# Patient Record
Sex: FEMALE | Race: WHITE | Employment: OTHER | URBAN - METROPOLITAN AREA
[De-identification: names, ages, dates, MRNs, and addresses within clinical notes are randomized per-mention and may not be internally consistent; named-entity substitution may affect disease eponyms.]

---

## 2022-03-16 ENCOUNTER — HOSPITAL ENCOUNTER (INPATIENT)
Age: 62
LOS: 2 days | Discharge: HOME | DRG: 193 | End: 2022-03-18
Attending: EMERGENCY MEDICINE | Admitting: INTERNAL MEDICINE
Payer: MEDICARE

## 2022-03-16 ENCOUNTER — APPOINTMENT (OUTPATIENT)
Dept: GENERAL RADIOLOGY | Age: 62
DRG: 193 | End: 2022-03-16
Payer: MEDICARE

## 2022-03-16 DIAGNOSIS — J44.1 COPD EXACERBATION (HCC): Primary | ICD-10-CM

## 2022-03-16 DIAGNOSIS — J18.9 MULTIFOCAL PNEUMONIA: ICD-10-CM

## 2022-03-16 DIAGNOSIS — J96.21 ACUTE ON CHRONIC RESPIRATORY FAILURE WITH HYPOXIA (HCC): ICD-10-CM

## 2022-03-16 PROBLEM — J15.9 BACTERIAL PNEUMONIA: Status: ACTIVE | Noted: 2022-03-16

## 2022-03-16 LAB
A/G RATIO: 1.2 (ref 1.1–2.2)
ALBUMIN SERPL-MCNC: 3.8 G/DL (ref 3.4–5)
ALP BLD-CCNC: 114 U/L (ref 40–129)
ALT SERPL-CCNC: 17 U/L (ref 10–40)
ANION GAP SERPL CALCULATED.3IONS-SCNC: 14 MMOL/L (ref 3–16)
ANISOCYTOSIS: ABNORMAL
AST SERPL-CCNC: 26 U/L (ref 15–37)
BANDED NEUTROPHILS RELATIVE PERCENT: 8 % (ref 0–7)
BASE EXCESS VENOUS: 4.5 MMOL/L (ref -3–3)
BASOPHILS ABSOLUTE: 0 K/UL (ref 0–0.2)
BASOPHILS RELATIVE PERCENT: 0 %
BILIRUB SERPL-MCNC: 0.8 MG/DL (ref 0–1)
BUN BLDV-MCNC: 20 MG/DL (ref 7–20)
CALCIUM SERPL-MCNC: 8.9 MG/DL (ref 8.3–10.6)
CARBOXYHEMOGLOBIN: 7.2 % (ref 0–1.5)
CHLORIDE BLD-SCNC: 98 MMOL/L (ref 99–110)
CO2: 26 MMOL/L (ref 21–32)
CREAT SERPL-MCNC: 0.7 MG/DL (ref 0.6–1.2)
EOSINOPHILS ABSOLUTE: 0 K/UL (ref 0–0.6)
EOSINOPHILS RELATIVE PERCENT: 0 %
GFR AFRICAN AMERICAN: >60
GFR NON-AFRICAN AMERICAN: >60
GLUCOSE BLD-MCNC: 125 MG/DL (ref 70–99)
HCO3 VENOUS: 29.2 MMOL/L (ref 23–29)
HCT VFR BLD CALC: 44.7 % (ref 36–48)
HEMOGLOBIN: 14.2 G/DL (ref 12–16)
LYMPHOCYTES ABSOLUTE: 2.8 K/UL (ref 1–5.1)
LYMPHOCYTES RELATIVE PERCENT: 15 %
MCH RBC QN AUTO: 28.5 PG (ref 26–34)
MCHC RBC AUTO-ENTMCNC: 31.7 G/DL (ref 31–36)
MCV RBC AUTO: 89.7 FL (ref 80–100)
METHEMOGLOBIN VENOUS: 0 %
MONOCYTES ABSOLUTE: 0.9 K/UL (ref 0–1.3)
MONOCYTES RELATIVE PERCENT: 5 %
NEUTROPHILS ABSOLUTE: 14.9 K/UL (ref 1.7–7.7)
NEUTROPHILS RELATIVE PERCENT: 72 %
O2 CONTENT, VEN: 20 VOL %
O2 SAT, VEN: 99 %
O2 THERAPY: ABNORMAL
PCO2, VEN: 42.8 MMHG (ref 40–50)
PDW BLD-RTO: 21.3 % (ref 12.4–15.4)
PH VENOUS: 7.44 (ref 7.35–7.45)
PLATELET # BLD: 172 K/UL (ref 135–450)
PLATELET SLIDE REVIEW: ADEQUATE
PMV BLD AUTO: 8.1 FL (ref 5–10.5)
PO2, VEN: 121 MMHG (ref 25–40)
POLYCHROMASIA: ABNORMAL
POTASSIUM SERPL-SCNC: 4.4 MMOL/L (ref 3.5–5.1)
PRO-BNP: 637 PG/ML (ref 0–124)
RBC # BLD: 4.99 M/UL (ref 4–5.2)
SLIDE REVIEW: ABNORMAL
SODIUM BLD-SCNC: 138 MMOL/L (ref 136–145)
TCO2 CALC VENOUS: 69 MMOL/L
TOTAL PROTEIN: 7.1 G/DL (ref 6.4–8.2)
TROPONIN: <0.01 NG/ML
WBC # BLD: 18.6 K/UL (ref 4–11)

## 2022-03-16 PROCEDURE — 99284 EMERGENCY DEPT VISIT MOD MDM: CPT

## 2022-03-16 PROCEDURE — 84484 ASSAY OF TROPONIN QUANT: CPT

## 2022-03-16 PROCEDURE — 96375 TX/PRO/DX INJ NEW DRUG ADDON: CPT

## 2022-03-16 PROCEDURE — 71045 X-RAY EXAM CHEST 1 VIEW: CPT

## 2022-03-16 PROCEDURE — 85025 COMPLETE CBC W/AUTO DIFF WBC: CPT

## 2022-03-16 PROCEDURE — 83880 ASSAY OF NATRIURETIC PEPTIDE: CPT

## 2022-03-16 PROCEDURE — 6370000000 HC RX 637 (ALT 250 FOR IP): Performed by: PHYSICIAN ASSISTANT

## 2022-03-16 PROCEDURE — 94640 AIRWAY INHALATION TREATMENT: CPT

## 2022-03-16 PROCEDURE — 82803 BLOOD GASES ANY COMBINATION: CPT

## 2022-03-16 PROCEDURE — 1200000000 HC SEMI PRIVATE

## 2022-03-16 PROCEDURE — U0003 INFECTIOUS AGENT DETECTION BY NUCLEIC ACID (DNA OR RNA); SEVERE ACUTE RESPIRATORY SYNDROME CORONAVIRUS 2 (SARS-COV-2) (CORONAVIRUS DISEASE [COVID-19]), AMPLIFIED PROBE TECHNIQUE, MAKING USE OF HIGH THROUGHPUT TECHNOLOGIES AS DESCRIBED BY CMS-2020-01-R: HCPCS

## 2022-03-16 PROCEDURE — 2580000003 HC RX 258: Performed by: PHYSICIAN ASSISTANT

## 2022-03-16 PROCEDURE — 96374 THER/PROPH/DIAG INJ IV PUSH: CPT

## 2022-03-16 PROCEDURE — U0005 INFEC AGEN DETEC AMPLI PROBE: HCPCS

## 2022-03-16 PROCEDURE — 93005 ELECTROCARDIOGRAM TRACING: CPT | Performed by: PHYSICIAN ASSISTANT

## 2022-03-16 PROCEDURE — 36415 COLL VENOUS BLD VENIPUNCTURE: CPT

## 2022-03-16 PROCEDURE — 6360000002 HC RX W HCPCS: Performed by: PHYSICIAN ASSISTANT

## 2022-03-16 PROCEDURE — 80053 COMPREHEN METABOLIC PANEL: CPT

## 2022-03-16 RX ORDER — ALBUTEROL SULFATE 2.5 MG/3ML
SOLUTION RESPIRATORY (INHALATION)
COMMUNITY
Start: 2022-03-10

## 2022-03-16 RX ORDER — ROSUVASTATIN CALCIUM 5 MG/1
TABLET, COATED ORAL
COMMUNITY
Start: 2021-04-29

## 2022-03-16 RX ORDER — METHYLPREDNISOLONE SODIUM SUCCINATE 125 MG/2ML
125 INJECTION, POWDER, LYOPHILIZED, FOR SOLUTION INTRAMUSCULAR; INTRAVENOUS ONCE
Status: COMPLETED | OUTPATIENT
Start: 2022-03-16 | End: 2022-03-16

## 2022-03-16 RX ORDER — ESCITALOPRAM OXALATE 20 MG/1
TABLET ORAL
COMMUNITY
Start: 2021-04-29

## 2022-03-16 RX ORDER — LISINOPRIL 2.5 MG/1
2.5 TABLET ORAL DAILY
COMMUNITY
Start: 2022-02-15

## 2022-03-16 RX ORDER — ONDANSETRON 4 MG/1
4 TABLET, FILM COATED ORAL EVERY 8 HOURS PRN
COMMUNITY
Start: 2021-04-29

## 2022-03-16 RX ORDER — IPRATROPIUM BROMIDE AND ALBUTEROL SULFATE 2.5; .5 MG/3ML; MG/3ML
1 SOLUTION RESPIRATORY (INHALATION) ONCE
Status: COMPLETED | OUTPATIENT
Start: 2022-03-16 | End: 2022-03-16

## 2022-03-16 RX ORDER — ALBUTEROL SULFATE 2.5 MG/3ML
SOLUTION RESPIRATORY (INHALATION)
COMMUNITY
Start: 2022-02-15

## 2022-03-16 RX ORDER — IPRATROPIUM BROMIDE AND ALBUTEROL SULFATE 2.5; .5 MG/3ML; MG/3ML
SOLUTION RESPIRATORY (INHALATION)
Status: COMPLETED
Start: 2022-03-16 | End: 2022-03-17

## 2022-03-16 RX ORDER — OMEPRAZOLE 40 MG/1
CAPSULE, DELAYED RELEASE ORAL
Status: ON HOLD | COMMUNITY
Start: 2021-04-29 | End: 2022-03-18 | Stop reason: SDUPTHER

## 2022-03-16 RX ORDER — CYCLOBENZAPRINE HCL 10 MG
TABLET ORAL
COMMUNITY
Start: 2021-04-29

## 2022-03-16 RX ORDER — FUROSEMIDE 10 MG/ML
20 INJECTION INTRAMUSCULAR; INTRAVENOUS ONCE
Status: DISCONTINUED | OUTPATIENT
Start: 2022-03-16 | End: 2022-03-18 | Stop reason: HOSPADM

## 2022-03-16 RX ORDER — GABAPENTIN 600 MG/1
TABLET ORAL
COMMUNITY
Start: 2021-07-06

## 2022-03-16 RX ADMIN — Medication 1000 MG: at 23:21

## 2022-03-16 RX ADMIN — METHYLPREDNISOLONE SODIUM SUCCINATE 125 MG: 125 INJECTION, POWDER, FOR SOLUTION INTRAMUSCULAR; INTRAVENOUS at 22:25

## 2022-03-16 RX ADMIN — IPRATROPIUM BROMIDE AND ALBUTEROL SULFATE 1 AMPULE: .5; 3 SOLUTION RESPIRATORY (INHALATION) at 21:43

## 2022-03-16 RX ADMIN — AZITHROMYCIN MONOHYDRATE 500 MG: 500 INJECTION, POWDER, LYOPHILIZED, FOR SOLUTION INTRAVENOUS at 23:24

## 2022-03-16 ASSESSMENT — ENCOUNTER SYMPTOMS
SHORTNESS OF BREATH: 1
DIARRHEA: 0
CHEST TIGHTNESS: 1
RHINORRHEA: 0
COUGH: 0
NAUSEA: 0
WHEEZING: 1
VOMITING: 0
ABDOMINAL PAIN: 0

## 2022-03-17 LAB
BASOPHILS ABSOLUTE: 0 K/UL (ref 0–0.2)
BASOPHILS RELATIVE PERCENT: 0.1 %
EKG ATRIAL RATE: 100 BPM
EKG DIAGNOSIS: NORMAL
EKG P AXIS: 51 DEGREES
EKG P-R INTERVAL: 152 MS
EKG Q-T INTERVAL: 346 MS
EKG QRS DURATION: 72 MS
EKG QTC CALCULATION (BAZETT): 446 MS
EKG R AXIS: 76 DEGREES
EKG T AXIS: 73 DEGREES
EKG VENTRICULAR RATE: 100 BPM
EOSINOPHILS ABSOLUTE: 0 K/UL (ref 0–0.6)
EOSINOPHILS RELATIVE PERCENT: 0 %
HCT VFR BLD CALC: 43 % (ref 36–48)
HEMOGLOBIN: 13.7 G/DL (ref 12–16)
LYMPHOCYTES ABSOLUTE: 0.3 K/UL (ref 1–5.1)
LYMPHOCYTES RELATIVE PERCENT: 2.5 %
MCH RBC QN AUTO: 29.1 PG (ref 26–34)
MCHC RBC AUTO-ENTMCNC: 31.9 G/DL (ref 31–36)
MCV RBC AUTO: 91.3 FL (ref 80–100)
MONOCYTES ABSOLUTE: 0.1 K/UL (ref 0–1.3)
MONOCYTES RELATIVE PERCENT: 0.8 %
NEUTROPHILS ABSOLUTE: 12.7 K/UL (ref 1.7–7.7)
NEUTROPHILS RELATIVE PERCENT: 96.6 %
PDW BLD-RTO: 21.2 % (ref 12.4–15.4)
PLATELET # BLD: 148 K/UL (ref 135–450)
PMV BLD AUTO: 8.5 FL (ref 5–10.5)
RBC # BLD: 4.71 M/UL (ref 4–5.2)
TROPONIN: <0.01 NG/ML
TROPONIN: <0.01 NG/ML
WBC # BLD: 13.2 K/UL (ref 4–11)

## 2022-03-17 PROCEDURE — G0378 HOSPITAL OBSERVATION PER HR: HCPCS

## 2022-03-17 PROCEDURE — 1200000000 HC SEMI PRIVATE

## 2022-03-17 PROCEDURE — 94761 N-INVAS EAR/PLS OXIMETRY MLT: CPT

## 2022-03-17 PROCEDURE — 6370000000 HC RX 637 (ALT 250 FOR IP): Performed by: INTERNAL MEDICINE

## 2022-03-17 PROCEDURE — 84484 ASSAY OF TROPONIN QUANT: CPT

## 2022-03-17 PROCEDURE — 92610 EVALUATE SWALLOWING FUNCTION: CPT

## 2022-03-17 PROCEDURE — 2700000000 HC OXYGEN THERAPY PER DAY

## 2022-03-17 PROCEDURE — 94640 AIRWAY INHALATION TREATMENT: CPT

## 2022-03-17 PROCEDURE — 96375 TX/PRO/DX INJ NEW DRUG ADDON: CPT

## 2022-03-17 PROCEDURE — 96376 TX/PRO/DX INJ SAME DRUG ADON: CPT

## 2022-03-17 PROCEDURE — 6370000000 HC RX 637 (ALT 250 FOR IP)

## 2022-03-17 PROCEDURE — 6360000002 HC RX W HCPCS: Performed by: INTERNAL MEDICINE

## 2022-03-17 PROCEDURE — 92526 ORAL FUNCTION THERAPY: CPT

## 2022-03-17 PROCEDURE — 36415 COLL VENOUS BLD VENIPUNCTURE: CPT

## 2022-03-17 PROCEDURE — 87205 SMEAR GRAM STAIN: CPT

## 2022-03-17 PROCEDURE — 2580000003 HC RX 258: Performed by: INTERNAL MEDICINE

## 2022-03-17 PROCEDURE — 93010 ELECTROCARDIOGRAM REPORT: CPT | Performed by: INTERNAL MEDICINE

## 2022-03-17 PROCEDURE — 85025 COMPLETE CBC W/AUTO DIFF WBC: CPT

## 2022-03-17 PROCEDURE — 96372 THER/PROPH/DIAG INJ SC/IM: CPT

## 2022-03-17 PROCEDURE — 87070 CULTURE OTHR SPECIMN AEROBIC: CPT

## 2022-03-17 RX ORDER — ONDANSETRON 4 MG/1
4 TABLET, ORALLY DISINTEGRATING ORAL EVERY 8 HOURS PRN
Status: DISCONTINUED | OUTPATIENT
Start: 2022-03-17 | End: 2022-03-18 | Stop reason: HOSPADM

## 2022-03-17 RX ORDER — PANTOPRAZOLE SODIUM 40 MG/1
40 TABLET, DELAYED RELEASE ORAL
Status: DISCONTINUED | OUTPATIENT
Start: 2022-03-17 | End: 2022-03-18 | Stop reason: HOSPADM

## 2022-03-17 RX ORDER — ACETAMINOPHEN 325 MG/1
650 TABLET ORAL EVERY 6 HOURS PRN
Status: DISCONTINUED | OUTPATIENT
Start: 2022-03-17 | End: 2022-03-18 | Stop reason: HOSPADM

## 2022-03-17 RX ORDER — ROSUVASTATIN CALCIUM 10 MG/1
5 TABLET, COATED ORAL NIGHTLY
Status: DISCONTINUED | OUTPATIENT
Start: 2022-03-17 | End: 2022-03-18 | Stop reason: HOSPADM

## 2022-03-17 RX ORDER — FUROSEMIDE 10 MG/ML
20 INJECTION INTRAMUSCULAR; INTRAVENOUS 2 TIMES DAILY
Status: DISCONTINUED | OUTPATIENT
Start: 2022-03-17 | End: 2022-03-18 | Stop reason: HOSPADM

## 2022-03-17 RX ORDER — POTASSIUM CHLORIDE 20 MEQ/1
40 TABLET, EXTENDED RELEASE ORAL PRN
Status: DISCONTINUED | OUTPATIENT
Start: 2022-03-17 | End: 2022-03-18 | Stop reason: HOSPADM

## 2022-03-17 RX ORDER — ONDANSETRON 2 MG/ML
4 INJECTION INTRAMUSCULAR; INTRAVENOUS EVERY 6 HOURS PRN
Status: DISCONTINUED | OUTPATIENT
Start: 2022-03-17 | End: 2022-03-18 | Stop reason: HOSPADM

## 2022-03-17 RX ORDER — SODIUM CHLORIDE 9 MG/ML
25 INJECTION, SOLUTION INTRAVENOUS PRN
Status: DISCONTINUED | OUTPATIENT
Start: 2022-03-17 | End: 2022-03-18 | Stop reason: HOSPADM

## 2022-03-17 RX ORDER — PREDNISONE 20 MG/1
40 TABLET ORAL DAILY
Status: DISCONTINUED | OUTPATIENT
Start: 2022-03-17 | End: 2022-03-18 | Stop reason: HOSPADM

## 2022-03-17 RX ORDER — ACETAMINOPHEN 650 MG/1
650 SUPPOSITORY RECTAL EVERY 6 HOURS PRN
Status: DISCONTINUED | OUTPATIENT
Start: 2022-03-17 | End: 2022-03-18 | Stop reason: HOSPADM

## 2022-03-17 RX ORDER — CYCLOBENZAPRINE HCL 10 MG
10 TABLET ORAL NIGHTLY PRN
Status: DISCONTINUED | OUTPATIENT
Start: 2022-03-17 | End: 2022-03-18 | Stop reason: HOSPADM

## 2022-03-17 RX ORDER — POTASSIUM CHLORIDE 7.45 MG/ML
10 INJECTION INTRAVENOUS PRN
Status: DISCONTINUED | OUTPATIENT
Start: 2022-03-17 | End: 2022-03-18 | Stop reason: HOSPADM

## 2022-03-17 RX ORDER — GABAPENTIN 300 MG/1
600 CAPSULE ORAL 3 TIMES DAILY
Status: DISCONTINUED | OUTPATIENT
Start: 2022-03-17 | End: 2022-03-18 | Stop reason: HOSPADM

## 2022-03-17 RX ORDER — SODIUM CHLORIDE 0.9 % (FLUSH) 0.9 %
10 SYRINGE (ML) INJECTION EVERY 12 HOURS SCHEDULED
Status: DISCONTINUED | OUTPATIENT
Start: 2022-03-17 | End: 2022-03-18 | Stop reason: HOSPADM

## 2022-03-17 RX ORDER — ALBUTEROL SULFATE 2.5 MG/3ML
2.5 SOLUTION RESPIRATORY (INHALATION) EVERY 4 HOURS PRN
Status: DISCONTINUED | OUTPATIENT
Start: 2022-03-17 | End: 2022-03-18 | Stop reason: HOSPADM

## 2022-03-17 RX ORDER — ESCITALOPRAM OXALATE 10 MG/1
20 TABLET ORAL DAILY
Status: DISCONTINUED | OUTPATIENT
Start: 2022-03-17 | End: 2022-03-18 | Stop reason: HOSPADM

## 2022-03-17 RX ORDER — IPRATROPIUM BROMIDE AND ALBUTEROL SULFATE 2.5; .5 MG/3ML; MG/3ML
1 SOLUTION RESPIRATORY (INHALATION) EVERY 4 HOURS
Status: DISCONTINUED | OUTPATIENT
Start: 2022-03-17 | End: 2022-03-18

## 2022-03-17 RX ORDER — MAGNESIUM SULFATE 1 G/100ML
1000 INJECTION INTRAVENOUS PRN
Status: DISCONTINUED | OUTPATIENT
Start: 2022-03-17 | End: 2022-03-18 | Stop reason: HOSPADM

## 2022-03-17 RX ORDER — POLYETHYLENE GLYCOL 3350 17 G/17G
17 POWDER, FOR SOLUTION ORAL DAILY PRN
Status: DISCONTINUED | OUTPATIENT
Start: 2022-03-17 | End: 2022-03-18 | Stop reason: HOSPADM

## 2022-03-17 RX ORDER — LISINOPRIL 5 MG/1
2.5 TABLET ORAL DAILY
Status: DISCONTINUED | OUTPATIENT
Start: 2022-03-17 | End: 2022-03-18 | Stop reason: HOSPADM

## 2022-03-17 RX ORDER — IPRATROPIUM BROMIDE AND ALBUTEROL SULFATE 2.5; .5 MG/3ML; MG/3ML
1 SOLUTION RESPIRATORY (INHALATION)
Status: DISCONTINUED | OUTPATIENT
Start: 2022-03-17 | End: 2022-03-17

## 2022-03-17 RX ORDER — SODIUM CHLORIDE 0.9 % (FLUSH) 0.9 %
10 SYRINGE (ML) INJECTION PRN
Status: DISCONTINUED | OUTPATIENT
Start: 2022-03-17 | End: 2022-03-18 | Stop reason: HOSPADM

## 2022-03-17 RX ORDER — GUAIFENESIN/DEXTROMETHORPHAN 100-10MG/5
5 SYRUP ORAL EVERY 4 HOURS PRN
Status: DISCONTINUED | OUTPATIENT
Start: 2022-03-17 | End: 2022-03-18 | Stop reason: HOSPADM

## 2022-03-17 RX ADMIN — IPRATROPIUM BROMIDE AND ALBUTEROL SULFATE 1 AMPULE: .5; 2.5 SOLUTION RESPIRATORY (INHALATION) at 15:52

## 2022-03-17 RX ADMIN — GABAPENTIN 600 MG: 300 CAPSULE ORAL at 20:54

## 2022-03-17 RX ADMIN — ROSUVASTATIN 5 MG: 10 TABLET, FILM COATED ORAL at 20:54

## 2022-03-17 RX ADMIN — GABAPENTIN 600 MG: 300 CAPSULE ORAL at 13:35

## 2022-03-17 RX ADMIN — IPRATROPIUM BROMIDE AND ALBUTEROL SULFATE 1 AMPULE: .5; 2.5 SOLUTION RESPIRATORY (INHALATION) at 08:03

## 2022-03-17 RX ADMIN — FUROSEMIDE 20 MG: 10 INJECTION, SOLUTION INTRAMUSCULAR; INTRAVENOUS at 17:25

## 2022-03-17 RX ADMIN — IPRATROPIUM BROMIDE AND ALBUTEROL SULFATE 1 AMPULE: 2.5; .5 SOLUTION RESPIRATORY (INHALATION) at 20:06

## 2022-03-17 RX ADMIN — LISINOPRIL 2.5 MG: 5 TABLET ORAL at 08:36

## 2022-03-17 RX ADMIN — IPRATROPIUM BROMIDE AND ALBUTEROL SULFATE 1 AMPULE: .5; 2.5 SOLUTION RESPIRATORY (INHALATION) at 20:06

## 2022-03-17 RX ADMIN — PANTOPRAZOLE SODIUM 40 MG: 40 TABLET, DELAYED RELEASE ORAL at 06:13

## 2022-03-17 RX ADMIN — FUROSEMIDE 20 MG: 10 INJECTION, SOLUTION INTRAMUSCULAR; INTRAVENOUS at 08:37

## 2022-03-17 RX ADMIN — SODIUM CHLORIDE, PRESERVATIVE FREE 10 ML: 5 INJECTION INTRAVENOUS at 08:37

## 2022-03-17 RX ADMIN — ESCITALOPRAM OXALATE 20 MG: 10 TABLET ORAL at 08:39

## 2022-03-17 RX ADMIN — ENOXAPARIN SODIUM 40 MG: 40 INJECTION SUBCUTANEOUS at 08:35

## 2022-03-17 RX ADMIN — SODIUM CHLORIDE, PRESERVATIVE FREE 10 ML: 5 INJECTION INTRAVENOUS at 17:26

## 2022-03-17 RX ADMIN — CYCLOBENZAPRINE 10 MG: 10 TABLET, FILM COATED ORAL at 20:54

## 2022-03-17 RX ADMIN — GABAPENTIN 600 MG: 300 CAPSULE ORAL at 08:39

## 2022-03-17 RX ADMIN — PREDNISONE 40 MG: 20 TABLET ORAL at 08:39

## 2022-03-17 ASSESSMENT — PAIN - FUNCTIONAL ASSESSMENT: PAIN_FUNCTIONAL_ASSESSMENT: ACTIVITIES ARE NOT PREVENTED

## 2022-03-17 ASSESSMENT — PAIN SCALES - GENERAL
PAINLEVEL_OUTOF10: 0
PAINLEVEL_OUTOF10: 3
PAINLEVEL_OUTOF10: 0
PAINLEVEL_OUTOF10: 2
PAINLEVEL_OUTOF10: 0

## 2022-03-17 ASSESSMENT — PAIN DESCRIPTION - FREQUENCY: FREQUENCY: INTERMITTENT

## 2022-03-17 ASSESSMENT — PAIN DESCRIPTION - ONSET: ONSET: GRADUAL

## 2022-03-17 ASSESSMENT — PAIN DESCRIPTION - PAIN TYPE: TYPE: CHRONIC PAIN

## 2022-03-17 ASSESSMENT — PAIN DESCRIPTION - DESCRIPTORS: DESCRIPTORS: ACHING;SPASM

## 2022-03-17 ASSESSMENT — PAIN DESCRIPTION - LOCATION: LOCATION: ARM

## 2022-03-17 ASSESSMENT — PAIN DESCRIPTION - ORIENTATION: ORIENTATION: LEFT

## 2022-03-17 ASSESSMENT — PAIN DESCRIPTION - PROGRESSION: CLINICAL_PROGRESSION: GRADUALLY WORSENING
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
## 2022-03-17 NOTE — PLAN OF CARE
Secure message sent to Provider Deatricpietro Kennedy) regarding Speech recommendation for Modified Barium Swallow to be completed once COVID results are available. Waiting on response or orders. Speech recommend Med's Whole with Puree (pudding or applesauce) and no straws with fluids.      Franco BLUMN, RN

## 2022-03-17 NOTE — PROGRESS NOTES
complaints  No fever  No chest pain      Medications: Reviewed           Allergy(ies):  Penicillins, Codeine, and Morphine        Review of Systems:  Pertinent positives are listed in HPI. At least 10-point ROS reviewed and were negative. Physical examination:  /62   Pulse 87   Temp 97.7 °F (36.5 °C) (Oral)   Resp 18   Ht 5' 3\" (1.6 m)   Wt 207 lb 3.7 oz (94 kg)   SpO2 94%   BMI 36.71 kg/m²   Gen/overall appearance: Not in acute distress. Alert. Oriented x3. Head: Normocephalic, atraumatic  Eyes: EOMI, good acuity  ENT: Oral mucosa moist  Neck: No JVD, thyromegaly  CVS: Nml S1S2, no MRG, RRR  Pulm: Diffuse expiratory wheezes. There is fine crackles in lung bases. Gastrointestinal: Soft, NT/ND, +BS  Musculoskeletal: No edema. Warm  Neuro: No focal deficit. Moves extremity spontaneously. Psychiatry: Appropriate affect. Not agitated. Skin: Warm, dry with normal turgor. No rash  Capillary refill: Brisk,< 3 seconds   Peripheral Pulses: +2 palpable, equal bilaterally      Labs/imaging/EKG:  CBC:   Recent Labs     03/16/22 2124 03/17/22  0452   WBC 18.6* 13.2*   HGB 14.2 13.7    148     BMP:    Recent Labs     03/16/22 2124      K 4.4   CL 98*   CO2 26   BUN 20   CREATININE 0.7   GLUCOSE 125*     Hepatic:   Recent Labs     03/16/22 2124   AST 26   ALT 17   BILITOT 0.8   ALKPHOS 114       EKG: Normal sinus rhythm, rate 100 beats per minutes. No acute ST/T changes. I reviewed EKG. Assessment/plan:    Acute on chronic respiratory failure with hypoxia  COPD exacerbation   Asthma  Tobacco abuse  - Uses 5 L/min supplemental oxygen at baseline  - States she has been out for about 3-5 days  - Tobacco abuse hx  - Has bronchoconstriction on exam  - Not fluid overloaded on exam  - Cont steroids, antibx: Zithromax and Rocephin for presumed pneumonia  - Check Procalcitonin: low threshold to de-escalate antibx  - Blood cultures have been sent from the emergency room.    - COVID-19 infection, although my suspicion for COVID-19 infection is low  - Tobacco cessation  - Continue supplemental oxygen, plan to wean as tolerated  - Pxt was supposed to f/u with Pulm o/p: advised to keep appointment        Abnormal CXR:  Acute pulmonary edema, noted on chest x-ray. Pxt does not appear fluid overloaded on exam    Reviewed limited echo done in 1/2022 in [address] on  IV Lasix 20 mg twice daily on admit: continue for now pending progress  Repeat troponin; CXR   Monitor fluid in/out. Moderate obesity due to excess calories Body mass index is 36.71 kg/m². - Complicating assessment and treatment. Placing patient at risk for multiple co-morbidities as well as early death and contributing to the patient's presentation.   on weight loss   - F/u for sleep study as per recent rec           Activities: Up with assist  Prophylaxis: Subcutaneous Lovenox  Code status: Full code    ==========================================================      Disposition: possible DC in 2-3 days  PT/OT eval  -----------------------------  Paulina Ball MD  [facility] hospitalist

## 2022-03-17 NOTE — PROGRESS NOTES
motor strength and ROM for completion of OME with no overt asymmetry noted. With initial po trial of thin liquids, Pt demonstrated abrupt oral \"holding\" with apparent cough prior to bolus transfer to pharynx. Pt reports this happens some times. With subsequent po trials of thin liquids, purees and solids, pt demonstrated adequate bolus control, A-P propulsion and appropriate oropharyngeal bolus transfer with all textures. Clinical symptoms of mild delayed swallow initiation, mild reduced laryngeal excursion and suspected delayed pharyngeal clearing noted with all textures. One episode of suspected penetration/aspiration with delayed throat clear, noted with thin liquids via straw. Pt tolerated trials of thin liquids via cup in isolation and in combination with other textures, with no overt signs of aspiration noted. However, an MBS is indicated to conclusively rule out aspiration with thin liquids due to reported episodes of \"choking\" and history of recurrent pneumonias. Recommended Diet and Intervention 3/17/2022:  Diet Solids Recommendation:  Regular diet  Liquid Consistency Recommendation: Thin liquids/no straws Recommended form of Meds:   Po meds (whole) with applesauce     Compensatory Swallowing Strategies:  Upright as possible with all PO intake , No straws , Small bites/sips , Swallow 2 times per bite , Remain upright 30-45 min     SHORT TERM DYSPHAGIA GOALS/PLAN OF CARE: Speech therapy for dysphagia tx 1-2 times to ensure diet tolerance.   Pt will functionally tolerate recommended diet with no overt clinical s/s of aspiration  Pt will demonstrate understanding of aspiration risk and precautions via education/demonstration with occasional prompting  Pt will participate in Modified Barium swallow study to further assess pharyngeal phase of swallow, assist with diet recommendations and to further direct plan of care     Dysphagia Therapeutic Intervention:  Diet Tolerance Monitoring , Patient/Family Education     Discharge Recommendations: Discharge recommendations to be determined pending ongoing follow-up during acute care stay    Patient Positioning: Upright in bed     Current Diet Level (prior to evaluation): Regular texture diet  Thin liquids      Respiratory Status:   []Room Air   [x]O2 via nasal cannula   []Other:    Dentition:  [x]Adequate  []Dentures   []Missing Many Teeth  []Edentulous  []Other:    Baseline Vocal Quality:  []Normal  []Dysphonic   []Aphonic   [x]Hoarse  []Wet  []Weak  []Other:    Volitional Cough:  []Strong  [x]Weak  []Wet  []Absent  []Congested  []Other:    Volitional Swallow:   []Absent   []Delayed     [x]Adequate     []Required use of drink     Oral Mechanism Exam:  [x]WFL []Mild   [] Moderate  []Severe  []To be assessed  Impaired:   []Left side      []Right side    []Labial ROM/Coordination    []Labial Symmetry   []Lingual ROM/Coordination   []Lingual Symmetry  []Gag  []Other:     Oral Phase: []WFL [x]Mild   [] Moderate  []Severe  []To be assessed   [x]Impaired/Prolonged Mastication:   []Spillage Left:   []Spillage Right:  []Pocketing Left:   []Pocketing Right:   []Decreased Anterior to Posterior Transit:   [x]Suspected Premature Bolus Loss:   []Lingual/Palatal Residue:   []Other:     Pharyngeal Phase: []WFL [x]Mild   [] Moderate  []Severe  []To be assessed   [x]Delayed Swallow:   []Suspected Pharyngeal Pooling:   [x]Decreased Laryngeal Elevation:   []Absent Swallow:  []Wet Vocal Quality:   []Throat Clearing-Immediate:   [x]Throat Clearing-Delayed:   []Cough-Immediate:   []Cough-Delayed:  []Change in Vital Signs:  [x]Suspected Delayed Pharyngeal Clearing:  []Other:       Eating Assistance:  [x]Independent  []Setup or clean-up assistance   [] Supervision or touching assistance   [] Partial or moderate assistance   [] Substantial or maximal assistance  [] Dependent       EDUCATION:   Provided education regarding role of SLP, results of assessment, recommendations and general speech pathology plan of care. [x] Pt verbalized understanding and agreement   [] Pt requires ongoing learning   [] No evidence of comprehension     If patient discharges prior to next visit, this note will serve as discharge.      Timed Code Minutes: 0 min  Total Treatment Minutes: 30 min    Shay ESCALONAGallup [facility] Center#[id number]
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
## 2022-03-17 NOTE — PROGRESS NOTES
4 Eyes Skin Assessment     NAME:  Vandana Mcelroy  YOB: 1960  MEDICAL RECORD NUMBER:  [id number]    The patient is being assess for  Admission    I agree that 2 RN's have performed a thorough Head to Toe Skin Assessment on the patient. ALL assessment sites listed below have been assessed. Areas assessed by both nurses:    Head, Face, Ears, Shoulders, Back, Chest, Arms, Elbows, Hands, Sacrum. Buttock, Coccyx, Ischium and Legs. Feet and Heels        Does the Patient have a Wound? Yes wound(s) were present on assessment.  LDA wound assessment was Initiated and completed        Dc Prevention initiated:  No   Wound Care Orders initiated:  No    Pressure Injury (Stage 3,4, Unstageable, DTI, NWPT, and Complex wounds) if present place consult order under [de-identified] No    New and Established Ostomies if present place consult order under : No      Nurse 1 eSignature: Electronically signed by Kia Lopez RN on 3/17/22 at 2:50 AM EDT    **SHARE this note so that the co-signing nurse is able to place an eSignature**    Nurse 2 eSignature: Electronically signed by Estill Landau, RN on 3/17/22 at 6:52 AM EDT

## 2022-03-17 NOTE — RT PROTOCOL NOTE
RT Inhaler-Nebulizer Bronchodilator Protocol Note    There is a bronchodilator order in the chart from a provider indicating to follow the RT Bronchodilator Protocol and there is an Initiate RT Inhaler-Nebulizer Bronchodilator Protocol order as well (see protocol at bottom of note). CXR Findings:  XR CHEST PORTABLE    Result Date: 3/16/2022  Pulmonary vascular congestion/interstitial edema versus an atypical pneumonia. Right basilar atelectasis versus airspace disease. The findings from the last RT Protocol Assessment were as follows:   History Pulmonary Disease: Chronic pulmonary disease  Respiratory Pattern: Dyspnea on exertion or RR 21-25 bpm  Breath Sounds: Intermittent or unilateral wheezes  Cough: Weak, productive  Indication for Bronchodilator Therapy: Wheezing associated with pulm disorder  Bronchodilator Assessment Score: 10    Aerosolized bronchodilator medication orders have been revised according to the RT Inhaler-Nebulizer Bronchodilator Protocol below. Respiratory Therapist to perform RT Therapy Protocol Assessment initially then follow the protocol. Repeat RT Therapy Protocol Assessment PRN for score 0-3 or on second treatment, BID, and PRN for scores above 3. No Indications  adjust the frequency to every 6 hours PRN wheezing or bronchospasm, if no treatments needed after 48 hours then discontinue using Per Protocol order mode. If indication present, adjust the RT bronchodilator orders based on the Bronchodilator Assessment Score as indicated below. Use Inhaler orders unless patient has one or more of the following: on home nebulizer, not able to hold breath for 10 seconds, is not alert and oriented, cannot activate and use MDI correctly, or respiratory rate 25 breaths per minute or more, then use the equivalent nebulizer order(s) with same Frequency and PRN reasons based on the score.   If a patient is on this medication at home then do not decrease Frequency below that used at home.    0-3  enter or revise RT bronchodilator order(s) to equivalent RT Bronchodilator order with Frequency of every 4 hours PRN for wheezing or increased work of breathing using Per Protocol order mode. 4-6  enter or revise RT Bronchodilator order(s) to two equivalent RT bronchodilator orders with one order with BID Frequency and one order with Frequency of every 4 hours PRN wheezing or increased work of breathing using Per Protocol order mode. 7-10  enter or revise RT Bronchodilator order(s) to two equivalent RT bronchodilator orders with one order with TID Frequency and one order with Frequency of every 4 hours PRN wheezing or increased work of breathing using Per Protocol order mode. 11-13  enter or revise RT Bronchodilator order(s) to one equivalent RT bronchodilator order with QID Frequency and an Albuterol order with Frequency of every 4 hours PRN wheezing or increased work of breathing using Per Protocol order mode. Greater than 13  enter or revise RT Bronchodilator order(s) to one equivalent RT bronchodilator order with every 4 hours Frequency and an Albuterol order with Frequency of every 2 hours PRN wheezing or increased work of breathing using Per Protocol order mode. RT to enter RT Home Evaluation for COPD & MDI Assessment order using Per Protocol order mode.     Electronically signed by Skyler Martines RCP on 3/17/2022 at 4:55 AM

## 2022-03-17 NOTE — PLAN OF CARE
Results for Luis Montenegro (MRN [id number]) as of 3/17/2022 18:23   Ref. Range 3/17/2022 16:54   Troponin Latest Ref Range: <0.01 ng/mL <0.01     COVID result still in process as of 3/17/2022 at 1824.     Mary Nur BSN, RN

## 2022-03-17 NOTE — ED PROVIDER NOTES
[facility]    Physician Attestation    Pt Name: Anai Gale  MRN: [id number]  Shannon 1960  Date of evaluation: 3/16/22        Physician Note:    I havepersonally performed and/or participated in the history, exam and medical decision making and agree with all pertinent clinical information. I have also reviewed and agree with the past medical, family and social historyunless otherwise noted. I have personally performed a face to face diagnostic evaluation onthis patient. I have reviewed the mid-levels findings and agree. History: Shortness of breath with productive cough for several days patient normally on 4 L today she requires 6 L denies any fever      REVIEW OF SYSTEMS    Constitutional:  Denies fever, chills, or weakness   Eyes:  Denies vision changes  HENT:  Denies sore throat or neck pain   Respiratory:   cough  shortness of breath   Cardiovascular:  Denies chest pain  GI:  Denies abdominal pain, nausea, vomiting, or diarrhea   Musculoskeletal:  Denies back pain   Skin: no rash or vesicles   Neurologic:  no headache weakness focal    Lymphatic:  no swollen  nodes   Psychiatric: no si or hs thoughts     All systems negative except as marked. General Appearance:  Alert, cooperative, mod distress, appears stated age. Head:  Normocephalic, without obvious abnormality, atraumatic. Eyes:  conjunctiva/corneas clear, EOM's intact. Sclera anicteric. ENT: Mucous membranes moist.   Neck: Supple, symmetrical, trachea midline, no adenopathy. No jugular venous distention. Lungs:    Respiratory Distress. no rales  rhonchi rub   Chest Wall:  Nontender  no deformity   Heart:   Tacky no murmer gallop    Abdomen:   Soft nontender no organomegally    Extremities:  Full range of motion. no deformity   Pulses: Equal  upper and lower    Skin:  No rashes or lesions to exposed skin. Neurologic: Alert and oriented X 3.    Motor

## 2022-03-17 NOTE — PLAN OF CARE
Results for Mariana Huff (MRN [id number]) as of 3/17/2022 07:09   Ref.  Range 3/16/2022 21:24 3/16/2022 22:38 3/16/2022 23:25 3/17/2022 04:52   WBC Latest Ref Range: 4.0 - 11.0 K/uL 18.6 (H)   13.2 (H)     COVID in process 3/17/2022 at [facility] [address], [address] [facility]
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
## 2022-03-17 NOTE — PLAN OF CARE
Nutrition Problem #1: Inadequate oral intake,In context of social or environmental circumstances  Intervention: Food and/or Nutrient Delivery: Continue Current Diet  Nutritional Goals: po intake greater than 50% of meals

## 2022-03-17 NOTE — PROGRESS NOTES
Patient up to unit by stretcher with belongings in hand and oxygen. Oriented to room and call light. Admission and assessment completed. Four eyes completed by 2 RN's. VSS. O2 at 91% on 5L NC. Patient awake, alert, and in bed. Safety and isolation precautions maintained. Call light within reach. Will continue to monitor.

## 2022-03-18 ENCOUNTER — APPOINTMENT (OUTPATIENT)
Dept: GENERAL RADIOLOGY | Age: 62
DRG: 193 | End: 2022-03-18
Payer: MEDICARE

## 2022-03-18 VITALS
OXYGEN SATURATION: 97 % | WEIGHT: 201.4 LBS | HEIGHT: 63 IN | SYSTOLIC BLOOD PRESSURE: 96 MMHG | DIASTOLIC BLOOD PRESSURE: 60 MMHG | RESPIRATION RATE: 13 BRPM | TEMPERATURE: 97.3 F | BODY MASS INDEX: 35.68 KG/M2 | HEART RATE: 93 BPM

## 2022-03-18 LAB
BASOPHILS ABSOLUTE: 0 K/UL (ref 0–0.2)
BASOPHILS RELATIVE PERCENT: 0.3 %
EOSINOPHILS ABSOLUTE: 0 K/UL (ref 0–0.6)
EOSINOPHILS RELATIVE PERCENT: 0 %
HCT VFR BLD CALC: 42.2 % (ref 36–48)
HEMOGLOBIN: 13.3 G/DL (ref 12–16)
LV EF: 63 %
LVEF MODALITY: NORMAL
LYMPHOCYTES ABSOLUTE: 1 K/UL (ref 1–5.1)
LYMPHOCYTES RELATIVE PERCENT: 6.6 %
MCH RBC QN AUTO: 29 PG (ref 26–34)
MCHC RBC AUTO-ENTMCNC: 31.6 G/DL (ref 31–36)
MCV RBC AUTO: 91.9 FL (ref 80–100)
MONOCYTES ABSOLUTE: 0.8 K/UL (ref 0–1.3)
MONOCYTES RELATIVE PERCENT: 5.4 %
NEUTROPHILS ABSOLUTE: 13.5 K/UL (ref 1.7–7.7)
NEUTROPHILS RELATIVE PERCENT: 87.7 %
PDW BLD-RTO: 21.1 % (ref 12.4–15.4)
PLATELET # BLD: 180 K/UL (ref 135–450)
PMV BLD AUTO: 8.9 FL (ref 5–10.5)
PROCALCITONIN: 0.12 NG/ML (ref 0–0.15)
RBC # BLD: 4.59 M/UL (ref 4–5.2)
SARS-COV-2: NOT DETECTED
WBC # BLD: 15.4 K/UL (ref 4–11)

## 2022-03-18 PROCEDURE — 2580000003 HC RX 258: Performed by: INTERNAL MEDICINE

## 2022-03-18 PROCEDURE — 2700000000 HC OXYGEN THERAPY PER DAY

## 2022-03-18 PROCEDURE — G0378 HOSPITAL OBSERVATION PER HR: HCPCS

## 2022-03-18 PROCEDURE — 6360000002 HC RX W HCPCS: Performed by: INTERNAL MEDICINE

## 2022-03-18 PROCEDURE — 97165 OT EVAL LOW COMPLEX 30 MIN: CPT

## 2022-03-18 PROCEDURE — 36415 COLL VENOUS BLD VENIPUNCTURE: CPT

## 2022-03-18 PROCEDURE — 96376 TX/PRO/DX INJ SAME DRUG ADON: CPT

## 2022-03-18 PROCEDURE — 97530 THERAPEUTIC ACTIVITIES: CPT

## 2022-03-18 PROCEDURE — 94761 N-INVAS EAR/PLS OXIMETRY MLT: CPT

## 2022-03-18 PROCEDURE — 6370000000 HC RX 637 (ALT 250 FOR IP): Performed by: INTERNAL MEDICINE

## 2022-03-18 PROCEDURE — 93306 TTE W/DOPPLER COMPLETE: CPT

## 2022-03-18 PROCEDURE — 94640 AIRWAY INHALATION TREATMENT: CPT

## 2022-03-18 PROCEDURE — 85025 COMPLETE CBC W/AUTO DIFF WBC: CPT

## 2022-03-18 PROCEDURE — 97116 GAIT TRAINING THERAPY: CPT

## 2022-03-18 PROCEDURE — 74230 X-RAY XM SWLNG FUNCJ C+: CPT

## 2022-03-18 PROCEDURE — 96372 THER/PROPH/DIAG INJ SC/IM: CPT

## 2022-03-18 PROCEDURE — 97161 PT EVAL LOW COMPLEX 20 MIN: CPT

## 2022-03-18 PROCEDURE — 92526 ORAL FUNCTION THERAPY: CPT

## 2022-03-18 PROCEDURE — 84145 PROCALCITONIN (PCT): CPT

## 2022-03-18 PROCEDURE — 92611 MOTION FLUOROSCOPY/SWALLOW: CPT

## 2022-03-18 PROCEDURE — 96365 THER/PROPH/DIAG IV INF INIT: CPT

## 2022-03-18 RX ORDER — FUROSEMIDE 20 MG/1
20 TABLET ORAL DAILY
Qty: 30 TABLET | Refills: 1 | Status: SHIPPED | OUTPATIENT
Start: 2022-03-18

## 2022-03-18 RX ORDER — AZITHROMYCIN 250 MG/1
250 TABLET, FILM COATED ORAL DAILY
Qty: 5 TABLET | Refills: 0 | Status: SHIPPED | OUTPATIENT
Start: 2022-03-18 | End: 2022-03-23

## 2022-03-18 RX ORDER — IPRATROPIUM BROMIDE AND ALBUTEROL SULFATE 2.5; .5 MG/3ML; MG/3ML
1 SOLUTION RESPIRATORY (INHALATION) 4 TIMES DAILY
Status: DISCONTINUED | OUTPATIENT
Start: 2022-03-18 | End: 2022-03-18 | Stop reason: HOSPADM

## 2022-03-18 RX ORDER — OMEPRAZOLE 40 MG/1
CAPSULE, DELAYED RELEASE ORAL
Qty: 30 CAPSULE | Refills: 3 | Status: SHIPPED | OUTPATIENT
Start: 2022-03-18

## 2022-03-18 RX ORDER — IPRATROPIUM BROMIDE AND ALBUTEROL SULFATE 2.5; .5 MG/3ML; MG/3ML
1 SOLUTION RESPIRATORY (INHALATION) EVERY 4 HOURS PRN
Status: DISCONTINUED | OUTPATIENT
Start: 2022-03-18 | End: 2022-03-18 | Stop reason: HOSPADM

## 2022-03-18 RX ORDER — PREDNISONE 20 MG/1
40 TABLET ORAL DAILY
Qty: 10 TABLET | Refills: 0 | Status: SHIPPED | OUTPATIENT
Start: 2022-03-19 | End: 2022-03-24

## 2022-03-18 RX ORDER — POTASSIUM CHLORIDE 750 MG/1
10 TABLET, EXTENDED RELEASE ORAL DAILY
Qty: 90 TABLET | Refills: 1 | Status: SHIPPED | OUTPATIENT
Start: 2022-03-18

## 2022-03-18 RX ADMIN — GABAPENTIN 600 MG: 300 CAPSULE ORAL at 09:46

## 2022-03-18 RX ADMIN — FUROSEMIDE 20 MG: 10 INJECTION, SOLUTION INTRAMUSCULAR; INTRAVENOUS at 09:48

## 2022-03-18 RX ADMIN — PANTOPRAZOLE SODIUM 40 MG: 40 TABLET, DELAYED RELEASE ORAL at 05:12

## 2022-03-18 RX ADMIN — ENOXAPARIN SODIUM 40 MG: 40 INJECTION SUBCUTANEOUS at 09:48

## 2022-03-18 RX ADMIN — IPRATROPIUM BROMIDE AND ALBUTEROL SULFATE 1 AMPULE: .5; 3 SOLUTION RESPIRATORY (INHALATION) at 12:23

## 2022-03-18 RX ADMIN — IPRATROPIUM BROMIDE AND ALBUTEROL SULFATE 1 AMPULE: .5; 2.5 SOLUTION RESPIRATORY (INHALATION) at 03:23

## 2022-03-18 RX ADMIN — IPRATROPIUM BROMIDE AND ALBUTEROL SULFATE 1 AMPULE: .5; 2.5 SOLUTION RESPIRATORY (INHALATION) at 00:15

## 2022-03-18 RX ADMIN — SODIUM CHLORIDE 25 ML: 9 INJECTION, SOLUTION INTRAVENOUS at 00:20

## 2022-03-18 RX ADMIN — PREDNISONE 40 MG: 20 TABLET ORAL at 09:47

## 2022-03-18 RX ADMIN — SODIUM CHLORIDE, PRESERVATIVE FREE 10 ML: 5 INJECTION INTRAVENOUS at 09:52

## 2022-03-18 RX ADMIN — IPRATROPIUM BROMIDE AND ALBUTEROL SULFATE 1 AMPULE: .5; 3 SOLUTION RESPIRATORY (INHALATION) at 15:42

## 2022-03-18 RX ADMIN — AZITHROMYCIN MONOHYDRATE 500 MG: 500 INJECTION, POWDER, LYOPHILIZED, FOR SOLUTION INTRAVENOUS at 00:21

## 2022-03-18 RX ADMIN — GABAPENTIN 600 MG: 300 CAPSULE ORAL at 14:21

## 2022-03-18 RX ADMIN — Medication 1000 MG: at 00:12

## 2022-03-18 RX ADMIN — ESCITALOPRAM OXALATE 20 MG: 10 TABLET ORAL at 09:47

## 2022-03-18 RX ADMIN — IPRATROPIUM BROMIDE AND ALBUTEROL SULFATE 1 AMPULE: .5; 3 SOLUTION RESPIRATORY (INHALATION) at 08:50

## 2022-03-18 ASSESSMENT — PAIN DESCRIPTION - PROGRESSION: CLINICAL_PROGRESSION: NOT CHANGED

## 2022-03-18 ASSESSMENT — PAIN SCALES - GENERAL
PAINLEVEL_OUTOF10: 5
PAINLEVEL_OUTOF10: 0
PAINLEVEL_OUTOF10: 5
PAINLEVEL_OUTOF10: 0
PAINLEVEL_OUTOF10: 0
PAINLEVEL_OUTOF10: 5
PAINLEVEL_OUTOF10: 0

## 2022-03-18 ASSESSMENT — PAIN DESCRIPTION - LOCATION
LOCATION: BACK

## 2022-03-18 ASSESSMENT — PAIN DESCRIPTION - ONSET: ONSET: ON-GOING

## 2022-03-18 ASSESSMENT — PAIN DESCRIPTION - PAIN TYPE
TYPE: CHRONIC PAIN

## 2022-03-18 ASSESSMENT — PAIN DESCRIPTION - DESCRIPTORS: DESCRIPTORS: ACHING

## 2022-03-18 ASSESSMENT — PAIN - FUNCTIONAL ASSESSMENT: PAIN_FUNCTIONAL_ASSESSMENT: ACTIVITIES ARE NOT PREVENTED

## 2022-03-18 ASSESSMENT — PAIN DESCRIPTION - FREQUENCY: FREQUENCY: CONTINUOUS

## 2022-03-18 NOTE — PROGRESS NOTES
Assessment completed. VSS. O2 at 93% on 5L NC. Patient awake, alert, and in bed. Medications given per MAR. Safety and isolation precautions maintained. Call light within reach. Will continue to monitor.

## 2022-03-18 NOTE — PROGRESS NOTES
Pt discharged home. Pt educated on new medications and plans for follow up care including lab work. All questions answered. Pt verbalized understanding. Pt reports she has all DME at home that she requires.

## 2022-03-18 NOTE — PROGRESS NOTES
03/18/22 0447   RT Protocol   History Pulmonary Disease 2   Respiratory pattern 2   Breath sounds 6   Cough 0   Indications for Bronchodilator Therapy Decreased or absent breath sounds; On home bronchodilators; Wheezing associated with pulm disorder   Bronchodilator Assessment Score 10

## 2022-03-18 NOTE — PROCEDURES
[facility] SPEECH THERAPY  MODIFIED BARIUM SWALLOW EVALUATION    Patient's Name: Sofía Valadez  YOB: 1960  Medical Diagnosis: Bacterial pneumonia [J15.9]  COPD exacerbation ([address] [J44.1]  Acute on chronic respiratory failure with hypoxia (HCC) [J96.21]  Multifocal pneumonia [J18.9]  Treatment Diagnosis: Dysphagia  Ordering MD: Rolando Diaz MD  Date of Evaluation: 3/18/2022  Type of Study: Modified Barium Swallowing Study (MBS)  Diet Prior to Study: Regular textures with Thin liquids/no straws, meds in applesauce   Pain Level: Pt did not report pain    Impression:  Modified Barium Swallow evaluation completed on 3/18/2022. Results indicate mild oropharyngeal dysphagia characterized by prolonged mastication, decreased AP transit, premature spillage to pyriforms, mildly delayed swallow initiation and minimally decreased hyolaryngeal excursion. Deep laryngeal penetration was viewed with thin liquids via straw with a mild stain along laryngeal inlet that appeared to be related to presentation of thins with increased volume. Pharyngeal residue was grossly clearing with intermittent use of successive swallow. No retrograde flow or reflux was viewed in the upper 1/3 of esophagus. Although no aspiration was viewed, precautions should be followed to maximize safety with diet recommendations. Aspiration/Penetration Risk:  Mild with thin liquids via straw    Recommendations:    Diet Level: Regular textures with Thin liquids/avoid straws, meds in puree   Strategies:  Upright as possible with all PO intake , No straws , Small bites/sips , Eat/feed slowly  Treatments: Speech Therapy for dysphagia treatment  Goals:  1. Pt will functionally tolerate recommended diet level without s/s of aspiration/penetration   2. Pt/family will demonstrate understanding of results Modified Barium Swallow Study, risk for aspiration, rationale for diet level and compensatory strategies   3.   Pt will utilize appropriate compensatory strategies as indicated with min with cues   4.   Pt will demonstrate improved sensory motor function via targeted exercises and appropriate treatment modalities     Consistencies given: Thin, Mildly Thick (Nectar) Liquids, Puree, Soft solid, Solid    Oral Phase  -Reduced bolus control  -Premature bolus loss to pharynx  -Reduced mastication  -Reduced A-P bolus propulsion    Pharyngeal Phase  -Delayed swallow onset  -Pooling to the pyriforms   -Decreased hyolaryngeal excursion   -Laryngeal penetration with thin liquids via straw    Penetration/Aspiration Scores across consistencies     CONSISTENCY  Pen/Asp rating Description    Thin liquids  1    Thin liquids via straw 2    Mildly (nectar) thick  1    Moderately (honey) thick  1    Puree  1    Soft Solid  1    Regular Solid  1      KEY:   1) Material does not enter the airway   2) Material enters the airway, remains above the vocal folds, and is ejected from the airway   3) Material enters the airway, remains above the vocal folds, and is not ejected from the airway   4) Material enters the airway, contacts the vocal folds, and is ejected from the airway   5) Material enters the airway, contacts the vocal folds, and is not ejected from the airway   6) Material enters the airway, passes below the vocal folds, and is ejected into the larynx or out of the airway  7) Material enters the airway, passes below the vocal folds, and is not ejected from the trachea despite effort  8) Material enters the airway, passes below the vocal folds, and on effort is made to eject   N/A - consistency not provided due to safety concerns     Esophageal Phase  Unremarkable    Following Evaluation:  Results/recommendations and education given to Patient and nurse, who verbalized understanding    Timed Code Treatment: 0 minutes    Total Treatment Time: 30 minutes    Michela Mean, M.A., [address] [place], [place] [id number]  Speech-Language Pathologist  3/18/2022 11:22 AM

## 2022-03-18 NOTE — DISCHARGE SUMMARY
have:          Discharge Diagnoses:     Acute on chronic respiratory failure with hypoxia  COPD exacerbation   Asthma  Tobacco abuse  - Uses 3-5 L/min supplemental oxygen at baseline  - States she has been out for about 3-5 days  - Tobacco abuse hx  - Had bronchoconstriction on exam; on going tobacco abuse  - Not fluid overloaded on exam  - Cont steroids  - Was placed on Zithromax and Rocephin for presumed pneumonia; switch to PO on discharge. - Checked Procalcitonin: 0.12  - Sputum cultures: appears growing resp nikki   - COVID-19 infection, negative  - Tobacco cessation counseled  - Continue supplemental oxygen, weaned to 3L  - Son has obtained new supplies for her  - Pxt was supposed to f/u with Pulm o/p: advised to keep appointment  - PT/OT eval: Home no needs.           Abnormal CXR:  Appears there was concern for acute pulmonary edema, noted on chest x-ray. Pxt does not appear fluid overloaded on exam    Reviewed limited echo done in 1/2022 in [address] on  IV Lasix 20 mg twice daily on admit:  Change to PO dly on discharge  O/P follow up to re-evaluate         Moderate obesity due to excess calories Body mass index is 36.71 kg/m². - Complicating assessment and treatment. Placing patient at risk for multiple co-morbidities as well as early death and contributing to the patient's presentation.  on weight loss   - Prob Sleep apnea: F/u for sleep study as per recent rec                    Physical Exam: /67   Pulse 79   Temp 97 °F (36.1 °C) (Oral)   Resp 18   Ht 5' 3\" (1.6 m)   Wt 201 lb 6.4 oz (91.4 kg)   SpO2 97%   BMI 35.68 kg/m²     No results for input(s): POCGLU in the last 72 hours. Gen/overall appearance: Not in acute distress. Alert. Oriented x3. Head: Normocephalic, atraumatic  Eyes: EOMI, good acuity  ENT: Oral mucosa moist  Neck: No JVD, thyromegaly  CVS: Nml S1S2, no MRG, RRR  Pulm: Diffuse expiratory wheezes.   There is fine crackles in lung bases. Gastrointestinal: Soft, NT/ND, +BS  Musculoskeletal: No edema. Warm  Neuro: No focal deficit. Moves extremity spontaneously. Psychiatry: Appropriate affect. Not agitated. Skin: Warm, dry with normal turgor. No rash  Capillary refill: Brisk,< 3 seconds   Peripheral Pulses: +2 palpable, equal bilaterally          LABS:  Recent Labs     03/18/22  0443   WBC 15.4*   HGB 13.3         Recent Labs     03/16/22  2124      K 4.4   CL 98*   CO2 26   BUN 20   CREATININE 0.7   GLUCOSE 125*     No results for input(s): INR in the last 72 hours.         Discharge Medications:  Current Discharge Medication List           Details   predniSONE (DELTASONE) 20 MG tablet Take 2 tablets by mouth daily for 5 days  Qty: 10 tablet, Refills: 0      azithromycin (ZITHROMAX) 250 MG tablet Take 1 tablet by mouth daily for 5 days  Qty: 5 tablet, Refills: 0    Associated Diagnoses: COPD exacerbation (HCC)      furosemide (LASIX) 20 MG tablet Take 1 tablet by mouth daily  Qty: 30 tablet, Refills: 1      potassium chloride (KLOR-CON M) 10 MEQ extended release tablet Take 1 tablet by mouth daily  Qty: 90 tablet, Refills: 1              Details   nicotine (NICODERM CQ) 7 MG/24HR Place 1 patch onto the skin daily  Qty: 30 patch, Refills: 3      omeprazole (PRILOSEC) 40 MG delayed release capsule TAKE 1 CAPSULE EVERY DAY  Qty: 30 capsule, Refills: 3              Details   !! albuterol (PROVENTIL) (2.5 MG/3ML) 0.083% nebulizer solution INHALE THE CONTENTS OF 1 VIAL VIA NEBULIZER AS DIRECTED EVERY 4 HOURS AS NEEDED      cyclobenzaprine (FLEXERIL) 10 MG tablet TAKE 1 TABLET AT BEDTIME      escitalopram (LEXAPRO) 20 MG tablet TAKE ONE TABLET BY MOUTH DAILY      gabapentin (NEURONTIN) 600 MG tablet TAKE ONE TABLET BY MOUTH THREE TIMES A DAY      lisinopril (PRINIVIL;ZESTRIL) 2.5 MG tablet Take 2.5 mg by mouth daily      mupirocin (BACTROBAN) 2 % ointment Apply topically 2 times daily      ondansetron (ZOFRAN) 4 MG tablet Take 4 mg by mouth every 8 hours as needed      rosuvastatin (CRESTOR) 5 MG tablet TAKE 1 TABLET EVERY DAY FOR CHOLESTEROL      !! albuterol (PROVENTIL) (2.5 MG/3ML) 0.083% nebulizer solution        !! - Potential duplicate medications found. Please discuss with provider. Activity: activity as tolerated  Diet: regular diet  Wound Care: none needed    Disposition: home  Discharged Condition: Stable  Follow Up: Primary Care Physician in one week    Total time spent on discharge, finalizing medications, referrals and arranging outpatient follow up was more than 30 minutes    Thank you Dr. Anusha Winter, APRN, APRN for the opportunity to be involved in this patients care. If you have any questions or concerns please feel free to contact me at [phone].
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
## 2022-03-18 NOTE — PROGRESS NOTES
Occupational Therapy   Occupational Therapy Initial Assessment/Discharge Summary  Date: 3/18/2022   Patient Name: Dmitry Rodriguez  MRN: [id number]     : 1960    Date of Service: 3/18/2022    Discharge Recommendations: Dmitry Rodriguez scored a 23/24 on the AM-PAC ADL Inpatient form. At this time, no further OT is recommended upon discharge due to patient being near baseline level of function. Recommend patient returns to prior setting with prior services. OT Equipment Recommendations  Equipment Needed: No    Assessment   Assessment: Pt presents near baseline level of function. Anticipate safe to return home with assist from family as needed. No further OT needed at this time. Decision Making: Low Complexity  Exam: as seen above  Assistance / Modification: mod I transfers & bed mobility; SUP fxl mobility  OT Education: OT Role;Plan of Care  Patient Education: eval/discharge - pt v/u  REQUIRES OT FOLLOW UP: No  Activity Tolerance  Activity Tolerance: Patient Tolerated treatment well;Patient limited by fatigue  Activity Tolerance: Pt SpO2 dropped to ~88% with activity but recovered almost immediately to 92%. Remained upper 90's the rest of session. Safety Devices  Safety Devices in place: Yes  Type of devices: Nurse notified (left with transport at end of session)           Patient Diagnosis(es): The primary encounter diagnosis was COPD exacerbation ([facility], [place] [address] Diagnoses of Multifocal pneumonia and Acute on chronic respiratory failure with hypoxia [facility]) were also pertinent to this visit. has a past medical history of Asthma and COPD (chronic obstructive pulmonary disease) ([facility], [place] [address] has no past surgical history on file.            Restrictions  Restrictions/Precautions  Restrictions/Precautions: Fall Risk (high fall risk)  Position Activity Restriction  Other position/activity restrictions: 57 y.o. female with  COPD, asthma, chronic respiratory failure on 5 L/min supplemental oxygen, who presented to the emergency room with complaints of cough that is productive of yellowish sputum, shortness of breath, wheezes    Subjective   General  Chart Reviewed: Yes  Patient assessed for rehabilitation services?: Yes  Family / Caregiver Present: No  Diagnosis: Bacterial pneumonia  Subjective  Subjective: Pt in bed upon arrival -- pleasant and agreeable to therapy  Patient Currently in Pain: Yes  Pain Assessment  Pain Assessment: 0-10  Pain Level: 5  Patient's Stated Pain Goal: 5  Pain Type: Chronic pain  Pain Location: Back  Pain Descriptors: Aching  Pain Frequency: Continuous  Pain Onset: On-going  Clinical Progression: Not changed  Functional Pain Assessment: Activities are not prevented  Non-Pharmaceutical Pain Intervention(s): Rest  Response to Pain Intervention: Patient Satisfied  Multiple Pain Sites: No  Pre Treatment Pain Screening  Intervention List: Patient able to continue with treatment  Vital Signs  Temp: 97 °F (36.1 °C)  Temp Source: Oral  Pulse: 79  Heart Rate Source: Radial  Resp: 18  BP: 103/67  BP Location: Left upper arm  Patient Position: Sitting  Level of Consciousness: Alert (0)  MEWS Score: 1  Patient Currently in Pain: Yes  Oxygen Therapy  SpO2: 97 %  Pulse Oximeter Device Mode: Intermittent  Pulse Oximeter Device Location: Finger  O2 Device: Nasal cannula  Skin Assessment: Clean, dry, & intact  O2 Flow Rate (L/min): 3 L/min  Social/Functional History  Social/Functional History  Lives With: Family  Type of Home: House  Home Layout: One level  Home Access: Stairs to enter without rails  Entrance Stairs - Number of Steps: 1 curb step  Bathroom Shower/Tub: Tub/Shower unit  Bathroom Toilet: Standard  Bathroom Equipment:  (unsure what equipment is at her moms house)  Home Equipment: Rolling walker  ADL Assistance: Needs assistance (independent with everything except bathing)  Bath:  (assist from )  Homemaking Assistance: Needs assistance  Homemaking Responsibilities: No (aide comes ~2 days a week)  Ambulation Assistance: Independent (uses RW)  Transfer Assistance: Independent  Active : No  Patient's  Info:  drives  Additional Comments: Pt reported 6 falls in last 6 months. Pt currently in between houses; was living at [facility] [place] but will d/c to [facility]. above information about [facility]       Objective   Vision: Impaired  Vision Exceptions: Wears glasses at all times  Hearing: Within functional limits    Orientation  Overall Orientation Status: Within Functional Limits     Balance  Sitting Balance: Independent  Standing Balance: Supervision (SUP (dynamic) > Independent (static))  Standing Balance  Time: ~15 minutes total  Activity: fxl mobility, in stance to complete ADLs  Comment: RW  Functional Mobility  Functional - Mobility Device: Rolling Walker  Activity: To/from bathroom; Other (hallway)  Assist Level: Supervision  Functional Mobility Comments: 200' with no rest breaks  Toilet Transfers  Toilet - Technique: Ambulating  Equipment Used: Standard toilet  Toilet Transfer: Modified independent  ADL  Grooming: Modified independent  (oral care; in stance at sink)  UE Bathing: Modified independent  (seated on toilet)  LE Bathing: Supervision (standing; holding onto grab bars)  UE Dressing: Modified independent  (gown change; seated)  LE Dressing: Modified independent  (pants)  Toileting: Modified independent   Tone RUE  RUE Tone: Normotonic  Tone LUE  LUE Tone: Normotonic  Coordination  Movements Are Fluid And Coordinated: Yes     Bed mobility  Supine to Sit: Modified independent  Scooting: Independent  Transfers  Sit to stand: Modified independent (3x; EOB, toilet, recliner)  Stand to sit: Modified independent (3x; toilet, recliner, transfer chair)     Cognition  Overall Cognitive Status: WFL  Perception  Overall Perceptual Status: WFL     Sensation  Overall Sensation Status: WFL        LUE AROM (degrees)  LUE AROM : WFL  Left Hand AROM (degrees)  Left Hand AROM: WFL  RUE AROM (degrees)  RUE AROM : WFL  Right Hand AROM (degrees)  Right Hand AROM: WFL  LUE Strength  Gross LUE Strength: WFL  RUE Strength  Gross RUE Strength: WFL        Plan   Plan  Times per week: d/c      AM-PAC Score        AM-PAC Inpatient Daily Activity Raw Score: 23 (03/18/22 1230)  AM-PAC Inpatient ADL T-Scale Score : 51.12 (03/18/22 1230)  ADL Inpatient CMS 0-100% Score: 15.86 (03/18/22 1230)  ADL Inpatient CMS G-Code Modifier : CI (03/18/22 1230)    Goals  Short term goals  Time Frame for Short term goals: d/c       Therapy Time   Individual Concurrent Group Co-treatment   Time In 0942         Time Out 1030         Minutes 48           Timed Code Treatment Minutes:   33 minutes     Total Treatment Minutes:  48 minutes      Wyatt Louise, S/OT    OT provided direct supervision to student, facilitated in making skilled judgements throughout duration of session.     PHOEBE Kim OTR/L [id number]     Jaja Rodriguez OT

## 2022-03-18 NOTE — PROGRESS NOTES
Physical Therapy    Facility/Department: [address]  Initial Assessment    NAME: Jose G Cee  : 1960  MRN: [id number]    Date of Service: 3/18/2022    Discharge Recommendations: oJse G Cee scored a 22/24 on the AM-PAC short mobility form. At this time, no further PT is recommended upon discharge due to pt being near baseline functional mobility. Recommend patient returns to prior setting with prior services. PT Equipment Recommendations  Equipment Needed: No    Assessment   Body structures, Functions, Activity limitations: Decreased endurance  Assessment: Pt presents slightly below baseline functional mobility with impaired activity tolerance during extended mobility tasks. Pt would benefit from acute PT services to address deficits and facilitate return to PLOF of MOD I with RW. Treatment Diagnosis: decreased activity tolerance  Prognosis: Good  Decision Making: Low Complexity  Clinical Presentation: stable  PT Education: Goals;PT Role;Plan of Care;General Safety  Patient Education: d/c recommendations--pt verbalizing understanding  Barriers to Learning: none  REQUIRES PT FOLLOW UP: Yes  Activity Tolerance  Activity Tolerance: Patient Tolerated treatment well;Patient limited by endurance  Activity Tolerance: Slight SOB noted with mobility, SpO2 ranging from 88-92% throughout session. Patient Diagnosis(es): The primary encounter diagnosis was COPD exacerbation ([facility] [address] Diagnoses of Multifocal pneumonia and Acute on chronic respiratory failure with hypoxia [facility]) were also pertinent to this visit. has a past medical history of Asthma and COPD (chronic obstructive pulmonary disease) ([facility] [address] has no past surgical history on file.     Restrictions  Restrictions/Precautions  Restrictions/Precautions: Fall Risk  Position Activity Restriction  Other position/activity restrictions: 57 y.o. female with  COPD, asthma, chronic respiratory failure on 5 L/min supplemental oxygen, who presented to the emergency room with complaints of cough that is productive of yellowish sputum, shortness of breath, wheezes     Vision/Hearing  Vision: Impaired  Vision Exceptions: Wears glasses at all times  Hearing: Within functional limits       Subjective  General  Chart Reviewed: Yes  Patient assessed for rehabilitation services?: Yes  Response To Previous Treatment: Not applicable  Family / Caregiver Present: No  Diagnosis: bacterial pneumonia  Follows Commands: Within Functional Limits  General Comment  Comments: Pt supine in bed upon arrival.  Subjective  Subjective: Pt agreeable to PT/OT eval, reporting no pain at this time. Pain Screening  Patient Currently in Pain: Denies  Vital Signs  Patient Currently in Pain: Denies       Orientation  Orientation  Overall Orientation Status: Within Functional Limits     Social/Functional History  Social/Functional History  Lives With: Family  Type of Home: House  Home Layout: One level  Home Access: Stairs to enter without rails  Entrance Stairs - Number of Steps: 1 curb step  Bathroom Shower/Tub: Tub/Shower unit  Bathroom Toilet: Standard  Bathroom Equipment:  (unsure what equipment is at her [facility])  Home Equipment: Rolling walker  ADL Assistance: Needs assistance (independent with everything except bathing)  Bath:  (assist from )  Homemaking Assistance: Needs assistance  Homemaking Responsibilities: No (aide comes ~2 days a week)  Ambulation Assistance: Independent (uses RW)  Transfer Assistance: Independent  Active : No  Patient's  Info:  drives  Additional Comments: Pt reported 6 falls in last 6 months. Pt currently in between houses; was living at [facility] but will d/c to [facility].  above information about [facility]     Cognition   Cognition  Overall Cognitive Status: WFL    Objective  AROM RLE (degrees)  RLE AROM: WFL  AROM LLE (degrees)  LLE AROM : WFL  Strength RLE  Strength RLE: WFL  Strength LLE  Strength LLE: WFL  Tone RLE  RLE Tone: Normotonic  Tone LLE  LLE Tone: Normotonic  Sensation  Overall Sensation Status: WFL  Bed mobility  Supine to Sit: Modified independent  Scooting: Independent  Transfers  Sit to Stand: Modified independent (x1 EOB, x1 toilet, x2 chair)  Stand to sit: Modified independent  Comment: with RW  Ambulation  Ambulation?: Yes  Ambulation 1  Surface: level tile  Device: Rolling Walker  Other Apparatus: O2 (3L)  Assistance: Supervision  Quality of Gait: slightly widened William, slightly decreased aline  Distance: 10'+8'+10'+300'+20'  Comments: Pt able to negotiate obstacles without assist, no LOB. SOB noted towards end of ambulation, SpO2 at 88% at end of longer bout of ambulation and required ~30 seconds for recovery to low 90s. Stairs/Curb  Stairs?: No     Balance  Posture: Good  Sitting - Static: Good  Sitting - Dynamic: Good (independent seated at toilet for ADLs ~20 minutes total)  Standing - Static: Good;- (supervision with RW for UE support)  Standing - Dynamic: Good;- (Supervision during ADLs ~5-8 minutes total)        Plan   Plan  Times per week: 1-2x  Times per day: Daily  Current Treatment Recommendations: Endurance Training,Gait Training,Stair training,Home Exercise Program  Safety Devices  Type of devices: Gait belt,Nurse notified (pt left seated in w/c with transporter taking pt off floor for testing--RN aware)  Restraints  Initially in place: No    G-Code       OutComes Score       AM-PAC Score  AM-PAC Inpatient Mobility Raw Score : 22 (03/18/22 1153)  AM-PAC Inpatient T-Scale Score : 53.28 (03/18/22 1153)  Mobility Inpatient CMS 0-100% Score: 20.91 (03/18/22 1153)  Mobility Inpatient CMS G-Code Modifier : CJ (03/18/22 1153)          Goals  Short term goals  Time Frame for Short term goals: upon d/c  Short term goal 1: Pt will ambulate 150' with LRAD MOD I with SpO2 above 90%.   Short term goal 2: Pt will negotiate curb step with LRAD and SBA  Patient Goals   Patient goals : pt did not state       Therapy Time Individual Concurrent Group Co-treatment   Time In 0942         Time Out 1030         Minutes 48              Timed Code Treatment Minutes:   33    Total Treatment Minutes:  [address] [facility], [address], [place] [id number]

## 2022-03-19 LAB
CULTURE, RESPIRATORY: NORMAL
GRAM STAIN RESULT: NORMAL

## 2022-03-22 NOTE — PROGRESS NOTES
Physician Progress Note      PATIENT:               Modesto Escobar  CSN #:                  [id number]  :                       1960  ADMIT DATE:       3/16/2022 8:15 PM  [address] [place] DATE:        3/18/2022 4:44 PM  RESPONDING  PROVIDER #:        Yeny Mcmahan MD          QUERY TEXT:    Pt admitted with Pneumonia. Pt noted to have Acute pulmonary edema on CXR. If   possible, please document in the progress notes and discharge summary if you   are evaluating and/or treating any of the following: The medical record reflects the following:  Risk Factors: HTN, HLD  Clinical Indicators: Patient presented to ED with worsening dyspnea, noted   with COPD exacerbation and pneumonia. CXR showed acute pulmonary edema and pt   was treated with IV Lasix. Echo with no regional wall abnormalities, EF   60-65%. . Per DC summary, patient did not present as fluid overloaded   on exam, discharged with oral Lasix 20mg at DC with plan to follow up out   patient. Treatment: Echo, Lasix IV, Imaging. Options provided:  -- Acute pulmonary edema due to possible heart disease  -- Acute pulmonary edema due to possible heart failure  -- Acute pulmonary edema due to heart failure ruled out  -- Other - I will add my own diagnosis  -- Disagree - Not applicable / Not valid  -- Disagree - Clinically unable to determine / Unknown  -- Refer to Clinical Documentation Reviewer    PROVIDER RESPONSE TEXT:    This patient has acute pulmonary edema due to possible heart failure.     Query created by: Brooke Gerard on 3/19/2022 9:01 AM      Electronically signed by:  Yeny Mcmahan MD 3/21/2022 10:41 PM

## 2023-11-13 ENCOUNTER — APPOINTMENT (OUTPATIENT)
Dept: RADIOLOGY | Facility: HOSPITAL | Age: 63
DRG: 189 | End: 2023-11-13
Payer: COMMERCIAL

## 2023-11-13 ENCOUNTER — HOSPITAL ENCOUNTER (INPATIENT)
Facility: HOSPITAL | Age: 63
LOS: 5 days | Discharge: NURSING, REHAB OR LONG-TERM CARE | DRG: 189 | End: 2023-11-18
Attending: EMERGENCY MEDICINE | Admitting: INTERNAL MEDICINE
Payer: COMMERCIAL

## 2023-11-13 DIAGNOSIS — R74.01 TRANSAMINASEMIA: ICD-10-CM

## 2023-11-13 DIAGNOSIS — R79.89 ELEVATED TROPONIN: ICD-10-CM

## 2023-11-13 DIAGNOSIS — J44.9 COPD (CHRONIC OBSTRUCTIVE PULMONARY DISEASE) (MULTI): Primary | ICD-10-CM

## 2023-11-13 DIAGNOSIS — R07.9 CHEST PAIN: ICD-10-CM

## 2023-11-13 DIAGNOSIS — E11.9 DIABETES MELLITUS (MULTI): ICD-10-CM

## 2023-11-13 DIAGNOSIS — D64.9 ANEMIA, UNSPECIFIED TYPE: ICD-10-CM

## 2023-11-13 DIAGNOSIS — R53.1 GENERALIZED WEAKNESS: ICD-10-CM

## 2023-11-13 DIAGNOSIS — I21.4 NSTEMI (NON-ST ELEVATED MYOCARDIAL INFARCTION) (MULTI): ICD-10-CM

## 2023-11-13 DIAGNOSIS — Z78.9 DECREASED ACTIVITIES OF DAILY LIVING (ADL): ICD-10-CM

## 2023-11-13 DIAGNOSIS — I50.9 CONGESTIVE HEART FAILURE, UNSPECIFIED HF CHRONICITY, UNSPECIFIED HEART FAILURE TYPE (MULTI): ICD-10-CM

## 2023-11-13 DIAGNOSIS — E83.51 HYPOCALCEMIA: ICD-10-CM

## 2023-11-13 DIAGNOSIS — R41.82 ALTERED MENTAL STATUS, UNSPECIFIED ALTERED MENTAL STATUS TYPE: ICD-10-CM

## 2023-11-13 DIAGNOSIS — Z74.09 IMPAIRED MOBILITY: ICD-10-CM

## 2023-11-13 LAB
ALBUMIN SERPL-MCNC: 3 G/DL (ref 3.5–5)
ALP BLD-CCNC: 89 U/L (ref 35–125)
ALT SERPL-CCNC: 107 U/L (ref 5–40)
ANION GAP BLDA CALCULATED.4IONS-SCNC: 2 MMO/L (ref 10–25)
ANION GAP SERPL CALC-SCNC: 9 MMOL/L
APPEARANCE UR: ABNORMAL
AST SERPL-CCNC: 287 U/L (ref 5–40)
BASE EXCESS BLDA CALC-SCNC: 8.2 MMOL/L (ref -2–3)
BASOPHILS # BLD AUTO: 0.05 X10*3/UL (ref 0–0.1)
BASOPHILS NFR BLD AUTO: 0.5 %
BILIRUB SERPL-MCNC: 0.5 MG/DL (ref 0.1–1.2)
BILIRUB UR STRIP.AUTO-MCNC: NEGATIVE MG/DL
BODY TEMPERATURE: 37 DEGREES CELSIUS
BUN SERPL-MCNC: 19 MG/DL (ref 8–25)
CA-I BLDA-SCNC: 1.06 MMOL/L (ref 1.1–1.33)
CALCIUM SERPL-MCNC: 7.9 MG/DL (ref 8.5–10.4)
CHLORIDE BLDA-SCNC: 101 MMOL/L (ref 98–107)
CHLORIDE SERPL-SCNC: 103 MMOL/L (ref 97–107)
CK SERPL-CCNC: 494 U/L (ref 24–195)
CO2 SERPL-SCNC: 29 MMOL/L (ref 24–31)
COLOR UR: YELLOW
CREAT SERPL-MCNC: 1.5 MG/DL (ref 0.4–1.6)
CREAT SERPL-MCNC: 1.5 MG/DL (ref 0.4–1.6)
EOSINOPHIL # BLD AUTO: 0.01 X10*3/UL (ref 0–0.7)
EOSINOPHIL NFR BLD AUTO: 0.1 %
ERYTHROCYTE [DISTWIDTH] IN BLOOD BY AUTOMATED COUNT: 16.2 % (ref 11.5–14.5)
ETHANOL SERPL-MCNC: <0.01 G/DL
GFR SERPL CREATININE-BSD FRML MDRD: 39 ML/MIN/1.73M*2
GFR SERPL CREATININE-BSD FRML MDRD: 39 ML/MIN/1.73M*2
GLUCOSE BLDA-MCNC: 94 MG/DL (ref 74–99)
GLUCOSE SERPL-MCNC: 106 MG/DL (ref 65–99)
GLUCOSE UR STRIP.AUTO-MCNC: NORMAL MG/DL
HCO3 BLDA-SCNC: 37.3 MMOL/L (ref 22–26)
HCT VFR BLD AUTO: 44.7 % (ref 36–46)
HCT VFR BLD EST: 40 % (ref 36–46)
HGB BLD-MCNC: 13.1 G/DL (ref 12–16)
HGB BLDA-MCNC: 13.3 G/DL (ref 12–16)
HYALINE CASTS #/AREA URNS AUTO: ABNORMAL /LPF
IMM GRANULOCYTES # BLD AUTO: 0.03 X10*3/UL (ref 0–0.7)
IMM GRANULOCYTES NFR BLD AUTO: 0.3 % (ref 0–0.9)
INHALED O2 CONCENTRATION: 36 %
KETONES UR STRIP.AUTO-MCNC: NEGATIVE MG/DL
LACTATE BLDA-SCNC: 0.9 MMOL/L (ref 0.4–2)
LEUKOCYTE ESTERASE UR QL STRIP.AUTO: NEGATIVE
LYMPHOCYTES # BLD AUTO: 2.21 X10*3/UL (ref 1.2–4.8)
LYMPHOCYTES NFR BLD AUTO: 19.9 %
MCH RBC QN AUTO: 29 PG (ref 26–34)
MCHC RBC AUTO-ENTMCNC: 29.3 G/DL (ref 32–36)
MCV RBC AUTO: 99 FL (ref 80–100)
MONOCYTES # BLD AUTO: 1.11 X10*3/UL (ref 0.1–1)
MONOCYTES NFR BLD AUTO: 10 %
MUCOUS THREADS #/AREA URNS AUTO: ABNORMAL /LPF
NEUTROPHILS # BLD AUTO: 7.68 X10*3/UL (ref 1.2–7.7)
NEUTROPHILS NFR BLD AUTO: 69.2 %
NITRITE UR QL STRIP.AUTO: NEGATIVE
NRBC BLD-RTO: 0 /100 WBCS (ref 0–0)
NT-PROBNP SERPL-MCNC: ABNORMAL PG/ML (ref 0–353)
OXYHGB MFR BLDA: 92.7 % (ref 94–98)
PCO2 BLDA: 74 MM HG (ref 38–42)
PH BLDA: 7.31 PH (ref 7.38–7.42)
PH UR STRIP.AUTO: 5.5 [PH]
PLATELET # BLD AUTO: 187 X10*3/UL (ref 150–450)
PO2 BLDA: 105 MM HG (ref 85–95)
POTASSIUM BLDA-SCNC: 3.7 MMOL/L (ref 3.5–5.3)
POTASSIUM SERPL-SCNC: 3.6 MMOL/L (ref 3.4–5.1)
PROT SERPL-MCNC: 6.5 G/DL (ref 5.9–7.9)
PROT UR STRIP.AUTO-MCNC: ABNORMAL MG/DL
RBC # BLD AUTO: 4.52 X10*6/UL (ref 4–5.2)
RBC # UR STRIP.AUTO: NEGATIVE /UL
RBC #/AREA URNS AUTO: ABNORMAL /HPF
SAO2 % BLDA: 100 % (ref 94–100)
SARS-COV-2 RNA RESP QL NAA+PROBE: NOT DETECTED
SODIUM BLDA-SCNC: 137 MMOL/L (ref 136–145)
SODIUM SERPL-SCNC: 141 MMOL/L (ref 133–145)
SP GR UR STRIP.AUTO: 1.03
SQUAMOUS #/AREA URNS AUTO: ABNORMAL /HPF
TROPONIN T SERPL-MCNC: 160 NG/L
TROPONIN T SERPL-MCNC: 196 NG/L
UROBILINOGEN UR STRIP.AUTO-MCNC: ABNORMAL MG/DL
WBC # BLD AUTO: 11.1 X10*3/UL (ref 4.4–11.3)
WBC #/AREA URNS AUTO: ABNORMAL /HPF

## 2023-11-13 PROCEDURE — 83880 ASSAY OF NATRIURETIC PEPTIDE: CPT | Performed by: EMERGENCY MEDICINE

## 2023-11-13 PROCEDURE — 99285 EMERGENCY DEPT VISIT HI MDM: CPT | Mod: 25 | Performed by: EMERGENCY MEDICINE

## 2023-11-13 PROCEDURE — 85025 COMPLETE CBC W/AUTO DIFF WBC: CPT | Performed by: EMERGENCY MEDICINE

## 2023-11-13 PROCEDURE — 70450 CT HEAD/BRAIN W/O DYE: CPT

## 2023-11-13 PROCEDURE — 2550000001 HC RX 255 CONTRASTS: Performed by: EMERGENCY MEDICINE

## 2023-11-13 PROCEDURE — 36600 WITHDRAWAL OF ARTERIAL BLOOD: CPT

## 2023-11-13 PROCEDURE — 94664 DEMO&/EVAL PT USE INHALER: CPT

## 2023-11-13 PROCEDURE — 36415 COLL VENOUS BLD VENIPUNCTURE: CPT | Performed by: EMERGENCY MEDICINE

## 2023-11-13 PROCEDURE — 87635 SARS-COV-2 COVID-19 AMP PRB: CPT | Performed by: EMERGENCY MEDICINE

## 2023-11-13 PROCEDURE — 84484 ASSAY OF TROPONIN QUANT: CPT | Performed by: EMERGENCY MEDICINE

## 2023-11-13 PROCEDURE — 2500000004 HC RX 250 GENERAL PHARMACY W/ HCPCS (ALT 636 FOR OP/ED): Performed by: INTERNAL MEDICINE

## 2023-11-13 PROCEDURE — 94640 AIRWAY INHALATION TREATMENT: CPT

## 2023-11-13 PROCEDURE — 76705 ECHO EXAM OF ABDOMEN: CPT

## 2023-11-13 PROCEDURE — 82565 ASSAY OF CREATININE: CPT | Performed by: CLINICAL NURSE SPECIALIST

## 2023-11-13 PROCEDURE — 84132 ASSAY OF SERUM POTASSIUM: CPT | Performed by: INTERNAL MEDICINE

## 2023-11-13 PROCEDURE — 94660 CPAP INITIATION&MGMT: CPT

## 2023-11-13 PROCEDURE — 82550 ASSAY OF CK (CPK): CPT | Performed by: INTERNAL MEDICINE

## 2023-11-13 PROCEDURE — 2500000002 HC RX 250 W HCPCS SELF ADMINISTERED DRUGS (ALT 637 FOR MEDICARE OP, ALT 636 FOR OP/ED): Performed by: INTERNAL MEDICINE

## 2023-11-13 PROCEDURE — 2020000001 HC ICU ROOM DAILY

## 2023-11-13 PROCEDURE — 71045 X-RAY EXAM CHEST 1 VIEW: CPT

## 2023-11-13 PROCEDURE — 81001 URINALYSIS AUTO W/SCOPE: CPT | Performed by: EMERGENCY MEDICINE

## 2023-11-13 PROCEDURE — 80053 COMPREHEN METABOLIC PANEL: CPT | Performed by: EMERGENCY MEDICINE

## 2023-11-13 PROCEDURE — 82077 ASSAY SPEC XCP UR&BREATH IA: CPT | Performed by: CLINICAL NURSE SPECIALIST

## 2023-11-13 PROCEDURE — 82553 CREATINE MB FRACTION: CPT | Mod: TRILAB | Performed by: INTERNAL MEDICINE

## 2023-11-13 PROCEDURE — 93010 ELECTROCARDIOGRAM REPORT: CPT | Performed by: INTERNAL MEDICINE

## 2023-11-13 PROCEDURE — 74177 CT ABD & PELVIS W/CONTRAST: CPT

## 2023-11-13 RX ORDER — POTASSIUM CHLORIDE 750 MG/1
1 TABLET, FILM COATED, EXTENDED RELEASE ORAL
COMMUNITY
Start: 2022-03-18 | End: 2023-11-18 | Stop reason: HOSPADM

## 2023-11-13 RX ORDER — HEPARIN SODIUM 5000 [USP'U]/ML
5000 INJECTION, SOLUTION INTRAVENOUS; SUBCUTANEOUS EVERY 12 HOURS
Status: DISCONTINUED | OUTPATIENT
Start: 2023-11-13 | End: 2023-11-18 | Stop reason: HOSPADM

## 2023-11-13 RX ORDER — SPIRONOLACTONE 25 MG/1
12.5 TABLET ORAL DAILY
Status: DISCONTINUED | OUTPATIENT
Start: 2023-11-14 | End: 2023-11-18 | Stop reason: HOSPADM

## 2023-11-13 RX ORDER — VANCOMYCIN 1.5 G/300ML
1500 INJECTION, SOLUTION INTRAVENOUS ONCE
Status: DISCONTINUED | OUTPATIENT
Start: 2023-11-13 | End: 2023-11-13 | Stop reason: ENTERED-IN-ERROR

## 2023-11-13 RX ORDER — BUDESONIDE 0.5 MG/2ML
0.5 INHALANT ORAL
Status: DISCONTINUED | OUTPATIENT
Start: 2023-11-13 | End: 2023-11-18 | Stop reason: HOSPADM

## 2023-11-13 RX ORDER — DEXTROSE MONOHYDRATE 100 MG/ML
0.3 INJECTION, SOLUTION INTRAVENOUS ONCE AS NEEDED
Status: DISCONTINUED | OUTPATIENT
Start: 2023-11-13 | End: 2023-11-18 | Stop reason: HOSPADM

## 2023-11-13 RX ORDER — IPRATROPIUM BROMIDE AND ALBUTEROL SULFATE 2.5; .5 MG/3ML; MG/3ML
3 SOLUTION RESPIRATORY (INHALATION)
Status: DISCONTINUED | OUTPATIENT
Start: 2023-11-14 | End: 2023-11-18 | Stop reason: HOSPADM

## 2023-11-13 RX ORDER — IPRATROPIUM BROMIDE AND ALBUTEROL SULFATE 2.5; .5 MG/3ML; MG/3ML
3 SOLUTION RESPIRATORY (INHALATION) EVERY 2 HOUR PRN
Status: DISCONTINUED | OUTPATIENT
Start: 2023-11-13 | End: 2023-11-18 | Stop reason: HOSPADM

## 2023-11-13 RX ORDER — OMEPRAZOLE 40 MG/1
40 CAPSULE, DELAYED RELEASE ORAL
COMMUNITY
Start: 2023-04-25 | End: 2023-11-18 | Stop reason: HOSPADM

## 2023-11-13 RX ORDER — DEXTROSE 50 % IN WATER (D50W) INTRAVENOUS SYRINGE
25
Status: DISCONTINUED | OUTPATIENT
Start: 2023-11-13 | End: 2023-11-18 | Stop reason: HOSPADM

## 2023-11-13 RX ORDER — VANCOMYCIN 1 G/200ML
1000 INJECTION, SOLUTION INTRAVENOUS EVERY 24 HOURS
Status: DISCONTINUED | OUTPATIENT
Start: 2023-11-14 | End: 2023-11-14

## 2023-11-13 RX ORDER — IPRATROPIUM BROMIDE AND ALBUTEROL SULFATE 2.5; .5 MG/3ML; MG/3ML
3 SOLUTION RESPIRATORY (INHALATION)
Status: DISCONTINUED | OUTPATIENT
Start: 2023-11-14 | End: 2023-11-13

## 2023-11-13 RX ORDER — GABAPENTIN 600 MG/1
600 TABLET ORAL 3 TIMES DAILY
COMMUNITY
Start: 2023-08-10 | End: 2023-11-18 | Stop reason: HOSPADM

## 2023-11-13 RX ORDER — ROSUVASTATIN CALCIUM 5 MG/1
5 TABLET, COATED ORAL
COMMUNITY
Start: 2023-07-05 | End: 2023-11-18 | Stop reason: HOSPADM

## 2023-11-13 RX ORDER — INSULIN LISPRO 100 [IU]/ML
0-5 INJECTION, SOLUTION INTRAVENOUS; SUBCUTANEOUS
Status: DISCONTINUED | OUTPATIENT
Start: 2023-11-14 | End: 2023-11-18 | Stop reason: HOSPADM

## 2023-11-13 RX ORDER — METFORMIN HYDROCHLORIDE 500 MG/1
1 TABLET, EXTENDED RELEASE ORAL
Status: ON HOLD | COMMUNITY
Start: 2023-07-26 | End: 2024-01-03 | Stop reason: SDUPTHER

## 2023-11-13 RX ORDER — LISINOPRIL 2.5 MG/1
2.5 TABLET ORAL
COMMUNITY
Start: 2023-04-25 | End: 2023-11-18 | Stop reason: HOSPADM

## 2023-11-13 RX ORDER — FUROSEMIDE 10 MG/ML
40 INJECTION INTRAMUSCULAR; INTRAVENOUS ONCE
Status: COMPLETED | OUTPATIENT
Start: 2023-11-13 | End: 2023-11-13

## 2023-11-13 RX ORDER — FUROSEMIDE 10 MG/ML
40 INJECTION INTRAMUSCULAR; INTRAVENOUS DAILY
Status: DISCONTINUED | OUTPATIENT
Start: 2023-11-13 | End: 2023-11-15

## 2023-11-13 RX ORDER — ONDANSETRON 8 MG/1
8 TABLET, ORALLY DISINTEGRATING ORAL EVERY 8 HOURS PRN
COMMUNITY
Start: 2023-05-05 | End: 2023-11-18 | Stop reason: HOSPADM

## 2023-11-13 RX ORDER — ACETAMINOPHEN 325 MG/1
650 TABLET ORAL ONCE
Status: DISCONTINUED | OUTPATIENT
Start: 2023-11-13 | End: 2023-11-18 | Stop reason: HOSPADM

## 2023-11-13 RX ADMIN — FUROSEMIDE 40 MG: 10 INJECTION, SOLUTION INTRAMUSCULAR; INTRAVENOUS at 22:54

## 2023-11-13 RX ADMIN — IOHEXOL 75 ML: 350 INJECTION, SOLUTION INTRAVENOUS at 20:17

## 2023-11-13 RX ADMIN — BUDESONIDE INHALATION 0.5 MG: 0.5 SUSPENSION RESPIRATORY (INHALATION) at 23:26

## 2023-11-13 ASSESSMENT — COLUMBIA-SUICIDE SEVERITY RATING SCALE - C-SSRS
2. HAVE YOU ACTUALLY HAD ANY THOUGHTS OF KILLING YOURSELF?: NO
1. IN THE PAST MONTH, HAVE YOU WISHED YOU WERE DEAD OR WISHED YOU COULD GO TO SLEEP AND NOT WAKE UP?: NO
6. HAVE YOU EVER DONE ANYTHING, STARTED TO DO ANYTHING, OR PREPARED TO DO ANYTHING TO END YOUR LIFE?: NO

## 2023-11-13 ASSESSMENT — PAIN DESCRIPTION - PAIN TYPE: TYPE: CHRONIC PAIN

## 2023-11-13 ASSESSMENT — PAIN - FUNCTIONAL ASSESSMENT: PAIN_FUNCTIONAL_ASSESSMENT: 0-10

## 2023-11-13 ASSESSMENT — PAIN DESCRIPTION - LOCATION: LOCATION: BACK

## 2023-11-13 ASSESSMENT — PAIN SCALES - GENERAL: PAINLEVEL_OUTOF10: 9

## 2023-11-13 ASSESSMENT — PAIN DESCRIPTION - PROGRESSION: CLINICAL_PROGRESSION: NOT CHANGED

## 2023-11-13 NOTE — ED PROVIDER NOTES
Department of Emergency Medicine   ED  Provider Note  Admit Date/RoomTime: 2023  6:18 PM  ED Room: 18/18        History of Present Illness:  Chief Complaint   Patient presents with    Weakness, Gen Subha Miller is a 63 y.o. female presenting to the ED for increased confusion EMS was notified by patient's son who felt she was confused and patient presented with general malaise fatigue generalized weakness and shortness of breath.  Patient reports the symptoms are chronic for her.  She does not believe there is any change in her symptoms she does use supplemental oxygen at home for COPD and CHF.  Swelling noted in bilateral legs which is also chronic for her.  EMS was called to the house around 230 today the patient refused to come to the emergency department at that time.  She also has chronic back pain with no change no fall no loss of bowel or bladder control no numbness or tingling to the groin area.  Patient reported to the attending physician that she had no nausea or vomiting however told me she had nausea vomiting for the last 3 days.  She reports she has not been able to have a bowel movement for several days.  No diarrhea.  No pressure burning or frequency when she urinates when asked what brought her to the emergency department patient states that she has swelling in her legs.    Review of Systems:   Pertinent positives and negatives are stated within HPI, all other systems reviewed and are negative.        --------------------------------------------- PAST HISTORY ---------------------------------------------  Past Medical History:  has a past medical history of CHF (congestive heart failure) (CMS/[facility]), COPD (chronic obstructive pulmonary disease) (CMS/[facility]), Diabetes mellitus (CMS/[facility]), Hypertension, and Stroke (CMS/[facility]).  Past Surgical History:  has a past surgical history that includes Back surgery; Tonsillectomy; and  section, low transverse.  Social History:  reports that she  has been smoking cigarettes. She has a 12.50 pack-year smoking history. She has never used smokeless tobacco. She reports that she does not drink alcohol and does not use drugs.  Family History: family history is not on file.. Unless otherwise noted, family history is non contributory  The patient’s home medications have been reviewed.  Allergies: Penicillins, Codeine, and Morphine        ---------------------------------------------------PHYSICAL EXAM--------------------------------------    GENERAL APPEARANCE: Awake and alert.  Confused  VITAL SIGNS: As per the nurses' triage record.   HEENT: Normocephalic, atraumatic. Extraocular muscles are intact. Pupils equal round and reactive to light. Conjunctiva are pink. Negative scleral icterus. Mucous membranes are dry Tongue in the midline. Pharynx was without erythema or exudates, uvula midline  NECK: Soft Nontender and supple, full gross ROM, no meningeal signs.  CHEST: Nontender to palpation.  Scattered wheezing posterior lobes fine crackles bilateral bases..   HEART: S1, S2. Regular rate and rhythm. No murmurs, gallops or rubs.  Strong and equal pulses in the extremities.   ABDOMEN: Soft, nontender, nondistended, positive bowel sounds, no palpable masses.  MUSCULCSKELETAL: The calves are nontender to palpation. Full gross active range of motion. Ambulating on own with no acute difficulties.  +2 pitting edema bilateral extremities peripheral pulses intact.  No erythema noted  NEUROLOGICAL: Awake, alert confused power intact in the upper and lower extremities. Sensation is intact to light touch in the upper and lower extremities.   IMMUNOLOGICAL: No lymphatic streaking noted   DERM: No petechiae, rashes, or ecchymoses.          ------------------------- NURSING NOTES AND VITALS REVIEWED ---------------------------  The nursing notes within the ED encounter and vital signs as below have been reviewed by myself  /80 (BP Location: Left arm, Patient Position:  "Lying) Comment (BP Location): forearm  Pulse 67   Temp 37 °C (98.6 °F) (Oral)   Resp 15   Ht 1.6 m (5' 3\")   Wt 105 kg (231 lb 0.7 oz)   SpO2 97%   BMI 40.93 kg/m²     Oxygen Saturation Interpretation: Normal    The cardiac monitor revealed sinus rhythm with a heart rate in the 70s s as interpreted by me. The cardiac monitor was ordered secondary to the patient's heart rate and to monitor the patient for dysrhythmia.       The patient’s available past medical records and past encounters were reviewed.          -----------------------DIAGNOSTIC RESULTS------------------------  LABS:    Labs Reviewed   URINALYSIS WITH REFLEX MICROSCOPIC - Abnormal       Result Value    Color, Urine Yellow      Appearance, Urine Turbid (*)     Specific Gravity, Urine 1.026      pH, Urine 5.5      Protein, Urine 70 (1+) (*)     Glucose, Urine Normal      Blood, Urine NEGATIVE      Ketones, Urine NEGATIVE      Bilirubin, Urine NEGATIVE      Urobilinogen, Urine 4 (2+) (*)     Nitrite, Urine NEGATIVE      Leukocyte Esterase, Urine NEGATIVE     CBC WITH AUTO DIFFERENTIAL - Abnormal    WBC 11.1      nRBC 0.0      RBC 4.52      Hemoglobin 13.1      Hematocrit 44.7      MCV 99      MCH 29.0      MCHC 29.3 (*)     RDW 16.2 (*)     Platelets 187      Neutrophils % 69.2      Immature Granulocytes %, Automated 0.3      Lymphocytes % 19.9      Monocytes % 10.0      Eosinophils % 0.1      Basophils % 0.5      Neutrophils Absolute 7.68      Immature Granulocytes Absolute, Automated 0.03      Lymphocytes Absolute 2.21      Monocytes Absolute 1.11 (*)     Eosinophils Absolute 0.01      Basophils Absolute 0.05     COMPREHENSIVE METABOLIC PANEL - Abnormal    Glucose 106 (*)     Sodium 141      Potassium 3.6      Chloride 103      Bicarbonate 29      Urea Nitrogen 19      Creatinine 1.50      eGFR 39 (*)     Calcium 7.9 (*)     Albumin 3.0 (*)     Alkaline Phosphatase 89      Total Protein 6.5       (*)     Bilirubin, Total 0.5       " (*)     Anion Gap 9     N-TERMINAL PROBNP - Abnormal    PROBNP 33,009 (*)     Narrative:     Reference ranges are based on clinical submission data. These ranges represent the 95th percentile of normal cut-off points. As NT Pro- BNP values approach 1000 pg/ml, clinical symptoms are more likely associated with CHF.   SERIAL TROPONIN, INITIAL ([facility]) - Abnormal    Troponin T, High Sensitivity 160 (*)    SERIAL TROPONIN,  2 HOUR ([facility]) - Abnormal    Troponin T, High Sensitivity 196 (*)    CREATININE - Abnormal    Creatinine 1.50      eGFR 39 (*)    MICROSCOPIC ONLY, URINE - Abnormal    WBC, Urine 1-5      RBC, Urine 1-2      Squamous Epithelial Cells, Urine 10-25 (FEW)      Mucus, Urine 2+      Hyaline Casts, Urine OCCASIONAL (*)    SARS-COV-2 PCR, SYMPTOMATIC - Normal    Coronavirus 2019, PCR Not Detected      Narrative:     This assay has received FDA Emergency Use Authorization (EUA) and is only authorized for the duration of time that circumstances exist to justify the authorization of the emergency use of in vitro diagnostic tests for the detection of SARS-CoV-2 virus and/or diagnosis of COVID-19 infection under section 564(b)(1) of the Act, 21 U.S.C. 360bbb-3(b)(1). This assay is an in vitro diagnostic nucleic acid amplification test for the qualitative detection of SARS-CoV-2 from nasopharyngeal specimens and has been validated for use at [facility]. Negative results do not preclude COVID-19 infections and should not be used as the sole basis for diagnosis, treatment, or other management decisions.     ALCOHOL - Normal    Alcohol <0.010     TROPONIN T SERIES, HIGH SENSITIVITY (0, 2 HR, 6 HR)    Narrative:     The following orders were created for panel order Troponin T Series, High Sensitivity (0, 2HR, 6HR).  Procedure                               Abnormality         Status                     ---------                               -----------         ------                     Serial  Troponin, Initial...[[id number]]  Abnormal            Final result               Serial Troponin, 2 Hour ...[[id number]]  Abnormal            Final result               Serial Troponin, 6 Hour ...[[id number]]                                                   Please view results for these tests on the individual orders.   SERIAL TROPONIN, 6 HOUR ([facility])   BLOOD GAS ARTERIAL FULL PANEL       As interpreted by me, the above displayed labs are abnormal. All other labs obtained during this visit were within normal range or not returned as of this dictation.      EKG Interpretation    normal sinus rhythm at 76 bpm, rightward axis, normal voltage, normal ST segment, and a slight diffuse flattening of the T waves in the septal and lateral leads.  Per attending Note Dr. Wills         CT abdomen pelvis w IV contrast   Final Result   Left renal angiomyolipoma measures 6.6 x 4.7 x 5.5 cm, unchanged in   comparison to prior. Beyond 4 cm, this lesion presents an increased   risk for hemorrhage. Urological consult recommended.        MACRO:   None        Signed by: Anastasia Yoontina 11/13/2023 8:48 PM   Dictation workstation:   [id number]      CT head wo IV contrast   Final Result   Acute intracranial findings.        MACRO:   None        Signed by: Anastasia Yoontina 11/13/2023 8:49 PM   Dictation workstation:   [id number]      US gallbladder   Final Result   1. Cholelithiasis. No additional abnormalities identified.   2. Mild right hydronephrosis.        MACRO:   None.        Signed by: Anastasia Yoontina 11/13/2023 8:14 PM   Dictation workstation:   [id number]      XR chest 1 view   Final Result   Prominent heart size. No focal airspace consolidations or effusions.        MACRO:   None        Signed by: Anastasia Yoontina 11/13/2023 7:33 PM   Dictation workstation:   [id number]              CT abdomen pelvis w IV contrast   Final Result   Left renal angiomyolipoma measures 6.6 x 4.7 x 5.5 cm, unchanged in   comparison to prior. Beyond 4 cm, this lesion presents  an increased   risk for hemorrhage. Urological consult recommended.        MACRO:   None        Signed by: Anastasia Fishman 11/13/2023 8:48 PM   Dictation workstation:   [id number]      CT head wo IV contrast   Final Result   Acute intracranial findings.        MACRO:   None        Signed by: Anastasia Fishman 11/13/2023 8:49 PM   Dictation workstation:   [id number]      US gallbladder   Final Result   1. Cholelithiasis. No additional abnormalities identified.   2. Mild right hydronephrosis.        MACRO:   None.        Signed by: Anastasia Fishman 11/13/2023 8:14 PM   Dictation workstation:   [id number]      XR chest 1 view   Final Result   Prominent heart size. No focal airspace consolidations or effusions.        MACRO:   None        Signed by: Anastasia Fishman 11/13/2023 7:33 PM   Dictation workstation:   [id number]              ------------------------------ ED COURSE/MEDICAL DECISION MAKING----------------------  Medical Decision Making:   Exam: A medically appropriate exam performed, outlined above, given the known history and presentation.    History obtained from: Patient review of records EMS      Social Determinants of Health considered during this visit: Patient lives at home      PAST MEDICAL HISTORY/Chronic Conditions Affecting Care     has a past medical history of CHF (congestive heart failure) (CMS/[facility]), COPD (chronic obstructive pulmonary disease) (CMS/[facility]), Diabetes mellitus (CMS/[facility]), Hypertension, and Stroke (CMS/[facility]).       CC/HPI Summary, Social Determinants of health, Records Reviewed, DDx, testing done/not done, ED Course, Reassessment, disposition considerations/shared decision making with patient, consults, disposition:   Presents with worsening altered mental status General malaise weakness and shortness of breath  Plan:  Tylenol  EKG  CBC  CMP  COVID  Urine  CT head- CSF Spaces: Ex vacuo dilation of the ventricles. Sulci and basal  cisterns are within normal limits. There is no extraaxial fluid  collection.       Parenchyma: Chronic bilateral white matter microvascular disease.  There is no mass effect or midline shift.  There is no intracranial  hemorrhage.      Calvarium: The calvarium is unremarkable.      Paranasal sinuses and mastoids: Visualized paranasal sinuses and  mastoids are clear.     CT ABD pelvis -Left renal angiomyolipoma measures 6.6 x 4.7 x 5.5 cm, unchanged in  comparison to prior. Beyond 4 cm, this lesion presents an increased  risk for hemorrhage. Urological consult recommended.    Chest x-ray- Prominent heart size. No focal airspace consolidations or effusions.    Ultrasound gallbladder: 1. Cholelithiasis. No additional abnormalities identified.  2. Mild right hydronephrosis.  proBNP  Troponin    Medical Decision Making/Differential Diagnosis:  Differentials include but not limited to electrode abnormality versus anemia versus dehydration versus infectious process UTI pneumonia COVID versus acute coronary syndrome versus CHF versus intracranial bleed versus stroke.  Patient is not a TNK candidate onset of symptoms is unknown  Review:  Troponin 160 repeat trop 196  proBNP 33,009  White blood cells 11.1  Hemoglobin 13.1  Glucose 106  Electrolytes within normal limits except calcium 7.9 baseline 9.7 on 9/24/23  albumin 3   baseline 23 on 9/24/23   baseline  11 on 9/24/23  BUN 19  Creatinine 1.5  Patient presented with shortness of breath, confusion weakness.  Troponin is elevated at 160 repeat troponin 196 EKG per attending note no ST elevation no arrhythmia.  Chest x-ray showed prominent heart size no focal airspace consolidation or effusions.  proBNP elevated at 33,009.  Peripheral edema noted.  Concerning for congestive heart failure.  LFTs are elevated from her baseline.  CT of the abdomen pelvis showed left renal angiomyolipoma unchanged from prior comparisons recommended urological consult.  Ultrasound showed cholelithiasis no additional abnormalities identified right mild  hydronephrosis noted.  No complaints of chest pain.  Electrolytes within normal limits except for calcium low at 7.9 baseline of 9.7.  Patient is not anemic.  She is not hypoglycemic.  White blood cell count is within normal limits.  Urine is not consistent with UTI.  Creatinine 1.4 BUN 19.  CT of the head showed no acute abnormality.  Patient seen and evaluated with attending physician Dr. Wills  Unsure etiology of patient's altered mental status noted to have elevated troponins.  Congestive heart failure with history of the same.  Hypocalcemia.  Patient will need to be admitted to the hospital for further evaluation and treatment Case was discussed with hospitalist by attending physician is agreed to meet the patient under his service.  PROCEDURES  Unless otherwise noted below, none      CONSULTS:   None      Diagnoses as of 11/13/23 2316   Generalized weakness   COPD (chronic obstructive pulmonary disease) (CMS/HCC)   Altered mental status, unspecified altered mental status type   Congestive heart failure, unspecified HF chronicity, unspecified heart failure type (CMS/HCC)   Anemia, unspecified type   Elevated troponin   Hypocalcemia   Transaminasemia         This patient has remained hemodynamically stable during their ED course.      Critical Care: none       Counseling:  The emergency provider has spoken with the patient and discussed today’s results, in addition to providing specific details for the plan of care and counseling regarding the diagnosis and prognosis.  Questions are answered at this time and they are agreeable with the plan.         --------------------------------- IMPRESSION AND DISPOSITION ---------------------------------    IMPRESSION  1. COPD (chronic obstructive pulmonary disease) (CMS/HCC)    2. Generalized weakness    3. Altered mental status, unspecified altered mental status type    4. Congestive heart failure, unspecified HF chronicity, unspecified heart failure type (CMS/HCC)    5.  Anemia, unspecified type    6. Elevated troponin    7. Hypocalcemia    8. Transaminasemia        DISPOSITION  Disposition: Admit hospitalist service  Patient condition is stable        NOTE: This report was transcribed using voice recognition software. Every effort was made to ensure accuracy; however, inadvertent computerized transcription errors may be present      Marnie Tesfaye, AILYN-APRIL  11/13/23 [id number]
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
## 2023-11-13 NOTE — PROGRESS NOTES
Attestation note/supervisory note for APRIL Mera      The patient is a 63-year-old female presenting to the emergency department by EMS from home for evaluation of altered mental status, generalized malaise and fatigue, generalized weakness, and shortness of breath.  The patient reports that her symptoms are chronic.  She states that she does not have any new changes today.  She does use supplemental oxygen at home for her COPD/CHF.  She states that she also has swelling of her legs but this is also a chronic issue.  EMS reports that her son called them to have her brought to the emergency room initially around 1430 today because he thought that she was more confused than usual.  The patient reportedly signed off and refused to come to the emergency room at that time but the son called back just prior to arrival and she did agree to come to the emergency room at this time.  They report that her  is currently an inpatient in the hospital so she is trying to do things for herself at home.  The patient states that she always is a little confused but it has been going on for several years.  She states that she has chronic back pain but that is not a new issue either.  She also reports chronic shortness of breath without recent changes.  She denies any chest pain.  No abdominal pain.  She denies any nausea or vomiting.  No diarrhea or constipation.  No urinary complaints.  No fever or chills.  No sick contacts or recent travel.  No vaginal discharge.  EMS is not able to provide a last known well time from the son as the son reportedly lives in [place], [place] and was only able to say that the patient seemed more confused when talking to him on the phone today.  All pertinent positives and negatives are recorded above.  All other systems reviewed and otherwise negative.  Vital signs with diastolic hypertension but otherwise within normal limits.  Physical exam with a well-nourished well-developed female with  obesity but no evidence of acute distress.  HEENT exam with dry mucous membranes but otherwise unremarkable.  She is able to converse without difficulty.  She has no evidence of airway compromise or respiratory distress.  Abdominal exam is benign.  She does have bilateral lower extremity pitting edema but no visible or palpable bony deformity.  She has no gross motor, neurologic or vascular deficits on exam.  NIH stroke scale score of 0.      EKG with normal sinus rhythm at 76 bpm, rightward axis, normal voltage, normal ST segment, and a slight diffuse flattening of the T waves in the septal and lateral leads.      Diagnostic labs with elevated BNP, elevated transaminases, elevated Troponin T but otherwise unremarkable.      Initial Troponin T 160.  Repeat Troponin T pending at the time of admission      COVID-19 testing negative      CT abdomen pelvis w IV contrast   Final Result   Left renal angiomyolipoma measures 6.6 x 4.7 x 5.5 cm, unchanged in   comparison to prior. Beyond 4 cm, this lesion presents an increased   risk for hemorrhage. Urological consult recommended.        MACRO:   None        Signed by: Anastasia Fishman 11/13/2023 8:48 PM   Dictation workstation:   [id number]      CT head wo IV contrast   Final Result   Acute intracranial findings.        MACRO:   None        Signed by: Anastasia Fishman 11/13/2023 8:49 PM   Dictation workstation:   [id number]      US gallbladder   Final Result   1. Cholelithiasis. No additional abnormalities identified.   2. Mild right hydronephrosis.        MACRO:   None.        Signed by: Anastasia Fishman 11/13/2023 8:14 PM   Dictation workstation:   [id number]      XR chest 1 view   Final Result   Prominent heart size. No focal airspace consolidations or effusions.        MACRO:   None        Signed by: Anastasia Fishman 11/13/2023 7:33 PM   Dictation workstation:   [id number]           The patient does not have any evidence of airway compromise or respiratory distress on exam but she does have an  elevated troponin T and an elevated BNP.  Chest x-ray does not show any evidence of CHF or pneumothorax.  No evidence of pneumonia.  The patient does have elevated AST and ALT but she does not have any acute process on CT abdomen pelvis.  She does have evidence of cholelithiasis without cholecystitis on the gallbladder ultrasound.  The patient does not have any acute process on the CT head.  She reports that her confusion is chronic but EMS reports that her son was very concerned and felt that she was more confused than usual.      The patient was admitted for further management and trending of her cardiac enzymes.      I personally saw the patient and performed a substantive portion of the visit including all aspects of the medical decision making.        I reviewed the results of the diagnostic labs and diagnostic imaging.  Formal radiology reading was completed by the radiologist      Anna Wills MD

## 2023-11-14 PROBLEM — Z78.9 DECREASED ACTIVITIES OF DAILY LIVING (ADL): Status: ACTIVE | Noted: 2023-11-14

## 2023-11-14 PROBLEM — N28.89 RENAL MASS: Status: ACTIVE | Noted: 2023-11-14

## 2023-11-14 PROBLEM — I10 HYPERTENSION: Status: ACTIVE | Noted: 2023-11-14

## 2023-11-14 PROBLEM — E11.9 DIABETES MELLITUS (MULTI): Status: ACTIVE | Noted: 2023-11-14

## 2023-11-14 LAB
ALBUMIN SERPL-MCNC: 3 G/DL (ref 3.5–5)
ALP BLD-CCNC: 88 U/L (ref 35–125)
ALT SERPL-CCNC: 247 U/L (ref 5–40)
ANION GAP SERPL CALC-SCNC: 11 MMOL/L
AST SERPL-CCNC: 638 U/L (ref 5–40)
BILIRUB SERPL-MCNC: 0.5 MG/DL (ref 0.1–1.2)
BUN SERPL-MCNC: 20 MG/DL (ref 8–25)
CALCIUM SERPL-MCNC: 8.4 MG/DL (ref 8.5–10.4)
CHLORIDE SERPL-SCNC: 103 MMOL/L (ref 97–107)
CK MB CFR SERPL CALC: 1 %MB OF CK
CK MB CFR SERPL CALC: 2 %MB OF CK
CK MB SERPL-CCNC: 5.7 NG/ML
CK MB SERPL-CCNC: 6.8 NG/ML
CK SERPL-CCNC: 408 U/L (ref 24–195)
CO2 SERPL-SCNC: 29 MMOL/L (ref 24–31)
CREAT SERPL-MCNC: 1.2 MG/DL (ref 0.4–1.6)
ERYTHROCYTE [DISTWIDTH] IN BLOOD BY AUTOMATED COUNT: 16.4 % (ref 11.5–14.5)
GFR SERPL CREATININE-BSD FRML MDRD: 50 ML/MIN/1.73M*2
GLUCOSE BLD MANUAL STRIP-MCNC: 183 MG/DL (ref 74–99)
GLUCOSE BLD MANUAL STRIP-MCNC: 202 MG/DL (ref 74–99)
GLUCOSE BLD MANUAL STRIP-MCNC: 313 MG/DL (ref 74–99)
GLUCOSE BLD MANUAL STRIP-MCNC: 86 MG/DL (ref 74–99)
GLUCOSE SERPL-MCNC: 76 MG/DL (ref 65–99)
HCT VFR BLD AUTO: 52.5 % (ref 36–46)
HGB BLD-MCNC: 14.5 G/DL (ref 12–16)
MCH RBC QN AUTO: 29.4 PG (ref 26–34)
MCHC RBC AUTO-ENTMCNC: 27.6 G/DL (ref 32–36)
MCV RBC AUTO: 106 FL (ref 80–100)
NRBC BLD-RTO: 0 /100 WBCS (ref 0–0)
PLATELET # BLD AUTO: 164 X10*3/UL (ref 150–450)
POTASSIUM SERPL-SCNC: 3.8 MMOL/L (ref 3.4–5.1)
PROT SERPL-MCNC: 6.4 G/DL (ref 5.9–7.9)
RBC # BLD AUTO: 4.94 X10*6/UL (ref 4–5.2)
SODIUM SERPL-SCNC: 143 MMOL/L (ref 133–145)
TROPONIN T SERPL-MCNC: 170 NG/L
TROPONIN T SERPL-MCNC: 174 NG/L
TROPONIN T SERPL-MCNC: 206 NG/L
VANCOMYCIN SERPL-MCNC: 10 UG/ML (ref 10–20)
WBC # BLD AUTO: 9.2 X10*3/UL (ref 4.4–11.3)

## 2023-11-14 PROCEDURE — 82553 CREATINE MB FRACTION: CPT | Mod: TRILAB | Performed by: INTERNAL MEDICINE

## 2023-11-14 PROCEDURE — 84484 ASSAY OF TROPONIN QUANT: CPT | Performed by: INTERNAL MEDICINE

## 2023-11-14 PROCEDURE — 36415 COLL VENOUS BLD VENIPUNCTURE: CPT | Performed by: INTERNAL MEDICINE

## 2023-11-14 PROCEDURE — 94664 DEMO&/EVAL PT USE INHALER: CPT

## 2023-11-14 PROCEDURE — 94660 CPAP INITIATION&MGMT: CPT

## 2023-11-14 PROCEDURE — 9420000001 HC RT PATIENT EDUCATION 5 MIN

## 2023-11-14 PROCEDURE — 97530 THERAPEUTIC ACTIVITIES: CPT | Mod: GP

## 2023-11-14 PROCEDURE — 82947 ASSAY GLUCOSE BLOOD QUANT: CPT

## 2023-11-14 PROCEDURE — S0073 INJECTION, AZTREONAM, 500 MG: HCPCS | Performed by: INTERNAL MEDICINE

## 2023-11-14 PROCEDURE — 2020000001 HC ICU ROOM DAILY

## 2023-11-14 PROCEDURE — 2500000005 HC RX 250 GENERAL PHARMACY W/O HCPCS: Performed by: INTERNAL MEDICINE

## 2023-11-14 PROCEDURE — 2500000002 HC RX 250 W HCPCS SELF ADMINISTERED DRUGS (ALT 637 FOR MEDICARE OP, ALT 636 FOR OP/ED): Performed by: INTERNAL MEDICINE

## 2023-11-14 PROCEDURE — 2500000004 HC RX 250 GENERAL PHARMACY W/ HCPCS (ALT 636 FOR OP/ED): Performed by: INTERNAL MEDICINE

## 2023-11-14 PROCEDURE — 80053 COMPREHEN METABOLIC PANEL: CPT | Performed by: INTERNAL MEDICINE

## 2023-11-14 PROCEDURE — 94640 AIRWAY INHALATION TREATMENT: CPT

## 2023-11-14 PROCEDURE — 2500000001 HC RX 250 WO HCPCS SELF ADMINISTERED DRUGS (ALT 637 FOR MEDICARE OP): Performed by: INTERNAL MEDICINE

## 2023-11-14 PROCEDURE — 85027 COMPLETE CBC AUTOMATED: CPT | Performed by: INTERNAL MEDICINE

## 2023-11-14 PROCEDURE — 99223 1ST HOSP IP/OBS HIGH 75: CPT | Performed by: INTERNAL MEDICINE

## 2023-11-14 PROCEDURE — 97162 PT EVAL MOD COMPLEX 30 MIN: CPT | Mod: GP

## 2023-11-14 PROCEDURE — 84484 ASSAY OF TROPONIN QUANT: CPT | Performed by: EMERGENCY MEDICINE

## 2023-11-14 PROCEDURE — 80202 ASSAY OF VANCOMYCIN: CPT | Performed by: INTERNAL MEDICINE

## 2023-11-14 PROCEDURE — 82550 ASSAY OF CK (CPK): CPT | Performed by: INTERNAL MEDICINE

## 2023-11-14 PROCEDURE — 94799 UNLISTED PULMONARY SVC/PX: CPT

## 2023-11-14 PROCEDURE — 96372 THER/PROPH/DIAG INJ SC/IM: CPT | Performed by: INTERNAL MEDICINE

## 2023-11-14 PROCEDURE — 97166 OT EVAL MOD COMPLEX 45 MIN: CPT | Mod: GO

## 2023-11-14 RX ORDER — GABAPENTIN 600 MG/1
300 TABLET ORAL 3 TIMES DAILY
Status: DISCONTINUED | OUTPATIENT
Start: 2023-11-14 | End: 2023-11-18 | Stop reason: HOSPADM

## 2023-11-14 RX ORDER — AZTREONAM 1 G/1
1 INJECTION, POWDER, LYOPHILIZED, FOR SOLUTION INTRAMUSCULAR; INTRAVENOUS EVERY 8 HOURS
Status: DISCONTINUED | OUTPATIENT
Start: 2023-11-14 | End: 2023-11-14

## 2023-11-14 RX ORDER — IBUPROFEN 600 MG/1
600 TABLET ORAL EVERY 8 HOURS PRN
Status: DISCONTINUED | OUTPATIENT
Start: 2023-11-14 | End: 2023-11-18 | Stop reason: HOSPADM

## 2023-11-14 RX ORDER — VANCOMYCIN 1.5 G/300ML
1500 INJECTION, SOLUTION INTRAVENOUS EVERY 24 HOURS
Status: DISCONTINUED | OUTPATIENT
Start: 2023-11-14 | End: 2023-11-15 | Stop reason: DRUGHIGH

## 2023-11-14 RX ADMIN — SPIRONOLACTONE 12.5 MG: 25 TABLET ORAL at 10:01

## 2023-11-14 RX ADMIN — GABAPENTIN 300 MG: 600 TABLET, FILM COATED ORAL at 11:19

## 2023-11-14 RX ADMIN — METHYLPREDNISOLONE SODIUM SUCCINATE 40 MG: 40 INJECTION, POWDER, FOR SOLUTION INTRAMUSCULAR; INTRAVENOUS at 11:17

## 2023-11-14 RX ADMIN — BUDESONIDE INHALATION 0.5 MG: 0.5 SUSPENSION RESPIRATORY (INHALATION) at 19:36

## 2023-11-14 RX ADMIN — FUROSEMIDE 40 MG: 10 INJECTION, SOLUTION INTRAMUSCULAR; INTRAVENOUS at 10:02

## 2023-11-14 RX ADMIN — IPRATROPIUM BROMIDE AND ALBUTEROL SULFATE 3 ML: 2.5; .5 SOLUTION RESPIRATORY (INHALATION) at 08:07

## 2023-11-14 RX ADMIN — METHYLPREDNISOLONE SODIUM SUCCINATE 40 MG: 40 INJECTION, POWDER, FOR SOLUTION INTRAMUSCULAR; INTRAVENOUS at 23:13

## 2023-11-14 RX ADMIN — AZTREONAM 1 G: 1 INJECTION, POWDER, LYOPHILIZED, FOR SOLUTION INTRAMUSCULAR; INTRAVENOUS at 18:16

## 2023-11-14 RX ADMIN — GABAPENTIN 300 MG: 600 TABLET, FILM COATED ORAL at 15:51

## 2023-11-14 RX ADMIN — AZTREONAM 1 G: 1 INJECTION, POWDER, LYOPHILIZED, FOR SOLUTION INTRAMUSCULAR; INTRAVENOUS at 11:18

## 2023-11-14 RX ADMIN — IPRATROPIUM BROMIDE AND ALBUTEROL SULFATE 3 ML: 2.5; .5 SOLUTION RESPIRATORY (INHALATION) at 19:36

## 2023-11-14 RX ADMIN — IPRATROPIUM BROMIDE AND ALBUTEROL SULFATE 3 ML: 2.5; .5 SOLUTION RESPIRATORY (INHALATION) at 15:17

## 2023-11-14 RX ADMIN — HEPARIN SODIUM 5000 UNITS: 5000 INJECTION, SOLUTION INTRAVENOUS; SUBCUTANEOUS at 23:12

## 2023-11-14 RX ADMIN — VANCOMYCIN 1500 MG: 1.5 INJECTION, SOLUTION INTRAVENOUS at 21:39

## 2023-11-14 RX ADMIN — INSULIN LISPRO 1 UNITS: 100 INJECTION, SOLUTION INTRAVENOUS; SUBCUTANEOUS at 12:35

## 2023-11-14 RX ADMIN — Medication 4 L/MIN: at 19:37

## 2023-11-14 RX ADMIN — GABAPENTIN 300 MG: 600 TABLET, FILM COATED ORAL at 21:43

## 2023-11-14 RX ADMIN — HEPARIN SODIUM 5000 UNITS: 5000 INJECTION, SOLUTION INTRAVENOUS; SUBCUTANEOUS at 11:17

## 2023-11-14 RX ADMIN — VANCOMYCIN 1000 MG: 1 INJECTION, SOLUTION INTRAVENOUS at 00:24

## 2023-11-14 RX ADMIN — INSULIN LISPRO 4 UNITS: 100 INJECTION, SOLUTION INTRAVENOUS; SUBCUTANEOUS at 18:16

## 2023-11-14 RX ADMIN — AMPICILLIN SODIUM AND SULBACTAM SODIUM 3 G: 2; 1 INJECTION, POWDER, FOR SOLUTION INTRAMUSCULAR; INTRAVENOUS at 23:12

## 2023-11-14 SDOH — SOCIAL STABILITY: SOCIAL INSECURITY: ABUSE: ADULT

## 2023-11-14 ASSESSMENT — ENCOUNTER SYMPTOMS
SHORTNESS OF BREATH: 1
FEVER: 0
MYALGIAS: 1
ARTHRALGIAS: 1
CHILLS: 0
CONFUSION: 0
APPETITE CHANGE: 1
ACTIVITY CHANGE: 1

## 2023-11-14 ASSESSMENT — COGNITIVE AND FUNCTIONAL STATUS - GENERAL
MOVING FROM LYING ON BACK TO SITTING ON SIDE OF FLAT BED WITH BEDRAILS: A LITTLE
STANDING UP FROM CHAIR USING ARMS: A LOT
DAILY ACTIVITIY SCORE: 18
TOILETING: A LOT
TURNING FROM BACK TO SIDE WHILE IN FLAT BAD: A LITTLE
DAILY ACTIVITIY SCORE: 16
DRESSING REGULAR LOWER BODY CLOTHING: A LOT
PERSONAL GROOMING: A LITTLE
DRESSING REGULAR UPPER BODY CLOTHING: A LITTLE
CLIMB 3 TO 5 STEPS WITH RAILING: TOTAL
HELP NEEDED FOR BATHING: A LOT
TOILETING: A LOT
MOVING FROM LYING ON BACK TO SITTING ON SIDE OF FLAT BED WITH BEDRAILS: A LOT
MOVING FROM LYING ON BACK TO SITTING ON SIDE OF FLAT BED WITH BEDRAILS: A LOT
MOBILITY SCORE: 10
DRESSING REGULAR LOWER BODY CLOTHING: A LOT
MOBILITY SCORE: 18
PERSONAL GROOMING: A LITTLE
TURNING FROM BACK TO SIDE WHILE IN FLAT BAD: A LOT
WALKING IN HOSPITAL ROOM: TOTAL
TOILETING: A LITTLE
DAILY ACTIVITIY SCORE: 16
DRESSING REGULAR LOWER BODY CLOTHING: A LITTLE
CLIMB 3 TO 5 STEPS WITH RAILING: A LITTLE
MOVING TO AND FROM BED TO CHAIR: A LOT
HELP NEEDED FOR BATHING: A LITTLE
STANDING UP FROM CHAIR USING ARMS: A LITTLE
WALKING IN HOSPITAL ROOM: A LITTLE
MOVING TO AND FROM BED TO CHAIR: A LITTLE
EATING MEALS: A LITTLE
PERSONAL GROOMING: A LITTLE
DRESSING REGULAR UPPER BODY CLOTHING: A LITTLE
MOVING TO AND FROM BED TO CHAIR: A LOT
STANDING UP FROM CHAIR USING ARMS: TOTAL
HELP NEEDED FOR BATHING: A LOT
PATIENT BASELINE BEDBOUND: NO
DRESSING REGULAR UPPER BODY CLOTHING: A LITTLE
WALKING IN HOSPITAL ROOM: TOTAL
TURNING FROM BACK TO SIDE WHILE IN FLAT BAD: A LOT

## 2023-11-14 ASSESSMENT — ACTIVITIES OF DAILY LIVING (ADL)
GROOMING: NEEDS ASSISTANCE
ADL_ASSISTANCE: INDEPENDENT
BATHING_ASSISTANCE: MODERATE
ASSISTIVE_DEVICE: WHEELCHAIR
HEARING - LEFT EAR: FUNCTIONAL
DRESSING YOURSELF: NEEDS ASSISTANCE
JUDGMENT_ADEQUATE_SAFELY_COMPLETE_DAILY_ACTIVITIES: YES
ADL_ASSISTANCE: INDEPENDENT
FEEDING YOURSELF: INDEPENDENT
ADEQUATE_TO_COMPLETE_ADL: YES
TOILETING: NEEDS ASSISTANCE
BATHING: NEEDS ASSISTANCE
WALKS IN HOME: NEEDS ASSISTANCE
PATIENT'S MEMORY ADEQUATE TO SAFELY COMPLETE DAILY ACTIVITIES?: YES
HEARING - RIGHT EAR: FUNCTIONAL

## 2023-11-14 ASSESSMENT — PAIN - FUNCTIONAL ASSESSMENT
PAIN_FUNCTIONAL_ASSESSMENT: 0-10

## 2023-11-14 ASSESSMENT — PAIN SCALES - GENERAL
PAINLEVEL_OUTOF10: 10 - WORST POSSIBLE PAIN
PAINLEVEL_OUTOF10: 0 - NO PAIN

## 2023-11-14 NOTE — H&P
History Of Present Illness  Subha Miller is a 63 y.o. female presenting with a change in mental status.  Her son talked to her today and was very concerned that she was not her normal self.  Patient herself says she feels weak and is having trouble walking but is not able to describe any other specific symptoms.     Past Medical History  She has a past medical history of CHF (congestive heart failure) (CMS/HCC), COPD (chronic obstructive pulmonary disease) (CMS/HCC), Diabetes mellitus (CMS/HCC), Hypertension, and Stroke (CMS/HCC).    Surgical History  She has a past surgical history that includes Back surgery; Tonsillectomy; and  section, low transverse.     Social History  She reports that she has been smoking cigarettes. She has a 12.50 pack-year smoking history. She has never used smokeless tobacco. She reports that she does not drink alcohol and does not use drugs.    Family History  Negative for premature heart disease     Allergies  Penicillins, Codeine, and Morphine    Review of Systems-unobtainable.  Too confused for a laundry list of questions     Physical Exam  She is groggy and making some upper airway noises when she breathes but she can wake up follow commands and answer some questions.  Head atraumatic normocephalic  Pupils equal round reactive light extraocular motion intact  Mouth normal appearing tongue and oropharynx  Neck supple without thyromegaly  Lymph nodes no cervical or axillary lymphadenopathy  Heart regular rate and rhythm without murmurs rubs or gallops  Lungs intermittent very faint expiratory wheeze  Abdomen soft nontender nondistended  Extremities very tight bilateral leg edema going all the way up to the thighs right erythematous shins bilateral.  Neuro cranial nerves intact good tone and strength in arms  Difficulty lifting either leg up off the bed  Musculoskeletal normal passive range of motion shoulders elbows  Vague sense of diffuse pain in either leg with range of motion  testing.  Skin see above description of erythematous shins     Last Recorded Vitals  /80 (BP Location: Left arm, Patient Position: Lying) Comment (BP Location): forearm  Pulse 67   Temp 37 °C (98.6 °F) (Oral)   Resp 15   Wt 105 kg (231 lb 0.7 oz)   SpO2 97%     Relevant Results  Cardiac troponins 160 and 196  creatinine 1.5.  EKG shows low voltage.  Recent echocardiogram shows aortic stenosis.  CT of the abdomen shows a longstanding kidney mass.  Right upper quadrant ultrasound shows some gallstones.     Assessment/Plan   #1-COPD-she certainly looks like somebody who could be under ventilating and this could explain her lethargy and confusion.  I have ordered a blood gas.  She might need BiPAP she might need steroids she might need a pulmonary consult.  At the very least she will get oxygen Pulmicort and DuoNebs  #2-CHF-she is fluid overloaded and she has very tight edema in both of her legs.  She would benefit from diuresis.  Ordering 40 mg IV Lasix now and again daily starting tomorrow  #3-leg cellulitis.  Treat with vancomycin  #4-elevated troponins-there is not much about this presentation that suggest acute coronary syndrome.  We will check CKs to retroactively and prospectively and see how those look.  #5-elevated AST.  She denies alcohol usage and does have some gallstones.  This is not an acute gallbladder situation    Use subcu heparin for DVT prophylaxis sliding-scale insulin and ordering a PT OT eval.       Marcell Esquivel MD

## 2023-11-14 NOTE — PROGRESS NOTES
Physical Therapy    Physical Therapy Evaluation & Treatment    Patient Name: Subha Miller  MRN: [id number]  Today's Date: 11/14/2023   Time Calculation  Start Time: 1311  Stop Time: 1334  Time Calculation (min): 23 min    Assessment/Plan   PT Assessment  PT Assessment Results: Decreased strength, Decreased range of motion, Decreased endurance, Impaired balance, Decreased mobility, Decreased coordination, Decreased cognition, Impaired judgement, Decreased safety awareness  Rehab Prognosis: Good  Evaluation/Treatment Tolerance: Patient tolerated treatment well  Medical Staff Made Aware: Yes  End of Session Communication: Bedside nurse  End of Session Patient Position: Up in chair  IP OR SWING BED PT PLAN  Inpatient or Swing Bed: Inpatient  PT Plan  Treatment/Interventions: Bed mobility, Transfer training, Gait training, Stair training, Balance training, Neuromuscular re-education, Strengthening, Endurance training, Range of motion, Therapeutic exercise, Therapeutic activity, Home exercise program  PT Plan: Skilled PT  PT Frequency: 4 times per week  PT Discharge Recommendations: Moderate intensity level of continued care  PT Recommended Transfer Status: Assist x2, Assistive device  PT - OK to Discharge: Yes      Subjective     General Visit Information:  General  Reason for Referral: Impaired Mobility  Referred By: Dr. Esquivel  Missed Visit: Yes  Missed Visit Reason: Cancel  Family/Caregiver Present: No  Prior to Session Communication: Bedside nurse  Patient Position Received: Bed, 3 rail up  Preferred Learning Style: verbal  General Comment: 63 year old female admits with COPD, CHF, LE cellulitis, elevated Tp  Home Living:  Home Living  Type of Home: Apartment  Lives With: Spouse  Home Layout: One level  Home Access: No concerns  Bathroom Shower/Tub: Tub/shower unit  Bathroom Equipment: Grab bars in shower, Hand-held shower hose  Prior Level of Function:  Prior Function Per Pt/Caregiver Report  Level of Evangeline:  "Independent with ADLs and functional transfers, Independent with homemaking with ambulation  Receives Help From: Family  ADL Assistance: Independent  Homemaking Assistance: Independent  Ambulatory Assistance: Independent (no AD)  Precautions:  Precautions  Medical Precautions: Fall precautions    Objective   Pain:  Pain Assessment  Pain Assessment: 0-10  Pain Score:  (\"30\")  Pain Type: Acute pain  Pain Location: Leg  Pain Orientation: Left, Right  Pain Interventions: Ambulation/increased activity  Response to Interventions: Decreased pain reported OOB and sitting in chair but unrated  Cognition:  Cognition  Overall Cognitive Status: Impaired  Orientation Level: Disoriented to time (\"1998\")  Safety/Judgement: Exceptions to WFL  Insight: Moderate (Surprised to find difficulty with her movements today)  Impulsive: Mildly    General Assessments:  Activity Tolerance  Endurance: Decreased tolerance for upright activites  Rate of Perceived Exertion (RPE): 10/10 with transfer to chair    Sensation  Light Touch: No apparent deficits    Static Sitting Balance  Static Sitting-Balance Support: Bilateral upper extremity supported, Feet supported  Static Sitting-Level of Assistance: Minimum assistance  Static Sitting-Comment/Number of Minutes: 5    Static Standing Balance  Static Standing-Balance Support: Bilateral upper extremity supported  Static Standing-Level of Assistance: Maximum assistance  Static Standing-Comment/Number of Minutes: 1  Functional Assessments:  Bed Mobility  Bed Mobility: Yes  Bed Mobility 1  Bed Mobility 1: Supine to sitting  Level of Assistance 1: Moderate assistance, Moderate verbal cues  Bed Mobility Comments 1: R rail use    Transfers  Transfer: Yes  Transfer 1  Transfer From 1: Bed to  Transfer to 1: Chair with arms  Technique 1: Stand pivot  Transfer Device 1: Walker  Transfer Level of Assistance 1: Maximum assistance, Maximum tactile cues, Maximum verbal cues  Trials/Comments 1: B knee " buckling  Transfers 2  Transfer From 2: Chair with arms to  Transfer to 2: Stand  Technique 2: Sit to stand  Transfer Device 2: Walker  Transfer Level of Assistance 2: Maximum assistance, Maximum verbal cues  Trials/Comments 2: cues on safety and posture    Ambulation/Gait Training  Ambulation/Gait Training Performed: No-unable  Extremity/Trunk Assessments:  RLE   RLE : Exceptions to WFL  Strength RLE  RLE Overall Strength: Deficits  R Hip Flexion: 2/5  R Knee Flexion: 2/5  R Knee Extension: 2/5  LLE   LLE : Exceptions to WFL  Strength LLE  LLE Overall Strength: Deficits  L Hip Flexion: 2/5  L Knee Flexion: 2/5  L Knee Extension: 2/5  Treatments:  Bed Mobility  Bed Mobility: Yes  Bed Mobility 1  Bed Mobility 1: Supine to sitting  Level of Assistance 1: Moderate assistance, Moderate verbal cues  Bed Mobility Comments 1: R rail use    Transfers  Transfer: Yes  Transfer 1  Transfer From 1: Bed to  Transfer to 1: Chair with arms  Technique 1: Stand pivot  Transfer Device 1: Walker  Transfer Level of Assistance 1: Maximum assistance, Maximum tactile cues, Maximum verbal cues  Trials/Comments 1: B knee buckling  Transfers 2  Transfer From 2: Chair with arms to  Transfer to 2: Stand  Technique 2: Sit to stand  Transfer Device 2: Walker  Transfer Level of Assistance 2: Maximum assistance, Maximum verbal cues  Trials/Comments 2: cues on safety and posture    Education as below  Outcome Measures:  [facility] Basic Mobility  Turning from your back to your side while in a flat bed without using bedrails: A lot  Moving from lying on your back to sitting on the side of a flat bed without using bedrails: A lot  Moving to and from bed to chair (including a wheelchair): A lot  Standing up from a chair using your arms (e.g. wheelchair or bedside chair): A lot  To walk in hospital room: Total  Climbing 3-5 steps with railing: Total  Basic Mobility - Total Score: 10    Encounter Problems       Encounter Problems (Active)       Balance        Sitting Balance (Progressing)       Start:  11/14/23    Expected End:  11/28/23       Pt will demonstrate good sitting balance to promote safe engagement with out of bed activities           Standing Balance (Progressing)       Start:  11/14/23    Expected End:  11/28/23       Pt will demonstrate good static standing balance to promote safe participation with out of bed activity, transfers, and mobility              Mobility       Ambulation (Progressing)       Start:  11/14/23    Expected End:  11/28/23       Pt will ambulate 50' modified independent assist with LRD to promote safe home mobility              Pain - Adult          Safety       Safe Mobility Techniques (Progressing)       Start:  11/14/23    Expected End:  11/28/23       Pt will correctly identify and demonstrate safe mobility techniques to reduce their risks for falls during their acute care stay               Transfers       Supine to sit (Progressing)       Start:  11/14/23    Expected End:  11/28/23       Pt will transfer supine to sitting at edge of bed with modified independent assist to promote acute care out of bed activity             Sit to stand (Progressing)       Start:  11/14/23    Expected End:  11/28/23       Pt will transfer sit to standing position with modified independent assist and LRD to promote safe out of bed activity           Bed to Chair (Progressing)       Start:  11/14/23    Expected End:  11/28/23       Pt will transfer from sitting edge of bed to the chair with modified independent assist and LRD to promote out of bed activity and reduce the risks of prolonged acute care bedrest                  Education Documentation  Mobility Training, taught by Reinaldo Cooper, PT at 11/14/2023  3:20 PM.  Learner: Patient  Readiness: Acceptance  Method: Explanation  Response: Verbalizes Understanding  Comment: safe mobility techniques, fall risk management, AD use, risks of prolonged bedrest    Education Comments  No comments  found.

## 2023-11-14 NOTE — NURSING NOTE
COPD education given to patient. Discussed importance of taking medications as prescribed/following up with physician appointments. Smoking cessation education given to patient.

## 2023-11-14 NOTE — PROGRESS NOTES
"Vancomycin Dosing by Pharmacy- FOLLOW UP    Subha Miller is a 63 y.o. year old female who Pharmacy has been consulted for vancomycin dosing for cellulitis, skin and soft tissue. Based on the patient's indication and renal status this patient is being dosed based on a goal AUC of 400-600.     Renal function is currently improving.    Current vancomycin dose: 1000 mg given every 24 hours    Estimated vancomycin AUC on current dose: 326 mg/L.hr     Visit Vitals  /58   Pulse 87   Temp 36.4 °C (97.5 °F) (Temporal)   Resp 16        Lab Results   Component Value Date    CREATININE 1.20 11/14/2023    CREATININE 1.50 11/13/2023    CREATININE 1.50 11/13/2023    CREATININE 0.94 09/24/2023    CREATININE 0.90 09/23/2023    CREATININE 0.91 09/23/2023    CREATININE 0.87 09/23/2023        Patient weight is No results found for: \"PTWEIGHT\"    No results found for: \"CULTURE\"     I/O last 3 completed shifts:  In: - (0 mL/kg)   Out: 300 (3 mL/kg) [Urine:300 (0.1 mL/kg/hr)]  Weight: 100.4 kg   [unfilled]    Lab Results   Component Value Date    PATIENTTEMP 37.0 11/13/2023    PATIENTTEMP 37.0 09/19/2023        Assessment/Plan    Below goal AUC. Orders placed for new vancomcyin regimen of 1500 mg every 24 hours to begin at 2100 today.     This dosing regimen is predicted by [employer] to result in the following pharmacokinetic parameters:  Loading dose: N/A  Regimen: 1500 mg IV every 24 hours.  Start time: 12:24 on 11/14/2023  Exposure target: AUC24 (range)400-600 mg/L.hr   AUC24,ss: 480 mg/L.hr  Probability of AUC24 > 400: 80 %  Ctrough,ss: 13.7 mg/L  Probability of Ctrough,ss > 20: 10 %  Probability of nephrotoxicity (Lodise ISSA 2009): 9 %    The next level will be obtained on 11/15 with am labs. May be obtained sooner if clinically indicated.   Will continue to monitor renal function daily while on vancomycin and order serum creatinine at least every 48 hours if not already ordered.  Follow for continued vancomycin needs, " clinical response, and signs/symptoms of toxicity.       Rosanna Sr, PharmD

## 2023-11-14 NOTE — CONSULTS
Consults    Reason For Consult  Elevated LFTs question cholecystitis from Dr Patel     Location [facility]     History Of Present Illness  Subha Miller is a 63 y.o. female on day 1 of admission presenting with COPD (chronic obstructive pulmonary disease) (CMS/[facility]) and was admitted with COPD, CHF, leg cellulitis, elevated troponins, elevated AST.  We were asked to see the patient as above. H&P also states that the patient presented with a change in mental status.  The patient states that she came in because her legs are really bad and she could not stand.    When initially asked the patient said that she has not been having any abdominal pain, nausea or vomiting prior to admission, since, or now but then changed her answer stating that she has been getting bilateral lower abdominal pain for years that she does not know how many years every day every time she eats something no matter what she eats right after she eats something that usually lasts a half hour or more that is intermittent, without any radiation, describes as dull, achy, crampy, upon ER presentation was a 0 and is currently 2 because she just ate and just had cream of wheat, eggs, and yogurt.  Says that the bilateral lower abdominal pain is chronically there before she eats as well a little bit but eating always makes it a little bit worse. Denies ever having any right upper quadrant, epigastric, or bilateral upper abdominal pain. Fried foods don't bother her. Foods that are heavy and greasy give her really bad heartburn and nausea and then she vomits sometimes without blood.  Denies eating anything out of the ordinary, any sick contacts or any recent travels.  Denies ever having an EGD.  Says that last colonoscopy was over 20 years ago for the indications of screening and was negative.  Is passing gas.  Last bowel movement was 3 to 4 days ago and was constipated and says she has been constipated lately for the past 3 to 4 days which is not normal  for her.  Denies eating anything out of the ordinary, any sick contacts or any recent travels.    Denies ever being diagnosed with gallbladder disease before. Denies ever having jaundice or icterus. Denies previously having elevated liver function tests before.  Denies any known liver disease, hepatitis, cirrhosis, ever having mono.  Denies ever abusing alcohol or drinking currently.    Bilateral lower extremities have been red for 1 week. Denies ever having cellulitis before.  Denies any trauma to her skin.  Says is due to to her smaller wheelchair that she has.    Denies taking any anticoagulants at home.  Please see her below past medical and past surgical history.  Please see her labs and imaging that are noted.    Past Medical History  CHF   Current cigarette smoker  COPD  Chronic hypoxic respiratory failure-chronically on 4 L of oxygen continuously  Diabetes  Hypertension  TIAs x2-does not recall when-denies any deficits from  Obesity  Chronic low back pain  Anxiety  Depression      Surgical History  Tonsillectomy 15 years ago  Back surgeries x2 ten years ago  Low-transverse  x1 in         Social History  Comes from home with her  who the patient says is here as a patient as well and thinks he is in [address]  Has 2 children-1 vaginal and 1 low-transverse   Retired manager  Smoker of 5 cigarettes a day and has been a smoker for 20 years  Denies any alcohol or drugs     Family History  Denies any family history of gallbladder disease       Allergies  Allergies   Allergen Reactions    Penicillins Hives and Rash     dilerious    Codeine GI Upset    Morphine Hives        Review of Systems  1) Anesthetic complications: No known personal or family history of anesthetic complications  2) General: No significant unintentional weight loss or gain, fevers, chills, weakness, fatigue, appetite loss.  3) HEENT: Negative.  4) Cardiac:  Says she gets chest pain sometimes.  Does have bilateral lower  "extremity edema.  Also has chronic shortness of breath at rest and exertion.  5) Pulmonary: No asthma, wheezing.  Chronic shortness of breath at rest and exertion.  6) GI: As per HPI.   7) Hematologic: No known personal or family history of bleeding diathesis.  8) Endocrine: No thyroid disorders.  Positive diabetes.  9) : No dysuria, hematuria, or frequency.  10) Musculoskeletal:  As per HPI  11) Neuro: No history of seizures or strokes. Positive TIAs x2  12) Psych:  Positive anxiety and depression        Physical Exam  1) VS-noted as documented.  2) General-laying in bed in no acute distress. Not septic appearing. Pleasant and cooperative. Looks fine and comfortable.   3) Neuro-Awake, alert, oriented to person, place, and time. Speech is normal. Affect is normal. Follows commands appropriately.  4) HEENT-Head normocephalic and externally atraumatic. Conjunctiva pink. Sclera anicteric. MMM.  5) Heart-RRR. Sinus rhythm on the monitor with a rate of 75  Blood pressure on the monitor 107/71 with a MAP of 80  6) Lungs-Clear on 4 L. Speaks in complete sentences and does not have any accessory muscle use.  7) Extremities-  Positive bilateral lower extremity edema. The patient's extremities are warm and normal color except for the patient has bilateral lower extremity cellulitis with no evidence of any abscesses.  8) Skin-Warm and dry. No diaphoresis or jaundice. See extremities.  9) Psych-Normal mood. Appropriate affect.   10) Abdomen-Soft. Positive bowel sounds. Non distended. Nontender.  Negative Baum's.  No obvious hernias  Obese    Vital signs in last 24 hours:  Temp:  [35.6 °C (96.1 °F)-37 °C (98.6 °F)] 36.5 °C (97.7 °F)  Heart Rate:  [67-87] 73  Resp:  [15-20] 15  BP: (101-129)/(56-82) 107/71  Heart Rate:  [67-87]   Temp:  [35.6 °C (96.1 °F)-37 °C (98.6 °F)]   Resp:  [15-20]   BP: (101-129)/(56-82)   Height:  [160 cm (5' 3\")]   Weight:  [100 kg (221 lb 5.5 oz)-105 kg (231 lb 0.7 oz)]   SpO2:  [90 %-97 %]  "     Intake/Output last 3 Shifts:  I/O last 3 completed shifts:  In: - (0 mL/kg)   Out: 300 (3 mL/kg) [Urine:300 (0.1 mL/kg/hr)]  Weight: 100.4 kg     Scheduled medications  acetaminophen, 650 mg, oral, Once  aztreonam, 1 g, intravenous, q8h  budesonide, 0.5 mg, nebulization, BID  furosemide, 40 mg, intravenous, Daily  gabapentin, 300 mg, oral, TID  heparin, 5,000 Units, subcutaneous, q12h  insulin lispro, 0-5 Units, subcutaneous, TID with meals  ipratropium-albuteroL, 3 mL, nebulization, 4x daily  methylPREDNISolone sodium succinate (PF), 40 mg, intravenous, q12h  spironolactone, 12.5 mg, oral, Daily  vancomycin, 1,500 mg, intravenous, q24h      Continuous medications     PRN medications  PRN medications: dextrose 10 % in water (D10W), dextrose, glucagon, ipratropium-albuteroL, oxygen    Relevant Results  Results from last 7 days   Lab Units 11/14/23  0602 11/13/23  1838   WBC AUTO x10*3/uL 9.2 11.1   HEMOGLOBIN g/dL 14.5 13.1   HEMATOCRIT % 52.5* 44.7   PLATELETS AUTO x10*3/uL 164 187      Results from last 7 days   Lab Units 11/14/23  0602 11/13/23  2141 11/13/23  1838   SODIUM mmol/L 143  --  141   POTASSIUM mmol/L 3.8  --  3.6   CHLORIDE mmol/L 103  --  103   CO2 mmol/L 29  --  29   BUN mg/dL 20  --  19   CREATININE mg/dL 1.20 1.50 1.50   GLUCOSE mg/dL 76  --  106*   CALCIUM mg/dL 8.4*  --  7.9*      Results from last 7 days   Lab Units 11/13/23  2224   POCT PH, ARTERIAL pH 7.31*   POCT PCO2, ARTERIAL mm Hg 74*   POCT PO2, ARTERIAL mm Hg 105*   POCT HCO3 CALCULATED, ARTERIAL mmol/L 37.3*   POCT BASE EXCESS, ARTERIAL mmol/L 8.2*     No results found for the last 7 days.    CT head wo IV contrast  Result Date: 11/13/2023  Interpreted By:  Anastasia Fishman, STUDY: CT HEAD WO IV CONTRAST;  11/13/2023 8:00 pm   INDICATION: Signs/Symptoms:AMS.   COMPARISON: None.   ACCESSION NUMBER(S): [id number]   ORDERING CLINICIAN: ERIC CERVANTES   TECHNIQUE: Noncontrast axial CT scan of head was performed. Angled reformats in brain and  bone windows were generated. The images were reviewed in bone, brain, blood and soft tissue windows.   FINDINGS: CSF Spaces: Ex vacuo dilation of the ventricles. Sulci and basal cisterns are within normal limits. There is no extraaxial fluid collection.   Parenchyma: Chronic bilateral white matter microvascular disease. There is no mass effect or midline shift.  There is no intracranial hemorrhage.   Calvarium: The calvarium is unremarkable.   Paranasal sinuses and mastoids: Visualized paranasal sinuses and mastoids are clear.       Acute intracranial findings.   MACRO: None   Signed by: Anastasia Fishman 11/13/2023 8:49 PM Dictation workstation:   [id number]    CT abdomen pelvis w IV contrast  Result Date: 11/13/2023  Interpreted By:  Anastasia Fishman, STUDY: CT ABDOMEN PELVIS W IV CONTRAST;  11/13/2023 8:15 pm   INDICATION: Increasing confusion   COMPARISON: 10/09/2022.   ACCESSION NUMBER(S): [id number]   ORDERING CLINICIAN: SOLITARIO MONTOYA   TECHNIQUE: CT of the abdomen and pelvis was performed.  Standard contiguous axial images were obtained at 3 mm slice thickness through the abdomen and pelvis. Coronal and sagittal reconstructions at 3 mm slice thickness were performed.   75 mL of Omnipaque were administered intravenously without immediate complication.   FINDINGS: LOWER CHEST: Within normal limits.   ABDOMEN:   LIVER: Normal size. No focal lesions.   BILE DUCTS: Nondilated.   GALLBLADDER: No hyperdense stones. No wall thickening.   PANCREAS: No focal lesions.   SPLEEN: Normal size. No focal lesions   ADRENAL GLANDS: Within normal limits   KIDNEYS AND URETERS: Left lower pole angiomyolipoma measures 6.6 x 4.7 by 5.5 cm (series 5, image 63, and series 3, image 97). This is overall unchanged comparison to prior. Right renal cyst.   PELVIS:   BLADDER: Within normal limits.   REPRODUCTIVE ORGANS: No suspicious findings   BOWEL: The stomach, small and large bowel are unremarkable.   VESSELS: IVC and aorta are normal.    PERITONEUM/RETROPERITONEUM/LYMPH NODES: No free air, free fluid or adenopathy.   BONES AND ABDOMINAL WALL: No suspicious osseous lesions.       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 Beyond 4 cm, this lesion presents an increased risk for hemorrhage. Urological consult recommended.   MACRO: None   Signed by: Anastasia Fishman 11/13/2023 8:48 PM Dictation workstation:   [id number]    US gallbladder  Result Date: 11/13/2023  Interpreted By:  Anastasia Fishman, STUDY: US GALLBLADDER; 8:11 pm   INDICATION: Signs/Symptoms:elevated LFTs / vomiting.   COMPARISON: CT abdomen and pelvis dated same day.   ACCESSION NUMBER(S): [id number]   ORDERING CLINICIAN: SOLITARIO MONTOYA   TECHNIQUE: Limited abdominal ultrasound of the right upper quadrant was performed utilizing gray scale imaging.   FINDINGS: Liver: Normal echogenicity without focal lesion. No intrahepatic biliary distention. Gallbladder: Gallstones present. Gallbladder wall measures 0.3 cm. Sonographic Baum's sign: Negative CBD: 2.7 mm Visualized right kidney measures 11.6 cm with good corticomedullary differentiation. Mild prominent right renal collecting system. Right renal cyst.       1. Cholelithiasis. No additional abnormalities identified. 2. Mild right hydronephrosis.   MACRO: None.   Signed by: Anastasia Fishman 11/13/2023 8:14 PM Dictation workstation:   [id number]    XR chest 1 view  Result Date: 11/13/2023  Interpreted By:  Anastasia Fishman, STUDY: XR CHEST 1 VIEW;  11/13/2023 7:17 pm   INDICATION: Signs/Symptoms:dyspnea/ams.   COMPARISON: None.   ACCESSION NUMBER(S): [id number]   ORDERING CLINICIAN: ERIC CERVANTES   FINDINGS:         CARDIOMEDIASTINAL SILHOUETTE: Prominent heart size.   LUNGS: No focal airspace consolidations or effusions.   ABDOMEN: No remarkable upper abdominal findings.   BONES: No acute osseous changes.       Prominent heart size. No focal airspace consolidations or effusions.   MACRO: None   Signed by: Anastasia Fishman  11/13/2023 7:33 PM Dictation workstation:   [id number]         Assessment/Plan   Principal Problem:    COPD (chronic obstructive pulmonary disease) (CMS/HCC)  Active Problems:    Generalized weakness    Altered mental status    Congestive heart failure (CMS/HCC)    Anemia    Elevated troponin    Hypocalcemia    Transaminasemia    Diabetes mellitus (CMS/HCC)    Hypertension    Renal mass    Cholelithiasis  Does not seem to be symptomatic  Seems to be an incidental finding    Elevated LFTs  Follow-CMP already ordered for am    Chronic bilateral lower abdominal pain  Denies ever having an EGD  Says last colonoscopy was done over 20 years ago as screening and was negative  11/13 CT noted as above    Bilateral lower extremity cellulitis  Antibiotics per IM/infectious disease  No acute surgical indications at present      Lyubov Angulo, APRN-CNP

## 2023-11-14 NOTE — CONSULTS
Inpatient consult to Cardiology  Consult performed by: Andria Campo MD  Consult ordered by: Jean Carlos Patel MD  Reason for consult: Elevated troponin, shortness of breath, heart failure        History Of Present Illness:    Subha Miller is a 63 y.o. female with history of heart failure diastolic dysfunction.  Patient has also history of COPD.  Admitted to the hospital with altered mental status.  She was found to have hypoxia.  Chest x-ray suggestive of pulmonary edema.  Troponin mildly elevated.  Patient currently laying comfortable in bed.  She does complain of shortness of breath lower extremity edema for a few days.  Denies having chest pain.  EKG showed normal sinus rhythm nonspecific ST-T wave abnormalities.  Denies prior coronary artery disease history.  Again not great historian.  Limited review of systems.  Has been complaining of orthopnea and mild cough nonproductive.  No fever or chills.     Last Recorded Vitals:  Vitals:    11/14/23 1500 11/14/23 1517 11/14/23 1600 11/14/23 1713   BP:   113/56 (!) 111/6   BP Location:       Patient Position:       Pulse: 88  89 90   Resp: 22  17 17   Temp:    36.7 °C (98.1 °F)   TempSrc:    Temporal   SpO2: (!) 75% 95% (!) 87% 99%   Weight:       Height:           Last Labs:  CBC - 11/14/2023:  6:02 AM  9.2 14.5 164    52.5      CMP - 11/14/2023:  6:02 AM  8.4 6.4 638 --- 0.5   4.0 3.0 247 88      PTT - 9/20/2023:  6:03 AM  1.1   12.3 31     BNP   Date/Time Value Ref Range Status   09/19/2023 03:36  (H) 0 - 99 pg/mL Final     Comment:     .  <100 pg/mL - Heart failure unlikely  100-299 pg/mL - Intermediate probability of acute heart  .               failure exacerbation. Correlate with clinical  .               context and patient history.    >=300 pg/mL - Heart Failure likely. Correlate with clinical  .               context and patient history.  BNP testing is performed using different testing   methodology at [facility] than at other  "  system hospitals. Direct result comparisons should   only be made within the same method.       Hemoglobin A1C   Date/Time Value Ref Range Status   09/22/2023 10:24 AM 6.4 (A) % Final     Comment:          Diagnosis of Diabetes-Adults   Non-Diabetic: < or = 5.6%   Increased risk for developing diabetes: 5.7-6.4%   Diagnostic of diabetes: > or = 6.5%  .       Monitoring of Diabetes                Age (y)     Therapeutic Goal (%)   Adults:          >18           <7.0   Pediatrics:    13-18           <7.5                   7-12           <8.0                   0- 6            7.5-8.5   American Diabetes Association. Diabetes Care 33(S1), Jan 2010.     04/25/2023 01:39 PM 6.4 (H) 4.3 - 5.6 % Final     Comment:     American Diabetes Association guidelines indicate that patients with HgbA1c in the range 5.7-6.4% are at increased risk for development of diabetes, and intervention by lifestyle modification may be beneficial. HgbA1c greater or equal to 6.5% is considered diagnostic of diabetes.      Last I/O:  I/O last 3 completed shifts:  In: - (0 mL/kg)   Out: 300 (3 mL/kg) [Urine:300 (0.1 mL/kg/hr)]  Weight: 100.4 kg     Past Cardiology Tests (Last 3 Years):  EKG:  No results found for this or any previous visit from the past 1095 days.    Echo:  No results found for this or any previous visit from the past 1095 days.    Ejection Fractions:  No results found for: \"EF\"  Cath:  No results found for this or any previous visit from the past 1095 days.    Stress Test:  No results found for this or any previous visit from the past 1095 days.    Cardiac Imaging:  No results found for this or any previous visit from the past 1095 days.    Review of systems.  10 point review of systems Limited but otherwise negative    Past Medical History:  She has a past medical history of CHF (congestive heart failure) (CMS/HCC), COPD (chronic obstructive pulmonary disease) (CMS/HCC), Diabetes mellitus (CMS/HCC), Hypertension, and Stroke " (CMS/[facility]).    Past Surgical History:  She has a past surgical history that includes Back surgery; Tonsillectomy; and  section, low transverse.      Social History:  She reports that she has been smoking cigarettes. She has a 12.50 pack-year smoking history. She has never used smokeless tobacco. She reports that she does not drink alcohol and does not use drugs.    Family History:  No family history on file.     Allergies:  Penicillins, Codeine, and Morphine    Inpatient Medications:  Scheduled medications   Medication Dose Route Frequency    acetaminophen  650 mg oral Once    aztreonam  1 g intravenous q8h    budesonide  0.5 mg nebulization BID    furosemide  40 mg intravenous Daily    gabapentin  300 mg oral TID    heparin  5,000 Units subcutaneous q12h    insulin lispro  0-5 Units subcutaneous TID with meals    ipratropium-albuteroL  3 mL nebulization 4x daily    methylPREDNISolone sodium succinate (PF)  40 mg intravenous q12h    spironolactone  12.5 mg oral Daily    vancomycin  1,500 mg intravenous q24h     PRN medications   Medication    dextrose 10 % in water (D10W)    dextrose    glucagon    ibuprofen    ipratropium-albuteroL    oxygen     Continuous Medications   Medication Dose Last Rate     Outpatient Medications:  Current Outpatient Medications   Medication Instructions    albuterol 90 mcg/actuation inhaler INHALE 2 PUFFS BY MOUTH EVERY 4 HOURS AS NEEDED FOR SHORTNESS OF BREATH    empagliflozin (Jardiance) 10 mg TAKE 1 TABLET BY MOUTH ONCE DAILY.    fluticasone-umeclidin-vilanter (TRELEGY-ELLIPTA) 100-62.5-25 mcg blister with device INHALE 1 PUFF BY MOUTH ONCE DAILY.    furosemide (Lasix) 20 mg tablet TAKE 1 TABLET BY MOUTH ONCE DAILY    gabapentin (NEURONTIN) 600 mg, oral, 3 times daily    lisinopril 2.5 mg, oral, Daily RT    metFORMIN  mg 24 hr tablet 1 tablet, oral, Daily with breakfast    mupirocin (Bactroban) 2 % ointment APPLY TO CRUSTED AREAS ON FACE THREE TIMES A DAY UNTIL EVENING OF  9/25/23. AVOID THE EYES.    nicotine (Nicoderm CQ) 21 mg/24 hr patch APPLY 1 PATCH EVERY 24 HOURS    nicotine polacrilex (Commit) 4 mg lozenge DISSOLVE 1 LOZENGE BY MOUTH EVERY 4 HOURS AS NEEDED    omeprazole (PRILOSEC) 40 mg, oral, Daily before breakfast    ondansetron ODT (ZOFRAN-ODT) 8 mg, oral, Every 8 hours PRN    potassium chloride CR 10 mEq ER tablet 1 tablet, oral, Daily RT    rosuvastatin (CRESTOR) 5 mg, oral, Daily RT    spironolactone (Aldactone) 25 mg tablet TAKE 1/2 TABLET BY MOUTH ONCE DAILY    sulfamethoxazole-trimethoprim (Bactrim DS) 800-160 mg tablet TAKE 1 TABLET BY MOUTH TWO TIMES A DAY. FIRST DOSE ON EVENING OF 09/23/2023, AND LAST DOSE EVENING OF 09/26/2023       Physical Exam:  General: Patient is in mild respiratory distress HEENT: atraumatic normocephalic.  Neck: is supple jugular venous pressure within normal limits no thyromegaly.  Cardiovascular regular rate and rhythm normal heart sounds no murmurs rubs or gallops.  Lungs: Bibasilar crackles  Abdomen: is soft nontender.  Extremities warm to touch 2+ edema.  Neurologic examination: patient is awake alert oriented to person, place  Psychiatric examination: patient has poor insight.  Denies feeling suicidal or depressed   pulses 2+ intact bilaterally     Assessment/Plan   Acute respiratory failure.  Patient admitted to the hospital with acute respiratory failure likely secondary to acute on chronic COPD.  She does have extremely elevated NT-proBNP and lower extremity edema.  She is also in acute on chronic decompensated heart failure diastolic dysfunction.  I will start furosemide 40 mg IV twice daily in AM.  She received already 40 mg of furosemide IV today.  We will monitor daily weights input and output.  According to the nurse she has been having significant amount of urine output although is not measured or collected.  Had 2D echo done in September 2023 showed normal ejection fraction.  We will monitor for now.  Elevated troponin.   Patient has elevated troponin with significant delta.  EKG at baseline.  Denies having chest pain.  This could be related to hypoxic respiratory failure and demand ischemia.  Patient did not have any ischemic work-up recently.  We will arrange for Lexiscan nuclear stress test once her respiratory status improves.  For now we will continue diuresis, aspirin, high intensity statin.  Hypertension currently blood pressure on the low normal.  We will hold antihypertensive medications will reassess in a.m.  Hyperlipidemia continue high intensity statin.  Altered mental status management by primary team.  Peripheral IV 11/13/23 22 G Left;Anterior Hand (Active)   Site Assessment Clean;Dry;Intact 11/14/23 1543   Dressing Status Clean;Dry 11/14/23 1543   Number of days: 1       Peripheral IV 11/14/23 20 G Left;Proximal;Anterior Forearm (Active)   Site Assessment Clean;Dry;Intact 11/14/23 1543   Dressing Status Clean;Dry 11/14/23 1543   Number of days: 0       Code Status:  Full Code      Andria Campo MD

## 2023-11-14 NOTE — PROGRESS NOTES
Occupational Therapy    Evaluation    Patient Name: Subha Miller  MRN: [id number]  Today's Date: 11/14/2023  Time Calculation  Start Time: 1309  Stop Time: 1336  Time Calculation (min): 27 min    Assessment  IP OT Assessment  OT Assessment:  (Pt is 62 y/o female admitted for COPD, CHF. Pt presents w/ decreased ADL skills/ functional mobility, decresedativity tolerance, impaired cognitiion. Recommend OT services toa ddress above deficits.)  Prognosis: Good  Barriers to Discharge: Other (Comment) (Spouse is currently in hospital.)  Evaluation/Treatment Tolerance: Patient tolerated treatment well  End of Session Communication: Bedside nurse  End of Session Patient Position: Up in chair  Plan:  Treatment Interventions: ADL retraining, Functional transfer training, Endurance training, Patient/family training  OT Frequency: 2 times per week  OT Discharge Recommendations: Moderate intensity level of continued care  OT Recommended Transfer Status: Maximum assist  OT - OK to Discharge: Yes    Subjective   Current Problem:  1. COPD (chronic obstructive pulmonary disease) (CMS/HCC)        2. Generalized weakness        3. Altered mental status, unspecified altered mental status type        4. Congestive heart failure, unspecified HF chronicity, unspecified heart failure type (CMS/HCC)        5. Anemia, unspecified type        6. Elevated troponin        7. Hypocalcemia        8. Transaminasemia          General:  General  Reason for Referral: decreased ADL's  Referred By: Dr. Esquivel  Past Medical History Relevant to Rehab: CHF, COPD, DMII, HTN, stroke  Missed Visit: Yes  Missed Visit Reason: Cancel, Other (Comment) (Per nurse, Wes Currie, AM cancel, pt is on continous biPAP.)  Family/Caregiver Present: No  Co-Treatment: PT  Co-Treatment Reason: For safety in mobility  Prior to Session Communication: Bedside nurse  Patient Position Received: Bed, 3 rail up  Preferred Learning Style: verbal  Precautions:  Medical Precautions: Fall  precautions  Vital Signs:  Heart Rate: 79  Heart Rate Source: Monitor  Resp: 18  SpO2: 93 %  BP: 103/70  BP Location: Right arm  BP Method: Automatic  Patient Position: Lying  Pain:  Pain Assessment  Pain Assessment: 0-10  Pain Score: 10 - Worst possible pain (Pt reports 30 pain level)  Pain Type: Acute pain (cellulitis)  Pain Location: Leg (Both legs)  Pain Orientation: Right, Left    Objective   Cognition:  Overall Cognitive Status: Impaired  Orientation Level: Disoriented to time, Disoriented to situation  Insight: Moderate           Home Living:  Type of Home: Apartment  Lives With: Spouse  Home Layout: One level  Home Access: Level entry  Bathroom Shower/Tub: Tub/shower unit  Bathroom Toilet: Standard  Bathroom Equipment: Grab bars in shower, Hand-held shower hose   Prior Function:  Level of [place]: Independent with ADLs and functional transfers  Receives Help From: Family  ADL Assistance: Independent  Homemaking Assistance: Independent  Ambulatory Assistance: Independent  Hand Dominance: Right  Prior Function Comments:  (All per pt, who is questionable historian)  IADL History:  Homemaking Responsibilities: No  ADL:  Eating Assistance: Not performed  Grooming Assistance: Stand by  Grooming Deficit: Setup (per clinical judgement)  Bathing Assistance: Moderate  Bathing Deficit:  (per clinical judgement)  UE Dressing Assistance: Minimal  UE Dressing Deficit:  (per clinical judgement)  LE Dressing Assistance: Maximal  LE Dressing Deficit:  (per clinical judgement)  Toileting Assistance with Device: Not performed  Functional Assistance: Not performed  Activity Tolerance:  Endurance: Decreased tolerance for upright activites  Bed Mobility/Transfers: Bed Mobility  Bed Mobility: Yes  Bed Mobility 1  Bed Mobility 1: Supine to sitting  Level of Assistance 1: Moderate assistance (for trunk up, legs out)    Transfers  Transfer: Yes  Transfer 1  Transfer From 1: Sit to, Stand to  Transfer to 1: Stand, Sit  Technique  1: Stand pivot  Transfer Level of Assistance 1: Maximum assistance  Transfers 2  Transfer From 2: Sit to, Stand to, Chair with arms to  Transfer to 2: Sit, Stand, Chair with arms  Technique 2: Sit to stand, Stand to sit  Transfer Level of Assistance 2: Maximum assistance  Trials/Comments 2: x1  Modalities:     IADL's:   Homemaking Responsibilities: No  Vision: Vision - Basic Assessment  Current Vision: Does not wear glasses  Sensation:  Light Touch: No apparent deficits  Strength:  Strength Comments: 3+/5 (BUE)  Perception:  Inattention/Neglect: Appears intact  Coordination:  Movements are Fluid and Coordinated: Yes   Hand Function:  Hand Function  Gross Grasp: Functional  Coordination: Functional  Extremities: RUE   RUE : Within Functional Limits and LUE   LUE: Within Functional Limits      Outcome Measures: [facility] Daily Activity  Putting on and taking off regular lower body clothing: A lot  Bathing (including washing, rinsing, drying): A lot  Putting on and taking off regular upper body clothing: A little  Toileting, which includes using toilet, bedpan or urinal: A lot  Taking care of personal grooming such as brushing teeth: A little  Eating Meals: None  Daily Activity - Total Score: 16      Education Documentation  Home Exercise Program, taught by Alisha Bess OT at 11/14/2023  3:37 PM.  Learner: Patient  Readiness: Acceptance  Method: Explanation  Response: Needs Reinforcement    ADL Training, taught by Alisha Bess OT at 11/14/2023  3:37 PM.  Learner: Patient  Readiness: Acceptance  Method: Explanation  Response: Needs Reinforcement    Education Comments  No comments found.      Goals:   Encounter Problems       Encounter Problems (Active)           OT Goals       ADL's (Progressing)       Start:  11/14/23    Expected End:  11/28/23       Pt will complete bathing/ dressing w/ min/mod. Assist w/ AE as needed for increased safety/ ease in self care tasks.         Functional transfers (Progressing)        Start:  11/14/23    Expected End:  11/28/23       Pt will demon. Bed, chair and toilet/ BSC transfers w/ mod. Assist and LRD.         Functional endurance. (Progressing)       Start:  11/14/23    Expected End:  11/28/23       Pt will demon. BUE exercises to improve functional endurance for self care tasks/ functional transfers.

## 2023-11-14 NOTE — PROGRESS NOTES
Vancomycin Dosing by Pharmacy- INITIAL    Subha Miller is a 63 y.o. year old female who Pharmacy has been consulted for vancomycin dosing for cellulitis, skin and soft tissue. Based on the patient's indication and renal status this patient will be dosed based on a goal AUC of 400-600.     scr is currently elevated.    Visit Vitals  /80 (BP Location: Left arm, Patient Position: Lying) Comment (BP Location): forearm   Pulse 67   Temp 37 °C (98.6 °F) (Oral)   Resp 15        Lab Results   Component Value Date    CREATININE 1.50 11/13/2023    CREATININE 1.50 11/13/2023    CREATININE 0.94 09/24/2023    CREATININE 0.90 09/23/2023    CREATININE 0.91 09/23/2023    CREATININE 0.87 09/23/2023        Patient weight is 105 kg     No intake/output data recorded.  [unfilled]    Lab Results   Component Value Date    PATIENTTEMP 37.0 11/13/2023    PATIENTTEMP 37.0 09/19/2023          Assessment/Plan     Patient will not be given a loading dose.  Will initiate vancomycin maintenance,  1000 mg every 24 hours.    This dosing regimen is predicted by InsightRx to result in the following pharmacokinetic parameters:  Loading dose: N/A  Regimen: 1000 mg IV every 24 hours.  Start time: 23:13 on 11/13/2023  Exposure target: AUC24 (range)400-600 mg/L.hr   AUC24,ss: 553 mg/L.hr  Probability of AUC24 > 400: 76 %  Ctrough,ss: 16.7 mg/L  Probability of Ctrough,ss > 20: 37 %  Probability of nephrotoxicity (Lodise ISSA 2009): 12 %      Follow-up level will be ordered on 11/14 with am labs unless clinically indicated sooner.  Will continue to monitor renal function daily while on vancomycin and order serum creatinine at least every 48 hours if not already ordered.  Follow for continued vancomycin needs, clinical response, and signs/symptoms of toxicity.       Yohana Wheatley, PharmD

## 2023-11-14 NOTE — PROGRESS NOTES
Subha Miller is a 63 y.o. female on day 1 of admission presenting with COPD (chronic obstructive pulmonary disease) (CMS/HCC).      Subjective   Taking over from my colleague Dr. Esquivel.  Chart reviewed.  Patient was seen in room 413.  She is on continuous BiPAP but alert oriented x3 follows commands she is hungry requesting to eat denies chest pain states shortness of breath is now better she does have some pain bilateral calves more so on the left and some chronic back pain for which she is requesting her gabapentin       Objective     Last Recorded Vitals  /65   Pulse 74   Temp 36.4 °C (97.5 °F) (Temporal)   Resp 17   Wt 100 kg (221 lb 5.5 oz)   SpO2 93%   Intake/Output last 3 Shifts:    Intake/Output Summary (Last 24 hours) at 11/14/2023 1004  Last data filed at 11/14/2023 0816  Gross per 24 hour   Intake --   Output 500 ml   Net -500 ml       Physical Exam  Morbidly obese  female on BiPAP no distress follows commands alert oriented x3  Normocephalic atraumatic EOMI PERRLA  Neck supple no obvious JVD  Chest bilateral wheezes and crackles somewhat prolonged expiration  Heart regular S1-S2 distant normal sinus rhythm on monitor they will  Abdomen obese soft nontender  Extremities bilateral edema both lower extremities with some redness more pronounced in the left calf some tenderness to touch both feet are warm seem to be well perfused  Neurologic exam moves all 4 extremities equally seems nonfocal  Psych no psychosis or delirium  Relevant Results  Labs reviewed  Assessment/Plan     Principal Problem:    COPD (chronic obstructive pulmonary disease) (CMS/HCC)  Active Problems:    Generalized weakness    Altered mental status    Congestive heart failure (CMS/HCC)    Anemia    Elevated troponin    Hypocalcemia    Transaminasemia    Diabetes mellitus (CMS/HCC)    Hypertension    Renal mass      I feel she can come off BiPAP now.  Pulmonology consult will add on cardiology consult.  We will add on  additional antibiotic vancomycin as I do believe she has lower extremity cellulitis more pronounced on the left.  Mental status seems to have improved and may have been related to CO2 retention.  We will continue diuresis until we await further input from cardiology.  We will advance diet and observe in ICU for the time being  Consult urology for renal mass and hydronephrosis  Consult surgery for elevated LFTs and cholelithiasis per ultrasound although I doubt cholecystitis           Jean Carlos Patel MD

## 2023-11-14 NOTE — CARE PLAN
The patient's goals for the shift include      The clinical goals for the shift include improved respiratory status    Over the shift, the patient did not make progress toward the following goals. Barriers to progression include Chronic lung disease. Recommendations to address these barriers include Monitoring glucise while on IV steroids.    Problem: Diabetes  Goal: Achieve decreasing blood glucose levels by end of shift  Outcome: Not Progressing  Goal: Increase stability of blood glucose readings by end of shift  Outcome: Not Progressing  Goal: Maintain glucose levels >70mg/dl to <250mg/dl throughout shift  Outcome: Not Progressing

## 2023-11-14 NOTE — CONSULTS
Inpatient consult to Infectious Diseases  Consult performed by: Unruly Shaevr MD  Consult ordered by: Jean Carlos Patel MD          Primary MD: No Assigned PCP Generic MD Sierra    Reason For Consult   bilateral leg cellulitis    History Of Present Illness  Subha Miller is a 63 y.o. female presenting with  altered mental status.   onset was on day of admission, gradual, constant, with interval worsening.  Her son noticed that she was not her normal self  She was brought to the hospital for further care  She was found to be hypoxic and is on supplemental oxygen.  She has mild nonproductive cough.   she denies any fever or chills.  She denies any chest pain.  She has bilateral leg swelling with redness, right greater than left.  She is on empiric antibiotic therapy-vancomycin and  aztreonam  Her work-up was remarkable for transaminitis, the CT abdomen pelvis remarkable for renal mass,     Past Medical History  She has a past medical history of CHF (congestive heart failure) (CMS/[facility]), COPD (chronic obstructive pulmonary disease) (CMS/[facility]), Diabetes mellitus (CMS/[facility]), Hypertension, and Stroke (CMS/[facility]).    Surgical History  She has a past surgical history that includes Back surgery; Tonsillectomy; and  section, low transverse.     Social History     Occupational History    Not on file   Tobacco Use    Smoking status: Every Day     Packs/day: 0.50     Years: 25.00     Additional pack years: 0.00     Total pack years: 12.50     Types: Cigarettes    Smokeless tobacco: Never   Substance and Sexual Activity    Alcohol use: Never    Drug use: Never    Sexual activity: Not on file     Travel History   Travel since 10/14/23    No documented travel since 10/14/23                Family History  No family history on file.  Allergies  Penicillins, Codeine, and Morphine     Immunization History   Administered Date(s) Administered    Moderna SARS-CoV-2 Vaccination 2021, 2021     Medications  Home  medications:  Medications Prior to Admission   Medication Sig Dispense Refill Last Dose    gabapentin (Neurontin) 600 mg tablet Take 1 tablet (600 mg) by mouth 3 times a day.   11/13/2023    lisinopril 2.5 mg tablet Take 1 tablet (2.5 mg) by mouth once daily.   11/13/2023    metFORMIN  mg 24 hr tablet Take 1 tablet (500 mg) by mouth once daily with a meal.   Past Week    omeprazole (PriLOSEC) 40 mg DR capsule Take 1 capsule (40 mg) by mouth once daily in the morning. Take before meals.   Past Week    ondansetron ODT (Zofran-ODT) 8 mg disintegrating tablet Take 1 tablet (8 mg) by mouth every 8 hours if needed for vomiting or nausea.   Past Week    potassium chloride CR 10 mEq ER tablet Take 1 tablet (10 mEq) by mouth once daily.   Past Week    rosuvastatin (Crestor) 5 mg tablet Take 1 tablet (5 mg) by mouth once daily.   Past Week    albuterol 90 mcg/actuation inhaler INHALE 2 PUFFS BY MOUTH EVERY 4 HOURS AS NEEDED FOR SHORTNESS OF BREATH 18 g 2 Past Week    empagliflozin (Jardiance) 10 mg TAKE 1 TABLET BY MOUTH ONCE DAILY. 90 tablet 0 Unknown    fluticasone-umeclidin-vilanter (TRELEGY-ELLIPTA) 100-62.5-25 mcg blister with device INHALE 1 PUFF BY MOUTH ONCE DAILY. 60 each 0 Past Week    furosemide (Lasix) 20 mg tablet TAKE 1 TABLET BY MOUTH ONCE DAILY 30 tablet 0 Past Week    mupirocin (Bactroban) 2 % ointment APPLY TO CRUSTED AREAS ON FACE THREE TIMES A DAY UNTIL EVENING OF 9/25/23. AVOID THE EYES. 22 g 0 Past Week    nicotine (Nicoderm CQ) 21 mg/24 hr patch APPLY 1 PATCH EVERY 24 HOURS 28 patch 2 Unknown    nicotine polacrilex (Commit) 4 mg lozenge DISSOLVE 1 LOZENGE BY MOUTH EVERY 4 HOURS AS NEEDED 72 lozenge 2 Unknown    spironolactone (Aldactone) 25 mg tablet TAKE 1/2 TABLET BY MOUTH ONCE DAILY 30 tablet 3 Past Week    sulfamethoxazole-trimethoprim (Bactrim DS) 800-160 mg tablet TAKE 1 TABLET BY MOUTH TWO TIMES A DAY. FIRST DOSE ON EVENING OF 09/23/2023, AND LAST DOSE EVENING OF 09/26/2023 7 tablet 0 Past  "Week     Current medications:  Scheduled medications  acetaminophen, 650 mg, oral, Once  aztreonam, 1 g, intravenous, q8h  budesonide, 0.5 mg, nebulization, BID  furosemide, 40 mg, intravenous, Daily  heparin, 5,000 Units, subcutaneous, q12h  insulin lispro, 0-5 Units, subcutaneous, TID with meals  ipratropium-albuteroL, 3 mL, nebulization, 4x daily  methylPREDNISolone sodium succinate (PF), 40 mg, intravenous, q12h  spironolactone, 12.5 mg, oral, Daily  vancomycin, 1,500 mg, intravenous, q24h      Continuous medications     PRN medications  PRN medications: dextrose 10 % in water (D10W), dextrose, glucagon, ipratropium-albuteroL, oxygen    Review of Systems   Constitutional:  Negative for chills and fever.   Skin:  Positive for rash.   Psychiatric/Behavioral:  Negative for confusion.    All other systems reviewed and are negative.       Objective  Range of Vitals (last 24 hours)  Heart Rate:  [67-87]   Temp:  [35.6 °C (96.1 °F)-37 °C (98.6 °F)]   Resp:  [15-20]   BP: (107-129)/(58-82)   Height:  [160 cm (5' 3\")]   Weight:  [100 kg (221 lb 5.5 oz)-105 kg (231 lb 0.7 oz)]   SpO2:  [90 %-97 %]   Daily Weight  11/14/23 : 100 kg (221 lb 5.5 oz)    Body mass index is 39.21 kg/m².     Physical Exam  Constitutional:       Appearance: Normal appearance.   HENT:      Head: Normocephalic and atraumatic.   Eyes:      Extraocular Movements: Extraocular movements intact.      Conjunctiva/sclera: Conjunctivae normal.   Cardiovascular:      Rate and Rhythm: Regular rhythm.      Pulses: Normal pulses.      Heart sounds: Normal heart sounds.   Pulmonary:      Effort: Pulmonary effort is normal.      Breath sounds: Decreased breath sounds and wheezing present.   Abdominal:      General: Bowel sounds are normal.      Palpations: Abdomen is soft.   Musculoskeletal:      Cervical back: Normal range of motion and neck supple.      Right lower leg: Edema present.      Left lower leg: Edema present.   Skin:     Findings: Erythema present. " "  Neurological:      General: No focal deficit present.      Mental Status: She is alert and oriented to person, place, and time.   Psychiatric:         Mood and Affect: Mood normal.         Behavior: Behavior normal.          Relevant Results  Outside Hospital Results    Labs  Results from last 72 hours   Lab Units 11/14/23  0602 11/13/23  1838   WBC AUTO x10*3/uL 9.2 11.1   HEMOGLOBIN g/dL 14.5 13.1   HEMATOCRIT % 52.5* 44.7   PLATELETS AUTO x10*3/uL 164 187   NEUTROS PCT AUTO %  --  69.2   LYMPHS PCT AUTO %  --  19.9   MONOS PCT AUTO %  --  10.0   EOS PCT AUTO %  --  0.1     Results from last 72 hours   Lab Units 11/14/23  0602 11/13/23  2141 11/13/23  1838   SODIUM mmol/L 143  --  141   POTASSIUM mmol/L 3.8  --  3.6   CHLORIDE mmol/L 103  --  103   CO2 mmol/L 29  --  29   BUN mg/dL 20  --  19   CREATININE mg/dL 1.20 1.50 1.50   GLUCOSE mg/dL 76  --  106*   CALCIUM mg/dL 8.4*  --  7.9*   ANION GAP mmol/L 11  --  9   EGFR mL/min/1.73m*2 50* 39* 39*     Results from last 72 hours   Lab Units 11/14/23  0602 11/13/23  1838   ALK PHOS U/L 88 89   BILIRUBIN TOTAL mg/dL 0.5 0.5   PROTEIN TOTAL g/dL 6.4 6.5   ALT U/L 247* 107*   AST U/L 638* 287*   ALBUMIN g/dL 3.0* 3.0*     Estimated Creatinine Clearance: 52.7 mL/min (by C-G formula based on SCr of 1.2 mg/dL).  No results found for: \"CRP\", \"SEDRATE\"  No results found for: \"HIV1X2\", \"HIVCONF\", \"NUFZYW7IN\"  No results found for: \"HEPCABINIT\", \"HEPCAB\", \"HCVPCRQUANT\"  Microbiology   urine micro negative for pyuria  Imaging  CT head wo IV contrast    Result Date: 11/13/2023  Interpreted By:  Anastasia Fishman, STUDY: CT HEAD WO IV CONTRAST;  11/13/2023 8:00 pm   INDICATION: Signs/Symptoms:AMS.   COMPARISON: None.   ACCESSION NUMBER(S): [id number]   ORDERING CLINICIAN: ERIC CERVANTES   TECHNIQUE: Noncontrast axial CT scan of head was performed. Angled reformats in brain and bone windows were generated. The images were reviewed in bone, brain, blood and soft tissue windows.   " FINDINGS: CSF Spaces: Ex vacuo dilation of the ventricles. Sulci and basal cisterns are within normal limits. There is no extraaxial fluid collection.   Parenchyma: Chronic bilateral white matter microvascular disease. There is no mass effect or midline shift.  There is no intracranial hemorrhage.   Calvarium: The calvarium is unremarkable.   Paranasal sinuses and mastoids: Visualized paranasal sinuses and mastoids are clear.       Acute intracranial findings.   MACRO: None   Signed by: Anastasia Fishman 11/13/2023 8:49 PM Dictation workstation:   [id number]    CT abdomen pelvis w IV contrast    Result Date: 11/13/2023  Interpreted By:  Anastasia Fishman, STUDY: CT ABDOMEN PELVIS W IV CONTRAST;  11/13/2023 8:15 pm   INDICATION: Increasing confusion   COMPARISON: 10/09/2022.   ACCESSION NUMBER(S): [id number]   ORDERING CLINICIAN: SOLITARIO MONTOYA   TECHNIQUE: CT of the abdomen and pelvis was performed.  Standard contiguous axial images were obtained at 3 mm slice thickness through the abdomen and pelvis. Coronal and sagittal reconstructions at 3 mm slice thickness were performed.   75 mL of Omnipaque were administered intravenously without immediate complication.   FINDINGS: LOWER CHEST: Within normal limits.   ABDOMEN:   LIVER: Normal size. No focal lesions.   BILE DUCTS: Nondilated.   GALLBLADDER: No hyperdense stones. No wall thickening.   PANCREAS: No focal lesions.   SPLEEN: Normal size. No focal lesions   ADRENAL GLANDS: Within normal limits   KIDNEYS AND URETERS: Left lower pole angiomyolipoma measures 6.6 x 4.7 by 5.5 cm (series 5, image 63, and series 3, image 97). This is overall unchanged comparison to prior. Right renal cyst.   PELVIS:   BLADDER: Within normal limits.   REPRODUCTIVE ORGANS: No suspicious findings   BOWEL: The stomach, small and large bowel are unremarkable.   VESSELS: IVC and aorta are normal.   PERITONEUM/RETROPERITONEUM/LYMPH NODES: No free air, free fluid or adenopathy.   BONES AND ABDOMINAL WALL: No  suspicious osseous lesions.       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 Beyond 4 cm, this lesion presents an increased risk for hemorrhage. Urological consult recommended.   MACRO: None   Signed by: Anastasia Fishman 11/13/2023 8:48 PM Dictation workstation:   [id number]    US gallbladder    Result Date: 11/13/2023  Interpreted By:  Anastasia Fishman, STUDY: US GALLBLADDER; 8:11 pm   INDICATION: Signs/Symptoms:elevated LFTs / vomiting.   COMPARISON: CT abdomen and pelvis dated same day.   ACCESSION NUMBER(S): [id number]   ORDERING CLINICIAN: SOLITARIO MONTOYA   TECHNIQUE: Limited abdominal ultrasound of the right upper quadrant was performed utilizing gray scale imaging.   FINDINGS: Liver: Normal echogenicity without focal lesion. No intrahepatic biliary distention. Gallbladder: Gallstones present. Gallbladder wall measures 0.3 cm. Sonographic Baum's sign: Negative CBD: 2.7 mm Visualized right kidney measures 11.6 cm with good corticomedullary differentiation. Mild prominent right renal collecting system. Right renal cyst.       1. Cholelithiasis. No additional abnormalities identified. 2. Mild right hydronephrosis.   MACRO: None.   Signed by: Anastasia Fishman 11/13/2023 8:14 PM Dictation workstation:   [id number]    XR chest 1 view    Result Date: 11/13/2023  Interpreted By:  Anastasia Fishman, STUDY: XR CHEST 1 VIEW;  11/13/2023 7:17 pm   INDICATION: Signs/Symptoms:dyspnea/ams.   COMPARISON: None.   ACCESSION NUMBER(S): [id number]   ORDERING CLINICIAN: ERIC CERVANTES   FINDINGS:         CARDIOMEDIASTINAL SILHOUETTE: Prominent heart size.   LUNGS: No focal airspace consolidations or effusions.   ABDOMEN: No remarkable upper abdominal findings.   BONES: No acute osseous changes.       Prominent heart size. No focal airspace consolidations or effusions.   MACRO: None   Signed by: Anastasia Fishman 11/13/2023 7:33 PM Dictation workstation:   [id number]     Assessment/Plan      encephalopathy-toxic  metabolic  Acute hypoxic respiratory failure  Acute exacerbation of COPD  Bilateral leg cellulitis-right greater than left versus dermatitis   Transaminitis   allergy to penicillin-has tolerated amoxicillin in the past       continue vancomycin   discontinue Azactam  IV Unasyn-monitor for adverse effects  Blood cultures x2  Supplemental oxygen needed  Supportive care  Monitor temperature and WBC      Unruly Shaver MD

## 2023-11-14 NOTE — PROGRESS NOTES
Spoke with patient's daughter Nely [phone].  Patient is from home with spouse (also admitted to [facility]).  Patient does not walk much at baseline due to spinal issues and multiple back surgeries with no success.  Patient has breathing issues and has home O2 which she is non-compliant.  Patient continues to smoke.  Therapy has been unable to work with therapy due to continuous BiPAP.  Daughter is open to SNF at discharge.  Spouse is going to [facility].  Daughter agreeable to [facility].  Korina Romina's name provided to daughter for assist with transitioning to next LOC.  RN TCC following.    Lakisha Pham RN

## 2023-11-14 NOTE — CONSULTS
Inpatient consult to Urology  Consult performed by: Cheryl Espinosa MD  Consult ordered by: Jean Carlos Patel MD  Reason for consult: renal  mass  Assessment/Recommendations: See below          Reason For Consult  Renal mass    History Of Present Illness  Subha Miller is a 63 y.o. female presenting with concern from family for increasing confusion, as her  is currently in hosptial and family noted more difficulty managing.  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    Surgical History  She has a past surgical history that includes Back surgery; Tonsillectomy; and  section, low transverse.     Social History  She reports that she has been smoking cigarettes. She has a 12.50 pack-year smoking history. She has never used smokeless tobacco. She reports that she does not drink alcohol and does not use drugs.    Family History  No family history on file.     Allergies  Penicillins, Codeine, and Morphine    Review of Systems   Constitutional:  Positive for activity change and appetite change.   Respiratory:  Positive for shortness of breath.    Cardiovascular:  Positive for leg swelling.   Musculoskeletal:  Positive for arthralgias and myalgias.        Physical Exam  Constitutional:       Appearance: She is ill-appearing.   HENT:      Head: Normocephalic and atraumatic.      Nose: Nose normal.      Mouth/Throat:      Mouth: Mucous membranes are moist.   Cardiovascular:      Rate and Rhythm: Tachycardia present.   Pulmonary:      Effort: Pulmonary effort is normal.      Breath sounds: Normal breath sounds.   Abdominal:      Palpations: Abdomen is soft.   Musculoskeletal:         General:  "Normal range of motion.   Skin:     General: Skin is warm.      Comments: Scarring throughout   Neurological:      Mental Status: She is oriented to person, place, and time.   Psychiatric:         Attention and Perception: Attention normal.         Mood and Affect: Affect is flat.         Behavior: Behavior is cooperative.         Thought Content: Thought content normal.          Last Recorded Vitals  Blood pressure 107/71, pulse 73, temperature 36.5 °C (97.7 °F), resp. rate 15, height 1.6 m (5' 3\"), weight 100 kg (221 lb 5.5 oz), SpO2 95 %.    Relevant Results        Current Facility-Administered Medications:     acetaminophen (Tylenol) tablet 650 mg, 650 mg, oral, Once, Marcell Esquivel MD    aztreonam (Azactam) in dextrose 5% 50 mL IV 1 g, 1 g, intravenous, q8h, Jean Carlos Patel MD, Stopped at 11/14/23 1148    budesonide (Pulmicort) 0.5 mg/2 mL nebulizer solution 0.5 mg, 0.5 mg, nebulization, BID, Marcell Esquivel MD, 0.5 mg at 11/13/23 2326    dextrose 10 % in water (D10W) infusion, 0.3 g/kg/hr, intravenous, Once PRN, Marcell Esquivel MD    dextrose 50 % injection 25 g, 25 g, intravenous, q15 min PRN, Marcell Esquivel MD    furosemide (Lasix) injection 40 mg, 40 mg, intravenous, Daily, Marcell Esquivel MD, 40 mg at 11/14/23 1002    gabapentin (Neurontin) tablet 300 mg, 300 mg, oral, TID, Jean Carlos Patel MD, 300 mg at 11/14/23 1119    glucagon (Glucagen) injection 1 mg, 1 mg, intramuscular, q15 min PRN, Marcell Esquivel MD    heparin (porcine) injection 5,000 Units, 5,000 Units, subcutaneous, q12h, Marcell Esquivel MD, 5,000 Units at 11/14/23 1117    insulin lispro (HumaLOG) injection 0-5 Units, 0-5 Units, subcutaneous, TID with meals, Marcell Esquivel MD, 1 Units at 11/14/23 1235    ipratropium-albuteroL (Duo-Neb) 0.5-2.5 mg/3 mL nebulizer solution 3 mL, 3 mL, nebulization, 4x daily, Marcell Esquivel MD, 3 mL at 11/14/23 0807    ipratropium-albuteroL (Duo-Neb) 0.5-2.5 mg/3 mL nebulizer solution 3 mL, 3 mL, nebulization, q2h " PRN, Marcell Esquivel MD    methylPREDNISolone sod succinate (PF) (SOLU-Medrol) 40 mg/mL injection 40 mg, 40 mg, intravenous, q12h, Marcell Esquivel MD, 40 mg at 11/14/23 1117    oxygen (O2) therapy, , inhalation, Continuous PRN - O2/gases, Marcell Esquivel MD    spironolactone (Aldactone) tablet 12.5 mg, 12.5 mg, oral, Daily, Marcell Esquivel MD, 12.5 mg at 11/14/23 1001    vancomycin-diluent combo no.1 (Xellia) IVPB 1,500 mg, 1,500 mg, intravenous, q24h, Marcell Esquivel MD   Allergies   Allergen Reactions    Penicillins Hives and Rash     dilerious    Codeine GI Upset    Morphine Hives      Results for orders placed or performed during the hospital encounter of 11/13/23 (from the past 96 hour(s))   CBC and Auto Differential   Result Value Ref Range    WBC 11.1 4.4 - 11.3 x10*3/uL    nRBC 0.0 0.0 - 0.0 /100 WBCs    RBC 4.52 4.00 - 5.20 x10*6/uL    Hemoglobin 13.1 12.0 - 16.0 g/dL    Hematocrit 44.7 36.0 - 46.0 %    MCV 99 80 - 100 fL    MCH 29.0 26.0 - 34.0 pg    MCHC 29.3 (L) 32.0 - 36.0 g/dL    RDW 16.2 (H) 11.5 - 14.5 %    Platelets 187 150 - 450 x10*3/uL    Neutrophils % 69.2 40.0 - 80.0 %    Immature Granulocytes %, Automated 0.3 0.0 - 0.9 %    Lymphocytes % 19.9 13.0 - 44.0 %    Monocytes % 10.0 2.0 - 10.0 %    Eosinophils % 0.1 0.0 - 6.0 %    Basophils % 0.5 0.0 - 2.0 %    Neutrophils Absolute 7.68 1.20 - 7.70 x10*3/uL    Immature Granulocytes Absolute, Automated 0.03 0.00 - 0.70 x10*3/uL    Lymphocytes Absolute 2.21 1.20 - 4.80 x10*3/uL    Monocytes Absolute 1.11 (H) 0.10 - 1.00 x10*3/uL    Eosinophils Absolute 0.01 0.00 - 0.70 x10*3/uL    Basophils Absolute 0.05 0.00 - 0.10 x10*3/uL   Comprehensive metabolic panel   Result Value Ref Range    Glucose 106 (H) 65 - 99 mg/dL    Sodium 141 133 - 145 mmol/L    Potassium 3.6 3.4 - 5.1 mmol/L    Chloride 103 97 - 107 mmol/L    Bicarbonate 29 24 - 31 mmol/L    Urea Nitrogen 19 8 - 25 mg/dL    Creatinine 1.50 0.40 - 1.60 mg/dL    eGFR 39 (L) >60 mL/min/1.73m*2    Calcium 7.9 (L)  8.5 - 10.4 mg/dL    Albumin 3.0 (L) 3.5 - 5.0 g/dL    Alkaline Phosphatase 89 35 - 125 U/L    Total Protein 6.5 5.9 - 7.9 g/dL     (H) 5 - 40 U/L    Bilirubin, Total 0.5 0.1 - 1.2 mg/dL     (H) 5 - 40 U/L    Anion Gap 9 <=19 mmol/L   NT Pro-BNP   Result Value Ref Range    PROBNP 33,009 (H) 0 - 353 pg/mL   SARS-CoV-2 RT PCR   Result Value Ref Range    Coronavirus 2019, PCR Not Detected Not Detected   Serial Troponin, Initial ([facility])   Result Value Ref Range    Troponin T, High Sensitivity 160 (H) <=15 ng/L   Urinalysis with Reflex Microscopic   Result Value Ref Range    Color, Urine Yellow Light-Yellow, Yellow, Dark-Yellow    Appearance, Urine Turbid (N) Clear    Specific Gravity, Urine 1.026 1.005 - 1.035    pH, Urine 5.5 5.0, 5.5, 6.0, 6.5, 7.0, 7.5, 8.0    Protein, Urine 70 (1+) (A) NEGATIVE, 10 (TRACE), 20 (TRACE) mg/dL    Glucose, Urine Normal Normal mg/dL    Blood, Urine NEGATIVE NEGATIVE    Ketones, Urine NEGATIVE NEGATIVE mg/dL    Bilirubin, Urine NEGATIVE NEGATIVE    Urobilinogen, Urine 4 (2+) (A) Normal mg/dL    Nitrite, Urine NEGATIVE NEGATIVE    Leukocyte Esterase, Urine NEGATIVE NEGATIVE   Microscopic Only, Urine   Result Value Ref Range    WBC, Urine 1-5 1-5, NONE /HPF    RBC, Urine 1-2 NONE, 1-2, 3-5 /HPF    Squamous Epithelial Cells, Urine 10-25 (FEW) Reference range not established. /HPF    Mucus, Urine 2+ Reference range not established. /LPF    Hyaline Casts, Urine OCCASIONAL (A) NONE /LPF   Serial Troponin, 2 Hour ([facility])   Result Value Ref Range    Troponin T, High Sensitivity 196 (H) <=15 ng/L   Ethanol   Result Value Ref Range    Alcohol <0.010 0.000 - 0.010 g/dL   Creatinine, Serum   Result Value Ref Range    Creatinine 1.50 0.40 - 1.60 mg/dL    eGFR 39 (L) >60 mL/min/1.73m*2   Creatine Kinase   Result Value Ref Range    Creatine Kinase 494 (H) 24 - 195 U/L   Blood Gas Arterial Full Panel   Result Value Ref Range    POCT pH, Arterial 7.31 (L) 7.38 - 7.42 pH    POCT pCO2, Arterial  74 (HH) 38 - 42 mm Hg    POCT pO2, Arterial 105 (H) 85 - 95 mm Hg    POCT SO2, Arterial 100 94 - 100 %    POCT Oxy Hemoglobin, Arterial 92.7 (L) 94.0 - 98.0 %    POCT Hematocrit Calculated, Arterial 40.0 36.0 - 46.0 %    POCT Sodium, Arterial 137 136 - 145 mmol/L    POCT Potassium, Arterial 3.7 3.5 - 5.3 mmol/L    POCT Chloride, Arterial 101 98 - 107 mmol/L    POCT Ionized Calcium, Arterial 1.06 (L) 1.10 - 1.33 mmol/L    POCT Glucose, Arterial 94 74 - 99 mg/dL    POCT Lactate, Arterial 0.9 0.4 - 2.0 mmol/L    POCT Base Excess, Arterial 8.2 (H) -2.0 - 3.0 mmol/L    POCT HCO3 Calculated, Arterial 37.3 (H) 22.0 - 26.0 mmol/L    POCT Hemoglobin, Arterial 13.3 12.0 - 16.0 g/dL    POCT Anion Gap, Arterial 2 (L) 10 - 25 mmo/L    Patient Temperature 37.0 degrees Celsius    FiO2 36 %   Serial Troponin, 6 Hour ([facility])   Result Value Ref Range    Troponin T, High Sensitivity 206 (H) <=15 ng/L   Creatine Kinase   Result Value Ref Range    Creatine Kinase 408 (H) 24 - 195 U/L   Comprehensive Metabolic Panel   Result Value Ref Range    Glucose 76 65 - 99 mg/dL    Sodium 143 133 - 145 mmol/L    Potassium 3.8 3.4 - 5.1 mmol/L    Chloride 103 97 - 107 mmol/L    Bicarbonate 29 24 - 31 mmol/L    Urea Nitrogen 20 8 - 25 mg/dL    Creatinine 1.20 0.40 - 1.60 mg/dL    eGFR 50 (L) >60 mL/min/1.73m*2    Calcium 8.4 (L) 8.5 - 10.4 mg/dL    Albumin 3.0 (L) 3.5 - 5.0 g/dL    Alkaline Phosphatase 88 35 - 125 U/L    Total Protein 6.4 5.9 - 7.9 g/dL     (H) 5 - 40 U/L    Bilirubin, Total 0.5 0.1 - 1.2 mg/dL     (H) 5 - 40 U/L    Anion Gap 11 <=19 mmol/L   CBC   Result Value Ref Range    WBC 9.2 4.4 - 11.3 x10*3/uL    nRBC 0.0 0.0 - 0.0 /100 WBCs    RBC 4.94 4.00 - 5.20 x10*6/uL    Hemoglobin 14.5 12.0 - 16.0 g/dL    Hematocrit 52.5 (H) 36.0 - 46.0 %     (H) 80 - 100 fL    MCH 29.4 26.0 - 34.0 pg    MCHC 27.6 (L) 32.0 - 36.0 g/dL    RDW 16.4 (H) 11.5 - 14.5 %    Platelets 164 150 - 450 x10*3/uL   Vancomycin   Result Value Ref  Range    Vancomycin 10.0 10.0 - 20.0 ug/mL   POCT GLUCOSE   Result Value Ref Range    POCT Glucose 86 74 - 99 mg/dL   Serial Troponin, Initial ([facility])   Result Value Ref Range    Troponin T, High Sensitivity 174 (H) <=15 ng/L   Troponin T   Result Value Ref Range    Troponin T, High Sensitivity 170 (H) <=15 ng/L   POCT GLUCOSE   Result Value Ref Range    POCT Glucose 183 (H) 74 - 99 mg/dL      CT head wo IV contrast    Result Date: 11/13/2023  Interpreted By:  Anastasia Fishman, STUDY: CT HEAD WO IV CONTRAST;  11/13/2023 8:00 pm   INDICATION: Signs/Symptoms:AMS.   COMPARISON: None.   ACCESSION NUMBER(S): [id number]   ORDERING CLINICIAN: ERIC CERVANTES   TECHNIQUE: Noncontrast axial CT scan of head was performed. Angled reformats in brain and bone windows were generated. The images were reviewed in bone, brain, blood and soft tissue windows.   FINDINGS: CSF Spaces: Ex vacuo dilation of the ventricles. Sulci and basal cisterns are within normal limits. There is no extraaxial fluid collection.   Parenchyma: Chronic bilateral white matter microvascular disease. There is no mass effect or midline shift.  There is no intracranial hemorrhage.   Calvarium: The calvarium is unremarkable.   Paranasal sinuses and mastoids: Visualized paranasal sinuses and mastoids are clear.       Acute intracranial findings.   MACRO: None   Signed by: Anastasia Fishman 11/13/2023 8:49 PM Dictation workstation:   [id number]    CT abdomen pelvis w IV contrast    Result Date: 11/13/2023  Interpreted By:  Anastasia Fishman, STUDY: CT ABDOMEN PELVIS W IV CONTRAST;  11/13/2023 8:15 pm   INDICATION: Increasing confusion   COMPARISON: 10/09/2022.   ACCESSION NUMBER(S): [id number]   ORDERING CLINICIAN: SOLITARIO MONTOYA   TECHNIQUE: CT of the abdomen and pelvis was performed.  Standard contiguous axial images were obtained at 3 mm slice thickness through the abdomen and pelvis. Coronal and sagittal reconstructions at 3 mm slice thickness were performed.   75 mL of Omnipaque were  administered intravenously without immediate complication.   FINDINGS: LOWER CHEST: Within normal limits.   ABDOMEN:   LIVER: Normal size. No focal lesions.   BILE DUCTS: Nondilated.   GALLBLADDER: No hyperdense stones. No wall thickening.   PANCREAS: No focal lesions.   SPLEEN: Normal size. No focal lesions   ADRENAL GLANDS: Within normal limits   KIDNEYS AND URETERS: Left lower pole angiomyolipoma measures 6.6 x 4.7 by 5.5 cm (series 5, image 63, and series 3, image 97). This is overall unchanged comparison to prior. Right renal cyst.   PELVIS:   BLADDER: Within normal limits.   REPRODUCTIVE ORGANS: No suspicious findings   BOWEL: The stomach, small and large bowel are unremarkable.   VESSELS: IVC and aorta are normal.   PERITONEUM/RETROPERITONEUM/LYMPH NODES: No free air, free fluid or adenopathy.   BONES AND ABDOMINAL WALL: No suspicious osseous lesions.       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 Beyond 4 cm, this lesion presents an increased risk for hemorrhage. Urological consult recommended.   MACRO: None   Signed by: Anastasia Fishman 11/13/2023 8:48 PM Dictation workstation:   [id number]    US gallbladder    Result Date: 11/13/2023  Interpreted By:  Anastasia Fishman, STUDY: US GALLBLADDER; 8:11 pm   INDICATION: Signs/Symptoms:elevated LFTs / vomiting.   COMPARISON: CT abdomen and pelvis dated same day.   ACCESSION NUMBER(S): [id number]   ORDERING CLINICIAN: SOLITARIO MONTOYA   TECHNIQUE: Limited abdominal ultrasound of the right upper quadrant was performed utilizing gray scale imaging.   FINDINGS: Liver: Normal echogenicity without focal lesion. No intrahepatic biliary distention. Gallbladder: Gallstones present. Gallbladder wall measures 0.3 cm. Sonographic Baum's sign: Negative CBD: 2.7 mm Visualized right kidney measures 11.6 cm with good corticomedullary differentiation. Mild prominent right renal collecting system. Right renal cyst.       1. Cholelithiasis. No  additional abnormalities identified. 2. Mild right hydronephrosis.   MACRO: None.   Signed by: Anastasia Fishman 11/13/2023 8:14 PM Dictation workstation:   [id number]    XR chest 1 view    Result Date: 11/13/2023  Interpreted By:  Anastasia Fishman, STUDY: XR CHEST 1 VIEW;  11/13/2023 7:17 pm   INDICATION: Signs/Symptoms:dyspnea/ams.   COMPARISON: None.   ACCESSION NUMBER(S): [id number]   ORDERING CLINICIAN: ERIC CERVANTES   FINDINGS:         CARDIOMEDIASTINAL SILHOUETTE: Prominent heart size.   LUNGS: No focal airspace consolidations or effusions.   ABDOMEN: No remarkable upper abdominal findings.   BONES: No acute osseous changes.       Prominent heart size. No focal airspace consolidations or effusions.   MACRO: None   Signed by: Anastasia Fishman 11/13/2023 7:33 PM Dictation workstation:   [id number]       Assessment/Plan     Left renal angiomyolipoma, stable, 6 cm.. Early literature reported higher risk of spontaneous bleeding  when greater than 6 cm, but more recent studies have not necessarily shown this association.  Given her complex medical issues, recommend following conservatively, as surgery to remove benign lesion may be more of risk.    I spent 28 minutes in the professional and overall care of this patient.

## 2023-11-14 NOTE — PROGRESS NOTES
Physical Therapy                 Therapy Communication Note    Patient Name: Subha Miller  MRN: [id number]  Today's Date: 11/14/2023     Discipline: Physical Therapy    Missed Visit Reason: Missed Visit Reason: Cancel    Missed Time: Cancel    Comment: AM Cancel--Pt on Bipap at this time.

## 2023-11-15 ENCOUNTER — APPOINTMENT (OUTPATIENT)
Dept: CARDIOLOGY | Facility: HOSPITAL | Age: 63
DRG: 189 | End: 2023-11-15
Payer: COMMERCIAL

## 2023-11-15 ENCOUNTER — APPOINTMENT (OUTPATIENT)
Dept: RADIOLOGY | Facility: HOSPITAL | Age: 63
DRG: 189 | End: 2023-11-15
Payer: COMMERCIAL

## 2023-11-15 LAB
ALBUMIN SERPL-MCNC: 3.1 G/DL (ref 3.5–5)
ALP BLD-CCNC: 91 U/L (ref 35–125)
ALT SERPL-CCNC: 265 U/L (ref 5–40)
ANION GAP SERPL CALC-SCNC: 8 MMOL/L
AST SERPL-CCNC: 314 U/L (ref 5–40)
ATRIAL RATE: 76 BPM
BASOPHILS # BLD AUTO: 0.01 X10*3/UL (ref 0–0.1)
BASOPHILS NFR BLD AUTO: 0.1 %
BILIRUB SERPL-MCNC: 0.5 MG/DL (ref 0.1–1.2)
BUN SERPL-MCNC: 22 MG/DL (ref 8–25)
CALCIUM SERPL-MCNC: 8.8 MG/DL (ref 8.5–10.4)
CHLORIDE SERPL-SCNC: 98 MMOL/L (ref 97–107)
CO2 SERPL-SCNC: 35 MMOL/L (ref 24–31)
CREAT SERPL-MCNC: 0.7 MG/DL (ref 0.4–1.6)
EOSINOPHIL # BLD AUTO: 0 X10*3/UL (ref 0–0.7)
EOSINOPHIL NFR BLD AUTO: 0 %
ERYTHROCYTE [DISTWIDTH] IN BLOOD BY AUTOMATED COUNT: 15.7 % (ref 11.5–14.5)
GFR SERPL CREATININE-BSD FRML MDRD: >90 ML/MIN/1.73M*2
GLUCOSE BLD MANUAL STRIP-MCNC: 163 MG/DL (ref 74–99)
GLUCOSE BLD MANUAL STRIP-MCNC: 249 MG/DL (ref 74–99)
GLUCOSE BLD MANUAL STRIP-MCNC: 254 MG/DL (ref 74–99)
GLUCOSE BLD MANUAL STRIP-MCNC: 254 MG/DL (ref 74–99)
GLUCOSE SERPL-MCNC: 175 MG/DL (ref 65–99)
HCT VFR BLD AUTO: 47.6 % (ref 36–46)
HGB BLD-MCNC: 14.1 G/DL (ref 12–16)
IMM GRANULOCYTES # BLD AUTO: 0.04 X10*3/UL (ref 0–0.7)
IMM GRANULOCYTES NFR BLD AUTO: 0.5 % (ref 0–0.9)
LYMPHOCYTES # BLD AUTO: 0.59 X10*3/UL (ref 1.2–4.8)
LYMPHOCYTES NFR BLD AUTO: 7.2 %
MCH RBC QN AUTO: 28.7 PG (ref 26–34)
MCHC RBC AUTO-ENTMCNC: 29.6 G/DL (ref 32–36)
MCV RBC AUTO: 97 FL (ref 80–100)
MONOCYTES # BLD AUTO: 0.11 X10*3/UL (ref 0.1–1)
MONOCYTES NFR BLD AUTO: 1.3 %
NEUTROPHILS # BLD AUTO: 7.45 X10*3/UL (ref 1.2–7.7)
NEUTROPHILS NFR BLD AUTO: 90.9 %
NRBC BLD-RTO: 0 /100 WBCS (ref 0–0)
P AXIS: 17 DEGREES
P OFFSET: 193 MS
P ONSET: 146 MS
PLATELET # BLD AUTO: 173 X10*3/UL (ref 150–450)
POTASSIUM SERPL-SCNC: 4.2 MMOL/L (ref 3.4–5.1)
PR INTERVAL: 160 MS
PROT SERPL-MCNC: 7 G/DL (ref 5.9–7.9)
Q ONSET: 226 MS
QRS COUNT: 12 BEATS
QRS DURATION: 76 MS
QT INTERVAL: 384 MS
QTC CALCULATION(BAZETT): 432 MS
QTC FREDERICIA: 415 MS
R AXIS: 117 DEGREES
RBC # BLD AUTO: 4.91 X10*6/UL (ref 4–5.2)
SODIUM SERPL-SCNC: 141 MMOL/L (ref 133–145)
T AXIS: 116 DEGREES
T OFFSET: 418 MS
TROPONIN T SERPL-MCNC: 94 NG/L
VANCOMYCIN SERPL-MCNC: 17 UG/ML (ref 10–20)
VENTRICULAR RATE: 76 BPM
WBC # BLD AUTO: 8.2 X10*3/UL (ref 4.4–11.3)

## 2023-11-15 PROCEDURE — 85025 COMPLETE CBC W/AUTO DIFF WBC: CPT | Performed by: INTERNAL MEDICINE

## 2023-11-15 PROCEDURE — 2500000004 HC RX 250 GENERAL PHARMACY W/ HCPCS (ALT 636 FOR OP/ED): Performed by: INTERNAL MEDICINE

## 2023-11-15 PROCEDURE — 80053 COMPREHEN METABOLIC PANEL: CPT | Performed by: INTERNAL MEDICINE

## 2023-11-15 PROCEDURE — 99233 SBSQ HOSP IP/OBS HIGH 50: CPT | Performed by: INTERNAL MEDICINE

## 2023-11-15 PROCEDURE — 97110 THERAPEUTIC EXERCISES: CPT | Mod: GP,CQ

## 2023-11-15 PROCEDURE — 97116 GAIT TRAINING THERAPY: CPT | Mod: GP,CQ

## 2023-11-15 PROCEDURE — 9420000001 HC RT PATIENT EDUCATION 5 MIN

## 2023-11-15 PROCEDURE — 87040 BLOOD CULTURE FOR BACTERIA: CPT | Mod: TRILAB | Performed by: INTERNAL MEDICINE

## 2023-11-15 PROCEDURE — 96372 THER/PROPH/DIAG INJ SC/IM: CPT | Performed by: INTERNAL MEDICINE

## 2023-11-15 PROCEDURE — 36415 COLL VENOUS BLD VENIPUNCTURE: CPT | Performed by: INTERNAL MEDICINE

## 2023-11-15 PROCEDURE — 2060000001 HC INTERMEDIATE ICU ROOM DAILY

## 2023-11-15 PROCEDURE — 2500000002 HC RX 250 W HCPCS SELF ADMINISTERED DRUGS (ALT 637 FOR MEDICARE OP, ALT 636 FOR OP/ED): Performed by: INTERNAL MEDICINE

## 2023-11-15 PROCEDURE — 97535 SELF CARE MNGMENT TRAINING: CPT | Mod: GO

## 2023-11-15 PROCEDURE — 93005 ELECTROCARDIOGRAM TRACING: CPT

## 2023-11-15 PROCEDURE — 84484 ASSAY OF TROPONIN QUANT: CPT | Performed by: INTERNAL MEDICINE

## 2023-11-15 PROCEDURE — 82947 ASSAY GLUCOSE BLOOD QUANT: CPT

## 2023-11-15 PROCEDURE — 94640 AIRWAY INHALATION TREATMENT: CPT

## 2023-11-15 PROCEDURE — 2500000001 HC RX 250 WO HCPCS SELF ADMINISTERED DRUGS (ALT 637 FOR MEDICARE OP): Performed by: INTERNAL MEDICINE

## 2023-11-15 PROCEDURE — 80202 ASSAY OF VANCOMYCIN: CPT | Performed by: INTERNAL MEDICINE

## 2023-11-15 RX ORDER — AMINOPHYLLINE 25 MG/ML
125 INJECTION, SOLUTION INTRAVENOUS AS NEEDED
Status: DISCONTINUED | OUTPATIENT
Start: 2023-11-15 | End: 2023-11-18 | Stop reason: HOSPADM

## 2023-11-15 RX ORDER — VANCOMYCIN 1 G/200ML
1000 INJECTION, SOLUTION INTRAVENOUS EVERY 12 HOURS
Status: DISCONTINUED | OUTPATIENT
Start: 2023-11-15 | End: 2023-11-15

## 2023-11-15 RX ORDER — FUROSEMIDE 10 MG/ML
40 INJECTION INTRAMUSCULAR; INTRAVENOUS
Status: DISCONTINUED | OUTPATIENT
Start: 2023-11-15 | End: 2023-11-17

## 2023-11-15 RX ORDER — REGADENOSON 0.08 MG/ML
0.4 INJECTION, SOLUTION INTRAVENOUS
Status: DISCONTINUED | OUTPATIENT
Start: 2023-11-15 | End: 2023-11-18 | Stop reason: HOSPADM

## 2023-11-15 RX ADMIN — HEPARIN SODIUM 5000 UNITS: 5000 INJECTION, SOLUTION INTRAVENOUS; SUBCUTANEOUS at 22:30

## 2023-11-15 RX ADMIN — FUROSEMIDE 40 MG: 10 INJECTION, SOLUTION INTRAMUSCULAR; INTRAVENOUS at 16:00

## 2023-11-15 RX ADMIN — GABAPENTIN 300 MG: 600 TABLET, FILM COATED ORAL at 16:00

## 2023-11-15 RX ADMIN — HEPARIN SODIUM 5000 UNITS: 5000 INJECTION, SOLUTION INTRAVENOUS; SUBCUTANEOUS at 12:25

## 2023-11-15 RX ADMIN — METHYLPREDNISOLONE SODIUM SUCCINATE 40 MG: 40 INJECTION, POWDER, FOR SOLUTION INTRAMUSCULAR; INTRAVENOUS at 22:30

## 2023-11-15 RX ADMIN — AMPICILLIN SODIUM AND SULBACTAM SODIUM 3 G: 2; 1 INJECTION, POWDER, FOR SOLUTION INTRAMUSCULAR; INTRAVENOUS at 16:01

## 2023-11-15 RX ADMIN — AMPICILLIN SODIUM AND SULBACTAM SODIUM 3 G: 2; 1 INJECTION, POWDER, FOR SOLUTION INTRAMUSCULAR; INTRAVENOUS at 04:07

## 2023-11-15 RX ADMIN — BUDESONIDE INHALATION 0.5 MG: 0.5 SUSPENSION RESPIRATORY (INHALATION) at 07:44

## 2023-11-15 RX ADMIN — INSULIN LISPRO 2 UNITS: 100 INJECTION, SOLUTION INTRAVENOUS; SUBCUTANEOUS at 16:03

## 2023-11-15 RX ADMIN — INSULIN LISPRO 3 UNITS: 100 INJECTION, SOLUTION INTRAVENOUS; SUBCUTANEOUS at 12:25

## 2023-11-15 RX ADMIN — FUROSEMIDE 40 MG: 10 INJECTION, SOLUTION INTRAMUSCULAR; INTRAVENOUS at 08:32

## 2023-11-15 RX ADMIN — AMPICILLIN SODIUM AND SULBACTAM SODIUM 3 G: 2; 1 INJECTION, POWDER, FOR SOLUTION INTRAMUSCULAR; INTRAVENOUS at 10:03

## 2023-11-15 RX ADMIN — IBUPROFEN 600 MG: 600 TABLET ORAL at 12:19

## 2023-11-15 RX ADMIN — BUDESONIDE INHALATION 0.5 MG: 0.5 SUSPENSION RESPIRATORY (INHALATION) at 19:30

## 2023-11-15 RX ADMIN — INSULIN LISPRO 1 UNITS: 100 INJECTION, SOLUTION INTRAVENOUS; SUBCUTANEOUS at 08:33

## 2023-11-15 RX ADMIN — IPRATROPIUM BROMIDE AND ALBUTEROL SULFATE 3 ML: 2.5; .5 SOLUTION RESPIRATORY (INHALATION) at 15:28

## 2023-11-15 RX ADMIN — METHYLPREDNISOLONE SODIUM SUCCINATE 40 MG: 40 INJECTION, POWDER, FOR SOLUTION INTRAMUSCULAR; INTRAVENOUS at 12:25

## 2023-11-15 RX ADMIN — IPRATROPIUM BROMIDE AND ALBUTEROL SULFATE 3 ML: 2.5; .5 SOLUTION RESPIRATORY (INHALATION) at 11:16

## 2023-11-15 RX ADMIN — IPRATROPIUM BROMIDE AND ALBUTEROL SULFATE 3 ML: 2.5; .5 SOLUTION RESPIRATORY (INHALATION) at 07:44

## 2023-11-15 RX ADMIN — GABAPENTIN 300 MG: 600 TABLET, FILM COATED ORAL at 21:32

## 2023-11-15 RX ADMIN — VANCOMYCIN 1000 MG: 1 INJECTION, SOLUTION INTRAVENOUS at 10:03

## 2023-11-15 RX ADMIN — SPIRONOLACTONE 12.5 MG: 25 TABLET ORAL at 08:32

## 2023-11-15 RX ADMIN — IPRATROPIUM BROMIDE AND ALBUTEROL SULFATE 3 ML: 2.5; .5 SOLUTION RESPIRATORY (INHALATION) at 19:30

## 2023-11-15 RX ADMIN — AMPICILLIN SODIUM AND SULBACTAM SODIUM 3 G: 2; 1 INJECTION, POWDER, FOR SOLUTION INTRAMUSCULAR; INTRAVENOUS at 21:32

## 2023-11-15 RX ADMIN — GABAPENTIN 300 MG: 600 TABLET, FILM COATED ORAL at 08:32

## 2023-11-15 ASSESSMENT — COGNITIVE AND FUNCTIONAL STATUS - GENERAL
MOVING FROM LYING ON BACK TO SITTING ON SIDE OF FLAT BED WITH BEDRAILS: A LITTLE
TURNING FROM BACK TO SIDE WHILE IN FLAT BAD: A LITTLE
DRESSING REGULAR LOWER BODY CLOTHING: A LOT
DAILY ACTIVITIY SCORE: 15
HELP NEEDED FOR BATHING: A LOT
DRESSING REGULAR LOWER BODY CLOTHING: A LOT
TOILETING: A LOT
CLIMB 3 TO 5 STEPS WITH RAILING: TOTAL
MOVING TO AND FROM BED TO CHAIR: A LOT
WALKING IN HOSPITAL ROOM: TOTAL
HELP NEEDED FOR BATHING: A LOT
DAILY ACTIVITIY SCORE: 16
CLIMB 3 TO 5 STEPS WITH RAILING: TOTAL
MOBILITY SCORE: 10
MOVING FROM LYING ON BACK TO SITTING ON SIDE OF FLAT BED WITH BEDRAILS: A LOT
STANDING UP FROM CHAIR USING ARMS: TOTAL
DRESSING REGULAR UPPER BODY CLOTHING: A LOT
TOILETING: A LOT
STANDING UP FROM CHAIR USING ARMS: A LOT
PERSONAL GROOMING: A LITTLE
PERSONAL GROOMING: A LITTLE
MOVING TO AND FROM BED TO CHAIR: TOTAL
MOBILITY SCORE: 10
WALKING IN HOSPITAL ROOM: TOTAL
DRESSING REGULAR UPPER BODY CLOTHING: A LITTLE
TURNING FROM BACK TO SIDE WHILE IN FLAT BAD: A LOT

## 2023-11-15 ASSESSMENT — ACTIVITIES OF DAILY LIVING (ADL)
HOME_MANAGEMENT_TIME_ENTRY: 27
BATHING_LEVEL_OF_ASSISTANCE: MAXIMUM ASSISTANCE

## 2023-11-15 ASSESSMENT — PAIN - FUNCTIONAL ASSESSMENT
PAIN_FUNCTIONAL_ASSESSMENT: 0-10
PAIN_FUNCTIONAL_ASSESSMENT: 0-10

## 2023-11-15 ASSESSMENT — PAIN SCALES - GENERAL
PAINLEVEL_OUTOF10: 0 - NO PAIN
PAINLEVEL_OUTOF10: 9
PAINLEVEL_OUTOF10: 9
PAINLEVEL_OUTOF10: 8

## 2023-11-15 NOTE — PROGRESS NOTES
Daughter is open to SNF at discharge.  Spouse is going to [facility].  Daughter agreeable to [facility] for patient.   PT/OT recommend MODERATE intensity at discharge.  SNF referral sent to [facility] for processing SNF referral.  RN TCC following.     Lakisha Pham RN

## 2023-11-15 NOTE — CARE PLAN
Problem: Respiratory  Goal: Minimize anxiety/maximize coping throughout shift  Outcome: Progressing  Goal: Minimal/no exertional discomfort or dyspnea this shift  Outcome: Progressing

## 2023-11-15 NOTE — PROGRESS NOTES
"Vancomycin Dosing by Pharmacy- FOLLOW UP    Subha Miller is a 63 y.o. year old female who Pharmacy has been consulted for vancomycin dosing for cellulitis, skin and soft tissue. Based on the patient's indication and renal status this patient is being dosed based on a goal AUC of 400-600.     Renal function is currently improving.    Current vancomycin dose: 1500 mg given every 24 hours    Estimated vancomycin AUC on current dose: 335 mg/L.hr     Visit Vitals  /76 (BP Location: Right arm, Patient Position: Lying)   Pulse 73   Temp 36.2 °C (97.2 °F) (Temporal)   Resp 15        Lab Results   Component Value Date    CREATININE 0.70 11/15/2023    CREATININE 1.20 11/14/2023    CREATININE 1.50 11/13/2023    CREATININE 1.50 11/13/2023        Patient weight is No results found for: \"PTWEIGHT\"    No results found for: \"CULTURE\"     I/O last 3 completed shifts:  In: 1060 (10.5 mL/kg) [P.O.:960; IV Piggyback:100]  Out: 2150 (21.3 mL/kg) [Urine:2150 (0.6 mL/kg/hr)]  Weight: 100.9 kg   [unfilled]    Lab Results   Component Value Date    PATIENTTEMP 37.0 11/13/2023    PATIENTTEMP 37.0 09/19/2023        Assessment/Plan    Below goal AUC. Orders placed for new vancomcyin regimen of 1000 mg every 12 hours to begin at 10:00 am on 11/15.     This dosing regimen is predicted by [employer] to result in the following pharmacokinetic parameters:  Loading dose: N/A  Regimen: 1000 mg IV every 12 hours.  Start time: 21:39 on 11/15/2023  Exposure target: AUC24 (range)400-600 mg/L.hr   AUC24,ss: 443 mg/L.hr  Probability of AUC24 > 400: 67 %  Ctrough,ss: 13.5 mg/L  Probability of Ctrough,ss > 20: 10 %  Probability of nephrotoxicity (Lodise ISSA 2009): 9 %    The next level will be obtained on 11/16 with AM labs. May be obtained sooner if clinically indicated.   Will continue to monitor renal function daily while on vancomycin and order serum creatinine at least every 48 hours if not already ordered.  Follow for continued vancomycin needs, " clinical response, and signs/symptoms of toxicity.       Barbi Whitley, PharmD

## 2023-11-15 NOTE — PROGRESS NOTES
Physical Therapy    Physical Therapy Treatment    Patient Name: Subha Miller  MRN: [id number]  Today's Date: 11/15/2023  Time Calculation  Start Time: 1040  Stop Time: 1104  Time Calculation (min): 24 min       Assessment/Plan   PT Assessment  PT Assessment Results: Decreased strength, Decreased range of motion, Decreased endurance, Impaired balance, Decreased mobility, Decreased coordination, Decreased cognition, Impaired judgement, Decreased safety awareness  Rehab Prognosis: Good  Evaluation/Treatment Tolerance: Patient tolerated treatment well  Medical Staff Made Aware: Yes  End of Session Communication: Bedside nurse  End of Session Patient Position: Up in chair  PT Plan  Inpatient/Swing Bed or Outpatient: Inpatient  PT Plan  Treatment/Interventions: Bed mobility, Transfer training, Gait training, Therapeutic exercise  PT Plan: Skilled PT  PT Frequency: 4 times per week  PT Discharge Recommendations: Moderate intensity level of continued care  Equipment Recommended upon Discharge: Wheeled walker  PT Recommended Transfer Status: Assist x2  PT - OK to Discharge: Yes      General Visit Information:   PT  Visit  PT Received On: 11/15/23  Response to Previous Treatment: Patient with no complaints from previous session.  General  Prior to Session Communication: Bedside nurse  Patient Position Received: Bed, 3 rail up  Preferred Learning Style: verbal  General Comment: Pt supine in bed. Cleared per nurse to see pt for PT. Pt agreeable.    Subjective   Precautions:  Precautions  Medical Precautions: Fall precautions  Vital Signs:       Objective   Pain:  Pain Assessment  Pain Assessment: 0-10  Pain Score: 9  Pain Location: Leg  Pain Orientation: Right, Left  Cognition:     Postural Control:     Extremity/Trunk Assessments:    Activity Tolerance:     Treatments:  Therapeutic Exercise  Therapeutic Exercise Performed: Yes  Therapeutic Exercise Activity 1: Ankle pumps/heel raises, glute sets, LAQ, seated hip flexion, jonny hip  adduction, resisted hip abduction bilat x15.    Bed Mobility  Bed Mobility: Yes  Bed Mobility 1  Bed Mobility 1: Supine to sitting  Level of Assistance 1: Moderate assistance, Moderate verbal cues  Bed Mobility Comments 1: Pt able to bring bilat LE's to edge and off bed, needed mod assist for trunk up.    Ambulation/Gait Training  Ambulation/Gait Training Performed: Yes  Ambulation/Gait Training 1  Surface 1: Level tile  Device 1: Rolling walker  Assistance 1: Moderate assistance, Minimal verbal cues  Comments/Distance (ft) 1: Pt used RW to take 6-7 small, shuffled steps to chair, mod assist of 2 and assist managing walker. Noted bilat LE weakness, pt c/o pain but no observed buckling.  Transfers  Transfer: Yes  Transfer 1  Transfer From 1: Sit to  Transfer to 1: Stand  Technique 1: Sit to stand  Transfer Device 1: Walker  Transfer Level of Assistance 1: Moderate assistance, +2, Minimal verbal cues  Transfers 2  Transfer From 2: Stand to  Transfer to 2: Sit, Chair with arms  Technique 2: Stand to sit  Transfer Level of Assistance 2: Moderate assistance, +2, Minimal verbal cues    Outcome Measures:  [facility] Basic Mobility  Turning from your back to your side while in a flat bed without using bedrails: A lot  Moving from lying on your back to sitting on the side of a flat bed without using bedrails: A lot  Moving to and from bed to chair (including a wheelchair): A lot  Standing up from a chair using your arms (e.g. wheelchair or bedside chair): A lot  To walk in hospital room: Total  Climbing 3-5 steps with railing: Total  Basic Mobility - Total Score: 10    Education Documentation  Handouts, taught by Hillary Infante PTA at 11/15/2023 11:54 AM.  Learner: Patient  Readiness: Acceptance  Method: Explanation  Response: Verbalizes Understanding    Precautions, taught by Hillary Infante PTA at 11/15/2023 11:54 AM.  Learner: Patient  Readiness: Acceptance  Method: Explanation  Response: Verbalizes Understanding    Body  Mechanics, taught by Hillary Infante PTA at 11/15/2023 11:54 AM.  Learner: Patient  Readiness: Acceptance  Method: Explanation  Response: Verbalizes Understanding    Home Exercise Program, taught by Hillary Infante PTA at 11/15/2023 11:54 AM.  Learner: Patient  Readiness: Acceptance  Method: Explanation  Response: Verbalizes Understanding    Mobility Training, taught by Hillary Infante PTA at 11/15/2023 11:54 AM.  Learner: Patient  Readiness: Acceptance  Method: Explanation  Response: Verbalizes Understanding    Education Comments  No comments found.        OP EDUCATION:       Encounter Problems       Encounter Problems (Active)       Balance       Sitting Balance (Progressing)       Start:  11/14/23    Expected End:  11/28/23       Pt will demonstrate good sitting balance to promote safe engagement with out of bed activities           Standing Balance (Progressing)       Start:  11/14/23    Expected End:  11/28/23       Pt will demonstrate good static standing balance to promote safe participation with out of bed activity, transfers, and mobility              Mobility       Ambulation (Progressing)       Start:  11/14/23    Expected End:  11/28/23       Pt will ambulate 50' modified independent assist with LRD to promote safe home mobility              Pain - Adult          Safety       Safe Mobility Techniques (Progressing)       Start:  11/14/23    Expected End:  11/28/23       Pt will correctly identify and demonstrate safe mobility techniques to reduce their risks for falls during their acute care stay               Transfers       Supine to sit (Progressing)       Start:  11/14/23    Expected End:  11/28/23       Pt will transfer supine to sitting at edge of bed with modified independent assist to promote acute care out of bed activity             Sit to stand (Progressing)       Start:  11/14/23    Expected End:  11/28/23       Pt will transfer sit to standing position with modified independent assist and LRD to  promote safe out of bed activity           Bed to Chair (Progressing)       Start:  11/14/23    Expected End:  11/28/23       Pt will transfer from sitting edge of bed to the chair with modified independent assist and LRD to promote out of bed activity and reduce the risks of prolonged acute care bedrest

## 2023-11-15 NOTE — PROGRESS NOTES
Subha Miller is a 63 y.o. female on day 2 of admission presenting with COPD (chronic obstructive pulmonary disease) (CMS/[facility]).    Subjective   Interval History:   Afebrile, no chills  Moderate to severe leg pain  Interval decrease in swelling and redness        Review of Systems   All other systems reviewed and are negative.      Objective   Range of Vitals (last 24 hours)  Heart Rate:  [63-98]   Temp:  [36.2 °C (97.2 °F)-36.8 °C (98.2 °F)]   Resp:  [12-19]   BP: ()/(6-100)   Weight:  [101 kg (222 lb 8 oz)]   SpO2:  [85 %-99 %]   Daily Weight  11/15/23 : 101 kg (222 lb 8 oz)    Body mass index is 39.41 kg/m².    Physical Exam  Constitutional:       Appearance: Normal appearance.   HENT:      Head: Normocephalic and atraumatic.   Eyes:      Extraocular Movements: Extraocular movements intact.      Conjunctiva/sclera: Conjunctivae normal.   Cardiovascular:      Rate and Rhythm: Regular rhythm.      Pulses: Normal pulses.      Heart sounds: Normal heart sounds.   Pulmonary:      Effort: Pulmonary effort is normal.      Breath sounds: Decreased breath sounds and wheezing present.   Abdominal:      General: Bowel sounds are normal.      Palpations: Abdomen is soft.   Musculoskeletal:      Cervical back: Normal range of motion and neck supple.      Right lower leg: Edema present.      Left lower leg: Edema present.   Skin:     Findings: Erythema present.   Neurological:      General: No focal deficit present.      Mental Status: She is alert and oriented to person, place, and time.   Psychiatric:         Mood and Affect: Mood normal.         Behavior: Behavior normal.        Antibiotics  acetaminophen (Tylenol) tablet 650 mg  gabapentin (Neurontin) tablet  lisinopril (Prinivil, Zestril) tablet    omeprazole (PriLOSEC) DR capsule  ondansetron (Zofran-ODT) disintegrating tablet    rosuvastatin (Crestor) tablet  iohexol (OMNIPaque) 350 mg iodine/mL solution 75 mL  furosemide (Lasix) injection 40 mg  furosemide (Lasix)  injection 40 mg  vancomycin-diluent combo no.1 (Xellia) IVPB 1,500 mg  ipratropium-albuteroL (Duo-Neb) 0.5-2.5 mg/3 mL nebulizer solution 3 mL  budesonide (Pulmicort) 0.5 mg/2 mL nebulizer solution 0.5 mg  spironolactone (Aldactone) tablet 12.5 mg  empagliflozin (Jardiance) tablet 10 mg  dextrose 50 % injection 25 g  glucagon (Glucagen) injection 1 mg  dextrose 10 % in water (D10W) infusion  insulin lispro (HumaLOG) injection 0-5 Units  heparin (porcine) injection 5,000 Units  oxygen (O2) therapy  methylPREDNISolone sod succinate (PF) (SOLU-Medrol) 40 mg/mL injection 40 mg  vancomycin-diluent combo no.1 (Xellia) IVPB 1,000 mg  ipratropium-albuteroL (Duo-Neb) 0.5-2.5 mg/3 mL nebulizer solution 3 mL  ipratropium-albuteroL (Duo-Neb) 0.5-2.5 mg/3 mL nebulizer solution 3 mL  vancomycin-diluent combo no.1 (Xellia) IVPB 1,500 mg  aztreonam (Azactam) in dextrose 5% 50 mL IV 1 g  gabapentin (Neurontin) tablet 300 mg  ibuprofen tablet 600 mg  ampicillin-sulbactam (Unasyn) in sodium chloride 0.9 % 100 mL 3 g  vancomycin-diluent combo no.1 (Xellia) IVPB 1,000 mg  furosemide (Lasix) injection 40 mg  regadenoson (Lexiscan) injection 0.4 mg  aminophylline syringe 125 mg      Relevant Results  Labs  Results from last 72 hours   Lab Units 11/15/23  0606 11/14/23  0602 11/13/23  1838   WBC AUTO x10*3/uL 8.2 9.2 11.1   HEMOGLOBIN g/dL 14.1 14.5 13.1   HEMATOCRIT % 47.6* 52.5* 44.7   PLATELETS AUTO x10*3/uL 173 164 187   NEUTROS PCT AUTO % 90.9  --  69.2   LYMPHS PCT AUTO % 7.2  --  19.9   MONOS PCT AUTO % 1.3  --  10.0   EOS PCT AUTO % 0.0  --  0.1     Results from last 72 hours   Lab Units 11/15/23  0606 11/14/23  0602 11/13/23  2141 11/13/23  1838   SODIUM mmol/L 141 143  --  141   POTASSIUM mmol/L 4.2 3.8  --  3.6   CHLORIDE mmol/L 98 103  --  103   CO2 mmol/L 35* 29  --  29   BUN mg/dL 22 20  --  19   CREATININE mg/dL 0.70 1.20 1.50 1.50   GLUCOSE mg/dL 175* 76  --  106*   CALCIUM mg/dL 8.8 8.4*  --  7.9*   ANION GAP mmol/L 8 11   "--  9   EGFR mL/min/1.73m*2 >90 50* 39* 39*     Results from last 72 hours   Lab Units 11/15/23  0606 11/14/23  0602 11/13/23  1838   ALK PHOS U/L 91 88 89   BILIRUBIN TOTAL mg/dL 0.5 0.5 0.5   PROTEIN TOTAL g/dL 7.0 6.4 6.5   ALT U/L 265* 247* 107*   AST U/L 314* 638* 287*   ALBUMIN g/dL 3.1* 3.0* 3.0*     Estimated Creatinine Clearance: 90.8 mL/min (by C-G formula based on SCr of 0.7 mg/dL).  No results found for: \"CRP\"  Microbiology  Reviewed-blood cultures 11/15/2023 pending  Imaging  CT head wo IV contrast    Result Date: 11/13/2023  Interpreted By:  Anastasia Fishman, STUDY: CT HEAD WO IV CONTRAST;  11/13/2023 8:00 pm   INDICATION: Signs/Symptoms:AMS.   COMPARISON: None.   ACCESSION NUMBER(S): [id number]   ORDERING CLINICIAN: ERIC CERVANTES   TECHNIQUE: Noncontrast axial CT scan of head was performed. Angled reformats in brain and bone windows were generated. The images were reviewed in bone, brain, blood and soft tissue windows.   FINDINGS: CSF Spaces: Ex vacuo dilation of the ventricles. Sulci and basal cisterns are within normal limits. There is no extraaxial fluid collection.   Parenchyma: Chronic bilateral white matter microvascular disease. There is no mass effect or midline shift.  There is no intracranial hemorrhage.   Calvarium: The calvarium is unremarkable.   Paranasal sinuses and mastoids: Visualized paranasal sinuses and mastoids are clear.       Acute intracranial findings.   MACRO: None   Signed by: Anastasia Fishman 11/13/2023 8:49 PM Dictation workstation:   [id number]    CT abdomen pelvis w IV contrast    Result Date: 11/13/2023  Interpreted By:  Anastasia Fishman, STUDY: CT ABDOMEN PELVIS W IV CONTRAST;  11/13/2023 8:15 pm   INDICATION: Increasing confusion   COMPARISON: 10/09/2022.   ACCESSION NUMBER(S): [id number]   ORDERING CLINICIAN: SOLITARIO MONTOYA   TECHNIQUE: CT of the abdomen and pelvis was performed.  Standard contiguous axial images were obtained at 3 mm slice thickness through the abdomen and pelvis. " Coronal and sagittal reconstructions at 3 mm slice thickness were performed.   75 mL of Omnipaque were administered intravenously without immediate complication.   FINDINGS: LOWER CHEST: Within normal limits.   ABDOMEN:   LIVER: Normal size. No focal lesions.   BILE DUCTS: Nondilated.   GALLBLADDER: No hyperdense stones. No wall thickening.   PANCREAS: No focal lesions.   SPLEEN: Normal size. No focal lesions   ADRENAL GLANDS: Within normal limits   KIDNEYS AND URETERS: Left lower pole angiomyolipoma measures 6.6 x 4.7 by 5.5 cm (series 5, image 63, and series 3, image 97). This is overall unchanged comparison to prior. Right renal cyst.   PELVIS:   BLADDER: Within normal limits.   REPRODUCTIVE ORGANS: No suspicious findings   BOWEL: The stomach, small and large bowel are unremarkable.   VESSELS: IVC and aorta are normal.   PERITONEUM/RETROPERITONEUM/LYMPH NODES: No free air, free fluid or adenopathy.   BONES AND ABDOMINAL WALL: No suspicious osseous lesions.       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 Beyond 4 cm, this lesion presents an increased risk for hemorrhage. Urological consult recommended.   MACRO: None   Signed by: Anastasia Fishman 11/13/2023 8:48 PM Dictation workstation:   [id number]    US gallbladder    Result Date: 11/13/2023  Interpreted By:  nAastasia Fishman, STUDY: US GALLBLADDER; 8:11 pm   INDICATION: Signs/Symptoms:elevated LFTs / vomiting.   COMPARISON: CT abdomen and pelvis dated same day.   ACCESSION NUMBER(S): [id number]   ORDERING CLINICIAN: SOLITARIO MONTOYA   TECHNIQUE: Limited abdominal ultrasound of the right upper quadrant was performed utilizing gray scale imaging.   FINDINGS: Liver: Normal echogenicity without focal lesion. No intrahepatic biliary distention. Gallbladder: Gallstones present. Gallbladder wall measures 0.3 cm. Sonographic Baum's sign: Negative CBD: 2.7 mm Visualized right kidney measures 11.6 cm with good corticomedullary  differentiation. Mild prominent right renal collecting system. Right renal cyst.       1. Cholelithiasis. No additional abnormalities identified. 2. Mild right hydronephrosis.   MACRO: None.   Signed by: Anastasia Fishman 11/13/2023 8:14 PM Dictation workstation:   [id number]    XR chest 1 view    Result Date: 11/13/2023  Interpreted By:  Anastasia Fishman, STUDY: XR CHEST 1 VIEW;  11/13/2023 7:17 pm   INDICATION: Signs/Symptoms:dyspnea/ams.   COMPARISON: None.   ACCESSION NUMBER(S): [id number]   ORDERING CLINICIAN: ERIC CERVANTES   FINDINGS:         CARDIOMEDIASTINAL SILHOUETTE: Prominent heart size.   LUNGS: No focal airspace consolidations or effusions.   ABDOMEN: No remarkable upper abdominal findings.   BONES: No acute osseous changes.       Prominent heart size. No focal airspace consolidations or effusions.   MACRO: None   Signed by: Anastasia Fishman 11/13/2023 7:33 PM Dictation workstation:   [id number]        Assessment/Plan   Encephalopathy-toxic metabolic-resolved  Acute hypoxic respiratory failure  Acute exacerbation of COPD-improving  Bilateral leg cellulitis-right greater than left versus dermatitis   Transaminitis   allergy to penicillin-has tolerated amoxicillin in the past        Discontinue vancomycin  IV Unasyn-monitor for adverse effects  Blood cultures x2  Supplemental oxygen needed  Supportive care  Monitor temperature and WBC  Long-term plan to de-escalate to Augmentin-total of 14 days      Unruly Shaver MD

## 2023-11-15 NOTE — PROGRESS NOTES
Occupational Therapy    OT Treatment    Patient Name: Subha Miller  MRN: [id number]  Today's Date: 11/15/2023  Time Calculation  Start Time: 1135  Stop Time: 1202  Time Calculation (min): 27 min       Assessment:  End of Session Communication: Bedside nurse  End of Session Patient Position: Bed, 3 rail up  OT Assessment Results:  (Patient demonstrates progress with functional transfers/mobility and activity tolerance this date.)  Plan:  OT Frequency: 2 times per week  OT Discharge Recommendations: Moderate intensity level of continued care  OT - OK to Discharge: Yes     Subjective   General:  OT Received On: 11/15/23  Prior to Session Communication: Bedside nurse  Patient Position Received: Up in chair  Preferred Learning Style: verbal  General Comment: Patient up in chair upon arrival and agreeable for theapy following clearance from nursing.    Vital Signs  SpO2: 90 % (on 4L of O2 via NC)  Patient Position: Sitting  Pain:  Pain Assessment  Pain Assessment: 0-10  Pain Score: 9  Pain Location: Leg  Pain Orientation: Right, Left  Pain Interventions: Other (Comment) (Notified nursing of request for pain meds)    Objective      Activities of Daily Living: Grooming  Grooming Level of Assistance: Setup  Grooming Where Assessed: Chair  Grooming Comments:  (oral care, washing face)    UE Bathing  UE Bathing Level of Assistance: Setup    LE Bathing  LE Bathing Level of Assistance: Maximum assistance  LE Bathing Comments: Assist for bilateral feet and front/back josie area    LE Dressing  LE Dressing: Yes  Sock Level of Assistance: Dependent  LE Dressing Where Assessed: Chair  LE Dressing Comments: total assist to aleksander/doff socks    Bed Mobility/Transfers: Bed Mobility  Bed Mobility: Yes  Bed Mobility 1  Bed Mobility 1: Sitting to supine  Level of Assistance 1: Maximum assistance  Bed Mobility Comments 1: Assist for bilateral LEs    Transfers  Transfer: Yes  Transfer 1  Transfer From 1: Sit to (stand from armchair to walker with  mod assist.)      Ambulation/Gait Training:  Ambulation/Gait Training  Ambulation/Gait Training Performed: Yes (Patient ambulated chair to bed with a 2 wheeled walker and mod assist.)    Outcome Measures:[facility] Daily Activity  Putting on and taking off regular lower body clothing: A lot  Bathing (including washing, rinsing, drying): A lot  Putting on and taking off regular upper body clothing: A little  Toileting, which includes using toilet, bedpan or urinal: A lot  Taking care of personal grooming such as brushing teeth: A little  Eating Meals: None  Daily Activity - Total Score: 16        Education Documentation  Home Exercise Program, taught by Phoebe Mliler OT at 11/15/2023 12:24 PM.  Learner: Patient  Readiness: Acceptance  Method: Explanation  Response: Verbalizes Understanding, Needs Reinforcement  Comment: Patient instructed in compensatory techniques to maximize safety and independence with ADLs and functional transfers/mobility.    ADL Training, taught by Phoebe Miller OT at 11/15/2023 12:24 PM.  Learner: Patient  Readiness: Acceptance  Method: Explanation  Response: Verbalizes Understanding, Needs Reinforcement  Comment: Patient instructed in compensatory techniques to maximize safety and independence with ADLs and functional transfers/mobility.    Education Comments  No comments found.      OP EDUCATION:  Education  Individual(s) Educated: Patient  Education Provided: Fall precautons  Risk and Benefits Discussed with Patient/Caregiver/Other: yes  Patient/Caregiver Demonstrated Understanding: yes  Plan of Care Discussed and Agreed Upon: yes  Patient Response to Education: Patient/Caregiver Verbalized Understanding of Information    Goals:  Encounter Problems          OT Goals       ADL's (Progressing)       Start:  11/14/23    Expected End:  11/28/23       Pt will complete bathing/ dressing w/ min/mod. Assist w/ AE as needed for increased safety/ ease in self care tasks.         Functional  transfers (Progressing)       Start:  11/14/23    Expected End:  11/28/23       Pt will demon. Bed, chair and toilet/ BSC transfers w/ mod. Assist and LRD.         Functional endurance. (Progressing)       Start:  11/14/23    Expected End:  11/28/23       Pt will demon. BUE exercises to improve functional endurance for self care tasks/ functional transfers.

## 2023-11-15 NOTE — PROGRESS NOTES
Subjective Data:  Patient still complaining shortness of breath.  No chest pain.  No dizziness remains slightly confused.     Events:    Telemetry overnight reviewed no events     Objective Data:  Last Recorded Vitals:  Vitals:    11/15/23 0500 11/15/23 0600 11/15/23 0744 11/15/23 0809   BP: 131/79 (!) 134/100  124/76   BP Location:    Right arm   Patient Position:    Lying   Pulse: 65 70  73   Resp: 16 19  15   Temp:    36.2 °C (97.2 °F)   TempSrc:    Temporal   SpO2: 90% 93% 93% 94%   Weight: 101 kg (222 lb 8 oz)      Height:           Last Labs:  CBC - 11/15/2023:  6:06 AM  8.2 14.1 173    47.6      CMP - 11/15/2023:  6:06 AM  8.8 7.0 314 --- 0.5   4.0 3.1 265 91      PTT - 9/20/2023:  6:03 AM  1.1   12.3 31     BNP   Date/Time Value Ref Range Status   09/19/2023 03:36  0 - 99 pg/mL Final     Comment:     .  <100 pg/mL - Heart failure unlikely  100-299 pg/mL - Intermediate probability of acute heart  .               failure exacerbation. Correlate with clinical  .               context and patient history.    >=300 pg/mL - Heart Failure likely. Correlate with clinical  .               context and patient history.  BNP testing is performed using different testing   methodology at [facility] than at other   [facility]. Direct result comparisons should   only be made within the same method.       HGBA1C   Date/Time Value Ref Range Status   09/22/2023 10:24 AM 6.4 % Final     Comment:          Diagnosis of Diabetes-Adults   Non-Diabetic: < or = 5.6%   Increased risk for developing diabetes: 5.7-6.4%   Diagnostic of diabetes: > or = 6.5%  .       Monitoring of Diabetes                Age (y)     Therapeutic Goal (%)   Adults:          >18           <7.0   Pediatrics:    13-18           <7.5                   7-12           <8.0                   0- 6            7.5-8.5   American Diabetes Association. Diabetes Care 33(S1), Jan 2010.     04/25/2023 01:39 PM 6.4 4.3 - 5.6 % Final     Comment:  "    American Diabetes Association guidelines indicate that patients with HgbA1c in the range 5.7-6.4% are at increased risk for development of diabetes, and intervention by lifestyle modification may be beneficial. HgbA1c greater or equal to 6.5% is considered diagnostic of diabetes.      Last I/O:  I/O last 3 completed shifts:  In: 1060 (10.5 mL/kg) [P.O.:960; IV Piggyback:100]  Out: 2150 (21.3 mL/kg) [Urine:2150 (0.6 mL/kg/hr)]  Weight: 100.9 kg     Past Cardiology Tests (Last 3 Years):  EKG:  No results found for this or any previous visit from the past 1095 days.    Echo:  No results found for this or any previous visit from the past 1095 days.    Ejection Fractions:  No results found for: \"EF\"  Cath:  No results found for this or any previous visit from the past 1095 days.    Stress Test:  No results found for this or any previous visit from the past 1095 days.    Cardiac Imaging:  No results found for this or any previous visit from the past 1095 days.      Inpatient Medications:  Scheduled medications   Medication Dose Route Frequency    acetaminophen  650 mg oral Once    ampicillin-sulbactam  3 g intravenous q6h    budesonide  0.5 mg nebulization BID    furosemide  40 mg intravenous BID with meals    gabapentin  300 mg oral TID    heparin  5,000 Units subcutaneous q12h    insulin lispro  0-5 Units subcutaneous TID with meals    ipratropium-albuteroL  3 mL nebulization 4x daily    methylPREDNISolone sodium succinate (PF)  40 mg intravenous q12h    spironolactone  12.5 mg oral Daily    vancomycin  1,000 mg intravenous q12h     PRN medications   Medication    dextrose 10 % in water (D10W)    dextrose    glucagon    ibuprofen    ipratropium-albuteroL    oxygen     Continuous Medications   Medication Dose Last Rate       Physical Exam:  General: Patient is in mild respiratory distress HEENT: atraumatic normocephalic.  Neck: is supple jugular venous pressure within normal limits no thyromegaly.  Cardiovascular " regular rate and rhythm normal heart sounds no murmurs rubs or gallops.  Lungs: Bibasilar crackles  Abdomen: is soft nontender.  Extremities warm to touch 2+ edema.        Assessment/Plan   Acute respiratory failure.  Patient admitted to the hospital with acute respiratory failure likely secondary to acute on chronic COPD.  She does have extremely elevated NT-proBNP and lower extremity edema.  She is also in acute on chronic decompensated heart failure diastolic dysfunction.  I will start patient on furosemide 40 mg IV twice daily..  She received already 40 mg of furosemide IV today.  We will monitor daily weights input and output.  According to the nurse she has been having significant amount of urine output although is not measured or collected.  Had 2D echo done in September 2023 showed normal ejection fraction.  We will monitor for now.    Elevated troponin.  Patient has elevated troponin with significant delta.  EKG at baseline.  Denies having chest pain.  This could be related to hypoxic respiratory failure and demand ischemia.  Patient did not have any ischemic work-up recently.  We will arrange for Lexiscan nuclear stress test once her respiratory status improves.  For now we will continue diuresis, aspirin, high intensity statin.  Hypertension currently blood pressure on the low normal.  We will hold antihypertensive medications will reassess in a.m.  Hyperlipidemia continue high intensity statin.  Altered mental status management by primary team.  Peripheral IV 11/13/23 22 G Left;Anterior Hand (Active)   Site Assessment Clean;Dry;Intact 11/14/23 2315   Dressing Status Clean;Dry 11/14/23 2315   Number of days: 2       Peripheral IV 11/14/23 20 G Left;Proximal;Anterior Forearm (Active)   Site Assessment Clean;Dry;Intact 11/14/23 2315   Dressing Status Clean;Dry 11/14/23 2315   Number of days: 1       Code Status:  Full Code        Andria Campo MD

## 2023-11-15 NOTE — PROGRESS NOTES
Pulmonary Progress Note    Subha Miller is a 63 y.o. female on day 2 of admission presenting with COPD (chronic obstructive pulmonary disease) (CMS/[facility]).    Subjective   Follow up COPD exacerbation  No acute events  Off the bipap this morning  Objective   Vital Signs      11/15/2023     1:00 AM 11/15/2023     2:00 AM 11/15/2023     3:00 AM 11/15/2023     4:00 AM 11/15/2023     5:00 AM 11/15/2023     6:00 AM 11/15/2023     8:09 AM   Vitals   Systolic 118 120 130 140 131 134 124   Diastolic 74 73 77 85 79 100 76   Heart Rate 64 64 65 63 65 70 73   Temp    36.5 °C (97.7 °F)   36.2 °C (97.2 °F)   Resp 16 16 16 17 16 19 15   Weight (lb)     222.5     BMI     39.41 kg/m2     BSA (m2)     2.12 m2         Oxygen Therapy  SpO2: 91 %  Medical Gas Therapy: Supplemental oxygen  O2 Delivery Method: Nasal cannula    FiO2 (%):  [32 %-36 %] 36 %  S RR:  [16] 16    Intake/Output previous 24 hours:    Intake/Output Summary (Last 24 hours) at 11/15/2023 1136  Last data filed at 11/15/2023 0635  Gross per 24 hour   Intake 1010 ml   Output 1650 ml   Net -640 ml     Physical Exam  Vitals reviewed.   Constitutional:       Appearance: Normal appearance.   HENT:      Head: Normocephalic and atraumatic.      Mouth/Throat:      Mouth: Mucous membranes are moist.   Eyes:      Extraocular Movements: Extraocular movements intact.      Pupils: Pupils are equal, round, and reactive to light.   Cardiovascular:      Rate and Rhythm: Normal rate and regular rhythm.      Pulses: Normal pulses.      Heart sounds: Normal heart sounds.   Pulmonary:      Effort: Pulmonary effort is normal.      Breath sounds: Normal breath sounds. Decreased air movement present.   Abdominal:      General: Abdomen is flat. Bowel sounds are normal.      Palpations: Abdomen is soft.   Musculoskeletal:      Cervical back: Normal range of motion and neck supple.      Right lower leg: Edema present.      Left lower leg: Edema present.   Neurological:      Mental Status: She is  alert.     ...  Lines and Tubes:  Peripheral IV 11/13/23 22 G Left;Anterior Hand (Active)   Placement Date/Time: 11/13/23 1837   Placed by External Staff?: EMS  Size (Gauge): 22 G  Orientation: Left;Anterior  Location: Hand   Number of days: 1       Peripheral IV 11/14/23 20 G Left;Proximal;Anterior Forearm (Active)   Placement Date: 11/14/23   Size (Gauge): 20 G  Orientation: Left;Proximal;Anterior  Location: Forearm   Number of days: 1         Scheduled medications  acetaminophen, 650 mg, oral, Once  ampicillin-sulbactam, 3 g, intravenous, q6h  budesonide, 0.5 mg, nebulization, BID  furosemide, 40 mg, intravenous, BID with meals  gabapentin, 300 mg, oral, TID  heparin, 5,000 Units, subcutaneous, q12h  insulin lispro, 0-5 Units, subcutaneous, TID with meals  ipratropium-albuteroL, 3 mL, nebulization, 4x daily  methylPREDNISolone sodium succinate (PF), 40 mg, intravenous, q12h  regadenoson, 0.4 mg, intravenous, Once in imaging  spironolactone, 12.5 mg, oral, Daily  vancomycin, 1,000 mg, intravenous, q12h      Continuous medications     PRN medications  PRN medications: aminophylline, dextrose 10 % in water (D10W), dextrose, glucagon, ibuprofen, ipratropium-albuteroL, oxygen    Relevant Results  Results from last 7 days   Lab Units 11/15/23  0606 11/14/23  0602 11/13/23  1838   WBC AUTO x10*3/uL 8.2 9.2 11.1   HEMOGLOBIN g/dL 14.1 14.5 13.1   HEMATOCRIT % 47.6* 52.5* 44.7   PLATELETS AUTO x10*3/uL 173 164 187      Results from last 7 days   Lab Units 11/15/23  0606 11/14/23  0602 11/13/23  2141 11/13/23  1838   SODIUM mmol/L 141 143  --  141   POTASSIUM mmol/L 4.2 3.8  --  3.6   CHLORIDE mmol/L 98 103  --  103   CO2 mmol/L 35* 29  --  29   BUN mg/dL 22 20  --  19   CREATININE mg/dL 0.70 1.20 1.50 1.50   GLUCOSE mg/dL 175* 76  --  106*   CALCIUM mg/dL 8.8 8.4*  --  7.9*      Results from last 7 days   Lab Units 11/13/23  2224   POCT PH, ARTERIAL pH 7.31*   POCT PCO2, ARTERIAL mm Hg 74*   POCT PO2, ARTERIAL mm Hg 105*    POCT HCO3 CALCULATED, ARTERIAL mmol/L 37.3*   POCT BASE EXCESS, ARTERIAL mmol/L 8.2*     XR chest 1 view 11/13/2023    Narrative  Interpreted By:  Anastasia Fishman,  STUDY:  XR CHEST 1 VIEW;  11/13/2023 7:17 pm    INDICATION:  Signs/Symptoms:dyspnea/ams.    COMPARISON:  None.    ACCESSION NUMBER(S):  [id number]    ORDERING CLINICIAN:  ERIC CERVANTES    FINDINGS:          CARDIOMEDIASTINAL SILHOUETTE:  Prominent heart size.    LUNGS:  No focal airspace consolidations or effusions.    ABDOMEN:  No remarkable upper abdominal findings.    BONES:  No acute osseous changes.    Impression  Prominent heart size. No focal airspace consolidations or effusions.    MACRO:  None    Signed by: Anastasia Fishman 11/13/2023 7:33 PM  Dictation workstation:   [id number]      Patient Active Problem List   Diagnosis    COPD (chronic obstructive pulmonary disease) (CMS/HCC)    Generalized weakness    Altered mental status    Congestive heart failure (CMS/HCC)    Anemia    Elevated troponin    Hypocalcemia    Transaminasemia    Diabetes mellitus (CMS/HCC)    Hypertension    Renal mass    Decreased activities of daily living (ADL)     Assessment/Plan   Principal Problem:    COPD (chronic obstructive pulmonary disease) (CMS/HCC)  Active Problems:    Generalized weakness    Altered mental status    Congestive heart failure (CMS/HCC)    Anemia    Elevated troponin    Hypocalcemia    Transaminasemia    Diabetes mellitus (CMS/HCC)    Hypertension    Renal mass    Decreased activities of daily living (ADL)     Acute exacerbation of COPD. Volume overload also noted.   She also appears to have some lower extremity cellulitis    Bipap  at bedtime and prn    Diuresis  Cr is improving  Lasix BID ordered    Antibiotics for possible cellulitis  Clinically improving today  ID following    Steroids  Aerosols  Prophylaxis      Elan Lynn MD  [facility]

## 2023-11-15 NOTE — CARE PLAN
Problem: Respiratory  Goal: Minimize anxiety/maximize coping throughout shift  Outcome: Progressing  Goal: Minimal/no exertional discomfort or dyspnea this shift  Outcome: Progressing  Goal: No signs of respiratory distress (eg. Use of accessory muscles. Peds grunting)  Outcome: Progressing  Goal: Verbalize decreased shortness of breath this shift  Outcome: Progressing  Goal: Wean oxygen to maintain O2 saturation per order/standard this shift  Outcome: Progressing

## 2023-11-15 NOTE — PROGRESS NOTES
Subha Miller is a 63 y.o. female on day 2 of admission presenting with COPD (chronic obstructive pulmonary disease) (CMS/[facility]).    Subjective   Feels okay and better than yesterday.  Says has not eaten anything yet today but yesterday took her regular diabetic diet okay without any issues.  Has not any further abdominal pain since seen yesterday.  Has not had any nausea or vomiting.  Is passing gas.  Has not had any bowel movements since seen yesterday.  Her lower extremity cellulitis feels okay and better than yesterday.  Nothing new or different since seen yesterday.       Objective     Vital signs in last 24 hours:  Temp:  [36.2 °C (97.2 °F)-36.8 °C (98.2 °F)] 36.8 °C (98.2 °F)  Heart Rate:  [63-98] 88  Resp:  [12-24] 16  BP: ()/(6-100) 99/58  FiO2 (%):  [32 %-36 %] 36 %  Heart Rate:  [63-98]   Temp:  [36.2 °C (97.2 °F)-36.8 °C (98.2 °F)]   Resp:  [12-24]   BP: ()/(6-100)   Weight:  [101 kg (222 lb 8 oz)]   SpO2:  [75 %-99 %]      Intake/Output last 3 Shifts:  I/O last 3 completed shifts:  In: 1060 (10.5 mL/kg) [P.O.:960; IV Piggyback:100]  Out: 2150 (21.3 mL/kg) [Urine:2150 (0.6 mL/kg/hr)]  Weight: 100.9 kg     Physical Exam  No acute distress  Not septic appearing  Looks fine and comfortable  Alert and oriented  Heart regular-sinus rhythm on the monitor with rate of 76  Blood pressure on the monitor 124/70  Lungs wheezy on 4 L  Abdomen obese, soft, positive bowel sounds, nondistended, nontender except for only periumbilical patient rates a 3 and right upper quadrant patient rates a 3-both with no guarding or rebound.  Negative Baum's.  No obvious hernias that I appreciate.  Bilateral lower extremities are the same as yesterday they are warm, with cellulitis, edematous, no evidence of any abscesses    Scheduled medications  acetaminophen, 650 mg, oral, Once  ampicillin-sulbactam, 3 g, intravenous, q6h  budesonide, 0.5 mg, nebulization, BID  furosemide, 40 mg, intravenous, BID with meals  gabapentin,  300 mg, oral, TID  heparin, 5,000 Units, subcutaneous, q12h  insulin lispro, 0-5 Units, subcutaneous, TID with meals  ipratropium-albuteroL, 3 mL, nebulization, 4x daily  methylPREDNISolone sodium succinate (PF), 40 mg, intravenous, q12h  regadenoson, 0.4 mg, intravenous, Once in imaging  spironolactone, 12.5 mg, oral, Daily  vancomycin, 1,000 mg, intravenous, q12h      Continuous medications     PRN medications  PRN medications: aminophylline, dextrose 10 % in water (D10W), dextrose, glucagon, ibuprofen, ipratropium-albuteroL, oxygen    Relevant Results  Results from last 7 days   Lab Units 11/15/23  0606 11/14/23  0602 11/13/23  1838   WBC AUTO x10*3/uL 8.2 9.2 11.1   HEMOGLOBIN g/dL 14.1 14.5 13.1   HEMATOCRIT % 47.6* 52.5* 44.7   PLATELETS AUTO x10*3/uL 173 164 187      Results from last 7 days   Lab Units 11/15/23  0606 11/14/23  0602 11/13/23  2141 11/13/23  1838   SODIUM mmol/L 141 143  --  141   POTASSIUM mmol/L 4.2 3.8  --  3.6   CHLORIDE mmol/L 98 103  --  103   CO2 mmol/L 35* 29  --  29   BUN mg/dL 22 20  --  19   CREATININE mg/dL 0.70 1.20 1.50 1.50   GLUCOSE mg/dL 175* 76  --  106*   CALCIUM mg/dL 8.8 8.4*  --  7.9*      Results from last 7 days   Lab Units 11/13/23  2224   POCT PH, ARTERIAL pH 7.31*   POCT PCO2, ARTERIAL mm Hg 74*   POCT PO2, ARTERIAL mm Hg 105*   POCT HCO3 CALCULATED, ARTERIAL mmol/L 37.3*   POCT BASE EXCESS, ARTERIAL mmol/L 8.2*     CT head wo IV contrast  Result Date: 11/13/2023  Interpreted By:  Anastasia Fishman, STUDY: CT HEAD WO IV CONTRAST;  11/13/2023 8:00 pm   INDICATION: Signs/Symptoms:AMS.   COMPARISON: None.   ACCESSION NUMBER(S): [id number]   ORDERING CLINICIAN: ERIC HELEN   TECHNIQUE: Noncontrast axial CT scan of head was performed. Angled reformats in brain and bone windows were generated. The images were reviewed in bone, brain, blood and soft tissue windows.   FINDINGS: CSF Spaces: Ex vacuo dilation of the ventricles. Sulci and basal cisterns are within normal limits.  There is no extraaxial fluid collection.   Parenchyma: Chronic bilateral white matter microvascular disease. There is no mass effect or midline shift.  There is no intracranial hemorrhage.   Calvarium: The calvarium is unremarkable.   Paranasal sinuses and mastoids: Visualized paranasal sinuses and mastoids are clear.       Acute intracranial findings.   MACRO: None   Signed by: Anastasia Fishman 11/13/2023 8:49 PM Dictation workstation:   [id number]    CT abdomen pelvis w IV contrast  Result Date: 11/13/2023  Interpreted By:  Anastasia Fishman, STUDY: CT ABDOMEN PELVIS W IV CONTRAST;  11/13/2023 8:15 pm   INDICATION: Increasing confusion   COMPARISON: 10/09/2022.   ACCESSION NUMBER(S): [id number]   ORDERING CLINICIAN: SOLITARIO MONTOYA   TECHNIQUE: CT of the abdomen and pelvis was performed.  Standard contiguous axial images were obtained at 3 mm slice thickness through the abdomen and pelvis. Coronal and sagittal reconstructions at 3 mm slice thickness were performed.   75 mL of Omnipaque were administered intravenously without immediate complication.   FINDINGS: LOWER CHEST: Within normal limits.   ABDOMEN:   LIVER: Normal size. No focal lesions.   BILE DUCTS: Nondilated.   GALLBLADDER: No hyperdense stones. No wall thickening.   PANCREAS: No focal lesions.   SPLEEN: Normal size. No focal lesions   ADRENAL GLANDS: Within normal limits   KIDNEYS AND URETERS: Left lower pole angiomyolipoma measures 6.6 x 4.7 by 5.5 cm (series 5, image 63, and series 3, image 97). This is overall unchanged comparison to prior. Right renal cyst.   PELVIS:   BLADDER: Within normal limits.   REPRODUCTIVE ORGANS: No suspicious findings   BOWEL: The stomach, small and large bowel are unremarkable.   VESSELS: IVC and aorta are normal.   PERITONEUM/RETROPERITONEUM/LYMPH NODES: No free air, free fluid or adenopathy.   BONES AND ABDOMINAL WALL: No suspicious osseous lesions.       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 Beyond 4 cm, this lesion presents an increased risk for hemorrhage. Urological consult recommended.   MACRO: None   Signed by: Anastasia Fishman 11/13/2023 8:48 PM Dictation workstation:   [id number]    US gallbladder  Result Date: 11/13/2023  Interpreted By:  Anastasia Fishman, STUDY: US GALLBLADDER; 8:11 pm   INDICATION: Signs/Symptoms:elevated LFTs / vomiting.   COMPARISON: CT abdomen and pelvis dated same day.   ACCESSION NUMBER(S): [id number]   ORDERING CLINICIAN: SOLITARIO MONTOYA   TECHNIQUE: Limited abdominal ultrasound of the right upper quadrant was performed utilizing gray scale imaging.   FINDINGS: Liver: Normal echogenicity without focal lesion. No intrahepatic biliary distention. Gallbladder: Gallstones present. Gallbladder wall measures 0.3 cm. Sonographic Baum's sign: Negative CBD: 2.7 mm Visualized right kidney measures 11.6 cm with good corticomedullary differentiation. Mild prominent right renal collecting system. Right renal cyst.       1. Cholelithiasis. No additional abnormalities identified. 2. Mild right hydronephrosis.   MACRO: None.   Signed by: Anastasia Fishman 11/13/2023 8:14 PM Dictation workstation:   [id number]    XR chest 1 view  Result Date: 11/13/2023  Interpreted By:  Anastasia Fishman, STUDY: XR CHEST 1 VIEW;  11/13/2023 7:17 pm   INDICATION: Signs/Symptoms:dyspnea/ams.   COMPARISON: None.   ACCESSION NUMBER(S): [id number]   ORDERING CLINICIAN: ERIC CERVANTES   FINDINGS:         CARDIOMEDIASTINAL SILHOUETTE: Prominent heart size.   LUNGS: No focal airspace consolidations or effusions.   ABDOMEN: No remarkable upper abdominal findings.   BONES: No acute osseous changes.       Prominent heart size. No focal airspace consolidations or effusions.   MACRO: None   Signed by: Anastasia Fishman 11/13/2023 7:33 PM Dictation workstation:   [id number]       Assessment/Plan   Principal Problem:    COPD (chronic obstructive pulmonary disease) (CMS/HCC)  Active Problems:    Generalized weakness    Altered mental status     Congestive heart failure (CMS/HCC)    Anemia    Elevated troponin    Hypocalcemia    Transaminasemia    Diabetes mellitus (CMS/HCC)    Hypertension    Renal mass    Decreased activities of daily living (ADL)      Cholelithiasis  Does not seem to be symptomatic  Seems to be an incidental finding     Elevated LFTs  ALT  11/15 is 265  11/14 is 247  11/13 is 107  9/24 is 11    AST  11/15 is 314  11/14 is 638  11/13 is 287  9/24 is 23    Follow-CMP already ordered for am     Chronic bilateral lower abdominal pain  Denies ever having an EGD  Says last colonoscopy was done over 20 years ago as screening and was negative  11/13 CT noted as above     Bilateral lower extremity cellulitis  Antibiotics per IM/infectious disease  No acute surgical indications at present        Lyubov Angulo, APRN-CNP

## 2023-11-15 NOTE — PROGRESS NOTES
Subha Miller is a 63 y.o. female on day 2 of admission presenting with COPD (chronic obstructive pulmonary disease) (CMS/[facility]).      Subjective   I saw this patient in room 413.  She states her legs feel much better less painful able to move them better breathing is getting better as well.  Reviewed input from cardiology who are planning on stress test.  The patient is requesting my help to arrange for her to see her  who is down the floor on surge telemetry.....       Objective     Last Recorded Vitals  /76 (BP Location: Right arm, Patient Position: Lying)   Pulse 73   Temp 36.2 °C (97.2 °F) (Temporal)   Resp 15   Wt 101 kg (222 lb 8 oz)   SpO2 94%   Intake/Output last 3 Shifts:    Intake/Output Summary (Last 24 hours) at 11/15/2023 1014  Last data filed at 11/15/2023 0635  Gross per 24 hour   Intake 1060 ml   Output 1650 ml   Net -590 ml       Physical Exam  Alert ranted x3  No distress  Chest bilateral wheezes same as before may be improved  Heart regular  Abdomen soft nontender  Extremities somewhat decreased edema and redness  Neurologic exam gross nonfocal  Relevant Results  Reviewed labs  Assessment/Plan     Principal Problem:    COPD (chronic obstructive pulmonary disease) (CMS/HCC)  Active Problems:    Generalized weakness    Altered mental status    Congestive heart failure (CMS/HCC)    Anemia    Elevated troponin    Hypocalcemia    Transaminasemia    Diabetes mellitus (CMS/HCC)    Hypertension    Renal mass    Decreased activities of daily living (ADL)      Stress test today continue antibiotics and IV Lasix downgrade to stepdown discussed with nurse.  Nurse to see if she could see her              Jean Carlos Patel MD

## 2023-11-15 NOTE — CONSULTS
"Inpatient consult to Pulmonology  Consult performed by: Elan Lynn MD  Consult ordered by: Marcell Esquivel MD          Pulmonary Consult    Reason For Consult  COPD exacerbation  History Of Present Illness  Subha Miller is a 63 y.o. female presenting with COPD (chronic obstructive pulmonary disease) (CMS/[facility]). Brought to the ED for shortness of breath and altered mentation.      Past Medical History  She has a past medical history of CHF (congestive heart failure) (CMS/[facility]), COPD (chronic obstructive pulmonary disease) (CMS/[facility]), Diabetes mellitus (CMS/[facility]), Hypertension, and Stroke (CMS/[facility]).    Surgical History  She has a past surgical history that includes Back surgery; Tonsillectomy; and  section, low transverse.     Social History  She reports that she has been smoking cigarettes. She has a 12.50 pack-year smoking history. She has never used smokeless tobacco. She reports that she does not drink alcohol and does not use drugs.      Family History  No family history on file.     Allergies  Penicillins, Codeine, and Morphine    Review of Systems    Last Recorded Vitals  Blood pressure (!) 103/36, pulse 86, temperature 36.7 °C (98.1 °F), temperature source Temporal, resp. rate 13, height 1.6 m (5' 3\"), weight 100 kg (221 lb 5.5 oz), SpO2 90 %.    Intake/Output    Intake/Output Summary (Last 24 hours) at 2023  Last data filed at 2023 1816  Gross per 24 hour   Intake 1060 ml   Output 1300 ml   Net -240 ml       Physical Exam  Vitals reviewed.   Constitutional:       Appearance: Normal appearance.   HENT:      Head: Normocephalic and atraumatic.      Mouth/Throat:      Mouth: Mucous membranes are moist.   Eyes:      Extraocular Movements: Extraocular movements intact.      Pupils: Pupils are equal, round, and reactive to light.   Cardiovascular:      Rate and Rhythm: Normal rate and regular rhythm.      Pulses: Normal pulses.      Heart sounds: Normal heart sounds.   Pulmonary:      Effort: " Pulmonary effort is normal.      Breath sounds: Normal breath sounds. Decreased air movement present.   Abdominal:      General: Abdomen is flat. Bowel sounds are normal.      Palpations: Abdomen is soft.   Musculoskeletal:      Cervical back: Normal range of motion and neck supple.      Right lower leg: Edema present.      Left lower leg: Edema present.   Neurological:      Mental Status: She is alert.      ...    Oxygen Therapy  SpO2: 90 %  Medical Gas Therapy: Supplemental oxygen  O2 Delivery Method: Nasal cannula  FiO2 (%):  [32 %] 32 %  S RR:  [16] 16    Lines and Tubes:  Peripheral IV 11/13/23 22 G Left;Anterior Hand (Active)   Placement Date/Time: 11/13/23 1837   Placed by External Staff?: EMS  Size (Gauge): 22 G  Orientation: Left;Anterior  Location: Hand   Number of days: 1       Peripheral IV 11/14/23 20 G Left;Proximal;Anterior Forearm (Active)   Placement Date: 11/14/23   Size (Gauge): 20 G  Orientation: Left;Proximal;Anterior  Location: Forearm   Number of days: 0         Scheduled medications  acetaminophen, 650 mg, oral, Once  ampicillin-sulbactam, 3 g, intravenous, q6h  budesonide, 0.5 mg, nebulization, BID  furosemide, 40 mg, intravenous, Daily  gabapentin, 300 mg, oral, TID  heparin, 5,000 Units, subcutaneous, q12h  insulin lispro, 0-5 Units, subcutaneous, TID with meals  ipratropium-albuteroL, 3 mL, nebulization, 4x daily  methylPREDNISolone sodium succinate (PF), 40 mg, intravenous, q12h  spironolactone, 12.5 mg, oral, Daily  vancomycin, 1,500 mg, intravenous, q24h      Continuous medications     PRN medications  PRN medications: dextrose 10 % in water (D10W), dextrose, glucagon, ibuprofen, ipratropium-albuteroL, oxygen    Relevant Results  Results from last 7 days   Lab Units 11/14/23  0602 11/13/23  1838   WBC AUTO x10*3/uL 9.2 11.1   HEMOGLOBIN g/dL 14.5 13.1   HEMATOCRIT % 52.5* 44.7   PLATELETS AUTO x10*3/uL 164 187      Results from last 7 days   Lab Units 11/14/23  0602 11/13/23  2141  "11/13/23  1838   SODIUM mmol/L 143  --  141   POTASSIUM mmol/L 3.8  --  3.6   CHLORIDE mmol/L 103  --  103   CO2 mmol/L 29  --  29   BUN mg/dL 20  --  19   CREATININE mg/dL 1.20 1.50 1.50   GLUCOSE mg/dL 76  --  106*   CALCIUM mg/dL 8.4*  --  7.9*      Results from last 7 days   Lab Units 11/13/23  2224   POCT PH, ARTERIAL pH 7.31*   POCT PCO2, ARTERIAL mm Hg 74*   POCT PO2, ARTERIAL mm Hg 105*   POCT HCO3 CALCULATED, ARTERIAL mmol/L 37.3*   POCT BASE EXCESS, ARTERIAL mmol/L 8.2*     XR chest 1 view 11/13/2023    Narrative  Interpreted By:  Anastasia Fishman,  STUDY:  XR CHEST 1 VIEW;  11/13/2023 7:17 pm    INDICATION:  Signs/Symptoms:dyspnea/ams.    COMPARISON:  None.    ACCESSION NUMBER(S):  [id number]    ORDERING CLINICIAN:  ERIC CERVANTES    FINDINGS:          CARDIOMEDIASTINAL SILHOUETTE:  Prominent heart size.    LUNGS:  No focal airspace consolidations or effusions.    ABDOMEN:  No remarkable upper abdominal findings.    BONES:  No acute osseous changes.    Impression  Prominent heart size. No focal airspace consolidations or effusions.    MACRO:  None    Signed by: Anastasia Fishman 11/13/2023 7:33 PM  Dictation workstation:   [id number]    Pulmonary Functions Testing Results:    No results found for: \"FEV1\", \"FVC\", \"HFH6KES\", \"TLC\", \"DLCO\"    [unfilled]   Assessment/Plan   Principal Problem:    COPD (chronic obstructive pulmonary disease) (CMS/[facility])  Active Problems:    Generalized weakness    Altered mental status    Congestive heart failure (CMS/HCC)    Anemia    Elevated troponin    Hypocalcemia    Transaminasemia    Diabetes mellitus (CMS/HCC)    Hypertension    Renal mass    Decreased activities of daily living (ADL)    Acute exacerbation of COPD. Volume overload also noted.   She appears to have some lower extremity cellulitis  Bipap  at bedtime and prn  Diuresis  Abx for possible cellulitis  Steroids  Aerosols  Prophylaxis    Elan Lynn MD  [facility]   "

## 2023-11-16 ENCOUNTER — PATIENT OUTREACH (OUTPATIENT)
Dept: CASE MANAGEMENT | Facility: HOSPITAL | Age: 63
End: 2023-11-16
Payer: COMMERCIAL

## 2023-11-16 ENCOUNTER — APPOINTMENT (OUTPATIENT)
Dept: RADIOLOGY | Facility: HOSPITAL | Age: 63
DRG: 189 | End: 2023-11-16
Payer: COMMERCIAL

## 2023-11-16 ENCOUNTER — APPOINTMENT (OUTPATIENT)
Dept: CARDIOLOGY | Facility: HOSPITAL | Age: 63
DRG: 189 | End: 2023-11-16
Payer: COMMERCIAL

## 2023-11-16 LAB
ALBUMIN SERPL-MCNC: 3 G/DL (ref 3.5–5)
ALP BLD-CCNC: 80 U/L (ref 35–125)
ALT SERPL-CCNC: 177 U/L (ref 5–40)
ANION GAP SERPL CALC-SCNC: 5 MMOL/L
APPARATUS: ABNORMAL
AST SERPL-CCNC: 128 U/L (ref 5–40)
BASE EXCESS BLDA CALC-SCNC: 22.4 MMOL/L (ref -2–3)
BASOPHILS # BLD AUTO: 0.01 X10*3/UL (ref 0–0.1)
BASOPHILS NFR BLD AUTO: 0.1 %
BILIRUB SERPL-MCNC: 0.4 MG/DL (ref 0.1–1.2)
BODY TEMPERATURE: 37 DEGREES CELSIUS
BUN SERPL-MCNC: 28 MG/DL (ref 8–25)
CALCIUM SERPL-MCNC: 8.7 MG/DL (ref 8.5–10.4)
CHLORIDE SERPL-SCNC: 92 MMOL/L (ref 97–107)
CO2 SERPL-SCNC: 39 MMOL/L (ref 24–31)
CREAT SERPL-MCNC: 0.9 MG/DL (ref 0.4–1.6)
EOSINOPHIL # BLD AUTO: 0 X10*3/UL (ref 0–0.7)
EOSINOPHIL NFR BLD AUTO: 0 %
ERYTHROCYTE [DISTWIDTH] IN BLOOD BY AUTOMATED COUNT: 15.6 % (ref 11.5–14.5)
GFR SERPL CREATININE-BSD FRML MDRD: 71 ML/MIN/1.73M*2
GLUCOSE BLD MANUAL STRIP-MCNC: 152 MG/DL (ref 74–99)
GLUCOSE BLD MANUAL STRIP-MCNC: 193 MG/DL (ref 74–99)
GLUCOSE BLD MANUAL STRIP-MCNC: 197 MG/DL (ref 74–99)
GLUCOSE BLD MANUAL STRIP-MCNC: 300 MG/DL (ref 74–99)
GLUCOSE SERPL-MCNC: 206 MG/DL (ref 65–99)
HCO3 BLDA-SCNC: 49.5 MMOL/L (ref 22–26)
HCT VFR BLD AUTO: 42.4 % (ref 36–46)
HGB BLD-MCNC: 12.7 G/DL (ref 12–16)
IMM GRANULOCYTES # BLD AUTO: 0.07 X10*3/UL (ref 0–0.7)
IMM GRANULOCYTES NFR BLD AUTO: 0.7 % (ref 0–0.9)
INHALED O2 CONCENTRATION: 36 %
LYMPHOCYTES # BLD AUTO: 0.52 X10*3/UL (ref 1.2–4.8)
LYMPHOCYTES NFR BLD AUTO: 5.1 %
MCH RBC QN AUTO: 28.4 PG (ref 26–34)
MCHC RBC AUTO-ENTMCNC: 30 G/DL (ref 32–36)
MCV RBC AUTO: 95 FL (ref 80–100)
MONOCYTES # BLD AUTO: 0.23 X10*3/UL (ref 0.1–1)
MONOCYTES NFR BLD AUTO: 2.3 %
NEUTROPHILS # BLD AUTO: 9.37 X10*3/UL (ref 1.2–7.7)
NEUTROPHILS NFR BLD AUTO: 91.8 %
NRBC BLD-RTO: 0 /100 WBCS (ref 0–0)
OXYHGB MFR BLDA: 94.5 % (ref 94–98)
PCO2 BLDA: 62 MM HG (ref 38–42)
PH BLDA: 7.51 PH (ref 7.38–7.42)
PLATELET # BLD AUTO: 171 X10*3/UL (ref 150–450)
PO2 BLDA: 78 MM HG (ref 85–95)
POTASSIUM SERPL-SCNC: 4 MMOL/L (ref 3.4–5.1)
PROT SERPL-MCNC: 6.5 G/DL (ref 5.9–7.9)
RBC # BLD AUTO: 4.47 X10*6/UL (ref 4–5.2)
SAO2 % BLDA: 97 % (ref 94–100)
SODIUM SERPL-SCNC: 136 MMOL/L (ref 133–145)
WBC # BLD AUTO: 10.2 X10*3/UL (ref 4.4–11.3)

## 2023-11-16 PROCEDURE — 2500000002 HC RX 250 W HCPCS SELF ADMINISTERED DRUGS (ALT 637 FOR MEDICARE OP, ALT 636 FOR OP/ED): Performed by: INTERNAL MEDICINE

## 2023-11-16 PROCEDURE — 2500000004 HC RX 250 GENERAL PHARMACY W/ HCPCS (ALT 636 FOR OP/ED): Performed by: INTERNAL MEDICINE

## 2023-11-16 PROCEDURE — 94660 CPAP INITIATION&MGMT: CPT

## 2023-11-16 PROCEDURE — 82947 ASSAY GLUCOSE BLOOD QUANT: CPT

## 2023-11-16 PROCEDURE — 94640 AIRWAY INHALATION TREATMENT: CPT

## 2023-11-16 PROCEDURE — 80053 COMPREHEN METABOLIC PANEL: CPT | Performed by: INTERNAL MEDICINE

## 2023-11-16 PROCEDURE — 36415 COLL VENOUS BLD VENIPUNCTURE: CPT | Performed by: INTERNAL MEDICINE

## 2023-11-16 PROCEDURE — 9420000001 HC RT PATIENT EDUCATION 5 MIN

## 2023-11-16 PROCEDURE — 93016 CV STRESS TEST SUPVJ ONLY: CPT | Performed by: INTERNAL MEDICINE

## 2023-11-16 PROCEDURE — 2500000001 HC RX 250 WO HCPCS SELF ADMINISTERED DRUGS (ALT 637 FOR MEDICARE OP): Performed by: INTERNAL MEDICINE

## 2023-11-16 PROCEDURE — 82805 BLOOD GASES W/O2 SATURATION: CPT | Performed by: NURSE PRACTITIONER

## 2023-11-16 PROCEDURE — 97116 GAIT TRAINING THERAPY: CPT | Mod: GP,CQ

## 2023-11-16 PROCEDURE — 93017 CV STRESS TEST TRACING ONLY: CPT

## 2023-11-16 PROCEDURE — 36600 WITHDRAWAL OF ARTERIAL BLOOD: CPT

## 2023-11-16 PROCEDURE — A9502 TC99M TETROFOSMIN: HCPCS | Performed by: INTERNAL MEDICINE

## 2023-11-16 PROCEDURE — 85025 COMPLETE CBC W/AUTO DIFF WBC: CPT | Performed by: INTERNAL MEDICINE

## 2023-11-16 PROCEDURE — 97535 SELF CARE MNGMENT TRAINING: CPT | Mod: GO

## 2023-11-16 PROCEDURE — 2060000001 HC INTERMEDIATE ICU ROOM DAILY

## 2023-11-16 PROCEDURE — 96372 THER/PROPH/DIAG INJ SC/IM: CPT | Performed by: INTERNAL MEDICINE

## 2023-11-16 PROCEDURE — 99233 SBSQ HOSP IP/OBS HIGH 50: CPT | Performed by: INTERNAL MEDICINE

## 2023-11-16 PROCEDURE — 3430000001 HC RX 343 DIAGNOSTIC RADIOPHARMACEUTICALS: Performed by: INTERNAL MEDICINE

## 2023-11-16 RX ADMIN — HEPARIN SODIUM 5000 UNITS: 5000 INJECTION, SOLUTION INTRAVENOUS; SUBCUTANEOUS at 10:47

## 2023-11-16 RX ADMIN — SPIRONOLACTONE 12.5 MG: 25 TABLET ORAL at 10:46

## 2023-11-16 RX ADMIN — BUDESONIDE INHALATION 0.5 MG: 0.5 SUSPENSION RESPIRATORY (INHALATION) at 07:38

## 2023-11-16 RX ADMIN — GABAPENTIN 300 MG: 600 TABLET, FILM COATED ORAL at 10:46

## 2023-11-16 RX ADMIN — IBUPROFEN 600 MG: 600 TABLET ORAL at 22:45

## 2023-11-16 RX ADMIN — AMPICILLIN SODIUM AND SULBACTAM SODIUM 3 G: 2; 1 INJECTION, POWDER, FOR SOLUTION INTRAMUSCULAR; INTRAVENOUS at 22:42

## 2023-11-16 RX ADMIN — IPRATROPIUM BROMIDE AND ALBUTEROL SULFATE 3 ML: 2.5; .5 SOLUTION RESPIRATORY (INHALATION) at 07:39

## 2023-11-16 RX ADMIN — IPRATROPIUM BROMIDE AND ALBUTEROL SULFATE 3 ML: 2.5; .5 SOLUTION RESPIRATORY (INHALATION) at 12:39

## 2023-11-16 RX ADMIN — TETROFOSMIN 30.3 MILLICURIE: 0.23 INJECTION, POWDER, LYOPHILIZED, FOR SOLUTION INTRAVENOUS at 09:00

## 2023-11-16 RX ADMIN — GABAPENTIN 300 MG: 600 TABLET, FILM COATED ORAL at 22:41

## 2023-11-16 RX ADMIN — METHYLPREDNISOLONE SODIUM SUCCINATE 40 MG: 40 INJECTION, POWDER, FOR SOLUTION INTRAMUSCULAR; INTRAVENOUS at 13:40

## 2023-11-16 RX ADMIN — AMPICILLIN SODIUM AND SULBACTAM SODIUM 3 G: 2; 1 INJECTION, POWDER, FOR SOLUTION INTRAMUSCULAR; INTRAVENOUS at 04:27

## 2023-11-16 RX ADMIN — FUROSEMIDE 40 MG: 10 INJECTION, SOLUTION INTRAMUSCULAR; INTRAVENOUS at 17:15

## 2023-11-16 RX ADMIN — HEPARIN SODIUM 5000 UNITS: 5000 INJECTION, SOLUTION INTRAVENOUS; SUBCUTANEOUS at 22:42

## 2023-11-16 RX ADMIN — GABAPENTIN 300 MG: 600 TABLET, FILM COATED ORAL at 16:09

## 2023-11-16 RX ADMIN — IPRATROPIUM BROMIDE AND ALBUTEROL SULFATE 3 ML: 2.5; .5 SOLUTION RESPIRATORY (INHALATION) at 19:41

## 2023-11-16 RX ADMIN — BUDESONIDE INHALATION 0.5 MG: 0.5 SUSPENSION RESPIRATORY (INHALATION) at 19:41

## 2023-11-16 RX ADMIN — FUROSEMIDE 40 MG: 10 INJECTION, SOLUTION INTRAMUSCULAR; INTRAVENOUS at 10:47

## 2023-11-16 RX ADMIN — AMPICILLIN SODIUM AND SULBACTAM SODIUM 3 G: 2; 1 INJECTION, POWDER, FOR SOLUTION INTRAMUSCULAR; INTRAVENOUS at 10:47

## 2023-11-16 RX ADMIN — IBUPROFEN 600 MG: 600 TABLET ORAL at 10:47

## 2023-11-16 RX ADMIN — AMPICILLIN SODIUM AND SULBACTAM SODIUM 3 G: 2; 1 INJECTION, POWDER, FOR SOLUTION INTRAMUSCULAR; INTRAVENOUS at 17:18

## 2023-11-16 ASSESSMENT — COGNITIVE AND FUNCTIONAL STATUS - GENERAL
MOVING FROM LYING ON BACK TO SITTING ON SIDE OF FLAT BED WITH BEDRAILS: A LITTLE
DRESSING REGULAR LOWER BODY CLOTHING: A LOT
TURNING FROM BACK TO SIDE WHILE IN FLAT BAD: A LITTLE
MOBILITY SCORE: 17
HELP NEEDED FOR BATHING: A LOT
STANDING UP FROM CHAIR USING ARMS: A LITTLE
STANDING UP FROM CHAIR USING ARMS: A LITTLE
WALKING IN HOSPITAL ROOM: A LITTLE
DRESSING REGULAR UPPER BODY CLOTHING: A LOT
MOVING FROM LYING ON BACK TO SITTING ON SIDE OF FLAT BED WITH BEDRAILS: A LITTLE
TOILETING: A LOT
CLIMB 3 TO 5 STEPS WITH RAILING: A LOT
PERSONAL GROOMING: A LITTLE
MOVING TO AND FROM BED TO CHAIR: A LITTLE
MOBILITY SCORE: 17
CLIMB 3 TO 5 STEPS WITH RAILING: A LOT
WALKING IN HOSPITAL ROOM: A LITTLE
MOVING TO AND FROM BED TO CHAIR: A LITTLE
TURNING FROM BACK TO SIDE WHILE IN FLAT BAD: A LITTLE
DAILY ACTIVITIY SCORE: 15

## 2023-11-16 ASSESSMENT — PAIN - FUNCTIONAL ASSESSMENT
PAIN_FUNCTIONAL_ASSESSMENT: 0-10
PAIN_FUNCTIONAL_ASSESSMENT: 0-10

## 2023-11-16 ASSESSMENT — PAIN SCALES - GENERAL
PAINLEVEL_OUTOF10: 5 - MODERATE PAIN
PAINLEVEL_OUTOF10: 3
PAINLEVEL_OUTOF10: 7
PAINLEVEL_OUTOF10: 5 - MODERATE PAIN

## 2023-11-16 ASSESSMENT — ACTIVITIES OF DAILY LIVING (ADL): HOME_MANAGEMENT_TIME_ENTRY: 23

## 2023-11-16 NOTE — CARE PLAN
Problem: Skin  Goal: Decreased wound size/increased tissue granulation at next dressing change  Outcome: Progressing  Goal: Prevent/manage excess moisture  Outcome: Progressing  Goal: Prevent/minimize sheer/friction injuries  Outcome: Progressing  Goal: Promote/optimize nutrition  Outcome: Progressing  Goal: Promote skin healing  Outcome: Progressing     Problem: Discharge Planning  Goal: Discharge to home or other facility with appropriate resources  Outcome: Progressing     Problem: Diabetes  Goal: Achieve decreasing blood glucose levels by end of shift  Outcome: Progressing  Goal: Increase stability of blood glucose readings by end of shift  Outcome: Progressing  Goal: Maintain glucose levels >70mg/dl to <250mg/dl throughout shift  Outcome: Progressing  Goal: Learn about and adhere to nutrition recommendations by end of shift  Outcome: Progressing  Goal: Vital signs within normal range for age by end of shift  Outcome: Progressing  Goal: Increase self care and/or family involovement by end of shift  Outcome: Progressing  Goal: Receive DSME education by end of shift  Outcome: Progressing     Problem: Heart Failure  Goal: Reduction in peripheral edema within 24 hours  Outcome: Progressing  Goal: Report improvement of dyspnea/breathlessness this shift  Outcome: Progressing  Goal: Weight from fluid excess reduced over 2-3 days, then stabilize  Outcome: Progressing  Goal: Increase self care and/or family involvement in 24 hours  Outcome: Progressing     Problem: Pain  Goal: Takes deep breaths with improved pain control throughout the shift  Outcome: Progressing  Goal: Turns in bed with improved pain control throughout the shift  Outcome: Progressing  Goal: Walks with improved pain control throughout the shift  Outcome: Progressing  Goal: Performs ADL's with improved pain control throughout shift  Outcome: Progressing  Goal: Participates in PT with improved pain control throughout the shift  Outcome:  Progressing  Goal: Free from opioid side effects throughout the shift  Outcome: Progressing  Goal: Free from acute confusion related to pain meds throughout the shift  Outcome: Progressing     Problem: Respiratory  Goal: Minimize anxiety/maximize coping throughout shift  Outcome: Progressing  Goal: Minimal/no exertional discomfort or dyspnea this shift  Outcome: Progressing  Goal: No signs of respiratory distress (eg. Use of accessory muscles. Peds grunting)  Outcome: Progressing  Goal: Verbalize decreased shortness of breath this shift  Outcome: Progressing  Goal: Wean oxygen to maintain O2 saturation per order/standard this shift  Outcome: Progressing   The patient's goals for the shift include  comfort    The clinical goals for the shift include mobilize pt    Over the shift, the patient did not make progress toward the following goals. Barriers to progression include none. Recommendations to address these barriers include none.

## 2023-11-16 NOTE — PROGRESS NOTES
Subha Miller is a 63 y.o. female on day 3 of admission presenting with COPD (chronic obstructive pulmonary disease) (CMS/HCC).      Subjective   [id number]       Objective     Last Recorded Vitals  /75 (BP Location: Right arm, Patient Position: Lying)   Pulse 75   Temp 36.4 °C (97.5 °F) (Temporal)   Resp 17   Wt 101 kg (222 lb 8 oz)   SpO2 95%   Intake/Output last 3 Shifts:    Intake/Output Summary (Last 24 hours) at 11/16/2023 1056  Last data filed at 11/16/2023 0900  Gross per 24 hour   Intake 600 ml   Output 1050 ml   Net -450 ml       Admission Weight  Weight: 105 kg (231 lb 0.7 oz) (11/13/23 1837)    Daily Weight  11/15/23 : 101 kg (222 lb 8 oz)    Image Results  ECG 12 lead  Normal sinus rhythm  Right axis deviation  Septal infarct , age undetermined  Abnormal ECG  No previous ECGs available  Confirmed by Amari Palmer ([id number]) on 11/15/2023 9:58:59 PM      Physical Exam    Relevant Results               Assessment/Plan                  Principal Problem:    COPD (chronic obstructive pulmonary disease) (CMS/HCC)  Active Problems:    Generalized weakness    Altered mental status    Congestive heart failure (CMS/HCC)    Anemia    Elevated troponin    Hypocalcemia    Transaminasemia    Diabetes mellitus (CMS/HCC)    Hypertension    Renal mass    Decreased activities of daily living (ADL)    kinga Patel MD

## 2023-11-16 NOTE — PROGRESS NOTES
Subha Miller is a 63 y.o. female on day 3 of admission presenting with COPD (chronic obstructive pulmonary disease) (CMS/[facility]).      Subjective   Sitting up in a chair doing well   She is able to walk short distance with physical therapy.  She states improved breathing improved legs.  Apparently she is mostly wheelchair-bound at home and her  takes care of her  Stress test completed today but results not yet available    Objective     Last Recorded Vitals  /75 (BP Location: Right arm, Patient Position: Lying)   Pulse 75   Temp 36.4 °C (97.5 °F) (Temporal)   Resp 17   Wt 101 kg (222 lb 8 oz)   SpO2 95%   Intake/Output last 3 Shifts:    Intake/Output Summary (Last 24 hours) at 11/16/2023 1129  Last data filed at 11/16/2023 0900  Gross per 24 hour   Intake 600 ml   Output 1050 ml   Net -450 ml       Physical Exam  Alert oriented x3 cooperative  Chest improved wheezes and crackles  Heart regular  Abdomen obese soft nontender  Extremities much improved edema and redness  Neurologic exam nonfocal  Relevant Results  Lab results from today reviewed  Assessment/Plan     Principal Problem:    COPD (chronic obstructive pulmonary disease) (CMS/HCC)  Active Problems:    Generalized weakness    Altered mental status    Congestive heart failure (CMS/HCC)    Anemia    Elevated troponin    Hypocalcemia    Transaminasemia    Diabetes mellitus (CMS/HCC)    Hypertension    Renal mass    Decreased activities of daily living (ADL)      Discussed with cardiology would continue Lasix for at least 1 more day continue IV antibiotics plan is for eventual Augmentin will need rehab where she could go with her  together             Jean Carlos Patel MD

## 2023-11-16 NOTE — PROGRESS NOTES
Subha Miller is a 63 y.o. female on day 3 of admission presenting with COPD (chronic obstructive pulmonary disease) (CMS/[facility]).    Subjective   Interval History:    awake, left  Afebrile, no chills  Participating in physical therapy   no leg pain        Review of Systems   All other systems reviewed and are negative.      Objective   Range of Vitals (last 24 hours)  Heart Rate:  [75-98]   Temp:  [35.9 °C (96.6 °F)-36.8 °C (98.2 °F)]   Resp:  [15-23]   BP: ()/(54-94)   SpO2:  [85 %-95 %]   Daily Weight  11/15/23 : 101 kg (222 lb 8 oz)    Body mass index is 39.41 kg/m².    Physical Exam  Constitutional:       Appearance: Normal appearance.   HENT:      Head: Normocephalic and atraumatic.   Eyes:      Extraocular Movements: Extraocular movements intact.      Conjunctiva/sclera: Conjunctivae normal.   Cardiovascular:      Rate and Rhythm: Regular rhythm.      Pulses: Normal pulses.      Heart sounds: Normal heart sounds.   Pulmonary:      Effort: Pulmonary effort is normal.      Breath sounds: Decreased breath sounds and wheezing present.   Abdominal:      General: Bowel sounds are normal.      Palpations: Abdomen is soft.   Musculoskeletal:      Cervical back: Normal range of motion and neck supple.      Right lower leg: Edema present.      Left lower leg: Edema present.   Skin:     Findings: Erythema present.   Neurological:      General: No focal deficit present.      Mental Status: She is alert and oriented to person, place, and time.   Psychiatric:         Mood and Affect: Mood normal.         Behavior: Behavior normal.        Antibiotics  acetaminophen (Tylenol) tablet 650 mg  gabapentin (Neurontin) tablet  lisinopril (Prinivil, Zestril) tablet    omeprazole (PriLOSEC) DR capsule  ondansetron (Zofran-ODT) disintegrating tablet    rosuvastatin (Crestor) tablet  iohexol (OMNIPaque) 350 mg iodine/mL solution 75 mL  furosemide (Lasix) injection 40 mg  furosemide (Lasix) injection 40 mg  vancomycin-diluent combo  no.1 (Xellia) IVPB 1,500 mg  ipratropium-albuteroL (Duo-Neb) 0.5-2.5 mg/3 mL nebulizer solution 3 mL  budesonide (Pulmicort) 0.5 mg/2 mL nebulizer solution 0.5 mg  spironolactone (Aldactone) tablet 12.5 mg  empagliflozin (Jardiance) tablet 10 mg  dextrose 50 % injection 25 g  glucagon (Glucagen) injection 1 mg  dextrose 10 % in water (D10W) infusion  insulin lispro (HumaLOG) injection 0-5 Units  heparin (porcine) injection 5,000 Units  oxygen (O2) therapy  methylPREDNISolone sod succinate (PF) (SOLU-Medrol) 40 mg/mL injection 40 mg  vancomycin-diluent combo no.1 (Xellia) IVPB 1,000 mg  ipratropium-albuteroL (Duo-Neb) 0.5-2.5 mg/3 mL nebulizer solution 3 mL  ipratropium-albuteroL (Duo-Neb) 0.5-2.5 mg/3 mL nebulizer solution 3 mL  vancomycin-diluent combo no.1 (Xellia) IVPB 1,500 mg  aztreonam (Azactam) in dextrose 5% 50 mL IV 1 g  gabapentin (Neurontin) tablet 300 mg  ibuprofen tablet 600 mg  ampicillin-sulbactam (Unasyn) in sodium chloride 0.9 % 100 mL 3 g  vancomycin-diluent combo no.1 (Xellia) IVPB 1,000 mg  furosemide (Lasix) injection 40 mg  regadenoson (Lexiscan) injection 0.4 mg  aminophylline syringe 125 mg  regadenoson (Lexiscan) injection  - Omnicell Override Pull  Tc-99m tetrofosmin (Myoview) injection 30.3 millicurie      Relevant Results  Labs  Results from last 72 hours   Lab Units 11/16/23  0516 11/15/23  0606 11/14/23  0602 11/13/23  1838   WBC AUTO x10*3/uL 10.2 8.2 9.2 11.1   HEMOGLOBIN g/dL 12.7 14.1 14.5 13.1   HEMATOCRIT % 42.4 47.6* 52.5* 44.7   PLATELETS AUTO x10*3/uL 171 173 164 187   NEUTROS PCT AUTO % 91.8 90.9  --  69.2   LYMPHS PCT AUTO % 5.1 7.2  --  19.9   MONOS PCT AUTO % 2.3 1.3  --  10.0   EOS PCT AUTO % 0.0 0.0  --  0.1     Results from last 72 hours   Lab Units 11/16/23  0516 11/15/23  0606 11/14/23  0602   SODIUM mmol/L 136 141 143   POTASSIUM mmol/L 4.0 4.2 3.8   CHLORIDE mmol/L 92* 98 103   CO2 mmol/L 39* 35* 29   BUN mg/dL 28* 22 20   CREATININE mg/dL 0.90 0.70 1.20   GLUCOSE  "mg/dL 206* 175* 76   CALCIUM mg/dL 8.7 8.8 8.4*   ANION GAP mmol/L 5 8 11   EGFR mL/min/1.73m*2 71 >90 50*     Results from last 72 hours   Lab Units 11/16/23  0516 11/15/23  0606 11/14/23  0602   ALK PHOS U/L 80 91 88   BILIRUBIN TOTAL mg/dL 0.4 0.5 0.5   PROTEIN TOTAL g/dL 6.5 7.0 6.4   ALT U/L 177* 265* 247*   AST U/L 128* 314* 638*   ALBUMIN g/dL 3.0* 3.1* 3.0*     Estimated Creatinine Clearance: 70.6 mL/min (by C-G formula based on SCr of 0.9 mg/dL).  No results found for: \"CRP\"  Microbiology   reviewed-negative blood cultures  Imaging  ECG 12 lead    Result Date: 11/15/2023  Normal sinus rhythm Right axis deviation Septal infarct , age undetermined Abnormal ECG No previous ECGs available Confirmed by Amari Palmer ([id number]) on 11/15/2023 9:58:59 PM    CT head wo IV contrast    Result Date: 11/13/2023  Interpreted By:  Anastasia Fishman, STUDY: CT HEAD WO IV CONTRAST;  11/13/2023 8:00 pm   INDICATION: Signs/Symptoms:AMS.   COMPARISON: None.   ACCESSION NUMBER(S): [id number]   ORDERING CLINICIAN: ERIC CERVANTES   TECHNIQUE: Noncontrast axial CT scan of head was performed. Angled reformats in brain and bone windows were generated. The images were reviewed in bone, brain, blood and soft tissue windows.   FINDINGS: CSF Spaces: Ex vacuo dilation of the ventricles. Sulci and basal cisterns are within normal limits. There is no extraaxial fluid collection.   Parenchyma: Chronic bilateral white matter microvascular disease. There is no mass effect or midline shift.  There is no intracranial hemorrhage.   Calvarium: The calvarium is unremarkable.   Paranasal sinuses and mastoids: Visualized paranasal sinuses and mastoids are clear.       Acute intracranial findings.   MACRO: None   Signed by: Anastasia Fishman 11/13/2023 8:49 PM Dictation workstation:   [id number]    CT abdomen pelvis w IV contrast    Result Date: 11/13/2023  Interpreted By:  Anastasia Fishman, STUDY: CT ABDOMEN PELVIS W IV CONTRAST;  11/13/2023 8:15 pm   INDICATION: Increasing " confusion   COMPARISON: 10/09/2022.   ACCESSION NUMBER(S): [id number]   ORDERING CLINICIAN: SOLITARIO MONTOYA   TECHNIQUE: CT of the abdomen and pelvis was performed.  Standard contiguous axial images were obtained at 3 mm slice thickness through the abdomen and pelvis. Coronal and sagittal reconstructions at 3 mm slice thickness were performed.   75 mL of Omnipaque were administered intravenously without immediate complication.   FINDINGS: LOWER CHEST: Within normal limits.   ABDOMEN:   LIVER: Normal size. No focal lesions.   BILE DUCTS: Nondilated.   GALLBLADDER: No hyperdense stones. No wall thickening.   PANCREAS: No focal lesions.   SPLEEN: Normal size. No focal lesions   ADRENAL GLANDS: Within normal limits   KIDNEYS AND URETERS: Left lower pole angiomyolipoma measures 6.6 x 4.7 by 5.5 cm (series 5, image 63, and series 3, image 97). This is overall unchanged comparison to prior. Right renal cyst.   PELVIS:   BLADDER: Within normal limits.   REPRODUCTIVE ORGANS: No suspicious findings   BOWEL: The stomach, small and large bowel are unremarkable.   VESSELS: IVC and aorta are normal.   PERITONEUM/RETROPERITONEUM/LYMPH NODES: No free air, free fluid or adenopathy.   BONES AND ABDOMINAL WALL: No suspicious osseous lesions.       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 Beyond 4 cm, this lesion presents an increased risk for hemorrhage. Urological consult recommended.   MACRO: None   Signed by: Anastasia Fishman 11/13/2023 8:48 PM Dictation workstation:   [id number]    US gallbladder    Result Date: 11/13/2023  Interpreted By:  Anastasia Fishman, STUDY: US GALLBLADDER; 8:11 pm   INDICATION: Signs/Symptoms:elevated LFTs / vomiting.   COMPARISON: CT abdomen and pelvis dated same day.   ACCESSION NUMBER(S): [id number]   ORDERING CLINICIAN: SOLITARIO MONTOYA   TECHNIQUE: Limited abdominal ultrasound of the right upper quadrant was performed utilizing gray scale imaging.   FINDINGS: Liver:  Normal echogenicity without focal lesion. No intrahepatic biliary distention. Gallbladder: Gallstones present. Gallbladder wall measures 0.3 cm. Sonographic Baum's sign: Negative CBD: 2.7 mm Visualized right kidney measures 11.6 cm with good corticomedullary differentiation. Mild prominent right renal collecting system. Right renal cyst.       1. Cholelithiasis. No additional abnormalities identified. 2. Mild right hydronephrosis.   MACRO: None.   Signed by: Anastasia Fishman 11/13/2023 8:14 PM Dictation workstation:   [id number]    XR chest 1 view    Result Date: 11/13/2023  Interpreted By:  Anastasia Fishman, STUDY: XR CHEST 1 VIEW;  11/13/2023 7:17 pm   INDICATION: Signs/Symptoms:dyspnea/ams.   COMPARISON: None.   ACCESSION NUMBER(S): [id number]   ORDERING CLINICIAN: ERIC CERVANTES   FINDINGS:         CARDIOMEDIASTINAL SILHOUETTE: Prominent heart size.   LUNGS: No focal airspace consolidations or effusions.   ABDOMEN: No remarkable upper abdominal findings.   BONES: No acute osseous changes.       Prominent heart size. No focal airspace consolidations or effusions.   MACRO: None   Signed by: Anastasia Fishman 11/13/2023 7:33 PM Dictation workstation:   [id number]          Assessment/Plan   Encephalopathy-toxic metabolic-resolved  Acute hypoxic respiratory failure  Acute exacerbation of COPD-improving  Bilateral leg cellulitis-right greater than left versus dermatitis   Transaminitis   allergy to penicillin-has tolerated amoxicillin in the past          IV Unasyn-monitor for adverse effects  Blood cultures x2  Supplemental oxygen needed  Supportive care  Monitor temperature and WBC  Long-term plan to de-escalate to Augmentin-total of 14 days      Unruly Shaver MD

## 2023-11-16 NOTE — PROGRESS NOTES
Met with pt @ bedside for information exchange & CHF education. Discussed HF Nurse Navigator role. Pt is agreeable to receiving CHF education & being followed after discharge for CHF management. Pt reports that she was recently dx with CHF & did not receive any education on how to manage CHF. She follows up annually with her PCP, Dr León. Currently does not follow up with Cardiology. Feels that she will follow up with Dr Campo. She currently does  not have scale @ home, but reports she will buy one.  Pretty much follows low Na diet. Takes her medications as directed & can afford her medications.  Provided her with CHF booklet/folder & reviewed. Educated on CHF,EF, managing CHF-Daily weights/record, 2000 mg  Na diet, watch fluid intake,take medications as directed, follow up with PCP & cardiologist after discharge. Reviewed HF flare up symptoms to report to MD. She verbalized understanding of CHF education. Discharge plan-SNF. I will continue to follow IP & after discharge for CHF management.

## 2023-11-16 NOTE — PROGRESS NOTES
Subha Miller is a 63 y.o. female on day 3 of admission presenting with COPD (chronic obstructive pulmonary disease) (CMS/[facility]).    Subjective   Feels OK and better than yesterday.  Taking her other diabetic diet good without any abdominal pain, nausea or vomiting since seen yesterday.  Says her legs feel better than yesterday.  Passing gas.  No bowel movement since seen yesterday.       Objective     Vital signs in last 24 hours:  Temp:  [35.9 °C (96.6 °F)-36.8 °C (98.2 °F)] 36.4 °C (97.5 °F)  Heart Rate:  [75-93] 75  Resp:  [15-23] 17  BP: ()/(54-85) 141/75  FiO2 (%):  [36 %-40 %] 40 %  Heart Rate:  [75-93]   Temp:  [35.9 °C (96.6 °F)-36.8 °C (98.2 °F)]   Resp:  [15-23]   BP: ()/(54-85)   SpO2:  [89 %-95 %]      Intake/Output last 3 Shifts:  I/O last 3 completed shifts:  In: - (0 mL/kg)   Out: 1900 (18.8 mL/kg) [Urine:1900 (0.5 mL/kg/hr)]  Weight: 100.9 kg     Physical Exam  Came to initially see patient at 0927 however she was off the floor at a stress test  Back at 1006 and seen then concurrently with surgeon  No acute distress  Not septic appearing  Looks fine and comfortable  Alert and oriented  Abdomen obese, soft, positive bowel sounds, nondistended, nontender, negative Baum's, no obvious hernias I appreciate  Bilateral lower extremities with mild edema and look way better than yesterday in regards to the redness has much improved-there is no evidence of any abscesses    Scheduled medications  acetaminophen, 650 mg, oral, Once  ampicillin-sulbactam, 3 g, intravenous, q6h  budesonide, 0.5 mg, nebulization, BID  furosemide, 40 mg, intravenous, BID with meals  gabapentin, 300 mg, oral, TID  heparin, 5,000 Units, subcutaneous, q12h  insulin lispro, 0-5 Units, subcutaneous, TID with meals  ipratropium-albuteroL, 3 mL, nebulization, 4x daily  methylPREDNISolone sodium succinate (PF), 40 mg, intravenous, q12h  regadenoson, 0.4 mg, intravenous, Once in imaging  spironolactone, 12.5 mg, oral,  Daily      Continuous medications     PRN medications  PRN medications: aminophylline, dextrose 10 % in water (D10W), dextrose, glucagon, ibuprofen, ipratropium-albuteroL, oxygen    Relevant Results  Results from last 7 days   Lab Units 11/16/23  0516 11/15/23  0606 11/14/23  0602   WBC AUTO x10*3/uL 10.2 8.2 9.2   HEMOGLOBIN g/dL 12.7 14.1 14.5   HEMATOCRIT % 42.4 47.6* 52.5*   PLATELETS AUTO x10*3/uL 171 173 164      Results from last 7 days   Lab Units 11/16/23  0516 11/15/23  0606 11/14/23  0602   SODIUM mmol/L 136 141 143   POTASSIUM mmol/L 4.0 4.2 3.8   CHLORIDE mmol/L 92* 98 103   CO2 mmol/L 39* 35* 29   BUN mg/dL 28* 22 20   CREATININE mg/dL 0.90 0.70 1.20   GLUCOSE mg/dL 206* 175* 76   CALCIUM mg/dL 8.7 8.8 8.4*      Results from last 7 days   Lab Units 11/13/23  2224   POCT PH, ARTERIAL pH 7.31*   POCT PCO2, ARTERIAL mm Hg 74*   POCT PO2, ARTERIAL mm Hg 105*   POCT HCO3 CALCULATED, ARTERIAL mmol/L 37.3*   POCT BASE EXCESS, ARTERIAL mmol/L 8.2*     CT head wo IV contrast  Result Date: 11/13/2023  Interpreted By:  Anastasia Fishman, STUDY: CT HEAD WO IV CONTRAST;  11/13/2023 8:00 pm   INDICATION: Signs/Symptoms:AMS.   COMPARISON: None.   ACCESSION NUMBER(S): [id number]   ORDERING CLINICIAN: ERIC CERVANTES   TECHNIQUE: Noncontrast axial CT scan of head was performed. Angled reformats in brain and bone windows were generated. The images were reviewed in bone, brain, blood and soft tissue windows.   FINDINGS: CSF Spaces: Ex vacuo dilation of the ventricles. Sulci and basal cisterns are within normal limits. There is no extraaxial fluid collection.   Parenchyma: Chronic bilateral white matter microvascular disease. There is no mass effect or midline shift.  There is no intracranial hemorrhage.   Calvarium: The calvarium is unremarkable.   Paranasal sinuses and mastoids: Visualized paranasal sinuses and mastoids are clear.        Acute intracranial findings.   MACRO: None   Signed by: Anastasia Fishman 11/13/2023 8:49 PM Dictation  workstation:   [id number]     CT abdomen pelvis w IV contrast  Result Date: 11/13/2023  Interpreted By:  Anastasia Fishman, STUDY: CT ABDOMEN PELVIS W IV CONTRAST;  11/13/2023 8:15 pm   INDICATION: Increasing confusion   COMPARISON: 10/09/2022.   ACCESSION NUMBER(S): [id number]   ORDERING CLINICIAN: SOLITARIO MONTOYA   TECHNIQUE: CT of the abdomen and pelvis was performed.  Standard contiguous axial images were obtained at 3 mm slice thickness through the abdomen and pelvis. Coronal and sagittal reconstructions at 3 mm slice thickness were performed.   75 mL of Omnipaque were administered intravenously without immediate complication.   FINDINGS: LOWER CHEST: Within normal limits.   ABDOMEN:   LIVER: Normal size. No focal lesions.   BILE DUCTS: Nondilated.   GALLBLADDER: No hyperdense stones. No wall thickening.   PANCREAS: No focal lesions.   SPLEEN: Normal size. No focal lesions   ADRENAL GLANDS: Within normal limits   KIDNEYS AND URETERS: Left lower pole angiomyolipoma measures 6.6 x 4.7 by 5.5 cm (series 5, image 63, and series 3, image 97). This is overall unchanged comparison to prior. Right renal cyst.   PELVIS:   BLADDER: Within normal limits.   REPRODUCTIVE ORGANS: No suspicious findings   BOWEL: The stomach, small and large bowel are unremarkable.   VESSELS: IVC and aorta are normal.   PERITONEUM/RETROPERITONEUM/LYMPH NODES: No free air, free fluid or adenopathy.   BONES AND ABDOMINAL WALL: No suspicious osseous lesions.        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 Beyond 4 cm, this lesion presents an increased risk for hemorrhage. Urological consult recommended.   MACRO: None   Signed by: Anastasia Fishman 11/13/2023 8:48 PM Dictation workstation:   [id number]     US gallbladder  Result Date: 11/13/2023  Interpreted By:  Anastasia Fishman, STUDY: US GALLBLADDER; 8:11 pm   INDICATION: Signs/Symptoms:elevated LFTs / vomiting.   COMPARISON: CT abdomen and pelvis dated same day.    ACCESSION NUMBER(S): [id number]   ORDERING CLINICIAN: SOLITARIO MONTOYA   TECHNIQUE: Limited abdominal ultrasound of the right upper quadrant was performed utilizing gray scale imaging.   FINDINGS: Liver: Normal echogenicity without focal lesion. No intrahepatic biliary distention. Gallbladder: Gallstones present. Gallbladder wall measures 0.3 cm. Sonographic Baum's sign: Negative CBD: 2.7 mm Visualized right kidney measures 11.6 cm with good corticomedullary differentiation. Mild prominent right renal collecting system. Right renal cyst.        1. Cholelithiasis. No additional abnormalities identified. 2. Mild right hydronephrosis.   MACRO: None.   Signed by: Anastasia Fishman 11/13/2023 8:14 PM Dictation workstation:   Informative     XR chest 1 view  Result Date: 11/13/2023  Interpreted By:  Anastasia Fishman, STUDY: XR CHEST 1 VIEW;  11/13/2023 7:17 pm   INDICATION: Signs/Symptoms:dyspnea/ams.   COMPARISON: None.   ACCESSION NUMBER(S): [id number]   ORDERING CLINICIAN: ERIC CERVANTES   FINDINGS:         CARDIOMEDIASTINAL SILHOUETTE: Prominent heart size.   LUNGS: No focal airspace consolidations or effusions.   ABDOMEN: No remarkable upper abdominal findings.   BONES: No acute osseous changes.        Prominent heart size. No focal airspace consolidations or effusions.   MACRO: None   Signed by: Anastasia Fishman 11/13/2023 7:33 PM Dictation workstation:   [id number]             Assessment/Plan   Principal Problem:    COPD (chronic obstructive pulmonary disease) (CMS/HCC)  Active Problems:    Generalized weakness    Altered mental status    Congestive heart failure (CMS/HCC)    Anemia    Elevated troponin    Hypocalcemia    Transaminasemia    Diabetes mellitus (CMS/HCC)    Hypertension    Renal mass    Decreased activities of daily living (ADL)    Cholelithiasis  Does not seem to be symptomatic  Seems to be an incidental finding  No acute surgical indications for at present  Will sign off  Follow-up as needed     Elevated LFTs  ALT  11/16  is 177  11/15 is 265  11/14 is 247  11/13 is 107  9/24 is 11     AST  11/16 is 128  11/15 is 314  11/14 is 638  11/13 is 287  9/24 is 23     Observe from our standpoint     Chronic bilateral lower abdominal pain  Denies ever having an EGD  Says last colonoscopy was done over 20 years ago as screening and was negative  11/13 CT noted as above  Denies any abdominal pain since seen yesterday and abdomen is benign  No acute surgical indications for at present     Bilateral lower extremity cellulitis  Antibiotics per IM/infectious disease  No acute surgical indications for at present      Lyubov Angulo, APRN-CNP

## 2023-11-16 NOTE — PROGRESS NOTES
"Subha Miller is a 63 y.o. female on day 3 of admission seen in follow-up for COPD (chronic obstructive pulmonary disease) (CMS/[facility]).    Subjective   On 4 L nasal cannula oxygen; O2 sats 92%.  Endorses dyspnea on exertion and improved bilateral lower extremity pain. Denies cough.  Denies pain.  Afebrile.       Objective     Physical Exam  Vitals and nursing note reviewed.   Constitutional:       Appearance: She is obese.   HENT:      Head: Normocephalic and atraumatic.      Nose: Nose normal.      Mouth/Throat:      Mouth: Mucous membranes are moist.   Eyes:      Extraocular Movements: Extraocular movements intact.      Conjunctiva/sclera: Conjunctivae normal.      Pupils: Pupils are equal, round, and reactive to light.   Cardiovascular:      Rate and Rhythm: Normal rate and regular rhythm.      Pulses: Normal pulses.      Heart sounds: Normal heart sounds.   Pulmonary:      Effort: Pulmonary effort is normal.      Breath sounds: Normal breath sounds.      Comments: Lungs diminished but clear.  Abdominal:      General: Bowel sounds are normal.      Palpations: Abdomen is soft.   Musculoskeletal:         General: Normal range of motion.      Right lower leg: Edema present.      Left lower leg: Edema present.      Comments: Bilateral lower extremities with erythema/edema.   Skin:     General: Skin is warm and dry.      Capillary Refill: Capillary refill takes less than 2 seconds.   Neurological:      General: No focal deficit present.      Mental Status: She is alert and oriented to person, place, and time.   Psychiatric:         Mood and Affect: Mood normal.         Behavior: Behavior normal.         Last Recorded Vitals  Blood pressure 134/74, pulse 76, temperature 36.3 °C (97.3 °F), temperature source Temporal, resp. rate 17, height 1.6 m (5' 3\"), weight 101 kg (222 lb 8 oz), SpO2 92 %.    Intake/Output last 3 Shifts:  I/O last 3 completed shifts:  In: - (0 mL/kg)   Out: 1900 (18.8 mL/kg) [Urine:1900 (0.5 " mL/kg/hr)]  Weight: 100.9 kg     Scheduled medications:  acetaminophen, 650 mg, oral, Once  ampicillin-sulbactam, 3 g, intravenous, q6h  budesonide, 0.5 mg, nebulization, BID  furosemide, 40 mg, intravenous, BID with meals  gabapentin, 300 mg, oral, TID  heparin, 5,000 Units, subcutaneous, q12h  insulin lispro, 0-5 Units, subcutaneous, TID with meals  ipratropium-albuteroL, 3 mL, nebulization, 4x daily  methylPREDNISolone sodium succinate (PF), 40 mg, intravenous, q12h  regadenoson, 0.4 mg, intravenous, Once in imaging  spironolactone, 12.5 mg, oral, Daily      PRN medications: aminophylline, dextrose 10 % in water (D10W), dextrose, glucagon, ibuprofen, ipratropium-albuteroL, oxygen      Relevant Results  Results for orders placed or performed during the hospital encounter of 11/13/23 (from the past 24 hour(s))   POCT GLUCOSE   Result Value Ref Range    POCT Glucose 249 (H) 74 - 99 mg/dL   POCT GLUCOSE   Result Value Ref Range    POCT Glucose 254 (H) 74 - 99 mg/dL   ECG 12 lead   Result Value Ref Range    Ventricular Rate 76 BPM    Atrial Rate 76 BPM    MS Interval 160 ms    QRS Duration 76 ms    QT Interval 384 ms    QTC Calculation(Bazett) 432 ms    P Axis 17 degrees    R Axis 117 degrees    T Axis 116 degrees    QRS Count 12 beats    Q Onset 226 ms    P Onset 146 ms    P Offset 193 ms    T Offset 418 ms    QTC Fredericia 415 ms   Comprehensive Metabolic Panel   Result Value Ref Range    Glucose 206 (H) 65 - 99 mg/dL    Sodium 136 133 - 145 mmol/L    Potassium 4.0 3.4 - 5.1 mmol/L    Chloride 92 (L) 97 - 107 mmol/L    Bicarbonate 39 (H) 24 - 31 mmol/L    Urea Nitrogen 28 (H) 8 - 25 mg/dL    Creatinine 0.90 0.40 - 1.60 mg/dL    eGFR 71 >60 mL/min/1.73m*2    Calcium 8.7 8.5 - 10.4 mg/dL    Albumin 3.0 (L) 3.5 - 5.0 g/dL    Alkaline Phosphatase 80 35 - 125 U/L    Total Protein 6.5 5.9 - 7.9 g/dL     (H) 5 - 40 U/L    Bilirubin, Total 0.4 0.1 - 1.2 mg/dL     (H) 5 - 40 U/L    Anion Gap 5 <=19 mmol/L    CBC and Auto Differential   Result Value Ref Range    WBC 10.2 4.4 - 11.3 x10*3/uL    nRBC 0.0 0.0 - 0.0 /100 WBCs    RBC 4.47 4.00 - 5.20 x10*6/uL    Hemoglobin 12.7 12.0 - 16.0 g/dL    Hematocrit 42.4 36.0 - 46.0 %    MCV 95 80 - 100 fL    MCH 28.4 26.0 - 34.0 pg    MCHC 30.0 (L) 32.0 - 36.0 g/dL    RDW 15.6 (H) 11.5 - 14.5 %    Platelets 171 150 - 450 x10*3/uL    Neutrophils % 91.8 40.0 - 80.0 %    Immature Granulocytes %, Automated 0.7 0.0 - 0.9 %    Lymphocytes % 5.1 13.0 - 44.0 %    Monocytes % 2.3 2.0 - 10.0 %    Eosinophils % 0.0 0.0 - 6.0 %    Basophils % 0.1 0.0 - 2.0 %    Neutrophils Absolute 9.37 (H) 1.20 - 7.70 x10*3/uL    Immature Granulocytes Absolute, Automated 0.07 0.00 - 0.70 x10*3/uL    Lymphocytes Absolute 0.52 (L) 1.20 - 4.80 x10*3/uL    Monocytes Absolute 0.23 0.10 - 1.00 x10*3/uL    Eosinophils Absolute 0.00 0.00 - 0.70 x10*3/uL    Basophils Absolute 0.01 0.00 - 0.10 x10*3/uL   POCT GLUCOSE   Result Value Ref Range    POCT Glucose 197 (H) 74 - 99 mg/dL      CT head wo IV contrast  Result Date: 11/13/2023  CSF Spaces: Ex vacuo dilation of the ventricles. Sulci and basal cisterns are within normal limits. There is no extraaxial fluid collection.   Parenchyma: Chronic bilateral white matter microvascular disease. There is no mass effect or midline shift.  There is no intracranial hemorrhage.   Calvarium: The calvarium is unremarkable.   Paranasal sinuses and mastoids: Visualized paranasal sinuses and mastoids are clear.       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 Beyond 4 cm, this lesion presents an increased risk for hemorrhage. Urological consult recommended.     US gallbladder    Result Date: 11/13/2023  Cholelithiasis. No additional abnormalities identified. 2. Mild right hydronephrosis.       XR chest 1 view  Result Date: 11/13/2023  No focal airspace consolidations or effusions.            Impression  Chronic obstructive pulmonary disease  Active Problems:  Generalized weakness  Altered mental status  Congestive heart failure   Anemia  Elevated troponin  Hypocalcemia  Transaminasemia  Diabetes mellitus   Hypertension  Renal mass  Decreased activities of daily living    Plan  Oxygen at baseline  BiPAP nightly and as needed  Arterial blood gas-baseline for NIV at home  Continuous pulse oximetry  Continue IBD/ICS   Incentive spirometry/pulmonary hygiene  IV Solu-Medrol-we will maintain current dose  IV Unasyn per infectious disease  Follow-up cultures  IV Lasix  PT/OT/out of bed          Luciana Vazquez APRN-CNP  [facility]

## 2023-11-16 NOTE — PROGRESS NOTES
Subjective Data:  Patient is feeling better.  Denies any chest pain or shortness of breath.  Still complaining of cough.    Overnight Events:    Telemetry overnight reviewed no events     Objective Data:  Last Recorded Vitals:  Vitals:    11/16/23 0729 11/16/23 0738 11/16/23 1100 11/16/23 1239   BP: 141/75  134/74    BP Location: Right arm  Right arm    Patient Position: Lying  Sitting    Pulse: 75  76    Resp: 23 17 17    Temp: 36.4 °C (97.5 °F)  36.3 °C (97.3 °F)    TempSrc: Temporal  Temporal    SpO2: 90% 95% 92% 93%   Weight:       Height:           Last Labs:  CBC - 11/16/2023:  5:16 AM  10.2 12.7 171    42.4      CMP - 11/16/2023:  5:16 AM  8.7 6.5 128 --- 0.4   4.0 3.0 177 80      PTT - 9/20/2023:  6:03 AM  1.1   12.3 31     BNP   Date/Time Value Ref Range Status   09/19/2023 03:36  0 - 99 pg/mL Final     Comment:     .  <100 pg/mL - Heart failure unlikely  100-299 pg/mL - Intermediate probability of acute heart  .               failure exacerbation. Correlate with clinical  .               context and patient history.    >=300 pg/mL - Heart Failure likely. Correlate with clinical  .               context and patient history.  BNP testing is performed using different testing   methodology at [facility] than at other   [facility]. Direct result comparisons should   only be made within the same method.       HGBA1C   Date/Time Value Ref Range Status   09/22/2023 10:24 AM 6.4 % Final     Comment:          Diagnosis of Diabetes-Adults   Non-Diabetic: < or = 5.6%   Increased risk for developing diabetes: 5.7-6.4%   Diagnostic of diabetes: > or = 6.5%  .       Monitoring of Diabetes                Age (y)     Therapeutic Goal (%)   Adults:          >18           <7.0   Pediatrics:    13-18           <7.5                   7-12           <8.0                   0- 6            7.5-8.5   American Diabetes Association. Diabetes Care 33(S1), Jan 2010.     04/25/2023 01:39 PM 6.4 4.3 - 5.6 %  "Final     Comment:     American Diabetes Association guidelines indicate that patients with HgbA1c in the range 5.7-6.4% are at increased risk for development of diabetes, and intervention by lifestyle modification may be beneficial. HgbA1c greater or equal to 6.5% is considered diagnostic of diabetes.      Last I/O:  I/O last 3 completed shifts:  In: - (0 mL/kg)   Out: 1900 (18.8 mL/kg) [Urine:1900 (0.5 mL/kg/hr)]  Weight: 100.9 kg     Past Cardiology Tests (Last 3 Years):  EKG:  ECG 12 lead 11/15/2023    Echo:  No results found for this or any previous visit from the past 1095 days.    Ejection Fractions:  No results found for: \"EF\"  Cath:  No results found for this or any previous visit from the past 1095 days.    Stress Test:  No results found for this or any previous visit from the past 1095 days.    Cardiac Imaging:  No results found for this or any previous visit from the past 1095 days.      Inpatient Medications:  Scheduled medications   Medication Dose Route Frequency    acetaminophen  650 mg oral Once    ampicillin-sulbactam  3 g intravenous q6h    budesonide  0.5 mg nebulization BID    furosemide  40 mg intravenous BID with meals    gabapentin  300 mg oral TID    heparin  5,000 Units subcutaneous q12h    insulin lispro  0-5 Units subcutaneous TID with meals    ipratropium-albuteroL  3 mL nebulization 4x daily    methylPREDNISolone sodium succinate (PF)  40 mg intravenous q12h    regadenoson  0.4 mg intravenous Once in imaging    spironolactone  12.5 mg oral Daily     PRN medications   Medication    aminophylline    dextrose 10 % in water (D10W)    dextrose    glucagon    ibuprofen    ipratropium-albuteroL    oxygen     Continuous Medications   Medication Dose Last Rate       Physical Exam:  General: Patient is in mild respiratory distress HEENT: atraumatic normocephalic.  Neck: is supple jugular venous pressure within normal limits no thyromegaly.  Cardiovascular regular rate and rhythm normal heart " sounds no murmurs rubs or gallops.  Lungs: Bibasilar crackles  Abdomen: is soft nontender.  Extremities warm to touch 2+ edema.          Assessment/Plan   Acute respiratory failure.  Patient admitted to the hospital with acute respiratory failure likely secondary to acute on chronic COPD.  She does have extremely elevated NT-proBNP and lower extremity edema.  She is also in acute on chronic decompensated heart failure diastolic dysfunction.  2D echo in September 2023 showed normal ejection fraction.  I will continue for now furosemide.  Most likely her shortness of breath secondary to COPD exacerbation.  For now we will wait for the results of the stress test to assess ejection fraction and make sure no changes.    2. Elevated troponin.  Patient has elevated troponin with significant delta.  EKG at baseline.  Denies having chest pain.  This could be related to hypoxic respiratory failure and demand ischemia.  Patient did not have any ischemic work-up recently.  Underwent stress test today.  We will monitor result.  Hypertension currently blood pressure on the low normal.  We will hold antihypertensive medications will reassess in a.m.  Hyperlipidemia continue high intensity statin.  Altered mental status management by primary team.  Peripheral IV 11/13/23 22 G Left;Anterior Hand (Active)   Site Assessment Clean;Dry;Intact 11/16/23 0800   Dressing Status Clean;Dry;Occlusive 11/16/23 0800   Number of days: 3       Peripheral IV 11/14/23 20 G Left;Proximal;Anterior Forearm (Active)   Site Assessment Clean;Dry;Intact 11/16/23 0800   Dressing Status Clean;Dry;Occlusive 11/16/23 0800   Number of days: 2       Code Status:  Full Code          Andria Campo MD

## 2023-11-16 NOTE — PROGRESS NOTES
Physical Therapy    Physical Therapy Treatment    Patient Name: Subha Miller  MRN: [id number]  Today's Date: 11/16/2023  Time Calculation  Start Time: 1040  Stop Time: 1120  Time Calculation (min): 40 min       Assessment/Plan   PT Assessment  Rehab Prognosis: Good  Evaluation/Treatment Tolerance: Patient limited by fatigue, Patient limited by pain (Slight SOB)  Medical Staff Made Aware: Yes  Strengths: Ability to acquire knowledge, Attitude of self  End of Session Communication: Bedside nurse  End of Session Patient Position: Up in chair  PT Plan  Inpatient/Swing Bed or Outpatient: Inpatient  PT Plan  Treatment/Interventions: Bed mobility, Transfer training, Gait training, Strengthening, Endurance training, Therapeutic exercise, Therapeutic activity  PT Plan: Skilled PT  PT Frequency: 4 times per week  PT Discharge Recommendations: Moderate intensity level of continued care  Equipment Recommended upon Discharge: Wheeled walker  PT Recommended Transfer Status: Assist x1, Assistive device  PT - OK to Discharge: Yes      General Visit Information:   PT  Visit  PT Received On: 11/16/23  Response to Previous Treatment: Patient with no complaints from previous session.  General  Prior to Session Communication: Bedside nurse  Patient Position Received: Bed, 3 rail up  Preferred Learning Style: verbal  General Comment: Agreeqble to therapy    Subjective   Precautions:     Vital Signs:       Objective   Pain:  Pain Assessment  Pain Assessment: 0-10  Pain Score: 5 - Moderate pain  Pain Type: Chronic pain, Acute pain  Pain Location: Back (B LEs)  Pain Orientation: Right, Left  Pain Frequency: Constant/continuous  Pain Onset: Ongoing  Pain Interventions: Medication (See MAR)  Cognition:  Cognition  Orientation Level: Oriented X4  Postural Control:     Extremity/Trunk Assessments:    Activity Tolerance:     Treatments:  Therapeutic Exercise  Therapeutic Exercise Performed: Yes  Therapeutic Exercise Activity 1: Supine ther ex: ankle  pumps, quad/ gluteal sets, heelslides, SAQs, hip abduction/adduction x 15    Bed Mobility  Bed Mobility: Yes  Bed Mobility 1  Bed Mobility 1: Supine to sitting  Level of Assistance 1: Contact guard  Bed Mobility Comments 1: Use of bedrails    Ambulation/Gait Training  Ambulation/Gait Training Performed: Yes  Ambulation/Gait Training 1  Surface 1: Level tile  Device 1: Rolling walker  Assistance 1: Minimum assistance, Minimal verbal cues  Comments/Distance (ft) 1: 15' x 2 with RW with Min A with chair follow for safety with continous 02 on 4 liters Sp02 91-89% Instructed pursed lip breathing c/o pof B LE pain with gait  Transfers  Transfer: Yes  Transfer 1  Transfer From 1: Sit to  Transfer to 1: Stand  Technique 1: Sit to stand  Transfer Device 1: Walker  Transfer Level of Assistance 1: Minimum assistance, Minimal verbal cues (Cues for safe hand placement)  Trials/Comments 1: x 2 No buckling  Transfers 2  Transfer From 2: Stand to  Transfer to 2: Sit  Technique 2: Sit to stand, Stand to sit  Transfer Device 2: Walker  Transfer Level of Assistance 2: Minimum assistance, Minimal verbal cues (Cues for safe hand placement)  Trials/Comments 2: x 2 Cues for safe hand placement         Outcome Measures:  [facility] Basic Mobility  Turning from your back to your side while in a flat bed without using bedrails: A little  Moving from lying on your back to sitting on the side of a flat bed without using bedrails: A little  Moving to and from bed to chair (including a wheelchair): A little  Standing up from a chair using your arms (e.g. wheelchair or bedside chair): A little  To walk in hospital room: A little  Climbing 3-5 steps with railing: A lot  Basic Mobility - Total Score: 17    Education Documentation  Precautions, taught by Diana Garcia PTA at 11/16/2023 11:54 AM.  Learner: Patient  Readiness: Eager  Method: Explanation, Teach-back  Response: Verbalizes Understanding, Needs Reinforcement    Body Mechanics, taught by  Diana Garcia PTA at 11/16/2023 11:54 AM.  Learner: Patient  Readiness: Eager  Method: Explanation, Teach-back  Response: Verbalizes Understanding, Needs Reinforcement    Home Exercise Program, taught by Diana Garcia PTA at 11/16/2023 11:54 AM.  Learner: Patient  Readiness: Eager  Method: Explanation, Teach-back  Response: Verbalizes Understanding, Needs Reinforcement    Mobility Training, taught by Diana Garcia PTA at 11/16/2023 11:54 AM.  Learner: Patient  Readiness: Eager  Method: Explanation, Teach-back  Response: Verbalizes Understanding, Needs Reinforcement    Education Comments  No comments found.        OP EDUCATION:  Outpatient Education  Individual(s) Educated: Patient  Education Provided: Home Exercise Program    Encounter Problems       Encounter Problems (Active)       Balance       Sitting Balance (Progressing)       Start:  11/14/23    Expected End:  11/28/23       Pt will demonstrate good sitting balance to promote safe engagement with out of bed activities           Standing Balance (Progressing)       Start:  11/14/23    Expected End:  11/28/23       Pt will demonstrate good static standing balance to promote safe participation with out of bed activity, transfers, and mobility              Mobility       Ambulation (Progressing)       Start:  11/14/23    Expected End:  11/28/23       Pt will ambulate 50' modified independent assist with LRD to promote safe home mobility              Pain - Adult          Safety       Safe Mobility Techniques (Progressing)       Start:  11/14/23    Expected End:  11/28/23       Pt will correctly identify and demonstrate safe mobility techniques to reduce their risks for falls during their acute care stay               Transfers       Supine to sit (Progressing)       Start:  11/14/23    Expected End:  11/28/23       Pt will transfer supine to sitting at edge of bed with modified independent assist to promote acute care out of bed activity              Sit to stand (Progressing)       Start:  11/14/23    Expected End:  11/28/23       Pt will transfer sit to standing position with modified independent assist and LRD to promote safe out of bed activity           Bed to Chair (Progressing)       Start:  11/14/23    Expected End:  11/28/23       Pt will transfer from sitting edge of bed to the chair with modified independent assist and LRD to promote out of bed activity and reduce the risks of prolonged acute care bedrest

## 2023-11-16 NOTE — PROGRESS NOTES
Patient has been accepted by [facility].  Sent request to start precert via [facility] at this time.  Inquired about BiPAP for HS.  Awaiting updates.  RN TCC following.    3120  Requested updated therapy and BiPAP order to be sent to [facility] per request for initiating precert.  DSC processing.  Awaiting updates.  RN TCC following.    Lakisha Pham RN

## 2023-11-16 NOTE — PROGRESS NOTES
Occupational Therapy    Occupational Therapy Treatment    Name: Subha Miller  MRN: [id number]  : 1960  Date: 23  Time Calculation  Start Time: 1123  Stop Time: 1146  Time Calculation (min): 23 min    Assessment:  End of Session Communication: Bedside nurse  End of Session Patient Position: Up in chair  Plan:  Treatment Interventions: ADL retraining, Functional transfer training, Endurance training, Patient/family training  OT Frequency: 2 times per week  OT Discharge Recommendations: Moderate intensity level of continued care  OT Recommended Transfer Status: Maximum assist  OT - OK to Discharge: Yes    Subjective   Previous Visit Info:  OT Last Visit  OT Received On: 23  Vitals:     Pain Assessment:  Pain Assessment  Pain Assessment: 0-10  Pain Score: 5 - Moderate pain  Pain Type: Chronic pain  Pain Location: Back (BLE)  Pain Orientation: Right, Left  Pain Frequency: Constant/continuous  Pain Onset: Ongoing  Pain Interventions: Medication (See MAR)     Objective   Activities of Daily Living: Grooming  Grooming Level of Assistance: Setup (to wash hair w/ shampoo cap, brush hair and teeth)  Grooming Where Assessed: Chair    Functional Standing Tolerance:     Bed Mobility/Transfers: Transfers  Transfer: Yes  Transfer 1  Transfer From 1: Stand to  Transfer to 1: Sit, Chair with arms  Technique 1: Stand to sit  Transfer Device 1: Walker  Transfer Level of Assistance 1: Minimum assistance (x2, no buckling)  Trials/Comments 1: for hand placement  Transfers 2  Transfer From 2: Sit to, Stand to, Chair with arms to  Transfer to 2: Sit, Stand, Chair with arms  Technique 2: Sit to stand, Stand to sit  Transfer Level of Assistance 2: Maximum assistance  Trials/Comments 2: x1    Toilet Transfers  Toilet Transfer From: Walker  Toilet Transfer Type: To and from  Toilet Transfer to: Standard toilet  Toilet Transfer Technique: Ambulating  Toilet Transfers: Minimal assistance, Verbal cues (for hand placement)  Toilet  Transfers Comments: Pt ambulated to /from bathroom w/ min. assist x2 and FWW,  IADL's:   Communication:     Splinting:     Therapy/Activity:   Sensory:     Cognitive Skill Development:     Vision:     Strength:     Other Activity:             Outcome Measures:  [facility] Daily Activity  Putting on and taking off regular lower body clothing: A lot  Bathing (including washing, rinsing, drying): A lot  Putting on and taking off regular upper body clothing: A lot  Toileting, which includes using toilet, bedpan or urinal: A lot  Taking care of personal grooming such as brushing teeth: A little  Eating Meals: None  Daily Activity - Total Score: 15        Education Documentation  ADL Training, taught by Alisha Bess, OT at 11/16/2023  1:24 PM.  Learner: Patient  Readiness: Acceptance  Method: Explanation  Response: Demonstrated Understanding, Needs Reinforcement    Education Comments  No comments found.      Goals:  Encounter Problems       Encounter Problems (Active)                       OT Goals       ADL's (Progressing)       Start:  11/14/23    Expected End:  11/28/23       Pt will complete bathing/ dressing w/ min/mod. Assist w/ AE as needed for increased safety/ ease in self care tasks.         Functional transfers (Progressing)       Start:  11/14/23    Expected End:  11/28/23       Pt will demon. Bed, chair and toilet/ BSC transfers w/ mod. Assist and LRD.         Functional endurance. (Progressing)       Start:  11/14/23    Expected End:  11/28/23       Pt will demon. BUE exercises to improve functional endurance for self care tasks/ functional transfers.

## 2023-11-17 ENCOUNTER — APPOINTMENT (OUTPATIENT)
Dept: RADIOLOGY | Facility: HOSPITAL | Age: 63
End: 2023-11-17
Payer: COMMERCIAL

## 2023-11-17 ENCOUNTER — HOSPITAL ENCOUNTER (OUTPATIENT)
Facility: HOSPITAL | Age: 63
Setting detail: OUTPATIENT SURGERY
Discharge: ANOTHER HOSPITAL | DRG: 189 | End: 2023-11-17
Attending: INTERNAL MEDICINE | Admitting: INTERNAL MEDICINE
Payer: COMMERCIAL

## 2023-11-17 DIAGNOSIS — I21.4 NSTEMI (NON-ST ELEVATED MYOCARDIAL INFARCTION) (MULTI): ICD-10-CM

## 2023-11-17 DIAGNOSIS — R07.9 CHEST PAIN: ICD-10-CM

## 2023-11-17 DIAGNOSIS — I20.9 ANGINA PECTORIS, UNSPECIFIED (CMS-HCC): ICD-10-CM

## 2023-11-17 LAB
ALBUMIN SERPL-MCNC: 3.1 G/DL (ref 3.5–5)
ALP BLD-CCNC: 81 U/L (ref 35–125)
ALT SERPL-CCNC: 147 U/L (ref 5–40)
ANION GAP SERPL CALC-SCNC: 8 MMOL/L
AST SERPL-CCNC: 84 U/L (ref 5–40)
BASOPHILS # BLD AUTO: 0 X10*3/UL (ref 0–0.1)
BASOPHILS NFR BLD AUTO: 0 %
BILIRUB SERPL-MCNC: 0.5 MG/DL (ref 0.1–1.2)
BUN SERPL-MCNC: 35 MG/DL (ref 8–25)
CALCIUM SERPL-MCNC: 8.4 MG/DL (ref 8.5–10.4)
CHLORIDE SERPL-SCNC: 92 MMOL/L (ref 97–107)
CO2 SERPL-SCNC: 39 MMOL/L (ref 24–31)
CREAT SERPL-MCNC: 0.8 MG/DL (ref 0.4–1.6)
EOSINOPHIL # BLD AUTO: 0 X10*3/UL (ref 0–0.7)
EOSINOPHIL NFR BLD AUTO: 0 %
ERYTHROCYTE [DISTWIDTH] IN BLOOD BY AUTOMATED COUNT: 15.7 % (ref 11.5–14.5)
GFR SERPL CREATININE-BSD FRML MDRD: 82 ML/MIN/1.73M*2
GLUCOSE BLD MANUAL STRIP-MCNC: 208 MG/DL (ref 74–99)
GLUCOSE SERPL-MCNC: 216 MG/DL (ref 65–99)
HCT VFR BLD AUTO: 42.6 % (ref 36–46)
HGB BLD-MCNC: 12.8 G/DL (ref 12–16)
IMM GRANULOCYTES # BLD AUTO: 0.04 X10*3/UL (ref 0–0.7)
IMM GRANULOCYTES NFR BLD AUTO: 0.5 % (ref 0–0.9)
LYMPHOCYTES # BLD AUTO: 0.51 X10*3/UL (ref 1.2–4.8)
LYMPHOCYTES NFR BLD AUTO: 6.7 %
MCH RBC QN AUTO: 28.4 PG (ref 26–34)
MCHC RBC AUTO-ENTMCNC: 30 G/DL (ref 32–36)
MCV RBC AUTO: 95 FL (ref 80–100)
MONOCYTES # BLD AUTO: 0.24 X10*3/UL (ref 0.1–1)
MONOCYTES NFR BLD AUTO: 3.1 %
NEUTROPHILS # BLD AUTO: 6.85 X10*3/UL (ref 1.2–7.7)
NEUTROPHILS NFR BLD AUTO: 89.7 %
NRBC BLD-RTO: 0 /100 WBCS (ref 0–0)
PLATELET # BLD AUTO: 172 X10*3/UL (ref 150–450)
POTASSIUM SERPL-SCNC: 4.1 MMOL/L (ref 3.4–5.1)
PROT SERPL-MCNC: 6.4 G/DL (ref 5.9–7.9)
RBC # BLD AUTO: 4.5 X10*6/UL (ref 4–5.2)
SODIUM SERPL-SCNC: 139 MMOL/L (ref 133–145)
WBC # BLD AUTO: 7.6 X10*3/UL (ref 4.4–11.3)

## 2023-11-17 PROCEDURE — 2500000004 HC RX 250 GENERAL PHARMACY W/ HCPCS (ALT 636 FOR OP/ED): Performed by: NURSE PRACTITIONER

## 2023-11-17 PROCEDURE — 96372 THER/PROPH/DIAG INJ SC/IM: CPT | Performed by: INTERNAL MEDICINE

## 2023-11-17 PROCEDURE — 80053 COMPREHEN METABOLIC PANEL: CPT | Performed by: INTERNAL MEDICINE

## 2023-11-17 PROCEDURE — 2500000002 HC RX 250 W HCPCS SELF ADMINISTERED DRUGS (ALT 637 FOR MEDICARE OP, ALT 636 FOR OP/ED): Performed by: INTERNAL MEDICINE

## 2023-11-17 PROCEDURE — 85025 COMPLETE CBC W/AUTO DIFF WBC: CPT | Performed by: INTERNAL MEDICINE

## 2023-11-17 PROCEDURE — A9502 TC99M TETROFOSMIN: HCPCS | Performed by: INTERNAL MEDICINE

## 2023-11-17 PROCEDURE — 2700000047 HC OR 270 NO HCPCS: Performed by: INTERNAL MEDICINE

## 2023-11-17 PROCEDURE — 93017 CV STRESS TEST TRACING ONLY: CPT

## 2023-11-17 PROCEDURE — 78452 HT MUSCLE IMAGE SPECT MULT: CPT

## 2023-11-17 PROCEDURE — 2500000005 HC RX 250 GENERAL PHARMACY W/O HCPCS: Performed by: INTERNAL MEDICINE

## 2023-11-17 PROCEDURE — 99153 MOD SED SAME PHYS/QHP EA: CPT | Performed by: INTERNAL MEDICINE

## 2023-11-17 PROCEDURE — 93458 L HRT ARTERY/VENTRICLE ANGIO: CPT | Performed by: INTERNAL MEDICINE

## 2023-11-17 PROCEDURE — 94660 CPAP INITIATION&MGMT: CPT

## 2023-11-17 PROCEDURE — 2500000001 HC RX 250 WO HCPCS SELF ADMINISTERED DRUGS (ALT 637 FOR MEDICARE OP): Performed by: INTERNAL MEDICINE

## 2023-11-17 PROCEDURE — 99152 MOD SED SAME PHYS/QHP 5/>YRS: CPT | Performed by: INTERNAL MEDICINE

## 2023-11-17 PROCEDURE — 82947 ASSAY GLUCOSE BLOOD QUANT: CPT

## 2023-11-17 PROCEDURE — 36415 COLL VENOUS BLD VENIPUNCTURE: CPT | Performed by: INTERNAL MEDICINE

## 2023-11-17 PROCEDURE — 2500000002 HC RX 250 W HCPCS SELF ADMINISTERED DRUGS (ALT 637 FOR MEDICARE OP, ALT 636 FOR OP/ED): Performed by: NURSE PRACTITIONER

## 2023-11-17 PROCEDURE — 99232 SBSQ HOSP IP/OBS MODERATE 35: CPT | Performed by: INTERNAL MEDICINE

## 2023-11-17 PROCEDURE — 7100000010 HC PHASE TWO TIME - EACH INCREMENTAL 1 MINUTE: Performed by: INTERNAL MEDICINE

## 2023-11-17 PROCEDURE — 7100000009 HC PHASE TWO TIME - INITIAL BASE CHARGE: Performed by: INTERNAL MEDICINE

## 2023-11-17 PROCEDURE — 36415 COLL VENOUS BLD VENIPUNCTURE: CPT | Performed by: NURSE PRACTITIONER

## 2023-11-17 PROCEDURE — 9420000001 HC RT PATIENT EDUCATION 5 MIN

## 2023-11-17 PROCEDURE — 2550000001 HC RX 255 CONTRASTS: Performed by: INTERNAL MEDICINE

## 2023-11-17 PROCEDURE — 2780000003 HC OR 278 NO HCPCS: Performed by: INTERNAL MEDICINE

## 2023-11-17 PROCEDURE — C1894 INTRO/SHEATH, NON-LASER: HCPCS | Performed by: INTERNAL MEDICINE

## 2023-11-17 PROCEDURE — 94640 AIRWAY INHALATION TREATMENT: CPT

## 2023-11-17 PROCEDURE — 2500000004 HC RX 250 GENERAL PHARMACY W/ HCPCS (ALT 636 FOR OP/ED): Performed by: INTERNAL MEDICINE

## 2023-11-17 PROCEDURE — 1100000001 HC PRIVATE ROOM DAILY

## 2023-11-17 PROCEDURE — 3430000001 HC RX 343 DIAGNOSTIC RADIOPHARMACEUTICALS: Performed by: INTERNAL MEDICINE

## 2023-11-17 PROCEDURE — 2720000007 HC OR 272 NO HCPCS: Performed by: INTERNAL MEDICINE

## 2023-11-17 PROCEDURE — 2500000001 HC RX 250 WO HCPCS SELF ADMINISTERED DRUGS (ALT 637 FOR MEDICARE OP): Performed by: NURSE PRACTITIONER

## 2023-11-17 PROCEDURE — 96372 THER/PROPH/DIAG INJ SC/IM: CPT | Performed by: NURSE PRACTITIONER

## 2023-11-17 RX ORDER — HEPARIN SODIUM 1000 [USP'U]/ML
INJECTION, SOLUTION INTRAVENOUS; SUBCUTANEOUS AS NEEDED
Status: DISCONTINUED | OUTPATIENT
Start: 2023-11-17 | End: 2023-11-17 | Stop reason: HOSPADM

## 2023-11-17 RX ORDER — MIDAZOLAM HYDROCHLORIDE 1 MG/ML
INJECTION INTRAMUSCULAR; INTRAVENOUS AS NEEDED
Status: DISCONTINUED | OUTPATIENT
Start: 2023-11-17 | End: 2023-11-17 | Stop reason: HOSPADM

## 2023-11-17 RX ORDER — PANTOPRAZOLE SODIUM 40 MG/1
40 TABLET, DELAYED RELEASE ORAL DAILY
Start: 2023-11-17

## 2023-11-17 RX ORDER — LIDOCAINE HYDROCHLORIDE 10 MG/ML
INJECTION, SOLUTION EPIDURAL; INFILTRATION; INTRACAUDAL; PERINEURAL AS NEEDED
Status: DISCONTINUED | OUTPATIENT
Start: 2023-11-17 | End: 2023-11-17 | Stop reason: HOSPADM

## 2023-11-17 RX ORDER — SPIRONOLACTONE 25 MG/1
12.5 TABLET ORAL DAILY
Qty: 15 TABLET | Refills: 0 | Status: ON HOLD
Start: 2023-11-18 | End: 2024-01-03

## 2023-11-17 RX ORDER — PREDNISONE 20 MG/1
TABLET ORAL
Qty: 11 TABLET | Refills: 0
Start: 2023-11-17 | End: 2023-11-26

## 2023-11-17 RX ORDER — PANTOPRAZOLE SODIUM 40 MG/1
40 TABLET, DELAYED RELEASE ORAL DAILY
Status: DISCONTINUED | OUTPATIENT
Start: 2023-11-17 | End: 2023-11-18 | Stop reason: HOSPADM

## 2023-11-17 RX ORDER — IPRATROPIUM BROMIDE AND ALBUTEROL SULFATE 2.5; .5 MG/3ML; MG/3ML
3 SOLUTION RESPIRATORY (INHALATION)
Start: 2023-11-17

## 2023-11-17 RX ORDER — ACETAMINOPHEN 325 MG/1
650 TABLET ORAL EVERY 6 HOURS PRN
Qty: 30 TABLET | Refills: 0
Start: 2023-11-17

## 2023-11-17 RX ORDER — AMOXICILLIN AND CLAVULANATE POTASSIUM 875; 125 MG/1; MG/1
875 TABLET, FILM COATED ORAL EVERY 12 HOURS SCHEDULED
Start: 2023-11-17 | End: 2023-11-26

## 2023-11-17 RX ORDER — INSULIN LISPRO 100 [IU]/ML
0-5 INJECTION, SOLUTION INTRAVENOUS; SUBCUTANEOUS
Start: 2023-11-17 | End: 2024-01-03 | Stop reason: HOSPADM

## 2023-11-17 RX ORDER — NYSTATIN 100000 [USP'U]/G
1 POWDER TOPICAL 2 TIMES DAILY
Start: 2023-11-17

## 2023-11-17 RX ORDER — AMOXICILLIN AND CLAVULANATE POTASSIUM 875; 125 MG/1; MG/1
875 TABLET, FILM COATED ORAL EVERY 12 HOURS SCHEDULED
Status: DISCONTINUED | OUTPATIENT
Start: 2023-11-17 | End: 2023-11-18 | Stop reason: HOSPADM

## 2023-11-17 RX ORDER — NYSTATIN 100000 [USP'U]/G
1 POWDER TOPICAL 2 TIMES DAILY
Status: DISCONTINUED | OUTPATIENT
Start: 2023-11-17 | End: 2023-11-18 | Stop reason: HOSPADM

## 2023-11-17 RX ORDER — PREDNISONE 20 MG/1
20 TABLET ORAL 2 TIMES DAILY
Status: DISCONTINUED | OUTPATIENT
Start: 2023-11-17 | End: 2023-11-18 | Stop reason: HOSPADM

## 2023-11-17 RX ORDER — GABAPENTIN 600 MG/1
300 TABLET ORAL 3 TIMES DAILY
Start: 2023-11-17

## 2023-11-17 RX ADMIN — SPIRONOLACTONE 12.5 MG: 25 TABLET ORAL at 10:48

## 2023-11-17 RX ADMIN — AMPICILLIN SODIUM AND SULBACTAM SODIUM 3 G: 2; 1 INJECTION, POWDER, FOR SOLUTION INTRAMUSCULAR; INTRAVENOUS at 10:49

## 2023-11-17 RX ADMIN — PANTOPRAZOLE SODIUM 40 MG: 40 TABLET, DELAYED RELEASE ORAL at 11:35

## 2023-11-17 RX ADMIN — IPRATROPIUM BROMIDE AND ALBUTEROL SULFATE 3 ML: 2.5; .5 SOLUTION RESPIRATORY (INHALATION) at 19:26

## 2023-11-17 RX ADMIN — NYSTATIN 1 APPLICATION: 100000 POWDER TOPICAL at 11:37

## 2023-11-17 RX ADMIN — IPRATROPIUM BROMIDE AND ALBUTEROL SULFATE 3 ML: 2.5; .5 SOLUTION RESPIRATORY (INHALATION) at 11:44

## 2023-11-17 RX ADMIN — HEPARIN SODIUM 5000 UNITS: 5000 INJECTION, SOLUTION INTRAVENOUS; SUBCUTANEOUS at 22:11

## 2023-11-17 RX ADMIN — GABAPENTIN 300 MG: 600 TABLET, FILM COATED ORAL at 20:41

## 2023-11-17 RX ADMIN — NYSTATIN 1 APPLICATION: 100000 POWDER TOPICAL at 20:41

## 2023-11-17 RX ADMIN — GABAPENTIN 300 MG: 600 TABLET, FILM COATED ORAL at 10:49

## 2023-11-17 RX ADMIN — HEPARIN SODIUM 5000 UNITS: 5000 INJECTION, SOLUTION INTRAVENOUS; SUBCUTANEOUS at 11:35

## 2023-11-17 RX ADMIN — BUDESONIDE INHALATION 0.5 MG: 0.5 SUSPENSION RESPIRATORY (INHALATION) at 19:26

## 2023-11-17 RX ADMIN — PREDNISONE 20 MG: 20 TABLET ORAL at 20:41

## 2023-11-17 RX ADMIN — METHYLPREDNISOLONE SODIUM SUCCINATE 40 MG: 40 INJECTION, POWDER, FOR SOLUTION INTRAMUSCULAR; INTRAVENOUS at 00:00

## 2023-11-17 RX ADMIN — TETROFOSMIN 27.8 MILLICURIE: 0.23 INJECTION, POWDER, LYOPHILIZED, FOR SOLUTION INTRAVENOUS at 08:53

## 2023-11-17 RX ADMIN — AMPICILLIN SODIUM AND SULBACTAM SODIUM 3 G: 2; 1 INJECTION, POWDER, FOR SOLUTION INTRAMUSCULAR; INTRAVENOUS at 04:23

## 2023-11-17 RX ADMIN — PREDNISONE 20 MG: 20 TABLET ORAL at 11:35

## 2023-11-17 RX ADMIN — INSULIN LISPRO 2 UNITS: 100 INJECTION, SOLUTION INTRAVENOUS; SUBCUTANEOUS at 11:34

## 2023-11-17 RX ADMIN — IBUPROFEN 600 MG: 600 TABLET ORAL at 11:35

## 2023-11-17 ASSESSMENT — COGNITIVE AND FUNCTIONAL STATUS - GENERAL
DRESSING REGULAR UPPER BODY CLOTHING: A LOT
CLIMB 3 TO 5 STEPS WITH RAILING: A LOT
TURNING FROM BACK TO SIDE WHILE IN FLAT BAD: A LITTLE
HELP NEEDED FOR BATHING: A LOT
HELP NEEDED FOR BATHING: A LOT
CLIMB 3 TO 5 STEPS WITH RAILING: A LOT
WALKING IN HOSPITAL ROOM: A LITTLE
MOVING FROM LYING ON BACK TO SITTING ON SIDE OF FLAT BED WITH BEDRAILS: A LITTLE
MOVING TO AND FROM BED TO CHAIR: A LITTLE
TOILETING: A LOT
WALKING IN HOSPITAL ROOM: A LITTLE
DRESSING REGULAR UPPER BODY CLOTHING: A LOT
TOILETING: A LOT
DRESSING REGULAR LOWER BODY CLOTHING: A LOT
TURNING FROM BACK TO SIDE WHILE IN FLAT BAD: A LITTLE
PERSONAL GROOMING: A LITTLE
DAILY ACTIVITIY SCORE: 15
STANDING UP FROM CHAIR USING ARMS: A LITTLE
PERSONAL GROOMING: A LITTLE
STANDING UP FROM CHAIR USING ARMS: A LITTLE
DAILY ACTIVITIY SCORE: 15
MOBILITY SCORE: 17
MOVING TO AND FROM BED TO CHAIR: A LITTLE
DRESSING REGULAR LOWER BODY CLOTHING: A LOT
MOVING FROM LYING ON BACK TO SITTING ON SIDE OF FLAT BED WITH BEDRAILS: A LITTLE
MOBILITY SCORE: 17

## 2023-11-17 ASSESSMENT — PAIN SCALES - GENERAL
PAINLEVEL_OUTOF10: 0 - NO PAIN
PAINLEVEL_OUTOF10: 0 - NO PAIN

## 2023-11-17 NOTE — POST-PROCEDURE NOTE
Physician Transition of Care Summary  Invasive Cardiovascular Lab    Procedure Date: 11/17/2023  Attending:    * Andria Campo - Primary  Resident/Fellow/Other Assistant: Surgeon(s) and Role:    Indications:   Pre-op Diagnosis     * Chest pain [R07.9]     * NSTEMI (non-ST elevated myocardial infarction) (CMS/HCC) [I21.4]    Post-procedure diagnosis:   Post-op Diagnosis     * Chest pain [R07.9]     * NSTEMI (non-ST elevated myocardial infarction) (CMS/HCC) [I21.4]    Procedure(s):     * Left Heart Cath      Procedure Findings:   Nonobstructive coronary artery disease.  Moderate pulmonary hypertension.  Elevated filling pressures.    Description of the Procedure:   Coronary angiogram, left heart catheterization    Complications:   None    Stents/Implants:       Anticoagulation/Antiplatelet Plan:   4000 of heparin    Estimated Blood Loss:   * No values recorded between 11/17/2023  2:41 PM and 11/17/2023  3:25 PM *    Anesthesia: Moderate Sedation Anesthesia Staff: No anesthesia staff entered.    Any Specimen(s) Removed:   No specimens collected during this procedure.    Dis[facility]:   [facility]      Electronically signed by: Andria Campo MD, 11/17/2023 3:25 PM

## 2023-11-17 NOTE — PROGRESS NOTES
"Subha Miller is a 63 y.o. female on day 4 of admission seen in follow-up for COPD (chronic obstructive pulmonary disease) (CMS/[facility]).    Subjective   On 4 L nasal cannula oxygen; O2 sats 92%.  Endorses dyspnea on exertion and improved bilateral lower extremity pain.  Afebrile.     Objective     Physical Exam  Vitals and nursing note reviewed.   Constitutional:       Appearance: She is obese.   HENT:      Head: Normocephalic and atraumatic.      Nose: Nose normal.      Mouth/Throat:      Mouth: Mucous membranes are moist.   Eyes:      Extraocular Movements: Extraocular movements intact.      Conjunctiva/sclera: Conjunctivae normal.      Pupils: Pupils are equal, round, and reactive to light.   Cardiovascular:      Rate and Rhythm: Normal rate and regular rhythm.      Pulses: Normal pulses.      Heart sounds: Normal heart sounds.   Pulmonary:      Effort: Pulmonary effort is normal.      Breath sounds: Normal breath sounds.      Comments: Lungs diminished but clear.  Abdominal:      General: Bowel sounds are normal.      Palpations: Abdomen is soft.   Musculoskeletal:         General: Normal range of motion.      Right lower leg: Edema present.      Left lower leg: Edema present.      Comments: Bilateral lower extremities with erythema/edema.   Skin:     General: Skin is warm and dry.      Capillary Refill: Capillary refill takes less than 2 seconds.   Neurological:      General: No focal deficit present.      Mental Status: She is alert and oriented to person, place, and time.   Psychiatric:         Mood and Affect: Mood normal.         Behavior: Behavior normal.         Last Recorded Vitals  Blood pressure 140/74, pulse 76, temperature 36.4 °C (97.5 °F), temperature source Temporal, resp. rate 18, height 1.6 m (5' 3\"), weight 101 kg (222 lb 8 oz), SpO2 95 %.    Intake/Output last 3 Shifts:  I/O last 3 completed shifts:  In: 700 (6.9 mL/kg) [P.O.:600; IV Piggyback:100]  Out: 1050 (10.4 mL/kg) [Urine:1050 (0.3 " mL/kg/hr)]  Weight: 100.9 kg     Scheduled medications:  acetaminophen, 650 mg, oral, Once  amoxicillin-pot clavulanate, 875 mg, oral, q12h LYDIA  budesonide, 0.5 mg, nebulization, BID  gabapentin, 300 mg, oral, TID  heparin, 5,000 Units, subcutaneous, q12h  insulin lispro, 0-5 Units, subcutaneous, TID with meals  ipratropium-albuteroL, 3 mL, nebulization, 4x daily  nystatin, 1 Application, Topical, BID  pantoprazole, 40 mg, oral, Daily  predniSONE, 20 mg, oral, BID  regadenoson, 0.4 mg, intravenous, Once in imaging  spironolactone, 12.5 mg, oral, Daily      PRN medications: aminophylline, dextrose 10 % in water (D10W), dextrose, glucagon, ibuprofen, ipratropium-albuteroL, oxygen      Relevant Results  Results for orders placed or performed during the hospital encounter of 11/13/23 (from the past 24 hour(s))   POCT GLUCOSE   Result Value Ref Range    POCT Glucose 152 (H) 74 - 99 mg/dL   Blood Gas Arterial   Result Value Ref Range    POCT pH, Arterial 7.51 (H) 7.38 - 7.42 pH    POCT pCO2, Arterial 62 (H) 38 - 42 mm Hg    POCT pO2, Arterial 78 (L) 85 - 95 mm Hg    POCT SO2, Arterial 97 94 - 100 %    POCT Oxy Hemoglobin, Arterial 94.5 94.0 - 98.0 %    POCT Base Excess, Arterial 22.4 (H) -2.0 - 3.0 mmol/L    POCT HCO3 Calculated, Arterial 49.5 (H) 22.0 - 26.0 mmol/L    Patient Temperature 37.0 degrees Celsius    FiO2 36 %    Apparatus CANNULA    POCT GLUCOSE   Result Value Ref Range    POCT Glucose 193 (H) 74 - 99 mg/dL   POCT GLUCOSE   Result Value Ref Range    POCT Glucose 300 (H) 74 - 99 mg/dL   CBC and Auto Differential   Result Value Ref Range    WBC 7.6 4.4 - 11.3 x10*3/uL    nRBC 0.0 0.0 - 0.0 /100 WBCs    RBC 4.50 4.00 - 5.20 x10*6/uL    Hemoglobin 12.8 12.0 - 16.0 g/dL    Hematocrit 42.6 36.0 - 46.0 %    MCV 95 80 - 100 fL    MCH 28.4 26.0 - 34.0 pg    MCHC 30.0 (L) 32.0 - 36.0 g/dL    RDW 15.7 (H) 11.5 - 14.5 %    Platelets 172 150 - 450 x10*3/uL    Neutrophils % 89.7 40.0 - 80.0 %    Immature Granulocytes %,  Automated 0.5 0.0 - 0.9 %    Lymphocytes % 6.7 13.0 - 44.0 %    Monocytes % 3.1 2.0 - 10.0 %    Eosinophils % 0.0 0.0 - 6.0 %    Basophils % 0.0 0.0 - 2.0 %    Neutrophils Absolute 6.85 1.20 - 7.70 x10*3/uL    Immature Granulocytes Absolute, Automated 0.04 0.00 - 0.70 x10*3/uL    Lymphocytes Absolute 0.51 (L) 1.20 - 4.80 x10*3/uL    Monocytes Absolute 0.24 0.10 - 1.00 x10*3/uL    Eosinophils Absolute 0.00 0.00 - 0.70 x10*3/uL    Basophils Absolute 0.00 0.00 - 0.10 x10*3/uL   Comprehensive Metabolic Panel   Result Value Ref Range    Glucose 216 (H) 65 - 99 mg/dL    Sodium 139 133 - 145 mmol/L    Potassium 4.1 3.4 - 5.1 mmol/L    Chloride 92 (L) 97 - 107 mmol/L    Bicarbonate 39 (H) 24 - 31 mmol/L    Urea Nitrogen 35 (H) 8 - 25 mg/dL    Creatinine 0.80 0.40 - 1.60 mg/dL    eGFR 82 >60 mL/min/1.73m*2    Calcium 8.4 (L) 8.5 - 10.4 mg/dL    Albumin 3.1 (L) 3.5 - 5.0 g/dL    Alkaline Phosphatase 81 35 - 125 U/L    Total Protein 6.4 5.9 - 7.9 g/dL    AST 84 (H) 5 - 40 U/L    Bilirubin, Total 0.5 0.1 - 1.2 mg/dL     (H) 5 - 40 U/L    Anion Gap 8 <=19 mmol/L   POCT GLUCOSE   Result Value Ref Range    POCT Glucose 208 (H) 74 - 99 mg/dL      CT head wo IV contrast  Result Date: 11/13/2023  CSF Spaces: Ex vacuo dilation of the ventricles. Sulci and basal cisterns are within normal limits. There is no extraaxial fluid collection.   Parenchyma: Chronic bilateral white matter microvascular disease. There is no mass effect or midline shift.  There is no intracranial hemorrhage.   Calvarium: The calvarium is unremarkable.   Paranasal sinuses and mastoids: Visualized paranasal sinuses and mastoids are clear.       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 Beyond 4 cm, this lesion presents an increased risk for hemorrhage. Urological consult recommended.     US gallbladder    Result Date: 11/13/2023  Cholelithiasis. No  additional abnormalities identified. 2. Mild right hydronephrosis.       XR chest 1 view  Result Date: 11/13/2023  No focal airspace consolidations or effusions.           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   Chronic obstructive pulmonary disease  Active Problems:  Generalized weakness  Altered mental status  Congestive heart failure   Anemia  Elevated troponin  Hypocalcemia  Transaminasemia  Diabetes mellitus   Hypertension  Renal mass  Decreased activities of daily living    Plan  Oxygen at baseline  BiPAP nightly and as needed  Continuous pulse oximetry  Continue IBD/ICS   Incentive spirometry/pulmonary hygiene  Prednisone-discharge on taper  Augmentin per infectious disease  PT/OT/out of bed  Discharge planning-[facility] recommended  Ordering NIV with targeted tidal volume due to acute on chronic respiratory failure with hypercapnia secondary to restrictive thoracic disease and obesity hypoventilation syndrome  BiPAP has been trialed and failed in the clinical setting and is ineffective evidenced by persistent hypercapnia  Without NIV patient is at risk for hospital readmissions and death  Community surgical representative contacted who will initiate process for NIV at home        Luciana Vazquez, APRN-CNP  [facility]

## 2023-11-17 NOTE — DISCHARGE SUMMARY
Discharge Diagnosis  Generalized weakness failure to thrive  COPD with exacerbation  Acute on chronic respiratory failure  Right-sided heart failure   Cellulitis bilateral lower extremities  Diabetes mellitus type 2  Renal mass  Cholelithiasis      Issues Requiring Follow-Up  Follow-up with all physicians as instructed    Discharge Meds     Your medication list        START taking these medications        Instructions Last Dose Given Next Dose Due   acetaminophen 325 mg tablet  Commonly known as: Tylenol      Take 2 tablets (650 mg) by mouth every 6 hours if needed for mild pain (1 - 3).       amoxicillin-pot clavulanate 875-125 mg tablet  Commonly known as: Augmentin      Take 1 tablet (875 mg) by mouth every 12 hours for 9 days.       insulin lispro 100 unit/mL injection  Commonly known as: HumaLOG      Inject 0-0.05 mL (0-5 Units) under the skin 3 times a day with meals. Take as directed per insulin instructions.       ipratropium-albuteroL 0.5-2.5 mg/3 mL nebulizer solution  Commonly known as: Duo-Neb      Take 3 mL by nebulization 4 times a day.       nystatin 100,000 unit/gram powder  Commonly known as: Mycostatin      Apply 1 Application topically 2 times a day.       oxygen gas therapy  Commonly known as: O2      Inhale 4 L/min once every 24 hours.       pantoprazole 40 mg EC tablet  Commonly known as: ProtoNix      Take 1 tablet (40 mg) by mouth once daily. Do not crush, chew, or split.       predniSONE 20 mg tablet  Commonly known as: Deltasone  Start taking on: November 17, 2023      Take 1 tablet (20 mg) by mouth 2 times a day for 3 days, THEN 0.5 tablets (10 mg) 2 times a day for 3 days, THEN 0.5 tablets (10 mg) once daily for 3 days.              CHANGE how you take these medications        Instructions Last Dose Given Next Dose Due   gabapentin 600 mg tablet  Commonly known as: Neurontin  What changed:   how much to take  when to take this      Take 0.5 tablets (300 mg) by mouth 3 times a day.        spironolactone 25 mg tablet  Commonly known as: Aldactone  Start taking on: November 18, 2023  What changed: how much to take      Take 0.5 tablets (12.5 mg) by mouth once daily. Do not start before November 18, 2023.              CONTINUE taking these medications        Instructions Last Dose Given Next Dose Due   albuterol 90 mcg/actuation inhaler      INHALE 2 PUFFS BY MOUTH EVERY 4 HOURS AS NEEDED FOR SHORTNESS OF BREATH       metFORMIN  mg 24 hr tablet  Commonly known as: Glucophage-XR           mupirocin 2 % ointment  Commonly known as: Bactroban      APPLY TO CRUSTED AREAS ON FACE THREE TIMES A DAY UNTIL EVENING OF 9/25/23. AVOID THE EYES.       Trelegy Ellipta 100-62.5-25 mcg blister with device  Generic drug: fluticasone-umeclidin-vilanter      INHALE 1 PUFF BY MOUTH ONCE DAILY.              STOP taking these medications      furosemide 20 mg tablet  Commonly known as: Lasix        Jardiance 10 mg  Generic drug: empagliflozin        lisinopril 2.5 mg tablet        nicotine 21 mg/24 hr patch  Commonly known as: Nicoderm CQ        nicotine polacrilex 4 mg lozenge  Commonly known as: Commit        omeprazole 40 mg DR capsule  Commonly known as: PriLOSEC        ondansetron ODT 8 mg disintegrating tablet  Commonly known as: Zofran-ODT        potassium chloride CR 10 mEq ER tablet  Commonly known as: Klor-Con        rosuvastatin 5 mg tablet  Commonly known as: Crestor        sulfamethoxazole-trimethoprim 800-160 mg tablet  Commonly known as: Bactrim DS                  Where to Get Your Medications        Information about where to get these medications is not yet available    Ask your nurse or doctor about these medications  acetaminophen 325 mg tablet  amoxicillin-pot clavulanate 875-125 mg tablet  gabapentin 600 mg tablet  insulin lispro 100 unit/mL injection  ipratropium-albuteroL 0.5-2.5 mg/3 mL nebulizer solution  nystatin 100,000 unit/gram powder  oxygen gas therapy  pantoprazole 40 mg EC  tablet  predniSONE 20 mg tablet  spironolactone 25 mg tablet         Test Results Pending At Discharge  Pending Labs       Order Current Status    Blood Culture Preliminary result    Blood Culture Preliminary result            Hospital Course  Chronically debilitated patient who has been wheelchair-bound for at least over 6 months.  Usually her  takes care of her.  He was recently hospitalized and for that reason she ended up in the hospital a few days later as she was unable to care for self.  She was admitted with bilateral lower extremity swelling weakness and pain.  She was found to be significantly volume overloaded and with superimposed cellulitis bilateral lower extremities.  She did well with diuresis and antibiotic treatment with significant improvement in her leg symptoms she actually started to get up and walk a little bit with physical therapy as well.  Her mental status also improved after initially she was on continuous BiPAP  She was seen by multiple specialists.  Cardiologist did a stress test EKG part negative nuclear still pending she was treated with IV Lasix which has been transitioned to oral today  Antibiotics managed by ID  She was seen by urology for renal mass outpatient follow-up is advised  She was seen by general surgery for elevated liver function tests and potentially acute cholecystitis although she had no symptoms with that it was felt LFTs are elevated due to passive congestion Crestor was discontinued  Neurology was seen current there we will arrange for trilogy some kind of outpatient BiPAP for her advanced COPD  Patient will be discharged to skilled nursing facility where her  currently is and I will share room    Updated on November 18: Patient's discharge was delayed yesterday as her stress test showed area of ischemia cardiology decided to the heart cath which was done yesterday afternoon showed nonobstructive coronary disease she was then cleared by cardiology  for discharge.  Cardiologist recommended Lasix 40 Aldactone 25 daily we will add on labs at the facility.  We will make an effort to continue her nighttime BiPAP at the facility which she has been using on and off    Pertinent Physical Exam At Time of Discharge  I saw this patient on 319 she is alert oriented x3 cooperative no distress ready to get out of the hospital  Chest diminished but no wheezes good air movement actually  Heart regular  Abdomen obese soft nontender  Extremities much improved cellulitic change and edema bilateral  Neurologic improved strength  Psych no psychosis  Labs reviewed from today  Outpatient Follow-Up  Per instructions    Time spent on discharge arrangement:  45 minutes    Jean Carlos Patel MD

## 2023-11-17 NOTE — PROGRESS NOTES
Patient at [facility] for Heart Cath.  Patient has been accepted by [facility].  Spouse transferred to [facility] 11/16/23.  Updated notes and BiPAP orders sent to [facility].  Precert pending.   Ensure BiPAP is on site at facility prior to discharge.  RN TCC following.    Lakisha Pham RN

## 2023-11-17 NOTE — CARE PLAN
The patient's goals for the shift include      The clinical goals for the shift include Getting stronger    Over the shift, the patient did not make progress toward the following goals. Barriers to progression include Lack of ambition. Recommendations to address these barriers include activity encouragement.

## 2023-11-17 NOTE — PROGRESS NOTES
Subjective Data:  Patient is doing much better.  Denies having chest pain.  Still complain of mild shortness of breath.    Overnight Events:    Telemetry overnight reviewed no events     Objective Data:  Last Recorded Vitals:  Vitals:    11/17/23 0355 11/17/23 0801 11/17/23 1032 11/17/23 1147   BP: 126/58 140/74 148/78    BP Location: Right arm  Left arm    Patient Position: Lying      Pulse: 77 76 77    Resp: 20 18 17    Temp: 36.3 °C (97.3 °F) 36.4 °C (97.5 °F) 36.9 °C (98.4 °F)    TempSrc: Oral Temporal Temporal    SpO2: 97% 95% 96% 95%   Weight:       Height:           Last Labs:  CBC - 11/17/2023:  5:35 AM  7.6 12.8 172    42.6      CMP - 11/17/2023:  5:35 AM  8.4 6.4 84 --- 0.5   4.0 3.1 147 81      PTT - 9/20/2023:  6:03 AM  1.1   12.3 31     BNP   Date/Time Value Ref Range Status   09/19/2023 03:36  0 - 99 pg/mL Final     Comment:     .  <100 pg/mL - Heart failure unlikely  100-299 pg/mL - Intermediate probability of acute heart  .               failure exacerbation. Correlate with clinical  .               context and patient history.    >=300 pg/mL - Heart Failure likely. Correlate with clinical  .               context and patient history.  BNP testing is performed using different testing   methodology at [facility] than at other   [facility]. Direct result comparisons should   only be made within the same method.       HGBA1C   Date/Time Value Ref Range Status   09/22/2023 10:24 AM 6.4 % Final     Comment:          Diagnosis of Diabetes-Adults   Non-Diabetic: < or = 5.6%   Increased risk for developing diabetes: 5.7-6.4%   Diagnostic of diabetes: > or = 6.5%  .       Monitoring of Diabetes                Age (y)     Therapeutic Goal (%)   Adults:          >18           <7.0   Pediatrics:    13-18           <7.5                   7-12           <8.0                   0- 6            7.5-8.5   American Diabetes Association. Diabetes Care 33(S1), Jan 2010.     04/25/2023 01:39 PM  "6.4 4.3 - 5.6 % Final     Comment:     American Diabetes Association guidelines indicate that patients with HgbA1c in the range 5.7-6.4% are at increased risk for development of diabetes, and intervention by lifestyle modification may be beneficial. HgbA1c greater or equal to 6.5% is considered diagnostic of diabetes.      Last I/O:  I/O last 3 completed shifts:  In: 700 (6.9 mL/kg) [P.O.:600; IV Piggyback:100]  Out: 1050 (10.4 mL/kg) [Urine:1050 (0.3 mL/kg/hr)]  Weight: 100.9 kg     Past Cardiology Tests (Last 3 Years):  EKG:  ECG 12 lead 11/15/2023    Echo:  No results found for this or any previous visit from the past 1095 days.    Ejection Fractions:  No results found for: \"EF\"  Cath:  No results found for this or any previous visit from the past 1095 days.    Stress Test:  Nuclear Stress Test 11/17/2023    Cardiac Imaging:  No results found for this or any previous visit from the past 1095 days.      Inpatient Medications:  Scheduled medications   Medication Dose Route Frequency    [MAR Hold - Suspended Admission] acetaminophen  650 mg oral Once    [MAR Hold - Suspended Admission] amoxicillin-pot clavulanate  875 mg oral q12h LYDIA    [MAR Hold - Suspended Admission] budesonide  0.5 mg nebulization BID    [MAR Hold - Suspended Admission] gabapentin  300 mg oral TID    [MAR Hold - Suspended Admission] heparin  5,000 Units subcutaneous q12h    [MAR Hold - Suspended Admission] insulin lispro  0-5 Units subcutaneous TID with meals    [MAR Hold - Suspended Admission] ipratropium-albuteroL  3 mL nebulization 4x daily    [MAR Hold - Suspended Admission] nystatin  1 Application Topical BID    [MAR Hold - Suspended Admission] pantoprazole  40 mg oral Daily    [MAR Hold - Suspended Admission] predniSONE  20 mg oral BID    [MAR Hold - Suspended Admission] regadenoson  0.4 mg intravenous Once in imaging    [MAR Hold - Suspended Admission] spironolactone  12.5 mg oral Daily     PRN medications   Medication    [MAR Hold - " Suspended Admission] aminophylline    [MAR Hold - Suspended Admission] dextrose 10 % in water (D10W)    [MAR Hold - Suspended Admission] dextrose    [MAR Hold - Suspended Admission] glucagon    [MAR Hold - Suspended Admission] ibuprofen    [MAR Hold - Suspended Admission] ipratropium-albuteroL    [MAR Hold - Suspended Admission] oxygen    [MAR Hold - Suspended Admission] oxygen     Continuous Medications   Medication Dose Last Rate       Physical Exam:  General: Patient is in no acute distress.  HEENT: atraumatic normocephalic.  Neck: is supple jugular venous pressure within normal limits no thyromegaly.  Cardiovascular regular rate and rhythm normal heart sounds no murmurs rubs or gallops.  Lungs: Scattered rhonchi   abdomen: is soft nontender.  Extremities warm to touch 1+ edema.    Assessment/Plan   Acute respiratory failure.  Patient admitted to the hospital with acute respiratory failure likely secondary to acute on chronic COPD.  She does have extremely elevated NT-proBNP and lower extremity edema.  She is also in acute on chronic decompensated heart failure diastolic dysfunction.  2D echo in September 2023 showed normal ejection fraction.  Moderate aortic valve stenosis.  Patient stress test showed anterior wall ischemia underwent cardiac catheterization today.  Showed no significant coronary disease still having elevated filling pressures.  My recommendation is to increase furosemide to 40 mg oral daily increase spironolactone to 25 mg oral daily.  She can be discharged from my standpoint follow-up as an outpatient.      2.   Elevated troponin.  Patient has elevated troponin with significant delta.  EKG at baseline.  Denies having chest pain.  This could be related to hypoxic respiratory failure and demand ischemia.  Cardiac cath showed no significant coronary disease.    3. Hypertension currently blood pressure on the low normal.  We will hold antihypertensive medications will reassess in  a.m..    4.Hyperlipidemia continue high intensity statin.  Okay discharge from my standpoint       Code Status:  Full Code    Andria Campo MD

## 2023-11-17 NOTE — PROGRESS NOTES
Physical Therapy                 Therapy Communication Note    Patient Name: Subha Miller  MRN: [id number]  Today's Date: 11/17/2023     Discipline: Physical Therapy    Missed Visit Reason:      Missed Time: Cancel    Comment: Pt at [facility] for procedure this afternoon.

## 2023-11-18 VITALS
HEIGHT: 63 IN | OXYGEN SATURATION: 95 % | DIASTOLIC BLOOD PRESSURE: 71 MMHG | RESPIRATION RATE: 22 BRPM | HEART RATE: 78 BPM | SYSTOLIC BLOOD PRESSURE: 124 MMHG | WEIGHT: 222.5 LBS | BODY MASS INDEX: 39.42 KG/M2 | TEMPERATURE: 97.5 F

## 2023-11-18 LAB
ALBUMIN SERPL-MCNC: 3.2 G/DL (ref 3.5–5)
ALP BLD-CCNC: 71 U/L (ref 35–125)
ALT SERPL-CCNC: 114 U/L (ref 5–40)
ANION GAP SERPL CALC-SCNC: 4 MMOL/L
AST SERPL-CCNC: 64 U/L (ref 5–40)
BASOPHILS # BLD AUTO: 0 X10*3/UL (ref 0–0.1)
BASOPHILS NFR BLD AUTO: 0 %
BILIRUB SERPL-MCNC: 0.5 MG/DL (ref 0.1–1.2)
BUN SERPL-MCNC: 33 MG/DL (ref 8–25)
CALCIUM SERPL-MCNC: 8.8 MG/DL (ref 8.5–10.4)
CHLORIDE SERPL-SCNC: 96 MMOL/L (ref 97–107)
CO2 SERPL-SCNC: 41 MMOL/L (ref 24–31)
CREAT SERPL-MCNC: 0.6 MG/DL (ref 0.4–1.6)
EOSINOPHIL # BLD AUTO: 0 X10*3/UL (ref 0–0.7)
EOSINOPHIL NFR BLD AUTO: 0 %
ERYTHROCYTE [DISTWIDTH] IN BLOOD BY AUTOMATED COUNT: 15.7 % (ref 11.5–14.5)
GFR SERPL CREATININE-BSD FRML MDRD: >90 ML/MIN/1.73M*2
GLUCOSE BLD MANUAL STRIP-MCNC: 178 MG/DL (ref 74–99)
GLUCOSE BLD MANUAL STRIP-MCNC: 188 MG/DL (ref 74–99)
GLUCOSE BLD MANUAL STRIP-MCNC: 223 MG/DL (ref 74–99)
GLUCOSE SERPL-MCNC: 222 MG/DL (ref 65–99)
HCT VFR BLD AUTO: 42 % (ref 36–46)
HGB BLD-MCNC: 12.4 G/DL (ref 12–16)
IMM GRANULOCYTES # BLD AUTO: 0.03 X10*3/UL (ref 0–0.7)
IMM GRANULOCYTES NFR BLD AUTO: 0.5 % (ref 0–0.9)
LYMPHOCYTES # BLD AUTO: 0.53 X10*3/UL (ref 1.2–4.8)
LYMPHOCYTES NFR BLD AUTO: 9 %
MCH RBC QN AUTO: 28.1 PG (ref 26–34)
MCHC RBC AUTO-ENTMCNC: 29.5 G/DL (ref 32–36)
MCV RBC AUTO: 95 FL (ref 80–100)
MONOCYTES # BLD AUTO: 0.5 X10*3/UL (ref 0.1–1)
MONOCYTES NFR BLD AUTO: 8.5 %
NEUTROPHILS # BLD AUTO: 4.84 X10*3/UL (ref 1.2–7.7)
NEUTROPHILS NFR BLD AUTO: 82 %
NRBC BLD-RTO: 0 /100 WBCS (ref 0–0)
PLATELET # BLD AUTO: 148 X10*3/UL (ref 150–450)
POTASSIUM SERPL-SCNC: 4.4 MMOL/L (ref 3.4–5.1)
PROT SERPL-MCNC: 6.4 G/DL (ref 5.9–7.9)
RBC # BLD AUTO: 4.41 X10*6/UL (ref 4–5.2)
SODIUM SERPL-SCNC: 141 MMOL/L (ref 133–145)
WBC # BLD AUTO: 5.9 X10*3/UL (ref 4.4–11.3)

## 2023-11-18 PROCEDURE — 9420000001 HC RT PATIENT EDUCATION 5 MIN

## 2023-11-18 PROCEDURE — 97110 THERAPEUTIC EXERCISES: CPT | Mod: GP,CQ

## 2023-11-18 PROCEDURE — 80053 COMPREHEN METABOLIC PANEL: CPT | Performed by: NURSE PRACTITIONER

## 2023-11-18 PROCEDURE — 82947 ASSAY GLUCOSE BLOOD QUANT: CPT

## 2023-11-18 PROCEDURE — 2500000001 HC RX 250 WO HCPCS SELF ADMINISTERED DRUGS (ALT 637 FOR MEDICARE OP): Performed by: NURSE PRACTITIONER

## 2023-11-18 PROCEDURE — 99232 SBSQ HOSP IP/OBS MODERATE 35: CPT | Performed by: INTERNAL MEDICINE

## 2023-11-18 PROCEDURE — 96372 THER/PROPH/DIAG INJ SC/IM: CPT | Performed by: NURSE PRACTITIONER

## 2023-11-18 PROCEDURE — 36415 COLL VENOUS BLD VENIPUNCTURE: CPT | Performed by: NURSE PRACTITIONER

## 2023-11-18 PROCEDURE — 85025 COMPLETE CBC W/AUTO DIFF WBC: CPT | Performed by: NURSE PRACTITIONER

## 2023-11-18 PROCEDURE — 94640 AIRWAY INHALATION TREATMENT: CPT

## 2023-11-18 PROCEDURE — 97116 GAIT TRAINING THERAPY: CPT | Mod: GP,CQ

## 2023-11-18 PROCEDURE — 2500000004 HC RX 250 GENERAL PHARMACY W/ HCPCS (ALT 636 FOR OP/ED): Performed by: NURSE PRACTITIONER

## 2023-11-18 PROCEDURE — 2500000002 HC RX 250 W HCPCS SELF ADMINISTERED DRUGS (ALT 637 FOR MEDICARE OP, ALT 636 FOR OP/ED): Performed by: NURSE PRACTITIONER

## 2023-11-18 RX ORDER — FUROSEMIDE 40 MG/1
40 TABLET ORAL DAILY
Qty: 30 TABLET | Refills: 0 | Status: ON HOLD
Start: 2023-11-18 | End: 2024-01-03

## 2023-11-18 RX ADMIN — INSULIN LISPRO 1 UNITS: 100 INJECTION, SOLUTION INTRAVENOUS; SUBCUTANEOUS at 12:12

## 2023-11-18 RX ADMIN — BUDESONIDE INHALATION 0.5 MG: 0.5 SUSPENSION RESPIRATORY (INHALATION) at 07:00

## 2023-11-18 RX ADMIN — SPIRONOLACTONE 12.5 MG: 25 TABLET ORAL at 08:28

## 2023-11-18 RX ADMIN — PANTOPRAZOLE SODIUM 40 MG: 40 TABLET, DELAYED RELEASE ORAL at 08:28

## 2023-11-18 RX ADMIN — GABAPENTIN 300 MG: 600 TABLET, FILM COATED ORAL at 14:25

## 2023-11-18 RX ADMIN — IPRATROPIUM BROMIDE AND ALBUTEROL SULFATE 3 ML: 2.5; .5 SOLUTION RESPIRATORY (INHALATION) at 11:24

## 2023-11-18 RX ADMIN — NYSTATIN 1 APPLICATION: 100000 POWDER TOPICAL at 08:33

## 2023-11-18 RX ADMIN — GABAPENTIN 300 MG: 600 TABLET, FILM COATED ORAL at 08:28

## 2023-11-18 RX ADMIN — INSULIN LISPRO 1 UNITS: 100 INJECTION, SOLUTION INTRAVENOUS; SUBCUTANEOUS at 08:29

## 2023-11-18 RX ADMIN — PREDNISONE 20 MG: 20 TABLET ORAL at 08:28

## 2023-11-18 RX ADMIN — HEPARIN SODIUM 5000 UNITS: 5000 INJECTION, SOLUTION INTRAVENOUS; SUBCUTANEOUS at 12:11

## 2023-11-18 RX ADMIN — IPRATROPIUM BROMIDE AND ALBUTEROL SULFATE 3 ML: 2.5; .5 SOLUTION RESPIRATORY (INHALATION) at 15:33

## 2023-11-18 RX ADMIN — IPRATROPIUM BROMIDE AND ALBUTEROL SULFATE 3 ML: 2.5; .5 SOLUTION RESPIRATORY (INHALATION) at 07:00

## 2023-11-18 RX ADMIN — AMOXICILLIN AND CLAVULANATE POTASSIUM 875 MG: 875; 125 TABLET, FILM COATED ORAL at 08:28

## 2023-11-18 RX ADMIN — INSULIN LISPRO 2 UNITS: 100 INJECTION, SOLUTION INTRAVENOUS; SUBCUTANEOUS at 16:46

## 2023-11-18 ASSESSMENT — COGNITIVE AND FUNCTIONAL STATUS - GENERAL
MOVING FROM LYING ON BACK TO SITTING ON SIDE OF FLAT BED WITH BEDRAILS: A LITTLE
WALKING IN HOSPITAL ROOM: A LITTLE
STANDING UP FROM CHAIR USING ARMS: A LITTLE
MOVING TO AND FROM BED TO CHAIR: A LITTLE
WALKING IN HOSPITAL ROOM: A LITTLE
HELP NEEDED FOR BATHING: A LOT
MOBILITY SCORE: 17
CLIMB 3 TO 5 STEPS WITH RAILING: A LOT
TURNING FROM BACK TO SIDE WHILE IN FLAT BAD: A LITTLE
DAILY ACTIVITIY SCORE: 15
MOVING FROM LYING ON BACK TO SITTING ON SIDE OF FLAT BED WITH BEDRAILS: A LITTLE
DRESSING REGULAR LOWER BODY CLOTHING: A LOT
TURNING FROM BACK TO SIDE WHILE IN FLAT BAD: A LITTLE
DRESSING REGULAR UPPER BODY CLOTHING: A LOT
MOBILITY SCORE: 17
CLIMB 3 TO 5 STEPS WITH RAILING: A LOT
PERSONAL GROOMING: A LITTLE
TOILETING: A LOT
MOVING TO AND FROM BED TO CHAIR: A LITTLE
STANDING UP FROM CHAIR USING ARMS: A LITTLE

## 2023-11-18 ASSESSMENT — PAIN SCALES - GENERAL
PAINLEVEL_OUTOF10: 0 - NO PAIN
PAINLEVEL_OUTOF10: 0 - NO PAIN

## 2023-11-18 ASSESSMENT — PAIN - FUNCTIONAL ASSESSMENT: PAIN_FUNCTIONAL_ASSESSMENT: 0-10

## 2023-11-18 NOTE — PROGRESS NOTES
Physical Therapy    Physical Therapy Treatment    Patient Name: Subha Miller  MRN: [id number]  Today's Date: 11/18/2023  Time Calculation  Start Time: 1215  Stop Time: 1240  Time Calculation (min): 25 min       Assessment/Plan   PT Assessment  Rehab Prognosis: Good  Evaluation/Treatment Tolerance: Patient tolerated treatment well  Medical Staff Made Aware: Yes  Strengths: Attitude of self  End of Session Communication: Bedside nurse  End of Session Patient Position: Up in chair  PT Plan  Inpatient/Swing Bed or Outpatient: Inpatient  PT Plan  Treatment/Interventions: Bed mobility, Transfer training, Gait training, Strengthening, Endurance training, Therapeutic exercise, Therapeutic activity  PT Plan: Skilled PT  PT Frequency: 4 times per week  PT Discharge Recommendations: Moderate intensity level of continued care  Equipment Recommended upon Discharge: Wheeled walker  PT Recommended Transfer Status: Assist x1 (Cueing)  PT - OK to Discharge: Yes      General Visit Information:   PT  Visit  PT Received On: 11/18/23  Response to Previous Treatment: Patient with no complaints from previous session.  General  Prior to Session Communication: Bedside nurse  Patient Position Received: Bed, 3 rail up  Preferred Learning Style: verbal  General Comment: Agreeable to therapy    Subjective   Precautions:  Precautions  Medical Precautions: Fall precautions  Vital Signs:       Objective   Pain:  Pain Assessment  Pain Assessment: 0-10  Pain Score: 0 - No pain  Cognition:  Cognition  Orientation Level: Oriented X4  Postural Control:     Extremity/Trunk Assessments:    Activity Tolerance:     Treatments:  Therapeutic Exercise  Therapeutic Exercise Performed: Yes  Therapeutic Exercise Activity 1: Supine ther ex:ankle pumps, quad/gluteal sets, heelslides, SAQs,hip abduction/adduction x 15    Bed Mobility  Bed Mobility: Yes  Bed Mobility 1  Bed Mobility 1: Supine to sitting  Level of Assistance 1: Minimum assistance  Bed Mobility Comments  1: Use of R bedrail    Ambulation/Gait Training  Ambulation/Gait Training Performed: Yes  Ambulation/Gait Training 1  Surface 1: Level tile  Device 1: Rolling walker  Assistance 1: Minimum assistance, Minimal verbal cues  Comments/Distance (ft) 1: 4-5 small shuffling steps from side of bed to bedside chair. Wanted to eat lunch out of bed. c/o B LE weakness but no pain  Transfers  Transfer: Yes  Transfer 1  Transfer From 1: Stand to  Transfer to 1: Stand  Technique 1: Sit to stand, Stand to sit  Transfer Device 1: Walker  Transfer Level of Assistance 1: Minimum assistance, Minimal verbal cues  Trials/Comments 1: x 1 No buckling noted  Transfers 2  Transfer From 2: Stand to  Transfer to 2: Sit  Technique 2: Sit to stand, Stand to sit  Transfer Device 2: Walker  Transfer Level of Assistance 2: Minimum assistance, Minimal verbal cues  Trials/Comments 2: x 1    Outcome Measures:  [facility] Basic Mobility  Turning from your back to your side while in a flat bed without using bedrails: A little  Moving from lying on your back to sitting on the side of a flat bed without using bedrails: A little  Moving to and from bed to chair (including a wheelchair): A little  Standing up from a chair using your arms (e.g. wheelchair or bedside chair): A little  To walk in hospital room: A little  Climbing 3-5 steps with railing: A lot  Basic Mobility - Total Score: 17    Education Documentation  Precautions, taught by Diana Garcia PTA at 11/18/2023 12:47 PM.  Learner: Patient  Readiness: Acceptance  Method: Explanation, Teach-back  Response: Verbalizes Understanding, Needs Reinforcement    Body Mechanics, taught by Diana Garcia PTA at 11/18/2023 12:47 PM.  Learner: Patient  Readiness: Acceptance  Method: Explanation, Teach-back  Response: Verbalizes Understanding, Needs Reinforcement    Home Exercise Program, taught by Diana Garcia PTA at 11/18/2023 12:47 PM.  Learner: Patient  Readiness: Acceptance  Method: Explanation,  Teach-back  Response: Verbalizes Understanding, Needs Reinforcement    Mobility Training, taught by Diana Garcia PTA at 11/18/2023 12:47 PM.  Learner: Patient  Readiness: Acceptance  Method: Explanation, Teach-back  Response: Verbalizes Understanding, Needs Reinforcement    Education Comments  No comments found.        OP EDUCATION:  Outpatient Education  Individual(s) Educated: Patient  Education Provided: Home Exercise Program    Encounter Problems       Encounter Problems (Active)       Balance       Sitting Balance (Progressing)       Start:  11/14/23    Expected End:  11/28/23       Pt will demonstrate good sitting balance to promote safe engagement with out of bed activities           Standing Balance (Progressing)       Start:  11/14/23    Expected End:  11/28/23       Pt will demonstrate good static standing balance to promote safe participation with out of bed activity, transfers, and mobility              Mobility       Ambulation (Progressing)       Start:  11/14/23    Expected End:  11/28/23       Pt will ambulate 50' modified independent assist with LRD to promote safe home mobility              Pain - Adult          Safety       Safe Mobility Techniques (Progressing)       Start:  11/14/23    Expected End:  11/28/23       Pt will correctly identify and demonstrate safe mobility techniques to reduce their risks for falls during their acute care stay               Transfers       Supine to sit (Progressing)       Start:  11/14/23    Expected End:  11/28/23       Pt will transfer supine to sitting at edge of bed with modified independent assist to promote acute care out of bed activity             Sit to stand (Progressing)       Start:  11/14/23    Expected End:  11/28/23       Pt will transfer sit to standing position with modified independent assist and LRD to promote safe out of bed activity           Bed to Chair (Progressing)       Start:  11/14/23    Expected End:  11/28/23       Pt will  transfer from sitting edge of bed to the chair with modified independent assist and LRD to promote out of bed activity and reduce the risks of prolonged acute care bedrest

## 2023-11-18 NOTE — CARE PLAN
The patient's goals for the shift include      The clinical goals for the shift include discharge to snif

## 2023-11-18 NOTE — CARE PLAN
[facility] is waiting for the precert from [facility].    They were also notified that she will need a Bipap in place to transfer.

## 2023-11-18 NOTE — PROGRESS NOTES
Subha Miller is a 63 y.o. female on day 5 of admission presenting with COPD (chronic obstructive pulmonary disease) (CMS/[facility]).      Subjective   She spent the night on BiPAP she is doing well today  Discussed with Dr. Campo yesterday who wanted to continue Lasix 40 a day plus Aldactone 25 a day.  Cardiac catheterization showed nonobstructive coronary disease cleared for discharge by Dr. Campo as of yesterday   Patient wants to leave today so she could be with her  at the rehab facility    Objective     Last Recorded Vitals  /75 (BP Location: Right arm, Patient Position: Lying)   Pulse 69   Temp 36.2 °C (97.2 °F) (Temporal)   Resp 22   Wt 101 kg (222 lb 8 oz)   SpO2 100%   Intake/Output last 3 Shifts:    Intake/Output Summary (Last 24 hours) at 11/18/2023 1007  Last data filed at 11/17/2023 1529  Gross per 24 hour   Intake 100 ml   Output 10 ml   Net 90 ml       Physical Exam  Alert ranted x3 cooperative  Chest clear  Heart regular  Abdomen soft nontender  Extremities improved cellulitis and edema  Relevant Results  Lab results reviewed  Assessment/Plan     Principal Problem:    COPD (chronic obstructive pulmonary disease) (CMS/HCC)  Active Problems:    Generalized weakness    Altered mental status    Congestive heart failure (CMS/HCC)    Anemia    Elevated troponin    Hypocalcemia    Transaminasemia    Diabetes mellitus (CMS/HCC)    Hypertension    Renal mass    Decreased activities of daily living (ADL)      I feel she is okay for discharge we will add on follow-up labs we will add on the BiPAP at night and will add on daily Lasix and Aldactone as requested by the cardiologist             Jean Carlos Patel MD

## 2023-11-18 NOTE — CARE PLAN
Received a note from the facility they can accept and noted they need the Bipap.  Nurse setting up transport.

## 2023-11-18 NOTE — PROGRESS NOTES
Subjective Data:  Patient with COPD exacerbation, stable cardiac wise no chest pain or tightness continue current Lasix and Aldactone.  Noncritical CAD.  Stable cardiac wise.  Pending rehab placement.  Overnight Events:    None  Objective   Last Recorded Vitals  /75 (BP Location: Right arm, Patient Position: Lying)   Pulse 69   Temp 36.2 °C (97.2 °F) (Temporal)   Resp 22   Wt 101 kg (222 lb 8 oz)   SpO2 100%     Intake/Output Summary (Last 24 hours) at 11/18/2023 1122  Last data filed at 11/17/2023 1529  Gross per 24 hour   Intake --   Output 10 ml   Net -10 ml     Physical Exam:  HEENT: Normocephalic/atraumatic pupils equal react light  Neck exam mild JVD, no bruit  Lung exam clear to auscultation, few crackles at the bases  Cardiac exam is regular rhythm S1-S2, soft slight murmur heard.  No S3 heard.  Abdomen soft nontender, nondistended  Extremities no clubbing, cyanosis but trace edema  Neuro exam grossly intact.  Image Results  Nuclear Stress Test  Narrative: Interpreted By:  Marcell Hernandez,   STUDY:  NUCLEAR STRESS TEST; 11/17/2023 8:49 am      INDICATION:  Elevated troponin. Chest pain.      COMPARISON:  There are no relevant comparisons for this exam.      ACCESSION NUMBER(S):  [id number]      ORDERING CLINICIAN:  ESTHER HERNANDEZ      TECHNIQUE:  The exam was performed with SPECT imaging. The rest study was  performed with the intravenous injection of 27.8 mCi of Technetium  Myoview. The stress study was performed with the intravenous  injection of 30.3 mCi of Technetium Myoview. Pharmacologic stress was  obtained with the intravenous injection of 0.4 mg of Lexiscan. A 2  day stress rest protocol was performed.      FINDINGS:  The LVEF is 65 %. The end diastolic volume is calculated to be 110  ml. There is normal wall motion identified. There is a moderate-sized  area of nontransmural ischemia involving the anteroseptal segment of  the left ventricle.      Impression: Anteroseptal ischemia  left ventricle.      MACRO:  Critical Finding:  See findings. Notification was initiated on  11/17/2023 at 10:47 am by  Marcell Hernandez.  (**-OCF-**) Instructions:      Signed by: Marcell Hernandez 11/17/2023 10:48 AM  Dictation workstation:   [id number]  Cardiology Interpretation Of Nuclear Stress - See Other Report For Nuclear Portion  Nuclear Pharmacologic Stress Test    Patient Name:      DESIREE RIVERA           Ordering Provider:     [id number] ESTHER HERNANDEZ  Study Date:        11/16/2023           Reading Physician:     [id number] Andria Campo MD  MRN/PID:           [id number]             Supervising Physician: [id number] Andria Campo MD  Accession#:        [id number]         Fellow:  Date of Birth/Age: 1960 / 63 years Fellow:  Gender:            F                    Nurse:                 ALEXANDRE  Admission Status:                       Sonographer:           ALEXANDRE  Height:            160.02 cm            Technologist:          Jesi Valentine                                                                 [facility]  Weight:            100.70 kg            Additional Staff:  BSA:               2.02 m2              Encounter#:            [id number]  BMI:               39.33 kg/m2          Patient Location:    Study Type:    CARDIOLOGY INTERPRETATION OF NUCLEAR STRESS  Diagnosis/ICD: Elevated troponin level-R79.89; Chest pain, unspecified-R07.9  Indication:    Chest Pain    Falls Risk:     Patient Performance: The resting blood pressure was 122/60 mmHg with a heart rate of 76 bpm. The patient developed no symptoms during the stress exam. The blood pressure response was normal. The test was terminated due to: dyspnea.     Stress Stage Data:  +----------------+--+------+-------+-----+                  HRSys BPDias BPMETS    +----------------+--+------+-------+-----+  Baseline [id number]   60            +----------------+--+------+-------+-----+  Stage I         [id number]   70     1 MET  +----------------+--+------+-------+-----+       +----------+--+------+-------+            HRSys BPDias BP  +----------+--+------+-------+  Recovery [id number]   70       +----------+--+------+-------+       Summary:   1. Normal ECG.   2. Normal Stress Test.   3. Nuclear image results are reported separately.    [id number] Andria Campo MD  Electronically signed on 11/17/2023 at 8:22:22 AM       ** Final **    Last Labs:  CBC - 11/18/2023:  5:04 AM  5.9 12.4 148    42.0      CMP - 11/18/2023:  5:04 AM  8.8 6.4 64 --- 0.5   4.0 3.2 114 71      PTT - 9/20/2023:  6:03 AM  1.1   12.3 31     Inpatient Medications:  Scheduled medications   Medication Dose Route Frequency    acetaminophen  650 mg oral Once    amoxicillin-pot clavulanate  875 mg oral q12h LYDIA    budesonide  0.5 mg nebulization BID    gabapentin  300 mg oral TID    heparin  5,000 Units subcutaneous q12h    insulin lispro  0-5 Units subcutaneous TID with meals    ipratropium-albuteroL  3 mL nebulization 4x daily    nystatin  1 Application Topical BID    pantoprazole  40 mg oral Daily    predniSONE  20 mg oral BID    regadenoson  0.4 mg intravenous Once in imaging    spironolactone  12.5 mg oral Daily     Principal Problem:    COPD (chronic obstructive pulmonary disease) (CMS/[facility])  Active Problems:    Generalized weakness    Altered mental status    Congestive heart failure (CMS/HCC)    Anemia    Elevated troponin    Hypocalcemia    Transaminasemia    Diabetes mellitus (CMS/HCC)    Hypertension    Renal mass    Decreased activities of daily living (ADL)    Assessment/Plan   Patient as above stable cardiac wise no active angina CHF signs symptoms.  COPD exacerbation.  Continue medical therapy.  Continue diuretics.  CHF education was done.  Modify risk factor pending rehab  placement.  Advised patient for CHF diet to avoid SALTY 6/equals 1,000MG per DAY, Advised patient to watch out for diarrhea, dehydration and dizziness with CHF care plan. Advised patient for CHF diet to avoid SALTY 6/equals 1,000MG per DAY, Guideline directed medical therapy for CHF and CMP.    Code Status:  Full Code  I spent 35 minutes in the professional and overall care of this patient.  Amari Palmer MD

## 2023-11-18 NOTE — PROGRESS NOTES
Pulmonary Progress Note 11/18/23     Patient seen in follow-up for COPD exacerbation.  Case signed out to me by Dr. Lynn.    Subjective   Interval History:   No overnight events.  Actually plans for discharge today to facility.  She denies any significant shortness of breath    Objective   Vital signs in last 24 hours:  Temp:  [36.2 °C (97.2 °F)-37.2 °C (99 °F)] 36.4 °C (97.5 °F)  Heart Rate:  [64-78] 64  Resp:  [12-22] 20  BP: (105-153)/(42-75) 128/54  FiO2 (%):  [28 %-40 %] 36 %    Intake/Output last 3 shifts:  I/O last 3 completed shifts:  In: 200 (2 mL/kg) [IV Piggyback:200]  Out: 10 (0.1 mL/kg) [Blood:10]  Weight: 100.9 kg   Intake/Output this shift:  I/O this shift:  In: 300 [P.O.:300]  Out: -     Physical Exam  Alert, chronically ill-appearing  Nasal cannula  Lungs are diminished but clear, no wheezing  Normal mood and affect    Scheduled medications  acetaminophen, 650 mg, oral, Once  amoxicillin-pot clavulanate, 875 mg, oral, q12h LYDIA  budesonide, 0.5 mg, nebulization, BID  gabapentin, 300 mg, oral, TID  heparin, 5,000 Units, subcutaneous, q12h  insulin lispro, 0-5 Units, subcutaneous, TID with meals  ipratropium-albuteroL, 3 mL, nebulization, 4x daily  nystatin, 1 Application, Topical, BID  pantoprazole, 40 mg, oral, Daily  predniSONE, 20 mg, oral, BID  regadenoson, 0.4 mg, intravenous, Once in imaging  spironolactone, 12.5 mg, oral, Daily      Continuous medications     PRN medications  PRN medications: aminophylline, dextrose 10 % in water (D10W), dextrose, glucagon, ibuprofen, ipratropium-albuteroL, oxygen, oxygen     Labs:  Lab Results   Component Value Date     11/18/2023    K 4.4 11/18/2023    CL 96 (L) 11/18/2023    CO2 41 (H) 11/18/2023    BUN 33 (H) 11/18/2023    CREATININE 0.60 11/18/2023    GLUCOSE 222 (H) 11/18/2023    CALCIUM 8.8 11/18/2023     Lab Results   Component Value Date    WBC 5.9 11/18/2023    HGB 12.4 11/18/2023    HCT 42.0 11/18/2023    MCV 95 11/18/2023    PLT  148 (L) 11/18/2023       Imaging:  Nuclear Stress Test    Result Date: 11/17/2023  Interpreted By:  Marcell Hernandez, STUDY: NUCLEAR STRESS TEST; 11/17/2023 8:49 am   INDICATION: Elevated troponin. Chest pain.   COMPARISON: There are no relevant comparisons for this exam.   ACCESSION NUMBER(S): [id number]   ORDERING CLINICIAN: ESTHER HERNANDEZ   TECHNIQUE: The exam was performed with SPECT imaging. The rest study was performed with the intravenous injection of 27.8 mCi of Technetium Myoview. The stress study was performed with the intravenous injection of 30.3 mCi of Technetium Myoview. Pharmacologic stress was obtained with the intravenous injection of 0.4 mg of Lexiscan. A 2 day stress rest protocol was performed.   FINDINGS: The LVEF is 65 %. The end diastolic volume is calculated to be 110 ml. There is normal wall motion identified. There is a moderate-sized area of nontransmural ischemia involving the anteroseptal segment of the left ventricle.       Anteroseptal ischemia left ventricle.   MACRO: Critical Finding:  See findings. Notification was initiated on 11/17/2023 at 10:47 am by  Marcell Hernandez.  (**-OCF-**) Instructions:   Signed by: Marcell Hernandez 11/17/2023 10:48 AM Dictation workstation:   [id number]    Cardiology Interpretation Of Nuclear Stress - See Other Report For Nuclear Portion    Result Date: 11/17/2023  Nuclear Pharmacologic Stress Test Patient Name:      DESIREE RIVERA           Ordering Provider:     [id number] ESTHER HERNANDEZ Study Date:        11/16/2023           Reading Physician:     [id number] Campo MD MRN/PID:           [id number]             Supervising Physician: [id number] Campo MD Accession#:        [id number]         Fellow: Date of Birth/Age: 1960 / 63 years Fellow: Gender:             F                    Nurse:                 ALEXANDRE Admission Status:                       Sonographer:           ALEXANDRE Height:            160.02 cm            Technologist:          Jesi Valentine                                                                [facility] [[email or web address]] Weight:            100.70 kg            Additional Staff: BSA:               2.02 m2              Encounter#:            [id number] BMI:               39.33 kg/m2          Patient Location: Study Type:    CARDIOLOGY INTERPRETATION OF NUCLEAR STRESS Diagnosis/ICD: Elevated troponin level-R79.89; Chest pain, unspecified-R07.9 Indication:    Chest Pain Falls Risk:  Patient Performance: The resting blood pressure was 122/60 mmHg with a heart rate of 76 bpm. The patient developed no symptoms during the stress exam. The blood pressure response was normal. The test was terminated due to: dyspnea.  Stress Stage Data: +----------------+--+------+-------+-----+                 HRSys BPDias BPMETS  +----------------+--+------+-------+-----+ Baseline [id number]   60           +----------------+--+------+-------+-----+ Stage I         [id number]   70     1 MET +----------------+--+------+-------+-----+  +----------+--+------+-------+           HRSys BPDias BP +----------+--+------+-------+ Recovery [id number]   70      +----------+--+------+-------+  Summary:  1. Normal ECG.  2. Normal Stress Test.  3. Nuclear image results are reported separately. [id number] Andria Campo MD Electronically signed on 11/17/2023 at 8:22:22 AM  ** Final **     ECG 12 lead    Result Date: 11/15/2023  Normal sinus rhythm Right axis deviation Septal infarct , age undetermined Abnormal ECG No previous ECGs available Confirmed by Amari Palmer ([id number]) on 11/15/2023 9:58:59 PM    CT head wo IV contrast    Result Date: 11/13/2023  Interpreted By:  Anastasia Fishman, STUDY: CT HEAD WO IV CONTRAST;  11/13/2023 8:00 pm   INDICATION: Signs/Symptoms:AMS.   COMPARISON: None.   ACCESSION NUMBER(S):  [id number]   ORDERING CLINICIAN: ERIC CERVANTES   TECHNIQUE: Noncontrast axial CT scan of head was performed. Angled reformats in brain and bone windows were generated. The images were reviewed in bone, brain, blood and soft tissue windows.   FINDINGS: CSF Spaces: Ex vacuo dilation of the ventricles. Sulci and basal cisterns are within normal limits. There is no extraaxial fluid collection.   Parenchyma: Chronic bilateral white matter microvascular disease. There is no mass effect or midline shift.  There is no intracranial hemorrhage.   Calvarium: The calvarium is unremarkable.   Paranasal sinuses and mastoids: Visualized paranasal sinuses and mastoids are clear.       Acute intracranial findings.   MACRO: None   Signed by: Anastasia Fishman 11/13/2023 8:49 PM Dictation workstation:   [id number]    CT abdomen pelvis w IV contrast    Result Date: 11/13/2023  Interpreted By:  Anastasia Fishman, STUDY: CT ABDOMEN PELVIS W IV CONTRAST;  11/13/2023 8:15 pm   INDICATION: Increasing confusion   COMPARISON: 10/09/2022.   ACCESSION NUMBER(S): [id number]   ORDERING CLINICIAN: SOLITARIO MONTOYA   TECHNIQUE: CT of the abdomen and pelvis was performed.  Standard contiguous axial images were obtained at 3 mm slice thickness through the abdomen and pelvis. Coronal and sagittal reconstructions at 3 mm slice thickness were performed.   75 mL of Omnipaque were administered intravenously without immediate complication.   FINDINGS: LOWER CHEST: Within normal limits.   ABDOMEN:   LIVER: Normal size. No focal lesions.   BILE DUCTS: Nondilated.   GALLBLADDER: No hyperdense stones. No wall thickening.   PANCREAS: No focal lesions.   SPLEEN: Normal size. No focal lesions   ADRENAL GLANDS: Within normal limits   KIDNEYS AND URETERS: Left lower pole angiomyolipoma measures 6.6 x 4.7 by 5.5 cm (series 5, image 63, and series 3, image 97). This is overall unchanged comparison to prior. Right renal cyst.   PELVIS:   BLADDER: Within normal limits.   REPRODUCTIVE  ORGANS: No suspicious findings   BOWEL: The stomach, small and large bowel are unremarkable.   VESSELS: IVC and aorta are normal.   PERITONEUM/RETROPERITONEUM/LYMPH NODES: No free air, free fluid or adenopathy.   BONES AND ABDOMINAL WALL: No suspicious osseous lesions.       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 Beyond 4 cm, this lesion presents an increased risk for hemorrhage. Urological consult recommended.   MACRO: None   Signed by: Anastasia Fishman 11/13/2023 8:48 PM Dictation workstation:   [id number]    US gallbladder    Result Date: 11/13/2023  Interpreted By:  Anastasia Fishman, STUDY: US GALLBLADDER; 8:11 pm   INDICATION: Signs/Symptoms:elevated LFTs / vomiting.   COMPARISON: CT abdomen and pelvis dated same day.   ACCESSION NUMBER(S): [id number]   ORDERING CLINICIAN: SOLITARIO MONTOYA   TECHNIQUE: Limited abdominal ultrasound of the right upper quadrant was performed utilizing gray scale imaging.   FINDINGS: Liver: Normal echogenicity without focal lesion. No intrahepatic biliary distention. Gallbladder: Gallstones present. Gallbladder wall measures 0.3 cm. Sonographic Baum's sign: Negative CBD: 2.7 mm Visualized right kidney measures 11.6 cm with good corticomedullary differentiation. Mild prominent right renal collecting system. Right renal cyst.       1. Cholelithiasis. No additional abnormalities identified. 2. Mild right hydronephrosis.   MACRO: None.   Signed by: Anastasia Fishman 11/13/2023 8:14 PM Dictation workstation:   [id number]    XR chest 1 view    Result Date: 11/13/2023  Interpreted By:  Anastasia Fishman, STUDY: XR CHEST 1 VIEW;  11/13/2023 7:17 pm   INDICATION: Signs/Symptoms:dyspnea/ams.   COMPARISON: None.   ACCESSION NUMBER(S): [id number]   ORDERING CLINICIAN: ERIC CERVANTES   FINDINGS:         CARDIOMEDIASTINAL SILHOUETTE: Prominent heart size.   LUNGS: No focal airspace consolidations or effusions.   ABDOMEN: No remarkable upper abdominal findings.   BONES: No  acute osseous changes.       Prominent heart size. No focal airspace consolidations or effusions.   MACRO: None   Signed by: Anastasia Fishman 11/13/2023 7:33 PM Dictation workstation:   [id number]              Assessment/Plan   Principal Problem:    COPD (chronic obstructive pulmonary disease) (CMS/[facility])  Active Problems:    Generalized weakness    Altered mental status    Congestive heart failure (CMS/[facility])    Anemia    Elevated troponin    Hypocalcemia    Transaminasemia    Diabetes mellitus (CMS/[facility])    Hypertension    Renal mass    Decreased activities of daily living (ADL)      First time evaluating but nothing from my perspective will preclude ability to discharge to her facility.  Per my understanding, we will be working on obtaining an NIV with targeted tidal volume to be used at home as documented in Pulmonary CNP note.  She should follow-up with my colleague Dr. Lynn in 2 weeks    We will sign off.  Please call with any questions.    Turner Bill MD  Pulmonary/CC Medicine  [facility]     LOS: 5 days

## 2023-11-19 LAB
BACTERIA BLD CULT: NORMAL
BACTERIA BLD CULT: NORMAL

## 2023-11-20 ENCOUNTER — PATIENT OUTREACH (OUTPATIENT)
Dept: CASE MANAGEMENT | Facility: HOSPITAL | Age: 63
End: 2023-11-20
Payer: COMMERCIAL

## 2023-12-11 ENCOUNTER — DOCUMENTATION (OUTPATIENT)
Dept: CARDIOLOGY | Facility: HOSPITAL | Age: 63
End: 2023-12-11
Payer: COMMERCIAL

## 2023-12-12 ENCOUNTER — PATIENT OUTREACH (OUTPATIENT)
Dept: CASE MANAGEMENT | Facility: HOSPITAL | Age: 63
End: 2023-12-12
Payer: COMMERCIAL

## 2023-12-12 NOTE — PROGRESS NOTES
Follow up phone call made by CHF Clinical Nurse Navigator. Spoke with Subha, reports that she was recently discharged home from SNF. She did not remember our IP meetings regarding CHF education & no longer has her CHF booklet/folder. I will mail new CHF information. Educated her on managing CHF-Perform daily weights/record, follow low Na diet, take medications as directed, follow up appts with her PCP & Cardiologist. Reviewed HF flare up symptoms to report to MD. She verbalized understanding of CHF education. I will continue to follow for CHF management.

## 2024-01-01 ENCOUNTER — APPOINTMENT (OUTPATIENT)
Dept: RADIOLOGY | Facility: HOSPITAL | Age: 64
DRG: 602 | End: 2024-01-01
Payer: COMMERCIAL

## 2024-01-01 ENCOUNTER — HOSPITAL ENCOUNTER (INPATIENT)
Facility: HOSPITAL | Age: 64
LOS: 2 days | Discharge: HOME | DRG: 602 | End: 2024-01-03
Attending: FAMILY MEDICINE | Admitting: INTERNAL MEDICINE
Payer: COMMERCIAL

## 2024-01-01 DIAGNOSIS — J44.9 COPD (CHRONIC OBSTRUCTIVE PULMONARY DISEASE) (MULTI): ICD-10-CM

## 2024-01-01 DIAGNOSIS — H05.013 ORBITAL CELLULITIS, BILATERAL: ICD-10-CM

## 2024-01-01 DIAGNOSIS — L23.9 ALLERGIC DERMATITIS: ICD-10-CM

## 2024-01-01 DIAGNOSIS — E11.9 TYPE 2 DIABETES MELLITUS WITHOUT COMPLICATION, UNSPECIFIED WHETHER LONG TERM INSULIN USE (MULTI): ICD-10-CM

## 2024-01-01 DIAGNOSIS — L03.90 CELLULITIS: Primary | ICD-10-CM

## 2024-01-01 DIAGNOSIS — L03.211 FACIAL CELLULITIS: ICD-10-CM

## 2024-01-01 DIAGNOSIS — Z78.9 DECREASED ACTIVITIES OF DAILY LIVING (ADL): ICD-10-CM

## 2024-01-01 LAB
ALBUMIN SERPL BCP-MCNC: 3.4 G/DL (ref 3.4–5)
ALP SERPL-CCNC: 59 U/L (ref 33–136)
ALT SERPL W P-5'-P-CCNC: 6 U/L (ref 7–45)
ANION GAP SERPL CALC-SCNC: 11 MMOL/L (ref 10–20)
AST SERPL W P-5'-P-CCNC: 14 U/L (ref 9–39)
BASOPHILS # BLD AUTO: 0.06 X10*3/UL (ref 0–0.1)
BASOPHILS NFR BLD AUTO: 0.6 %
BILIRUB SERPL-MCNC: 0.4 MG/DL (ref 0–1.2)
BUN SERPL-MCNC: 9 MG/DL (ref 6–23)
CALCIUM SERPL-MCNC: 8.9 MG/DL (ref 8.6–10.3)
CHLORIDE SERPL-SCNC: 100 MMOL/L (ref 98–107)
CO2 SERPL-SCNC: 34 MMOL/L (ref 21–32)
CREAT SERPL-MCNC: 0.58 MG/DL (ref 0.5–1.05)
CRP SERPL-MCNC: 0.71 MG/DL
EOSINOPHIL # BLD AUTO: 0.53 X10*3/UL (ref 0–0.7)
EOSINOPHIL NFR BLD AUTO: 5.1 %
ERYTHROCYTE [DISTWIDTH] IN BLOOD BY AUTOMATED COUNT: 18.6 % (ref 11.5–14.5)
ERYTHROCYTE [SEDIMENTATION RATE] IN BLOOD BY WESTERGREN METHOD: 27 MM/H (ref 0–30)
FLUAV RNA RESP QL NAA+PROBE: NOT DETECTED
FLUBV RNA RESP QL NAA+PROBE: NOT DETECTED
GFR SERPL CREATININE-BSD FRML MDRD: >90 ML/MIN/1.73M*2
GLUCOSE SERPL-MCNC: 115 MG/DL (ref 74–99)
HCT VFR BLD AUTO: 45.7 % (ref 36–46)
HGB BLD-MCNC: 13.8 G/DL (ref 12–16)
IMM GRANULOCYTES # BLD AUTO: 0.03 X10*3/UL (ref 0–0.7)
IMM GRANULOCYTES NFR BLD AUTO: 0.3 % (ref 0–0.9)
LACTATE SERPL-SCNC: 1.4 MMOL/L (ref 0.4–2)
LYMPHOCYTES # BLD AUTO: 2.36 X10*3/UL (ref 1.2–4.8)
LYMPHOCYTES NFR BLD AUTO: 22.7 %
MAGNESIUM SERPL-MCNC: 1.68 MG/DL (ref 1.6–2.4)
MCH RBC QN AUTO: 29.1 PG (ref 26–34)
MCHC RBC AUTO-ENTMCNC: 30.2 G/DL (ref 32–36)
MCV RBC AUTO: 96 FL (ref 80–100)
MONOCYTES # BLD AUTO: 0.51 X10*3/UL (ref 0.1–1)
MONOCYTES NFR BLD AUTO: 4.9 %
NEUTROPHILS # BLD AUTO: 6.89 X10*3/UL (ref 1.2–7.7)
NEUTROPHILS NFR BLD AUTO: 66.4 %
NRBC BLD-RTO: 0 /100 WBCS (ref 0–0)
PLATELET # BLD AUTO: 183 X10*3/UL (ref 150–450)
POTASSIUM SERPL-SCNC: 3.9 MMOL/L (ref 3.5–5.3)
PROT SERPL-MCNC: 6.2 G/DL (ref 6.4–8.2)
RBC # BLD AUTO: 4.75 X10*6/UL (ref 4–5.2)
S PYO DNA THROAT QL NAA+PROBE: NOT DETECTED
SARS-COV-2 RNA RESP QL NAA+PROBE: NOT DETECTED
SODIUM SERPL-SCNC: 141 MMOL/L (ref 136–145)
WBC # BLD AUTO: 10.4 X10*3/UL (ref 4.4–11.3)

## 2024-01-01 PROCEDURE — 70487 CT MAXILLOFACIAL W/DYE: CPT | Performed by: RADIOLOGY

## 2024-01-01 PROCEDURE — 85652 RBC SED RATE AUTOMATED: CPT | Performed by: FAMILY MEDICINE

## 2024-01-01 PROCEDURE — 80053 COMPREHEN METABOLIC PANEL: CPT | Performed by: FAMILY MEDICINE

## 2024-01-01 PROCEDURE — 2500000004 HC RX 250 GENERAL PHARMACY W/ HCPCS (ALT 636 FOR OP/ED): Performed by: FAMILY MEDICINE

## 2024-01-01 PROCEDURE — 36415 COLL VENOUS BLD VENIPUNCTURE: CPT | Performed by: FAMILY MEDICINE

## 2024-01-01 PROCEDURE — 87636 SARSCOV2 & INF A&B AMP PRB: CPT | Performed by: FAMILY MEDICINE

## 2024-01-01 PROCEDURE — 2500000004 HC RX 250 GENERAL PHARMACY W/ HCPCS (ALT 636 FOR OP/ED): Mod: IPSPLIT | Performed by: NURSE PRACTITIONER

## 2024-01-01 PROCEDURE — 85025 COMPLETE CBC W/AUTO DIFF WBC: CPT | Performed by: FAMILY MEDICINE

## 2024-01-01 PROCEDURE — 1100000001 HC PRIVATE ROOM DAILY: Mod: IPSPLIT

## 2024-01-01 PROCEDURE — 87651 STREP A DNA AMP PROBE: CPT | Performed by: FAMILY MEDICINE

## 2024-01-01 PROCEDURE — 86140 C-REACTIVE PROTEIN: CPT | Performed by: FAMILY MEDICINE

## 2024-01-01 PROCEDURE — 2500000001 HC RX 250 WO HCPCS SELF ADMINISTERED DRUGS (ALT 637 FOR MEDICARE OP): Mod: IPSPLIT | Performed by: NURSE PRACTITIONER

## 2024-01-01 PROCEDURE — 87040 BLOOD CULTURE FOR BACTERIA: CPT | Mod: GENLAB | Performed by: FAMILY MEDICINE

## 2024-01-01 PROCEDURE — 96372 THER/PROPH/DIAG INJ SC/IM: CPT | Mod: IPSPLIT | Performed by: NURSE PRACTITIONER

## 2024-01-01 PROCEDURE — 2550000001 HC RX 255 CONTRASTS: Performed by: FAMILY MEDICINE

## 2024-01-01 PROCEDURE — 83605 ASSAY OF LACTIC ACID: CPT | Performed by: FAMILY MEDICINE

## 2024-01-01 PROCEDURE — 83735 ASSAY OF MAGNESIUM: CPT | Performed by: FAMILY MEDICINE

## 2024-01-01 PROCEDURE — 2500000005 HC RX 250 GENERAL PHARMACY W/O HCPCS: Mod: IPSPLIT | Performed by: NURSE PRACTITIONER

## 2024-01-01 PROCEDURE — 99285 EMERGENCY DEPT VISIT HI MDM: CPT | Performed by: FAMILY MEDICINE

## 2024-01-01 PROCEDURE — 96365 THER/PROPH/DIAG IV INF INIT: CPT

## 2024-01-01 PROCEDURE — 99222 1ST HOSP IP/OBS MODERATE 55: CPT | Performed by: NURSE PRACTITIONER

## 2024-01-01 PROCEDURE — 96375 TX/PRO/DX INJ NEW DRUG ADDON: CPT

## 2024-01-01 PROCEDURE — 99285 EMERGENCY DEPT VISIT HI MDM: CPT | Mod: 25

## 2024-01-01 PROCEDURE — 96367 TX/PROPH/DG ADDL SEQ IV INF: CPT

## 2024-01-01 PROCEDURE — 70487 CT MAXILLOFACIAL W/DYE: CPT

## 2024-01-01 RX ORDER — FLUTICASONE FUROATE AND VILANTEROL 100; 25 UG/1; UG/1
1 POWDER RESPIRATORY (INHALATION)
Status: DISCONTINUED | OUTPATIENT
Start: 2024-01-02 | End: 2024-01-03 | Stop reason: HOSPADM

## 2024-01-01 RX ORDER — LISINOPRIL 2.5 MG/1
2.5 TABLET ORAL DAILY
COMMUNITY

## 2024-01-01 RX ORDER — PANTOPRAZOLE SODIUM 40 MG/1
40 TABLET, DELAYED RELEASE ORAL DAILY
Status: DISCONTINUED | OUTPATIENT
Start: 2024-01-02 | End: 2024-01-03 | Stop reason: HOSPADM

## 2024-01-01 RX ORDER — IPRATROPIUM BROMIDE AND ALBUTEROL SULFATE 2.5; .5 MG/3ML; MG/3ML
3 SOLUTION RESPIRATORY (INHALATION)
Status: DISCONTINUED | OUTPATIENT
Start: 2024-01-02 | End: 2024-01-03 | Stop reason: HOSPADM

## 2024-01-01 RX ORDER — ACETAMINOPHEN 160 MG/5ML
650 SOLUTION ORAL EVERY 4 HOURS PRN
Status: DISCONTINUED | OUTPATIENT
Start: 2024-01-01 | End: 2024-01-03 | Stop reason: HOSPADM

## 2024-01-01 RX ORDER — ONDANSETRON HYDROCHLORIDE 2 MG/ML
4 INJECTION, SOLUTION INTRAVENOUS EVERY 8 HOURS PRN
Status: DISCONTINUED | OUTPATIENT
Start: 2024-01-01 | End: 2024-01-03 | Stop reason: HOSPADM

## 2024-01-01 RX ORDER — FAMOTIDINE 20 MG/1
20 TABLET, FILM COATED ORAL 2 TIMES DAILY
Status: DISCONTINUED | OUTPATIENT
Start: 2024-01-01 | End: 2024-01-02

## 2024-01-01 RX ORDER — FAMOTIDINE 10 MG/ML
20 INJECTION INTRAVENOUS 2 TIMES DAILY
Status: DISCONTINUED | OUTPATIENT
Start: 2024-01-01 | End: 2024-01-02

## 2024-01-01 RX ORDER — SPIRONOLACTONE 25 MG/1
12.5 TABLET ORAL DAILY
Status: DISCONTINUED | OUTPATIENT
Start: 2024-01-02 | End: 2024-01-03 | Stop reason: HOSPADM

## 2024-01-01 RX ORDER — IBUPROFEN 200 MG
1 TABLET ORAL DAILY
Status: DISCONTINUED | OUTPATIENT
Start: 2024-01-02 | End: 2024-01-03 | Stop reason: HOSPADM

## 2024-01-01 RX ORDER — LISINOPRIL 2.5 MG/1
2.5 TABLET ORAL DAILY
Status: DISCONTINUED | OUTPATIENT
Start: 2024-01-02 | End: 2024-01-03 | Stop reason: HOSPADM

## 2024-01-01 RX ORDER — HYDROCORTISONE 25 MG/G
1 CREAM TOPICAL 2 TIMES DAILY
Status: DISCONTINUED | OUTPATIENT
Start: 2024-01-01 | End: 2024-01-01

## 2024-01-01 RX ORDER — ALBUTEROL SULFATE 90 UG/1
2 AEROSOL, METERED RESPIRATORY (INHALATION) EVERY 4 HOURS PRN
Status: DISCONTINUED | OUTPATIENT
Start: 2024-01-01 | End: 2024-01-03 | Stop reason: HOSPADM

## 2024-01-01 RX ORDER — ENOXAPARIN SODIUM 100 MG/ML
40 INJECTION SUBCUTANEOUS EVERY 24 HOURS
Status: DISCONTINUED | OUTPATIENT
Start: 2024-01-01 | End: 2024-01-03 | Stop reason: HOSPADM

## 2024-01-01 RX ORDER — METFORMIN HYDROCHLORIDE 500 MG/1
500 TABLET, EXTENDED RELEASE ORAL
Status: DISCONTINUED | OUTPATIENT
Start: 2024-01-02 | End: 2024-01-03 | Stop reason: HOSPADM

## 2024-01-01 RX ORDER — SODIUM CHLORIDE 9 MG/ML
100 INJECTION, SOLUTION INTRAVENOUS CONTINUOUS
Status: DISCONTINUED | OUTPATIENT
Start: 2024-01-01 | End: 2024-01-01

## 2024-01-01 RX ORDER — ACETAMINOPHEN 650 MG/1
650 SUPPOSITORY RECTAL EVERY 4 HOURS PRN
Status: DISCONTINUED | OUTPATIENT
Start: 2024-01-01 | End: 2024-01-03 | Stop reason: HOSPADM

## 2024-01-01 RX ORDER — TALC
3 POWDER (GRAM) TOPICAL NIGHTLY PRN
Status: DISCONTINUED | OUTPATIENT
Start: 2024-01-01 | End: 2024-01-03 | Stop reason: HOSPADM

## 2024-01-01 RX ORDER — CEFTRIAXONE 2 G/50ML
2 INJECTION, SOLUTION INTRAVENOUS ONCE
Status: COMPLETED | OUTPATIENT
Start: 2024-01-01 | End: 2024-01-01

## 2024-01-01 RX ORDER — GABAPENTIN 300 MG/1
300 CAPSULE ORAL 3 TIMES DAILY
Status: DISCONTINUED | OUTPATIENT
Start: 2024-01-01 | End: 2024-01-03 | Stop reason: HOSPADM

## 2024-01-01 RX ORDER — ONDANSETRON 4 MG/1
4 TABLET, FILM COATED ORAL EVERY 8 HOURS PRN
Status: DISCONTINUED | OUTPATIENT
Start: 2024-01-01 | End: 2024-01-03 | Stop reason: HOSPADM

## 2024-01-01 RX ORDER — DIPHENHYDRAMINE HYDROCHLORIDE 50 MG/ML
50 INJECTION INTRAMUSCULAR; INTRAVENOUS ONCE
Status: COMPLETED | OUTPATIENT
Start: 2024-01-01 | End: 2024-01-01

## 2024-01-01 RX ORDER — ACETAMINOPHEN 325 MG/1
650 TABLET ORAL EVERY 4 HOURS PRN
Status: DISCONTINUED | OUTPATIENT
Start: 2024-01-01 | End: 2024-01-03 | Stop reason: HOSPADM

## 2024-01-01 RX ORDER — HYDROCORTISONE 1 %
CREAM (GRAM) TOPICAL 2 TIMES DAILY
Status: DISCONTINUED | OUTPATIENT
Start: 2024-01-01 | End: 2024-01-03 | Stop reason: HOSPADM

## 2024-01-01 RX ADMIN — GABAPENTIN 300 MG: 300 CAPSULE ORAL at 23:25

## 2024-01-01 RX ADMIN — IOHEXOL 75 ML: 350 INJECTION, SOLUTION INTRAVENOUS at 17:28

## 2024-01-01 RX ADMIN — VANCOMYCIN HYDROCHLORIDE 1500 MG: 1.5 INJECTION, POWDER, LYOPHILIZED, FOR SOLUTION INTRAVENOUS at 17:35

## 2024-01-01 RX ADMIN — METHYLPREDNISOLONE SODIUM SUCCINATE 125 MG: 125 INJECTION, POWDER, FOR SOLUTION INTRAMUSCULAR; INTRAVENOUS at 17:34

## 2024-01-01 RX ADMIN — ENOXAPARIN SODIUM 40 MG: 100 INJECTION SUBCUTANEOUS at 23:25

## 2024-01-01 RX ADMIN — DIPHENHYDRAMINE HYDROCHLORIDE 50 MG: 50 INJECTION INTRAMUSCULAR; INTRAVENOUS at 17:35

## 2024-01-01 RX ADMIN — HYDROCORTISONE: 1 CREAM TOPICAL at 23:48

## 2024-01-01 RX ADMIN — CEFTRIAXONE 2 G: 2 INJECTION, SOLUTION INTRAVENOUS at 16:48

## 2024-01-01 RX ADMIN — SODIUM CHLORIDE 500 ML: 9 INJECTION, SOLUTION INTRAVENOUS at 16:47

## 2024-01-01 RX ADMIN — Medication 4 L/MIN: at 23:00

## 2024-01-01 SDOH — SOCIAL STABILITY: SOCIAL INSECURITY: ARE YOU OR HAVE YOU BEEN THREATENED OR ABUSED PHYSICALLY, EMOTIONALLY, OR SEXUALLY BY ANYONE?: NO

## 2024-01-01 SDOH — SOCIAL STABILITY: SOCIAL INSECURITY: DO YOU FEEL UNSAFE GOING BACK TO THE PLACE WHERE YOU ARE LIVING?: NO

## 2024-01-01 SDOH — SOCIAL STABILITY: SOCIAL INSECURITY: DOES ANYONE TRY TO KEEP YOU FROM HAVING/CONTACTING OTHER FRIENDS OR DOING THINGS OUTSIDE YOUR HOME?: NO

## 2024-01-01 SDOH — SOCIAL STABILITY: SOCIAL INSECURITY: HAVE YOU HAD THOUGHTS OF HARMING ANYONE ELSE?: NO

## 2024-01-01 SDOH — SOCIAL STABILITY: SOCIAL INSECURITY: WERE YOU ABLE TO COMPLETE ALL THE BEHAVIORAL HEALTH SCREENINGS?: YES

## 2024-01-01 SDOH — SOCIAL STABILITY: SOCIAL INSECURITY: ARE THERE ANY APPARENT SIGNS OF INJURIES/BEHAVIORS THAT COULD BE RELATED TO ABUSE/NEGLECT?: NO

## 2024-01-01 SDOH — SOCIAL STABILITY: SOCIAL INSECURITY: DO YOU FEEL ANYONE HAS EXPLOITED OR TAKEN ADVANTAGE OF YOU FINANCIALLY OR OF YOUR PERSONAL PROPERTY?: NO

## 2024-01-01 SDOH — SOCIAL STABILITY: SOCIAL INSECURITY: HAS ANYONE EVER THREATENED TO HURT YOUR FAMILY OR YOUR PETS?: NO

## 2024-01-01 SDOH — SOCIAL STABILITY: SOCIAL INSECURITY: ABUSE: ADULT

## 2024-01-01 ASSESSMENT — PATIENT HEALTH QUESTIONNAIRE - PHQ9
SUM OF ALL RESPONSES TO PHQ9 QUESTIONS 1 & 2: 0
2. FEELING DOWN, DEPRESSED OR HOPELESS: NOT AT ALL
1. LITTLE INTEREST OR PLEASURE IN DOING THINGS: NOT AT ALL

## 2024-01-01 ASSESSMENT — COGNITIVE AND FUNCTIONAL STATUS - GENERAL
PATIENT BASELINE BEDBOUND: NO
DAILY ACTIVITIY SCORE: 22
MOBILITY SCORE: 20
HELP NEEDED FOR BATHING: A LITTLE
HELP NEEDED FOR BATHING: A LITTLE
WALKING IN HOSPITAL ROOM: A LITTLE
MOVING TO AND FROM BED TO CHAIR: A LITTLE
CLIMB 3 TO 5 STEPS WITH RAILING: A LITTLE
TOILETING: A LITTLE
PATIENT BASELINE BEDBOUND: NO
TOILETING: A LITTLE
STANDING UP FROM CHAIR USING ARMS: A LITTLE
CLIMB 3 TO 5 STEPS WITH RAILING: A LITTLE
STANDING UP FROM CHAIR USING ARMS: A LITTLE
PATIENT BASELINE BEDBOUND: NO
DAILY ACTIVITIY SCORE: 22
MOVING TO AND FROM BED TO CHAIR: A LITTLE

## 2024-01-01 ASSESSMENT — ACTIVITIES OF DAILY LIVING (ADL)
HEARING - LEFT EAR: FUNCTIONAL
JUDGMENT_ADEQUATE_SAFELY_COMPLETE_DAILY_ACTIVITIES: YES
BATHING: NEEDS ASSISTANCE
DRESSING YOURSELF: INDEPENDENT
ASSISTIVE_DEVICE: CANE;WHEELCHAIR
HEARING - RIGHT EAR: FUNCTIONAL
LACK_OF_TRANSPORTATION: NO
WALKS IN HOME: INDEPENDENT
GROOMING: INDEPENDENT
FEEDING YOURSELF: INDEPENDENT
ADEQUATE_TO_COMPLETE_ADL: YES
PATIENT'S MEMORY ADEQUATE TO SAFELY COMPLETE DAILY ACTIVITIES?: YES
TOILETING: NEEDS ASSISTANCE

## 2024-01-01 ASSESSMENT — COLUMBIA-SUICIDE SEVERITY RATING SCALE - C-SSRS
6. HAVE YOU EVER DONE ANYTHING, STARTED TO DO ANYTHING, OR PREPARED TO DO ANYTHING TO END YOUR LIFE?: NO
1. IN THE PAST MONTH, HAVE YOU WISHED YOU WERE DEAD OR WISHED YOU COULD GO TO SLEEP AND NOT WAKE UP?: NO
2. HAVE YOU ACTUALLY HAD ANY THOUGHTS OF KILLING YOURSELF?: NO

## 2024-01-01 ASSESSMENT — PAIN SCALES - GENERAL
PAINLEVEL_OUTOF10: 8
PAINLEVEL_OUTOF10: 5 - MODERATE PAIN

## 2024-01-01 ASSESSMENT — LIFESTYLE VARIABLES
HOW MANY STANDARD DRINKS CONTAINING ALCOHOL DO YOU HAVE ON A TYPICAL DAY: PATIENT DOES NOT DRINK
HOW OFTEN DO YOU HAVE 6 OR MORE DRINKS ON ONE OCCASION: NEVER
AUDIT-C TOTAL SCORE: 0
SKIP TO QUESTIONS 9-10: 1
AUDIT-C TOTAL SCORE: 0
HOW OFTEN DO YOU HAVE A DRINK CONTAINING ALCOHOL: NEVER

## 2024-01-01 ASSESSMENT — PAIN DESCRIPTION - LOCATION: LOCATION: FACE

## 2024-01-01 ASSESSMENT — PAIN - FUNCTIONAL ASSESSMENT: PAIN_FUNCTIONAL_ASSESSMENT: 0-10

## 2024-01-01 NOTE — ED PROVIDER NOTES
HPI   Chief Complaint   Patient presents with    Facial Swelling     Pt having facial swelling starting over 24 hours ago. Wears 4 L O2 chronically and has had cellulitis of her face before, states this feels similar. No fevers, no SOB       HPI     This 63-year-old female patient came to the emergency room with complaint of puffiness redness swelling around periorbital area and now spread to the perinasal and perilabial area anterior part of the face started 2 days ago she think she has a lot of face similar to when she in the past.  She denies new food new medication inversion exposure to new chemicals or insect bite or any cosmetic use.  Denies any sore throat or throat tightness related to the emergency room she was given breathing treatment because she has history of asthma she thought she has some difficulty breathing but did not any throat tightness or difficulty swallowing.  Denies any fevers or chills she feels nauseous which she describes is not unusual for her.  Denies any chest pain or short of breath.  This is a chronic back pain but denies any acute back symptoms.  Denies income stool or urine any headache or neck stiffness.  Family history: Reviewed    Social history: Reviewed denies substance abuse.    Review of system: 10 review of system was obtained review of system as described in HPI otherwise negative             Slade Coma Scale Score: 15                  Patient History   Past Medical History:   Diagnosis Date    CHF (congestive heart failure) (CMS/[facility])     COPD (chronic obstructive pulmonary disease) (CMS/[facility])     Diabetes mellitus (CMS/[facility])     Hypertension     Stroke (CMS/[facility])      Past Surgical History:   Procedure Laterality Date    BACK SURGERY      CARDIAC CATHETERIZATION N/A 2023    Procedure: Left Heart Cath;  Surgeon: Andria Campo MD;  Location: [facility] Cardiac [facility];  Service: Cardiovascular;  Laterality: N/A;     SECTION, LOW TRANSVERSE      TONSILLECTOMY        No family history on file.  Social History     Tobacco Use    Smoking status: Every Day     Packs/day: 0.50     Years: 25.00     Additional pack years: 0.00     Total pack years: 12.50     Types: Cigarettes    Smokeless tobacco: Never   Substance Use Topics    Alcohol use: Never    Drug use: Never       Physical Exam   ED Triage Vitals [01/01/24 1609]   Temp Heart Rate Resp BP   36.7 °C (98 °F) 83 15 125/77      SpO2 Temp Source Heart Rate Source Patient Position   97 % Temporal -- --      BP Location FiO2 (%)     -- --       Physical Exam    CONSTITUTIONAL: Elderly female patient who was awake alert oriented x 3 talking breathing without acute distress noted a puffiness team aware face around the periorbital area around the nasal and paranasal area and nose as well as cheek and Area Labial area of the face puffiness and edema of the skin noted with erythema in the periorbital area more on the right.  She was able to open and close her eyes to his partially sharp right eye.  No other rashes noted on neck face arms or legs or back or chest.  No petechiae no ecchymosis awake alert oriented x 3.      HENMT: Ear nose discharge for puffiness erythema on the face noted as described.    EYES: Periorbital edema erythema puffiness marked erythema more on the right than left.  Facial erythema noted able to open eyes partially on the right more easily and left grossly examination otherwise unremarkable.  She denies any visual trouble.  Limited examination she was keeping her eyes partially shot mostly on the right    CARDIOVASCULAR: Regular rate and rhythm. No friction rub or murmur good peripheral pulses no peripheral edema. Nontender Homans sign negative.     RESPIRATORY: Patient was breathing comfortably. No tachypnea no respiratory distress.  On auscultation he has diminished breath sounds crackles in the lung bases.  However has good skin perfusion.     GASTROINTESTINAL: Abdomen soft positive bowel sounds nontender  upon palpation abdomen no guarding rebound surgery no pulsatile mass noted no rashes in abdomen or back     GENITOURINARY:  No costovertebral angle tenderness.     MUSCULOSKELETAL: Head was normocephalic atraumatic cervical thoracic lumbar spine nontender.  Chest was nontender  Both upper extremity good motion nontender intact distal pulse intact sensation.     NEUROLOGICAL: Awake alert pleasant and cooperative. No motor or sensory deficit no arms selective noted. Intact neurovascular function and motor function. No facial drooping or drooling. Talking and breathing comfortably.  Cranial nerves II to XII grossly intact. No arms selective noted. No nystagmus.      SKIN: Facial erythema around the periorbital area on the cheek and  Labial fold and around the nose puffiness of bilateral orbital area mostly on the right with marked erythema.  Possible, is on the right more easily on the left.  No ptosis.  No petechiae ecchymosis or red streak Skin normal color for race, warm, dry and intact. No evidence of trauma or lesions.       PSYCHIATRIC: Awake alert and without acute distress. No obvious depression, no suicidal thoughts or ideation. Appropriate mood. Talking and normal tone. HEME/LYMPH: No adenopathy or splenomegaly.        CRITICAL CARE    VITAL SIGNS:       Temperature 98.0 pulse is 83 respiratory 15 pulse ox 97% room air temperature at 1/25/1977       DISPOSITION      ED Course & MDM        Medical Decision Making      Procedure  Procedures     Tracy Snowden MD  01/01/24 1616       Tracy Snowden MD  01/01/24 1935

## 2024-01-02 ENCOUNTER — APPOINTMENT (OUTPATIENT)
Dept: RADIOLOGY | Facility: HOSPITAL | Age: 64
DRG: 602 | End: 2024-01-02
Payer: COMMERCIAL

## 2024-01-02 LAB
ANION GAP SERPL CALC-SCNC: 14 MMOL/L (ref 10–20)
BUN SERPL-MCNC: 11 MG/DL (ref 6–23)
CALCIUM SERPL-MCNC: 9 MG/DL (ref 8.6–10.3)
CHLORIDE SERPL-SCNC: 100 MMOL/L (ref 98–107)
CO2 SERPL-SCNC: 29 MMOL/L (ref 21–32)
CREAT SERPL-MCNC: 0.72 MG/DL (ref 0.5–1.05)
CRP SERPL-MCNC: 0.66 MG/DL
ERYTHROCYTE [DISTWIDTH] IN BLOOD BY AUTOMATED COUNT: 18.8 % (ref 11.5–14.5)
GFR SERPL CREATININE-BSD FRML MDRD: >90 ML/MIN/1.73M*2
GLUCOSE BLD MANUAL STRIP-MCNC: 206 MG/DL (ref 74–99)
GLUCOSE BLD MANUAL STRIP-MCNC: 223 MG/DL (ref 74–99)
GLUCOSE BLD MANUAL STRIP-MCNC: 270 MG/DL (ref 74–99)
GLUCOSE BLD MANUAL STRIP-MCNC: 325 MG/DL (ref 74–99)
GLUCOSE SERPL-MCNC: 184 MG/DL (ref 74–99)
HCT VFR BLD AUTO: 51.2 % (ref 36–46)
HGB BLD-MCNC: 14.7 G/DL (ref 12–16)
HOLD SPECIMEN: NORMAL
HOLD SPECIMEN: NORMAL
LACTATE SERPL-SCNC: 3.4 MMOL/L (ref 0.4–2)
LACTATE SERPL-SCNC: 4.8 MMOL/L (ref 0.4–2)
MCH RBC QN AUTO: 29.2 PG (ref 26–34)
MCHC RBC AUTO-ENTMCNC: 28.7 G/DL (ref 32–36)
MCV RBC AUTO: 102 FL (ref 80–100)
NRBC BLD-RTO: 0 /100 WBCS (ref 0–0)
PLATELET # BLD AUTO: 154 X10*3/UL (ref 150–450)
POTASSIUM SERPL-SCNC: 4.4 MMOL/L (ref 3.5–5.3)
PROCALCITONIN SERPL-MCNC: 0.02 NG/ML
RBC # BLD AUTO: 5.04 X10*6/UL (ref 4–5.2)
SODIUM SERPL-SCNC: 139 MMOL/L (ref 136–145)
WBC # BLD AUTO: 6.4 X10*3/UL (ref 4.4–11.3)

## 2024-01-02 PROCEDURE — 71250 CT THORAX DX C-: CPT | Mod: IPSPLIT

## 2024-01-02 PROCEDURE — 99233 SBSQ HOSP IP/OBS HIGH 50: CPT

## 2024-01-02 PROCEDURE — 94640 AIRWAY INHALATION TREATMENT: CPT

## 2024-01-02 PROCEDURE — 80048 BASIC METABOLIC PNL TOTAL CA: CPT | Mod: IPSPLIT | Performed by: NURSE PRACTITIONER

## 2024-01-02 PROCEDURE — 2500000001 HC RX 250 WO HCPCS SELF ADMINISTERED DRUGS (ALT 637 FOR MEDICARE OP): Mod: IPSPLIT | Performed by: NURSE PRACTITIONER

## 2024-01-02 PROCEDURE — 2500000005 HC RX 250 GENERAL PHARMACY W/O HCPCS: Mod: IPSPLIT | Performed by: NURSE PRACTITIONER

## 2024-01-02 PROCEDURE — 96372 THER/PROPH/DIAG INJ SC/IM: CPT | Mod: IPSPLIT | Performed by: NURSE PRACTITIONER

## 2024-01-02 PROCEDURE — 1100000001 HC PRIVATE ROOM DAILY: Mod: IPSPLIT

## 2024-01-02 PROCEDURE — 71250 CT THORAX DX C-: CPT

## 2024-01-02 PROCEDURE — 2500000004 HC RX 250 GENERAL PHARMACY W/ HCPCS (ALT 636 FOR OP/ED): Mod: IPSPLIT | Performed by: NURSE PRACTITIONER

## 2024-01-02 PROCEDURE — 2500000001 HC RX 250 WO HCPCS SELF ADMINISTERED DRUGS (ALT 637 FOR MEDICARE OP): Mod: IPSPLIT

## 2024-01-02 PROCEDURE — 2500000004 HC RX 250 GENERAL PHARMACY W/ HCPCS (ALT 636 FOR OP/ED): Mod: IPSPLIT

## 2024-01-02 PROCEDURE — 84145 PROCALCITONIN (PCT): CPT | Mod: GENLAB

## 2024-01-02 PROCEDURE — 94640 AIRWAY INHALATION TREATMENT: CPT | Mod: IPSPLIT

## 2024-01-02 PROCEDURE — 9420000001 HC RT PATIENT EDUCATION 5 MIN: Mod: IPSPLIT

## 2024-01-02 PROCEDURE — 36415 COLL VENOUS BLD VENIPUNCTURE: CPT | Mod: IPSPLIT | Performed by: NURSE PRACTITIONER

## 2024-01-02 PROCEDURE — 36415 COLL VENOUS BLD VENIPUNCTURE: CPT | Mod: IPSPLIT

## 2024-01-02 PROCEDURE — 2500000002 HC RX 250 W HCPCS SELF ADMINISTERED DRUGS (ALT 637 FOR MEDICARE OP, ALT 636 FOR OP/ED): Mod: IPSPLIT

## 2024-01-02 PROCEDURE — 2500000002 HC RX 250 W HCPCS SELF ADMINISTERED DRUGS (ALT 637 FOR MEDICARE OP, ALT 636 FOR OP/ED): Mod: IPSPLIT | Performed by: NURSE PRACTITIONER

## 2024-01-02 PROCEDURE — 83605 ASSAY OF LACTIC ACID: CPT | Mod: IPSPLIT

## 2024-01-02 PROCEDURE — 82947 ASSAY GLUCOSE BLOOD QUANT: CPT | Mod: IPSPLIT

## 2024-01-02 PROCEDURE — 85027 COMPLETE CBC AUTOMATED: CPT | Mod: IPSPLIT | Performed by: NURSE PRACTITIONER

## 2024-01-02 PROCEDURE — 86140 C-REACTIVE PROTEIN: CPT | Mod: IPSPLIT

## 2024-01-02 PROCEDURE — 87081 CULTURE SCREEN ONLY: CPT | Mod: GENLAB

## 2024-01-02 PROCEDURE — S4991 NICOTINE PATCH NONLEGEND: HCPCS | Mod: IPSPLIT | Performed by: NURSE PRACTITIONER

## 2024-01-02 RX ORDER — VANCOMYCIN 1.75 G/350ML
1250 INJECTION, SOLUTION INTRAVENOUS EVERY 12 HOURS
Status: DISCONTINUED | OUTPATIENT
Start: 2024-01-02 | End: 2024-01-03 | Stop reason: DRUGHIGH

## 2024-01-02 RX ORDER — CEFTRIAXONE 2 G/50ML
2 INJECTION, SOLUTION INTRAVENOUS ONCE
Status: DISCONTINUED | OUTPATIENT
Start: 2024-01-02 | End: 2024-01-02

## 2024-01-02 RX ORDER — SODIUM CHLORIDE 9 MG/ML
INJECTION, SOLUTION INTRAVENOUS
Status: DISPENSED
Start: 2024-01-02 | End: 2024-01-02

## 2024-01-02 RX ORDER — DEXTROSE MONOHYDRATE 100 MG/ML
0.3 INJECTION, SOLUTION INTRAVENOUS ONCE AS NEEDED
Status: DISCONTINUED | OUTPATIENT
Start: 2024-01-02 | End: 2024-01-03 | Stop reason: HOSPADM

## 2024-01-02 RX ORDER — DEXTROSE 50 % IN WATER (D50W) INTRAVENOUS SYRINGE
25
Status: DISCONTINUED | OUTPATIENT
Start: 2024-01-02 | End: 2024-01-03 | Stop reason: HOSPADM

## 2024-01-02 RX ORDER — FUROSEMIDE 40 MG/1
40 TABLET ORAL DAILY
Status: DISCONTINUED | OUTPATIENT
Start: 2024-01-02 | End: 2024-01-03 | Stop reason: HOSPADM

## 2024-01-02 RX ORDER — INSULIN LISPRO 100 [IU]/ML
0-5 INJECTION, SOLUTION INTRAVENOUS; SUBCUTANEOUS
Status: DISCONTINUED | OUTPATIENT
Start: 2024-01-02 | End: 2024-01-03 | Stop reason: HOSPADM

## 2024-01-02 RX ADMIN — ONDANSETRON 4 MG: 2 INJECTION INTRAMUSCULAR; INTRAVENOUS at 00:53

## 2024-01-02 RX ADMIN — GABAPENTIN 300 MG: 300 CAPSULE ORAL at 14:03

## 2024-01-02 RX ADMIN — FLUTICASONE FUROATE AND VILANTEROL TRIFENATATE 1 PUFF: 100; 25 POWDER RESPIRATORY (INHALATION) at 09:58

## 2024-01-02 RX ADMIN — GABAPENTIN 300 MG: 300 CAPSULE ORAL at 20:55

## 2024-01-02 RX ADMIN — PIPERACILLIN SODIUM AND TAZOBACTAM SODIUM 3.38 G: 3; .375 INJECTION, SOLUTION INTRAVENOUS at 12:06

## 2024-01-02 RX ADMIN — HYDROCORTISONE: 1 CREAM TOPICAL at 09:02

## 2024-01-02 RX ADMIN — LISINOPRIL 2.5 MG: 2.5 TABLET ORAL at 08:54

## 2024-01-02 RX ADMIN — FAMOTIDINE 20 MG: 20 TABLET, FILM COATED ORAL at 08:54

## 2024-01-02 RX ADMIN — METHYLPREDNISOLONE SODIUM SUCCINATE 40 MG: 40 INJECTION, POWDER, FOR SOLUTION INTRAMUSCULAR; INTRAVENOUS at 15:57

## 2024-01-02 RX ADMIN — Medication 4 L/MIN: at 23:00

## 2024-01-02 RX ADMIN — PIPERACILLIN SODIUM AND TAZOBACTAM SODIUM 3.38 G: 3; .375 INJECTION, SOLUTION INTRAVENOUS at 17:34

## 2024-01-02 RX ADMIN — SPIRONOLACTONE 12.5 MG: 25 TABLET, FILM COATED ORAL at 08:54

## 2024-01-02 RX ADMIN — VANCOMYCIN 1250 MG: 1.75 INJECTION, SOLUTION INTRAVENOUS at 10:39

## 2024-01-02 RX ADMIN — IPRATROPIUM BROMIDE AND ALBUTEROL SULFATE 3 ML: .5; 3 SOLUTION RESPIRATORY (INHALATION) at 15:00

## 2024-01-02 RX ADMIN — IPRATROPIUM BROMIDE AND ALBUTEROL SULFATE 3 ML: .5; 3 SOLUTION RESPIRATORY (INHALATION) at 09:56

## 2024-01-02 RX ADMIN — METHYLPREDNISOLONE SODIUM SUCCINATE 40 MG: 40 INJECTION, POWDER, FOR SOLUTION INTRAMUSCULAR; INTRAVENOUS at 08:55

## 2024-01-02 RX ADMIN — GABAPENTIN 300 MG: 300 CAPSULE ORAL at 08:54

## 2024-01-02 RX ADMIN — FUROSEMIDE 40 MG: 40 TABLET ORAL at 10:39

## 2024-01-02 RX ADMIN — TIOTROPIUM BROMIDE INHALATION SPRAY 2 PUFF: 3.12 SPRAY, METERED RESPIRATORY (INHALATION) at 09:57

## 2024-01-02 RX ADMIN — IPRATROPIUM BROMIDE AND ALBUTEROL SULFATE 3 ML: .5; 3 SOLUTION RESPIRATORY (INHALATION) at 20:36

## 2024-01-02 RX ADMIN — HYDROCORTISONE: 1 CREAM TOPICAL at 20:56

## 2024-01-02 RX ADMIN — SODIUM CHLORIDE 500 ML: 9 INJECTION, SOLUTION INTRAVENOUS at 15:57

## 2024-01-02 RX ADMIN — ENOXAPARIN SODIUM 40 MG: 100 INJECTION SUBCUTANEOUS at 22:30

## 2024-01-02 RX ADMIN — VANCOMYCIN 1250 MG: 1.75 INJECTION, SOLUTION INTRAVENOUS at 20:56

## 2024-01-02 RX ADMIN — INSULIN LISPRO 2 UNITS: 100 INJECTION, SOLUTION INTRAVENOUS; SUBCUTANEOUS at 12:12

## 2024-01-02 RX ADMIN — INSULIN LISPRO 4 UNITS: 100 INJECTION, SOLUTION INTRAVENOUS; SUBCUTANEOUS at 17:29

## 2024-01-02 RX ADMIN — PIPERACILLIN SODIUM AND TAZOBACTAM SODIUM 3.38 G: 3; .375 INJECTION, SOLUTION INTRAVENOUS at 08:55

## 2024-01-02 RX ADMIN — NICOTINE 1 PATCH: 14 PATCH, EXTENDED RELEASE TRANSDERMAL at 08:54

## 2024-01-02 RX ADMIN — PANTOPRAZOLE SODIUM 40 MG: 40 TABLET, DELAYED RELEASE ORAL at 08:54

## 2024-01-02 SDOH — SOCIAL STABILITY: SOCIAL INSECURITY: WITHIN THE LAST YEAR, HAVE YOU BEEN AFRAID OF YOUR PARTNER OR EX-PARTNER?: NO

## 2024-01-02 SDOH — SOCIAL STABILITY: SOCIAL NETWORK: ARE YOU MARRIED, WIDOWED, DIVORCED, SEPARATED, NEVER MARRIED, OR LIVING WITH A PARTNER?: MARRIED

## 2024-01-02 SDOH — ECONOMIC STABILITY: TRANSPORTATION INSECURITY
IN THE PAST 12 MONTHS, HAS THE LACK OF TRANSPORTATION KEPT YOU FROM MEDICAL APPOINTMENTS OR FROM GETTING MEDICATIONS?: NO

## 2024-01-02 SDOH — SOCIAL STABILITY: SOCIAL INSECURITY: WITHIN THE LAST YEAR, HAVE YOU BEEN HUMILIATED OR EMOTIONALLY ABUSED IN OTHER WAYS BY YOUR PARTNER OR EX-PARTNER?: NO

## 2024-01-02 SDOH — ECONOMIC STABILITY: INCOME INSECURITY: IN THE PAST 12 MONTHS, HAS THE ELECTRIC, GAS, OIL, OR WATER COMPANY THREATENED TO SHUT OFF SERVICE IN YOUR HOME?: NO

## 2024-01-02 SDOH — ECONOMIC STABILITY: FOOD INSECURITY: WITHIN THE PAST 12 MONTHS, YOU WORRIED THAT YOUR FOOD WOULD RUN OUT BEFORE YOU GOT MONEY TO BUY MORE.: OFTEN TRUE

## 2024-01-02 SDOH — ECONOMIC STABILITY: HOUSING INSECURITY
IN THE LAST 12 MONTHS, WAS THERE A TIME WHEN YOU DID NOT HAVE A STEADY PLACE TO SLEEP OR SLEPT IN A SHELTER (INCLUDING NOW)?: NO

## 2024-01-02 SDOH — SOCIAL STABILITY: SOCIAL INSECURITY
WITHIN THE LAST YEAR, HAVE YOU BEEN KICKED, HIT, SLAPPED, OR OTHERWISE PHYSICALLY HURT BY YOUR PARTNER OR EX-PARTNER?: NO

## 2024-01-02 SDOH — SOCIAL STABILITY: SOCIAL INSECURITY
WITHIN THE LAST YEAR, HAVE TO BEEN RAPED OR FORCED TO HAVE ANY KIND OF SEXUAL ACTIVITY BY YOUR PARTNER OR EX-PARTNER?: NO

## 2024-01-02 SDOH — ECONOMIC STABILITY: TRANSPORTATION INSECURITY
IN THE PAST 12 MONTHS, HAS LACK OF TRANSPORTATION KEPT YOU FROM MEETINGS, WORK, OR FROM GETTING THINGS NEEDED FOR DAILY LIVING?: NO

## 2024-01-02 SDOH — ECONOMIC STABILITY: FOOD INSECURITY: WITHIN THE PAST 12 MONTHS, THE FOOD YOU BOUGHT JUST DIDN'T LAST AND YOU DIDN'T HAVE MONEY TO GET MORE.: OFTEN TRUE

## 2024-01-02 SDOH — ECONOMIC STABILITY: HOUSING INSECURITY: IN THE LAST 12 MONTHS, HOW MANY PLACES HAVE YOU LIVED?: 1

## 2024-01-02 ASSESSMENT — COGNITIVE AND FUNCTIONAL STATUS - GENERAL
MOVING TO AND FROM BED TO CHAIR: A LITTLE
DAILY ACTIVITIY SCORE: 22
HELP NEEDED FOR BATHING: A LITTLE
WALKING IN HOSPITAL ROOM: A LITTLE
MOBILITY SCORE: 20
STANDING UP FROM CHAIR USING ARMS: A LITTLE
CLIMB 3 TO 5 STEPS WITH RAILING: A LITTLE
TOILETING: A LITTLE

## 2024-01-02 ASSESSMENT — ACTIVITIES OF DAILY LIVING (ADL): LACK_OF_TRANSPORTATION: NO

## 2024-01-02 ASSESSMENT — ENCOUNTER SYMPTOMS
FEVER: 0
FACIAL SWELLING: 1
COUGH: 1
CHILLS: 0

## 2024-01-02 ASSESSMENT — PAIN - FUNCTIONAL ASSESSMENT: PAIN_FUNCTIONAL_ASSESSMENT: 0-10

## 2024-01-02 ASSESSMENT — PAIN SCALES - GENERAL
PAINLEVEL_OUTOF10: 0 - NO PAIN
PAINLEVEL_OUTOF10: 0 - NO PAIN

## 2024-01-02 NOTE — PROGRESS NOTES
Subha Miller is a 63 y.o. female on day 1 of admission presenting with Cellulitis.    Subjective     No overnight events reported.     Patient reports that she feels much better this morning. She still has facial erythema and edema but says it has improved since she came in to the ED. She does have some facial tenderness; denies any throat tightness or difficulty swallowing. No fevers, chills, chest pain, or worsening shortness of breath. She is on her baseline 4L O2 and reports some sinus congestion, rhonchi and wheezing noted on exam. CT chest is pending to rule out pneumonia. Patient is currently on Vancomycin and Zosyn with ID consulted. Plan of care reviewed with patient, all questions answered at this time.        Objective     Physical Exam  Constitutional:       General: She is not in acute distress.     Appearance: She is obese.   HENT:      Head: Normocephalic and atraumatic.      Mouth/Throat:      Mouth: Mucous membranes are moist.   Eyes:      General:         Right eye: No discharge.         Left eye: No discharge.      Extraocular Movements: Extraocular movements intact.      Conjunctiva/sclera: Conjunctivae normal.   Cardiovascular:      Rate and Rhythm: Normal rate and regular rhythm.   Pulmonary:      Effort: No respiratory distress.      Breath sounds: Wheezing and rhonchi present.   Abdominal:      General: There is no distension.      Palpations: Abdomen is soft.      Tenderness: There is no abdominal tenderness.   Musculoskeletal:         General: Swelling (trace ankle edema) present.   Skin:     General: Skin is warm and dry.      Findings: Erythema (facial erythema involving nose, periorbital area) present. No lesion.      Comments: Facial tenderness to palpation   Periorbital/facial edema    Neurological:      Mental Status: She is alert and oriented to person, place, and time.   Psychiatric:         Mood and Affect: Mood normal.         Behavior: Behavior normal.         Last Recorded  "Vitals  Blood pressure 103/57, pulse 57, temperature 35.6 °C (96.1 °F), temperature source Temporal, resp. rate 16, height 1.6 m (5' 3\"), weight 95.1 kg (209 lb 10.5 oz), SpO2 94 %.  Intake/Output last 3 Shifts:  No intake/output data recorded.    Relevant Results          Scheduled medications  sodium chloride 0.9%, , ,   enoxaparin, 40 mg, subcutaneous, q24h  famotidine, 20 mg, oral, BID   Or  famotidine, 20 mg, intravenous, BID  tiotropium, 2 Inhalation, inhalation, Daily   And  fluticasone furoate-vilanteroL, 1 puff, inhalation, Daily  gabapentin, 300 mg, oral, TID  hydrocortisone, , Topical, BID  ipratropium-albuteroL, 3 mL, nebulization, 4x daily  lisinopril, 2.5 mg, oral, Daily  [Held by provider] metFORMIN XR, 500 mg, oral, Daily with breakfast  methylPREDNISolone sodium succinate (PF), 40 mg, intravenous, q8h  nicotine, 1 patch, transdermal, Daily  oxygen, 4 L/min, inhalation, q24h  pantoprazole, 40 mg, oral, Daily  piperacillin-tazobactam, 3.375 g, intravenous, q6h  spironolactone, 12.5 mg, oral, Daily  vancomycin-diluent combo no.1, 1,250 mg, intravenous, q12h      Continuous medications     PRN medications  PRN medications: sodium chloride 0.9%, acetaminophen **OR** acetaminophen **OR** acetaminophen, albuterol, melatonin, ondansetron **OR** ondansetron    Results for orders placed or performed during the hospital encounter of 01/01/24 (from the past 24 hour(s))   CBC and Auto Differential   Result Value Ref Range    WBC 10.4 4.4 - 11.3 x10*3/uL    nRBC 0.0 0.0 - 0.0 /100 WBCs    RBC 4.75 4.00 - 5.20 x10*6/uL    Hemoglobin 13.8 12.0 - 16.0 g/dL    Hematocrit 45.7 36.0 - 46.0 %    MCV 96 80 - 100 fL    MCH 29.1 26.0 - 34.0 pg    MCHC 30.2 (L) 32.0 - 36.0 g/dL    RDW 18.6 (H) 11.5 - 14.5 %    Platelets 183 150 - 450 x10*3/uL    Neutrophils % 66.4 40.0 - 80.0 %    Immature Granulocytes %, Automated 0.3 0.0 - 0.9 %    Lymphocytes % 22.7 13.0 - 44.0 %    Monocytes % 4.9 2.0 - 10.0 %    Eosinophils % 5.1 0.0 - " 6.0 %    Basophils % 0.6 0.0 - 2.0 %    Neutrophils Absolute 6.89 1.20 - 7.70 x10*3/uL    Immature Granulocytes Absolute, Automated 0.03 0.00 - 0.70 x10*3/uL    Lymphocytes Absolute 2.36 1.20 - 4.80 x10*3/uL    Monocytes Absolute 0.51 0.10 - 1.00 x10*3/uL    Eosinophils Absolute 0.53 0.00 - 0.70 x10*3/uL    Basophils Absolute 0.06 0.00 - 0.10 x10*3/uL   Magnesium   Result Value Ref Range    Magnesium 1.68 1.60 - 2.40 mg/dL   Lactate   Result Value Ref Range    Lactate 1.4 0.4 - 2.0 mmol/L   Comprehensive metabolic panel   Result Value Ref Range    Glucose 115 (H) 74 - 99 mg/dL    Sodium 141 136 - 145 mmol/L    Potassium 3.9 3.5 - 5.3 mmol/L    Chloride 100 98 - 107 mmol/L    Bicarbonate 34 (H) 21 - 32 mmol/L    Anion Gap 11 10 - 20 mmol/L    Urea Nitrogen 9 6 - 23 mg/dL    Creatinine 0.58 0.50 - 1.05 mg/dL    eGFR >90 >60 mL/min/1.73m*2    Calcium 8.9 8.6 - 10.3 mg/dL    Albumin 3.4 3.4 - 5.0 g/dL    Alkaline Phosphatase 59 33 - 136 U/L    Total Protein 6.2 (L) 6.4 - 8.2 g/dL    AST 14 9 - 39 U/L    Bilirubin, Total 0.4 0.0 - 1.2 mg/dL    ALT 6 (L) 7 - 45 U/L   C-Reactive Protein   Result Value Ref Range    C-Reactive Protein 0.71 <1.00 mg/dL   Sedimentation Rate   Result Value Ref Range    Sedimentation Rate 27 0 - 30 mm/h   Influenza A, and B PCR   Result Value Ref Range    Flu A Result Not Detected Not Detected    Flu B Result Not Detected Not Detected   Group A Streptococcus, PCR    Specimen: Throat/Pharynx; Swab   Result Value Ref Range    Group A Strep PCR Not Detected Not Detected   SARS-CoV-2 RT PCR   Result Value Ref Range    Coronavirus 2019, PCR Not Detected Not Detected   POCT GLUCOSE   Result Value Ref Range    POCT Glucose 206 (H) 74 - 99 mg/dL     CT facial bones w IV contrast    Result Date: 1/1/2024  Interpreted By:  Soto Duvall, STUDY: CT MAXILLOFACIAL BONES W IV CONTRAST  1/1/2024 5:27 pm   INDICATION: Signs/Symptoms:Facial cellulitis periorbital cellulitis puffiness and swelling of the eyes more  on the right than left   COMPARISON: None.   ACCESSION NUMBER(S): [id number]   ORDERING CLINICIAN: LUIS STORY   TECHNIQUE: Enhanced thin cut axial CT images through the facial bones were obtained and reconstructed in the coronal  and sagittal plane using 75 mL Omnipaque 350 for contrast.   FINDINGS: The preseptal periorbital soft tissues have edema/inflammation more prominent on the right with no localized collections suggesting abscess.   The intraorbital contents are normal. No mass or abnormal enhancement is noted.   The paranasal sinuses are normally aerated. The middle turbinates have gabriela bullosa bilaterally.   The upper teeth are absent. The remaining lower teeth (canines and incisors) have multiple cavities with loss of the crown of the inferior right canine tooth.         There is preseptal edema/inflammation about both orbits more so on the right no intraorbital abnormalities or localized collections suggesting abscess. No underlying sinus abnormalities are noted.   MACRO: None   Signed by: Soto Duvall 1/1/2024 5:38 PM Dictation workstation:   [id number]                Assessment/Plan   Principal Problem:    Cellulitis  Active Problems:    Orbital cellulitis, bilateral    Facial cellulitis    Allergic dermatitis    #Facial cellulitis  - Patient has history of similar symptoms ~ 5 years ago  - WBC 10.4 on admission  - Lactate 1.4  - ESR 27, CRP 0.71  - BCx pending  - Group A strep, flu A/B, COVID negative  - CT facial bones with contrast: There is preseptal edema/inflammation about both orbits more so on the right no intraorbital abnormalities or localized collections suggesting abscess. No underlying sinus abnormalities are noted.   - Patient received Benadryl 50 mg IVP x 1 dose and a 500 ml NS bolus in ED   - Continue hydrocortisone cream BID  - Continue Vancomycin and Zosyn- started in ED   - Continue Solumedrol 40 mg IVP- frequency increased to q8h due to wheezing on exam   - ID consulted,  appreciate recs     #COPD, concern for acute exacerbation  #Chronic respiratory failure  #Tobacco use  - Wheezing and rhonchi noted on exam; patient reports sinus congestion  - Procal, CT chest pending   - Continue Vancomycin and Zosyn for cellulitis  - Solumedrol 40 mg IVP q8h   - RT eval/treat protocol   - DuoNebs QID   - Continue Spiriva and Breo- pharmacy substitute for home Trelegy  - Nicotine patch ordered; encourage cessation on discharge   - Remains on baseline 4L O2 continuously    #T2DM with neuropathy  - Continue home gabapentin   - Metformin held; patient received IV contrast for CT facial bones   - SSI three times a day before meals added while on steroids   - Hypoglycemia protocol  - Accuchecks ac/hs/prn  - Diabetic diet   - HbA1c 6.4 in September 2023    #HTN  #HFpEF, not in acute exacerbation  - Echo from September 2023 reviewed, LVEF 65-70% with diastolic dysfunction   - Continue home furosemide, lisinopril, and spironolactone  - Strict I&Os  - Daily weights  - 2g Na diet  - Monitor volume status closely, remains on baseline O2   - Monitor HR and BP     #GERD  - Continue PPI      DVT PPx: Lovenox   GI PPx: Protonix   Diet: Diabetic, 2g Na   Code Status: Full code     Disposition: Patient requires inpatient management at this time.     Total accumulated time spent face to face and not face to face preparing to see the patient, obtaining and reviewing separately obtained history; performing a medically appropriate examination and/or evaluation; counseling and educating the patient, family; ordering medications, tests, or procedures; referring and communicating with other health care professionals; documenting clinical information in the patient's medical record; independently interpreting results and communicating the results to the patient, family; and care coordination was 45 minutes.       Mayte Theodore PA-C

## 2024-01-02 NOTE — DISCHARGE INSTR - OTHER ORDERS
Thank you for choosing [facility] for your Health Care needs.  Also, thank you for allowing us to take you and your families preferences into account when determining your discharge plan.  Stay well!    [employer] is a free community service that provides information about social health and government resources 24 hours a day.  It provides Human services agencies, food and shelter providers,  resources, special services for senior and volunteer opportunities.  www.[employer].net       Your Care Transitions Team Member: Esme Theodore Amanda or Robin [phone]

## 2024-01-02 NOTE — NURSING NOTE
Patient relaxing in bed at this time. VS stable. No c/o pain. IV ATB's administered and IV bolus completed. Chest xray completed and MRSA swab sent.

## 2024-01-02 NOTE — CARE PLAN
The patient's goals for the shift include  have my face not itch as bad    The clinical goals for the shift include Tolerate IV ATB    Over the shift, the patient was admitted to med-surg for cellulitis of the face. Pt received IV ATB in ER. Applied hydrocortisone cream to face with good effect. Pt also received Zofran for nausea. Pt slept comfortably all night with no needs once falling asleep.

## 2024-01-02 NOTE — CARE PLAN
The patient's goals for the shift include      The clinical goals for the shift include Tolerate IV ATB      Problem: Diabetes  Goal: Achieve decreasing blood glucose levels by end of shift  Outcome: Progressing  Goal: Increase stability of blood glucose readings by end of shift  Outcome: Progressing  Goal: Decrease in ketones present in urine by end of shift  Outcome: Progressing  Goal: Maintain electrolyte levels within acceptable range throughout shift  Outcome: Progressing  Goal: Maintain glucose levels >70mg/dl to <250mg/dl throughout shift  Outcome: Progressing  Goal: No changes in neurological exam by end of shift  Outcome: Progressing  Goal: Learn about and adhere to nutrition recommendations by end of shift  Outcome: Progressing  Goal: Vital signs within normal range for age by end of shift  Outcome: Progressing  Goal: Increase self care and/or family involovement by end of shift  Outcome: Progressing  Goal: Receive DSME education by end of shift  Outcome: Progressing

## 2024-01-02 NOTE — PROGRESS NOTES
TCC spoke with patient regarding discharge planning and home going needs. Patient lives  in a 1 story house with her .  Patient says she goes to the [employer] for food.  Her  drives.  She has DME:  Walker, wheelchair, shower chair, grab bars in bathroom, nebulizer and is on 4L oxygen continuously.    Confirmed with patient PCP is  Brent León in [place] .  Discharge Plan is for patient to return home.  Resources given for house keeping; [employer] & [employer] [facility] [employer].  TCC will continue to follow for changes in discharge planning needs.

## 2024-01-02 NOTE — PROGRESS NOTES
"Vancomycin Dosing by Pharmacy- INITIAL    Subha Miller is a 63 y.o. year old female who Pharmacy has been consulted for vancomycin dosing for cellulitis, skin and soft tissue. Based on the patient's indication and renal status this patient will be dosed based on a goal AUC of 400-600.     Renal function is currently stable.    Visit Vitals  /57 (BP Location: Right arm, Patient Position: Lying)   Pulse 57   Temp 35.6 °C (96.1 °F) (Temporal)   Resp 16        Lab Results   Component Value Date    CREATININE 0.58 01/01/2024    CREATININE 0.60 11/18/2023    CREATININE 0.80 11/17/2023    CREATININE 0.90 11/16/2023        Patient weight is No results found for: \"PTWEIGHT\"    No results found for: \"CULTURE\"     No intake/output data recorded.  [unfilled]    Lab Results   Component Value Date    PATIENTTEMP 37.0 11/16/2023    PATIENTTEMP 37.0 11/13/2023    PATIENTTEMP 37.0 09/19/2023          Assessment/Plan     Patient will not be given a loading dose. Patient received a dose of 1500 mg yesterday.  Will initiate vancomycin maintenance,  1250 mg every 12 hours.    This dosing regimen is predicted by [employer] to result in the following pharmacokinetic parameters:  Loading dose: N/A  Regimen: 1250 mg IV every 12 hours.  Start time: 09:02 on 01/02/2024  Exposure target: AUC24 (range)400-600 mg/L.hr   AUC24,ss: 476 mg/L.hr  Probability of AUC24 > 400: 68 %  Ctrough,ss: 14.5 mg/L  Probability of Ctrough,ss > 20: 25 %  Probability of nephrotoxicity (Lodise ISSA 2009): 10 %    Follow-up level will be ordered on 1/3 at 0500 unless clinically indicated sooner.  Will continue to monitor renal function daily while on vancomycin and order serum creatinine at least every 48 hours if not already ordered.  Follow for continued vancomycin needs, clinical response, and signs/symptoms of toxicity.       Yvonne Owens, PharmD       "

## 2024-01-02 NOTE — H&P
History and Physical         Subha Miller 63 y.o. 1960     History Of Present Illness  Subha Miller is a 63 y.o. female presented to [facility] ED with  complaint of puffiness redness swelling around periorbital area and now spread to the perinasal and perilabial area anterior part of the face started 2 days ago. She states she has not change laundry detergents, cleaning chemicals, new food or recent travel.  In ED patient received  Vanco IV .  On exam patient resting in bed. Alert x 4. She c/o extreme pruritus. Received Sol-medrol in ED. Ordered Hydrocortisone 2.5 % apply daily to face.   Patient wear 4 L Oxygen  at home. Patient denies SOB Pain CP N/V/D.         Past Medical History  Past Medical History:   Diagnosis Date    CHF (congestive heart failure) (CMS/Formerly [facility])     COPD (chronic obstructive pulmonary disease) (CMS/Formerly [facility])     Diabetes mellitus (CMS/Formerly [facility])     Hypertension     Stroke (CMS/Formerly [facility])         Surgical History  She has a past surgical history that includes Back surgery; Tonsillectomy;  section, low transverse; and Cardiac catheterization (N/A, 2023).     Social History  Social History     Socioeconomic History    Marital status:      Spouse name: Not on file    Number of children: Not on file    Years of education: Not on file    Highest education level: Not on file   Occupational History    Not on file   Tobacco Use    Smoking status: Every Day     Packs/day: 0.50     Years: 25.00     Additional pack years: 0.00     Total pack years: 12.50     Types: Cigarettes    Smokeless tobacco: Never   Substance and Sexual Activity    Alcohol use: Never    Drug use: Never    Sexual activity: Not on file   Other Topics Concern    Not on file   Social History Narrative    Not on file     Social Determinants of Health     Financial Resource Strain: Low Risk  (2024)    Overall Financial Resource Strain (CARDIA)     Difficulty of Paying Living Expenses: Not hard at all    Food Insecurity: Not on file   Transportation Needs: No Transportation Needs (1/1/2024)    PRAPARE - Transportation     Lack of Transportation (Medical): No     Lack of Transportation (Non-Medical): No   Physical Activity: Not on file   Stress: Not on file   Social Connections: Not on file   Intimate Partner Violence: Not on file   Housing Stability: Low Risk  (1/1/2024)    Housing Stability Vital Sign     Unable to Pay for Housing in the Last Year: No     Number of Places Lived in the Last Year: 1     Unstable Housing in the Last Year: No        Family History  No family history on file.     Allergies  Allergies   Allergen Reactions    Penicillins Hives and Rash     dilerious    Codeine GI Upset    Morphine Hives        Vital Signs  Temp:  [36.3 °C (97.3 °F)-36.7 °C (98 °F)] 36.3 °C (97.3 °F)  Heart Rate:  [82-89] 82  Resp:  [15-19] 19  BP: ()/(65-77) 124/66  FiO2 (%):  [36 %] 36 %    Home Medications   Prior to Admission Medications   Prescriptions Last Dose Informant Patient Reported? Taking?   acetaminophen (Tylenol) 325 mg tablet 12/31/2023  No No   Sig: Take 2 tablets (650 mg) by mouth every 6 hours if needed for mild pain (1 - 3).   albuterol 90 mcg/actuation inhaler Past Week  No No   Sig: INHALE 2 PUFFS BY MOUTH EVERY 4 HOURS AS NEEDED FOR SHORTNESS OF BREATH   fluticasone-umeclidin-vilanter (TRELEGY-ELLIPTA) 100-62.5-25 mcg blister with device 1/1/2024  No No   Sig: INHALE 1 PUFF BY MOUTH ONCE DAILY.   furosemide (Lasix) 40 mg tablet   No No   Sig: Take 1 tablet (40 mg) by mouth once daily.   gabapentin (Neurontin) 600 mg tablet 1/1/2024  No No   Sig: Take 0.5 tablets (300 mg) by mouth 3 times a day.   insulin lispro (HumaLOG) 100 unit/mL injection   No No   Sig: Inject 0-0.05 mL (0-5 Units) under the skin 3 times a day with meals. Take as directed per insulin instructions.   ipratropium-albuteroL (Duo-Neb) 0.5-2.5 mg/3 mL nebulizer solution 12/31/2023  No No   Sig: Take 3 mL by nebulization 4  times a day.   lisinopril 2.5 mg tablet   Yes No   Sig: Take 1 tablet (2.5 mg) by mouth once daily.   metFORMIN  mg 24 hr tablet 1/1/2024  Yes No   Sig: Take 1 tablet (500 mg) by mouth once daily with breakfast.   mupirocin (Bactroban) 2 % ointment   No No   Sig: APPLY TO CRUSTED AREAS ON FACE THREE TIMES A DAY UNTIL EVENING OF 9/25/23. AVOID THE EYES.   nystatin (Mycostatin) 100,000 unit/gram powder 12/31/2023  No No   Sig: Apply 1 Application topically 2 times a day.   oxygen (O2) gas therapy 1/1/2024  No No   Sig: Inhale 4 L/min once every 24 hours.   oxygen (O2) gas therapy 1/1/2024  No No   Sig: Inhale 4 L/min once every 24 hours. At night BiPAP 15 over 5, 40% FiO2  During the day continuous 3 to 4 L/min oxygen   pantoprazole (ProtoNix) 40 mg EC tablet 1/1/2024  No No   Sig: Take 1 tablet (40 mg) by mouth once daily. Do not crush, chew, or split.   spironolactone (Aldactone) 25 mg tablet   No No   Sig: Take 0.5 tablets (12.5 mg) by mouth once daily. Do not start before November 18, 2023.      Facility-Administered Medications: None       New Hospital Orders  Current Facility-Administered Medications   Medication Dose Route Frequency Provider Last Rate Last Admin    acetaminophen (Tylenol) tablet 650 mg  650 mg oral q4h PRN ALEJANDRO Bunch        Or    acetaminophen (Tylenol) oral liquid 650 mg  650 mg nasogastric tube q4h PRN ALEJANDRO Bunch        Or    acetaminophen (Tylenol) suppository 650 mg  650 mg rectal q4h PRN ALEJANDRO Bunch        albuterol 90 mcg/actuation inhaler 2 puff  2 puff inhalation q4h PRN ALEJANDRO Bunch        enoxaparin (Lovenox) syringe 40 mg  40 mg subcutaneous q24h ALEJANDRO Bunch   40 mg at 01/01/24 9723    famotidine (Pepcid) tablet 20 mg  20 mg oral BID ALEJANDRO Bunch        Or    famotidine PF (Pepcid) injection 20 mg  20 mg intravenous BID ALEJANDRO Bunch        [START ON 1/2/2024] tiotropium (Spiriva Respimat) 2.5  mcg/actuation inhaler 2 puff  2 Inhalation inhalation Daily ALEJANDRO Bunch        And    [START ON 1/2/2024] fluticasone furoate-vilanteroL (Breo Ellipta) 100-25 mcg/dose inhaler 1 puff  1 puff inhalation Daily ALEJANDRO Bunch        gabapentin (Neurontin) capsule 300 mg  300 mg oral TID ALEJANDRO Bunch   300 mg at 01/01/24 2325    hydrocortisone 1 % cream   Topical BID ALEJANDRO Bunch        [START ON 1/2/2024] ipratropium-albuteroL (Duo-Neb) 0.5-2.5 mg/3 mL nebulizer solution 3 mL  3 mL nebulization 4x daily ALEJANDRO Bunch        [START ON 1/2/2024] lisinopril tablet 2.5 mg  2.5 mg oral Daily ALEJANDRO Bunch        melatonin tablet 3 mg  3 mg oral Nightly PRN ALEJANDRO Bunch        [START ON 1/2/2024] metFORMIN XR (Glucophage-XR) 24 hr tablet 500 mg  500 mg oral Daily with breakfast ALEJANDRO Bunch        methylPREDNISolone sod succinate (PF) (SOLU-Medrol) 40 mg/mL injection 40 mg  40 mg intravenous q24h ALEJANDRO Bunch        ondansetron (Zofran) tablet 4 mg  4 mg oral q8h PRN ALEJANDRO Bunch        Or    ondansetron (Zofran) injection 4 mg  4 mg intravenous q8h PRN ALEJANDRO Bunch        oxygen (O2) therapy  4 L/min inhalation q24h ALEJANDRO Bunch   4 L/min at 01/01/24 2300    [START ON 1/2/2024] pantoprazole (ProtoNix) EC tablet 40 mg  40 mg oral Daily ALEJANDRO Bunch        [START ON 1/2/2024] spironolactone (Aldactone) tablet 12.5 mg  12.5 mg oral Daily ALEJANDRO Bunch        vancomycin (Vancocin) in dextrose 5 % water (D5W) 500 mL IV 1,500 mg  1,500 mg intravenous q24h Mitch Blech, APRN-CNP               Review of Systems   Skin:         Facial Pruritus    All other systems reviewed and are negative.          Physical Exam  Constitutional:       Appearance: She is obese.   HENT:      Head: Normocephalic and atraumatic.      Nose: Nose normal.      Mouth/Throat:      Mouth: Mucous membranes are  "moist.      Pharynx: Oropharynx is clear.   Eyes:      Extraocular Movements: Extraocular movements intact.      Pupils: Pupils are equal, round, and reactive to light.   Cardiovascular:      Rate and Rhythm: Normal rate and regular rhythm.      Pulses: Normal pulses.      Heart sounds: Normal heart sounds.   Pulmonary:      Effort: Pulmonary effort is normal.      Breath sounds: Normal breath sounds.   Abdominal:      General: Abdomen is flat. Bowel sounds are normal.   Musculoskeletal:         General: Normal range of motion.      Cervical back: Normal range of motion and neck supple.   Skin:     Comments: Bilateral Orbital Redness and Swelling    Neurological:      Mental Status: She is alert.            Last Recorded Vitals  Blood pressure 124/66, pulse 82, temperature 36.3 °C (97.3 °F), temperature source Temporal, resp. rate 19, height 1.6 m (5' 3\"), weight 95.1 kg (209 lb 10.5 oz), SpO2 94 %.    Relevant Results  Results for orders placed or performed during the hospital encounter of 01/01/24   Group A Streptococcus, PCR    Specimen: Throat/Pharynx; Swab   Result Value Ref Range    Group A Strep PCR Not Detected Not Detected   CBC and Auto Differential   Result Value Ref Range    WBC 10.4 4.4 - 11.3 x10*3/uL    nRBC 0.0 0.0 - 0.0 /100 WBCs    RBC 4.75 4.00 - 5.20 x10*6/uL    Hemoglobin 13.8 12.0 - 16.0 g/dL    Hematocrit 45.7 36.0 - 46.0 %    MCV 96 80 - 100 fL    MCH 29.1 26.0 - 34.0 pg    MCHC 30.2 (L) 32.0 - 36.0 g/dL    RDW 18.6 (H) 11.5 - 14.5 %    Platelets 183 150 - 450 x10*3/uL    Neutrophils % 66.4 40.0 - 80.0 %    Immature Granulocytes %, Automated 0.3 0.0 - 0.9 %    Lymphocytes % 22.7 13.0 - 44.0 %    Monocytes % 4.9 2.0 - 10.0 %    Eosinophils % 5.1 0.0 - 6.0 %    Basophils % 0.6 0.0 - 2.0 %    Neutrophils Absolute 6.89 1.20 - 7.70 x10*3/uL    Immature Granulocytes Absolute, Automated 0.03 0.00 - 0.70 x10*3/uL    Lymphocytes Absolute 2.36 1.20 - 4.80 x10*3/uL    Monocytes Absolute 0.51 0.10 - 1.00 " x10*3/uL    Eosinophils Absolute 0.53 0.00 - 0.70 x10*3/uL    Basophils Absolute 0.06 0.00 - 0.10 x10*3/uL   Magnesium   Result Value Ref Range    Magnesium 1.68 1.60 - 2.40 mg/dL   Lactate   Result Value Ref Range    Lactate 1.4 0.4 - 2.0 mmol/L   Comprehensive metabolic panel   Result Value Ref Range    Glucose 115 (H) 74 - 99 mg/dL    Sodium 141 136 - 145 mmol/L    Potassium 3.9 3.5 - 5.3 mmol/L    Chloride 100 98 - 107 mmol/L    Bicarbonate 34 (H) 21 - 32 mmol/L    Anion Gap 11 10 - 20 mmol/L    Urea Nitrogen 9 6 - 23 mg/dL    Creatinine 0.58 0.50 - 1.05 mg/dL    eGFR >90 >60 mL/min/1.73m*2    Calcium 8.9 8.6 - 10.3 mg/dL    Albumin 3.4 3.4 - 5.0 g/dL    Alkaline Phosphatase 59 33 - 136 U/L    Total Protein 6.2 (L) 6.4 - 8.2 g/dL    AST 14 9 - 39 U/L    Bilirubin, Total 0.4 0.0 - 1.2 mg/dL    ALT 6 (L) 7 - 45 U/L   Influenza A, and B PCR   Result Value Ref Range    Flu A Result Not Detected Not Detected    Flu B Result Not Detected Not Detected   SARS-CoV-2 RT PCR   Result Value Ref Range    Coronavirus 2019, PCR Not Detected Not Detected   C-Reactive Protein   Result Value Ref Range    C-Reactive Protein 0.71 <1.00 mg/dL   Sedimentation Rate   Result Value Ref Range    Sedimentation Rate 27 0 - 30 mm/h        Imaging   CT facial bones w IV contrast    Result Date: 1/1/2024  Interpreted By:  Soto Duvall, STUDY: CT MAXILLOFACIAL BONES W IV CONTRAST  1/1/2024 5:27 pm   INDICATION: Signs/Symptoms:Facial cellulitis periorbital cellulitis puffiness and swelling of the eyes more on the right than left   COMPARISON: None.   ACCESSION NUMBER(S): [id number]   ORDERING CLINICIAN: LUIS STORY   TECHNIQUE: Enhanced thin cut axial CT images through the facial bones were obtained and reconstructed in the coronal  and sagittal plane using 75 mL Omnipaque 350 for contrast.   FINDINGS: The preseptal periorbital soft tissues have edema/inflammation more prominent on the right with no localized collections suggesting abscess.    The intraorbital contents are normal. No mass or abnormal enhancement is noted.   The paranasal sinuses are normally aerated. The middle turbinates have gabriela bullosa bilaterally.   The upper teeth are absent. The remaining lower teeth (canines and incisors) have multiple cavities with loss of the crown of the inferior right canine tooth.         There is preseptal edema/inflammation about both orbits more so on the right no intraorbital abnormalities or localized collections suggesting abscess. No underlying sinus abnormalities are noted.   MACRO: None   Signed by: Soto Duvall 1/1/2024 5:38 PM Dictation workstation:   [id number]       Assessment/Plan   Principal Problem:    Cellulitis  Active Problems:    Orbital cellulitis, bilateral    Facial cellulitis    Allergic dermatitis  This 63-year-old female patient came to the emergency room with complaint of puffiness redness swelling around periorbital area and now spread to the perinasal and perilabial area anterior part of the face started 2 days ago.    CT Facial Bones There is preseptal edema/inflammation about both orbits more so on the right no intraorbital abnormalities or localized collections suggesting abscess. No underlying sinus abnormalities are noted.  -CRP 0.71  -Sed Rate 27   Received Solumedrol 125 mg IVP in ED   Received Vanco IV in ED   Continue Pepcid 20 mg Daily   Ordered Hydrocortisone Cream 2.5 % to face daily  Solumedrol 40 mg IVP daily   Vanco   1.5 gm IV Daily   Consult ID Appreciate Rec     Diabetes  w/ Neuropathy   Monitor BG Q AC/HS   Continue Metformin   Continue Neurotin     DVT Prophylaxis Lovenox   Fluids: PRN   Electrolytes: replace as needed  Nutrition:  Diabetic Low  Carb   Adjuncts: PIV      Total accumulated time spent face to face and not face to face preparing to see the patient, obtaining and reviewing separately obtained history; performing a medically appropriate examination and/or evaluation; counseling and educating  the patient, family; ordering medications, tests, or procedures; referring and communicating with other health care professionals; documenting clinical information in the patient's medical record; independently interpreting results and communicating the results to the patient, family; and care coordination was 40 minutes      ALEJANDRO Bunch

## 2024-01-02 NOTE — CONSULTS
Inpatient consult to Infectious Diseases  Consult performed by: Unruly Shaver MD  Consult ordered by: Marcell Wood APRN-CNP            Primary MD: No Assigned PCP Generic Provider, MD    Reason For Consult  Periorbital cellulitis    History Of Present Illness  Subha Miller is a 63 y.o. female presenting with swelling and redness around both eyes.  Onset was about 2 days prior to presentation.  Started after patient ate grapes from [place] she denies any mouth or throat swelling.  She denies any fever or chills.  She was placed on Solu-Medrol with interval improvement.  She denies any blurring of vision or restriction in eye movement.  She reports mild to moderate occasional productive cough.  She denies any chest pain.  She is on 4 L of oxygen-baseline.  She is on vancomycin and Zosyn     Past Medical History  She has a past medical history of CHF (congestive heart failure) (CMS/[facility]), COPD (chronic obstructive pulmonary disease) (CMS/[facility]), Diabetes mellitus (CMS/[facility]), Hypertension, and Stroke (CMS/[facility]).    Surgical History  She has a past surgical history that includes Back surgery; Tonsillectomy;  section, low transverse; and Cardiac catheterization (N/A, 2023).     Social History     Occupational History    Not on file   Tobacco Use    Smoking status: Every Day     Packs/day: 0.50     Years: 25.00     Additional pack years: 0.00     Total pack years: 12.50     Types: Cigarettes    Smokeless tobacco: Never   Substance and Sexual Activity    Alcohol use: Never    Drug use: Never    Sexual activity: Not on file     Travel History   Travel since 23    No documented travel since 23                Family History  No family history on file.  Allergies  Penicillins, Codeine, and Morphine     Immunization History   Administered Date(s) Administered    Moderna SARS-CoV-2 Vaccination 2021, 2021     Medications  Home medications:  Medications Prior to Admission   Medication Sig  Dispense Refill Last Dose    acetaminophen (Tylenol) 325 mg tablet Take 2 tablets (650 mg) by mouth every 6 hours if needed for mild pain (1 - 3). 30 tablet 0 12/31/2023    albuterol 90 mcg/actuation inhaler INHALE 2 PUFFS BY MOUTH EVERY 4 HOURS AS NEEDED FOR SHORTNESS OF BREATH 18 g 2 Past Week    fluticasone-umeclidin-vilanter (TRELEGY-ELLIPTA) 100-62.5-25 mcg blister with device INHALE 1 PUFF BY MOUTH ONCE DAILY. 60 each 0 1/1/2024    furosemide (Lasix) 40 mg tablet Take 1 tablet (40 mg) by mouth once daily. 30 tablet 0     gabapentin (Neurontin) 600 mg tablet Take 0.5 tablets (300 mg) by mouth 3 times a day.   1/1/2024    insulin lispro (HumaLOG) 100 unit/mL injection Inject 0-0.05 mL (0-5 Units) under the skin 3 times a day with meals. Take as directed per insulin instructions.       ipratropium-albuteroL (Duo-Neb) 0.5-2.5 mg/3 mL nebulizer solution Take 3 mL by nebulization 4 times a day.   12/31/2023    lisinopril 2.5 mg tablet Take 1 tablet (2.5 mg) by mouth once daily.       metFORMIN  mg 24 hr tablet Take 1 tablet (500 mg) by mouth once daily with breakfast.   1/1/2024    mupirocin (Bactroban) 2 % ointment APPLY TO CRUSTED AREAS ON FACE THREE TIMES A DAY UNTIL EVENING OF 9/25/23. AVOID THE EYES. 22 g 0     nystatin (Mycostatin) 100,000 unit/gram powder Apply 1 Application topically 2 times a day.   12/31/2023    oxygen (O2) gas therapy Inhale 4 L/min once every 24 hours.   1/1/2024    oxygen (O2) gas therapy Inhale 4 L/min once every 24 hours. At night BiPAP 15 over 5, 40% FiO2  During the day continuous 3 to 4 L/min oxygen   1/1/2024    pantoprazole (ProtoNix) 40 mg EC tablet Take 1 tablet (40 mg) by mouth once daily. Do not crush, chew, or split.   1/1/2024    spironolactone (Aldactone) 25 mg tablet Take 0.5 tablets (12.5 mg) by mouth once daily. Do not start before November 18, 2023. 15 tablet 0      Current medications:  Scheduled medications  enoxaparin, 40 mg, subcutaneous, q24h  tiotropium, 2  "Inhalation, inhalation, Daily   And  fluticasone furoate-vilanteroL, 1 puff, inhalation, Daily  furosemide, 40 mg, oral, Daily  gabapentin, 300 mg, oral, TID  hydrocortisone, , Topical, BID  insulin lispro, 0-5 Units, subcutaneous, TID with meals  ipratropium-albuteroL, 3 mL, nebulization, 4x daily  lisinopril, 2.5 mg, oral, Daily  [Held by provider] metFORMIN XR, 500 mg, oral, Daily with breakfast  methylPREDNISolone sodium succinate (PF), 40 mg, intravenous, q8h  nicotine, 1 patch, transdermal, Daily  oxygen, 4 L/min, inhalation, q24h  pantoprazole, 40 mg, oral, Daily  piperacillin-tazobactam, 3.375 g, intravenous, q6h  sodium chloride 0.9%, , ,   spironolactone, 12.5 mg, oral, Daily  vancomycin-diluent combo no.1, 1,250 mg, intravenous, q12h      Continuous medications     PRN medications  PRN medications: acetaminophen **OR** acetaminophen **OR** acetaminophen, albuterol, dextrose 10 % in water (D10W), dextrose, glucagon, melatonin, ondansetron **OR** ondansetron, sodium chloride 0.9%    Review of Systems   Constitutional:  Negative for chills and fever.   HENT:  Positive for facial swelling.    Respiratory:  Positive for cough.         Objective  Range of Vitals (last 24 hours)  Heart Rate:  [57-89]   Temp:  [35.6 °C (96.1 °F)-36.7 °C (98 °F)]   Resp:  [15-19]   BP: ()/(57-77)   Height:  [160 cm (5' 3\")]   Weight:  [95.1 kg (209 lb 10.5 oz)-99.8 kg (220 lb)]   SpO2:  [93 %-97 %]   Daily Weight  01/01/24 : 95.1 kg (209 lb 10.5 oz)    Body mass index is 37.14 kg/m².     Physical Exam  Constitutional:       Appearance: Normal appearance.   HENT:      Head: Normocephalic and atraumatic.      Nose: Nose normal.   Eyes:      Extraocular Movements: Extraocular movements intact.      Conjunctiva/sclera: Conjunctivae normal.   Cardiovascular:      Rate and Rhythm: Regular rhythm.      Heart sounds: Normal heart sounds.   Pulmonary:      Effort: Pulmonary effort is normal.      Breath sounds: Decreased breath " "sounds present.   Abdominal:      General: Bowel sounds are normal.      Palpations: Abdomen is soft.   Musculoskeletal:      Cervical back: Neck supple.   Skin:     Comments: Bilateral periorbital erythema   Neurological:      Mental Status: She is alert and oriented to person, place, and time. Mental status is at baseline.   Psychiatric:         Mood and Affect: Mood normal.         Behavior: Behavior normal.          Relevant Results  Outside Hospital Results    Labs  Results from last 72 hours   Lab Units 01/02/24  0743 01/01/24  1627   WBC AUTO x10*3/uL 6.4 10.4   HEMOGLOBIN g/dL 14.7 13.8   HEMATOCRIT % 51.2* 45.7   PLATELETS AUTO x10*3/uL 154 183   NEUTROS PCT AUTO %  --  66.4   LYMPHS PCT AUTO %  --  22.7   MONOS PCT AUTO %  --  4.9   EOS PCT AUTO %  --  5.1     Results from last 72 hours   Lab Units 01/02/24  0743 01/01/24  1627   SODIUM mmol/L 139 141   POTASSIUM mmol/L 4.4 3.9   CHLORIDE mmol/L 100 100   CO2 mmol/L 29 34*   BUN mg/dL 11 9   CREATININE mg/dL 0.72 0.58   GLUCOSE mg/dL 184* 115*   CALCIUM mg/dL 9.0 8.9   ANION GAP mmol/L 14 11   EGFR mL/min/1.73m*2 >90 >90     Results from last 72 hours   Lab Units 01/01/24  1627   ALK PHOS U/L 59   BILIRUBIN TOTAL mg/dL 0.4   PROTEIN TOTAL g/dL 6.2*   ALT U/L 6*   AST U/L 14   ALBUMIN g/dL 3.4     Estimated Creatinine Clearance: 85.5 mL/min (by C-G formula based on SCr of 0.72 mg/dL).  C-Reactive Protein   Date Value Ref Range Status   01/02/2024 0.66 <1.00 mg/dL Final   01/01/2024 0.71 <1.00 mg/dL Final     Sedimentation Rate   Date Value Ref Range Status   01/01/2024 27 0 - 30 mm/h Final     No results found for: \"HIV1X2\", \"HIVCONF\", \"FFIROK0CT\"  No results found for: \"HEPCABINIT\", \"HEPCAB\", \"HCVPCRQUANT\"  Microbiology  Reviewed  Imaging  CT chest wo IV contrast    Result Date: 1/2/2024  Interpreted By:  Esme Goodwin, STUDY: CT CHEST WO IV CONTRAST;  1/2/2024 8:50 am   INDICATION: Signs/Symptoms:concern for pneumonia.   COMPARISON: None.   ACCESSION " NUMBER(S): [id number]   ORDERING CLINICIAN: EMERITA JETER   TECHNIQUE: Helical data acquisition of the chest was obtained  without IV contrast material.  Images were reformatted in axial, coronal, and sagittal planes.   FINDINGS: LUNGS AND AIRWAYS: The trachea and central airways are patent. No endobronchial lesion.   Small infiltrate is present posterolateral left upper lobe consistent with a small area of pneumonia. There is mild atelectasis in the lingula in the left lower lobe. No pleural effusion is noted. Background mild centrilobular emphysema is present at the apices. 5 mm densely calcified nodule is present medial right upper lobe (image 74 of [phone]).   MEDIASTINUM AND MARISOL, LOWER NECK AND AXILLA: The visualized thyroid gland is within normal limits.   No evidence of thoracic lymphadenopathy by CT criteria.   Esophagus appears within normal limits as seen.   HEART AND VESSELS: The thoracic aorta is of normal course and caliber with minimal vascular calcifications.   Main pulmonary artery is upper normal size 2.9 cm.   Minimal coronary artery calcifications are seen. The study is not optimized for evaluation of coronary arteries.   The cardiac chambers are not enlarged.   No evidence of pericardial effusion.   UPPER ABDOMEN: Right renal stone is partially visualized with measurement up to 1.4 cm.   CHEST WALL AND OSSEOUS STRUCTURES: There are no suspicious osseous lesions. Multilevel degenerative changes are present       1.  Small infiltrate left upper lobe consistent with pneumonia 2. Partial visualization of right renal stone measuring up to 1.4 cm   MACRO: None   Signed by: Esme Goodwin 1/2/2024 10:03 AM Dictation workstation:   [id number]    CT facial bones w IV contrast    Result Date: 1/1/2024  Interpreted By:  Soto Duvall, STUDY: CT MAXILLOFACIAL BONES W IV CONTRAST  1/1/2024 5:27 pm   INDICATION: Signs/Symptoms:Facial cellulitis periorbital cellulitis puffiness and swelling of the eyes more  on the right than left   COMPARISON: None.   ACCESSION NUMBER(S): [id number]   ORDERING CLINICIAN: LUIS STORY   TECHNIQUE: Enhanced thin cut axial CT images through the facial bones were obtained and reconstructed in the coronal  and sagittal plane using 75 mL Omnipaque 350 for contrast.   FINDINGS: The preseptal periorbital soft tissues have edema/inflammation more prominent on the right with no localized collections suggesting abscess.   The intraorbital contents are normal. No mass or abnormal enhancement is noted.   The paranasal sinuses are normally aerated. The middle turbinates have gabriela bullosa bilaterally.   The upper teeth are absent. The remaining lower teeth (canines and incisors) have multiple cavities with loss of the crown of the inferior right canine tooth.         There is preseptal edema/inflammation about both orbits more so on the right no intraorbital abnormalities or localized collections suggesting abscess. No underlying sinus abnormalities are noted.   MACRO: None   Signed by: Soto Duvall 1/1/2024 5:38 PM Dictation workstation:   [id number]     Assessment/Plan   Bilateral periorbital erythema-possible adverse effect of grapes  Pneumonia-gram-positive versus gram-negative      Continue vancomycin  Continue Zosyn  Monitor response to therapy  Taper steroids  De-escalate to doxycycline and Augmentin for 7 more days  Discharge planning       Unruly Shaver MD

## 2024-01-03 VITALS
WEIGHT: 209.66 LBS | RESPIRATION RATE: 18 BRPM | HEART RATE: 73 BPM | DIASTOLIC BLOOD PRESSURE: 70 MMHG | HEIGHT: 63 IN | BODY MASS INDEX: 37.15 KG/M2 | TEMPERATURE: 97.9 F | SYSTOLIC BLOOD PRESSURE: 111 MMHG | OXYGEN SATURATION: 97 %

## 2024-01-03 DIAGNOSIS — Z78.9 DECREASED ACTIVITIES OF DAILY LIVING (ADL): ICD-10-CM

## 2024-01-03 LAB
ANION GAP SERPL CALC-SCNC: 13 MMOL/L (ref 10–20)
BUN SERPL-MCNC: 14 MG/DL (ref 6–23)
CALCIUM SERPL-MCNC: 9.1 MG/DL (ref 8.6–10.3)
CHLORIDE SERPL-SCNC: 97 MMOL/L (ref 98–107)
CO2 SERPL-SCNC: 32 MMOL/L (ref 21–32)
CREAT SERPL-MCNC: 0.71 MG/DL (ref 0.5–1.05)
ERYTHROCYTE [DISTWIDTH] IN BLOOD BY AUTOMATED COUNT: 18.6 % (ref 11.5–14.5)
GFR SERPL CREATININE-BSD FRML MDRD: >90 ML/MIN/1.73M*2
GLUCOSE BLD MANUAL STRIP-MCNC: 164 MG/DL (ref 74–99)
GLUCOSE SERPL-MCNC: 179 MG/DL (ref 74–99)
HCT VFR BLD AUTO: 42.7 % (ref 36–46)
HGB BLD-MCNC: 12.9 G/DL (ref 12–16)
LACTATE SERPL-SCNC: 1.8 MMOL/L (ref 0.4–2)
LACTATE SERPL-SCNC: 1.8 MMOL/L (ref 0.4–2)
MCH RBC QN AUTO: 29.2 PG (ref 26–34)
MCHC RBC AUTO-ENTMCNC: 30.2 G/DL (ref 32–36)
MCV RBC AUTO: 97 FL (ref 80–100)
NRBC BLD-RTO: 0 /100 WBCS (ref 0–0)
PLATELET # BLD AUTO: 180 X10*3/UL (ref 150–450)
POTASSIUM SERPL-SCNC: 4.2 MMOL/L (ref 3.5–5.3)
RBC # BLD AUTO: 4.42 X10*6/UL (ref 4–5.2)
SODIUM SERPL-SCNC: 138 MMOL/L (ref 136–145)
VANCOMYCIN SERPL-MCNC: 22.2 UG/ML (ref 5–20)
WBC # BLD AUTO: 15.7 X10*3/UL (ref 4.4–11.3)

## 2024-01-03 PROCEDURE — 82947 ASSAY GLUCOSE BLOOD QUANT: CPT | Mod: IPSPLIT

## 2024-01-03 PROCEDURE — 2500000001 HC RX 250 WO HCPCS SELF ADMINISTERED DRUGS (ALT 637 FOR MEDICARE OP): Mod: IPSPLIT

## 2024-01-03 PROCEDURE — 2500000001 HC RX 250 WO HCPCS SELF ADMINISTERED DRUGS (ALT 637 FOR MEDICARE OP): Mod: IPSPLIT | Performed by: NURSE PRACTITIONER

## 2024-01-03 PROCEDURE — 2500000005 HC RX 250 GENERAL PHARMACY W/O HCPCS: Mod: IPSPLIT | Performed by: NURSE PRACTITIONER

## 2024-01-03 PROCEDURE — 83605 ASSAY OF LACTIC ACID: CPT | Mod: IPSPLIT

## 2024-01-03 PROCEDURE — S4991 NICOTINE PATCH NONLEGEND: HCPCS | Mod: IPSPLIT | Performed by: NURSE PRACTITIONER

## 2024-01-03 PROCEDURE — 94640 AIRWAY INHALATION TREATMENT: CPT

## 2024-01-03 PROCEDURE — 2500000002 HC RX 250 W HCPCS SELF ADMINISTERED DRUGS (ALT 637 FOR MEDICARE OP, ALT 636 FOR OP/ED): Mod: IPSPLIT | Performed by: NURSE PRACTITIONER

## 2024-01-03 PROCEDURE — 94640 AIRWAY INHALATION TREATMENT: CPT | Mod: IPSPLIT

## 2024-01-03 PROCEDURE — 2500000004 HC RX 250 GENERAL PHARMACY W/ HCPCS (ALT 636 FOR OP/ED): Mod: IPSPLIT

## 2024-01-03 PROCEDURE — 2500000004 HC RX 250 GENERAL PHARMACY W/ HCPCS (ALT 636 FOR OP/ED): Mod: IPSPLIT | Performed by: NURSE PRACTITIONER

## 2024-01-03 PROCEDURE — 99231 SBSQ HOSP IP/OBS SF/LOW 25: CPT

## 2024-01-03 PROCEDURE — 80202 ASSAY OF VANCOMYCIN: CPT | Mod: IPSPLIT

## 2024-01-03 PROCEDURE — 85027 COMPLETE CBC AUTOMATED: CPT | Mod: IPSPLIT

## 2024-01-03 PROCEDURE — 36415 COLL VENOUS BLD VENIPUNCTURE: CPT | Mod: IPSPLIT

## 2024-01-03 PROCEDURE — 80048 BASIC METABOLIC PNL TOTAL CA: CPT | Mod: IPSPLIT

## 2024-01-03 RX ORDER — SPIRONOLACTONE 25 MG/1
TABLET ORAL
Qty: 45 TABLET | Refills: 0 | Status: SHIPPED | OUTPATIENT
Start: 2024-01-03 | End: 2024-04-02

## 2024-01-03 RX ORDER — VANCOMYCIN 750 MG/150ML
750 INJECTION, SOLUTION INTRAVENOUS EVERY 12 HOURS
Status: DISCONTINUED | OUTPATIENT
Start: 2024-01-03 | End: 2024-01-03 | Stop reason: HOSPADM

## 2024-01-03 RX ORDER — DOXYCYCLINE 100 MG/1
100 CAPSULE ORAL 2 TIMES DAILY
Qty: 12 CAPSULE | Refills: 0 | Status: SHIPPED | OUTPATIENT
Start: 2024-01-03 | End: 2024-01-09

## 2024-01-03 RX ORDER — METHYLPREDNISOLONE 4 MG/1
TABLET ORAL
Qty: 21 TABLET | Refills: 0 | Status: SHIPPED | OUTPATIENT
Start: 2024-01-03 | End: 2024-01-10

## 2024-01-03 RX ORDER — AMOXICILLIN AND CLAVULANATE POTASSIUM 875; 125 MG/1; MG/1
1 TABLET, FILM COATED ORAL 2 TIMES DAILY
Qty: 12 TABLET | Refills: 0 | Status: SHIPPED | OUTPATIENT
Start: 2024-01-03 | End: 2024-01-09

## 2024-01-03 RX ORDER — FUROSEMIDE 40 MG/1
40 TABLET ORAL DAILY
Qty: 30 TABLET | Refills: 0 | Status: SHIPPED | OUTPATIENT
Start: 2024-01-03 | End: 2024-02-02

## 2024-01-03 RX ORDER — SPIRONOLACTONE 25 MG/1
12.5 TABLET ORAL DAILY
Qty: 15 TABLET | Refills: 0 | Status: SHIPPED | OUTPATIENT
Start: 2024-01-03 | End: 2024-01-03

## 2024-01-03 RX ORDER — METFORMIN HYDROCHLORIDE 500 MG/1
500 TABLET, EXTENDED RELEASE ORAL
Start: 2024-01-03

## 2024-01-03 RX ADMIN — METHYLPREDNISOLONE SODIUM SUCCINATE 40 MG: 40 INJECTION, POWDER, FOR SOLUTION INTRAMUSCULAR; INTRAVENOUS at 09:21

## 2024-01-03 RX ADMIN — IPRATROPIUM BROMIDE AND ALBUTEROL SULFATE 3 ML: .5; 3 SOLUTION RESPIRATORY (INHALATION) at 10:22

## 2024-01-03 RX ADMIN — GABAPENTIN 300 MG: 300 CAPSULE ORAL at 09:15

## 2024-01-03 RX ADMIN — SPIRONOLACTONE 12.5 MG: 25 TABLET, FILM COATED ORAL at 09:15

## 2024-01-03 RX ADMIN — LISINOPRIL 2.5 MG: 2.5 TABLET ORAL at 09:15

## 2024-01-03 RX ADMIN — HYDROCORTISONE: 1 CREAM TOPICAL at 09:21

## 2024-01-03 RX ADMIN — INSULIN LISPRO 1 UNITS: 100 INJECTION, SOLUTION INTRAVENOUS; SUBCUTANEOUS at 09:18

## 2024-01-03 RX ADMIN — FLUTICASONE FUROATE AND VILANTEROL TRIFENATATE 1 PUFF: 100; 25 POWDER RESPIRATORY (INHALATION) at 10:30

## 2024-01-03 RX ADMIN — METHYLPREDNISOLONE SODIUM SUCCINATE 40 MG: 40 INJECTION, POWDER, FOR SOLUTION INTRAMUSCULAR; INTRAVENOUS at 00:45

## 2024-01-03 RX ADMIN — PIPERACILLIN SODIUM AND TAZOBACTAM SODIUM 3.38 G: 3; .375 INJECTION, SOLUTION INTRAVENOUS at 00:45

## 2024-01-03 RX ADMIN — Medication 4 L/MIN: at 10:31

## 2024-01-03 RX ADMIN — FUROSEMIDE 40 MG: 40 TABLET ORAL at 09:14

## 2024-01-03 RX ADMIN — PIPERACILLIN SODIUM AND TAZOBACTAM SODIUM 3.38 G: 3; .375 INJECTION, SOLUTION INTRAVENOUS at 06:18

## 2024-01-03 RX ADMIN — PANTOPRAZOLE SODIUM 40 MG: 40 TABLET, DELAYED RELEASE ORAL at 09:15

## 2024-01-03 RX ADMIN — TIOTROPIUM BROMIDE INHALATION SPRAY 2 PUFF: 3.12 SPRAY, METERED RESPIRATORY (INHALATION) at 10:29

## 2024-01-03 RX ADMIN — NICOTINE 1 PATCH: 14 PATCH, EXTENDED RELEASE TRANSDERMAL at 09:16

## 2024-01-03 RX ADMIN — VANCOMYCIN 750 MG: 750 INJECTION, SOLUTION INTRAVENOUS at 11:04

## 2024-01-03 ASSESSMENT — PAIN SCALES - GENERAL: PAINLEVEL_OUTOF10: 2

## 2024-01-03 ASSESSMENT — COGNITIVE AND FUNCTIONAL STATUS - GENERAL
MOBILITY SCORE: 24
DAILY ACTIVITIY SCORE: 24

## 2024-01-03 NOTE — CARE PLAN
The patient's goals for the shift include  going home    The clinical goals for the shift include Patient report pain controlled.

## 2024-01-03 NOTE — PROGRESS NOTES
Subha Miller is a 63 y.o. female on day 2 of admission presenting with Cellulitis.    Subjective   Interval History:   Afebrile, no chills  Reports feeling better  Reports occasional cough  On baseline oxygen  Denies nausea, vomiting or diarrhea  Reports facial swelling resolving        Objective   Range of Vitals (last 24 hours)  Heart Rate:  [70-81]   Temp:  [36.5 °C (97.7 °F)-36.7 °C (98.1 °F)]   Resp:  [18-19]   BP: ()/(57-70)   SpO2:  [95 %-99 %]   Daily Weight  01/01/24 : 95.1 kg (209 lb 10.5 oz)    Body mass index is 37.14 kg/m².    Physical Exam  Constitutional:       Appearance: Normal appearance.   HENT:      Head: Normocephalic and atraumatic.      Nose: Nose normal.   Eyes:      Extraocular Movements: Extraocular movements intact.      Conjunctiva/sclera: Conjunctivae normal.   Cardiovascular:      Rate and Rhythm: Regular rhythm.      Heart sounds: Normal heart sounds.   Pulmonary:      Effort: Pulmonary effort is normal.      Breath sounds: Decreased breath sounds present.   Abdominal:      General: Bowel sounds are normal.      Palpations: Abdomen is soft.   Musculoskeletal:      Cervical back: Neck supple.   Skin:     Comments: Bilateral periorbital erythema -improving  Neurological:      Mental Status: She is alert and oriented to person, place, and time. Mental status is at baseline.   Psychiatric:         Mood and Affect: Mood normal.         Behavior: Behavior normal.     Antibiotics  sodium chloride 0.9 % bolus 500 mL  vancomycin 1,500 mg in dextrose 5 % in water (D5W) 500 mL IV  cefTRIAXone (Rocephin) 2 g IV in dextrose 5% 50 mL  methylPREDNISolone sod succinate (SOLU-Medrol) injection 125 mg  diphenhydrAMINE (BENADryl) injection 50 mg  iohexol (OMNIPaque) 350 mg iodine/mL solution 75 mL  vancomycin (Vancocin) in dextrose 5 % water (D5W) 500 mL IV 1,500 mg  methylPREDNISolone sod succinate (PF) (SOLU-Medrol) 40 mg/mL injection 40 mg  sodium chloride 0.9% infusion  famotidine (Pepcid) tablet  20 mg  famotidine PF (Pepcid) injection 20 mg  acetaminophen (Tylenol) tablet 650 mg  acetaminophen (Tylenol) oral liquid 650 mg  acetaminophen (Tylenol) suppository 650 mg  ondansetron (Zofran) tablet 4 mg  ondansetron (Zofran) injection 4 mg  melatonin tablet 3 mg  enoxaparin (Lovenox) syringe 40 mg  lisinopril (Prinivil, Zestril) tablet  albuterol 90 mcg/actuation inhaler 2 puff  tiotropium (Spiriva Respimat) 2.5 mcg/actuation inhaler 2 puff  fluticasone furoate-vilanteroL (Breo Ellipta) 100-25 mcg/dose inhaler 1 puff  gabapentin (Neurontin) capsule 300 mg  ipratropium-albuteroL (Duo-Neb) 0.5-2.5 mg/3 mL nebulizer solution 3 mL  lisinopril tablet 2.5 mg  metFORMIN XR (Glucophage-XR) 24 hr tablet 500 mg  oxygen (O2) therapy  pantoprazole (ProtoNix) EC tablet 40 mg  spironolactone (Aldactone) tablet 12.5 mg  hydrocortisone 2.5 % cream 1 Application  hydrocortisone 1 % cream  nicotine (Nicoderm CQ) 14 mg/24 hr patch 1 patch  cefTRIAXone (Rocephin) 2 g IV in dextrose 5% 50 mL  piperacillin-tazobactam-dextrose (Zosyn) IV 3.375 g  sodium chloride 0.9% infusion  - Omnicell Override Pull  methylPREDNISolone sod succinate (PF) (SOLU-Medrol) 40 mg/mL injection 40 mg  methylPREDNISolone sod succinate (PF) (SOLU-Medrol) 40 mg/mL injection 40 mg  vancomycin-diluent combo no.1 (Xellia) IVPB 1,250 mg  furosemide (Lasix) tablet 40 mg  dextrose 50 % injection 25 g  glucagon (Glucagen) injection 1 mg  dextrose 10 % in water (D10W) infusion  insulin lispro (HumaLOG) injection 0-5 Units  sodium chloride 0.9 % bolus 500 mL  vancomycin-diluent combo no.1 (Xellia) IVPB 750 mg  furosemide (Lasix) tablet  metFORMIN (Glucophage-XR) 24 hr tablet  spironolactone (Aldactone) tablet  methylPREDNISolone (Medrol Dospak) tablets  doxycycline (Monodox) capsule  amoxicillin-clavulanate (Augmentin) 875-125 mg tablet, 1 BID for 10 days, #20, Ref 0      Relevant Results  Labs  Results from last 72 hours   Lab Units 01/03/24  0715 01/02/24  0743  01/01/24  1627   WBC AUTO x10*3/uL 15.7* 6.4 10.4   HEMOGLOBIN g/dL 12.9 14.7 13.8   HEMATOCRIT % 42.7 51.2* 45.7   PLATELETS AUTO x10*3/uL 180 154 183   NEUTROS PCT AUTO %  --   --  66.4   LYMPHS PCT AUTO %  --   --  22.7   MONOS PCT AUTO %  --   --  4.9   EOS PCT AUTO %  --   --  5.1     Results from last 72 hours   Lab Units 01/03/24  0714 01/02/24  0743 01/01/24  1627   SODIUM mmol/L 138 139 141   POTASSIUM mmol/L 4.2 4.4 3.9   CHLORIDE mmol/L 97* 100 100   CO2 mmol/L 32 29 34*   BUN mg/dL 14 11 9   CREATININE mg/dL 0.71 0.72 0.58   GLUCOSE mg/dL 179* 184* 115*   CALCIUM mg/dL 9.1 9.0 8.9   ANION GAP mmol/L 13 14 11   EGFR mL/min/1.73m*2 >90 >90 >90     Results from last 72 hours   Lab Units 01/01/24  1627   ALK PHOS U/L 59   BILIRUBIN TOTAL mg/dL 0.4   PROTEIN TOTAL g/dL 6.2*   ALT U/L 6*   AST U/L 14   ALBUMIN g/dL 3.4     Estimated Creatinine Clearance: 86.7 mL/min (by C-G formula based on SCr of 0.71 mg/dL).  C-Reactive Protein   Date Value Ref Range Status   01/02/2024 0.66 <1.00 mg/dL Final   01/01/2024 0.71 <1.00 mg/dL Final     Microbiology  reviewed  Imaging  CT chest wo IV contrast    Result Date: 1/2/2024  Interpreted By:  Esme Goodwin, STUDY: CT CHEST WO IV CONTRAST;  1/2/2024 8:50 am   INDICATION: Signs/Symptoms:concern for pneumonia.   COMPARISON: None.   ACCESSION NUMBER(S): [id number]   ORDERING CLINICIAN: EMERITA JETER   TECHNIQUE: Helical data acquisition of the chest was obtained  without IV contrast material.  Images were reformatted in axial, coronal, and sagittal planes.   FINDINGS: LUNGS AND AIRWAYS: The trachea and central airways are patent. No endobronchial lesion.   Small infiltrate is present posterolateral left upper lobe consistent with a small area of pneumonia. There is mild atelectasis in the lingula in the left lower lobe. No pleural effusion is noted. Background mild centrilobular emphysema is present at the apices. 5 mm densely calcified nodule is present medial right  upper lobe (image 74 of 324).   MEDIASTINUM AND MARISOL, LOWER NECK AND AXILLA: The visualized thyroid gland is within normal limits.   No evidence of thoracic lymphadenopathy by CT criteria.   Esophagus appears within normal limits as seen.   HEART AND VESSELS: The thoracic aorta is of normal course and caliber with minimal vascular calcifications.   Main pulmonary artery is upper normal size 2.9 cm.   Minimal coronary artery calcifications are seen. The study is not optimized for evaluation of coronary arteries.   The cardiac chambers are not enlarged.   No evidence of pericardial effusion.   UPPER ABDOMEN: Right renal stone is partially visualized with measurement up to 1.4 cm.   CHEST WALL AND OSSEOUS STRUCTURES: There are no suspicious osseous lesions. Multilevel degenerative changes are present       1.  Small infiltrate left upper lobe consistent with pneumonia 2. Partial visualization of right renal stone measuring up to 1.4 cm   MACRO: None   Signed by: Esme Goodwin 1/2/2024 10:03 AM Dictation workstation:   [id number]    CT facial bones w IV contrast    Result Date: 1/1/2024  Interpreted By:  Soto Duvall, STUDY: CT MAXILLOFACIAL BONES W IV CONTRAST  1/1/2024 5:27 pm   INDICATION: Signs/Symptoms:Facial cellulitis periorbital cellulitis puffiness and swelling of the eyes more on the right than left   COMPARISON: None.   ACCESSION NUMBER(S): [id number]   ORDERING CLINICIAN: LUIS STORY   TECHNIQUE: Enhanced thin cut axial CT images through the facial bones were obtained and reconstructed in the coronal  and sagittal plane using 75 mL Omnipaque 350 for contrast.   FINDINGS: The preseptal periorbital soft tissues have edema/inflammation more prominent on the right with no localized collections suggesting abscess.   The intraorbital contents are normal. No mass or abnormal enhancement is noted.   The paranasal sinuses are normally aerated. The middle turbinates have gabriela bullosa bilaterally.   The upper  teeth are absent. The remaining lower teeth (canines and incisors) have multiple cavities with loss of the crown of the inferior right canine tooth.         There is preseptal edema/inflammation about both orbits more so on the right no intraorbital abnormalities or localized collections suggesting abscess. No underlying sinus abnormalities are noted.   MACRO: None   Signed by: Soto Duvall 1/1/2024 5:38 PM Dictation workstation:   [id number]        Assessment/Plan   Bilateral periorbital erythema-possible adverse effect of grapes  Pneumonia-gram-positive versus gram-negative        Continue vancomycin  Continue Zosyn  Monitor response to therapy  Taper steroids  De-escalate to doxycycline and Augmentin for 7 more days  Discharge planning     Cristela Donnelly, APRN-CNP

## 2024-01-03 NOTE — PROGRESS NOTES
Patient medically ready for discharge.  Patient follow up information on discharge instructions.  Patient will be returning home. Patient is in agreement with discharge plan.  Ewelina Brantley RN,TCC

## 2024-01-03 NOTE — PROGRESS NOTES
"Vancomycin Dosing by Pharmacy- FOLLOW UP    Subha Miller is a 63 y.o. year old female who Pharmacy has been consulted for vancomycin dosing for cellulitis, skin and soft tissue. Based on the patient's indication and renal status this patient is being dosed based on a goal AUC of 400-600.     Renal function is currently stable.    Current vancomycin dose: 1250 mg given every 12 hours    Estimated vancomycin AUC on current dose: 727 mg/L.hr     Visit Vitals  /70 (BP Location: Left arm, Patient Position: Lying)   Pulse 71   Temp 36.6 °C (97.9 °F) (Temporal)   Resp 19        Lab Results   Component Value Date    CREATININE 0.71 01/03/2024    CREATININE 0.72 01/02/2024    CREATININE 0.58 01/01/2024    CREATININE 0.60 11/18/2023        Patient weight is No results found for: \"PTWEIGHT\"    No results found for: \"CULTURE\"     I/O last 3 completed shifts:  In: 1359.6 (14.3 mL/kg) [P.O.:720; IV Piggyback:639.6]  Out: - (0 mL/kg)   Weight: 95.1 kg   [unfilled]    Lab Results   Component Value Date    PATIENTTEMP 37.0 11/16/2023    PATIENTTEMP 37.0 11/13/2023    PATIENTTEMP 37.0 09/19/2023        Assessment/Plan    Above goal AUC. Orders placed for new vancomcyin regimen of 750 mg every 12 hours to begin at 1100.    This dosing regimen is predicted by [employer] to result in the following pharmacokinetic parameters:  Loading dose: N/A  Regimen: 750 mg IV every 12 hours.  Start time: 08:56 on 01/03/2024  Exposure target: AUC24 (range)400-600 mg/L.hr   AUC24,ss: 445 mg/L.hr  Probability of AUC24 > 400: 72 %  Ctrough,ss: 14.4 mg/L  Probability of Ctrough,ss > 20: 8 %  Probability of nephrotoxicity (Lodise ISSA 2009): 10 %    The next level will be obtained on 1/4/24 at 0500. May be obtained sooner if clinically indicated.   Will continue to monitor renal function daily while on vancomycin and order serum creatinine at least every 48 hours if not already ordered.  Follow for continued vancomycin needs, clinical response, and " signs/symptoms of toxicity.       Yvonne Owens, PharmD

## 2024-01-03 NOTE — DISCHARGE SUMMARY
Discharge Diagnosis  Cellulitis    Issues Requiring Follow-Up  Resume metformin on 1/4/24   antibiotics and medication refills from pharmacy  PCP follow up     Discharge Meds     Your medication list        START taking these medications        Instructions Last Dose Given Next Dose Due   amoxicillin-pot clavulanate 875-125 mg tablet  Commonly known as: Augmentin      Take 1 tablet (875 mg) by mouth 2 times a day for 6 days.       doxycycline 100 mg capsule  Commonly known as: Monodox      Take 1 capsule (100 mg) by mouth 2 times a day for 6 days. Take with at least 8 ounces (large glass) of water, do not lie down for 30 minutes after       methylPREDNISolone 4 mg tablets  Commonly known as: Medrol Dospak      Take as directed on package.              CHANGE how you take these medications        Instructions Last Dose Given Next Dose Due   metFORMIN  mg 24 hr tablet  Commonly known as: Glucophage-XR  What changed: additional instructions      Take 1 tablet (500 mg) by mouth once daily with breakfast. Resume on 1/4/24              CONTINUE taking these medications        Instructions Last Dose Given Next Dose Due   acetaminophen 325 mg tablet  Commonly known as: Tylenol      Take 2 tablets (650 mg) by mouth every 6 hours if needed for mild pain (1 - 3).       albuterol 90 mcg/actuation inhaler      INHALE 2 PUFFS BY MOUTH EVERY 4 HOURS AS NEEDED FOR SHORTNESS OF BREATH       furosemide 40 mg tablet  Commonly known as: Lasix      Take 1 tablet (40 mg) by mouth once daily.       gabapentin 600 mg tablet  Commonly known as: Neurontin      Take 0.5 tablets (300 mg) by mouth 3 times a day.       ipratropium-albuteroL 0.5-2.5 mg/3 mL nebulizer solution  Commonly known as: Duo-Neb      Take 3 mL by nebulization 4 times a day.       lisinopril 2.5 mg tablet           nystatin 100,000 unit/gram powder  Commonly known as: Mycostatin      Apply 1 Application topically 2 times a day.       oxygen gas  therapy  Commonly known as: O2      Inhale 4 L/min once every 24 hours.       oxygen gas therapy  Commonly known as: O2      Inhale 4 L/min once every 24 hours. At night BiPAP 15 over 5, 40% FiO2  During the day continuous 3 to 4 L/min oxygen       pantoprazole 40 mg EC tablet  Commonly known as: ProtoNix      Take 1 tablet (40 mg) by mouth once daily. Do not crush, chew, or split.       Trelegy Ellipta 100-62.5-25 mcg blister with device  Generic drug: fluticasone-umeclidin-vilanter      INHALE 1 PUFF BY MOUTH ONCE DAILY.              STOP taking these medications      insulin lispro 100 unit/mL injection  Commonly known as: HumaLOG        mupirocin 2 % ointment  Commonly known as: Bactroban               ASK your doctor about these medications        Instructions Last Dose Given Next Dose Due   spironolactone 25 mg tablet  Commonly known as: Aldactone  Ask about: Which instructions should I use?      TAKE 1/2 TABLET(12.5 MG) BY MOUTH EVERY DAY                 Where to Get Your Medications        These medications were sent to [facility] #[id number] - [address] AT [address] [address]      Phone: [phone]   amoxicillin-pot clavulanate 875-125 mg tablet  doxycycline 100 mg capsule  furosemide 40 mg tablet  methylPREDNISolone 4 mg tablets  spironolactone 25 mg tablet       Information about where to get these medications is not yet available    Ask your nurse or doctor about these medications  metFORMIN  mg 24 hr tablet         Test Results Pending At Discharge  Pending Labs       Order Current Status    Staphylococcus aureus/MRSA colonization, Culture In process    Blood Culture Preliminary result    Blood Culture Preliminary result            Hospital Course   Subha Miller is a 63 y.o. female presented to [facility] ED with  complaint of puffiness redness swelling around periorbital area and now spread to the perinasal and perilabial area  anterior part of the face started 2 days ago. She states she has not change laundry detergents, cleaning chemicals, new food or recent travel.  In ED patient received  Vanco IV .  On exam patient resting in bed. Alert x 4. She c/o extreme pruritus. Received Sol-medrol in ED. Ordered Hydrocortisone 2.5 % apply daily to face.   Patient wear 4 L Oxygen 24/7 at home. Patient denies SOB Pain CP N/V/D.     #Facial cellulitis vs. Allergic dermatitis   - Patient has history of similar symptoms ~ 5 years ago  - WBC 10.4 > 15.7 (on steroids)   - Lactate 1.4 > 3.4 > 4.8 > 1.8 with IVF bolus   - ESR 27, CRP 0.71  - BCx pending  - Group A strep, flu A/B, COVID negative  - CT facial bones with contrast: There is preseptal edema/inflammation about both orbits more so on the right no intraorbital abnormalities or localized collections suggesting abscess. No underlying sinus abnormalities are noted.   - Patient received Benadryl 50 mg IVP x 1 dose and a 500 ml NS bolus in ED   - Continue hydrocortisone cream BID  - Continue Vancomycin and Zosyn- started in ED   - Continue Solumedrol 40 mg IVP- frequency increased to q8h due to wheezing on exam   - ID consulted, recommend discharge on doxycycline and Augmentin x 7 days      #COPD, in acute exacerbation 2/2 pneumonia, community acquired   #Chronic respiratory failure  #Tobacco use  - Wheezing and rhonchi noted on exam; patient reports sinus congestion  - Procal 0.02  - CT chest: Small infiltrate left upper lobe consistent with pneumonia   - Continue Vancomycin and Zosyn for possible cellulitis  - Solumedrol 40 mg IVP q8h   - RT eval/treat protocol   - DuoNebs QID   - Continue Spiriva and Breo- pharmacy substitute for home Trelegy  - Nicotine patch ordered; encourage cessation on discharge   - Remains on baseline 4L O2 continuously     #T2DM with neuropathy  - Continue home gabapentin   - Metformin held; patient received IV contrast for CT facial bones   - SSI three times a day before  meals added while on steroids   - Hypoglycemia protocol  - Accuchecks ac/hs/prn  - Diabetic diet   - HbA1c 6.4 in September 2023     #HTN  #HFpEF, not in acute exacerbation  - Echo from September 2023 reviewed, LVEF 65-70% with diastolic dysfunction   - Continue home furosemide, lisinopril, and spironolactone  - Strict I&Os  - Daily weights  - 2g Na diet  - Monitor volume status closely, remains on baseline O2   - Monitor HR and BP      #GERD  - Continue PPI      DVT PPx: Lovenox   GI PPx: Protonix   Diet: Diabetic, 2g Na   Code Status: Full code     Disposition: Patient stable for discharge home. Facial erythema and edema have improved significantly. She was seen by ID and will be discharged on doxycycline and Augmentin to complete a total of 7 days of treatment. She is discharged on a steroid taper pack for pneumonia. She has instructions to resume metformin on 1/4/24. Refills for spironolactone and furosemide sent to pharmacy per patient request. She will follow up with her PCP on discharge.     Total cumulative time spent in preparation of this discharge including documentation review, coordination of care with the medical team including PT/SW/care coordinators and treating consultants, discussion with patient and pertinent family members and finalization of prescriptions, follow-up appointments, and this discharge summary was approximately 45 minutes.     Pertinent Physical Exam At Time of Discharge  Physical Exam  Constitutional:       General: She is not in acute distress.     Appearance: She is not toxic-appearing.   HENT:      Head: Normocephalic and atraumatic.      Mouth/Throat:      Mouth: Mucous membranes are moist.   Eyes:      Conjunctiva/sclera: Conjunctivae normal.   Cardiovascular:      Rate and Rhythm: Normal rate and regular rhythm.   Pulmonary:      Effort: No respiratory distress.      Breath sounds: Wheezing and rhonchi present.   Abdominal:      General: There is no distension.       Palpations: Abdomen is soft.      Tenderness: There is no abdominal tenderness.   Musculoskeletal:         General: No swelling.   Skin:     General: Skin is warm and dry.      Findings: Erythema present.      Comments: Periorbital edema and erythema, improving   Neurological:      Mental Status: She is alert and oriented to person, place, and time.   Psychiatric:         Mood and Affect: Mood normal.         Behavior: Behavior normal.         Outpatient Follow-Up  No future appointments.      Mayte Theodore PA-C

## 2024-01-04 LAB — STAPHYLOCOCCUS SPEC CULT: NORMAL

## 2024-01-04 RX ORDER — CEPHALEXIN 500 MG/1
500 CAPSULE ORAL 3 TIMES DAILY
Qty: 30 CAPSULE | Refills: 0 | Status: SHIPPED | OUTPATIENT
Start: 2024-01-04 | End: 2024-01-14

## 2024-01-06 LAB
BACTERIA BLD CULT: NORMAL
BACTERIA BLD CULT: NORMAL

## 2024-01-15 ENCOUNTER — PATIENT OUTREACH (OUTPATIENT)
Dept: CASE MANAGEMENT | Facility: HOSPITAL | Age: 64
End: 2024-01-15
Payer: COMMERCIAL

## 2024-01-15 NOTE — PROGRESS NOTES
Follow up phone call made by CHF Clinical Nurse Navigator. No answer, voice box was full. Closed HF case today. I have been following Subha since 11/18/23. She has only answered 1 HF follow up call. Subha was admitted to [facility](1/1/24-1/3/24), Dx Cellulitis.

## 2024-03-12 VITALS
OXYGEN SATURATION: 98 % | TEMPERATURE: 98.5 F | DIASTOLIC BLOOD PRESSURE: 60 MMHG | RESPIRATION RATE: 18 BRPM | HEART RATE: 75 BPM | SYSTOLIC BLOOD PRESSURE: 123 MMHG

## 2024-09-18 ENCOUNTER — HOSPITAL ENCOUNTER (EMERGENCY)
Facility: HOSPITAL | Age: 64
Discharge: HOME | End: 2024-09-18
Payer: COMMERCIAL

## 2024-09-18 VITALS
HEART RATE: 87 BPM | SYSTOLIC BLOOD PRESSURE: 103 MMHG | TEMPERATURE: 98 F | DIASTOLIC BLOOD PRESSURE: 59 MMHG | RESPIRATION RATE: 16 BRPM | WEIGHT: 220 LBS | HEIGHT: 63 IN | BODY MASS INDEX: 38.98 KG/M2 | OXYGEN SATURATION: 93 %

## 2024-09-18 DIAGNOSIS — R11.2 NAUSEA AND VOMITING, UNSPECIFIED VOMITING TYPE: ICD-10-CM

## 2024-09-18 DIAGNOSIS — R21 FACIAL RASH: Primary | ICD-10-CM

## 2024-09-18 DIAGNOSIS — Z76.0 MEDICATION REFILL: ICD-10-CM

## 2024-09-18 LAB
APPEARANCE UR: CLEAR
BILIRUB UR STRIP.AUTO-MCNC: NEGATIVE MG/DL
COLOR UR: NORMAL
GLUCOSE UR STRIP.AUTO-MCNC: NORMAL MG/DL
KETONES UR STRIP.AUTO-MCNC: NEGATIVE MG/DL
LEUKOCYTE ESTERASE UR QL STRIP.AUTO: NEGATIVE
NITRITE UR QL STRIP.AUTO: NEGATIVE
PH UR STRIP.AUTO: 5.5 [PH]
PROT UR STRIP.AUTO-MCNC: NEGATIVE MG/DL
RBC # UR STRIP.AUTO: NEGATIVE /UL
SARS-COV-2 RNA RESP QL NAA+PROBE: NOT DETECTED
SP GR UR STRIP.AUTO: 1.01
UROBILINOGEN UR STRIP.AUTO-MCNC: NORMAL MG/DL

## 2024-09-18 PROCEDURE — 99283 EMERGENCY DEPT VISIT LOW MDM: CPT

## 2024-09-18 PROCEDURE — 87635 SARS-COV-2 COVID-19 AMP PRB: CPT | Performed by: HEALTH CARE PROVIDER

## 2024-09-18 PROCEDURE — 81003 URINALYSIS AUTO W/O SCOPE: CPT | Performed by: HEALTH CARE PROVIDER

## 2024-09-18 RX ORDER — MUPIROCIN 20 MG/G
OINTMENT TOPICAL
Qty: 15 G | Refills: 0 | Status: SHIPPED | OUTPATIENT
Start: 2024-09-18 | End: 2024-09-28

## 2024-09-18 RX ORDER — ALBUTEROL SULFATE 90 UG/1
2 INHALANT RESPIRATORY (INHALATION) EVERY 4 HOURS PRN
Qty: 18 G | Refills: 0 | Status: SHIPPED | OUTPATIENT
Start: 2024-09-18 | End: 2024-10-18

## 2024-09-18 ASSESSMENT — PAIN - FUNCTIONAL ASSESSMENT: PAIN_FUNCTIONAL_ASSESSMENT: 0-10

## 2024-09-18 ASSESSMENT — PAIN SCALES - GENERAL
PAINLEVEL_OUTOF10: 0 - NO PAIN
PAINLEVEL_OUTOF10: 0 - NO PAIN

## 2024-09-18 NOTE — ED TRIAGE NOTES
"Pt is wheeled in on wheel chair as she uses it to get around daily, states complaints of nausea/vomit, her \"brain feels bad,\" hasn't eaten in 3 days. 0/10 pain with extensive medical history.  "

## 2024-09-18 NOTE — ED PROVIDER NOTES
"HPI   Chief Complaint   Patient presents with    Flu Symptoms     Pt is wheeled in on wheel chair as she uses it to get around daily, states complaints of nausea/vomit, her \"brain feels bad,\" hasn't eaten in 3 days. 0/10 pain with extensive medical history.       CC: Multiple medical complaints  HPI:   64-year-old female presents ED complaining of flulike symptoms, she notes having couple of episodes of nausea vomiting, currently denies any nausea, and is currently eating a cracker, patient denies any fever, chills, denies having any hemoptysis, hematemesis, hematochezia or melanotic stools.  Patient also notes rash on her face that she has been treating with topical steroid for several days now.  Patient states she had an infection previously and notes face has become itchy, she denies any pain to her face.    Additional Limitations to History:   External Records Reviewed: I reviewed recent and relevant outside records including   History Obtained From:     Past Medical History: Per HPI  Medications: Reviewed in EMR and with patient  Allergies:  Reviewed in EMR  Past Surgical History:   Social History:     ------------------------------------------------------------------------------------------------------  Physical Exam:  --Vital signs reviewed in nursing triage note, EMR flow sheets, and at patient's bedside  GEN:  A&Ox3, no acute distress, appears comfortable.  Conversational and appropriate.  No confusion or gross mental status changes.  EYES: EOMI, non-injected sclera.  ENT: Moist mucous membranes, no apparent injuries or lesions.   CARDIO: Normal rate and regular rhythm. No murmurs, rubs, or gallops.  2+ equal pulses of the distal extremities.   PULM: Clear to auscultation bilaterally. No rales, rhonchi, or wheezes. Good symmetric chest expansion.  GI: Soft, non-tender, non-distended. No rebound tenderness or guarding.  SKIN: Warm and dry, patient has a diffuse erythematous nontender rash over the forehead " nose bilateral cheeks, no obvious soft tissue swelling, dry  MSK: ROM intact the extremities without contractures.   EXT: No peripheral edema, contusions, or wounds.   NEURO: Cranial nerves II-XII grossly intact. Sensation to light touch intact and equal bilaterally in upper and lower extremities.  Symmetric 5/5 strength in upper and lower extremities.  PSYCH: Appropriate mood and behavior, converses and responds appropriately during exam.  -------------------------------------------------------------------------------------------------------        Differential Diagnoses Considered:   Chronic Medical Conditions Significantly Affecting Care:   Diagnostic testing considered: [PERC, D-Dimer, PECARN, etc.]      - I independently interpreted: [CXR, CT, POCUS, etc. including your interpretation]  - Labs notable for     Escalation of Care: Appropriate for  Social Determinants of Health Significantly Affecting Care: [Homelessness, lacking transportation, uninsured, unable to afford medications]  Prescription Drug Consideration: [Antibiotics, antivirals, pain medications, etc.]  Discussion of Management with Other Providers:  I discussed the patient/results with: [admitting team, consultant, radiologist, social work, EPAT, case management, PT/OT, RT, PCP, etc.]      Ger Mullins PA-C              Patient History   Past Medical History:   Diagnosis Date    CHF (congestive heart failure) (Multi)     COPD (chronic obstructive pulmonary disease) (Multi)     Diabetes mellitus (Multi)     Hypertension     Stroke (Multi)      Past Surgical History:   Procedure Laterality Date    BACK SURGERY      CARDIAC CATHETERIZATION N/A 2023    Procedure: Left Heart Cath;  Surgeon: Andria Campo MD;  Location: [facility] Cardiac Cath Lab;  Service: Cardiovascular;  Laterality: N/A;     SECTION, LOW TRANSVERSE      TONSILLECTOMY       No family history on file.  Social History     Tobacco Use    Smoking status: Every Day     Current  packs/day: 0.50     Average packs/day: 0.5 packs/day for 25.0 years (12.5 ttl pk-yrs)     Types: Cigarettes    Smokeless tobacco: Never   Vaping Use    Vaping status: Never Used   Substance Use Topics    Alcohol use: Never    Drug use: Never       Physical Exam   ED Triage Vitals [09/18/24 1725]   Temperature Heart Rate Respirations BP   36.7 °C (98 °F) 93 14 111/64      Pulse Ox Temp Source Heart Rate Source Patient Position   94 % Tympanic -- Sitting      BP Location FiO2 (%)     Left arm --       Physical Exam      ED Course & MDM   Diagnoses as of 09/19/24 2328   Facial rash   Nausea and vomiting, unspecified vomiting type   Medication refill                 No data recorded                                 Medical Decision Making  64-year-old female with diffuse rash to her face possibly secondary to overuse of her hydrocortisone topical cream, this does not appear to be cellulitis, she has no pain with palpation to the face, there does not appear to be any obvious soft tissue swelling.  She also requested her refill on her inhaler.  Negative COVID, urinalysis appears unremarkable.  Advised outpatient follow-up with her PCP and dermatologist        Procedure  Procedures     Ger Mullins PA-C  09/18/24 1806       Ger Mullins PA-C  09/19/24 2332       Ger Mullins PA-C  09/19/24 2338

## 2024-09-19 LAB — HOLD SPECIMEN: NORMAL

## 2024-10-03 ENCOUNTER — HOSPITAL ENCOUNTER (EMERGENCY)
Facility: HOSPITAL | Age: 64
Discharge: HOME | End: 2024-10-03
Payer: COMMERCIAL

## 2024-10-03 ENCOUNTER — APPOINTMENT (OUTPATIENT)
Dept: CARDIOLOGY | Facility: HOSPITAL | Age: 64
End: 2024-10-03
Payer: COMMERCIAL

## 2024-10-03 ENCOUNTER — APPOINTMENT (OUTPATIENT)
Dept: RADIOLOGY | Facility: HOSPITAL | Age: 64
End: 2024-10-03
Payer: COMMERCIAL

## 2024-10-03 VITALS
OXYGEN SATURATION: 92 % | DIASTOLIC BLOOD PRESSURE: 126 MMHG | HEART RATE: 90 BPM | HEIGHT: 65 IN | WEIGHT: 220 LBS | SYSTOLIC BLOOD PRESSURE: 146 MMHG | TEMPERATURE: 98.1 F | BODY MASS INDEX: 36.65 KG/M2 | RESPIRATION RATE: 18 BRPM

## 2024-10-03 DIAGNOSIS — R06.02 SHORTNESS OF BREATH: ICD-10-CM

## 2024-10-03 DIAGNOSIS — J44.1 COPD EXACERBATION (MULTI): Primary | ICD-10-CM

## 2024-10-03 LAB
ALBUMIN SERPL BCP-MCNC: 3.6 G/DL (ref 3.4–5)
ALP SERPL-CCNC: 61 U/L (ref 33–136)
ALT SERPL W P-5'-P-CCNC: 6 U/L (ref 7–45)
ANION GAP SERPL CALCULATED.3IONS-SCNC: 9 MMOL/L (ref 10–20)
APPEARANCE UR: CLEAR
AST SERPL W P-5'-P-CCNC: 16 U/L (ref 9–39)
BASOPHILS # BLD AUTO: 0.06 X10*3/UL (ref 0–0.1)
BASOPHILS NFR BLD AUTO: 0.7 %
BILIRUB SERPL-MCNC: 0.5 MG/DL (ref 0–1.2)
BILIRUB UR STRIP.AUTO-MCNC: NEGATIVE MG/DL
BNP SERPL-MCNC: 61 PG/ML (ref 0–99)
BUN SERPL-MCNC: 10 MG/DL (ref 6–23)
CALCIUM SERPL-MCNC: 9.2 MG/DL (ref 8.6–10.3)
CARDIAC TROPONIN I PNL SERPL HS: 12 NG/L (ref 0–13)
CARDIAC TROPONIN I PNL SERPL HS: 12 NG/L (ref 0–13)
CHLORIDE SERPL-SCNC: 99 MMOL/L (ref 98–107)
CO2 SERPL-SCNC: 35 MMOL/L (ref 21–32)
COLOR UR: YELLOW
CREAT SERPL-MCNC: 0.58 MG/DL (ref 0.5–1.05)
EGFRCR SERPLBLD CKD-EPI 2021: >90 ML/MIN/1.73M*2
EOSINOPHIL # BLD AUTO: 0.31 X10*3/UL (ref 0–0.7)
EOSINOPHIL NFR BLD AUTO: 3.4 %
ERYTHROCYTE [DISTWIDTH] IN BLOOD BY AUTOMATED COUNT: 16.1 % (ref 11.5–14.5)
FLUAV RNA RESP QL NAA+PROBE: NOT DETECTED
FLUBV RNA RESP QL NAA+PROBE: NOT DETECTED
GLUCOSE SERPL-MCNC: 107 MG/DL (ref 74–99)
GLUCOSE UR STRIP.AUTO-MCNC: NORMAL MG/DL
HCT VFR BLD AUTO: 54.2 % (ref 36–46)
HGB BLD-MCNC: 16.7 G/DL (ref 12–16)
IMM GRANULOCYTES # BLD AUTO: 0.03 X10*3/UL (ref 0–0.7)
IMM GRANULOCYTES NFR BLD AUTO: 0.3 % (ref 0–0.9)
KETONES UR STRIP.AUTO-MCNC: NEGATIVE MG/DL
LEUKOCYTE ESTERASE UR QL STRIP.AUTO: NEGATIVE
LIPASE SERPL-CCNC: 30 U/L (ref 9–82)
LYMPHOCYTES # BLD AUTO: 2.06 X10*3/UL (ref 1.2–4.8)
LYMPHOCYTES NFR BLD AUTO: 22.9 %
MAGNESIUM SERPL-MCNC: 1.69 MG/DL (ref 1.6–2.4)
MCH RBC QN AUTO: 30.3 PG (ref 26–34)
MCHC RBC AUTO-ENTMCNC: 30.8 G/DL (ref 32–36)
MCV RBC AUTO: 98 FL (ref 80–100)
MONOCYTES # BLD AUTO: 0.45 X10*3/UL (ref 0.1–1)
MONOCYTES NFR BLD AUTO: 5 %
NEUTROPHILS # BLD AUTO: 6.08 X10*3/UL (ref 1.2–7.7)
NEUTROPHILS NFR BLD AUTO: 67.7 %
NITRITE UR QL STRIP.AUTO: NEGATIVE
NRBC BLD-RTO: 0 /100 WBCS (ref 0–0)
PH UR STRIP.AUTO: 6 [PH]
PHOSPHATE SERPL-MCNC: 4.4 MG/DL (ref 2.5–4.9)
PLATELET # BLD AUTO: 157 X10*3/UL (ref 150–450)
POTASSIUM SERPL-SCNC: 4.2 MMOL/L (ref 3.5–5.3)
PROT SERPL-MCNC: 6.9 G/DL (ref 6.4–8.2)
PROT UR STRIP.AUTO-MCNC: NEGATIVE MG/DL
RBC # BLD AUTO: 5.51 X10*6/UL (ref 4–5.2)
RBC # UR STRIP.AUTO: NEGATIVE /UL
SARS-COV-2 RNA RESP QL NAA+PROBE: NOT DETECTED
SODIUM SERPL-SCNC: 139 MMOL/L (ref 136–145)
SP GR UR STRIP.AUTO: 1.02
UROBILINOGEN UR STRIP.AUTO-MCNC: NORMAL MG/DL
WBC # BLD AUTO: 9 X10*3/UL (ref 4.4–11.3)

## 2024-10-03 PROCEDURE — 99284 EMERGENCY DEPT VISIT MOD MDM: CPT | Mod: 25

## 2024-10-03 PROCEDURE — 83880 ASSAY OF NATRIURETIC PEPTIDE: CPT

## 2024-10-03 PROCEDURE — 94640 AIRWAY INHALATION TREATMENT: CPT

## 2024-10-03 PROCEDURE — 2500000002 HC RX 250 W HCPCS SELF ADMINISTERED DRUGS (ALT 637 FOR MEDICARE OP, ALT 636 FOR OP/ED)

## 2024-10-03 PROCEDURE — 84484 ASSAY OF TROPONIN QUANT: CPT

## 2024-10-03 PROCEDURE — 80053 COMPREHEN METABOLIC PANEL: CPT

## 2024-10-03 PROCEDURE — 96374 THER/PROPH/DIAG INJ IV PUSH: CPT

## 2024-10-03 PROCEDURE — 36415 COLL VENOUS BLD VENIPUNCTURE: CPT

## 2024-10-03 PROCEDURE — 71046 X-RAY EXAM CHEST 2 VIEWS: CPT

## 2024-10-03 PROCEDURE — 71046 X-RAY EXAM CHEST 2 VIEWS: CPT | Performed by: RADIOLOGY

## 2024-10-03 PROCEDURE — 81003 URINALYSIS AUTO W/O SCOPE: CPT

## 2024-10-03 PROCEDURE — 93005 ELECTROCARDIOGRAM TRACING: CPT

## 2024-10-03 PROCEDURE — 2500000004 HC RX 250 GENERAL PHARMACY W/ HCPCS (ALT 636 FOR OP/ED)

## 2024-10-03 PROCEDURE — 84100 ASSAY OF PHOSPHORUS: CPT

## 2024-10-03 PROCEDURE — 85025 COMPLETE CBC W/AUTO DIFF WBC: CPT

## 2024-10-03 PROCEDURE — 87635 SARS-COV-2 COVID-19 AMP PRB: CPT

## 2024-10-03 PROCEDURE — 87636 SARSCOV2 & INF A&B AMP PRB: CPT

## 2024-10-03 PROCEDURE — 83735 ASSAY OF MAGNESIUM: CPT

## 2024-10-03 PROCEDURE — 83690 ASSAY OF LIPASE: CPT

## 2024-10-03 RX ORDER — ONDANSETRON HYDROCHLORIDE 2 MG/ML
4 INJECTION, SOLUTION INTRAVENOUS ONCE
Status: DISCONTINUED | OUTPATIENT
Start: 2024-10-03 | End: 2024-10-03 | Stop reason: HOSPADM

## 2024-10-03 RX ORDER — PREDNISONE 50 MG/1
50 TABLET ORAL DAILY
Qty: 4 TABLET | Refills: 0 | Status: SHIPPED | OUTPATIENT
Start: 2024-10-03 | End: 2024-10-07

## 2024-10-03 RX ORDER — PANTOPRAZOLE SODIUM 40 MG/10ML
80 INJECTION, POWDER, LYOPHILIZED, FOR SOLUTION INTRAVENOUS DAILY
Status: DISCONTINUED | OUTPATIENT
Start: 2024-10-03 | End: 2024-10-03 | Stop reason: ENTERED-IN-ERROR

## 2024-10-03 RX ORDER — IPRATROPIUM BROMIDE AND ALBUTEROL SULFATE 2.5; .5 MG/3ML; MG/3ML
3 SOLUTION RESPIRATORY (INHALATION) ONCE
Status: COMPLETED | OUTPATIENT
Start: 2024-10-03 | End: 2024-10-03

## 2024-10-03 ASSESSMENT — PAIN - FUNCTIONAL ASSESSMENT: PAIN_FUNCTIONAL_ASSESSMENT: 0-10

## 2024-10-03 ASSESSMENT — PAIN SCALES - GENERAL
PAINLEVEL_OUTOF10: 7
PAINLEVEL_OUTOF10: 7

## 2024-10-03 ASSESSMENT — PAIN DESCRIPTION - LOCATION: LOCATION: CHEST

## 2024-10-03 ASSESSMENT — PAIN DESCRIPTION - ORIENTATION: ORIENTATION: MID

## 2024-10-03 ASSESSMENT — PAIN DESCRIPTION - PAIN TYPE: TYPE: ACUTE PAIN

## 2024-10-03 NOTE — DISCHARGE INSTRUCTIONS
You are seen today for COPD exacerbation, your lab work and imaging are reassuring, I will give you 4 days of steroid, please take as directed.

## 2024-10-08 LAB
ATRIAL RATE: 91 BPM
P AXIS: 77 DEGREES
P OFFSET: 185 MS
P ONSET: 136 MS
PR INTERVAL: 170 MS
Q ONSET: 221 MS
QRS COUNT: 15 BEATS
QRS DURATION: 82 MS
QT INTERVAL: 356 MS
QTC CALCULATION(BAZETT): 437 MS
QTC FREDERICIA: 409 MS
R AXIS: 97 DEGREES
T AXIS: 81 DEGREES
T OFFSET: 399 MS
VENTRICULAR RATE: 91 BPM

## 2025-02-01 ENCOUNTER — HOSPITAL ENCOUNTER (EMERGENCY)
Facility: HOSPITAL | Age: 65
Discharge: OTHER INSTITUTION | End: 2025-02-02
Attending: STUDENT IN AN ORGANIZED HEALTH CARE EDUCATION/TRAINING PROGRAM
Payer: COMMERCIAL

## 2025-02-01 ENCOUNTER — APPOINTMENT (OUTPATIENT)
Dept: RADIOLOGY | Facility: HOSPITAL | Age: 65
End: 2025-02-01
Payer: COMMERCIAL

## 2025-02-01 ENCOUNTER — APPOINTMENT (OUTPATIENT)
Dept: CARDIOLOGY | Facility: HOSPITAL | Age: 65
End: 2025-02-01
Payer: COMMERCIAL

## 2025-02-01 VITALS
HEIGHT: 63 IN | OXYGEN SATURATION: 91 % | TEMPERATURE: 98.1 F | DIASTOLIC BLOOD PRESSURE: 119 MMHG | BODY MASS INDEX: 38.98 KG/M2 | RESPIRATION RATE: 18 BRPM | HEART RATE: 89 BPM | WEIGHT: 220 LBS | SYSTOLIC BLOOD PRESSURE: 151 MMHG

## 2025-02-01 DIAGNOSIS — J96.21 ACUTE ON CHRONIC HYPOXIC RESPIRATORY FAILURE (MULTI): ICD-10-CM

## 2025-02-01 DIAGNOSIS — J18.9 PNEUMONIA OF LEFT LUNG DUE TO INFECTIOUS ORGANISM, UNSPECIFIED PART OF LUNG: Primary | ICD-10-CM

## 2025-02-01 LAB
ALBUMIN SERPL BCP-MCNC: 4.1 G/DL (ref 3.4–5)
ALP SERPL-CCNC: 62 U/L (ref 33–136)
ALT SERPL W P-5'-P-CCNC: 7 U/L (ref 7–45)
ANION GAP BLDA CALCULATED.4IONS-SCNC: 4 MMO/L (ref 10–25)
ANION GAP SERPL CALCULATED.3IONS-SCNC: 13 MMOL/L (ref 10–20)
APPARATUS: ABNORMAL
ARTERIAL PATENCY WRIST A: POSITIVE
AST SERPL W P-5'-P-CCNC: 15 U/L (ref 9–39)
BASE EXCESS BLDA CALC-SCNC: 8.6 MMOL/L (ref -2–3)
BASOPHILS # BLD AUTO: 0.04 X10*3/UL (ref 0–0.1)
BASOPHILS NFR BLD AUTO: 0.4 %
BILIRUB SERPL-MCNC: 0.6 MG/DL (ref 0–1.2)
BNP SERPL-MCNC: 136 PG/ML (ref 0–99)
BODY TEMPERATURE: 37 DEGREES CELSIUS
BUN SERPL-MCNC: 12 MG/DL (ref 6–23)
CA-I BLDA-SCNC: 1.22 MMOL/L (ref 1.1–1.33)
CALCIUM SERPL-MCNC: 9.8 MG/DL (ref 8.6–10.3)
CARDIAC TROPONIN I PNL SERPL HS: 24 NG/L (ref 0–13)
CARDIAC TROPONIN I PNL SERPL HS: 24 NG/L (ref 0–13)
CHLORIDE BLDA-SCNC: 98 MMOL/L (ref 98–107)
CHLORIDE SERPL-SCNC: 94 MMOL/L (ref 98–107)
CO2 SERPL-SCNC: 35 MMOL/L (ref 21–32)
CREAT SERPL-MCNC: 0.57 MG/DL (ref 0.5–1.05)
EGFRCR SERPLBLD CKD-EPI 2021: >90 ML/MIN/1.73M*2
EOSINOPHIL # BLD AUTO: 0.22 X10*3/UL (ref 0–0.7)
EOSINOPHIL NFR BLD AUTO: 2.2 %
ERYTHROCYTE [DISTWIDTH] IN BLOOD BY AUTOMATED COUNT: 15.1 % (ref 11.5–14.5)
FLOW: 8 LPM
FLUAV RNA RESP QL NAA+PROBE: NOT DETECTED
FLUBV RNA RESP QL NAA+PROBE: NOT DETECTED
GLUCOSE BLDA-MCNC: 127 MG/DL (ref 74–99)
GLUCOSE SERPL-MCNC: 127 MG/DL (ref 74–99)
HCO3 BLDA-SCNC: 37.3 MMOL/L (ref 22–26)
HCT VFR BLD AUTO: 56.9 % (ref 36–46)
HCT VFR BLD EST: 51 % (ref 36–46)
HGB BLD-MCNC: 17.8 G/DL (ref 12–16)
HGB BLDA-MCNC: 16.9 G/DL (ref 12–16)
IMM GRANULOCYTES # BLD AUTO: 0.03 X10*3/UL (ref 0–0.7)
IMM GRANULOCYTES NFR BLD AUTO: 0.3 % (ref 0–0.9)
INHALED O2 CONCENTRATION: 52 %
LACTATE BLDA-SCNC: 0.8 MMOL/L (ref 0.4–2)
LYMPHOCYTES # BLD AUTO: 1.59 X10*3/UL (ref 1.2–4.8)
LYMPHOCYTES NFR BLD AUTO: 15.6 %
MAGNESIUM SERPL-MCNC: 1.56 MG/DL (ref 1.6–2.4)
MCH RBC QN AUTO: 32.5 PG (ref 26–34)
MCHC RBC AUTO-ENTMCNC: 31.3 G/DL (ref 32–36)
MCV RBC AUTO: 104 FL (ref 80–100)
MONOCYTES # BLD AUTO: 0.52 X10*3/UL (ref 0.1–1)
MONOCYTES NFR BLD AUTO: 5.1 %
NEUTROPHILS # BLD AUTO: 7.76 X10*3/UL (ref 1.2–7.7)
NEUTROPHILS NFR BLD AUTO: 76.4 %
NRBC BLD-RTO: 0 /100 WBCS (ref 0–0)
OXYHGB MFR BLDA: 87 % (ref 94–98)
PCO2 BLDA: 66 MM HG (ref 38–42)
PH BLDA: 7.36 PH (ref 7.38–7.42)
PLATELET # BLD AUTO: 151 X10*3/UL (ref 150–450)
PO2 BLDA: 79 MM HG (ref 85–95)
POTASSIUM BLDA-SCNC: 3.9 MMOL/L (ref 3.5–5.3)
POTASSIUM SERPL-SCNC: 3.6 MMOL/L (ref 3.5–5.3)
PROT SERPL-MCNC: 7.7 G/DL (ref 6.4–8.2)
RBC # BLD AUTO: 5.48 X10*6/UL (ref 4–5.2)
RSV RNA RESP QL NAA+PROBE: NOT DETECTED
SAO2 % BLDA: 97 % (ref 94–100)
SARS-COV-2 RNA RESP QL NAA+PROBE: NOT DETECTED
SODIUM BLDA-SCNC: 135 MMOL/L (ref 136–145)
SODIUM SERPL-SCNC: 138 MMOL/L (ref 136–145)
SPECIMEN DRAWN FROM PATIENT: ABNORMAL
WBC # BLD AUTO: 10.2 X10*3/UL (ref 4.4–11.3)

## 2025-02-01 PROCEDURE — 84484 ASSAY OF TROPONIN QUANT: CPT

## 2025-02-01 PROCEDURE — 36415 COLL VENOUS BLD VENIPUNCTURE: CPT

## 2025-02-01 PROCEDURE — 2500000004 HC RX 250 GENERAL PHARMACY W/ HCPCS (ALT 636 FOR OP/ED): Performed by: STUDENT IN AN ORGANIZED HEALTH CARE EDUCATION/TRAINING PROGRAM

## 2025-02-01 PROCEDURE — 2500000004 HC RX 250 GENERAL PHARMACY W/ HCPCS (ALT 636 FOR OP/ED)

## 2025-02-01 PROCEDURE — 96375 TX/PRO/DX INJ NEW DRUG ADDON: CPT

## 2025-02-01 PROCEDURE — 93005 ELECTROCARDIOGRAM TRACING: CPT

## 2025-02-01 PROCEDURE — 71260 CT THORAX DX C+: CPT

## 2025-02-01 PROCEDURE — 71045 X-RAY EXAM CHEST 1 VIEW: CPT

## 2025-02-01 PROCEDURE — 96368 THER/DIAG CONCURRENT INF: CPT

## 2025-02-01 PROCEDURE — 87075 CULTR BACTERIA EXCEPT BLOOD: CPT | Mod: TRILAB,59 | Performed by: STUDENT IN AN ORGANIZED HEALTH CARE EDUCATION/TRAINING PROGRAM

## 2025-02-01 PROCEDURE — 83735 ASSAY OF MAGNESIUM: CPT

## 2025-02-01 PROCEDURE — 71045 X-RAY EXAM CHEST 1 VIEW: CPT | Performed by: RADIOLOGY

## 2025-02-01 PROCEDURE — 80053 COMPREHEN METABOLIC PANEL: CPT

## 2025-02-01 PROCEDURE — 94640 AIRWAY INHALATION TREATMENT: CPT

## 2025-02-01 PROCEDURE — 84132 ASSAY OF SERUM POTASSIUM: CPT | Mod: 59

## 2025-02-01 PROCEDURE — 99291 CRITICAL CARE FIRST HOUR: CPT | Performed by: STUDENT IN AN ORGANIZED HEALTH CARE EDUCATION/TRAINING PROGRAM

## 2025-02-01 PROCEDURE — 36600 WITHDRAWAL OF ARTERIAL BLOOD: CPT

## 2025-02-01 PROCEDURE — 2550000001 HC RX 255 CONTRASTS: Performed by: STUDENT IN AN ORGANIZED HEALTH CARE EDUCATION/TRAINING PROGRAM

## 2025-02-01 PROCEDURE — 85025 COMPLETE CBC W/AUTO DIFF WBC: CPT

## 2025-02-01 PROCEDURE — 83880 ASSAY OF NATRIURETIC PEPTIDE: CPT

## 2025-02-01 PROCEDURE — 2500000002 HC RX 250 W HCPCS SELF ADMINISTERED DRUGS (ALT 637 FOR MEDICARE OP, ALT 636 FOR OP/ED): Mod: MUE

## 2025-02-01 PROCEDURE — 87637 SARSCOV2&INF A&B&RSV AMP PRB: CPT

## 2025-02-01 PROCEDURE — 96365 THER/PROPH/DIAG IV INF INIT: CPT

## 2025-02-01 RX ORDER — LANOLIN ALCOHOL/MO/W.PET/CERES
400 CREAM (GRAM) TOPICAL ONCE
Status: COMPLETED | OUTPATIENT
Start: 2025-02-01 | End: 2025-02-01

## 2025-02-01 RX ORDER — CEFTRIAXONE 1 G/50ML
1 INJECTION, SOLUTION INTRAVENOUS ONCE
Status: COMPLETED | OUTPATIENT
Start: 2025-02-01 | End: 2025-02-01

## 2025-02-01 RX ORDER — IPRATROPIUM BROMIDE AND ALBUTEROL SULFATE 2.5; .5 MG/3ML; MG/3ML
3 SOLUTION RESPIRATORY (INHALATION)
Status: DISCONTINUED | OUTPATIENT
Start: 2025-02-01 | End: 2025-02-02 | Stop reason: HOSPADM

## 2025-02-01 RX ADMIN — CEFTRIAXONE SODIUM 1 G: 1 INJECTION, SOLUTION INTRAVENOUS at 17:53

## 2025-02-01 RX ADMIN — DEXTROSE MONOHYDRATE 500 MG: 50 INJECTION, SOLUTION INTRAVENOUS at 17:54

## 2025-02-01 RX ADMIN — Medication 400 MG: at 21:44

## 2025-02-01 RX ADMIN — METHYLPREDNISOLONE SODIUM SUCCINATE 125 MG: 125 INJECTION, POWDER, FOR SOLUTION INTRAMUSCULAR; INTRAVENOUS at 15:31

## 2025-02-01 RX ADMIN — IPRATROPIUM BROMIDE AND ALBUTEROL SULFATE 3 ML: 2.5; .5 SOLUTION RESPIRATORY (INHALATION) at 18:38

## 2025-02-01 RX ADMIN — IPRATROPIUM BROMIDE AND ALBUTEROL SULFATE 3 ML: 2.5; .5 SOLUTION RESPIRATORY (INHALATION) at 15:32

## 2025-02-01 RX ADMIN — IOHEXOL 75 ML: 350 INJECTION, SOLUTION INTRAVENOUS at 17:23

## 2025-02-01 ASSESSMENT — PAIN - FUNCTIONAL ASSESSMENT: PAIN_FUNCTIONAL_ASSESSMENT: 0-10

## 2025-02-01 ASSESSMENT — PAIN SCALES - GENERAL: PAINLEVEL_OUTOF10: 5 - MODERATE PAIN

## 2025-02-01 NOTE — ED PROVIDER NOTES
HPI   Chief Complaint   Patient presents with    Shortness of Breath     Patient comes in by ems sunday. Patient from home with sob. Ems giving breathing tx. Pt baseline O2 - 5L. On arrival 6L O2 at 88%, ED RT called.        Patient is a 64-year-old female presenting to the emergency department for evaluation of shortness of breath.  Patient has a history of COPD and CHF.  She states she is normally on 4 L via nasal cannula daily.  She states last night she started feeling short of breath with cough and congestion.  She states she went to bed hoping that in the morning she would feel better.  She states she woke up this morning due to increased pain in her lungs and shortness of breath.  She states it feels like she possibly has double pneumonia.  She denies any chest pain, fevers, chills, nausea, vomiting abdominal pain.  She states that she is currently battling cellulitis on her face.  She denies any recent travel or sick contacts.              Patient History   Past Medical History:   Diagnosis Date    CHF (congestive heart failure)     COPD (chronic obstructive pulmonary disease) (Multi)     Diabetes mellitus (Multi)     Hypertension     Stroke (Multi)      Past Surgical History:   Procedure Laterality Date    BACK SURGERY      CARDIAC CATHETERIZATION N/A 2023    Procedure: Left Heart Cath;  Surgeon: Andria Campo MD;  Location: [facility] Cardiac Cath Lab;  Service: Cardiovascular;  Laterality: N/A;     SECTION, LOW TRANSVERSE      TONSILLECTOMY       No family history on file.  Social History     Tobacco Use    Smoking status: Every Day     Current packs/day: 0.50     Average packs/day: 0.5 packs/day for 25.0 years (12.5 ttl pk-yrs)     Types: Cigarettes    Smokeless tobacco: Never   Vaping Use    Vaping status: Never Used   Substance Use Topics    Alcohol use: Never    Drug use: Never       Physical Exam   ED Triage Vitals   Temp Pulse Resp BP   -- -- -- --      SpO2 Temp src Heart Rate Source  Patient Position   -- -- -- --      BP Location FiO2 (%)     -- --       Physical Exam  Vitals and nursing note reviewed.   Constitutional:       General: She is not in acute distress.     Appearance: She is well-developed. She is not ill-appearing or toxic-appearing.      Comments: Disheveled appearing   HENT:      Head: Normocephalic and atraumatic.   Eyes:      Extraocular Movements: Extraocular movements intact.      Pupils: Pupils are equal, round, and reactive to light.   Cardiovascular:      Rate and Rhythm: Normal rate and regular rhythm.   Pulmonary:      Effort: Pulmonary effort is normal.      Breath sounds: Wheezing present. No decreased breath sounds, rhonchi or rales.   Abdominal:      Palpations: Abdomen is soft.      Tenderness: There is no abdominal tenderness.   Musculoskeletal:         General: Normal range of motion.      Right lower leg: No edema.      Left lower leg: No edema.   Skin:     General: Skin is warm and dry.      Findings: Erythema (Erythema noted to the face) present.   Neurological:      General: No focal deficit present.      Mental Status: She is alert and oriented to person, place, and time.   Psychiatric:         Mood and Affect: Mood normal.         Behavior: Behavior normal.           ED Course & MDM   ED Course as of 02/01/25 2141   Sat Feb 01, 2025   1523 EKG interpreted by me: Normal sinus rhythm, rate 86.  Normal axis.  No significant ST or T wave abnormalities. [ML]   1546 Respiratory has patient on 8L which improved O2 sat to 96% [AJ]      ED Course User Index  [AJ] Jennie Cordero PA-C  [ML] Eric Butler MD         Diagnoses as of 02/01/25 2141   Pneumonia of left lung due to infectious organism, unspecified part of lung                 No data recorded                                 Medical Decision Making  **Disclaimer parts of this chart have been completed using voice recognition software. Please excuse any errors of transcription.     Patient seen in  conjunction with attending physician Dr. Butler.    HPI: Detailed above.    Exam: A medically appropriate exam performed, outlined above, given the known history and presentation.    History obtained from: Patient    EKG: Reviewed and interpreted by my attending physician    Labs/Diagnostics:  Labs Reviewed   CBC WITH AUTO DIFFERENTIAL - Abnormal       Result Value    WBC 10.2      nRBC 0.0      RBC 5.48 (*)     Hemoglobin 17.8 (*)     Hematocrit 56.9 (*)      (*)     MCH 32.5      MCHC 31.3 (*)     RDW 15.1 (*)     Platelets 151      Neutrophils % 76.4      Immature Granulocytes %, Automated 0.3      Lymphocytes % 15.6      Monocytes % 5.1      Eosinophils % 2.2      Basophils % 0.4      Neutrophils Absolute 7.76 (*)     Immature Granulocytes Absolute, Automated 0.03      Lymphocytes Absolute 1.59      Monocytes Absolute 0.52      Eosinophils Absolute 0.22      Basophils Absolute 0.04     MAGNESIUM - Abnormal    Magnesium 1.56 (*)    COMPREHENSIVE METABOLIC PANEL - Abnormal    Glucose 127 (*)     Sodium 138      Potassium 3.6      Chloride 94 (*)     Bicarbonate 35 (*)     Anion Gap 13      Urea Nitrogen 12      Creatinine 0.57      eGFR >90      Calcium 9.8      Albumin 4.1      Alkaline Phosphatase 62      Total Protein 7.7      AST 15      Bilirubin, Total 0.6      ALT 7     B-TYPE NATRIURETIC PEPTIDE - Abnormal     (*)     Narrative:        <100 pg/mL - Heart failure unlikely  100-299 pg/mL - Intermediate probability of acute heart                  failure exacerbation. Correlate with clinical                  context and patient history.    >=300 pg/mL - Heart Failure likely. Correlate with clinical                  context and patient history.    BNP testing is performed using different testing methodology at [facility] than at other [facility] hospitals. Direct result comparisons should only be made within the same method.      SERIAL TROPONIN-INITIAL - Abnormal    Troponin I, High  Sensitivity 24 (*)     Narrative:     Less than 99th percentile of normal range cutoff-  Female and children under 18 years old <14 ng/L; Male <21 ng/L: Negative  Repeat testing should be performed if clinically indicated.     Female and children under 18 years old 14-50 ng/L; Male 21-50 ng/L:  Consistent with possible cardiac damage and possible increased clinical   risk. Serial measurements may help to assess extent of myocardial damage.     >50 ng/L: Consistent with cardiac damage, increased clinical risk and  myocardial infarction. Serial measurements may help assess extent of   myocardial damage.      NOTE: Children less than 1 year old may have higher baseline troponin   levels and results should be interpreted in conjunction with the overall   clinical context.     NOTE: Troponin I testing is performed using a different   testing methodology at [facility] than at other   [facility]. Direct result comparisons should only   be made within the same method.   SERIAL TROPONIN, 1 HOUR - Abnormal    Troponin I, High Sensitivity 24 (*)     Narrative:     Less than 99th percentile of normal range cutoff-  Female and children under 18 years old <14 ng/L; Male <21 ng/L: Negative  Repeat testing should be performed if clinically indicated.     Female and children under 18 years old 14-50 ng/L; Male 21-50 ng/L:  Consistent with possible cardiac damage and possible increased clinical   risk. Serial measurements may help to assess extent of myocardial damage.     >50 ng/L: Consistent with cardiac damage, increased clinical risk and  myocardial infarction. Serial measurements may help assess extent of   myocardial damage.      NOTE: Children less than 1 year old may have higher baseline troponin   levels and results should be interpreted in conjunction with the overall   clinical context.     NOTE: Troponin I testing is performed using a different   testing methodology at [facility] than at  [facility]. Direct result comparisons should only   be made within the same method.   BLOOD GAS ARTERIAL FULL PANEL - Abnormal    POCT pH, Arterial 7.36 (*)     POCT pCO2, Arterial 66 (*)     POCT pO2, Arterial 79 (*)     POCT SO2, Arterial 97      POCT Oxy Hemoglobin, Arterial 87.0 (*)     POCT Hematocrit Calculated, Arterial 51.0 (*)     POCT Sodium, Arterial 135 (*)     POCT Potassium, Arterial 3.9      POCT Chloride, Arterial 98      POCT Ionized Calcium, Arterial 1.22      POCT Glucose, Arterial 127 (*)     POCT Lactate, Arterial 0.8      POCT Base Excess, Arterial 8.6 (*)     POCT HCO3 Calculated, Arterial 37.3 (*)     POCT Hemoglobin, Arterial 16.9 (*)     POCT Anion Gap, Arterial 4 (*)     Patient Temperature 37.0      FiO2 52      Apparatus CANNULA      Flow 8.0      Site of Arterial Puncture Radial Right      Sudhakar's Test Positive     SARS-COV-2 AND INFLUENZA A/B PCR - Normal    Flu A Result Not Detected      Flu B Result Not Detected      Coronavirus 2019, PCR Not Detected      Narrative:     This assay is an FDA-cleared, in vitro diagnostic nucleic acid amplification test for the qualitative detection and differentiation of SARS CoV-2/ Influenza A/B from nasopharyngeal specimens collected from individuals with signs and symptoms of respiratory tract infections, and has been validated for use at [facility]. Negative results do not preclude COVID-19/ Influenza A/B infections and should not be used as the sole basis for diagnosis, treatment, or other management decisions. Testing for SARS CoV-2 is recommended only for patients who meet current clinical and/or epidemiological criteria defined by federal, state, or local public health directives.   RSV PCR - Normal    RSV PCR Not Detected      Narrative:     This assay is an FDA-cleared, in vitro diagnostic nucleic acid amplification test for the detection of RSV from nasopharyngeal specimens, and has been validated for  use at [facility]. Negative results do not preclude RSV infections, and should not be used as the sole basis for diagnosis, treatment, or other management decisions. If Influenza A/B and RSV PCR results are negative, testing for Parainfluenza virus, Adenovirus and Metapneumovirus is routinely performed for pediatric oncology and intensive care inpatients at [facility], and is available on other patients by placing an add-on request.       BLOOD CULTURE   BLOOD CULTURE   TROPONIN SERIES- (INITIAL, 1 HR)    Narrative:     The following orders were created for panel order Troponin I Series, High Sensitivity (0, 1 HR).  Procedure                               Abnormality         Status                     ---------                               -----------         ------                     Troponin I, High Sensiti...[[id number]]  Abnormal            Final result               Troponin, High Sensitivi...[[id number]]  Abnormal            Final result                 Please view results for these tests on the individual orders.   BLOOD GAS ARTERIAL FULL PANEL     CT chest w IV contrast   Final Result   Almost complete collapse of the left upper lobe due to obstruction of   the upper lobe bronchi, most likely due to thick secretions;   correlate clinically and follow-up as needed additional   infiltrates/atelectatic changes in the left lower lobe. Associated   right to left mediastinal shift. Correlate clinically and follow-up   as needed. If findings not resolve, bronchoscopic evaluation could be   obtained.        MACRO:   None.             Signed by: Jose Schofield 2/1/2025 5:37 PM   Dictation workstation:   [id number]      XR chest 1 view   Final Result   Extensive pneumonia left lung with volume loss and mediastinal shift   to the left.        MACRO:   None        Signed by: Lyubov Loo 2/1/2025 4:05 PM   Dictation workstation:   [id number]        EMERGENCY DEPARTMENT COURSE and DIFFERENTIAL  DIAGNOSIS/MDM:  Patient is a 64-year-old female presenting to the emergency department for evaluation of shortness of breath.  On physical exam vital signs remarkable for tachypnea patient satting at 88%-89% on 6 L therefore respiratory was called.  Respiratory place the patient on 8 L with bubbler and patient comfortable.  She has end-expiratory wheezing noted in all lung fields.  She is disheveled appearing with some cellulitis noted to the face.  Diagnostic labs ordered as well as DuoNeb, IV Solu-Medrol, and chest x-ray.  Initial troponin 24 and repeat troponin 24 therefore low suspicion for ACS.  CBC showed no leukocytosis or anemia with an increase in hemoglobin and hematocrit.  BNP mildly elevated at 136.  CMP showed a chloride of 94 as well as a bicarb of 35.  Magnesium mildly decreased at 1.56.  Patient tested negative for COVID, flu, and RSV.  Chest x-ray showed extensive pneumonia in the left lung with volume loss and mediastinal shift to the left.  Due to the concern from the chest x-ray CT of the chest ordered.  CT of the chest showed an almost complete collapse of the left upper lobe due to obstruction of the upper lobe bronchi most likely due to thick secretions recommending clinical correlation and follow-up as needed.  We spoke with hospitalist at [facility] who said that we do not have bronchoscopy here and therefore recommended transfer.  Patient was started on IV ceftriaxone as well as IV azithromycin.  I spoke with hospitalist Dr. Ford at [facility] who agreed to accept the patient for further management of pneumonia and possible COPD exacerbation.  It has reached the end of my shift and patient still pending transfer.  Suspect if patient does not get transferred within the next 4 hours that she will be admitted at [facility] and transferred at a later time.  Continuation of care as well as final disposition will be determined by attending physician in the emergency department.  Handoff given  to attending physician Dr. Morel.    The patient presented with a chief complaint of shortness of breath. The differential diagnosis associated with this patient's presentation includes cough, asthma, atrial fibrillation, congestive heart failure, pulmonary edema, acute MI, pneumonia, pneumothorax, pulmonary embolus, sepsis, SIRS, URI, bronchitis, foreign body aspiration..     Vitals:    Vitals:    02/01/25 1815 02/01/25 1830 02/01/25 1900 02/01/25 1915   BP:  104/71 117/76    Pulse: 92 90 98 96   Resp: 17 17 18 18   Temp:       TempSrc:       SpO2: 98% 98% 98% (!) 93%   Weight:       Height:         History Limited by:    None    Independent history obtained from:    None    External records reviewed:    Inpatient Notes/Discharge Summary from previous emergency department visit from 10/3/2024 where patient was also seen for shortness of breath    Diagnostics interpreted by me:    CT Scan(s) see MDM and Xrays - see my independent interpretation in MDM    Discussions with other clinicians:    Hospitalist/Admitting Team Dr. Ford    Chronic conditions impacting care:    COPD and Heart Disease    Social determinants of health affecting care:    None    Diagnostic tests considered but not performed: None    ED Medications managed:    Medications   ipratropium-albuteroL (Duo-Neb) 0.5-2.5 mg/3 mL nebulizer solution 3 mL (3 mL nebulization Given 2/1/25 1838)   magnesium oxide (Mag-Ox) tablet 400 mg (has no administration in time range)   methylPREDNISolone sod succinate (SOLU-Medrol) injection 125 mg (125 mg intravenous Given 2/1/25 1531)   cefTRIAXone (Rocephin) 1 g in dextrose (iso) IV 50 mL (0 g intravenous Stopped 2/1/25 1836)   azithromycin (Zithromax) 500 mg in dextrose 5%  mL (0 mg intravenous Stopped 2/1/25 1855)   iohexol (OMNIPaque) 350 mg iodine/mL solution 75 mL (75 mL intravenous Given 2/1/25 1723)       Prescription drugs considered:    None    Screenings:              Procedure  Procedures     Jennie  ALIYAH Cordero PA-C  02/01/25 2146

## 2025-02-01 NOTE — ED PROVIDER NOTES
Supervisory note:  Patient seen in conjunction with DERICK Torres.  Patient presents with shortness of breath.  She reports that her symptoms have been ongoing for several days.  She has been coughing and producing sputum and having wheezing.  She reports that she has been out of of her breathing treatments for the last several days.  She does have history of COPD and normally uses 4 L/min by nasal cannula.  On examination, there are expiratory wheezes heard in all lung fields.  No significant pretibial edema.  Patient is a 88 to 89% on 6 L/min by nasal cannula.    Patient was placed on high flow nasal cannula/bubbler.  Chest x-ray reveals significant opacity of the left lung.  CT was obtained to better evaluate for etiology of symptoms.  This reveals likely mucous plugging in the larger airways.  Patient was treated with antibiotics for pneumonia and given steroid for COPD exacerbation.  Patient's case was discussed with hospitalist, Dr. Palmer, who states that bronchoscopy will not be able to be performed at [facility] and recommends transfer to facility which is capable of bronchoscopy given her significant mucous plugging.  Patient then accepted at [facility].    I personally saw the patient and made/approved the management plan and take responsiblity for the patient management.  Parts of this chart were completed with dictation software, please excuse any errors in transcription.    Critical Care    Performed by: Eric Butler MD  Authorized by: Eric Butler MD    Critical care provider statement:     Critical care time (minutes):  34    Critical care time was exclusive of:  Separately billable procedures and treating other patients    Critical care was necessary to treat or prevent imminent or life-threatening deterioration of the following conditions:  Respiratory failure    Critical care was time spent personally by me on the following activities:  Development of treatment plan with patient or surrogate,  evaluation of patient's response to treatment, examination of patient, obtaining history from patient or surrogate, ordering and performing treatments and interventions, ordering and review of laboratory studies, ordering and review of radiographic studies, pulse oximetry, re-evaluation of patient's condition and review of old charts         Eric Butler MD  02/01/25 7601

## 2025-02-02 ENCOUNTER — HOSPITAL ENCOUNTER (OUTPATIENT)
Dept: CARDIOLOGY | Facility: HOSPITAL | Age: 65
Discharge: HOME | End: 2025-02-02
Payer: COMMERCIAL

## 2025-02-02 ENCOUNTER — HOSPITAL ENCOUNTER (INPATIENT)
Facility: HOSPITAL | Age: 65
DRG: 205 | End: 2025-02-02
Attending: INTERNAL MEDICINE | Admitting: INTERNAL MEDICINE
Payer: COMMERCIAL

## 2025-02-02 VITALS
TEMPERATURE: 98.1 F | BODY MASS INDEX: 33.59 KG/M2 | HEIGHT: 63 IN | OXYGEN SATURATION: 93 % | DIASTOLIC BLOOD PRESSURE: 52 MMHG | WEIGHT: 189.6 LBS | SYSTOLIC BLOOD PRESSURE: 95 MMHG | RESPIRATION RATE: 20 BRPM | HEART RATE: 88 BPM

## 2025-02-02 DIAGNOSIS — J44.1 COPD EXACERBATION (MULTI): ICD-10-CM

## 2025-02-02 DIAGNOSIS — J96.00 ACUTE RESPIRATORY FAILURE: Primary | ICD-10-CM

## 2025-02-02 DIAGNOSIS — Z78.9 DECREASED ACTIVITIES OF DAILY LIVING (ADL): ICD-10-CM

## 2025-02-02 DIAGNOSIS — Z76.0 MEDICATION REFILL: ICD-10-CM

## 2025-02-02 DIAGNOSIS — J44.1 CHRONIC OBSTRUCTIVE PULMONARY DISEASE WITH ACUTE EXACERBATION (MULTI): ICD-10-CM

## 2025-02-02 PROBLEM — E83.42 HYPOMAGNESEMIA: Status: ACTIVE | Noted: 2025-02-02

## 2025-02-02 PROBLEM — J15.9 BACTERIAL PNEUMONIA: Status: ACTIVE | Noted: 2025-02-02

## 2025-02-02 LAB
ANION GAP BLDA CALCULATED.4IONS-SCNC: 7 MMO/L (ref 10–25)
ANION GAP BLDV CALCULATED.4IONS-SCNC: ABNORMAL MMOL/L
ANION GAP SERPL CALC-SCNC: 14 MMOL/L (ref 10–20)
BASE EXCESS BLDA CALC-SCNC: 6.5 MMOL/L (ref -2–3)
BASE EXCESS BLDV CALC-SCNC: -4.4 MMOL/L (ref -2–3)
BODY TEMPERATURE: ABNORMAL
BODY TEMPERATURE: ABNORMAL
BUN SERPL-MCNC: 18 MG/DL (ref 6–23)
CA-I BLDA-SCNC: 1.21 MMOL/L (ref 1.1–1.33)
CA-I BLDV-SCNC: ABNORMAL MMOL/L
CALCIUM SERPL-MCNC: 9.1 MG/DL (ref 8.6–10.3)
CHLORIDE BLDA-SCNC: 95 MMOL/L (ref 98–107)
CHLORIDE BLDV-SCNC: 91 MMOL/L (ref 98–107)
CHLORIDE SERPL-SCNC: 95 MMOL/L (ref 98–107)
CO2 SERPL-SCNC: 32 MMOL/L (ref 21–32)
CREAT SERPL-MCNC: 0.77 MG/DL (ref 0.5–1.05)
CRP SERPL-MCNC: 1.75 MG/DL
EGFRCR SERPLBLD CKD-EPI 2021: 85 ML/MIN/1.73M*2
ERYTHROCYTE [DISTWIDTH] IN BLOOD BY AUTOMATED COUNT: 14.9 % (ref 11.5–14.5)
ERYTHROCYTE [SEDIMENTATION RATE] IN BLOOD BY WESTERGREN METHOD: 37 MM/H (ref 0–30)
GLUCOSE BLD MANUAL STRIP-MCNC: 179 MG/DL (ref 74–99)
GLUCOSE BLD MANUAL STRIP-MCNC: 196 MG/DL (ref 74–99)
GLUCOSE BLD MANUAL STRIP-MCNC: 201 MG/DL (ref 74–99)
GLUCOSE BLD MANUAL STRIP-MCNC: 263 MG/DL (ref 74–99)
GLUCOSE BLDA-MCNC: 212 MG/DL (ref 74–99)
GLUCOSE BLDV-MCNC: 205 MG/DL (ref 74–99)
GLUCOSE SERPL-MCNC: 202 MG/DL (ref 74–99)
HCO3 BLDA-SCNC: 34.1 MMOL/L (ref 22–26)
HCO3 BLDV-SCNC: 28.3 MMOL/L (ref 22–26)
HCT VFR BLD AUTO: 50.1 % (ref 36–46)
HCT VFR BLD EST: 49 % (ref 36–46)
HCT VFR BLD EST: 51 % (ref 36–46)
HGB BLD-MCNC: 15.8 G/DL (ref 12–16)
HGB BLDA-MCNC: 16.3 G/DL (ref 12–16)
HGB BLDV-MCNC: 17.1 G/DL (ref 12–16)
HSV1 DNA SKIN QL NAA+PROBE: NOT DETECTED
HSV2 DNA SKIN QL NAA+PROBE: NOT DETECTED
INHALED O2 CONCENTRATION: 52 %
INHALED O2 CONCENTRATION: 52 %
LACTATE BLDA-SCNC: 2.3 MMOL/L (ref 0.4–2)
LACTATE BLDV-SCNC: 3.1 MMOL/L (ref 0.4–2)
LEGIONELLA AG UR QL: NEGATIVE
MCH RBC QN AUTO: 33 PG (ref 26–34)
MCHC RBC AUTO-ENTMCNC: 31.5 G/DL (ref 32–36)
MCV RBC AUTO: 105 FL (ref 80–100)
MRSA DNA SPEC QL NAA+PROBE: NOT DETECTED
NRBC BLD-RTO: 0 /100 WBCS (ref 0–0)
OXYHGB MFR BLDA: 90 % (ref 94–98)
OXYHGB MFR BLDV: 89.3 % (ref 45–75)
PCO2 BLDA: 59 MM HG (ref 38–42)
PCO2 BLDV: 89 MM HG (ref 41–51)
PH BLDA: 7.37 PH (ref 7.38–7.42)
PH BLDV: 7.11 PH (ref 7.33–7.43)
PLATELET # BLD AUTO: 154 X10*3/UL (ref 150–450)
PO2 BLDA: 60 MM HG (ref 85–95)
PO2 BLDV: 77 MM HG (ref 35–45)
POTASSIUM BLDA-SCNC: 4 MMOL/L (ref 3.5–5.3)
POTASSIUM BLDV-SCNC: >10 MMOL/L (ref 3.5–5.3)
POTASSIUM SERPL-SCNC: 4.1 MMOL/L (ref 3.5–5.3)
PROCALCITONIN SERPL-MCNC: 0.05 NG/ML
RBC # BLD AUTO: 4.79 X10*6/UL (ref 4–5.2)
S PNEUM AG UR QL: NEGATIVE
SAO2 % BLDA: 94 % (ref 94–100)
SAO2 % BLDV: 93 % (ref 45–75)
SODIUM BLDA-SCNC: 132 MMOL/L (ref 136–145)
SODIUM BLDV-SCNC: 119 MMOL/L (ref 136–145)
SODIUM SERPL-SCNC: 137 MMOL/L (ref 136–145)
WBC # BLD AUTO: 12.2 X10*3/UL (ref 4.4–11.3)

## 2025-02-02 PROCEDURE — 2500000001 HC RX 250 WO HCPCS SELF ADMINISTERED DRUGS (ALT 637 FOR MEDICARE OP): Performed by: INTERNAL MEDICINE

## 2025-02-02 PROCEDURE — 9420000001 HC RT PATIENT EDUCATION 5 MIN

## 2025-02-02 PROCEDURE — 2500000005 HC RX 250 GENERAL PHARMACY W/O HCPCS: Performed by: INTERNAL MEDICINE

## 2025-02-02 PROCEDURE — 94640 AIRWAY INHALATION TREATMENT: CPT

## 2025-02-02 PROCEDURE — 94664 DEMO&/EVAL PT USE INHALER: CPT

## 2025-02-02 PROCEDURE — 85652 RBC SED RATE AUTOMATED: CPT | Performed by: INTERNAL MEDICINE

## 2025-02-02 PROCEDURE — 99291 CRITICAL CARE FIRST HOUR: CPT | Performed by: INTERNAL MEDICINE

## 2025-02-02 PROCEDURE — 87449 NOS EACH ORGANISM AG IA: CPT | Mod: GEALAB | Performed by: INTERNAL MEDICINE

## 2025-02-02 PROCEDURE — 82435 ASSAY OF BLOOD CHLORIDE: CPT | Performed by: INTERNAL MEDICINE

## 2025-02-02 PROCEDURE — 85027 COMPLETE CBC AUTOMATED: CPT | Performed by: INTERNAL MEDICINE

## 2025-02-02 PROCEDURE — 2500000002 HC RX 250 W HCPCS SELF ADMINISTERED DRUGS (ALT 637 FOR MEDICARE OP, ALT 636 FOR OP/ED): Performed by: INTERNAL MEDICINE

## 2025-02-02 PROCEDURE — 94668 MNPJ CHEST WALL SBSQ: CPT

## 2025-02-02 PROCEDURE — 2500000002 HC RX 250 W HCPCS SELF ADMINISTERED DRUGS (ALT 637 FOR MEDICARE OP, ALT 636 FOR OP/ED): Performed by: FAMILY MEDICINE

## 2025-02-02 PROCEDURE — 84145 PROCALCITONIN (PCT): CPT | Mod: GEALAB | Performed by: INTERNAL MEDICINE

## 2025-02-02 PROCEDURE — 2060000001 HC INTERMEDIATE ICU ROOM DAILY

## 2025-02-02 PROCEDURE — 99223 1ST HOSP IP/OBS HIGH 75: CPT | Performed by: INTERNAL MEDICINE

## 2025-02-02 PROCEDURE — 87641 MR-STAPH DNA AMP PROBE: CPT | Performed by: INTERNAL MEDICINE

## 2025-02-02 PROCEDURE — 36415 COLL VENOUS BLD VENIPUNCTURE: CPT | Performed by: INTERNAL MEDICINE

## 2025-02-02 PROCEDURE — 94760 N-INVAS EAR/PLS OXIMETRY 1: CPT

## 2025-02-02 PROCEDURE — 94669 MECHANICAL CHEST WALL OSCILL: CPT

## 2025-02-02 PROCEDURE — 82947 ASSAY GLUCOSE BLOOD QUANT: CPT

## 2025-02-02 PROCEDURE — 94667 MNPJ CHEST WALL 1ST: CPT

## 2025-02-02 PROCEDURE — 87899 AGENT NOS ASSAY W/OPTIC: CPT | Mod: GEALAB | Performed by: INTERNAL MEDICINE

## 2025-02-02 PROCEDURE — 87529 HSV DNA AMP PROBE: CPT | Mod: GEALAB | Performed by: INTERNAL MEDICINE

## 2025-02-02 PROCEDURE — 82810 BLOOD GASES O2 SAT ONLY: CPT | Performed by: INTERNAL MEDICINE

## 2025-02-02 PROCEDURE — 2500000004 HC RX 250 GENERAL PHARMACY W/ HCPCS (ALT 636 FOR OP/ED): Mod: JW | Performed by: INTERNAL MEDICINE

## 2025-02-02 PROCEDURE — 86140 C-REACTIVE PROTEIN: CPT | Performed by: INTERNAL MEDICINE

## 2025-02-02 PROCEDURE — 84484 ASSAY OF TROPONIN QUANT: CPT | Performed by: INTERNAL MEDICINE

## 2025-02-02 PROCEDURE — 93005 ELECTROCARDIOGRAM TRACING: CPT

## 2025-02-02 PROCEDURE — 82330 ASSAY OF CALCIUM: CPT | Performed by: INTERNAL MEDICINE

## 2025-02-02 RX ORDER — HYDROCORTISONE 1 %
CREAM (GRAM) TOPICAL 2 TIMES DAILY
Status: DISCONTINUED | OUTPATIENT
Start: 2025-02-02 | End: 2025-02-04 | Stop reason: HOSPADM

## 2025-02-02 RX ORDER — PREDNISONE 20 MG/1
40 TABLET ORAL DAILY
Status: DISCONTINUED | OUTPATIENT
Start: 2025-02-03 | End: 2025-02-04 | Stop reason: HOSPADM

## 2025-02-02 RX ORDER — DEXTROSE 50 % IN WATER (D50W) INTRAVENOUS SYRINGE
12.5
Status: DISCONTINUED | OUTPATIENT
Start: 2025-02-02 | End: 2025-02-04 | Stop reason: HOSPADM

## 2025-02-02 RX ORDER — LISINOPRIL 5 MG/1
2.5 TABLET ORAL DAILY
Status: DISCONTINUED | OUTPATIENT
Start: 2025-02-02 | End: 2025-02-02

## 2025-02-02 RX ORDER — GUAIFENESIN 600 MG/1
600 TABLET, EXTENDED RELEASE ORAL 2 TIMES DAILY PRN
Status: DISCONTINUED | OUTPATIENT
Start: 2025-02-02 | End: 2025-02-04 | Stop reason: HOSPADM

## 2025-02-02 RX ORDER — PANTOPRAZOLE SODIUM 40 MG/1
40 TABLET, DELAYED RELEASE ORAL DAILY
Status: DISCONTINUED | OUTPATIENT
Start: 2025-02-02 | End: 2025-02-04 | Stop reason: HOSPADM

## 2025-02-02 RX ORDER — AZITHROMYCIN 250 MG/1
500 TABLET, FILM COATED ORAL DAILY
Status: DISCONTINUED | OUTPATIENT
Start: 2025-02-02 | End: 2025-02-04 | Stop reason: HOSPADM

## 2025-02-02 RX ORDER — DEXTROSE 50 % IN WATER (D50W) INTRAVENOUS SYRINGE
25
Status: DISCONTINUED | OUTPATIENT
Start: 2025-02-02 | End: 2025-02-04 | Stop reason: HOSPADM

## 2025-02-02 RX ORDER — IPRATROPIUM BROMIDE AND ALBUTEROL SULFATE 2.5; .5 MG/3ML; MG/3ML
3 SOLUTION RESPIRATORY (INHALATION)
Status: DISCONTINUED | OUTPATIENT
Start: 2025-02-02 | End: 2025-02-04 | Stop reason: HOSPADM

## 2025-02-02 RX ORDER — ALBUTEROL SULFATE 0.83 MG/ML
3 SOLUTION RESPIRATORY (INHALATION) EVERY 4 HOURS PRN
Status: DISCONTINUED | OUTPATIENT
Start: 2025-02-02 | End: 2025-02-02

## 2025-02-02 RX ORDER — GABAPENTIN 300 MG/1
300 CAPSULE ORAL 3 TIMES DAILY
Status: DISCONTINUED | OUTPATIENT
Start: 2025-02-02 | End: 2025-02-04 | Stop reason: HOSPADM

## 2025-02-02 RX ORDER — NYSTATIN 100000 [USP'U]/G
1 POWDER TOPICAL 2 TIMES DAILY
Status: DISCONTINUED | OUTPATIENT
Start: 2025-02-02 | End: 2025-02-04 | Stop reason: HOSPADM

## 2025-02-02 RX ORDER — ALBUTEROL SULFATE 0.83 MG/ML
3 SOLUTION RESPIRATORY (INHALATION) EVERY 2 HOUR PRN
Status: DISCONTINUED | OUTPATIENT
Start: 2025-02-02 | End: 2025-02-04 | Stop reason: HOSPADM

## 2025-02-02 RX ORDER — CEFTRIAXONE 1 G/50ML
1 INJECTION, SOLUTION INTRAVENOUS EVERY 24 HOURS
Status: DISCONTINUED | OUTPATIENT
Start: 2025-02-02 | End: 2025-02-04 | Stop reason: HOSPADM

## 2025-02-02 RX ORDER — INSULIN LISPRO 100 [IU]/ML
0-10 INJECTION, SOLUTION INTRAVENOUS; SUBCUTANEOUS
Status: DISCONTINUED | OUTPATIENT
Start: 2025-02-02 | End: 2025-02-04 | Stop reason: HOSPADM

## 2025-02-02 RX ADMIN — PANTOPRAZOLE SODIUM 40 MG: 40 TABLET, DELAYED RELEASE ORAL at 08:20

## 2025-02-02 RX ADMIN — IPRATROPIUM BROMIDE AND ALBUTEROL SULFATE 3 ML: 2.5; .5 SOLUTION RESPIRATORY (INHALATION) at 08:19

## 2025-02-02 RX ADMIN — Medication 4 L/MIN: at 19:23

## 2025-02-02 RX ADMIN — CEFTRIAXONE SODIUM 1 G: 1 INJECTION, SOLUTION INTRAVENOUS at 17:24

## 2025-02-02 RX ADMIN — METHYLPREDNISOLONE SODIUM SUCCINATE 60 MG: 125 INJECTION, POWDER, FOR SOLUTION INTRAMUSCULAR; INTRAVENOUS at 03:40

## 2025-02-02 RX ADMIN — INSULIN LISPRO 6 UNITS: 100 INJECTION, SOLUTION INTRAVENOUS; SUBCUTANEOUS at 12:02

## 2025-02-02 RX ADMIN — NYSTATIN 1 APPLICATION: 100000 POWDER TOPICAL at 21:04

## 2025-02-02 RX ADMIN — IPRATROPIUM BROMIDE AND ALBUTEROL SULFATE 3 ML: 2.5; .5 SOLUTION RESPIRATORY (INHALATION) at 14:09

## 2025-02-02 RX ADMIN — ALBUTEROL SULFATE 3 ML: 2.5 SOLUTION RESPIRATORY (INHALATION) at 23:40

## 2025-02-02 RX ADMIN — AZITHROMYCIN 500 MG: 250 TABLET, FILM COATED ORAL at 13:20

## 2025-02-02 RX ADMIN — IPRATROPIUM BROMIDE AND ALBUTEROL SULFATE 3 ML: 2.5; .5 SOLUTION RESPIRATORY (INHALATION) at 19:23

## 2025-02-02 RX ADMIN — GABAPENTIN 300 MG: 300 CAPSULE ORAL at 21:04

## 2025-02-02 RX ADMIN — NYSTATIN 1 APPLICATION: 100000 POWDER TOPICAL at 08:20

## 2025-02-02 RX ADMIN — Medication 8 L/MIN: at 08:19

## 2025-02-02 RX ADMIN — GABAPENTIN 300 MG: 300 CAPSULE ORAL at 15:43

## 2025-02-02 RX ADMIN — METHYLPREDNISOLONE SODIUM SUCCINATE 60 MG: 125 INJECTION, POWDER, FOR SOLUTION INTRAMUSCULAR; INTRAVENOUS at 12:02

## 2025-02-02 RX ADMIN — Medication 4 L/MIN: at 20:57

## 2025-02-02 RX ADMIN — Medication 8 L/MIN: at 03:05

## 2025-02-02 RX ADMIN — INSULIN LISPRO 2 UNITS: 100 INJECTION, SOLUTION INTRAVENOUS; SUBCUTANEOUS at 17:24

## 2025-02-02 RX ADMIN — GABAPENTIN 300 MG: 300 CAPSULE ORAL at 08:20

## 2025-02-02 SDOH — SOCIAL STABILITY: SOCIAL INSECURITY: WERE YOU ABLE TO COMPLETE ALL THE BEHAVIORAL HEALTH SCREENINGS?: YES

## 2025-02-02 SDOH — SOCIAL STABILITY: SOCIAL INSECURITY: DOES ANYONE TRY TO KEEP YOU FROM HAVING/CONTACTING OTHER FRIENDS OR DOING THINGS OUTSIDE YOUR HOME?: NO

## 2025-02-02 SDOH — ECONOMIC STABILITY: HOUSING INSECURITY: AT ANY TIME IN THE PAST 12 MONTHS, WERE YOU HOMELESS OR LIVING IN A SHELTER (INCLUDING NOW)?: NO

## 2025-02-02 SDOH — ECONOMIC STABILITY: FOOD INSECURITY: WITHIN THE PAST 12 MONTHS, THE FOOD YOU BOUGHT JUST DIDN'T LAST AND YOU DIDN'T HAVE MONEY TO GET MORE.: OFTEN TRUE

## 2025-02-02 SDOH — SOCIAL STABILITY: SOCIAL INSECURITY: WITHIN THE LAST YEAR, HAVE YOU BEEN AFRAID OF YOUR PARTNER OR EX-PARTNER?: NO

## 2025-02-02 SDOH — SOCIAL STABILITY: SOCIAL INSECURITY: DO YOU FEEL UNSAFE GOING BACK TO THE PLACE WHERE YOU ARE LIVING?: NO

## 2025-02-02 SDOH — SOCIAL STABILITY: SOCIAL INSECURITY: WITHIN THE LAST YEAR, HAVE YOU BEEN HUMILIATED OR EMOTIONALLY ABUSED IN OTHER WAYS BY YOUR PARTNER OR EX-PARTNER?: NO

## 2025-02-02 SDOH — ECONOMIC STABILITY: HOUSING INSECURITY: IN THE LAST 12 MONTHS, WAS THERE A TIME WHEN YOU WERE NOT ABLE TO PAY THE MORTGAGE OR RENT ON TIME?: NO

## 2025-02-02 SDOH — SOCIAL STABILITY: SOCIAL INSECURITY: ARE YOU OR HAVE YOU BEEN THREATENED OR ABUSED PHYSICALLY, EMOTIONALLY, OR SEXUALLY BY ANYONE?: NO

## 2025-02-02 SDOH — SOCIAL STABILITY: SOCIAL INSECURITY: HAS ANYONE EVER THREATENED TO HURT YOUR FAMILY OR YOUR PETS?: NO

## 2025-02-02 SDOH — SOCIAL STABILITY: SOCIAL INSECURITY: ARE THERE ANY APPARENT SIGNS OF INJURIES/BEHAVIORS THAT COULD BE RELATED TO ABUSE/NEGLECT?: NO

## 2025-02-02 SDOH — SOCIAL STABILITY: SOCIAL INSECURITY
WITHIN THE LAST YEAR, HAVE YOU BEEN RAPED OR FORCED TO HAVE ANY KIND OF SEXUAL ACTIVITY BY YOUR PARTNER OR EX-PARTNER?: NO

## 2025-02-02 SDOH — ECONOMIC STABILITY: FOOD INSECURITY: WITHIN THE PAST 12 MONTHS, YOU WORRIED THAT YOUR FOOD WOULD RUN OUT BEFORE YOU GOT THE MONEY TO BUY MORE.: OFTEN TRUE

## 2025-02-02 SDOH — ECONOMIC STABILITY: INCOME INSECURITY: IN THE PAST 12 MONTHS HAS THE ELECTRIC, GAS, OIL, OR WATER COMPANY THREATENED TO SHUT OFF SERVICES IN YOUR HOME?: NO

## 2025-02-02 SDOH — SOCIAL STABILITY: SOCIAL INSECURITY: ABUSE: ADULT

## 2025-02-02 SDOH — SOCIAL STABILITY: SOCIAL INSECURITY: HAVE YOU HAD THOUGHTS OF HARMING ANYONE ELSE?: NO

## 2025-02-02 SDOH — ECONOMIC STABILITY: FOOD INSECURITY: HOW HARD IS IT FOR YOU TO PAY FOR THE VERY BASICS LIKE FOOD, HOUSING, MEDICAL CARE, AND HEATING?: VERY HARD

## 2025-02-02 SDOH — ECONOMIC STABILITY: HOUSING INSECURITY: IN THE PAST 12 MONTHS, HOW MANY TIMES HAVE YOU MOVED WHERE YOU WERE LIVING?: 0

## 2025-02-02 SDOH — SOCIAL STABILITY: SOCIAL INSECURITY: HAVE YOU HAD ANY THOUGHTS OF HARMING ANYONE ELSE?: NO

## 2025-02-02 SDOH — ECONOMIC STABILITY: TRANSPORTATION INSECURITY: IN THE PAST 12 MONTHS, HAS LACK OF TRANSPORTATION KEPT YOU FROM MEDICAL APPOINTMENTS OR FROM GETTING MEDICATIONS?: YES

## 2025-02-02 SDOH — SOCIAL STABILITY: SOCIAL INSECURITY: DO YOU FEEL ANYONE HAS EXPLOITED OR TAKEN ADVANTAGE OF YOU FINANCIALLY OR OF YOUR PERSONAL PROPERTY?: NO

## 2025-02-02 ASSESSMENT — COGNITIVE AND FUNCTIONAL STATUS - GENERAL
DAILY ACTIVITIY SCORE: 21
TOILETING: A LITTLE
CLIMB 3 TO 5 STEPS WITH RAILING: A LITTLE
DRESSING REGULAR LOWER BODY CLOTHING: A LITTLE
MOBILITY SCORE: 20
WALKING IN HOSPITAL ROOM: A LITTLE
WALKING IN HOSPITAL ROOM: A LITTLE
DAILY ACTIVITIY SCORE: 21
TOILETING: A LITTLE
HELP NEEDED FOR BATHING: A LITTLE
PATIENT BASELINE BEDBOUND: NO
MOVING TO AND FROM BED TO CHAIR: A LITTLE
DRESSING REGULAR LOWER BODY CLOTHING: A LITTLE
TOILETING: A LITTLE
HELP NEEDED FOR BATHING: A LITTLE
MOVING TO AND FROM BED TO CHAIR: A LITTLE
CLIMB 3 TO 5 STEPS WITH RAILING: A LITTLE
STANDING UP FROM CHAIR USING ARMS: A LITTLE
STANDING UP FROM CHAIR USING ARMS: A LITTLE
CLIMB 3 TO 5 STEPS WITH RAILING: A LITTLE
MOBILITY SCORE: 20
MOVING TO AND FROM BED TO CHAIR: A LITTLE
DRESSING REGULAR LOWER BODY CLOTHING: A LITTLE
WALKING IN HOSPITAL ROOM: A LITTLE
HELP NEEDED FOR BATHING: A LITTLE
MOBILITY SCORE: 20
DAILY ACTIVITIY SCORE: 21
STANDING UP FROM CHAIR USING ARMS: A LITTLE

## 2025-02-02 ASSESSMENT — LIFESTYLE VARIABLES
SKIP TO QUESTIONS 9-10: 1
HOW OFTEN DO YOU HAVE A DRINK CONTAINING ALCOHOL: NEVER
HOW MANY STANDARD DRINKS CONTAINING ALCOHOL DO YOU HAVE ON A TYPICAL DAY: PATIENT DOES NOT DRINK
HOW OFTEN DO YOU HAVE 6 OR MORE DRINKS ON ONE OCCASION: NEVER
AUDIT-C TOTAL SCORE: 0
AUDIT-C TOTAL SCORE: 0

## 2025-02-02 ASSESSMENT — PAIN - FUNCTIONAL ASSESSMENT
PAIN_FUNCTIONAL_ASSESSMENT: 0-10

## 2025-02-02 ASSESSMENT — ACTIVITIES OF DAILY LIVING (ADL)
DRESSING YOURSELF: INDEPENDENT
BATHING: NEEDS ASSISTANCE
PATIENT'S MEMORY ADEQUATE TO SAFELY COMPLETE DAILY ACTIVITIES?: YES
LACK_OF_TRANSPORTATION: YES
TOILETING: NEEDS ASSISTANCE
WALKS IN HOME: INDEPENDENT
FEEDING YOURSELF: INDEPENDENT
HEARING - RIGHT EAR: FUNCTIONAL
GROOMING: INDEPENDENT
ADEQUATE_TO_COMPLETE_ADL: YES
JUDGMENT_ADEQUATE_SAFELY_COMPLETE_DAILY_ACTIVITIES: YES
LACK_OF_TRANSPORTATION: NO
HEARING - LEFT EAR: FUNCTIONAL

## 2025-02-02 ASSESSMENT — PATIENT HEALTH QUESTIONNAIRE - PHQ9
1. LITTLE INTEREST OR PLEASURE IN DOING THINGS: NOT AT ALL
SUM OF ALL RESPONSES TO PHQ9 QUESTIONS 1 & 2: 0
2. FEELING DOWN, DEPRESSED OR HOPELESS: NOT AT ALL

## 2025-02-02 ASSESSMENT — ENCOUNTER SYMPTOMS
COUGH: 0
GASTROINTESTINAL NEGATIVE: 1
CARDIOVASCULAR NEGATIVE: 1
NEUROLOGICAL NEGATIVE: 1
CHILLS: 0
SHORTNESS OF BREATH: 1
PSYCHIATRIC NEGATIVE: 1
FEVER: 0

## 2025-02-02 ASSESSMENT — PAIN SCALES - GENERAL
PAINLEVEL_OUTOF10: 0 - NO PAIN
PAINLEVEL_OUTOF10: 5 - MODERATE PAIN

## 2025-02-02 NOTE — CONSULTS
Consults  Referred by ARTI Raymundo MD: Brent León MD    Reason For Consult  Facial cellulitis    History Of Present Illness  Subha Miller is a 64 y.o. female, hx of COPD on O2, hx of DM, hx of HTN, hx of HTN, hx of CVA, hx of recurrent facial cellulitis, she has developed fever, facial pain and redness 2 weeks PTA, she was on oral antibiotics, the temp improved, her face has partially improved, she was admitted for increasing sob x 1 day, she was hypoxic, the ct showed JORGE collapse, Lt sided infiltrate, no fever, no hemoptysis, has productive cough, no chest pain, WBC were N, viral screen is negative     Past Medical History  She has a past medical history of CHF (congestive heart failure), COPD (chronic obstructive pulmonary disease) (Multi), Diabetes mellitus (Multi), Hypertension, and Stroke (Multi).    Surgical History  She has a past surgical history that includes Back surgery; Tonsillectomy;  section, low transverse; and Cardiac catheterization (N/A, 2023).     Social History     Occupational History    Not on file   Tobacco Use    Smoking status: Every Day     Current packs/day: 0.50     Average packs/day: 0.5 packs/day for 25.0 years (12.5 ttl pk-yrs)     Types: Cigarettes    Smokeless tobacco: Never   Vaping Use    Vaping status: Never Used   Substance and Sexual Activity    Alcohol use: Never    Drug use: Never    Sexual activity: Not Currently     Travel History   Travel since 23    No documented travel since 25          Family History  No family history on file., no immunodeficincy  Allergies  Penicillins, Codeine, and Morphine     Immunization History   Administered Date(s) Administered    Moderna SARS-CoV-2 Vaccination 2021, 2021   Pneumonia vaccine is up to date, influenza vaccine   Fortune fall risk 60, preventive protocol was implemented  Depression screen is negative    Medications  Home medications:  Medications Prior to Admission    Medication Sig Dispense Refill Last Dose/Taking    acetaminophen (Tylenol) 325 mg tablet Take 2 tablets (650 mg) by mouth every 6 hours if needed for mild pain (1 - 3). 30 tablet 0 2/1/2025    gabapentin (Neurontin) 600 mg tablet Take 0.5 tablets (300 mg) by mouth 3 times a day.   2/1/2025 Noon    ipratropium-albuteroL (Duo-Neb) 0.5-2.5 mg/3 mL nebulizer solution Take 3 mL by nebulization 4 times a day.   Past Week    lisinopril 2.5 mg tablet Take 1 tablet (2.5 mg) by mouth once daily.   2/1/2025 Morning    metFORMIN  mg 24 hr tablet Take 1 tablet (500 mg) by mouth once daily with breakfast. Resume on 1/4/24 2/1/2025 Morning    oxygen (O2) gas therapy Inhale 4 L/min once every 24 hours. At night BiPAP 15 over 5, 40% FiO2  During the day continuous 3 to 4 L/min oxygen   2/2/2025 Morning    pantoprazole (ProtoNix) 40 mg EC tablet Take 1 tablet (40 mg) by mouth once daily. Do not crush, chew, or split.   2/1/2025 Morning    albuterol 90 mcg/actuation inhaler Inhale 2 puffs every 4 hours if needed for wheezing. 18 g 0     fluticasone-umeclidin-vilanter (TRELEGY-ELLIPTA) 100-62.5-25 mcg blister with device INHALE 1 PUFF BY MOUTH ONCE DAILY. 60 each 0     furosemide (Lasix) 40 mg tablet Take 1 tablet (40 mg) by mouth once daily. 30 tablet 0     nystatin (Mycostatin) 100,000 unit/gram powder Apply 1 Application topically 2 times a day.   Unknown    oxygen (O2) gas therapy Inhale 4 L/min once every 24 hours.       spironolactone (Aldactone) 25 mg tablet TAKE 1/2 TABLET(12.5 MG) BY MOUTH EVERY DAY 45 tablet 0      Current medications:  Scheduled medications  azithromycin, 500 mg, intravenous, q24h  cefTRIAXone, 1 g, intravenous, q24h  gabapentin, 300 mg, oral, TID  insulin lispro, 0-10 Units, subcutaneous, TID AC  ipratropium-albuteroL, 3 mL, nebulization, 4x daily  methylPREDNISolone sodium succinate (PF), 60 mg, intravenous, q8h  nystatin, 1 Application, Topical, BID  oxygen, 4 L/min, inhalation, q24h  [START ON  "2/3/2025] oxygen, 4 L/min, inhalation, q24h  pantoprazole, 40 mg, oral, Daily      Continuous medications     PRN medications  PRN medications: albuterol, dextrose, dextrose, glucagon, glucagon, guaiFENesin    Review of Systems   All other systems reviewed and are negative.       Objective  Range of Vitals (last 24 hours)  Heart Rate:  [79-98]   Temp:  [36.3 °C (97.4 °F)-37.1 °C (98.7 °F)]   Resp:  [13-22]   BP: ()/()   Height:  [160 cm (5' 2.99\")-160 cm (5' 3\")]   Weight:  [86 kg (189 lb 9.5 oz)-99.8 kg (220 lb)]   SpO2:  [91 %-98 %]   Daily Weight  02/02/25 : 86 kg (189 lb 9.5 oz)    Body mass index is 33.59 kg/m².   Nutritional consult    Physical Exam  Constitutional:       Appearance: Normal appearance.   HENT:      Head: Normocephalic and atraumatic.      Comments: Butterfly rash, skin thickening, desquamation,   Few spots of rash, papules, scabs on the nose, lip     Mouth/Throat:      Mouth: Mucous membranes are moist.      Pharynx: Oropharynx is clear.   Eyes:      Pupils: Pupils are equal, round, and reactive to light.   Cardiovascular:      Rate and Rhythm: Normal rate and regular rhythm.      Heart sounds: Normal heart sounds.   Pulmonary:      Effort: Pulmonary effort is normal.      Breath sounds: Normal breath sounds.   Abdominal:      General: Abdomen is flat. Bowel sounds are normal.      Palpations: Abdomen is soft.   Musculoskeletal:      Cervical back: Normal range of motion.   Neurological:      Mental Status: She is alert.          Relevant Results  Outside Hospital Results  reviewed  Labs  Results from last 72 hours   Lab Units 02/02/25  0619 02/01/25  1527   WBC AUTO x10*3/uL 12.2* 10.2   HEMOGLOBIN g/dL 15.8 17.8*   HEMATOCRIT % 50.1* 56.9*   PLATELETS AUTO x10*3/uL 154 151   NEUTROS PCT AUTO %  --  76.4   LYMPHS PCT AUTO %  --  15.6   MONOS PCT AUTO %  --  5.1   EOS PCT AUTO %  --  2.2     Results from last 72 hours   Lab Units 02/02/25  0619 02/01/25  1527   SODIUM mmol/L 137 " "138   POTASSIUM mmol/L 4.1 3.6   CHLORIDE mmol/L 95* 94*   CO2 mmol/L 32 35*   BUN mg/dL 18 12   CREATININE mg/dL 0.77 0.57   GLUCOSE mg/dL 202* 127*   CALCIUM mg/dL 9.1 9.8   ANION GAP mmol/L 14 13   EGFR mL/min/1.73m*2 85 >90     Results from last 72 hours   Lab Units 02/01/25  1527   ALK PHOS U/L 62   BILIRUBIN TOTAL mg/dL 0.6   PROTEIN TOTAL g/dL 7.7   ALT U/L 7   AST U/L 15   ALBUMIN g/dL 4.1     Estimated Creatinine Clearance: 74.7 mL/min (by C-G formula based on SCr of 0.77 mg/dL).  C-Reactive Protein   Date Value Ref Range Status   01/02/2024 0.66 <1.00 mg/dL Final   01/01/2024 0.71 <1.00 mg/dL Final     Sedimentation Rate   Date Value Ref Range Status   01/01/2024 27 0 - 30 mm/h Final     No results found for: \"HIV1X2\", \"HIVCONF\", \"KJJDQY2AK\"  No results found for: \"HEPCABINIT\", \"HEPCAB\", \"HCVPCRQUANT\"  Microbiology  Reviewed  Imaging  Reviewed      Assessment/Plan   Facial erysipelas / eczema  Respiratory failure / mucous plug / pneumonia    Recommendations :  Continue Azithromycin and Rocephin  Cultures  Herpes screen  Topical cortisone  Chest PT  MRSA screen  Inflammatory markers     I spent minutes in the professional and overall care of this patient.      Kailee Jara MD  "

## 2025-02-02 NOTE — SIGNIFICANT EVENT
History Of Present Illness  Subha Miller is a 64 y.o. female with a past medical history of chronic respiratory failure on 5 L of oxygen at home COPD, CHF, diabetes mellitus type 2, CVA, hypertension, facial cellulitis, ambulatory dysfunction (uses a wheelchair most of the time but sometimes able to use a walker) and elevated BMI (33.59 kg/m²) who was admitted to the hospital for hypoxic respiratory failure secondary to probable pneumonia with almost complete collapse of the left upper lobe and COPD exacerbation.  Patient states that she started to feel sick about a week ago.  She was coughing and was having increasing shortness of breath.  About 4 to 5 days ago she was unable to cough to bring up any phlegm but the shortness of breath continued to worsen.  She is an active smoker but she cut down to 5 cigarettes.  She denied chest pain, abdominal pain.  Denies nausea, vomiting, leg pain or leg swelling.  It involve the left periorbital, perinasal and perilabial area of the face.    Patient has a facial rash which was treated with 2 weeks of antibiotics as well as topical cream.  She completed the antibiotics about a week ago.  Wife is still the patient was cellulitis with     Past Medical History  She has a past medical history of CHF (congestive heart failure), COPD (chronic obstructive pulmonary disease) (Multi), Diabetes mellitus (Multi), Hypertension, and Stroke (Multi).    Surgical History  She has a past surgical history that includes Back surgery; Tonsillectomy;  section, low transverse; and Cardiac catheterization (N/A, 2023).     Social History  She reports that she has been smoking cigarettes. She has a 12.5 pack-year smoking history. She has never used smokeless tobacco. She reports that she does not drink alcohol and does not use drugs.  She lives with her .  She wants to be full code    Family History  No family history on file.     Allergies  Penicillins, Codeine, and  Morphine    Medications  Scheduled medications  azithromycin, 500 mg, intravenous, q24h  cefTRIAXone, 1 g, intravenous, q24h  gabapentin, 300 mg, oral, TID  ipratropium-albuteroL, 3 mL, nebulization, 4x daily  methylPREDNISolone sodium succinate (PF), 60 mg, intravenous, q8h  nystatin, 1 Application, Topical, BID  oxygen, 4 L/min, inhalation, q24h  [START ON 2/3/2025] oxygen, 4 L/min, inhalation, q24h  pantoprazole, 40 mg, oral, Daily      Continuous medications     PRN medications  PRN medications: albuterol, guaiFENesin    Review of systems: 10-point review of systems is negative.     Physical Exam  Constitutional: alert and oriented x 3, awake, cooperative, no acute distress  Skin: warm and dry  Head/Neck: Normocephalic, atraumatic  Eyes: clear sclera  ENMT: mucous membranes moist  Cardio: Regular rate and rhythm  Resp: Decreased air entry on the left side  Gastrointestinal: Soft, nontender, nondistended  Musculoskeletal: ROM intact, no joint swelling  Extremities: No edema, cyanosis, or clubbing  Neuro: lert and oriented x 3, sensation is intact.  Patient moves all limbs against resistance.  Psychological: Appropriate mood and behavior     Last Recorded Vitals  BP 97/53 (BP Location: Right arm, Patient Position: Lying)   Pulse 88   Temp 36.7 °C (98.1 °F) (Temporal)   Resp 20   Wt 86 kg (189 lb 9.5 oz)   SpO2 92%     Relevant Results  Results for orders placed or performed during the hospital encounter of 02/02/25 (from the past 24 hours)   Blood Gas Venous Full Panel   Result Value Ref Range    POCT pH, Venous 7.11 (LL) 7.33 - 7.43 pH    POCT pCO2, Venous 89 (HH) 41 - 51 mm Hg    POCT pO2, Venous 77 (H) 35 - 45 mm Hg    POCT SO2, Venous 93 (H) 45 - 75 %    POCT Oxy Hemoglobin, Venous 89.3 (H) 45.0 - 75.0 %    POCT Hematocrit Calculated, Venous 51.0 (H) 36.0 - 46.0 %    POCT Sodium, Venous 119 (LL) 136 - 145 mmol/L    POCT Potassium, Venous >10.0 (HH) 3.5 - 5.3 mmol/L    POCT Chloride, Venous 91 (L) 98 - 107  mmol/L    POCT Ionized Calicum, Venous      POCT Glucose, Venous 205 (H) 74 - 99 mg/dL    POCT Lactate, Venous 3.1 (H) 0.4 - 2.0 mmol/L    POCT Base Excess, Venous -4.4 (L) -2.0 - 3.0 mmol/L    POCT HCO3 Calculated, Venous 28.3 (H) 22.0 - 26.0 mmol/L    POCT Hemoglobin, Venous 17.1 (H) 12.0 - 16.0 g/dL    POCT Anion Gap, Venous      Patient Temperature      FiO2 52 %   Blood Gas Arterial Full Panel   Result Value Ref Range    POCT pH, Arterial 7.37 (L) 7.38 - 7.42 pH    POCT pCO2, Arterial 59 (H) 38 - 42 mm Hg    POCT pO2, Arterial 60 (L) 85 - 95 mm Hg    POCT SO2, Arterial 94 94 - 100 %    POCT Oxy Hemoglobin, Arterial 90.0 (L) 94.0 - 98.0 %    POCT Hematocrit Calculated, Arterial 49.0 (H) 36.0 - 46.0 %    POCT Sodium, Arterial 132 (L) 136 - 145 mmol/L    POCT Potassium, Arterial 4.0 3.5 - 5.3 mmol/L    POCT Chloride, Arterial 95 (L) 98 - 107 mmol/L    POCT Ionized Calcium, Arterial 1.21 1.10 - 1.33 mmol/L    POCT Glucose, Arterial 212 (H) 74 - 99 mg/dL    POCT Lactate, Arterial 2.3 (H) 0.4 - 2.0 mmol/L    POCT Base Excess, Arterial 6.5 (H) -2.0 - 3.0 mmol/L    POCT HCO3 Calculated, Arterial 34.1 (H) 22.0 - 26.0 mmol/L    POCT Hemoglobin, Arterial 16.3 (H) 12.0 - 16.0 g/dL    POCT Anion Gap, Arterial 7 (L) 10 - 25 mmo/L    Patient Temperature      FiO2 52 %     CT chest w IV contrast    Result Date: 2/1/2025  Interpreted By:  Jose Schofield, STUDY: CT CHEST W IV CONTRAST;  2/1/2025 5:20 pm   INDICATION: Signs/Symptoms:shortness of breath.   COMPARISON: CT from 01/02/2024 and radiographs from 02/01/2025.   ACCESSION NUMBER(S): [id number]   ORDERING CLINICIAN: SHILPA JEWELL   TECHNIQUE: CT of the chest was performed with Intravenous contrast material along with 3D (maximum intensity projection - MIP) image post processing and coronal reformatted images. 75 ml Omnipaque 350 was injected intravenously.   All CT examinations are performed with one or more of the following dose reduction techniques: Automated Exposure  Control, adjustment of mA and/or kV according to patient size, or use of iterative reconstruction techniques.   FINDINGS: Findings of vascular structures: Atherosclerotic calcifications involve coronary arteries. There is no aortic aneurysm or dissection. The central pulmonary arteries are normal in course and caliber, without filling defects to suggest central pulmonary embolism.   Other findings of chest: There is almost complete consolidation of the left upper lobe with volume loss, due to obstruction of the upper lobe bronchi, most likely due to secretions. Infiltrates/atelectatic changes also involve the left lower lobe. There is associated right to left mediastinal shift. Hiatal hernia is present. Upper abdomen: Right renal cyst is present. Osseous structures: Degenerative changes involve the spine.       Almost complete collapse of the left upper lobe due to obstruction of the upper lobe bronchi, most likely due to thick secretions; correlate clinically and follow-up as needed additional infiltrates/atelectatic changes in the left lower lobe. Associated right to left mediastinal shift. Correlate clinically and follow-up as needed. If findings not resolve, bronchoscopic evaluation could be obtained.   MACRO: None.     Signed by: Jose Schofield 2/1/2025 5:37 PM Dictation workstation:   [id number]    XR chest 1 view    Result Date: 2/1/2025  Interpreted By:  Lyubov Loo, STUDY: XR CHEST 1 VIEW;  2/1/2025 3:36 pm   INDICATION: Signs/Symptoms:shortness of breath.   COMPARISON: 10/03/2024   ACCESSION NUMBER(S): [id number]   ORDERING CLINICIAN: SHILPA JEWELL   FINDINGS: CARDIOMEDIASTINAL SILHOUETTE: Cardiomediastinal silhouette is normal in size and configuration.     LUNGS: There is extensive pneumonia in the left lung with volume loss and mediastinal shift to the left. The right lung is clear.   ABDOMEN: No remarkable upper abdominal findings.     BONES: No acute osseous changes.       Extensive pneumonia  left lung with volume loss and mediastinal shift to the left.   MACRO: None   Signed by: Lyubov Loo 2/1/2025 4:05 PM Dictation workstation:   [id number]       Assessment/Plan   Assessment & Plan  Acute respiratory failure    COPD exacerbation (Multi)    Bacterial pneumonia    Hypomagnesemia    Subha Miller is a 64 y.o. female with a past medical history of chronic respiratory failure on 5 L of oxygen at home COPD, CHF, diabetes mellitus type 2, CVA, hypertension, facial cellulitis, ambulatory dysfunction (uses a wheelchair most of the time but sometimes able to use a walker) and elevated BMI (33.59 kg/m²) who was admitted to the hospital for hypoxic respiratory failure secondary to probable pneumonia with almost complete collapse of the left upper lobe and COPD exacerbation.      Acute hypoxic respiratory failure  -Secondary to pneumonia, left upper lobe collapse and COPD exacerbation  -CT scan showed almost complete collapse of the left upper lobe due to obstruction of the upper lobe bronchi, most likely due to thick secretions; correlate clinically and follow-up as needed additional infiltrates/atelectatic changes in the left lower lobe. Associated right to left mediastinal shift.  -Patient is currently on 8 L of high flow nasal cannula  -Repeat ABG noted.  -There is no need for NIPPV at this time  -Will continue with high flow oxygen via nasal cannula  -Admit to the stepdown unit  -Consult pulmonology   -Patient will likely need a bronc  -Started on Rocephin and Zithromax for the probable pneumonia  -Give steroids and bronchodilators for the COPD exacerbation    Facial cellulitis  -Continue antibiotics and monitor  -Follow-up with ID    History of hypertension  -Blood pressure is on the low side  -Patient is on a low-dose of lisinopril which is likely for her kidneys  -Will hold lisinopril since blood pressure is on the low side.    Diabetes mellitus type 2  -Sliding scale insulin  -We will monitor blood  sugar    DVT prophylaxis  -[facility]       Tristen Ford MD

## 2025-02-02 NOTE — PROGRESS NOTES
02/02/25 0850   Discharge Planning   Living Arrangements Spouse/significant other   Support Systems Spouse/significant other   Assistance Needed A&OX4; independent with ADLs with wheelchair (transfers self); doesn't drive; 5L NC baseline ([employer]-need new supplier now?recently moved from [place])currently 8L NC; PCP Dr León   Type of Residence Private residence   Number of Stairs to Enter Residence 0   Number of Stairs Within Residence 0   Do you have animals or pets at home? No   Expected Discharge Disposition Home  (DC dispo is pending workup. Patient requesting RT to assess new O2 supplier? She recently moved from [place] and said her equipment is 10years old)   Does the patient need discharge transport arranged? Yes   RoundTrip coordination needed? Yes   Has discharge transport been arranged? No   Financial Resource Strain   How hard is it for you to pay for the very basics like food, housing, medical care, and heating? Not hard   Housing Stability   In the last 12 months, was there a time when you were not able to pay the mortgage or rent on time? N   In the past 12 months, how many times have you moved where you were living? 0   At any time in the past 12 months, were you homeless or living in a shelter (including now)? N   Transportation Needs   In the past 12 months, has lack of transportation kept you from medical appointments or from getting medications? no   In the past 12 months, has lack of transportation kept you from meetings, work, or from getting things needed for daily living? No

## 2025-02-02 NOTE — H&P
History Of Present Illness  Subha Miller is a 64 y.o. female with a past medical history of chronic respiratory failure on 5 L of oxygen at home COPD, CHF, diabetes mellitus type 2, CVA, hypertension, facial cellulitis, ambulatory dysfunction (uses a wheelchair most of the time but sometimes able to use a walker) and elevated BMI (33.59 kg/m²) who was admitted to the hospital for hypoxic respiratory failure secondary to probable pneumonia with almost complete collapse of the left upper lobe and COPD exacerbation.  Patient states that she started to feel sick about a week ago.  She was coughing and was having increasing shortness of breath.  About 4 to 5 days ago she was unable to cough to bring up any phlegm but the shortness of breath continued to worsen.  She is an active smoker but she cut down to 5 cigarettes.  She denied chest pain, abdominal pain.  Denies nausea, vomiting, leg pain or leg swelling.  It involve the left periorbital, perinasal and perilabial area of the face.    Patient has a facial rash which was treated with 2 weeks of antibiotics as well as topical cream.  She completed the antibiotics about a week ago.  Wife is still the patient was cellulitis with     Past Medical History  She has a past medical history of CHF (congestive heart failure), COPD (chronic obstructive pulmonary disease) (Multi), Diabetes mellitus (Multi), Hypertension, and Stroke (Multi).    Surgical History  She has a past surgical history that includes Back surgery; Tonsillectomy;  section, low transverse; and Cardiac catheterization (N/A, 2023).     Social History  She reports that she has been smoking cigarettes. She has a 12.5 pack-year smoking history. She has never used smokeless tobacco. She reports that she does not drink alcohol and does not use drugs.  She lives with her .  She wants to be full code    Family History  No family history on file.     Allergies  Penicillins, Codeine, and  Morphine    Medications  Scheduled medications  azithromycin, 500 mg, intravenous, q24h  cefTRIAXone, 1 g, intravenous, q24h  gabapentin, 300 mg, oral, TID  ipratropium-albuteroL, 3 mL, nebulization, 4x daily  methylPREDNISolone sodium succinate (PF), 60 mg, intravenous, q8h  nystatin, 1 Application, Topical, BID  oxygen, 4 L/min, inhalation, q24h  [START ON 2/3/2025] oxygen, 4 L/min, inhalation, q24h  pantoprazole, 40 mg, oral, Daily      Continuous medications     PRN medications  PRN medications: albuterol, guaiFENesin    Review of systems: 10-point review of systems is negative.     Physical Exam  Constitutional: alert and oriented x 3, awake, cooperative, no acute distress  Skin: warm and dry  Head/Neck: Normocephalic, atraumatic  Eyes: clear sclera  ENMT: mucous membranes moist  Cardio: Regular rate and rhythm  Resp: Decreased air entry on the left side  Gastrointestinal: Soft, nontender, nondistended  Musculoskeletal: ROM intact, no joint swelling  Extremities: No edema, cyanosis, or clubbing  Neuro: lert and oriented x 3, sensation is intact.  Patient moves all limbs against resistance.  Psychological: Appropriate mood and behavior     Last Recorded Vitals  /53   Pulse 79   Temp 36.7 °C (98.1 °F) (Temporal)   Resp 22   Wt 86 kg (189 lb 9.5 oz)   SpO2 91%     Relevant Results  Results for orders placed or performed during the hospital encounter of 02/02/25 (from the past 24 hours)   Blood Gas Venous Full Panel   Result Value Ref Range    POCT pH, Venous 7.11 (LL) 7.33 - 7.43 pH    POCT pCO2, Venous 89 (HH) 41 - 51 mm Hg    POCT pO2, Venous 77 (H) 35 - 45 mm Hg    POCT SO2, Venous 93 (H) 45 - 75 %    POCT Oxy Hemoglobin, Venous 89.3 (H) 45.0 - 75.0 %    POCT Hematocrit Calculated, Venous 51.0 (H) 36.0 - 46.0 %    POCT Sodium, Venous 119 (LL) 136 - 145 mmol/L    POCT Potassium, Venous >10.0 (HH) 3.5 - 5.3 mmol/L    POCT Chloride, Venous 91 (L) 98 - 107 mmol/L    POCT Ionized Calicum, Venous      POCT  Glucose, Venous 205 (H) 74 - 99 mg/dL    POCT Lactate, Venous 3.1 (H) 0.4 - 2.0 mmol/L    POCT Base Excess, Venous -4.4 (L) -2.0 - 3.0 mmol/L    POCT HCO3 Calculated, Venous 28.3 (H) 22.0 - 26.0 mmol/L    POCT Hemoglobin, Venous 17.1 (H) 12.0 - 16.0 g/dL    POCT Anion Gap, Venous      Patient Temperature      FiO2 52 %   Blood Gas Arterial Full Panel   Result Value Ref Range    POCT pH, Arterial 7.37 (L) 7.38 - 7.42 pH    POCT pCO2, Arterial 59 (H) 38 - 42 mm Hg    POCT pO2, Arterial 60 (L) 85 - 95 mm Hg    POCT SO2, Arterial 94 94 - 100 %    POCT Oxy Hemoglobin, Arterial 90.0 (L) 94.0 - 98.0 %    POCT Hematocrit Calculated, Arterial 49.0 (H) 36.0 - 46.0 %    POCT Sodium, Arterial 132 (L) 136 - 145 mmol/L    POCT Potassium, Arterial 4.0 3.5 - 5.3 mmol/L    POCT Chloride, Arterial 95 (L) 98 - 107 mmol/L    POCT Ionized Calcium, Arterial 1.21 1.10 - 1.33 mmol/L    POCT Glucose, Arterial 212 (H) 74 - 99 mg/dL    POCT Lactate, Arterial 2.3 (H) 0.4 - 2.0 mmol/L    POCT Base Excess, Arterial 6.5 (H) -2.0 - 3.0 mmol/L    POCT HCO3 Calculated, Arterial 34.1 (H) 22.0 - 26.0 mmol/L    POCT Hemoglobin, Arterial 16.3 (H) 12.0 - 16.0 g/dL    POCT Anion Gap, Arterial 7 (L) 10 - 25 mmo/L    Patient Temperature      FiO2 52 %     CT chest w IV contrast    Result Date: 2/1/2025  Interpreted By:  Jose Schofield, STUDY: CT CHEST W IV CONTRAST;  2/1/2025 5:20 pm   INDICATION: Signs/Symptoms:shortness of breath.   COMPARISON: CT from 01/02/2024 and radiographs from 02/01/2025.   ACCESSION NUMBER(S): [id number]   ORDERING CLINICIAN: SHILPA JEWELL   TECHNIQUE: CT of the chest was performed with Intravenous contrast material along with 3D (maximum intensity projection - MIP) image post processing and coronal reformatted images. 75 ml Omnipaque 350 was injected intravenously.   All CT examinations are performed with one or more of the following dose reduction techniques: Automated Exposure Control, adjustment of mA and/or kV according to  patient size, or use of iterative reconstruction techniques.   FINDINGS: Findings of vascular structures: Atherosclerotic calcifications involve coronary arteries. There is no aortic aneurysm or dissection. The central pulmonary arteries are normal in course and caliber, without filling defects to suggest central pulmonary embolism.   Other findings of chest: There is almost complete consolidation of the left upper lobe with volume loss, due to obstruction of the upper lobe bronchi, most likely due to secretions. Infiltrates/atelectatic changes also involve the left lower lobe. There is associated right to left mediastinal shift. Hiatal hernia is present. Upper abdomen: Right renal cyst is present. Osseous structures: Degenerative changes involve the spine.       Almost complete collapse of the left upper lobe due to obstruction of the upper lobe bronchi, most likely due to thick secretions; correlate clinically and follow-up as needed additional infiltrates/atelectatic changes in the left lower lobe. Associated right to left mediastinal shift. Correlate clinically and follow-up as needed. If findings not resolve, bronchoscopic evaluation could be obtained.   MACRO: None.     Signed by: Jose Schofield 2/1/2025 5:37 PM Dictation workstation:   [id number]    XR chest 1 view    Result Date: 2/1/2025  Interpreted By:  Lyubov Loo, STUDY: XR CHEST 1 VIEW;  2/1/2025 3:36 pm   INDICATION: Signs/Symptoms:shortness of breath.   COMPARISON: 10/03/2024   ACCESSION NUMBER(S): [id number]   ORDERING CLINICIAN: SHILPA JEWELL   FINDINGS: CARDIOMEDIASTINAL SILHOUETTE: Cardiomediastinal silhouette is normal in size and configuration.     LUNGS: There is extensive pneumonia in the left lung with volume loss and mediastinal shift to the left. The right lung is clear.   ABDOMEN: No remarkable upper abdominal findings.     BONES: No acute osseous changes.       Extensive pneumonia left lung with volume loss and mediastinal shift  to the left.   MACRO: None   Signed by: Lyubov Loo 2/1/2025 4:05 PM Dictation workstation:   [id number]       Assessment/Plan   Assessment & Plan  Acute respiratory failure    COPD exacerbation (Multi)    Bacterial pneumonia    Hypomagnesemia    Subha Miller is a 64 y.o. female with a past medical history of chronic respiratory failure on 5 L of oxygen at home COPD, CHF, diabetes mellitus type 2, CVA, hypertension, facial cellulitis, ambulatory dysfunction (uses a wheelchair most of the time but sometimes able to use a walker) and elevated BMI (33.59 kg/m²) who was admitted to the hospital for hypoxic respiratory failure secondary to probable pneumonia with almost complete collapse of the left upper lobe and COPD exacerbation.      Acute hypoxic respiratory failure  -Secondary to pneumonia, left upper lobe collapse and COPD exacerbation  -CT scan showed almost complete collapse of the left upper lobe due to obstruction of the upper lobe bronchi, most likely due to thick secretions; correlate clinically and follow-up as needed additional infiltrates/atelectatic changes in the left lower lobe. Associated right to left mediastinal shift.  -Patient is currently on 8 L of high flow nasal cannula  -Repeat ABG noted.  -There is no need for NIPPV at this time  -Will continue with high flow oxygen via nasal cannula  -Admit to the stepdown unit  -Consult pulmonology   -Patient will likely need a bronc  -Started on Rocephin and Zithromax for the probable pneumonia  -Give steroids and bronchodilators for the COPD exacerbation    Facial cellulitis  -Continue antibiotics and monitor  -Follow-up with ID    History of hypertension  -Blood pressure is on the low side  -Patient is on a low-dose of lisinopril which is likely for her kidneys  -Will hold lisinopril since blood pressure is on the low side.    Diabetes mellitus type 2  -Sliding scale insulin  -We will monitor blood sugar    DVT prophylaxis  -Lovenox       Roberto-Kaden  MD Logan     Yes

## 2025-02-02 NOTE — CARE PLAN
The patient's goals for the shift include      The clinical goals for the shift include Patient will remain HDS this shift    Patient admitted this shift from [facility]. Maintained O2 sat 88-92% on 8L high flow NC.

## 2025-02-02 NOTE — CARE PLAN
The patient's goals for the shift include  breathe better    The clinical goals for the shift include pt will not require increased supplemental O2 during shift

## 2025-02-03 ENCOUNTER — APPOINTMENT (OUTPATIENT)
Dept: RADIOLOGY | Facility: HOSPITAL | Age: 65
DRG: 205 | End: 2025-02-03
Payer: COMMERCIAL

## 2025-02-03 ENCOUNTER — APPOINTMENT (OUTPATIENT)
Dept: CARDIOLOGY | Facility: HOSPITAL | Age: 65
DRG: 205 | End: 2025-02-03
Payer: COMMERCIAL

## 2025-02-03 LAB
ALBUMIN SERPL BCP-MCNC: 3.4 G/DL (ref 3.4–5)
ANION GAP SERPL CALC-SCNC: 11 MMOL/L (ref 10–20)
ATRIAL RATE: 86 BPM
ATRIAL RATE: 91 BPM
BUN SERPL-MCNC: 24 MG/DL (ref 6–23)
CALCIUM SERPL-MCNC: 9.4 MG/DL (ref 8.6–10.3)
CARDIAC TROPONIN I PNL SERPL HS: 16 NG/L (ref 0–13)
CARDIAC TROPONIN I PNL SERPL HS: 17 NG/L (ref 0–13)
CARDIAC TROPONIN I PNL SERPL HS: 18 NG/L (ref 0–13)
CHLORIDE SERPL-SCNC: 96 MMOL/L (ref 98–107)
CO2 SERPL-SCNC: 37 MMOL/L (ref 21–32)
CREAT SERPL-MCNC: 0.63 MG/DL (ref 0.5–1.05)
EGFRCR SERPLBLD CKD-EPI 2021: >90 ML/MIN/1.73M*2
ERYTHROCYTE [DISTWIDTH] IN BLOOD BY AUTOMATED COUNT: 15.1 % (ref 11.5–14.5)
GLUCOSE BLD MANUAL STRIP-MCNC: 115 MG/DL (ref 74–99)
GLUCOSE BLD MANUAL STRIP-MCNC: 183 MG/DL (ref 74–99)
GLUCOSE BLD MANUAL STRIP-MCNC: 196 MG/DL (ref 74–99)
GLUCOSE BLD MANUAL STRIP-MCNC: 258 MG/DL (ref 74–99)
GLUCOSE SERPL-MCNC: 110 MG/DL (ref 74–99)
HCT VFR BLD AUTO: 47.6 % (ref 36–46)
HGB BLD-MCNC: 14.9 G/DL (ref 12–16)
MAGNESIUM SERPL-MCNC: 1.72 MG/DL (ref 1.6–2.4)
MCH RBC QN AUTO: 33.5 PG (ref 26–34)
MCHC RBC AUTO-ENTMCNC: 31.3 G/DL (ref 32–36)
MCV RBC AUTO: 107 FL (ref 80–100)
NRBC BLD-RTO: 0 /100 WBCS (ref 0–0)
P AXIS: 60 DEGREES
P AXIS: 73 DEGREES
P OFFSET: 190 MS
P OFFSET: 194 MS
P ONSET: 138 MS
P ONSET: 142 MS
PHOSPHATE SERPL-MCNC: 3 MG/DL (ref 2.5–4.9)
PLATELET # BLD AUTO: 148 X10*3/UL (ref 150–450)
POTASSIUM SERPL-SCNC: 4.2 MMOL/L (ref 3.5–5.3)
PR INTERVAL: 150 MS
PR INTERVAL: 160 MS
Q ONSET: 217 MS
Q ONSET: 218 MS
QRS COUNT: 14 BEATS
QRS COUNT: 15 BEATS
QRS DURATION: 80 MS
QRS DURATION: 86 MS
QT INTERVAL: 350 MS
QT INTERVAL: 384 MS
QTC CALCULATION(BAZETT): 430 MS
QTC CALCULATION(BAZETT): 459 MS
QTC FREDERICIA: 402 MS
QTC FREDERICIA: 433 MS
R AXIS: 45 DEGREES
R AXIS: 74 DEGREES
RBC # BLD AUTO: 4.45 X10*6/UL (ref 4–5.2)
SODIUM SERPL-SCNC: 140 MMOL/L (ref 136–145)
T AXIS: 50 DEGREES
T AXIS: 50 DEGREES
T OFFSET: 392 MS
T OFFSET: 410 MS
VENTRICULAR RATE: 86 BPM
VENTRICULAR RATE: 91 BPM
WBC # BLD AUTO: 16.6 X10*3/UL (ref 4.4–11.3)

## 2025-02-03 PROCEDURE — 97530 THERAPEUTIC ACTIVITIES: CPT | Mod: GP

## 2025-02-03 PROCEDURE — 36415 COLL VENOUS BLD VENIPUNCTURE: CPT | Performed by: FAMILY MEDICINE

## 2025-02-03 PROCEDURE — 83735 ASSAY OF MAGNESIUM: CPT | Performed by: FAMILY MEDICINE

## 2025-02-03 PROCEDURE — 2500000001 HC RX 250 WO HCPCS SELF ADMINISTERED DRUGS (ALT 637 FOR MEDICARE OP): Performed by: FAMILY MEDICINE

## 2025-02-03 PROCEDURE — 2500000004 HC RX 250 GENERAL PHARMACY W/ HCPCS (ALT 636 FOR OP/ED): Performed by: INTERNAL MEDICINE

## 2025-02-03 PROCEDURE — 2500000002 HC RX 250 W HCPCS SELF ADMINISTERED DRUGS (ALT 637 FOR MEDICARE OP, ALT 636 FOR OP/ED): Performed by: INTERNAL MEDICINE

## 2025-02-03 PROCEDURE — 82947 ASSAY GLUCOSE BLOOD QUANT: CPT

## 2025-02-03 PROCEDURE — 71045 X-RAY EXAM CHEST 1 VIEW: CPT | Performed by: STUDENT IN AN ORGANIZED HEALTH CARE EDUCATION/TRAINING PROGRAM

## 2025-02-03 PROCEDURE — 36415 COLL VENOUS BLD VENIPUNCTURE: CPT | Performed by: INTERNAL MEDICINE

## 2025-02-03 PROCEDURE — 99233 SBSQ HOSP IP/OBS HIGH 50: CPT | Performed by: INTERNAL MEDICINE

## 2025-02-03 PROCEDURE — 84484 ASSAY OF TROPONIN QUANT: CPT | Performed by: FAMILY MEDICINE

## 2025-02-03 PROCEDURE — 94668 MNPJ CHEST WALL SBSQ: CPT

## 2025-02-03 PROCEDURE — 2500000005 HC RX 250 GENERAL PHARMACY W/O HCPCS: Performed by: INTERNAL MEDICINE

## 2025-02-03 PROCEDURE — 2500000001 HC RX 250 WO HCPCS SELF ADMINISTERED DRUGS (ALT 637 FOR MEDICARE OP): Performed by: INTERNAL MEDICINE

## 2025-02-03 PROCEDURE — 2500000002 HC RX 250 W HCPCS SELF ADMINISTERED DRUGS (ALT 637 FOR MEDICARE OP, ALT 636 FOR OP/ED): Performed by: FAMILY MEDICINE

## 2025-02-03 PROCEDURE — 71045 X-RAY EXAM CHEST 1 VIEW: CPT

## 2025-02-03 PROCEDURE — 94760 N-INVAS EAR/PLS OXIMETRY 1: CPT

## 2025-02-03 PROCEDURE — 84100 ASSAY OF PHOSPHORUS: CPT | Performed by: FAMILY MEDICINE

## 2025-02-03 PROCEDURE — 84484 ASSAY OF TROPONIN QUANT: CPT | Performed by: INTERNAL MEDICINE

## 2025-02-03 PROCEDURE — 97161 PT EVAL LOW COMPLEX 20 MIN: CPT | Mod: GP

## 2025-02-03 PROCEDURE — 9420000001 HC RT PATIENT EDUCATION 5 MIN

## 2025-02-03 PROCEDURE — 93005 ELECTROCARDIOGRAM TRACING: CPT

## 2025-02-03 PROCEDURE — 94669 MECHANICAL CHEST WALL OSCILL: CPT

## 2025-02-03 PROCEDURE — 2500000005 HC RX 250 GENERAL PHARMACY W/O HCPCS: Performed by: FAMILY MEDICINE

## 2025-02-03 PROCEDURE — 85027 COMPLETE CBC AUTOMATED: CPT | Performed by: FAMILY MEDICINE

## 2025-02-03 PROCEDURE — 2060000001 HC INTERMEDIATE ICU ROOM DAILY

## 2025-02-03 PROCEDURE — 99233 SBSQ HOSP IP/OBS HIGH 50: CPT | Performed by: FAMILY MEDICINE

## 2025-02-03 PROCEDURE — 94640 AIRWAY INHALATION TREATMENT: CPT

## 2025-02-03 RX ORDER — ACETAMINOPHEN 325 MG/1
650 TABLET ORAL EVERY 6 HOURS PRN
Status: DISCONTINUED | OUTPATIENT
Start: 2025-02-03 | End: 2025-02-04 | Stop reason: HOSPADM

## 2025-02-03 RX ADMIN — CEFTRIAXONE SODIUM 1 G: 1 INJECTION, SOLUTION INTRAVENOUS at 17:39

## 2025-02-03 RX ADMIN — Medication 3 L/MIN: at 11:28

## 2025-02-03 RX ADMIN — PANTOPRAZOLE SODIUM 40 MG: 40 TABLET, DELAYED RELEASE ORAL at 09:42

## 2025-02-03 RX ADMIN — GABAPENTIN 300 MG: 300 CAPSULE ORAL at 20:40

## 2025-02-03 RX ADMIN — GABAPENTIN 300 MG: 300 CAPSULE ORAL at 09:42

## 2025-02-03 RX ADMIN — GUAIFENESIN 600 MG: 600 TABLET ORAL at 09:42

## 2025-02-03 RX ADMIN — HYDROCORTISONE: 1 CREAM TOPICAL at 09:41

## 2025-02-03 RX ADMIN — GABAPENTIN 300 MG: 300 CAPSULE ORAL at 14:50

## 2025-02-03 RX ADMIN — Medication 5 L/MIN: at 15:51

## 2025-02-03 RX ADMIN — PREDNISONE 40 MG: 20 TABLET ORAL at 09:42

## 2025-02-03 RX ADMIN — INSULIN LISPRO 6 UNITS: 100 INJECTION, SOLUTION INTRAVENOUS; SUBCUTANEOUS at 17:39

## 2025-02-03 RX ADMIN — HYDROCORTISONE 1 APPLICATION: 1 CREAM TOPICAL at 20:40

## 2025-02-03 RX ADMIN — AZITHROMYCIN 500 MG: 250 TABLET, FILM COATED ORAL at 09:42

## 2025-02-03 RX ADMIN — Medication 4 L/MIN: at 19:30

## 2025-02-03 RX ADMIN — INSULIN LISPRO 2 UNITS: 100 INJECTION, SOLUTION INTRAVENOUS; SUBCUTANEOUS at 12:40

## 2025-02-03 RX ADMIN — IPRATROPIUM BROMIDE AND ALBUTEROL SULFATE 3 ML: 2.5; .5 SOLUTION RESPIRATORY (INHALATION) at 19:31

## 2025-02-03 RX ADMIN — IPRATROPIUM BROMIDE AND ALBUTEROL SULFATE 3 ML: 2.5; .5 SOLUTION RESPIRATORY (INHALATION) at 09:35

## 2025-02-03 RX ADMIN — IPRATROPIUM BROMIDE AND ALBUTEROL SULFATE 3 ML: 2.5; .5 SOLUTION RESPIRATORY (INHALATION) at 11:27

## 2025-02-03 RX ADMIN — NYSTATIN 1 APPLICATION: 100000 POWDER TOPICAL at 09:42

## 2025-02-03 RX ADMIN — ACETAMINOPHEN 650 MG: 325 TABLET, FILM COATED ORAL at 12:22

## 2025-02-03 RX ADMIN — IPRATROPIUM BROMIDE AND ALBUTEROL SULFATE 3 ML: 2.5; .5 SOLUTION RESPIRATORY (INHALATION) at 15:44

## 2025-02-03 RX ADMIN — NYSTATIN 1 APPLICATION: 100000 POWDER TOPICAL at 20:40

## 2025-02-03 RX ADMIN — Medication 5 L/MIN: at 09:36

## 2025-02-03 ASSESSMENT — PAIN SCALES - GENERAL
PAINLEVEL_OUTOF10: 7
PAINLEVEL_OUTOF10: 7
PAINLEVEL_OUTOF10: 3
PAINLEVEL_OUTOF10: 7
PAINLEVEL_OUTOF10: 0 - NO PAIN

## 2025-02-03 ASSESSMENT — PAIN - FUNCTIONAL ASSESSMENT
PAIN_FUNCTIONAL_ASSESSMENT: 0-10

## 2025-02-03 ASSESSMENT — COGNITIVE AND FUNCTIONAL STATUS - GENERAL
STANDING UP FROM CHAIR USING ARMS: A LITTLE
MOBILITY SCORE: 17
WALKING IN HOSPITAL ROOM: A LOT
CLIMB 3 TO 5 STEPS WITH RAILING: TOTAL
MOVING TO AND FROM BED TO CHAIR: A LITTLE

## 2025-02-03 ASSESSMENT — ACTIVITIES OF DAILY LIVING (ADL)
ADL_ASSISTANCE: INDEPENDENT
ADLS_ADDRESSED: YES

## 2025-02-03 ASSESSMENT — PAIN DESCRIPTION - LOCATION: LOCATION: CHEST

## 2025-02-03 ASSESSMENT — PAIN DESCRIPTION - DESCRIPTORS: DESCRIPTORS: DISCOMFORT

## 2025-02-03 NOTE — PROGRESS NOTES
02/03/25 1432   Discharge Planning   Living Arrangements Spouse/significant other   Support Systems Spouse/significant other   Assistance Needed A&OX4; independent with ADLs with wheelchair (transfers self); doesn't drive; 5L NC baseline ([employer]-need new supplier now?recently moved from [place])currently 8L NC; PCP Dr León   Type of Residence Private residence   Do you have animals or pets at home? No   Who is requesting discharge planning? Provider   Expected Discharge Disposition Home  (denies any HHC needs, agreeable to [facility], referral made, RT re-qualifying for new 02 and will arrange thru [facility], currently on 5L02)   Does the patient need discharge transport arranged? No   Stroke Family Assessment   Stroke Family Assessment Needed No   Intensity of Service   Intensity of Service 0-30 min

## 2025-02-03 NOTE — CARE PLAN
The patient's goals for the shift include      The clinical goals for the shift include patient will maintain Spo2 above 92% on the 5LNC throughout shift    Overnight patient developed chest pain VS remained WNL EKG showed NSR cardiac enzymes drawn twice first troponin 18, second troponin 17 with chest pain and anxiety resolving as a result of therapeutic communication, ice pack, education, nebulizer and ice chips. Provider notified and aware of patient status at time of event.

## 2025-02-03 NOTE — CONSULTS
"Nutrition Note:   Nutrition Assessment       Patient is a 64 y.o. female presenting with AHRF in setting of L upper lobe collapse 2/2 mucus plugging, suspected PNA, & COPD exacerbation.     RDN received nursing admission screen (MST=2, unsure of weight loss).     Visit made, pt reports poor appetite x few days prior to admission. Notes this has since resolved & she is eating well again. Nursing documenting 100% intake at all meals. Pt reports 40# weight loss but unable to provide stated UBW, as she is \"unsure\" of what she normally weighs. Historical weights [1/1/24 - 95.1 kg] indicate no significant weight loss over the past x1 year. Pt w/ adequate PO intake at this time & declines need of ONS. Will defer full assessment given no nutritional intervention is indicated. Please re-consult if clinical status declines.     Dietary Orders (From admission, onward)       Start     Ordered    02/02/25 0646  Adult diet Regular, Consistent Carb; CCD 60 gm/meal  Diet effective now        Question Answer Comment   Diet type Regular    Diet type Consistent Carb    Carb diet selection: CCD 60 gm/meal        02/02/25 0646    02/02/25 0200  May Participate in Room Service  ( ROOM SERVICE MAY PARTICIPATE)  Once        Question:  .  Answer:  Yes    02/02/25 0159               Time Spent (min): 30 minutes       "

## 2025-02-03 NOTE — CONSULTS
"Reason For Consult  Patient consulted for COPD education and smoking cessation.     History Of Present Illness  Subha Miller is a 64 y.o. female presenting with acute respiratory failure 2/2 mucous plugging and pna  Patient has a hx of COPD and wears 5 L NC baseline.   Patient is not followed by pulm outpatient and has never had pft testing done.  She is a current smoker, . 25 ppd for 50 years.    She takes trelegy and albuterol at home and states gets relief from that.   Patient states she has a hard time making appts d/t transportation needs.      Past Medical History  She has a past medical history of CHF (congestive heart failure), COPD (chronic obstructive pulmonary disease) (Multi), Diabetes mellitus (Multi), Hypertension, and Stroke (Multi).    Surgical History  She has a past surgical history that includes Back surgery; Tonsillectomy;  section, low transverse; and Cardiac catheterization (N/A, 2023).     Social History  She reports that she has been smoking cigarettes. She has a 12.5 pack-year smoking history. She has never used smokeless tobacco. She reports that she does not drink alcohol and does not use drugs.    Family History  No family history on file.     Allergies  Penicillins, Codeine, and Morphine         Last Recorded Vitals  Blood pressure 120/56, pulse 81, temperature 36.7 °C (98.1 °F), temperature source Oral, resp. rate 12, height 1.6 m (5' 2.99\"), weight 86 kg (189 lb 9.5 oz), SpO2 96%.         Assessment/Plan     This RT to see patient for COPD consult. The patient was given a COPD booklet with educational materials regarding pulmonary issues. Smoking cessation education reviewed, documentation given. Smoking history 12.5 pack year hx. I discussed various options for quitting smoking. I talked about different strategies that can be used to support changing habits and reduce the urges of withdraw symptoms.  [facility] support group discussed. Flyer given for next month's " meeting. I educated patient about the disease process and how it affected the lungs making it difficult to breathe. We discussed current medications and if their current medications give them relief. We discussed importance of future PFT testing and following up with primary and pulmonology. Patient given  pulmonary office phone number to make an appt. Patient was very receptive to all information given.     Flutter valve- The patient was given a flutter valve to help facilitate the removal of mucous from the airways. I instructed the patient on how to use the flutter with the proper technique to get the best results. In return, the patient demonstrated flutter training appropriately with a good understanding on how it is utilized.   Pulmonary rehab- Educated patient on pulmonary rehab program. The patient was given information regarding our pulmonary rehabilitation program. Spoke to the patient about the benefits of this program which can offer coping skills and exercise strengthening sessions providing a better quality of life. Patient is receptive to joining the program       DERREK GILLIS, RRT

## 2025-02-03 NOTE — PROGRESS NOTES
Subha Miller is a 64 y.o. female on day 1 of admission presenting with Acute respiratory failure.      Subjective   Patient had intermittent chest pain throughout the day.  2 EKG showed no acute changes.       Objective     Last Recorded Vitals  /56 (BP Location: Right arm, Patient Position: Lying)   Pulse 81   Temp 36.7 °C (98.1 °F) (Oral)   Resp 12   Wt 86 kg (189 lb 9.5 oz)   SpO2 96%   Intake/Output last 3 Shifts:    Intake/Output Summary (Last 24 hours) at 2/3/2025 1214  Last data filed at 2/2/2025 2057  Gross per 24 hour   Intake 530 ml   Output 250 ml   Net 280 ml       Admission Weight  Weight: 86 kg (189 lb 9.5 oz) (02/02/25 0150)    Daily Weight  02/02/25 : 86 kg (189 lb 9.5 oz)    Image Results  ECG 12 lead  Normal sinus rhythm  Possible Left atrial enlargement  Borderline ECG  When compared with ECG of 03-OCT-2024 14:08,  No significant change was found  Confirmed by Wes Clemente ([id number]) on 2/3/2025 11:30:03 AM  XR chest 1 view  Narrative: Interpreted By:  Barb Echeverria,   STUDY:  XR CHEST 1 VIEW; ;  2/3/2025 8:25 am      INDICATION:  Signs/Symptoms:f/u mucus plug.          COMPARISON:  02/01/2025      ACCESSION NUMBER(S):  [id number]      ORDERING CLINICIAN:  HENRY NANCE      FINDINGS:  Cardiac silhouette is stable in size and morphology. There is  interval improvement in airspace opacities in the left lung with  improved aeration compared to prior radiograph. There is also  interval improvement in left-sided pleural effusion with moderate  residual. Right lung is relatively clear. There is mild prominence of  interstitial markings in bilateral lungs. No large pneumothorax  within limits of portable technique. No acute osseous findings.      Impression: 1. Interval improvement in left upper lobe aeration with decreased  airspace opacities compared to prior radiograph.  2. Interval improvement in left-sided pleural effusion with moderate  residual.                  MACRO:  None      Signed  by: Barb Echeverria 2/3/2025 9:21 AM  Dictation workstation:   [id number]      Physical Exam    Gen: lying comfortably in bed, not in acute distress  HEENT: atraumatic, normocephalic  Pulm: normal respiratory effort, clear to auscultation b/l  Cardiac: RRR, no murmurs noted, normal S1/S2, chest is tender to palpation  GI: Soft, nontender, BS+  MSK: normal ROM without joint swelling  Extremities: no LE edema, cyanosis  Neuro: AOX3, CN II-XII grossly intact, equal b/l strength, no loss in sensation   Psych: calm and appropriate for situation        Assessment/Plan      Subha Miller is a 64 y.o. female with a past medical history of chronic respiratory failure on 5 L of oxygen at home COPD, CHF, diabetes mellitus type 2, CVA, hypertension, facial cellulitis, ambulatory dysfunction (uses a wheelchair most of the time but sometimes able to use a walker) and elevated BMI (33.59 kg/m²) who was admitted to the hospital for hypoxic respiratory failure secondary to probable pneumonia with almost complete collapse of the left upper lobe and COPD exacerbation.       Acute hypoxic respiratory failure due to pneumonia with left upper lobe collapse and COPD exacerbation  CT scan showed almost complete collapse of the left upper lobe due to obstruction of the upper lobe bronchi, most likely due to thick secretions; correlate clinically and follow-up as needed additional infiltrates/atelectatic changes in the left lower lobe. Associated right to left mediastinal shift.  Left lung mucous plug resolved on chest x-ray this a.m, Pulm no need for bronc  Patient is currently on 8 L of high flow nasal cannula  Leukocytosis has worsened, will continue antibiotics however this may be related to concurrent steroid therapy  ID following and recommends continued Rocephin and Zithromax   Pulm recommends to continue steroids and bronchodilators for the COPD exacerbation     Atypical chest pain/demand ischemia  Likely due to above  Chest is tender  to palpation  EKGs x 2 showed no acute changes  Troponins elevated but low    Facial cellulitis  Continue antibiotics and monitor  Follow-up with ID     History of hypertension  Blood pressure is on the low side  Patient is on a low-dose of lisinopril which is likely for her kidneys  Continue to hold lisinopril     Diabetes mellitus type 2  Sliding scale insulin  We will monitor blood sugar     DVT prophylaxis  Lovechris Raymundo DO

## 2025-02-03 NOTE — PROGRESS NOTES
Subha Miller is a 64 y.o. female on day 1 of admission presenting with Acute respiratory failure.    Subjective   Interval History: no fever, less sob, productive cough        Review of Systems    Objective   Range of Vitals (last 24 hours)  Heart Rate:  [79-92]   Temp:  [36.1 °C (96.9 °F)-37.1 °C (98.7 °F)]   Resp:  [12-20]   BP: ()/(50-59)   SpO2:  [92 %-97 %]   Daily Weight  02/02/25 : 86 kg (189 lb 9.5 oz)    Body mass index is 33.59 kg/m².    Physical Exam  Constitutional:       Appearance: Normal appearance.   HENT:      Head: Normocephalic and atraumatic.      Comments: Less facial edema ans eczema     Mouth/Throat:      Mouth: Mucous membranes are moist.      Pharynx: Oropharynx is clear.   Eyes:      Pupils: Pupils are equal, round, and reactive to light.   Cardiovascular:      Rate and Rhythm: Normal rate and regular rhythm.      Heart sounds: Normal heart sounds.   Pulmonary:      Effort: Pulmonary effort is normal.      Breath sounds: Rales present.   Abdominal:      General: Abdomen is flat. Bowel sounds are normal.      Palpations: Abdomen is soft.   Musculoskeletal:      Cervical back: Normal range of motion.   Neurological:      Mental Status: She is alert.         Antibiotics  azithromycin - 250 mg  cefTRIAXone - 1 gram/50 mL    Relevant Results  Labs  Results from last 72 hours   Lab Units 02/03/25  0543 02/02/25  0619 02/01/25  1527   WBC AUTO x10*3/uL 16.6* 12.2* 10.2   HEMOGLOBIN g/dL 14.9 15.8 17.8*   HEMATOCRIT % 47.6* 50.1* 56.9*   PLATELETS AUTO x10*3/uL 148* 154 151   NEUTROS PCT AUTO %  --   --  76.4   LYMPHS PCT AUTO %  --   --  15.6   MONOS PCT AUTO %  --   --  5.1   EOS PCT AUTO %  --   --  2.2     Results from last 72 hours   Lab Units 02/03/25  0543 02/02/25  0619 02/01/25  1527   SODIUM mmol/L 140 137 138   POTASSIUM mmol/L 4.2 4.1 3.6   CHLORIDE mmol/L 96* 95* 94*   CO2 mmol/L 37* 32 35*   BUN mg/dL 24* 18 12   CREATININE mg/dL 0.63 0.77 0.57   GLUCOSE mg/dL 110* 202* 127*    CALCIUM mg/dL 9.4 9.1 9.8   ANION GAP mmol/L 11 14 13   EGFR mL/min/1.73m*2 >90 85 >90   PHOSPHORUS mg/dL 3.0  --   --      Results from last 72 hours   Lab Units 02/03/25  0543 02/01/25  1527   ALK PHOS U/L  --  62   BILIRUBIN TOTAL mg/dL  --  0.6   PROTEIN TOTAL g/dL  --  7.7   ALT U/L  --  7   AST U/L  --  15   ALBUMIN g/dL 3.4 4.1     Estimated Creatinine Clearance: 91.2 mL/min (by C-G formula based on SCr of 0.63 mg/dL).  C-Reactive Protein   Date Value Ref Range Status   02/02/2025 1.75 (H) <1.00 mg/dL Final   01/02/2024 0.66 <1.00 mg/dL Final   01/01/2024 0.71 <1.00 mg/dL Final     Microbiology  Reviewed  Imaging  Reviewed        Assessment/Plan   Facial erysipelas / eczema, HSV PCR negative, MRSA screen negative  Respiratory failure / mucous plug / pneumonia     Recommendations :  Continue Azithromycin and Rocephin     I spent minutes in the professional and overall care of this patient.      Kailee Jara MD

## 2025-02-03 NOTE — CARE PLAN
The patient's goals for the shift include      The clinical goals for the shift include Pt will maintain sats without increase in O2 supplementation this shift.    Over the shift, the patient did make progress toward the following goals. Monitored and medicated as ordered this shift.

## 2025-02-03 NOTE — PROGRESS NOTES
"Subha Miller is a 64 y.o. female on day 1 of admission presenting with Acute respiratory failure.    Subjective   States her breathing feels much improved this morning. Able to cough up phlegm. On 6L/min.       Objective   GEN: breathing minimally labored  EYES: wears corrective lenses  ENT: wearing O2 nasal cannula  CV: RRR, no m/g/r  LUNGS: good effort, some wheezing on left  EXT: no edema, cyanosis, clubbing      Physical Exam    Last Recorded Vitals  Blood pressure 120/52, pulse 81, temperature 36.8 °C (98.3 °F), temperature source Temporal, resp. rate 12, height 1.6 m (5' 2.99\"), weight 86 kg (189 lb 9.5 oz), SpO2 96%.  Intake/Output last 3 Shifts:  I/O last 3 completed shifts:  In: 650 (7.6 mL/kg) [P.O.:600; IV Piggyback:50]  Out: 250 (2.9 mL/kg) [Urine:250 (0.1 mL/kg/hr)]  Weight: 86 kg     Relevant Results  Scheduled medications  azithromycin, 500 mg, oral, Daily  cefTRIAXone, 1 g, intravenous, q24h  gabapentin, 300 mg, oral, TID  hydrocortisone, , Topical, BID  insulin lispro, 0-10 Units, subcutaneous, TID AC  ipratropium-albuteroL, 3 mL, nebulization, 4x daily  nystatin, 1 Application, Topical, BID  oxygen, 4 L/min, inhalation, q24h  oxygen, 4 L/min, inhalation, q24h  pantoprazole, 40 mg, oral, Daily  predniSONE, 40 mg, oral, Daily      Continuous medications     PRN medications  PRN medications: albuterol, dextrose, dextrose, glucagon, glucagon, guaiFENesin    Results for orders placed or performed during the hospital encounter of 02/02/25 (from the past 24 hours)   POCT GLUCOSE   Result Value Ref Range    POCT Glucose 263 (H) 74 - 99 mg/dL   MRSA Surveillance for Vancomycin De-escalation, PCR    Specimen: Anterior Nares; Swab   Result Value Ref Range    MRSA PCR Not Detected Not Detected   HSV PCR, Skin/Mucosa    Specimen: Nasal; Swab   Result Value Ref Range    Herpes simplex virus 1 PCR, Skin/Mucosa Not Detected Not Detected    Herpes simplex virus 2 PCR, Skin/Mucosa Not Detected Not Detected   POCT " GLUCOSE   Result Value Ref Range    POCT Glucose 196 (H) 74 - 99 mg/dL   POCT GLUCOSE   Result Value Ref Range    POCT Glucose 201 (H) 74 - 99 mg/dL   Troponin I, High Sensitivity, Initial   Result Value Ref Range    Troponin I, High Sensitivity 18 (H) 0 - 13 ng/L   Troponin, High Sensitivity, 1 Hour   Result Value Ref Range    Troponin I, High Sensitivity 17 (H) 0 - 13 ng/L   Renal Function Panel   Result Value Ref Range    Glucose 110 (H) 74 - 99 mg/dL    Sodium 140 136 - 145 mmol/L    Potassium 4.2 3.5 - 5.3 mmol/L    Chloride 96 (L) 98 - 107 mmol/L    Bicarbonate 37 (H) 21 - 32 mmol/L    Anion Gap 11 10 - 20 mmol/L    Urea Nitrogen 24 (H) 6 - 23 mg/dL    Creatinine 0.63 0.50 - 1.05 mg/dL    eGFR >90 >60 mL/min/1.73m*2    Calcium 9.4 8.6 - 10.3 mg/dL    Phosphorus 3.0 2.5 - 4.9 mg/dL    Albumin 3.4 3.4 - 5.0 g/dL   CBC   Result Value Ref Range    WBC 16.6 (H) 4.4 - 11.3 x10*3/uL    nRBC 0.0 0.0 - 0.0 /100 WBCs    RBC 4.45 4.00 - 5.20 x10*6/uL    Hemoglobin 14.9 12.0 - 16.0 g/dL    Hematocrit 47.6 (H) 36.0 - 46.0 %     (H) 80 - 100 fL    MCH 33.5 26.0 - 34.0 pg    MCHC 31.3 (L) 32.0 - 36.0 g/dL    RDW 15.1 (H) 11.5 - 14.5 %    Platelets 148 (L) 150 - 450 x10*3/uL   POCT GLUCOSE   Result Value Ref Range    POCT Glucose 115 (H) 74 - 99 mg/dL                          IMAGING:  CXR 2/3/2025: reviewed independently by me and left lung is much better aerated compared to prior CXR from 2/1/2025. Still some area of left basilar atelectasis.           Assessment/Plan   Assessment & Plan  Acute respiratory failure    COPD exacerbation (Multi)    Bacterial pneumonia    64F with acute on chronic hypoxic respiratory failure. Left lung with mucus plugs which have resolved on CXR from this morning. O2 requirements improving.    Recommendations:  -no need for bronchoscopy  -continue bronchopulmonary hygiene  -continue antibiotics  -continue bronchodilators  -continue steroids        Vyttyler Zapien, DO  Pulmonary & Critical  Care  2/3/2025 9:11 AM

## 2025-02-03 NOTE — PROGRESS NOTES
Physical Therapy    Physical Therapy Evaluation & Treatment    Patient Name: Subha Miller  MRN: [id number]  Department: [address]  Room: [address] [place], [place] [address]  Today's Date: 2/3/2025   Time Calculation  Start Time: 1141  Stop Time: 1205  Time Calculation (min): 24 min    Assessment/Plan   PT Assessment  PT Assessment Results: Decreased mobility, Decreased endurance, Impaired balance  Rehab Prognosis: Good  Barriers to Discharge Home: No anticipated barriers  Evaluation/Treatment Tolerance: Patient limited by fatigue (low SpO2 (87% on 3L))  Medical Staff Made Aware: Yes  End of Session Communication: Bedside nurse  End of Session Patient Position: Up in chair, Alarm off, not on at start of session (in wheelchair; no alarm needed per RN)     Assessment:   Pt is a 65 yo female presenting for PT eval with acute hypoxic respiratory failure. Pt wears 3LO2 at baseline, and currently demonstrates low activity tolerance. Noted increased WOB and low SpO2 (87%) after dressing tasks and transferring to the wheelchair. Sats improved with rest/pursed lip breathing. Will continue to follow in-house to optimize safety and independence. Recommend low intensity PT at discharge for home safety assessment and endurance training, as pt is primary caregiver to her .        IP OR SWING BED PT PLAN  Inpatient or Swing Bed: Inpatient  PT Plan  Treatment/Interventions: Bed mobility, Transfer training, Gait training, Balance training, Endurance training  PT Plan: Ongoing PT  PT Frequency: 2 times per week  PT Discharge Recommendations: Low intensity level of continued care  Equipment Recommended upon Discharge: Wheelchair (owns)  PT Recommended Transfer Status: Assist x1 (stand pivot transfers only (to/from ))  PT - OK to Discharge: Yes      Subjective     General Visit Information:  General  Reason for Referral: Pt is a 65 yo female admitted to [facility] on 2/2/25 with acute hypoxic respiratory failure 2/2 pneumonia, L upper lobe collapse, and COPD  exacerbation. PT consulted for impaired mobility.  Past Medical History Relevant to Rehab: HTN, CHF, DM2, stroke, COPD (on 4L baseline), back surgery, facial cellulitis, ambulatory dysfunction (primarily uses wheelchair), active smoker  Family/Caregiver Present: No  Prior to Session Communication: Bedside nurse  Patient Position Received: Bed, 3 rail up, Alarm off, not on at start of session  General Comment: Pt pleasantly agreeable to PT. Hoping to get OOB to a chair this AM.  Home Living:  Home Living  Type of Home: House  Lives With: Spouse  Home Adaptive Equipment: Wheelchair-manual  Home Layout: One level  Home Access: Level entry  Bathroom Shower/Tub: Tub/shower unit  Bathroom Equipment: Shower chair with back  Prior Level of Function:  Prior Function Per Pt/Caregiver Report  Level of [place]: Independent with ADLs and functional transfers, Independent with homemaking with ambulation  ADL Assistance: Independent  Homemaking Assistance: Independent  Ambulatory Assistance: Independent (pt primarily uses w/c for mobility (no longer ambulates); IND with stand pivot transfers)  Prior Function Comments: Pt denies history of recent falls. Reports complete independence prior to admission. Also reports that she is primary caregiver for her .  Precautions:  Precautions  Medical Precautions: Fall precautions, Oxygen therapy device and L/min (3L via NC)  Precautions Comment: Tele, pegwidewayne     Date/Time Vitals Session Patient Position Pulse Resp SpO2 BP MAP (mmHg)    02/03/25 1248 --  --  97  20  90 %  90/54  67     02/03/25 1300 --  --  --  --  --  --  --           Vital Signs Comment: Increased WOB noted after activity. Initial SpO2 87%; instructed on pursed lip breathing, and levels recovered to 91% after ~1 minute    Objective   Pain:  Pain Assessment  Pain Assessment: 0-10  0-10 (Numeric) Pain Score: 7  Pain Type: Referred pain  Pain Location: Chest  Pain Descriptors: Discomfort  Pain Frequency:  Constant/continuous  Pain Interventions: Repositioned, Ambulation/increased activity  Response to Interventions: Content/relaxed  Cognition:  Cognition  Overall Cognitive Status: Within Functional Limits  Orientation Level: Oriented X4    General Assessments:                  Activity Tolerance  Endurance: Tolerates less than 10 min exercise with changes in vital signs (SpO2 down to 87% on 3L)    Sensation  Light Touch: No apparent deficits    Strength  Strength Comments: BLE WFL; at least 3/5  Static Sitting Balance  Static Sitting-Balance Support: Feet supported, No upper extremity supported  Static Sitting-Level of Assistance: Independent  Static Sitting-Comment/Number of Minutes: EOB    Static Standing Balance  Static Standing-Balance Support: No upper extremity supported  Static Standing-Level of Assistance: Contact guard  Static Standing-Comment/Number of Minutes: no AD; CGA for steadying  Functional Assessments:  ADL  ADL's Addressed: Yes  UE Dressing Assistance: Minimal  UE Dressing Deficit: Pull around back, Pull down in back  LE Dressing Assistance: Minimal  LE Dressing Deficit: Don/doff R sock, Don/doff L sock, Thread RLE into underwear, Thread LLE into underwear, Thread RLE into pants, Thread LLE into pants    Bed Mobility  Bed Mobility: Yes  Bed Mobility 1  Bed Mobility 1: Supine to sitting  Level of Assistance 1: Modified independent  Bed Mobility Comments 1: HOB elevated  Bed Mobility 2  Bed Mobility  2: Scooting  Level of Assistance 2: Modified independent  Bed Mobility Comments 2: forward at EOB    Transfers  Transfer: Yes  Transfer 1  Technique 1: Sit to stand, Stand to sit  Transfer Level of Assistance 1: Contact guard  Trials/Comments 1: no AD; unsteady on standing  Transfers 2  Technique 2: Stand pivot  Transfer Level of Assistance 2: Contact guard  Trials/Comments 2: bed>wheelchair; mildly unsteady    Ambulation/Gait Training  Ambulation/Gait Training Performed: No (pt does not ambulate at  baseline)  Extremity/Trunk Assessments:  RLE   RLE : Within Functional Limits  LLE   LLE : Within Functional Limits  Treatments:  Therapeutic Activity  Therapeutic Activity Performed: Yes  Therapeutic Activity 1: Assisted pt with dressing tasks at EOB - Ramona to don brief/scrub pants, Ramona to adjust gown  Outcome Measures:  [facility] Basic Mobility  Turning from your back to your side while in a flat bed without using bedrails: None  Moving from lying on your back to sitting on the side of a flat bed without using bedrails: None  Moving to and from bed to chair (including a wheelchair): A little  Standing up from a chair using your arms (e.g. wheelchair or bedside chair): A little  To walk in hospital room: A lot  Climbing 3-5 steps with railing: Total  Basic Mobility - Total Score: 17    Encounter Problems       Encounter Problems (Active)       Mobility       Patient will propel wheelchair at least 15ft at independent level.        Start:  02/03/25    Expected End:  02/17/25               PT Transfers       Patient will transfer sit to and from stand independently.        Start:  02/03/25    Expected End:  02/17/25            Patient will perform stand pivot transfers to/from wheelchair independently.        Start:  02/03/25    Expected End:  02/17/25               Pain - Adult              Education Documentation  Precautions, taught by Pricila Barnes PT at 2/3/2025  1:52 PM.  Learner: Patient  Readiness: Acceptance  Method: Explanation, Demonstration  Response: Verbalizes Understanding, Demonstrated Understanding  Comment: Role of PT, safe techniques for dressing/transfers    Body Mechanics, taught by Pricila Barnes PT at 2/3/2025  1:52 PM.  Learner: Patient  Readiness: Acceptance  Method: Explanation, Demonstration  Response: Verbalizes Understanding, Demonstrated Understanding  Comment: Role of PT, safe techniques for dressing/transfers    Mobility Training, taught by Pricila Barnes PT at 2/3/2025   1:52 PM.  Learner: Patient  Readiness: Acceptance  Method: Explanation, Demonstration  Response: Verbalizes Understanding, Demonstrated Understanding  Comment: Role of PT, safe techniques for dressing/transfers    Education Comments  No comments found.

## 2025-02-03 NOTE — CONSULTS
Inpatient consult to Pulmonology  Consult performed by: Toño Peck MD  Consult ordered by: Tristen Ford MD  Reason for consult: mucus plug          Reason For Consult  mucus plug     History Of Present Illness  Subha Miller is a 64 y.o. female presenting with mucus plug .  H/O COPD on 5L admitted w/ acute on chronic hypoxic resp failure 2/2 mucus plug and PNA.  No fevers, chills or cough.  ET is about a few steps.  CT chest w/ lobar collapse and likely mucus plugging.    Pt is a 20 pack year smoker.  Worked as a .  No pets.  No family history of lung disease.     Past Medical History  Past Medical History:   Diagnosis Date    CHF (congestive heart failure)     COPD (chronic obstructive pulmonary disease) (Multi)     Diabetes mellitus (Multi)     Hypertension     Stroke (Multi)        Surgical History  Past Surgical History:   Procedure Laterality Date    BACK SURGERY      CARDIAC CATHETERIZATION N/A 2023    Procedure: Left Heart Cath;  Surgeon: Andria Campo MD;  Location: [facility];  Service: Cardiovascular;  Laterality: N/A;     SECTION, LOW TRANSVERSE      TONSILLECTOMY          Social History  Social History     Tobacco Use    Smoking status: Every Day     Current packs/day: 0.50     Average packs/day: 0.5 packs/day for 25.0 years (12.5 ttl pk-yrs)     Types: Cigarettes    Smokeless tobacco: Never   Vaping Use    Vaping status: Never Used   Substance Use Topics    Alcohol use: Never    Drug use: Never       Family History  No family history on file.     Allergies  Penicillins, Codeine, and Morphine    Review of Systems   Constitutional:  Negative for chills and fever.   Respiratory:  Positive for shortness of breath. Negative for cough.    Cardiovascular: Negative.    Gastrointestinal: Negative.    Neurological: Negative.    Psychiatric/Behavioral: Negative.     All other systems reviewed and are negative.       Physical Exam  Vitals reviewed.   Constitutional:   "     Appearance: She is ill-appearing.   HENT:      Head: Normocephalic and atraumatic.   Eyes:      Extraocular Movements: Extraocular movements intact.   Cardiovascular:      Rate and Rhythm: Normal rate and regular rhythm.      Heart sounds: Normal heart sounds.   Pulmonary:      Effort: Pulmonary effort is normal.      Comments: Coarse breath sounds bilaterally   Abdominal:      Palpations: Abdomen is soft.      Tenderness: There is no abdominal tenderness.   Musculoskeletal:      Cervical back: Normal range of motion.   Skin:     General: Skin is warm.   Neurological:      General: No focal deficit present.      Mental Status: She is alert and oriented to person, place, and time. Mental status is at baseline.   Psychiatric:         Mood and Affect: Mood normal.         Behavior: Behavior normal.          Vital Signs  Blood pressure 99/50, pulse 92, temperature 36.6 °C (97.9 °F), resp. rate 20, height 1.6 m (5' 2.99\"), weight 86 kg (189 lb 9.5 oz), SpO2 93%.  Oxygen Therapy  SpO2: 93 %  Medical Gas Therapy: Supplemental oxygen  Medical Gas Delivery Method: High flow nasal cannula  FiO2 (%): 40 %    Relevant Results  CT chest w IV contrast 02/01/2025    Narrative  Interpreted By:  Jose Schofield,  STUDY:  CT CHEST W IV CONTRAST;  2/1/2025 5:20 pm    INDICATION:  Signs/Symptoms:shortness of breath.    COMPARISON:  CT from 01/02/2024 and radiographs from 02/01/2025.    ACCESSION NUMBER(S):  [id number]    ORDERING CLINICIAN:  SHILPA JEWELL    TECHNIQUE:  CT of the chest was performed with Intravenous contrast material  along with 3D (maximum intensity projection - MIP) image post  processing and coronal reformatted images. 75 ml Omnipaque 350 was  injected intravenously.    All CT examinations are performed with one or more of the following  dose reduction techniques: Automated Exposure Control, adjustment of  mA and/or kV according to patient size, or use of iterative  reconstruction " techniques.    FINDINGS:  Findings of vascular structures:  Atherosclerotic calcifications involve coronary arteries.  There is no aortic aneurysm or dissection.  The central pulmonary arteries are normal in course and caliber,  without filling defects to suggest central pulmonary embolism.    Other findings of chest:  There is almost complete consolidation of the left upper lobe with  volume loss, due to obstruction of the upper lobe bronchi, most  likely due to secretions. Infiltrates/atelectatic changes also  involve the left lower lobe. There is associated right to left  mediastinal shift. Hiatal hernia is present.  Upper abdomen: Right renal cyst is present.  Osseous structures: Degenerative changes involve the spine.    Impression  Almost complete collapse of the left upper lobe due to obstruction of  the upper lobe bronchi, most likely due to thick secretions;  correlate clinically and follow-up as needed additional  infiltrates/atelectatic changes in the left lower lobe. Associated  right to left mediastinal shift. Correlate clinically and follow-up  as needed. If findings not resolve, bronchoscopic evaluation could be  obtained.    MACRO:  None.      Signed by: Jose Schofield 2/1/2025 5:37 PM  Dictation workstation:   [id number]    Scheduled medications  azithromycin, 500 mg, oral, Daily  cefTRIAXone, 1 g, intravenous, q24h  gabapentin, 300 mg, oral, TID  hydrocortisone, , Topical, BID  insulin lispro, 0-10 Units, subcutaneous, TID AC  ipratropium-albuteroL, 3 mL, nebulization, 4x daily  nystatin, 1 Application, Topical, BID  oxygen, 4 L/min, inhalation, q24h  [START ON 2/3/2025] oxygen, 4 L/min, inhalation, q24h  pantoprazole, 40 mg, oral, Daily  [START ON 2/3/2025] predniSONE, 40 mg, oral, Daily      Continuous medications     PRN medications  PRN medications: albuterol, dextrose, dextrose, glucagon, glucagon, guaiFENesin    Results for orders placed or performed during the hospital encounter of  02/02/25 (from the past 24 hours)   Blood Gas Venous Full Panel   Result Value Ref Range    POCT pH, Venous 7.11 (LL) 7.33 - 7.43 pH    POCT pCO2, Venous 89 (HH) 41 - 51 mm Hg    POCT pO2, Venous 77 (H) 35 - 45 mm Hg    POCT SO2, Venous 93 (H) 45 - 75 %    POCT Oxy Hemoglobin, Venous 89.3 (H) 45.0 - 75.0 %    POCT Hematocrit Calculated, Venous 51.0 (H) 36.0 - 46.0 %    POCT Sodium, Venous 119 (LL) 136 - 145 mmol/L    POCT Potassium, Venous >10.0 (HH) 3.5 - 5.3 mmol/L    POCT Chloride, Venous 91 (L) 98 - 107 mmol/L    POCT Ionized Calicum, Venous      POCT Glucose, Venous 205 (H) 74 - 99 mg/dL    POCT Lactate, Venous 3.1 (H) 0.4 - 2.0 mmol/L    POCT Base Excess, Venous -4.4 (L) -2.0 - 3.0 mmol/L    POCT HCO3 Calculated, Venous 28.3 (H) 22.0 - 26.0 mmol/L    POCT Hemoglobin, Venous 17.1 (H) 12.0 - 16.0 g/dL    POCT Anion Gap, Venous      Patient Temperature      FiO2 52 %   Blood Gas Arterial Full Panel   Result Value Ref Range    POCT pH, Arterial 7.37 (L) 7.38 - 7.42 pH    POCT pCO2, Arterial 59 (H) 38 - 42 mm Hg    POCT pO2, Arterial 60 (L) 85 - 95 mm Hg    POCT SO2, Arterial 94 94 - 100 %    POCT Oxy Hemoglobin, Arterial 90.0 (L) 94.0 - 98.0 %    POCT Hematocrit Calculated, Arterial 49.0 (H) 36.0 - 46.0 %    POCT Sodium, Arterial 132 (L) 136 - 145 mmol/L    POCT Potassium, Arterial 4.0 3.5 - 5.3 mmol/L    POCT Chloride, Arterial 95 (L) 98 - 107 mmol/L    POCT Ionized Calcium, Arterial 1.21 1.10 - 1.33 mmol/L    POCT Glucose, Arterial 212 (H) 74 - 99 mg/dL    POCT Lactate, Arterial 2.3 (H) 0.4 - 2.0 mmol/L    POCT Base Excess, Arterial 6.5 (H) -2.0 - 3.0 mmol/L    POCT HCO3 Calculated, Arterial 34.1 (H) 22.0 - 26.0 mmol/L    POCT Hemoglobin, Arterial 16.3 (H) 12.0 - 16.0 g/dL    POCT Anion Gap, Arterial 7 (L) 10 - 25 mmo/L    Patient Temperature      FiO2 52 %   Procalcitonin   Result Value Ref Range    Procalcitonin 0.05 <=0.07 ng/mL   Basic metabolic panel   Result Value Ref Range    Glucose 202 (H) 74 - 99 mg/dL     Sodium 137 136 - 145 mmol/L    Potassium 4.1 3.5 - 5.3 mmol/L    Chloride 95 (L) 98 - 107 mmol/L    Bicarbonate 32 21 - 32 mmol/L    Anion Gap 14 10 - 20 mmol/L    Urea Nitrogen 18 6 - 23 mg/dL    Creatinine 0.77 0.50 - 1.05 mg/dL    eGFR 85 >60 mL/min/1.73m*2    Calcium 9.1 8.6 - 10.3 mg/dL   CBC   Result Value Ref Range    WBC 12.2 (H) 4.4 - 11.3 x10*3/uL    nRBC 0.0 0.0 - 0.0 /100 WBCs    RBC 4.79 4.00 - 5.20 x10*6/uL    Hemoglobin 15.8 12.0 - 16.0 g/dL    Hematocrit 50.1 (H) 36.0 - 46.0 %     (H) 80 - 100 fL    MCH 33.0 26.0 - 34.0 pg    MCHC 31.5 (L) 32.0 - 36.0 g/dL    RDW 14.9 (H) 11.5 - 14.5 %    Platelets 154 150 - 450 x10*3/uL   C-reactive protein   Result Value Ref Range    C-Reactive Protein 1.75 (H) <1.00 mg/dL   Sedimentation Rate   Result Value Ref Range    Sedimentation Rate 37 (H) 0 - 30 mm/h   Legionella Antigen, Urine    Specimen: Urine   Result Value Ref Range    L. pneumophila Urine Ag Negative Negative   Streptococcus pneumoniae Antigen, Urine    Specimen: Urine   Result Value Ref Range    Streptococcus pneumoniae Ag, Urine Negative Negative   POCT GLUCOSE   Result Value Ref Range    POCT Glucose 179 (H) 74 - 99 mg/dL   POCT GLUCOSE   Result Value Ref Range    POCT Glucose 263 (H) 74 - 99 mg/dL   MRSA Surveillance for Vancomycin De-escalation, PCR    Specimen: Anterior Nares; Swab   Result Value Ref Range    MRSA PCR Not Detected Not Detected   HSV PCR, Skin/Mucosa    Specimen: Nasal; Swab   Result Value Ref Range    Herpes simplex virus 1 PCR, Skin/Mucosa Not Detected Not Detected    Herpes simplex virus 2 PCR, Skin/Mucosa Not Detected Not Detected   POCT GLUCOSE   Result Value Ref Range    POCT Glucose 196 (H) 74 - 99 mg/dL        Assessment/Plan     A/P   65 yo  woman w/ h/o COPD on 5Ladmitted w/ acute on chronic hypoxic resp failure 2/2 mucus plug and PNA    acute on chronic hypoxic resp failure 2/2 mucus plug and PNA - on 8L.  Wean O2 as tolerated. Cont incentive  spirometry    Mucus plug - Start IPV, The Vest, acapella.  CXR in am.  If not improved, will need bronch to clear out mucus plug and r/o endobronchial lesion    COPD exacerbation - cont prednisone and nebs    PNA - on ceftriaxone and azithro.  follow-up cultures      Toño Peck MD

## 2025-02-04 VITALS
OXYGEN SATURATION: 95 % | HEIGHT: 63 IN | SYSTOLIC BLOOD PRESSURE: 117 MMHG | DIASTOLIC BLOOD PRESSURE: 53 MMHG | BODY MASS INDEX: 33.59 KG/M2 | TEMPERATURE: 97.7 F | HEART RATE: 97 BPM | RESPIRATION RATE: 16 BRPM | WEIGHT: 189.6 LBS

## 2025-02-04 PROBLEM — J96.00 ACUTE RESPIRATORY FAILURE: Status: RESOLVED | Noted: 2025-02-02 | Resolved: 2025-02-04

## 2025-02-04 LAB
ALBUMIN SERPL BCP-MCNC: 3.5 G/DL (ref 3.4–5)
ANION GAP SERPL CALC-SCNC: 9 MMOL/L (ref 10–20)
BUN SERPL-MCNC: 21 MG/DL (ref 6–23)
CALCIUM SERPL-MCNC: 9.2 MG/DL (ref 8.6–10.3)
CHLORIDE SERPL-SCNC: 97 MMOL/L (ref 98–107)
CO2 SERPL-SCNC: 37 MMOL/L (ref 21–32)
CREAT SERPL-MCNC: 0.62 MG/DL (ref 0.5–1.05)
EGFRCR SERPLBLD CKD-EPI 2021: >90 ML/MIN/1.73M*2
ERYTHROCYTE [DISTWIDTH] IN BLOOD BY AUTOMATED COUNT: 15.4 % (ref 11.5–14.5)
GLUCOSE BLD MANUAL STRIP-MCNC: 148 MG/DL (ref 74–99)
GLUCOSE BLD MANUAL STRIP-MCNC: 96 MG/DL (ref 74–99)
GLUCOSE SERPL-MCNC: 96 MG/DL (ref 74–99)
HCT VFR BLD AUTO: 45.9 % (ref 36–46)
HGB BLD-MCNC: 14.4 G/DL (ref 12–16)
MCH RBC QN AUTO: 34 PG (ref 26–34)
MCHC RBC AUTO-ENTMCNC: 31.4 G/DL (ref 32–36)
MCV RBC AUTO: 108 FL (ref 80–100)
NRBC BLD-RTO: 0 /100 WBCS (ref 0–0)
PHOSPHATE SERPL-MCNC: 3.2 MG/DL (ref 2.5–4.9)
PLATELET # BLD AUTO: 147 X10*3/UL (ref 150–450)
POTASSIUM SERPL-SCNC: 4.2 MMOL/L (ref 3.5–5.3)
RBC # BLD AUTO: 4.24 X10*6/UL (ref 4–5.2)
SODIUM SERPL-SCNC: 139 MMOL/L (ref 136–145)
WBC # BLD AUTO: 14.5 X10*3/UL (ref 4.4–11.3)

## 2025-02-04 PROCEDURE — 2500000004 HC RX 250 GENERAL PHARMACY W/ HCPCS (ALT 636 FOR OP/ED): Performed by: FAMILY MEDICINE

## 2025-02-04 PROCEDURE — 2500000004 HC RX 250 GENERAL PHARMACY W/ HCPCS (ALT 636 FOR OP/ED): Performed by: INTERNAL MEDICINE

## 2025-02-04 PROCEDURE — 99239 HOSP IP/OBS DSCHRG MGMT >30: CPT | Performed by: INTERNAL MEDICINE

## 2025-02-04 PROCEDURE — 2500000002 HC RX 250 W HCPCS SELF ADMINISTERED DRUGS (ALT 637 FOR MEDICARE OP, ALT 636 FOR OP/ED): Performed by: INTERNAL MEDICINE

## 2025-02-04 PROCEDURE — 94760 N-INVAS EAR/PLS OXIMETRY 1: CPT

## 2025-02-04 PROCEDURE — 36415 COLL VENOUS BLD VENIPUNCTURE: CPT | Performed by: FAMILY MEDICINE

## 2025-02-04 PROCEDURE — 94640 AIRWAY INHALATION TREATMENT: CPT

## 2025-02-04 PROCEDURE — 85027 COMPLETE CBC AUTOMATED: CPT | Performed by: FAMILY MEDICINE

## 2025-02-04 PROCEDURE — 2500000005 HC RX 250 GENERAL PHARMACY W/O HCPCS: Performed by: INTERNAL MEDICINE

## 2025-02-04 PROCEDURE — 94669 MECHANICAL CHEST WALL OSCILL: CPT

## 2025-02-04 PROCEDURE — 2500000001 HC RX 250 WO HCPCS SELF ADMINISTERED DRUGS (ALT 637 FOR MEDICARE OP): Performed by: INTERNAL MEDICINE

## 2025-02-04 PROCEDURE — 90662 IIV NO PRSV INCREASED AG IM: CPT | Performed by: FAMILY MEDICINE

## 2025-02-04 PROCEDURE — 80069 RENAL FUNCTION PANEL: CPT | Performed by: FAMILY MEDICINE

## 2025-02-04 PROCEDURE — G0008 ADMIN INFLUENZA VIRUS VAC: HCPCS | Performed by: FAMILY MEDICINE

## 2025-02-04 PROCEDURE — 2500000001 HC RX 250 WO HCPCS SELF ADMINISTERED DRUGS (ALT 637 FOR MEDICARE OP): Performed by: FAMILY MEDICINE

## 2025-02-04 PROCEDURE — 82947 ASSAY GLUCOSE BLOOD QUANT: CPT

## 2025-02-04 PROCEDURE — 94668 MNPJ CHEST WALL SBSQ: CPT

## 2025-02-04 RX ORDER — ALBUTEROL SULFATE 90 UG/1
2 INHALANT RESPIRATORY (INHALATION) EVERY 4 HOURS PRN
Qty: 18 G | Refills: 0 | Status: SHIPPED | OUTPATIENT
Start: 2025-02-04

## 2025-02-04 RX ORDER — PREDNISONE 20 MG/1
40 TABLET ORAL DAILY
Qty: 10 TABLET | Refills: 0 | Status: SHIPPED | OUTPATIENT
Start: 2025-02-05 | End: 2025-02-10

## 2025-02-04 RX ORDER — GABAPENTIN 600 MG/1
600 TABLET ORAL 3 TIMES DAILY
Start: 2025-02-04

## 2025-02-04 RX ORDER — AZITHROMYCIN 250 MG/1
250 TABLET, FILM COATED ORAL DAILY
Qty: 6 TABLET | Refills: 0 | Status: SHIPPED | OUTPATIENT
Start: 2025-02-04 | End: 2025-02-08

## 2025-02-04 RX ORDER — GUAIFENESIN 600 MG/1
600 TABLET, EXTENDED RELEASE ORAL 2 TIMES DAILY
Qty: 20 TABLET | Refills: 0 | Status: SHIPPED | OUTPATIENT
Start: 2025-02-04

## 2025-02-04 RX ADMIN — ACETAMINOPHEN 650 MG: 325 TABLET, FILM COATED ORAL at 14:27

## 2025-02-04 RX ADMIN — PREDNISONE 40 MG: 20 TABLET ORAL at 09:03

## 2025-02-04 RX ADMIN — PANTOPRAZOLE SODIUM 40 MG: 40 TABLET, DELAYED RELEASE ORAL at 09:03

## 2025-02-04 RX ADMIN — IPRATROPIUM BROMIDE AND ALBUTEROL SULFATE 3 ML: 2.5; .5 SOLUTION RESPIRATORY (INHALATION) at 14:34

## 2025-02-04 RX ADMIN — GUAIFENESIN 600 MG: 600 TABLET ORAL at 06:23

## 2025-02-04 RX ADMIN — Medication 4 L/MIN: at 01:51

## 2025-02-04 RX ADMIN — GABAPENTIN 300 MG: 300 CAPSULE ORAL at 09:03

## 2025-02-04 RX ADMIN — NYSTATIN 1 APPLICATION: 100000 POWDER TOPICAL at 09:04

## 2025-02-04 RX ADMIN — HYDROCORTISONE: 1 CREAM TOPICAL at 09:04

## 2025-02-04 RX ADMIN — AZITHROMYCIN 500 MG: 250 TABLET, FILM COATED ORAL at 09:03

## 2025-02-04 RX ADMIN — INFLUENZA A VIRUS A/VICTORIA/4897/2022 IVR-238 (H1N1) ANTIGEN (FORMALDEHYDE INACTIVATED), INFLUENZA A VIRUS A/CALIFORNIA/122/2022 SAN-022 (H3N2) ANTIGEN (FORMALDEHYDE INACTIVATED), AND INFLUENZA B VIRUS B/MICHIGAN/01/2021 ANTIGEN (FORMALDEHYDE INACTIVATED) 0.5 ML: 60; 60; 60 INJECTION, SUSPENSION INTRAMUSCULAR at 14:28

## 2025-02-04 RX ADMIN — GABAPENTIN 300 MG: 300 CAPSULE ORAL at 14:28

## 2025-02-04 RX ADMIN — IPRATROPIUM BROMIDE AND ALBUTEROL SULFATE 3 ML: 2.5; .5 SOLUTION RESPIRATORY (INHALATION) at 11:59

## 2025-02-04 RX ADMIN — ACETAMINOPHEN 650 MG: 325 TABLET, FILM COATED ORAL at 06:23

## 2025-02-04 ASSESSMENT — COGNITIVE AND FUNCTIONAL STATUS - GENERAL
MOVING TO AND FROM BED TO CHAIR: A LITTLE
DAILY ACTIVITIY SCORE: 21
DRESSING REGULAR LOWER BODY CLOTHING: A LITTLE
TOILETING: A LITTLE
CLIMB 3 TO 5 STEPS WITH RAILING: A LITTLE
STANDING UP FROM CHAIR USING ARMS: A LITTLE
WALKING IN HOSPITAL ROOM: A LITTLE
HELP NEEDED FOR BATHING: A LITTLE
MOBILITY SCORE: 20

## 2025-02-04 ASSESSMENT — PAIN - FUNCTIONAL ASSESSMENT
PAIN_FUNCTIONAL_ASSESSMENT: 0-10
PAIN_FUNCTIONAL_ASSESSMENT: 0-10

## 2025-02-04 ASSESSMENT — PAIN SCALES - GENERAL
PAINLEVEL_OUTOF10: 3
PAINLEVEL_OUTOF10: 2
PAINLEVEL_OUTOF10: 0 - NO PAIN
PAINLEVEL_OUTOF10: 2
PAINLEVEL_OUTOF10: 8

## 2025-02-04 ASSESSMENT — PAIN DESCRIPTION - LOCATION: LOCATION: BACK

## 2025-02-04 NOTE — PROGRESS NOTES
Subha Miller is a 64 y.o. female on day 2 of admission presenting with Acute respiratory failure.    Subjective   Interval History: no fever, less sob, productive cough        Review of Systems    Objective   Range of Vitals (last 24 hours)  Heart Rate:  []   Temp:  [36.2 °C (97.1 °F)-36.8 °C (98.3 °F)]   Resp:  [14-20]   BP: ()/(50-92)   SpO2:  [90 %-99 %]   Daily Weight  02/02/25 : 86 kg (189 lb 9.5 oz)    Body mass index is 33.59 kg/m².    Physical Exam  Constitutional:       Appearance: Normal appearance.   HENT:      Head: Normocephalic and atraumatic.      Comments: Less facial edema ans eczema     Mouth/Throat:      Mouth: Mucous membranes are moist.      Pharynx: Oropharynx is clear.   Eyes:      Pupils: Pupils are equal, round, and reactive to light.   Cardiovascular:      Rate and Rhythm: Normal rate and regular rhythm.      Heart sounds: Normal heart sounds.   Pulmonary:      Effort: Pulmonary effort is normal.      Breath sounds: Rales present.   Abdominal:      General: Abdomen is flat. Bowel sounds are normal.      Palpations: Abdomen is soft.   Musculoskeletal:      Cervical back: Normal range of motion.   Neurological:      Mental Status: She is alert.         Antibiotics  azithromycin - 250 mg, 250 mg  cefTRIAXone - 1 gram/50 mL    Relevant Results  Labs  Results from last 72 hours   Lab Units 02/04/25  0733 02/03/25  0543 02/02/25  0619 02/01/25  1527   WBC AUTO x10*3/uL 14.5* 16.6* 12.2* 10.2   HEMOGLOBIN g/dL 14.4 14.9 15.8 17.8*   HEMATOCRIT % 45.9 47.6* 50.1* 56.9*   PLATELETS AUTO x10*3/uL 147* 148* 154 151   NEUTROS PCT AUTO %  --   --   --  76.4   LYMPHS PCT AUTO %  --   --   --  15.6   MONOS PCT AUTO %  --   --   --  5.1   EOS PCT AUTO %  --   --   --  2.2     Results from last 72 hours   Lab Units 02/04/25  0733 02/03/25  0543 02/02/25  0619   SODIUM mmol/L 139 140 137   POTASSIUM mmol/L 4.2 4.2 4.1   CHLORIDE mmol/L 97* 96* 95*   CO2 mmol/L 37* 37* 32   BUN mg/dL 21 24* 18    CREATININE mg/dL 0.62 0.63 0.77   GLUCOSE mg/dL 96 110* 202*   CALCIUM mg/dL 9.2 9.4 9.1   ANION GAP mmol/L 9* 11 14   EGFR mL/min/1.73m*2 >90 >90 85   PHOSPHORUS mg/dL 3.2 3.0  --      Results from last 72 hours   Lab Units 02/04/25  0733 02/03/25  0543 02/01/25  1527   ALK PHOS U/L  --   --  62   BILIRUBIN TOTAL mg/dL  --   --  0.6   PROTEIN TOTAL g/dL  --   --  7.7   ALT U/L  --   --  7   AST U/L  --   --  15   ALBUMIN g/dL 3.5 3.4 4.1     Estimated Creatinine Clearance: 92.7 mL/min (by C-G formula based on SCr of 0.62 mg/dL).  C-Reactive Protein   Date Value Ref Range Status   02/02/2025 1.75 (H) <1.00 mg/dL Final   01/02/2024 0.66 <1.00 mg/dL Final   01/01/2024 0.71 <1.00 mg/dL Final     Microbiology  Reviewed  Imaging  Reviewed        Assessment/Plan   Facial erysipelas / eczema, HSV PCR negative, MRSA screen negative  Respiratory failure / mucous plug / pneumonia     Recommendations :  Continue Azithromycin and Rocephin  Discussed with the medical team     I spent minutes in the professional and overall care of this patient.      Kailee Jara MD

## 2025-02-04 NOTE — PROGRESS NOTES
02/04/25 1100   Discharge Planning   Expected Discharge Disposition Home  (Patient dcing today and floor to arrange transport home (wheelchair)RT has arranged new 2L O2 with [facility]. Pt dcing with [facility].)

## 2025-02-04 NOTE — NURSING NOTE
Discharge orders received, med rec complete. IV accesses and telemetry removed. Rxs sent to preferred pharmacy. Discharge instructions provided. Pt verbalized understanding. Pt left floor via wheelchair ambulette at 1535 with belongings and O2 tank provided by RT home O2 arrangements.

## 2025-02-04 NOTE — DISCHARGE SUMMARY
Discharge Diagnosis  Acute respiratory failure    Issues Requiring Follow-Up  Compliance with inhalers, nebs etc.   [facility]    Discharge Meds     Medication List      START taking these medications     azithromycin 250 mg tablet; Commonly known as: Zithromax; Take 1 tablet   (250 mg) by mouth once daily for 4 days. Take 2 tabs (500 mg) by mouth   today, then take 1 tab daily for 4 days.   guaiFENesin 600 mg 12 hr tablet; Commonly known as: Mucinex; Take 1   tablet (600 mg) by mouth 2 times a day. Do not crush, chew, or split.   predniSONE 20 mg tablet; Commonly known as: Deltasone; Take 2 tablets   (40 mg) by mouth once daily for 5 days.; Start taking on: February 5, 2025     CONTINUE taking these medications     acetaminophen 325 mg tablet; Commonly known as: Tylenol; Take 2 tablets   (650 mg) by mouth every 6 hours if needed for mild pain (1 - 3).   albuterol 90 mcg/actuation inhaler; Inhale 2 puffs every 4 hours if   needed for wheezing.   fluticasone-umeclidin-vilanter 100-62.5-25 mcg blister with device;   Commonly known as: TRELEGY-ELLIPTA; INHALE 1 PUFF BY MOUTH ONCE DAILY.   gabapentin 600 mg tablet; Commonly known as: Neurontin; Take 0.5 tablets   (300 mg) by mouth 3 times a day.   ipratropium-albuteroL 0.5-2.5 mg/3 mL nebulizer solution; Commonly known   as: Duo-Neb; Take 3 mL by nebulization 4 times a day.   nystatin 100,000 unit/gram powder; Commonly known as: Mycostatin; Apply   1 Application topically 2 times a day.   oxygen gas therapy; Commonly known as: O2; Inhale 4 L/min once every 24   hours.   pantoprazole 40 mg EC tablet; Commonly known as: ProtoNix; Take 1 tablet   (40 mg) by mouth once daily. Do not crush, chew, or split.     STOP taking these medications     furosemide 40 mg tablet; Commonly known as: Lasix   lisinopril 2.5 mg tablet   metFORMIN  mg 24 hr tablet; Commonly known as: Glucophage-XR   oxygen gas therapy; Commonly known as: O2   spironolactone 25 mg tablet; Commonly  known as: Aldactone       Test Results Pending At Discharge  Pending Labs       No current pending labs.            Hospital Course   Ms. Miller has chronic respiratory failure, was admitted with acute worsening of her symptoms, and increased oxygen requirements. She had almost complete collapse of her left upper lobe as well as copd exacerbation. She was placed on aggressive nebulizer thx, antibiotics, mucinex etc, . Pulm was consulted as well. Also placed on steroids. Over the last couple days she has had improvement of her symptoms, and cxr has considerably improved. She is now back to 4-5 liters of oxygen use, her baseline at home. She is feeling better, believes she is fine to go home today. [facility] been arranged for her , to assist with monitoring of med compliance etc.     Pertinent Physical Exam At Time of Discharge  She is alert, conversant. Appears chronically ill but in no acute distress. Fair to poor color and turgor. Oral mucosa is dry, but no thrush. No jvd. Heart is regular. Lungs with inspiratory and expiratory rhonchi, and wheezing, but no use of accessory muscles. Abdomen is soft, no tenderness. No edema of legs. Pulses equal. Neuro is stable    Outpatient Follow-Up  PCP--1 week    32 minutes was spent on this discharge  Subha Aguiar MD

## 2025-02-04 NOTE — CARE PLAN
The patient's goals for the shift include      The clinical goals for the shift include Pt will remain safe through discharge.    Over the shift, the patient did make progress toward the following goals. Remained safe and stable, preparing for discharge. Monitored and medicated as ordered.

## 2025-02-04 NOTE — CARE PLAN
The patient's goals for the shift include      The clinical goals for the shift include patient will get at least 4 hours of uninterrupted sleep through shift

## 2025-02-06 LAB
BACTERIA BLD CULT: NORMAL
BACTERIA BLD CULT: NORMAL

## 2025-02-06 NOTE — SIGNIFICANT EVENT
Follow Up Phone Call    Outgoing phone call    Spoke to: Subha Miller Relationship:self   Phone number: [phone]      Outcome: I left a message on answering machine   No chief complaint on file.         Diagnosis:Not applicable

## 2025-02-13 ENCOUNTER — TELEPHONE (OUTPATIENT)
Dept: RESPIRATORY THERAPY | Facility: HOSPITAL | Age: 65
End: 2025-02-13
Payer: COMMERCIAL

## 2025-02-13 NOTE — PROGRESS NOTES
Respiratory Therapy Note: Follow up    Attempted to call patient for follow up. No answer. Voice Mail left.

## 2025-02-21 LAB
ATRIAL RATE: 85 BPM
P AXIS: 71 DEGREES
P OFFSET: 189 MS
P ONSET: 138 MS
PR INTERVAL: 160 MS
Q ONSET: 218 MS
QRS COUNT: 14 BEATS
QRS DURATION: 88 MS
QT INTERVAL: 372 MS
QTC CALCULATION(BAZETT): 442 MS
QTC FREDERICIA: 417 MS
R AXIS: 33 DEGREES
T AXIS: 49 DEGREES
T OFFSET: 404 MS
VENTRICULAR RATE: 85 BPM

## 2025-03-16 LAB
ATRIAL RATE: 91 BPM
P AXIS: 73 DEGREES
P OFFSET: 194 MS
P ONSET: 142 MS
PR INTERVAL: 150 MS
Q ONSET: 217 MS
QRS COUNT: 15 BEATS
QRS DURATION: 80 MS
QT INTERVAL: 350 MS
QTC CALCULATION(BAZETT): 430 MS
QTC FREDERICIA: 402 MS
R AXIS: 45 DEGREES
T AXIS: 50 DEGREES
T OFFSET: 392 MS
VENTRICULAR RATE: 91 BPM

## 2025-05-31 ENCOUNTER — APPOINTMENT (OUTPATIENT)
Dept: RADIOLOGY | Facility: HOSPITAL | Age: 65
DRG: 871 | End: 2025-05-31
Payer: COMMERCIAL

## 2025-05-31 ENCOUNTER — HOSPITAL ENCOUNTER (INPATIENT)
Facility: HOSPITAL | Age: 65
LOS: 1 days | Discharge: ANOTHER HOSPITAL | DRG: 871 | End: 2025-05-31
Attending: STUDENT IN AN ORGANIZED HEALTH CARE EDUCATION/TRAINING PROGRAM | Admitting: HOSPITALIST
Payer: COMMERCIAL

## 2025-05-31 ENCOUNTER — HOSPITAL ENCOUNTER (INPATIENT)
Facility: HOSPITAL | Age: 65
DRG: 870 | End: 2025-05-31
Attending: INTERNAL MEDICINE | Admitting: INTERNAL MEDICINE
Payer: COMMERCIAL

## 2025-05-31 ENCOUNTER — APPOINTMENT (OUTPATIENT)
Dept: CARDIOLOGY | Facility: HOSPITAL | Age: 65
DRG: 871 | End: 2025-05-31
Payer: COMMERCIAL

## 2025-05-31 VITALS
BODY MASS INDEX: 37.77 KG/M2 | HEIGHT: 62 IN | SYSTOLIC BLOOD PRESSURE: 102 MMHG | DIASTOLIC BLOOD PRESSURE: 62 MMHG | HEART RATE: 86 BPM | RESPIRATION RATE: 24 BRPM | WEIGHT: 205.25 LBS | OXYGEN SATURATION: 95 % | TEMPERATURE: 97.3 F

## 2025-05-31 DIAGNOSIS — R65.20 SEPSIS WITH ENCEPHALOPATHY, DUE TO UNSPECIFIED ORGANISM, UNSPECIFIED WHETHER SEPTIC SHOCK PRESENT (MULTI): ICD-10-CM

## 2025-05-31 DIAGNOSIS — J96.21 ACUTE ON CHRONIC RESPIRATORY FAILURE WITH HYPOXIA AND HYPERCAPNIA: ICD-10-CM

## 2025-05-31 DIAGNOSIS — R78.81 BACTEREMIA DUE TO GRAM-POSITIVE BACTERIA: ICD-10-CM

## 2025-05-31 DIAGNOSIS — G93.41 SEPSIS WITH ENCEPHALOPATHY, DUE TO UNSPECIFIED ORGANISM, UNSPECIFIED WHETHER SEPTIC SHOCK PRESENT (MULTI): ICD-10-CM

## 2025-05-31 DIAGNOSIS — G93.41 METABOLIC ENCEPHALOPATHY: Primary | ICD-10-CM

## 2025-05-31 DIAGNOSIS — N17.9 ACUTE KIDNEY INJURY: ICD-10-CM

## 2025-05-31 DIAGNOSIS — J96.02 ACUTE RESPIRATORY FAILURE WITH HYPERCAPNIA: ICD-10-CM

## 2025-05-31 DIAGNOSIS — I50.9 CONGESTIVE HEART FAILURE, UNSPECIFIED HF CHRONICITY, UNSPECIFIED HEART FAILURE TYPE: ICD-10-CM

## 2025-05-31 DIAGNOSIS — M62.82 NON-TRAUMATIC RHABDOMYOLYSIS: ICD-10-CM

## 2025-05-31 DIAGNOSIS — A41.9 SEPSIS WITH ENCEPHALOPATHY, DUE TO UNSPECIFIED ORGANISM, UNSPECIFIED WHETHER SEPTIC SHOCK PRESENT (MULTI): ICD-10-CM

## 2025-05-31 DIAGNOSIS — J96.22 ACUTE ON CHRONIC RESPIRATORY FAILURE WITH HYPOXIA AND HYPERCAPNIA: ICD-10-CM

## 2025-05-31 DIAGNOSIS — J96.00 ACUTE RESPIRATORY FAILURE: Primary | ICD-10-CM

## 2025-05-31 DIAGNOSIS — I21.4 NSTEMI (NON-ST ELEVATED MYOCARDIAL INFARCTION) (MULTI): ICD-10-CM

## 2025-05-31 PROBLEM — I95.2 HYPOTENSION DUE TO DRUGS: Status: ACTIVE | Noted: 2025-05-31

## 2025-05-31 LAB
ALBUMIN SERPL BCP-MCNC: 4.2 G/DL (ref 3.4–5)
ALP SERPL-CCNC: 80 U/L (ref 33–110)
ALT SERPL W P-5'-P-CCNC: 33 U/L (ref 7–45)
AMMONIA PLAS-SCNC: 76 UMOL/L (ref 16–53)
AMPHETAMINES UR QL SCN: NORMAL
ANION GAP BLDA CALCULATED.4IONS-SCNC: 10 MMO/L (ref 10–25)
ANION GAP BLDA CALCULATED.4IONS-SCNC: 11 MMO/L (ref 10–25)
ANION GAP BLDA CALCULATED.4IONS-SCNC: 15 MMO/L (ref 10–25)
ANION GAP BLDA CALCULATED.4IONS-SCNC: 9 MMO/L (ref 10–25)
ANION GAP BLDA CALCULATED.4IONS-SCNC: ABNORMAL MMOL/L
ANION GAP SERPL CALCULATED.3IONS-SCNC: 13 MMOL/L (ref 10–20)
ANION GAP SERPL CALCULATED.3IONS-SCNC: 17 MMOL/L (ref 10–20)
ANION GAP SERPL CALCULATED.3IONS-SCNC: 26 MMOL/L (ref 10–20)
APPEARANCE UR: CLEAR
ARTERIAL PATENCY WRIST A: POSITIVE
AST SERPL W P-5'-P-CCNC: 77 U/L (ref 9–39)
BACTERIA #/AREA URNS AUTO: ABNORMAL /HPF
BARBITURATES UR QL SCN: NORMAL
BASE EXCESS BLDA CALC-SCNC: -1.4 MMOL/L (ref -2–3)
BASE EXCESS BLDA CALC-SCNC: -3.2 MMOL/L (ref -2–3)
BASE EXCESS BLDA CALC-SCNC: -5.5 MMOL/L (ref -2–3)
BASE EXCESS BLDA CALC-SCNC: -5.6 MMOL/L (ref -2–3)
BASE EXCESS BLDA CALC-SCNC: ABNORMAL MMOL/L
BASOPHILS # BLD AUTO: 0.02 X10*3/UL (ref 0–0.1)
BASOPHILS NFR BLD AUTO: 0.1 %
BENZODIAZ UR QL SCN: NORMAL
BILIRUB SERPL-MCNC: 0.5 MG/DL (ref 0–1.2)
BILIRUB UR STRIP.AUTO-MCNC: NEGATIVE MG/DL
BODY TEMPERATURE: 37 DEGREES CELSIUS
BUN SERPL-MCNC: 20 MG/DL (ref 6–23)
BUN SERPL-MCNC: 22 MG/DL (ref 6–23)
BUN SERPL-MCNC: 23 MG/DL (ref 6–23)
BZE UR QL SCN: NORMAL
CA-I BLDA-SCNC: 1.04 MMOL/L (ref 1.1–1.33)
CA-I BLDA-SCNC: 1.08 MMOL/L (ref 1.1–1.33)
CA-I BLDA-SCNC: 1.09 MMOL/L (ref 1.1–1.33)
CA-I BLDA-SCNC: 1.13 MMOL/L (ref 1.1–1.33)
CA-I BLDA-SCNC: 1.21 MMOL/L (ref 1.1–1.33)
CALCIUM SERPL-MCNC: 7.8 MG/DL (ref 8.6–10.3)
CALCIUM SERPL-MCNC: 8.6 MG/DL (ref 8.6–10.3)
CALCIUM SERPL-MCNC: 8.9 MG/DL (ref 8.6–10.3)
CANNABINOIDS UR QL SCN: NORMAL
CARDIAC TROPONIN I PNL SERPL HS: 1314 NG/L (ref 0–13)
CARDIAC TROPONIN I PNL SERPL HS: 268 NG/L (ref 0–13)
CARDIAC TROPONIN I PNL SERPL HS: 310 NG/L (ref 0–13)
CHLORIDE BLDA-SCNC: 95 MMOL/L (ref 98–107)
CHLORIDE BLDA-SCNC: 95 MMOL/L (ref 98–107)
CHLORIDE BLDA-SCNC: 96 MMOL/L (ref 98–107)
CHLORIDE BLDA-SCNC: 97 MMOL/L (ref 98–107)
CHLORIDE BLDA-SCNC: 98 MMOL/L (ref 98–107)
CHLORIDE SERPL-SCNC: 95 MMOL/L (ref 98–107)
CHLORIDE SERPL-SCNC: 98 MMOL/L (ref 98–107)
CHLORIDE SERPL-SCNC: 99 MMOL/L (ref 98–107)
CK SERPL-CCNC: 1851 U/L (ref 0–215)
CK SERPL-CCNC: 3434 U/L (ref 0–215)
CO2 SERPL-SCNC: 20 MMOL/L (ref 21–32)
CO2 SERPL-SCNC: 27 MMOL/L (ref 21–32)
CO2 SERPL-SCNC: 30 MMOL/L (ref 21–32)
COLOR UR: YELLOW
CREAT SERPL-MCNC: 1.81 MG/DL (ref 0.5–1.05)
CREAT SERPL-MCNC: 1.92 MG/DL (ref 0.5–1.05)
CREAT SERPL-MCNC: 2.16 MG/DL (ref 0.5–1.05)
CRITICAL CALL TIME: 1325
CRITICAL CALL TIME: 2014
CRITICAL CALLED BY: ABNORMAL
CRITICAL CALLED BY: ABNORMAL
CRITICAL CALLED TO: ABNORMAL
CRITICAL CALLED TO: ABNORMAL
CRITICAL NOTE: ABNORMAL
CRITICAL READ BACK: ABNORMAL
CRITICAL READ BACK: ABNORMAL
EGFRCR SERPLBLD CKD-EPI 2021: 26 ML/MIN/1.73M*2
EGFRCR SERPLBLD CKD-EPI 2021: 28 ML/MIN/1.73M*2
EGFRCR SERPLBLD CKD-EPI 2021: 30 ML/MIN/1.73M*2
EOSINOPHIL # BLD AUTO: 0 X10*3/UL (ref 0–0.7)
EOSINOPHIL NFR BLD AUTO: 0 %
EPAP CMH2O: 6 CM H2O
ERYTHROCYTE [DISTWIDTH] IN BLOOD BY AUTOMATED COUNT: 14.6 % (ref 11.5–14.5)
ERYTHROCYTE [DISTWIDTH] IN BLOOD BY AUTOMATED COUNT: 14.6 % (ref 11.5–14.5)
ETHANOL SERPL-MCNC: <10 MG/DL
FENTANYL+NORFENTANYL UR QL SCN: NORMAL
FREQUENCY (BPM): 20 BPM
GLUCOSE BLD MANUAL STRIP-MCNC: 184 MG/DL (ref 74–99)
GLUCOSE BLD MANUAL STRIP-MCNC: 210 MG/DL (ref 74–99)
GLUCOSE BLD MANUAL STRIP-MCNC: 257 MG/DL (ref 74–99)
GLUCOSE BLD MANUAL STRIP-MCNC: 279 MG/DL (ref 74–99)
GLUCOSE BLDA-MCNC: 151 MG/DL (ref 74–99)
GLUCOSE BLDA-MCNC: 152 MG/DL (ref 74–99)
GLUCOSE BLDA-MCNC: 177 MG/DL (ref 74–99)
GLUCOSE BLDA-MCNC: 190 MG/DL (ref 74–99)
GLUCOSE BLDA-MCNC: 243 MG/DL (ref 74–99)
GLUCOSE SERPL-MCNC: 140 MG/DL (ref 74–99)
GLUCOSE SERPL-MCNC: 167 MG/DL (ref 74–99)
GLUCOSE SERPL-MCNC: 232 MG/DL (ref 74–99)
GLUCOSE UR STRIP.AUTO-MCNC: NORMAL MG/DL
HCO3 BLDA-SCNC: 26.6 MMOL/L (ref 22–26)
HCO3 BLDA-SCNC: 27.9 MMOL/L (ref 22–26)
HCO3 BLDA-SCNC: 28.1 MMOL/L (ref 22–26)
HCO3 BLDA-SCNC: 32.1 MMOL/L (ref 22–26)
HCO3 BLDA-SCNC: ABNORMAL MMOL/L
HCT VFR BLD AUTO: 58.8 % (ref 36–46)
HCT VFR BLD AUTO: 60.3 % (ref 36–46)
HCT VFR BLD EST: 50 % (ref 36–46)
HCT VFR BLD EST: 53 % (ref 36–46)
HCT VFR BLD EST: 56 % (ref 36–46)
HGB BLD-MCNC: 17.4 G/DL (ref 12–16)
HGB BLD-MCNC: 18.1 G/DL (ref 12–16)
HGB BLDA-MCNC: 16.7 G/DL (ref 12–16)
HGB BLDA-MCNC: 17.6 G/DL (ref 12–16)
HGB BLDA-MCNC: 17.6 G/DL (ref 12–16)
HGB BLDA-MCNC: 17.8 G/DL (ref 12–16)
HGB BLDA-MCNC: 18.5 G/DL (ref 12–16)
IMM GRANULOCYTES # BLD AUTO: 0.08 X10*3/UL (ref 0–0.7)
IMM GRANULOCYTES NFR BLD AUTO: 0.6 % (ref 0–0.9)
INHALED O2 CONCENTRATION: 100 %
INHALED O2 CONCENTRATION: 21 %
INHALED O2 CONCENTRATION: 60 %
INSPIRATORY TIME: 0.9
IPAP CMH2O: 16 CM H2O
KETONES UR STRIP.AUTO-MCNC: NEGATIVE MG/DL
LACTATE BLDA-SCNC: 1.6 MMOL/L (ref 0.4–2)
LACTATE BLDA-SCNC: 2.3 MMOL/L (ref 0.4–2)
LACTATE BLDA-SCNC: 3.2 MMOL/L (ref 0.4–2)
LACTATE BLDA-SCNC: 3.2 MMOL/L (ref 0.4–2)
LACTATE BLDA-SCNC: 6 MMOL/L (ref 0.4–2)
LACTATE BLDV-SCNC: 2.2 MMOL/L (ref 0.4–2)
LACTATE BLDV-SCNC: 3.6 MMOL/L (ref 0.4–2)
LACTATE BLDV-SCNC: 6.8 MMOL/L (ref 0.4–2)
LACTATE BLDV-SCNC: 8.4 MMOL/L (ref 0.4–2)
LACTATE SERPL-SCNC: 2.5 MMOL/L (ref 0.4–2)
LACTATE SERPL-SCNC: 4.2 MMOL/L (ref 0.4–2)
LACTATE SERPL-SCNC: 6.9 MMOL/L (ref 0.4–2)
LEUKOCYTE ESTERASE UR QL STRIP.AUTO: NEGATIVE
LIPASE SERPL-CCNC: 40 U/L (ref 9–82)
LYMPHOCYTES # BLD AUTO: 1.42 X10*3/UL (ref 1.2–4.8)
LYMPHOCYTES NFR BLD AUTO: 9.9 %
MAGNESIUM SERPL-MCNC: 1.68 MG/DL (ref 1.6–2.4)
MAGNESIUM SERPL-MCNC: 1.92 MG/DL (ref 1.6–2.4)
MCH RBC QN AUTO: 32.1 PG (ref 26–34)
MCH RBC QN AUTO: 32.6 PG (ref 26–34)
MCHC RBC AUTO-ENTMCNC: 29.6 G/DL (ref 32–36)
MCHC RBC AUTO-ENTMCNC: 30 G/DL (ref 32–36)
MCV RBC AUTO: 109 FL (ref 80–100)
MCV RBC AUTO: 109 FL (ref 80–100)
METHADONE UR QL SCN: NORMAL
MONOCYTES # BLD AUTO: 0.79 X10*3/UL (ref 0.1–1)
MONOCYTES NFR BLD AUTO: 5.5 %
MUCOUS THREADS #/AREA URNS AUTO: ABNORMAL /LPF
NEUTROPHILS # BLD AUTO: 11.99 X10*3/UL (ref 1.2–7.7)
NEUTROPHILS NFR BLD AUTO: 83.9 %
NITRITE UR QL STRIP.AUTO: NEGATIVE
NRBC BLD-RTO: 0 /100 WBCS (ref 0–0)
NRBC BLD-RTO: 0.1 /100 WBCS (ref 0–0)
OPIATES UR QL SCN: NORMAL
OXYCODONE+OXYMORPHONE UR QL SCN: NORMAL
OXYHGB MFR BLDA: 83.8 % (ref 94–98)
OXYHGB MFR BLDA: 85.2 % (ref 94–98)
OXYHGB MFR BLDA: 85.6 % (ref 94–98)
OXYHGB MFR BLDA: 89.2 % (ref 94–98)
OXYHGB MFR BLDA: 94.9 % (ref 94–98)
PC PRESSURE CONTROL: 22 CM H2O
PCO2 BLDA: 101 MM HG (ref 38–42)
PCO2 BLDA: 77 MM HG (ref 38–42)
PCO2 BLDA: 80 MM HG (ref 38–42)
PCO2 BLDA: 94 MM HG (ref 38–42)
PCO2 BLDA: >125 MM HG (ref 38–42)
PCP UR QL SCN: NORMAL
PEAK PRESSURE: 30 CM H2O
PEEP CMH2O: 10 CM H2O
PEEP CMH2O: 8 CM H2O
PEEP CMH2O: 8 CM H2O
PH BLDA: 6.9 PH (ref 7.38–7.42)
PH BLDA: 7.08 PH (ref 7.38–7.42)
PH BLDA: 7.11 PH (ref 7.38–7.42)
PH BLDA: 7.13 PH (ref 7.38–7.42)
PH BLDA: 7.17 PH (ref 7.38–7.42)
PH UR STRIP.AUTO: 5.5 [PH]
PLATELET # BLD AUTO: 158 X10*3/UL (ref 150–450)
PLATELET # BLD AUTO: 167 X10*3/UL (ref 150–450)
PO2 BLDA: 142 MM HG (ref 85–95)
PO2 BLDA: 64 MM HG (ref 85–95)
PO2 BLDA: 78 MM HG (ref 85–95)
PO2 BLDA: 84 MM HG (ref 85–95)
PO2 BLDA: 89 MM HG (ref 85–95)
POTASSIUM BLDA-SCNC: 4 MMOL/L (ref 3.5–5.3)
POTASSIUM BLDA-SCNC: 4.8 MMOL/L (ref 3.5–5.3)
POTASSIUM BLDA-SCNC: 5.1 MMOL/L (ref 3.5–5.3)
POTASSIUM BLDA-SCNC: 5.2 MMOL/L (ref 3.5–5.3)
POTASSIUM BLDA-SCNC: 5.8 MMOL/L (ref 3.5–5.3)
POTASSIUM SERPL-SCNC: 4.2 MMOL/L (ref 3.5–5.3)
POTASSIUM SERPL-SCNC: 4.8 MMOL/L (ref 3.5–5.3)
POTASSIUM SERPL-SCNC: 5.2 MMOL/L (ref 3.5–5.3)
POTASSIUM SERPL-SCNC: 6.4 MMOL/L (ref 3.5–5.3)
POTASSIUM SERPL-SCNC: 6.6 MMOL/L (ref 3.5–5.3)
PROT SERPL-MCNC: 7.8 G/DL (ref 6.4–8.2)
PROT UR STRIP.AUTO-MCNC: ABNORMAL MG/DL
RBC # BLD AUTO: 5.42 X10*6/UL (ref 4–5.2)
RBC # BLD AUTO: 5.55 X10*6/UL (ref 4–5.2)
RBC # UR STRIP.AUTO: NEGATIVE MG/DL
RBC #/AREA URNS AUTO: ABNORMAL /HPF
SAO2 % BLDA: 100 % (ref 94–100)
SAO2 % BLDA: 92 % (ref 94–100)
SAO2 % BLDA: 96 % (ref 94–100)
SAO2 % BLDA: 97 % (ref 94–100)
SAO2 % BLDA: 98 % (ref 94–100)
SODIUM BLDA-SCNC: 130 MMOL/L (ref 136–145)
SODIUM BLDA-SCNC: 130 MMOL/L (ref 136–145)
SODIUM BLDA-SCNC: 131 MMOL/L (ref 136–145)
SODIUM BLDA-SCNC: 132 MMOL/L (ref 136–145)
SODIUM BLDA-SCNC: 133 MMOL/L (ref 136–145)
SODIUM SERPL-SCNC: 135 MMOL/L (ref 136–145)
SODIUM SERPL-SCNC: 137 MMOL/L (ref 136–145)
SODIUM SERPL-SCNC: 138 MMOL/L (ref 136–145)
SP GR UR STRIP.AUTO: 1.02
SPECIMEN DRAWN FROM PATIENT: ABNORMAL
SPONTANEOUS TIDAL VOLUME: 297 ML
SQUAMOUS #/AREA URNS AUTO: ABNORMAL /HPF
TIDAL VOLUME: 450 ML
TIDAL VOLUME: 500 ML
UROBILINOGEN UR STRIP.AUTO-MCNC: NORMAL MG/DL
VENTILATOR MODE: ABNORMAL
VENTILATOR RATE: 14 BPM
VENTILATOR RATE: 16 BPM
VENTILATOR RATE: 20 BPM
WBC # BLD AUTO: 14.3 X10*3/UL (ref 4.4–11.3)
WBC # BLD AUTO: 25.9 X10*3/UL (ref 4.4–11.3)
WBC #/AREA URNS AUTO: ABNORMAL /HPF

## 2025-05-31 PROCEDURE — 71045 X-RAY EXAM CHEST 1 VIEW: CPT | Mod: 76

## 2025-05-31 PROCEDURE — 93010 ELECTROCARDIOGRAM REPORT: CPT | Performed by: INTERNAL MEDICINE

## 2025-05-31 PROCEDURE — 96375 TX/PRO/DX INJ NEW DRUG ADDON: CPT | Mod: 59

## 2025-05-31 PROCEDURE — 5A1955Z RESPIRATORY VENTILATION, GREATER THAN 96 CONSECUTIVE HOURS: ICD-10-PCS

## 2025-05-31 PROCEDURE — 2500000001 HC RX 250 WO HCPCS SELF ADMINISTERED DRUGS (ALT 637 FOR MEDICARE OP): Performed by: HOSPITALIST

## 2025-05-31 PROCEDURE — 71045 X-RAY EXAM CHEST 1 VIEW: CPT | Mod: FOREIGN READ | Performed by: RADIOLOGY

## 2025-05-31 PROCEDURE — 5A1935Z RESPIRATORY VENTILATION, LESS THAN 24 CONSECUTIVE HOURS: ICD-10-PCS | Performed by: STUDENT IN AN ORGANIZED HEALTH CARE EDUCATION/TRAINING PROGRAM

## 2025-05-31 PROCEDURE — 2550000001 HC RX 255 CONTRASTS: Performed by: HOSPITALIST

## 2025-05-31 PROCEDURE — 80307 DRUG TEST PRSMV CHEM ANLYZR: CPT | Performed by: CLINICAL NURSE SPECIALIST

## 2025-05-31 PROCEDURE — 99292 CRITICAL CARE ADDL 30 MIN: CPT | Performed by: HOSPITALIST

## 2025-05-31 PROCEDURE — 2500000004 HC RX 250 GENERAL PHARMACY W/ HCPCS (ALT 636 FOR OP/ED): Mod: JZ | Performed by: HOSPITALIST

## 2025-05-31 PROCEDURE — 31500 INSERT EMERGENCY AIRWAY: CPT

## 2025-05-31 PROCEDURE — 71275 CT ANGIOGRAPHY CHEST: CPT | Performed by: INTERNAL MEDICINE

## 2025-05-31 PROCEDURE — 82330 ASSAY OF CALCIUM: CPT | Performed by: STUDENT IN AN ORGANIZED HEALTH CARE EDUCATION/TRAINING PROGRAM

## 2025-05-31 PROCEDURE — 94640 AIRWAY INHALATION TREATMENT: CPT

## 2025-05-31 PROCEDURE — 0BH17EZ INSERTION OF ENDOTRACHEAL AIRWAY INTO TRACHEA, VIA NATURAL OR ARTIFICIAL OPENING: ICD-10-PCS

## 2025-05-31 PROCEDURE — 37799 UNLISTED PX VASCULAR SURGERY: CPT | Performed by: HOSPITALIST

## 2025-05-31 PROCEDURE — 93005 ELECTROCARDIOGRAM TRACING: CPT

## 2025-05-31 PROCEDURE — 71045 X-RAY EXAM CHEST 1 VIEW: CPT

## 2025-05-31 PROCEDURE — 94760 N-INVAS EAR/PLS OXIMETRY 1: CPT

## 2025-05-31 PROCEDURE — 31500 INSERT EMERGENCY AIRWAY: CPT | Performed by: CLINICAL NURSE SPECIALIST

## 2025-05-31 PROCEDURE — 83735 ASSAY OF MAGNESIUM: CPT | Performed by: CLINICAL NURSE SPECIALIST

## 2025-05-31 PROCEDURE — 94002 VENT MGMT INPAT INIT DAY: CPT

## 2025-05-31 PROCEDURE — 85027 COMPLETE CBC AUTOMATED: CPT | Performed by: HOSPITALIST

## 2025-05-31 PROCEDURE — 71045 X-RAY EXAM CHEST 1 VIEW: CPT | Mod: REPEAT PROCEDURE BY SAME PHYSICIAN | Performed by: INTERNAL MEDICINE

## 2025-05-31 PROCEDURE — 94002 VENT MGMT INPAT INIT DAY: CPT | Mod: CCI

## 2025-05-31 PROCEDURE — 87077 CULTURE AEROBIC IDENTIFY: CPT | Mod: TRILAB | Performed by: CLINICAL NURSE SPECIALIST

## 2025-05-31 PROCEDURE — 82077 ASSAY SPEC XCP UR&BREATH IA: CPT | Performed by: STUDENT IN AN ORGANIZED HEALTH CARE EDUCATION/TRAINING PROGRAM

## 2025-05-31 PROCEDURE — 99292 CRITICAL CARE ADDL 30 MIN: CPT | Performed by: INTERNAL MEDICINE

## 2025-05-31 PROCEDURE — 71045 X-RAY EXAM CHEST 1 VIEW
CPT | Mod: REPEAT PROCEDURE BY ANOTHER PHYSICIAN | Performed by: STUDENT IN AN ORGANIZED HEALTH CARE EDUCATION/TRAINING PROGRAM

## 2025-05-31 PROCEDURE — 82947 ASSAY GLUCOSE BLOOD QUANT: CPT

## 2025-05-31 PROCEDURE — 99239 HOSP IP/OBS DSCHRG MGMT >30: CPT | Performed by: INTERNAL MEDICINE

## 2025-05-31 PROCEDURE — 70498 CT ANGIOGRAPHY NECK: CPT

## 2025-05-31 PROCEDURE — 96365 THER/PROPH/DIAG IV INF INIT: CPT | Mod: 59

## 2025-05-31 PROCEDURE — 83036 HEMOGLOBIN GLYCOSYLATED A1C: CPT | Mod: TRILAB

## 2025-05-31 PROCEDURE — 71045 X-RAY EXAM CHEST 1 VIEW: CPT | Mod: 77

## 2025-05-31 PROCEDURE — 84132 ASSAY OF SERUM POTASSIUM: CPT | Performed by: HOSPITALIST

## 2025-05-31 PROCEDURE — 84132 ASSAY OF SERUM POTASSIUM: CPT | Performed by: STUDENT IN AN ORGANIZED HEALTH CARE EDUCATION/TRAINING PROGRAM

## 2025-05-31 PROCEDURE — 74176 CT ABD & PELVIS W/O CONTRAST: CPT

## 2025-05-31 PROCEDURE — 82140 ASSAY OF AMMONIA: CPT | Performed by: CLINICAL NURSE SPECIALIST

## 2025-05-31 PROCEDURE — 84132 ASSAY OF SERUM POTASSIUM: CPT | Performed by: CLINICAL NURSE SPECIALIST

## 2025-05-31 PROCEDURE — 36415 COLL VENOUS BLD VENIPUNCTURE: CPT | Performed by: CLINICAL NURSE SPECIALIST

## 2025-05-31 PROCEDURE — 36600 WITHDRAWAL OF ARTERIAL BLOOD: CPT

## 2025-05-31 PROCEDURE — 2500000005 HC RX 250 GENERAL PHARMACY W/O HCPCS: Performed by: HOSPITALIST

## 2025-05-31 PROCEDURE — 83605 ASSAY OF LACTIC ACID: CPT | Performed by: HOSPITALIST

## 2025-05-31 PROCEDURE — 2500000005 HC RX 250 GENERAL PHARMACY W/O HCPCS: Performed by: CLINICAL NURSE SPECIALIST

## 2025-05-31 PROCEDURE — 83605 ASSAY OF LACTIC ACID: CPT | Performed by: CLINICAL NURSE SPECIALIST

## 2025-05-31 PROCEDURE — 82550 ASSAY OF CK (CPK): CPT | Performed by: STUDENT IN AN ORGANIZED HEALTH CARE EDUCATION/TRAINING PROGRAM

## 2025-05-31 PROCEDURE — 81001 URINALYSIS AUTO W/SCOPE: CPT | Performed by: CLINICAL NURSE SPECIALIST

## 2025-05-31 PROCEDURE — 83690 ASSAY OF LIPASE: CPT | Performed by: CLINICAL NURSE SPECIALIST

## 2025-05-31 PROCEDURE — 83605 ASSAY OF LACTIC ACID: CPT | Performed by: STUDENT IN AN ORGANIZED HEALTH CARE EDUCATION/TRAINING PROGRAM

## 2025-05-31 PROCEDURE — 84132 ASSAY OF SERUM POTASSIUM: CPT | Performed by: INTERNAL MEDICINE

## 2025-05-31 PROCEDURE — 2020000001 HC ICU ROOM DAILY

## 2025-05-31 PROCEDURE — 99285 EMERGENCY DEPT VISIT HI MDM: CPT | Mod: 25 | Performed by: STUDENT IN AN ORGANIZED HEALTH CARE EDUCATION/TRAINING PROGRAM

## 2025-05-31 PROCEDURE — 2500000005 HC RX 250 GENERAL PHARMACY W/O HCPCS

## 2025-05-31 PROCEDURE — 70496 CT ANGIOGRAPHY HEAD: CPT | Performed by: RADIOLOGY

## 2025-05-31 PROCEDURE — 99291 CRITICAL CARE FIRST HOUR: CPT | Performed by: STUDENT IN AN ORGANIZED HEALTH CARE EDUCATION/TRAINING PROGRAM

## 2025-05-31 PROCEDURE — 84484 ASSAY OF TROPONIN QUANT: CPT | Performed by: INTERNAL MEDICINE

## 2025-05-31 PROCEDURE — 2500000004 HC RX 250 GENERAL PHARMACY W/ HCPCS (ALT 636 FOR OP/ED)

## 2025-05-31 PROCEDURE — 2500000004 HC RX 250 GENERAL PHARMACY W/ HCPCS (ALT 636 FOR OP/ED): Performed by: STUDENT IN AN ORGANIZED HEALTH CARE EDUCATION/TRAINING PROGRAM

## 2025-05-31 PROCEDURE — 99291 CRITICAL CARE FIRST HOUR: CPT | Performed by: HOSPITALIST

## 2025-05-31 PROCEDURE — 2500000004 HC RX 250 GENERAL PHARMACY W/ HCPCS (ALT 636 FOR OP/ED): Mod: JZ | Performed by: INTERNAL MEDICINE

## 2025-05-31 PROCEDURE — 0BH17EZ INSERTION OF ENDOTRACHEAL AIRWAY INTO TRACHEA, VIA NATURAL OR ARTIFICIAL OPENING: ICD-10-PCS | Performed by: STUDENT IN AN ORGANIZED HEALTH CARE EDUCATION/TRAINING PROGRAM

## 2025-05-31 PROCEDURE — 96361 HYDRATE IV INFUSION ADD-ON: CPT | Mod: 59

## 2025-05-31 PROCEDURE — 2500000004 HC RX 250 GENERAL PHARMACY W/ HCPCS (ALT 636 FOR OP/ED): Mod: JZ | Performed by: CLINICAL NURSE SPECIALIST

## 2025-05-31 PROCEDURE — 99291 CRITICAL CARE FIRST HOUR: CPT | Performed by: CLINICAL NURSE SPECIALIST

## 2025-05-31 PROCEDURE — 2550000001 HC RX 255 CONTRASTS: Performed by: STUDENT IN AN ORGANIZED HEALTH CARE EDUCATION/TRAINING PROGRAM

## 2025-05-31 PROCEDURE — 99291 CRITICAL CARE FIRST HOUR: CPT | Performed by: INTERNAL MEDICINE

## 2025-05-31 PROCEDURE — 84484 ASSAY OF TROPONIN QUANT: CPT | Performed by: CLINICAL NURSE SPECIALIST

## 2025-05-31 PROCEDURE — 82550 ASSAY OF CK (CPK): CPT

## 2025-05-31 PROCEDURE — 83735 ASSAY OF MAGNESIUM: CPT | Performed by: INTERNAL MEDICINE

## 2025-05-31 PROCEDURE — 94660 CPAP INITIATION&MGMT: CPT

## 2025-05-31 PROCEDURE — 74176 CT ABD & PELVIS W/O CONTRAST: CPT | Mod: FOREIGN READ | Performed by: RADIOLOGY

## 2025-05-31 PROCEDURE — 2500000005 HC RX 250 GENERAL PHARMACY W/O HCPCS: Performed by: NURSE PRACTITIONER

## 2025-05-31 PROCEDURE — 87641 MR-STAPH DNA AMP PROBE: CPT | Mod: TRILAB | Performed by: HOSPITALIST

## 2025-05-31 PROCEDURE — 87081 CULTURE SCREEN ONLY: CPT | Mod: TRILAB | Performed by: HOSPITALIST

## 2025-05-31 PROCEDURE — 71275 CT ANGIOGRAPHY CHEST: CPT

## 2025-05-31 PROCEDURE — 71045 X-RAY EXAM CHEST 1 VIEW: CPT | Mod: REPEAT PROCEDURE BY SAME PHYSICIAN | Performed by: RADIOLOGY

## 2025-05-31 PROCEDURE — 2500000002 HC RX 250 W HCPCS SELF ADMINISTERED DRUGS (ALT 637 FOR MEDICARE OP, ALT 636 FOR OP/ED): Performed by: HOSPITALIST

## 2025-05-31 PROCEDURE — 36556 INSERT NON-TUNNEL CV CATH: CPT | Performed by: STUDENT IN AN ORGANIZED HEALTH CARE EDUCATION/TRAINING PROGRAM

## 2025-05-31 PROCEDURE — 85025 COMPLETE CBC W/AUTO DIFF WBC: CPT | Performed by: CLINICAL NURSE SPECIALIST

## 2025-05-31 PROCEDURE — 84443 ASSAY THYROID STIM HORMONE: CPT

## 2025-05-31 PROCEDURE — 94664 DEMO&/EVAL PT USE INHALER: CPT

## 2025-05-31 PROCEDURE — 70498 CT ANGIOGRAPHY NECK: CPT | Performed by: RADIOLOGY

## 2025-05-31 PROCEDURE — 84484 ASSAY OF TROPONIN QUANT: CPT | Performed by: STUDENT IN AN ORGANIZED HEALTH CARE EDUCATION/TRAINING PROGRAM

## 2025-05-31 PROCEDURE — 3E043XZ INTRODUCTION OF VASOPRESSOR INTO CENTRAL VEIN, PERCUTANEOUS APPROACH: ICD-10-PCS

## 2025-05-31 PROCEDURE — 02HV33Z INSERTION OF INFUSION DEVICE INTO SUPERIOR VENA CAVA, PERCUTANEOUS APPROACH: ICD-10-PCS | Performed by: STUDENT IN AN ORGANIZED HEALTH CARE EDUCATION/TRAINING PROGRAM

## 2025-05-31 RX ORDER — SODIUM CHLORIDE 9 MG/ML
100 INJECTION, SOLUTION INTRAVENOUS CONTINUOUS
Status: DISCONTINUED | OUTPATIENT
Start: 2025-05-31 | End: 2025-05-31 | Stop reason: HOSPADM

## 2025-05-31 RX ORDER — VANCOMYCIN HYDROCHLORIDE 1 G/20ML
INJECTION, POWDER, LYOPHILIZED, FOR SOLUTION INTRAVENOUS DAILY PRN
Status: DISCONTINUED | OUTPATIENT
Start: 2025-05-31 | End: 2025-05-31 | Stop reason: HOSPADM

## 2025-05-31 RX ORDER — FENTANYL CITRATE-0.9 % NACL/PF 10 MCG/ML
0-200 PLASTIC BAG, INJECTION (ML) INTRAVENOUS CONTINUOUS
Status: DISCONTINUED | OUTPATIENT
Start: 2025-06-01 | End: 2025-06-02

## 2025-05-31 RX ORDER — VANCOMYCIN HYDROCHLORIDE 1 G/20ML
INJECTION, POWDER, LYOPHILIZED, FOR SOLUTION INTRAVENOUS DAILY PRN
Status: DISCONTINUED | OUTPATIENT
Start: 2025-05-31 | End: 2025-06-02

## 2025-05-31 RX ORDER — FLUTICASONE FUROATE, UMECLIDINIUM BROMIDE AND VILANTEROL TRIFENATATE 100; 62.5; 25 UG/1; UG/1; UG/1
1 POWDER RESPIRATORY (INHALATION) DAILY
Status: ON HOLD | COMMUNITY
End: 2025-06-06

## 2025-05-31 RX ORDER — CALCIUM CHLORIDE INJECTION 100 MG/ML
1 INJECTION, SOLUTION INTRAVENOUS ONCE
Status: COMPLETED | OUTPATIENT
Start: 2025-05-31 | End: 2025-05-31

## 2025-05-31 RX ORDER — DEXTROSE 50 % IN WATER (D50W) INTRAVENOUS SYRINGE
25
Status: DISCONTINUED | OUTPATIENT
Start: 2025-05-31 | End: 2025-06-01 | Stop reason: SDUPTHER

## 2025-05-31 RX ORDER — IPRATROPIUM BROMIDE AND ALBUTEROL SULFATE 2.5; .5 MG/3ML; MG/3ML
3 SOLUTION RESPIRATORY (INHALATION)
Status: ON HOLD | COMMUNITY
End: 2025-06-06

## 2025-05-31 RX ORDER — FENTANYL CITRATE 50 UG/ML
50 INJECTION, SOLUTION INTRAMUSCULAR; INTRAVENOUS
Status: DISCONTINUED | OUTPATIENT
Start: 2025-05-31 | End: 2025-05-31 | Stop reason: HOSPADM

## 2025-05-31 RX ORDER — IPRATROPIUM BROMIDE AND ALBUTEROL SULFATE 2.5; .5 MG/3ML; MG/3ML
3 SOLUTION RESPIRATORY (INHALATION)
Status: DISCONTINUED | OUTPATIENT
Start: 2025-05-31 | End: 2025-05-31 | Stop reason: HOSPADM

## 2025-05-31 RX ORDER — MAGNESIUM SULFATE HEPTAHYDRATE 40 MG/ML
2 INJECTION, SOLUTION INTRAVENOUS ONCE
Status: DISCONTINUED | OUTPATIENT
Start: 2025-05-31 | End: 2025-05-31 | Stop reason: HOSPADM

## 2025-05-31 RX ORDER — NOREPINEPHRINE BITARTRATE/D5W 8 MG/250ML
0-.5 PLASTIC BAG, INJECTION (ML) INTRAVENOUS CONTINUOUS
Status: DISCONTINUED | OUTPATIENT
Start: 2025-06-01 | End: 2025-06-01

## 2025-05-31 RX ORDER — PANTOPRAZOLE SODIUM 40 MG/1
40 TABLET, DELAYED RELEASE ORAL
COMMUNITY

## 2025-05-31 RX ORDER — CALCIUM GLUCONATE 20 MG/ML
1 INJECTION, SOLUTION INTRAVENOUS ONCE
Status: COMPLETED | OUTPATIENT
Start: 2025-05-31 | End: 2025-05-31

## 2025-05-31 RX ORDER — NOREPINEPHRINE BITARTRATE/D5W 8 MG/250ML
PLASTIC BAG, INJECTION (ML) INTRAVENOUS
Status: COMPLETED
Start: 2025-05-31 | End: 2025-05-31

## 2025-05-31 RX ORDER — VANCOMYCIN 1.5 G/300ML
1500 INJECTION, SOLUTION INTRAVENOUS ONCE
Status: COMPLETED | OUTPATIENT
Start: 2025-05-31 | End: 2025-05-31

## 2025-05-31 RX ORDER — IPRATROPIUM BROMIDE AND ALBUTEROL SULFATE 2.5; .5 MG/3ML; MG/3ML
3 SOLUTION RESPIRATORY (INHALATION) ONCE
Status: COMPLETED | OUTPATIENT
Start: 2025-05-31 | End: 2025-05-31

## 2025-05-31 RX ORDER — SODIUM BICARBONATE 1 MEQ/ML
50 SYRINGE (ML) INTRAVENOUS ONCE
Status: COMPLETED | OUTPATIENT
Start: 2025-05-31 | End: 2025-05-31

## 2025-05-31 RX ORDER — CEFEPIME HYDROCHLORIDE 1 G/50ML
1 INJECTION, SOLUTION INTRAVENOUS EVERY 12 HOURS
Status: DISCONTINUED | OUTPATIENT
Start: 2025-05-31 | End: 2025-05-31 | Stop reason: HOSPADM

## 2025-05-31 RX ORDER — SODIUM CHLORIDE, SODIUM LACTATE, POTASSIUM CHLORIDE, CALCIUM CHLORIDE 600; 310; 30; 20 MG/100ML; MG/100ML; MG/100ML; MG/100ML
125 INJECTION, SOLUTION INTRAVENOUS CONTINUOUS
Status: DISCONTINUED | OUTPATIENT
Start: 2025-05-31 | End: 2025-05-31

## 2025-05-31 RX ORDER — PANTOPRAZOLE SODIUM 40 MG/1
40 TABLET, DELAYED RELEASE ORAL
Status: DISCONTINUED | OUTPATIENT
Start: 2025-06-01 | End: 2025-06-04

## 2025-05-31 RX ORDER — NOREPINEPHRINE BITARTRATE/D5W 8 MG/250ML
.01-1 PLASTIC BAG, INJECTION (ML) INTRAVENOUS CONTINUOUS
Status: DISCONTINUED | OUTPATIENT
Start: 2025-05-31 | End: 2025-05-31 | Stop reason: HOSPADM

## 2025-05-31 RX ORDER — ETOMIDATE 2 MG/ML
0.3 INJECTION INTRAVENOUS ONCE
Status: COMPLETED | OUTPATIENT
Start: 2025-05-31 | End: 2025-05-31

## 2025-05-31 RX ORDER — PROPOFOL 10 MG/ML
25 INJECTION, EMULSION INTRAVENOUS CONTINUOUS
Status: DISCONTINUED | OUTPATIENT
Start: 2025-05-31 | End: 2025-05-31 | Stop reason: HOSPADM

## 2025-05-31 RX ORDER — FENTANYL CITRATE-0.9 % NACL/PF 10 MCG/ML
50 PLASTIC BAG, INJECTION (ML) INTRAVENOUS CONTINUOUS
Status: DISCONTINUED | OUTPATIENT
Start: 2025-05-31 | End: 2025-05-31 | Stop reason: HOSPADM

## 2025-05-31 RX ORDER — DEXMEDETOMIDINE HYDROCHLORIDE 4 UG/ML
.2-1.5 INJECTION, SOLUTION INTRAVENOUS CONTINUOUS
Status: DISCONTINUED | OUTPATIENT
Start: 2025-05-31 | End: 2025-05-31

## 2025-05-31 RX ORDER — ROCURONIUM BROMIDE 10 MG/ML
1 INJECTION, SOLUTION INTRAVENOUS ONCE
Status: COMPLETED | OUTPATIENT
Start: 2025-05-31 | End: 2025-05-31

## 2025-05-31 RX ORDER — ALBUTEROL SULFATE 0.83 MG/ML
2.5 SOLUTION RESPIRATORY (INHALATION)
Status: DISCONTINUED | OUTPATIENT
Start: 2025-05-31 | End: 2025-05-31 | Stop reason: HOSPADM

## 2025-05-31 RX ORDER — PANTOPRAZOLE SODIUM 40 MG/10ML
40 INJECTION, POWDER, LYOPHILIZED, FOR SOLUTION INTRAVENOUS ONCE
Status: COMPLETED | OUTPATIENT
Start: 2025-05-31 | End: 2025-05-31

## 2025-05-31 RX ORDER — HEPARIN SODIUM 5000 [USP'U]/ML
5000 INJECTION, SOLUTION INTRAVENOUS; SUBCUTANEOUS EVERY 8 HOURS
Status: DISCONTINUED | OUTPATIENT
Start: 2025-06-01 | End: 2025-06-06 | Stop reason: HOSPADM

## 2025-05-31 RX ORDER — NYSTATIN 100000 [USP'U]/G
1 POWDER TOPICAL 2 TIMES DAILY
COMMUNITY

## 2025-05-31 RX ORDER — PANTOPRAZOLE SODIUM 40 MG/10ML
40 INJECTION, POWDER, LYOPHILIZED, FOR SOLUTION INTRAVENOUS 2 TIMES DAILY
Status: DISCONTINUED | OUTPATIENT
Start: 2025-05-31 | End: 2025-05-31 | Stop reason: HOSPADM

## 2025-05-31 RX ORDER — ACETAMINOPHEN 160 MG/5ML
650 SOLUTION ORAL EVERY 6 HOURS PRN
Status: DISCONTINUED | OUTPATIENT
Start: 2025-05-31 | End: 2025-06-06 | Stop reason: HOSPADM

## 2025-05-31 RX ORDER — GABAPENTIN 600 MG/1
600 TABLET ORAL 3 TIMES DAILY
COMMUNITY

## 2025-05-31 RX ORDER — PANTOPRAZOLE SODIUM 40 MG/10ML
40 INJECTION, POWDER, LYOPHILIZED, FOR SOLUTION INTRAVENOUS
Status: DISCONTINUED | OUTPATIENT
Start: 2025-06-01 | End: 2025-06-04

## 2025-05-31 RX ORDER — SODIUM CHLORIDE FOR INHALATION 3 %
3 VIAL, NEBULIZER (ML) INHALATION ONCE
Status: COMPLETED | OUTPATIENT
Start: 2025-05-31 | End: 2025-05-31

## 2025-05-31 RX ORDER — ACETAMINOPHEN 325 MG/1
650 TABLET ORAL EVERY 6 HOURS PRN
COMMUNITY

## 2025-05-31 RX ORDER — DEXTROSE 50 % IN WATER (D50W) INTRAVENOUS SYRINGE
12.5
Status: DISCONTINUED | OUTPATIENT
Start: 2025-05-31 | End: 2025-06-01 | Stop reason: SDUPTHER

## 2025-05-31 RX ORDER — ALBUTEROL SULFATE 0.83 MG/ML
2.5 SOLUTION RESPIRATORY (INHALATION) EVERY 6 HOURS PRN
Status: DISCONTINUED | OUTPATIENT
Start: 2025-05-31 | End: 2025-05-31

## 2025-05-31 RX ORDER — INSULIN LISPRO 100 [IU]/ML
0-5 INJECTION, SOLUTION INTRAVENOUS; SUBCUTANEOUS
Status: DISCONTINUED | OUTPATIENT
Start: 2025-05-31 | End: 2025-05-31 | Stop reason: HOSPADM

## 2025-05-31 RX ORDER — GUAIFENESIN 600 MG/1
1200 TABLET, EXTENDED RELEASE ORAL 2 TIMES DAILY
COMMUNITY

## 2025-05-31 RX ORDER — ACETAMINOPHEN 325 MG/1
650 TABLET ORAL EVERY 6 HOURS PRN
Status: DISCONTINUED | OUTPATIENT
Start: 2025-05-31 | End: 2025-05-31 | Stop reason: HOSPADM

## 2025-05-31 RX ORDER — ROCURONIUM BROMIDE 10 MG/ML
INJECTION, SOLUTION INTRAVENOUS CODE/TRAUMA/SEDATION MEDICATION
Status: COMPLETED | OUTPATIENT
Start: 2025-05-31 | End: 2025-05-31

## 2025-05-31 RX ORDER — PROPOFOL 10 MG/ML
0-50 INJECTION, EMULSION INTRAVENOUS CONTINUOUS
Status: DISCONTINUED | OUTPATIENT
Start: 2025-06-01 | End: 2025-06-04

## 2025-05-31 RX ORDER — NYSTATIN 100000 [USP'U]/G
1 POWDER TOPICAL 2 TIMES DAILY
Status: DISCONTINUED | OUTPATIENT
Start: 2025-05-31 | End: 2025-05-31 | Stop reason: HOSPADM

## 2025-05-31 RX ADMIN — PIPERACILLIN SODIUM AND TAZOBACTAM SODIUM 4.5 G: 4; .5 INJECTION, SOLUTION INTRAVENOUS at 11:50

## 2025-05-31 RX ADMIN — VASOPRESSIN 0.03 UNITS/MIN: 0.2 INJECTION INTRAVENOUS at 19:02

## 2025-05-31 RX ADMIN — Medication 80 PERCENT: at 23:38

## 2025-05-31 RX ADMIN — MINERAL OIL, WHITE PETROLATUM 1 APPLICATION: .03; .94 OINTMENT OPHTHALMIC at 20:44

## 2025-05-31 RX ADMIN — SODIUM CHLORIDE 500 ML: 900 INJECTION, SOLUTION INTRAVENOUS at 11:14

## 2025-05-31 RX ADMIN — CALCIUM CHLORIDE 1 G: 100 INJECTION INTRAVENOUS; INTRAVENTRICULAR at 18:16

## 2025-05-31 RX ADMIN — NOREPINEPHRINE BITARTRATE 0.33 MCG/KG/MIN: 8 INJECTION, SOLUTION INTRAVENOUS at 21:53

## 2025-05-31 RX ADMIN — IOHEXOL 75 ML: 350 INJECTION, SOLUTION INTRAVENOUS at 19:04

## 2025-05-31 RX ADMIN — SODIUM CHLORIDE 2961 ML: 900 INJECTION, SOLUTION INTRAVENOUS at 11:50

## 2025-05-31 RX ADMIN — SODIUM CHLORIDE SOLN NEBU 3% 3 ML: 3 NEBU SOLN at 20:55

## 2025-05-31 RX ADMIN — Medication 80 PERCENT: at 20:56

## 2025-05-31 RX ADMIN — SODIUM BICARBONATE 100 ML/HR: 84 INJECTION, SOLUTION INTRAVENOUS at 18:08

## 2025-05-31 RX ADMIN — IPRATROPIUM BROMIDE AND ALBUTEROL SULFATE 3 ML: 2.5; .5 SOLUTION RESPIRATORY (INHALATION) at 20:54

## 2025-05-31 RX ADMIN — MAGNESIUM SULFATE HEPTAHYDRATE 2 G: 40 INJECTION, SOLUTION INTRAVENOUS at 21:56

## 2025-05-31 RX ADMIN — PROPOFOL 25 MCG/KG/MIN: 10 INJECTION, EMULSION INTRAVENOUS at 19:01

## 2025-05-31 RX ADMIN — ROCURONIUM 50 MG: 50 INJECTION, SOLUTION INTRAVENOUS at 14:10

## 2025-05-31 RX ADMIN — CALCIUM GLUCONATE 1 G: 20 INJECTION, SOLUTION INTRAVENOUS at 19:52

## 2025-05-31 RX ADMIN — IOHEXOL 75 ML: 350 INJECTION, SOLUTION INTRAVENOUS at 11:38

## 2025-05-31 RX ADMIN — ROCURONIUM BROMIDE 93 MG: 10 INJECTION, SOLUTION INTRAVENOUS at 18:02

## 2025-05-31 RX ADMIN — HYDROCORTISONE SODIUM SUCCINATE 100 MG: 100 INJECTION, POWDER, FOR SOLUTION INTRAMUSCULAR; INTRAVENOUS at 21:48

## 2025-05-31 RX ADMIN — Medication 50 MCG/HR: at 19:01

## 2025-05-31 RX ADMIN — DEXMEDETOMIDINE HYDROCHLORIDE 0.2 MCG/KG/HR: 4 INJECTION, SOLUTION INTRAVENOUS at 14:20

## 2025-05-31 RX ADMIN — NOREPINEPHRINE BITARTRATE 0.04 MCG/KG/MIN: 8 INJECTION, SOLUTION INTRAVENOUS at 16:03

## 2025-05-31 RX ADMIN — SODIUM BICARBONATE 50 MEQ: 84 INJECTION INTRAVENOUS at 18:14

## 2025-05-31 RX ADMIN — PANTOPRAZOLE SODIUM 40 MG: 40 INJECTION, POWDER, FOR SOLUTION INTRAVENOUS at 20:44

## 2025-05-31 RX ADMIN — IPRATROPIUM BROMIDE AND ALBUTEROL SULFATE 3 ML: 2.5; .5 SOLUTION RESPIRATORY (INHALATION) at 20:56

## 2025-05-31 RX ADMIN — SODIUM CHLORIDE 100 ML/HR: 900 INJECTION, SOLUTION INTRAVENOUS at 15:59

## 2025-05-31 RX ADMIN — IPRATROPIUM BROMIDE AND ALBUTEROL SULFATE 3 ML: 2.5; .5 SOLUTION RESPIRATORY (INHALATION) at 17:41

## 2025-05-31 RX ADMIN — CISATRACURIUM BESYLATE 37.5 MG/HR: 200 INJECTION, SOLUTION INTRAVENOUS at 19:02

## 2025-05-31 RX ADMIN — PANTOPRAZOLE SODIUM 40 MG: 40 INJECTION, POWDER, FOR SOLUTION INTRAVENOUS at 15:14

## 2025-05-31 RX ADMIN — CISATRACURIUM BESYLATE 37.5 MG/HR: 200 INJECTION, SOLUTION INTRAVENOUS at 22:29

## 2025-05-31 RX ADMIN — INSULIN LISPRO 3 UNITS: 100 INJECTION, SOLUTION INTRAVENOUS; SUBCUTANEOUS at 21:32

## 2025-05-31 RX ADMIN — CEFEPIME HYDROCHLORIDE 1 G: 1 INJECTION, SOLUTION INTRAVENOUS at 20:24

## 2025-05-31 RX ADMIN — SODIUM CHLORIDE, POTASSIUM CHLORIDE, SODIUM LACTATE AND CALCIUM CHLORIDE 125 ML/HR: 600; 310; 30; 20 INJECTION, SOLUTION INTRAVENOUS at 14:11

## 2025-05-31 RX ADMIN — VANCOMYCIN 1.5 G: 1.5 INJECTION, SOLUTION INTRAVENOUS at 12:08

## 2025-05-31 RX ADMIN — NYSTATIN 1 APPLICATION: 100000 POWDER TOPICAL at 20:44

## 2025-05-31 RX ADMIN — Medication 100 PERCENT: at 16:44

## 2025-05-31 RX ADMIN — ETOMIDATE 30 MG: 2 INJECTION, SOLUTION INTRAVENOUS at 14:10

## 2025-05-31 RX ADMIN — Medication 80 PERCENT: at 20:57

## 2025-05-31 RX ADMIN — DOXYCYCLINE 100 MG: 100 INJECTION, POWDER, LYOPHILIZED, FOR SOLUTION INTRAVENOUS at 18:07

## 2025-05-31 RX ADMIN — PROPOFOL 25 MCG/KG/MIN: 10 INJECTION, EMULSION INTRAVENOUS at 22:18

## 2025-05-31 RX ADMIN — METHYLPREDNISOLONE SODIUM SUCCINATE 125 MG: 125 INJECTION, POWDER, FOR SOLUTION INTRAMUSCULAR; INTRAVENOUS at 20:44

## 2025-05-31 SDOH — SOCIAL STABILITY: SOCIAL INSECURITY
WITHIN THE LAST YEAR, HAVE YOU BEEN HUMILIATED OR EMOTIONALLY ABUSED IN OTHER WAYS BY YOUR PARTNER OR EX-PARTNER?: PATIENT UNABLE TO ANSWER

## 2025-05-31 SDOH — ECONOMIC STABILITY: FOOD INSECURITY
WITHIN THE PAST 12 MONTHS, THE FOOD YOU BOUGHT JUST DIDN'T LAST AND YOU DIDN'T HAVE MONEY TO GET MORE.: PATIENT UNABLE TO ANSWER

## 2025-05-31 SDOH — ECONOMIC STABILITY: INCOME INSECURITY
IN THE PAST 12 MONTHS HAS THE ELECTRIC, GAS, OIL, OR WATER COMPANY THREATENED TO SHUT OFF SERVICES IN YOUR HOME?: PATIENT UNABLE TO ANSWER

## 2025-05-31 SDOH — SOCIAL STABILITY: SOCIAL INSECURITY: HAVE YOU HAD THOUGHTS OF HARMING ANYONE ELSE?: NO

## 2025-05-31 SDOH — ECONOMIC STABILITY: FOOD INSECURITY
WITHIN THE PAST 12 MONTHS, YOU WORRIED THAT YOUR FOOD WOULD RUN OUT BEFORE YOU GOT THE MONEY TO BUY MORE.: PATIENT UNABLE TO ANSWER

## 2025-05-31 SDOH — SOCIAL STABILITY: SOCIAL INSECURITY: DO YOU FEEL UNSAFE GOING BACK TO THE PLACE WHERE YOU ARE LIVING?: UNABLE TO ASSESS

## 2025-05-31 SDOH — SOCIAL STABILITY: SOCIAL INSECURITY
WITHIN THE LAST YEAR, HAVE YOU BEEN KICKED, HIT, SLAPPED, OR OTHERWISE PHYSICALLY HURT BY YOUR PARTNER OR EX-PARTNER?: PATIENT UNABLE TO ANSWER

## 2025-05-31 SDOH — SOCIAL STABILITY: SOCIAL INSECURITY: WERE YOU ABLE TO COMPLETE ALL THE BEHAVIORAL HEALTH SCREENINGS?: YES

## 2025-05-31 SDOH — SOCIAL STABILITY: SOCIAL INSECURITY: WITHIN THE LAST YEAR, HAVE YOU BEEN AFRAID OF YOUR PARTNER OR EX-PARTNER?: PATIENT UNABLE TO ANSWER

## 2025-05-31 SDOH — SOCIAL STABILITY: SOCIAL INSECURITY: DOES ANYONE TRY TO KEEP YOU FROM HAVING/CONTACTING OTHER FRIENDS OR DOING THINGS OUTSIDE YOUR HOME?: UNABLE TO ASSESS

## 2025-05-31 SDOH — SOCIAL STABILITY: SOCIAL INSECURITY: ABUSE: ADULT

## 2025-05-31 SDOH — SOCIAL STABILITY: SOCIAL INSECURITY: ARE THERE ANY APPARENT SIGNS OF INJURIES/BEHAVIORS THAT COULD BE RELATED TO ABUSE/NEGLECT?: UNABLE TO ASSESS

## 2025-05-31 SDOH — SOCIAL STABILITY: SOCIAL INSECURITY
WITHIN THE LAST YEAR, HAVE YOU BEEN RAPED OR FORCED TO HAVE ANY KIND OF SEXUAL ACTIVITY BY YOUR PARTNER OR EX-PARTNER?: PATIENT UNABLE TO ANSWER

## 2025-05-31 SDOH — SOCIAL STABILITY: SOCIAL INSECURITY: DO YOU FEEL ANYONE HAS EXPLOITED OR TAKEN ADVANTAGE OF YOU FINANCIALLY OR OF YOUR PERSONAL PROPERTY?: UNABLE TO ASSESS

## 2025-05-31 SDOH — SOCIAL STABILITY: SOCIAL INSECURITY: HAVE YOU HAD ANY THOUGHTS OF HARMING ANYONE ELSE?: UNABLE TO ASSESS

## 2025-05-31 SDOH — SOCIAL STABILITY: SOCIAL INSECURITY: ARE YOU OR HAVE YOU BEEN THREATENED OR ABUSED PHYSICALLY, EMOTIONALLY, OR SEXUALLY BY ANYONE?: UNABLE TO ASSESS

## 2025-05-31 SDOH — SOCIAL STABILITY: SOCIAL INSECURITY: HAS ANYONE EVER THREATENED TO HURT YOUR FAMILY OR YOUR PETS?: UNABLE TO ASSESS

## 2025-05-31 ASSESSMENT — LIFESTYLE VARIABLES
HOW OFTEN DO YOU HAVE A DRINK CONTAINING ALCOHOL: PATIENT UNABLE TO ANSWER
HOW OFTEN DO YOU HAVE 6 OR MORE DRINKS ON ONE OCCASION: PATIENT UNABLE TO ANSWER
AUDIT-C TOTAL SCORE: -1
AUDIT-C TOTAL SCORE: -1
SKIP TO QUESTIONS 9-10: 0
HOW MANY STANDARD DRINKS CONTAINING ALCOHOL DO YOU HAVE ON A TYPICAL DAY: PATIENT UNABLE TO ANSWER

## 2025-05-31 ASSESSMENT — ACTIVITIES OF DAILY LIVING (ADL)
FEEDING YOURSELF: UNABLE TO ASSESS
PATIENT'S MEMORY ADEQUATE TO SAFELY COMPLETE DAILY ACTIVITIES?: UNABLE TO ASSESS
TOILETING: UNABLE TO ASSESS
GROOMING: UNABLE TO ASSESS
LACK_OF_TRANSPORTATION: PATIENT UNABLE TO ANSWER
HEARING - LEFT EAR: UNABLE TO ASSESS
DRESSING YOURSELF: UNABLE TO ASSESS
ASSISTIVE_DEVICE: WHEELCHAIR
ADEQUATE_TO_COMPLETE_ADL: UNABLE TO ASSESS
WALKS IN HOME: UNABLE TO ASSESS
JUDGMENT_ADEQUATE_SAFELY_COMPLETE_DAILY_ACTIVITIES: UNABLE TO ASSESS
BATHING: UNABLE TO ASSESS
HEARING - RIGHT EAR: UNABLE TO ASSESS

## 2025-05-31 ASSESSMENT — PAIN - FUNCTIONAL ASSESSMENT
PAIN_FUNCTIONAL_ASSESSMENT: CPOT (CRITICAL CARE PAIN OBSERVATION TOOL)
PAIN_FUNCTIONAL_ASSESSMENT: 0-10

## 2025-05-31 ASSESSMENT — PATIENT HEALTH QUESTIONNAIRE - PHQ9
2. FEELING DOWN, DEPRESSED OR HOPELESS: NOT AT ALL
1. LITTLE INTEREST OR PLEASURE IN DOING THINGS: NOT AT ALL
SUM OF ALL RESPONSES TO PHQ9 QUESTIONS 1 & 2: 0

## 2025-05-31 ASSESSMENT — COGNITIVE AND FUNCTIONAL STATUS - GENERAL: PATIENT BASELINE BEDBOUND: UNABLE TO ASSESS AT THIS TIME

## 2025-05-31 ASSESSMENT — PAIN SCALES - GENERAL
PAINLEVEL_OUTOF10: 0 - NO PAIN
PAINLEVEL_OUTOF10: 2

## 2025-05-31 NOTE — ASSESSMENT & PLAN NOTE
Continue broad spectrum abx  Blood cultures pending  Sputum culture to be checked if possible  UA negative

## 2025-05-31 NOTE — ASSESSMENT & PLAN NOTE
Likely multifactorial in setting of infection, but biggest contributor might be her profound hypercapnic respiratory failure  Ultimately required intubation in ER. Acidosis improving somewhat  Stop sedation for now  Will utilize prn pushes of fentanyl if needed for sedation-- can start low level drip if needed

## 2025-05-31 NOTE — CARE PLAN
PT GIVEN  3 DOUNEBS BACK  TO BACK PER PULMONARY HR85, BS CO RH, PT SXN  FOR LARGE TAN SPUTUM. PT RET'D TO [facility] PER PULMONARY ABG TO FOLLOW WITHIN THE HOUR

## 2025-05-31 NOTE — SIGNIFICANT EVENT
Prior to going down for CTA, repeat ABG was done. ABG showed 6.9/>125/89/incalculable bicarb. Lactate and potassium normal on this gas.  BMP sent right around this time.     Tidal volumes low on vent. I called and spoke with Dr. Mckinney who was able to evaluate vent via facetime. Adjustments made to vent and he felt patient having restriction- recommended duoneb x3, back to back. As RT was getting the duonebs, I noted that patient suddenly became bradycardic with rhythm changes. Appeared to be a regularly irregular junctional rhythm. BP dropped precipitously into the 70s (not on levophed prior to this). We increased levophed, started fluid bolus. EKG done. Patient bagged by RT. We gave IV calcium chloride and 2 amps of bicarb. Rhythm stabilized shortly after this and went back to NSR. Her BP also stabilized during this time.    We then opted to paralyze her with rocuronium. Tidal volumes started to increase shortly after this. She was fully obtunded and nonresponsive at the time of paralytic given.  He also recommends hypertonic saline lavage by RT.    Ultimately after vitals stabilized, decision made to go down for CTA to r/o PE. Patient to have nimbex drip started ASAP (without bolus), as well as vasopressin drip, and then fixed rate propofol and fixed rate fentanyl for sedation.    After discussion with Dr. Mckinney, he recommends transfer to somewhere with in-person intensivist coverage overnight. He discussed the case with Dr. Diaz at , who agrees to accept the patient. Goal is to r/o PE. If large PE present, we would adjust and call PERT team, with possible plan to transfer downtown instead.       CCT: additional 180 min bedside with patient, excluding any procedures.

## 2025-05-31 NOTE — ASSESSMENT & PLAN NOTE
Baseline creatinine appears to be around 0.6 as of 3 months ago  Now creatinine 2.1  See above regarding concerns of contrast administration  Patient is getting IVF and received sepsis fluid bolus in ER close to 3L  Avoiding all other nephrotoxic agents. Renally dosing med

## 2025-05-31 NOTE — ASSESSMENT & PLAN NOTE
Imaging in ER includes CXR and CT abdomen- signs of RML atelectasis vs consolidation. She could have aspirated although it's a rather well-demarcated area of consolidation  Discussed at length with pulmonologist Dr. Mckinney. He agrees with continued abx at this time  Unfortunately patient profoundly hypoxic in spite of her 100% FiO2 and PEEP 8. PO2 on ABG is 64. Given that the imaging/lung changes so far identified don't really explain the degree of hypoxia she is exhibiting, Dr. Mckinney and I both believe that CT PE is indicated to rule out PE. If she has massive/submassive PE could be candidate for PERT team and further intervention. Given profound hypoxia and concern for unstable PE, we are ordering CTA which unfortunately means more IV contrast. I spoke with the patient's son, who is the emergency . I explained that contrast exposure in the setting of SARAH increases risk of worsening renal failure and possibly the need for dialysis. I explained that dialysis could be temporary but there is always a risk that she may need permanent dialysis. He understands the risks and is agreeable to proceed.

## 2025-05-31 NOTE — CARE PLAN
The patient's goals for the shift include  DAILY    The clinical goals for the shift include remain hemodynamically stable      Problem: Pain - Adult  Goal: Verbalizes/displays adequate comfort level or baseline comfort level  5/31/2025 1641 by Justine Noe RN  Outcome: Progressing  5/31/2025 1640 by Justine Noe RN  Outcome: Progressing     Problem: Safety - Adult  Goal: Free from fall injury  5/31/2025 1641 by Justine Noe RN  Outcome: Progressing  5/31/2025 1640 by Justine Noe RN  Outcome: Progressing     Problem: Discharge Planning  Goal: Discharge to home or other facility with appropriate resources  5/31/2025 1641 by Justine Noe RN  Outcome: Progressing  5/31/2025 1640 by Justine Noe RN  Outcome: Progressing     Problem: Chronic Conditions and Co-morbidities  Goal: Patient's chronic conditions and co-morbidity symptoms are monitored and maintained or improved  5/31/2025 1641 by Justine Noe RN  Outcome: Progressing  5/31/2025 1640 by Justine Noe RN  Outcome: Progressing     Problem: Nutrition  Goal: Nutrient intake appropriate for maintaining nutritional needs  5/31/2025 1641 by Justine Noe RN  Outcome: Progressing  5/31/2025 1640 by Justine Noe RN  Outcome: Progressing     Problem: Diabetes  Goal: Achieve decreasing blood glucose levels by end of shift  5/31/2025 1641 by Justine Noe RN  Outcome: Progressing  5/31/2025 1640 by Justine Noe RN  Outcome: Progressing  Goal: Increase stability of blood glucose readings by end of shift  5/31/2025 1641 by Justine Noe RN  Outcome: Progressing  5/31/2025 1640 by Justine Noe RN  Outcome: Progressing  Goal: Decrease in ketones present in urine by end of shift  5/31/2025 1641 by Justine Noe RN  Outcome: Progressing  5/31/2025 1640 by Justine Noe RN  Outcome: Progressing  Goal: Maintain electrolyte levels within acceptable range  throughout shift  5/31/2025 1641 by Justine Noe RN  Outcome: Progressing  5/31/2025 1640 by Justine Noe RN  Outcome: Progressing  Goal: Maintain glucose levels >70mg/dl to <250mg/dl throughout shift  5/31/2025 1641 by Justine Noe RN  Outcome: Progressing  5/31/2025 1640 by Justine Noe RN  Outcome: Progressing  Goal: No changes in neurological exam by end of shift  5/31/2025 1641 by Justine Noe RN  Outcome: Progressing  5/31/2025 1640 by Justine Noe RN  Outcome: Progressing  Goal: Learn about and adhere to nutrition recommendations by end of shift  5/31/2025 1641 by Justine Noe RN  Outcome: Progressing  5/31/2025 1640 by Justine Noe RN  Outcome: Progressing  Goal: Vital signs within normal range for age by end of shift  5/31/2025 1641 by Justine Noe RN  Outcome: Progressing  5/31/2025 1640 by Justine Noe RN  Outcome: Progressing  Goal: Increase self care and/or family involovement by end of shift  5/31/2025 1641 by Justine Noe RN  Outcome: Progressing  5/31/2025 1640 by Justine Noe RN  Outcome: Progressing  Goal: Receive DSME education by end of shift  5/31/2025 1641 by Justine Noe RN  Outcome: Progressing  5/31/2025 1640 by Justine Noe RN  Outcome: Progressing     Problem: Knowledge Deficit  Goal: Patient/family/caregiver demonstrates understanding of disease process, treatment plan, medications, and discharge instructions  5/31/2025 1641 by Justine Noe RN  Outcome: Progressing  5/31/2025 1640 by Justine Noe RN  Outcome: Progressing     Problem: Mechanical Ventilation  Goal: Patient Will Maintain Patent Airway  5/31/2025 1641 by Justine Noe RN  Outcome: Progressing  5/31/2025 1640 by Justine Noe RN  Outcome: Progressing  Goal: Oral health is maintained or improved  5/31/2025 1641 by Justine Noe RN  Outcome: Progressing  5/31/2025 1640 by Justine HOLLOWAY  PATRICIA Noe  Outcome: Progressing  Goal: Tracheostomy will be managed safely  5/31/2025 1641 by Justine Noe RN  Outcome: Progressing  5/31/2025 1640 by Justine Noe RN  Outcome: Progressing  Goal: ET tube will be managed safely  5/31/2025 1641 by Justine Noe RN  Outcome: Progressing  5/31/2025 1640 by Justine Noe RN  Outcome: Progressing  Goal: Ability to express needs and understand communication  5/31/2025 1641 by Justine Noe RN  Outcome: Progressing  5/31/2025 1640 by Justine Noe RN  Outcome: Progressing  Goal: Mobility/activity is maintained at optimum level for patient  5/31/2025 1641 by Justine Noe RN  Outcome: Progressing  5/31/2025 1640 by Justine Noe RN  Outcome: Progressing     Problem: Skin  Goal: Decreased wound size/increased tissue granulation at next dressing change  5/31/2025 1641 by Justine Noe RN  Outcome: Progressing  5/31/2025 1640 by Justine oNe RN  Flowsheets (Taken 5/31/2025 1640)  Decreased wound size/increased tissue granulation at next dressing change:   Promote sleep for wound healing   Protective dressings over bony prominences   Utilize specialty bed per algorithm  5/31/2025 1640 by Justine Noe RN  Outcome: Progressing  Flowsheets (Taken 5/31/2025 1640)  Decreased wound size/increased tissue granulation at next dressing change:   Promote sleep for wound healing   Protective dressings over bony prominences   Utilize specialty bed per algorithm  Goal: Participates in plan/prevention/treatment measures  5/31/2025 1641 by Justine Noe RN  Outcome: Progressing  5/31/2025 1640 by Justine Noe RN  Flowsheets (Taken 5/31/2025 1640)  Participates in plan/prevention/treatment measures:   Discuss with provider PT/OT consult   Elevate heels   Increase activity/out of bed for meals  5/31/2025 1640 by Justine Noe RN  Outcome: Progressing  Flowsheets (Taken 5/31/2025  1640)  Participates in plan/prevention/treatment measures:   Discuss with provider PT/OT consult   Elevate heels   Increase activity/out of bed for meals  Goal: Prevent/manage excess moisture  5/31/2025 1641 by Justine Noe RN  Outcome: Progressing  5/31/2025 1640 by Justine Noe RN  Flowsheets (Taken 5/31/2025 1640)  Prevent/manage excess moisture:   Cleanse incontinence/protect with barrier cream   Monitor for/manage infection if present   Follow provider orders for dressing changes   Moisturize dry skin  5/31/2025 1640 by Justine Noe RN  Outcome: Progressing  Flowsheets (Taken 5/31/2025 1640)  Prevent/manage excess moisture:   Cleanse incontinence/protect with barrier cream   Monitor for/manage infection if present   Follow provider orders for dressing changes   Moisturize dry skin  Goal: Prevent/minimize sheer/friction injuries  5/31/2025 1641 by Justine Noe RN  Outcome: Progressing  5/31/2025 1640 by Justine Noe RN  Flowsheets (Taken 5/31/2025 1640)  Prevent/minimize sheer/friction injuries:   Complete micro-shifts as needed if patient unable. Adjust patient position to relieve pressure points, not a full turn   Increase activity/out of bed for meals   Use pull sheet   HOB 30 degrees or less   Turn/reposition every 2 hours/use positioning/transfer devices   Utilize specialty bed per algorithm  5/31/2025 1640 by Justine Noe RN  Outcome: Progressing  Flowsheets (Taken 5/31/2025 1640)  Prevent/minimize sheer/friction injuries:   Complete micro-shifts as needed if patient unable. Adjust patient position to relieve pressure points, not a full turn   Increase activity/out of bed for meals   Use pull sheet   HOB 30 degrees or less   Turn/reposition every 2 hours/use positioning/transfer devices   Utilize specialty bed per algorithm  Goal: Promote/optimize nutrition  5/31/2025 1641 by Justine Noe RN  Outcome: Progressing  5/31/2025 1640 by Justine HOLLOWAY  PATRICIA Noe  Flowsheets (Taken 5/31/2025 1640)  Promote/optimize nutrition:   Assist with feeding   Monitor/record intake including meals   Consume > 50% meals/supplements   Offer water/supplements/favorite foods   Discuss with provider if NPO > 2 days   Reassess MST if dietician not consulted  5/31/2025 1640 by Justine Noe RN  Outcome: Progressing  Flowsheets (Taken 5/31/2025 1640)  Promote/optimize nutrition:   Assist with feeding   Monitor/record intake including meals   Consume > 50% meals/supplements   Offer water/supplements/favorite foods   Discuss with provider if NPO > 2 days   Reassess MST if dietician not consulted  Goal: Promote skin healing  5/31/2025 1641 by Justine Noe RN  Outcome: Progressing  5/31/2025 1640 by Justine Noe RN  Flowsheets (Taken 5/31/2025 1640)  Promote skin healing:   Assess skin/pad under line(s)/device(s)   Turn/reposition every 2 hours/use positioning/transfer devices   Protective dressings over bony prominences   Ensure correct size (line/device) and apply per  instructions   Rotate device position/do not position patient on device  5/31/2025 1640 by Justine Noe RN  Outcome: Progressing  Flowsheets (Taken 5/31/2025 1640)  Promote skin healing:   Assess skin/pad under line(s)/device(s)   Turn/reposition every 2 hours/use positioning/transfer devices   Protective dressings over bony prominences   Ensure correct size (line/device) and apply per  instructions   Rotate device position/do not position patient on device     Problem: Fall/Injury  Goal: Not fall by end of shift  5/31/2025 1641 by Justine Noe RN  Outcome: Progressing  5/31/2025 1640 by Justine Noe RN  Outcome: Progressing  Goal: Be free from injury by end of the shift  5/31/2025 1641 by Justine Noe RN  Outcome: Progressing  5/31/2025 1640 by Justine Noe RN  Outcome: Progressing  Goal: Verbalize understanding of personal risk  factors for fall in the hospital  5/31/2025 1641 by Justine Noe RN  Outcome: Progressing  5/31/2025 1640 by Justine Noe RN  Outcome: Progressing  Goal: Verbalize understanding of risk factor reduction measures to prevent injury from fall in the home  5/31/2025 1641 by Justine Noe RN  Outcome: Progressing  5/31/2025 1640 by Justine Noe RN  Outcome: Progressing  Goal: Use assistive devices by end of the shift  5/31/2025 1641 by Justine Noe RN  Outcome: Progressing  5/31/2025 1640 by Justine Noe RN  Outcome: Progressing  Goal: Pace activities to prevent fatigue by end of the shift  5/31/2025 1641 by Justine Noe RN  Outcome: Progressing  5/31/2025 1640 by Justine Noe RN  Outcome: Progressing     Problem: Safety - Medical Restraint  Goal: Remains free of injury from restraints (Restraint for Interference with Medical Device)  5/31/2025 1641 by Justine Noe RN  Outcome: Progressing  Flowsheets (Taken 5/31/2025 1641)  Remains free of injury from restraints (restraint for interference with medical device):   Determine that other, less restrictive measures have been tried or would not be effective before applying the restraint   Inform patient/family regarding the reason for restraint   Evaluate the patient's condition at the time of restraint application   Every 2 hours: Monitor safety, psychosocial status, comfort, nutrition and hydration  5/31/2025 1641 by Justine Noe RN  Outcome: Progressing  Flowsheets (Taken 5/31/2025 1641)  Remains free of injury from restraints (restraint for interference with medical device):   Determine that other, less restrictive measures have been tried or would not be effective before applying the restraint   Inform patient/family regarding the reason for restraint   Evaluate the patient's condition at the time of restraint application   Every 2 hours: Monitor safety, psychosocial status, comfort, nutrition  and hydration  Goal: Free from restraint(s) (Restraint for Interference with Medical Device)  5/31/2025 1641 by Justine Noe RN  Outcome: Progressing  Flowsheets (Taken 5/31/2025 1641)  Free from restraint(s) (restraint for interference with medical device):   Every 24 hours: Continued use of restraint requires Licensed Independent Practitioner to perform face to face examination and written order   ONCE/SHIFT or MINIMUM Every 12 hours: Assess and document the continuing need for restraints   Identify and implement measures to help patient regain control  5/31/2025 1641 by Justine Noe RN  Outcome: Progressing  Flowsheets (Taken 5/31/2025 1641)  Free from restraint(s) (restraint for interference with medical device):   Every 24 hours: Continued use of restraint requires Licensed Independent Practitioner to perform face to face examination and written order   ONCE/SHIFT or MINIMUM Every 12 hours: Assess and document the continuing need for restraints   Identify and implement measures to help patient regain control

## 2025-05-31 NOTE — ASSESSMENT & PLAN NOTE
Hypotensive after arrival in ER. Not sure if this is result of the precedex or PE vs sepsis  That said, her hypotension resolved after we stopped precedex. Was started on levophed briefly

## 2025-05-31 NOTE — ED PROCEDURE NOTE
Procedure  Central Line    Performed by: Kang Jorgensen MD  Authorized by: Kang Jorgensen MD    Consent:     Consent obtained:  Emergent situation    Consent given by:  Healthcare agent  Universal protocol:     Patient identity confirmed:  Arm band  Pre-procedure details:     Indication(s): central venous access      Hand hygiene: Hand hygiene performed prior to insertion      Sterile barrier technique: All elements of maximal sterile technique followed      Skin preparation:  Chlorhexidine  Sedation:     Sedation type:  Moderate sedation  Anesthesia:     Anesthesia method:  Local infiltration    Local anesthetic:  Lidocaine 1% w/o epi  Procedure details:     Location:  R internal jugular    Patient position:  Trendelenburg    Procedural supplies:  Triple lumen    Catheter size:  8 Fr    Landmarks identified: yes      Ultrasound guidance: yes      Number of attempts:  2    Successful placement: yes    Post-procedure details:     Post-procedure:  Dressing applied and line sutured    Assessment:  Blood return through all ports, free fluid flow and placement verified by x-ray    Procedure completion:  Tolerated               Kang Jorgensen MD  05/31/25 6551

## 2025-05-31 NOTE — H&P
"History Of Present Illness  Patricia Pruitt is a 65 y.o. female presenting after being found unresponsive at home. She has past medical history of COPD, chronic respiratory failure on 4-5L at home, stroke, and reported CHF though prior echocardiogram was normal. Per the son, she has been out of oxygen \"for a while\" and is trying to get more. The  reported last night around 2200 she was okay, sitting in her wheelchair at the table and rolling cigarettes. This morning he found her in the same location, but face was down on the table and she was unresponsive. He called 911. In the ER she was obtunded, though would wake and answer simple questions at times. ABG was done that showed respiratory acidosis and shew as placed on BIPAP. CT head showed no acute process. CT abdomen showed possible RML consolidation. Labs showed SARAH. Hyperkalemia noted but hemolysis present. She was given sepsis fluid bolus. Lactate initially >8. Also started on broad spectrum abx after her cultures were sent.     Repeat ABG showed worsening acidosis and she was intubated by the ER provider. When I saw her, she was sedated on precedex. OG output was dark brown/red and watery. Approximately 150-200 ml in canister. She was hypothermic and pulsox levels reading low at 89% on 100% FIO2 with PEEP 8.     Past Medical History  She has a past medical history of Chronic hypoxic respiratory failure, COPD (chronic obstructive pulmonary disease) (Multi), Diabetes mellitus (Multi), and Stroke (Multi).    Surgical History  She has a past surgical history that includes  section, low transverse and Tonsillectomy.     Social History  She reports that she has been smoking cigarettes. She does not have any smokeless tobacco history on file. No history on file for alcohol use and drug use.    Family History  Family History[1]     Allergies  Codeine, Morphine, and Penicillins    Review of Systems  Unable to assess, patient unresponsive    Physical Exam   " General: unresponsive, no diaphoresis. Hands and feet mottled and cyanotic. Cool to touch   HENT: mucous membranes moist, external ears normal, no rhinorrhea   Eyes: no icterus or injection, no discharge   Lungs: diminished   Heart: RRR,  +1 LE edema BL   GI: abdomen soft, nontender, nondistended, BS present   MSK: no joint effusion or deformity   Skin: no rashes, erythema, or ecchymosis. Skin on legs and feet dirty   Neuro: grossly normal cognition, motor strength, sensation    Last Recorded Vitals  /64   Pulse 67   Temp 36.6 °C (97.8 °F) (Axillary)   Resp 13   Wt 93.1 kg (205 lb 4 oz)   SpO2 (!) 89%     Relevant Results             Assessment/Plan   Assessment & Plan  Metabolic encephalopathy  Likely multifactorial in setting of infection, but biggest contributor might be her profound hypercapnic respiratory failure  Ultimately required intubation in ER. Acidosis improving somewhat  Stop sedation for now  Will utilize prn pushes of fentanyl if needed for sedation-- can start low level drip if needed  Acute on chronic respiratory failure with hypoxia and hypercapnia  Imaging in ER includes CXR and CT abdomen- signs of RML atelectasis vs consolidation. She could have aspirated although it's a rather well-demarcated area of consolidation  Discussed at length with pulmonologist Dr. Mckinney. He agrees with continued abx at this time  Unfortunately patient profoundly hypoxic in spite of her 100% FiO2 and PEEP 8. PO2 on ABG is 64. Given that the imaging/lung changes so far identified don't really explain the degree of hypoxia she is exhibiting, Dr. Mckinney and I both believe that CT PE is indicated to rule out PE. If she has massive/submassive PE could be candidate for PERT team and further intervention. Given profound hypoxia and concern for unstable PE, we are ordering CTA which unfortunately means more IV contrast. I spoke with the patient's son, who is the emergency . I explained that contrast  exposure in the setting of SARAH increases risk of worsening renal failure and possibly the need for dialysis. I explained that dialysis could be temporary but there is always a risk that she may need permanent dialysis. He understands the risks and is agreeable to proceed.  Acute kidney injury  Baseline creatinine appears to be around 0.6 as of 3 months ago  Now creatinine 2.1  See above regarding concerns of contrast administration  Patient is getting IVF and received sepsis fluid bolus in ER close to 3L  Avoiding all other nephrotoxic agents. Renally dosing med  Sepsis with encephalopathy (Multi)  Continue broad spectrum abx  Blood cultures pending  Sputum culture to be checked if possible  UA negative    Non-traumatic rhabdomyolysis  Will trend  NSTEMI (non-ST elevated myocardial infarction) (Multi)  Likely type 2.  Consult cardiology  Hypotension due to drugs  Hypotensive after arrival in ER. Not sure if this is result of the precedex or PE vs sepsis  That said, her hypotension resolved after we stopped precedex. Was started on levophed briefly    DVT ppx      Discussed with son, Robby Pruitt. He asks to be emergency contact. Patient's  doesn't have a phone and we are unable to contact regularly.  Robby says his mother has expressed a desire to be FULL CODE.      CCT: 115 min       Shelbi Bacon DO         [1] No family history on file.

## 2025-05-31 NOTE — PROGRESS NOTES
Vancomycin Dosing by Pharmacy- SARAH INITIAL    Patricia Pruitt is a 65 y.o. year old female who Pharmacy has been consulted for vancomycin dosing for pneumonia. Based on the patient's indication and renal status this patient will be dosed based on a target level of Other Indication: 15-20 mcg/mL    Patient is currently in SARAH.    Initial Dosin mg x 1 given in ER    Visit Vitals  /64   Pulse 67   Temp 36.6 °C (97.8 °F) (Axillary)   Resp 13           Lab Results   Component Value Date    CREATININE 2.16 (H) 2025       No intake/output data recorded.    Lab Results   Component Value Date    PATIENTTEMP 37.0 2025    PATIENTTEMP 37.0 2025    PATIENTTEMP 37.0 2025          Assessment/Plan     Random level within 24 hours of first dose will be obtained on 25 at 0500 hr. May be obtained sooner if clinically indicated.   Will continue to monitor renal function daily while on vancomycin and order serum creatinine at least every 48 hours if not already ordered.  Follow for continued vancomycin needs, clinical response, and signs/symptoms of toxicity.     Fortino Anders, PharmD

## 2025-05-31 NOTE — ED PROVIDER NOTES
Department of Emergency Medicine   ED  Provider Note  Admit Date/RoomTime: 2025 10:56 AM  ED Room: 410/410-A        History of Present Illness:  Chief Complaint   Patient presents with    Unresponsive     Pt was found unresponsive by  in wheelchair at home. Pt AxOx3. Pt easily arousable at this time. Pt maintaining airway         Patricia Pruitt is a 65 y.o. female initially arrived unidentified for unresponsive episode.  EMS was called to the home after  found patient slumped over in her wheelchair with her face on the desk unresponsive.  Patient was placed on a breather maintaining her airway.  Upon was able to answer some questions confused. History of COPD chronic respiratory failure on 4 L nasal cannula per the chart, CHF,.  Patient and does not provide much information secondary to current critical medical state.      Review of Systems:   Limited secondary to patient's altered mental status respiratory distress        --------------------------------------------- PAST HISTORY ---------------------------------------------  Past Medical History:  has a past medical history of Chronic hypoxic respiratory failure, COPD (chronic obstructive pulmonary disease) (Multi), Diabetes mellitus (Multi), and Stroke (Multi).  Past Surgical History:  has a past surgical history that includes  section, low transverse and Tonsillectomy.  Social History:  reports that she has been smoking cigarettes. She does not have any smokeless tobacco history on file.  Family History: family history is not on file.. Unless otherwise noted, family history is non contributory  The patient’s home medications have been reviewed.  Allergies: Patient has no allergy information on record.        ---------------------------------------------------PHYSICAL EXAM--------------------------------------    GENERAL APPEARANCE: Lethargic obtunded lower extremities dirty  VITAL SIGNS: As per the nurses' triage record.   HEENT:  Normocephalic, atraumatic. Extraocular muscles are intact. Pupils equal round and reactive to light. Conjunctiva are pink. Negative scleral icterus. Mucous membranes are moist. Tongue in the midline. Pharynx was without erythema or exudates, uvula midline.  Rash along the nasal bridge extending over to the cheeks around the upper lip red blanchable.  NECK: Soft Nontender and supple, full gross ROM, no meningeal signs.  CHEST: Nontender to palpation.  Diminished.    HEART: S1, S2. Regular rate and rhythm. No murmurs, gallops or rubs.  Strong and equal pulses in the extremities.   ABDOMEN: Soft, nontender, nondistended, positive bowel sounds, no palpable masses.  MUSCULCSKELETAL: The calves are nontender to palpation.  Moves upper extremities no difficulty.  No effort against gravity lower extremities.  No peripheral edema noted.  Peripheral pulses intact   NEUROLOGICAL: lethargic  obtunded moves upper extremities.  Sensation intact upper extremities  IMMUNOLOGICAL: No lymphatic streaking noted   DERM: No petechiae, or ecchymoses.          ------------------------- NURSING NOTES AND VITALS REVIEWED ---------------------------  The nursing notes within the ED encounter and vital signs as below have been reviewed by myself  /64   Pulse 67   Temp 36.6 °C (97.8 °F) (Axillary)   Resp 13   Wt 93.1 kg (205 lb 4 oz)   SpO2 (!) 89%     Oxygen Saturation Interpretation: 96% room air    The cardiac monitor revealed sinus rhythm with a heart rate in the 70s as interpreted by me. The cardiac monitor was ordered secondary to the patient's heart rate and to monitor the patient for dysrhythmia.       The patient’s available past medical records and past encounters were reviewed.          -----------------------DIAGNOSTIC RESULTS------------------------  LABS:    Labs Reviewed   BLOOD GAS ARTERIAL FULL PANEL - Abnormal       Result Value    POCT pH, Arterial 7.13 (*)     POCT pCO2, Arterial 80 (*)     POCT pO2, Arterial 84  (*)     POCT SO2, Arterial 98      POCT Oxy Hemoglobin, Arterial 85.2 (*)     POCT Hematocrit Calculated, Arterial 56.0 (*)     POCT Sodium, Arterial 131 (*)     POCT Potassium, Arterial 5.8 (*)     POCT Chloride, Arterial 95 (*)     POCT Ionized Calcium, Arterial 1.09 (*)     POCT Glucose, Arterial 151 (*)     POCT Lactate, Arterial 6.0 (*)     POCT Base Excess, Arterial -5.5 (*)     POCT HCO3 Calculated, Arterial 26.6 (*)     POCT Hemoglobin, Arterial 18.5 (*)     POCT Anion Gap, Arterial 15      Patient Temperature 37.0      FiO2 21     CBC WITH AUTO DIFFERENTIAL - Abnormal    WBC 14.3 (*)     nRBC 0.0      RBC 5.55 (*)     Hemoglobin 18.1 (*)     Hematocrit 60.3 (*)      (*)     MCH 32.6      MCHC 30.0 (*)     RDW 14.6 (*)     Platelets 167      Neutrophils % 83.9      Immature Granulocytes %, Automated 0.6      Lymphocytes % 9.9      Monocytes % 5.5      Eosinophils % 0.0      Basophils % 0.1      Neutrophils Absolute 11.99 (*)     Immature Granulocytes Absolute, Automated 0.08      Lymphocytes Absolute 1.42      Monocytes Absolute 0.79      Eosinophils Absolute 0.00      Basophils Absolute 0.02     COMPREHENSIVE METABOLIC PANEL - Abnormal    Glucose 140 (*)     Sodium 135 (*)     Potassium 6.4 (*)     Chloride 95 (*)     Bicarbonate 20 (*)     Anion Gap 26 (*)     Urea Nitrogen 20      Creatinine 2.16 (*)     eGFR 26 (*)     Calcium 8.6      Albumin 4.2      Alkaline Phosphatase 80      Total Protein 7.8      AST 77 (*)     Bilirubin, Total 0.5      ALT 33     LACTATE - Abnormal    Lactate 6.9 (*)     Narrative:     Venipuncture immediately after or during the administration of Metamizole may lead to falsely low results. Testing should be performed immediately prior to Metamizole dosing.   TROPONIN I, HIGH SENSITIVITY - Abnormal    Troponin I, High Sensitivity 268 (*)     Narrative:     Less than 99th percentile of normal range cutoff-  Female and children under 18 years old <14 ng/L; Male <21 ng/L:  Negative  Repeat testing should be performed if clinically indicated.     Female and children under 18 years old 14-50 ng/L; Male 21-50 ng/L:  Consistent with possible cardiac damage and possible increased clinical   risk. Serial measurements may help to assess extent of myocardial damage.     >50 ng/L: Consistent with cardiac damage, increased clinical risk and  myocardial infarction. Serial measurements may help assess extent of   myocardial damage.      NOTE: Children less than 1 year old may have higher baseline troponin   levels and results should be interpreted in conjunction with the overall   clinical context.     NOTE: Troponin I testing is performed using a different   testing methodology at [facility] than at other   [facility]. Direct result comparisons should only   be made within the same method.   AMMONIA - Abnormal    Ammonia 76 (*)    URINALYSIS WITH REFLEX CULTURE AND MICROSCOPIC - Abnormal    Color, Urine Yellow      Appearance, Urine Clear      Specific Gravity, Urine 1.025      pH, Urine 5.5      Protein, Urine 30 (1+) (*)     Glucose, Urine Normal      Blood, Urine NEGATIVE      Ketones, Urine NEGATIVE      Bilirubin, Urine NEGATIVE      Urobilinogen, Urine Normal      Nitrite, Urine NEGATIVE      Leukocyte Esterase, Urine NEGATIVE     BLOOD GAS LACTIC ACID, VENOUS - Abnormal    POCT Lactate, Venous 8.4 (*)    BLOOD GAS ARTERIAL FULL PANEL - Abnormal    POCT pH, Arterial 7.08 (*)     POCT pCO2, Arterial 94 (*)     POCT pO2, Arterial 78 (*)     POCT SO2, Arterial 96      POCT Oxy Hemoglobin, Arterial 85.6 (*)     POCT Hematocrit Calculated, Arterial 53.0 (*)     POCT Sodium, Arterial 130 (*)     POCT Potassium, Arterial 4.8      POCT Chloride, Arterial 96 (*)     POCT Ionized Calcium, Arterial 1.04 (*)     POCT Glucose, Arterial 152 (*)     POCT Lactate, Arterial 3.2 (*)     POCT Base Excess, Arterial -5.6 (*)     POCT HCO3 Calculated, Arterial 27.9 (*)     POCT Hemoglobin,  Arterial 17.6 (*)     POCT Anion Gap, Arterial 11      Patient Temperature 37.0      FiO2 60      Ipap CMH2O 16.0      Epap CMH2O 6.0      Site of Arterial Puncture Radial Right      Cristopher's Test Positive     TROPONIN I, HIGH SENSITIVITY - Abnormal    Troponin I, High Sensitivity 310 (*)     Narrative:     Less than 99th percentile of normal range cutoff-  Female and children under 18 years old <14 ng/L; Male <21 ng/L: Negative  Repeat testing should be performed if clinically indicated.     Female and children under 18 years old 14-50 ng/L; Male 21-50 ng/L:  Consistent with possible cardiac damage and possible increased clinical   risk. Serial measurements may help to assess extent of myocardial damage.     >50 ng/L: Consistent with cardiac damage, increased clinical risk and  myocardial infarction. Serial measurements may help assess extent of   myocardial damage.      NOTE: Children less than 1 year old may have higher baseline troponin   levels and results should be interpreted in conjunction with the overall   clinical context.     NOTE: Troponin I testing is performed using a different   testing methodology at [facility] than at other   [facility]. Direct result comparisons should only   be made within the same method.   POTASSIUM - Abnormal    Potassium 6.6 (*)    CREATINE KINASE - Abnormal    Creatine Kinase 1,851 (*)    LACTATE - Abnormal    Lactate 4.2 (*)     Narrative:     Venipuncture immediately after or during the administration of Metamizole may lead to falsely low results. Testing should be performed immediately prior to Metamizole dosing.   BLOOD GAS LACTIC ACID, VENOUS - Abnormal    POCT Lactate, Venous 6.8 (*)    BLOOD GAS ARTERIAL FULL PANEL - Abnormal    POCT pH, Arterial 7.17 (*)     POCT pCO2, Arterial 77 (*)     POCT pO2, Arterial 64 (*)     POCT SO2, Arterial 92 (*)     POCT Oxy Hemoglobin, Arterial 83.8 (*)     POCT Hematocrit Calculated, Arterial 53.0 (*)     POCT  Sodium, Arterial 130 (*)     POCT Potassium, Arterial 5.1      POCT Chloride, Arterial 98      POCT Ionized Calcium, Arterial 1.08 (*)     POCT Glucose, Arterial 190 (*)     POCT Lactate, Arterial 2.3 (*)     POCT Base Excess, Arterial -3.2 (*)     POCT HCO3 Calculated, Arterial 28.1 (*)     POCT Hemoglobin, Arterial 17.6 (*)     POCT Anion Gap, Arterial 9 (*)     Patient Temperature 37.0      FiO2 100      Ventilator Mode A/C      Ventilator Rate 16      Tidal Volume 500      Peep CHM2O 8.0      Site of Arterial Puncture Radial Right      Cristopher's Test Positive     BLOOD GAS LACTIC ACID, VENOUS - Abnormal    POCT Lactate, Venous 2.2 (*)    POCT GLUCOSE - Abnormal    POCT Glucose 184 (*)    URINALYSIS MICROSCOPIC WITH REFLEX CULTURE - Abnormal    WBC, Urine 1-5      RBC, Urine 1-2      Squamous Epithelial Cells, Urine 1-9 (SPARSE)      Bacteria, Urine 1+ (*)     Mucus, Urine FEW     LIPASE - Normal    Lipase 40      Narrative:     Venipuncture immediately after or during the administration of Metamizole may lead to falsely low results. Testing should be performed immediately prior to Metamizole dosing.   DRUG SCREEN,URINE - Normal    Amphetamine Screen, Urine Presumptive Negative      Barbiturate Screen, Urine Presumptive Negative      Benzodiazepines Screen, Urine Presumptive Negative      Cannabinoid Screen, Urine Presumptive Negative      Cocaine Metabolite Screen, Urine Presumptive Negative      Fentanyl Screen, Urine Presumptive Negative      Opiate Screen, Urine Presumptive Negative      Oxycodone Screen, Urine Presumptive Negative      PCP Screen, Urine Presumptive Negative      Methadone Screen, Urine Presumptive Negative      Narrative:     Drug screen results are presumptive and should not be used to assess   compliance with prescribed medication. Contact the performing [facility] laboratory   to add-on definitive confirmatory testing if clinically indicated.    Toxicology screening results are reported  qualitatively. The concentration must   be greater than or equal to the cutoff to be reported as positive. The concentration   at which the screening test can detect an individual drug or metabolite varies.   The absence of expected drug(s) and/or drug metabolite(s) may indicate non-compliance,   inappropriate timing of specimen collection relative to drug administration, poor drug   absorption, diluted/adulterated urine, or limitations of testing. For medical purposes   only; not valid for forensic use.    Interpretive questions should be directed to the laboratory medical directors.   ALCOHOL - Normal    Alcohol <10     MAGNESIUM - Normal    Magnesium 1.92     BLOOD CULTURE   BLOOD CULTURE   STAPHYLOCOCCUS AUREUS/MRSA COLONIZATION, CULTURE   MRSA SURVEILLANCE FOR VANCOMYCIN DE-ESCALATION, PCR   URINALYSIS WITH REFLEX CULTURE AND MICROSCOPIC    Narrative:     The following orders were created for panel order Urinalysis with Reflex Culture and Microscopic.  Procedure                               Abnormality         Status                     ---------                               -----------         ------                     Urinalysis with Reflex C...[[id number]]  Abnormal            Final result               Extra Urine Gray Tube[[id number]]                                                         Please view results for these tests on the individual orders.   EXTRA URINE GRAY TUBE   LACTATE   POTASSIUM       As interpreted by me, the above displayed labs are abnormal. All other labs obtained during this visit were within normal range or not returned as of this dictation.      EKG Interpretation    1103 EKG of interpretation shows normal sinus rhythm, rate of 68 bpm.  Normal axis.  QTc is 431 ms, MN interval 150.  No ST elevation or depression, no acute ischemic pattern.  No STEMI.  Patient with an artifact present in V3.  Otherwise unremarkable EKG. [NT]           XR chest 1 view   Final Result   *Lines and  tubes as above.   *Mild pulmonary vascular congestion with small bilateral pleural   effusions.   Signed by Juan Francisco Gonzalez MD      CT abdomen pelvis wo IV contrast   Final Result   1.Complete atelectasis of the right middle lobe. Small   superimposed areas of consolidation in the right lower lobe and   possibly within the atelectatic right lower lobe. Correlate clinically   for possible pneumonia.   2.Slight nodular contour the liver raising the possibility of   underlying cirrhosis. There is fatty infiltration of the liver.   3.Inferior pole left renal angiomyolipoma measuring 4.8 cm x 4.5   cm.   Signed by Sree Pacheco MD      XR chest 1 view   Final Result   Increased pulmonary vascularity. Hazy opacities in the perihilar   regions and medial lung bases. Correlate clinically for pneumonia.   Signed by Sree Pacheco MD      CT angio head and neck w and wo IV contrast   Final Result   1. Negative head CT for acute change.   2. There is a background of prominent white matter demyelination.   This is more than what is expected in a patient of this age. Whether   this represents a primary process such as MS or a secondary process   is indeterminate. Would recommend follow-up with a brain MRI   examination with contrast.   3. A small encephalomalacia defect is present within the posterior   left temporal lobe seen on series 5, image 18.  a completed lacunar   type defect is present within the right cerebellum.   4. Negative cervical CTA for significant flow-limiting stenosis or   dissection.   5. Negative intracranial CTA for large vessel occlusion, aneurysm, or   dissection.        MACRO:   None        Signed by: Napoleon Robins 5/31/2025 12:18 PM   Dictation workstation:   [id number]      XR chest 1 view    (Results Pending)           XR chest 1 view   Final Result   *Lines and tubes as above.   *Mild pulmonary vascular congestion with small bilateral pleural   effusions.   Signed by Juan Francisco Gonzalez MD      CT  abdomen pelvis wo IV contrast   Final Result   1.Complete atelectasis of the right middle lobe. Small   superimposed areas of consolidation in the right lower lobe and   possibly within the atelectatic right lower lobe. Correlate clinically   for possible pneumonia.   2.Slight nodular contour the liver raising the possibility of   underlying cirrhosis. There is fatty infiltration of the liver.   3.Inferior pole left renal angiomyolipoma measuring 4.8 cm x 4.5   cm.   Signed by Sree Pacheco MD      XR chest 1 view   Final Result   Increased pulmonary vascularity. Hazy opacities in the perihilar   regions and medial lung bases. Correlate clinically for pneumonia.   Signed by Sree Pacheco MD      CT angio head and neck w and wo IV contrast   Final Result   1. Negative head CT for acute change.   2. There is a background of prominent white matter demyelination.   This is more than what is expected in a patient of this age. Whether   this represents a primary process such as MS or a secondary process   is indeterminate. Would recommend follow-up with a brain MRI   examination with contrast.   3. A small encephalomalacia defect is present within the posterior   left temporal lobe seen on series 5, image 18.  a completed lacunar   type defect is present within the right cerebellum.   4. Negative cervical CTA for significant flow-limiting stenosis or   dissection.   5. Negative intracranial CTA for large vessel occlusion, aneurysm, or   dissection.        MACRO:   None        Signed by: Napoleon Robins 5/31/2025 12:18 PM   Dictation workstation:   [id number]      XR chest 1 view    (Results Pending)           ------------------------------ ED COURSE/MEDICAL DECISION MAKING----------------------  Medical Decision Making:   Exam: A medically appropriate exam performed, outlined above, given the known history and presentation.    History obtained from: Review of medical record nursing notes EMS report      Social  Determinants of Health considered during this visit: Lives at home with  and wheelchair-bound      PAST MEDICAL HISTORY/Chronic Conditions Affecting Care     has a past medical history of Chronic hypoxic respiratory failure, COPD (chronic obstructive pulmonary disease) (Multi), Diabetes mellitus (Multi), and Stroke (Multi).       CC/HPI Summary, Social Determinants of health, Records Reviewed, DDx, testing done/not done, ED Course, Reassessment, disposition considerations/shared decision making with patient, consults, disposition:   Presents with altered mental status unresponsive episode  Plan  Oxygen, BiPAP, normal saline, blood culture, CBC, CMP, lactate, lipase, troponin, urine, Zosyn, vancomycin, CT abdomen pelvis, CT angio head neck, chest x-ray, ammonia, ABG, CK, drug screen, alcohol, potassium, urine    Medical Decision Making/Differential Diagnosis:  Differentials include but not limited to stroke versus cranial bleed versus metabolic encephalopathy versus infectious process versus CO2 retention versus acute coronary syndrome versus electrolyte abnormality versus renal failure  Review  Lactate 8.4 sepsis initiated antibiotics  Troponin 268 consult cardiology  Lipase 40  Glucose 140  Electrolytes abnormalities sodium 135, potassium 6.4 hemolyzed repeat, chloride 95, anion gap of 26 bicarb 20,  Creatinine 2.16  White blood count 14.3  Hemoglobin 18.1  Drug screen negative  Urine showed protein otherwise unremarkable  Ammonia level 76 hemolyzed  pH 7.13 [id number] placed on BiPAP  AST elevated 77  Patient presented to the emergency department with altered mental status unresponsive episode.  Troponin found to be elevated at 268 EKG showed normal sinus rhythm no ST elevation or arrhythmia consult placed to cardiology advised to treat underlying metabolic issue no heparin at this time.  Will see in consult no further recommendations.  Urine not consistent with UTI did show trace protein.  Lactate elevated 8.4  repeat lactate 6.9 lactate improving after fluids.  White blood cell count 14.3.  Sepsis initiated broad-spectrum antibiotics blood cultures 30 mg/kg's IV fluids.  Chest x-ray showed  Increased pulmonary vascularity. Hazy opacities in the perihilar regions and medial lung bases. Correlate clinically for pneumonia cultures pending.  Multiple electrolyte abnormalities sodium low chloride low electrolyte imbalance noted potassium high but hemolyzed repeat potassium ordered for clarification.  Potassium 6.6 on recollect also hemolyzed blood gas showed 4.8 magnesium normal    Drug screen negative ammonia level elevated hemolyzed AST elevated otherwise LFTs unremarkable.  CT of the abdomen pelvis showed   1.Complete atelectasis of the right middle lobe. Small superimposed areas of consolidation in the right lower lobe and possibly within the atelectatic right lower lobe. Correlate clinically for possible pneumonia. 2.Slight nodular contour the liver raising the possibility of  underlying cirrhosis. There is fatty infiltration of the liver.  3.Inferior pole left renal angiomyolipoma measuring 4.8 cm x 4.5 cm. acute kidney injury with a creatinine of 2.16 patient is altered mental status suspect metabolic encephalopathy.  CT of the head was obtained showed 1. Negative head CT for acute change. 2. There is a background of prominent white matter demyelination. This is more than what is expected in a patient of this age. . 3. A small encephalomalacia defect is present within the posterior left temporal lobe seen on series 5, image 18.  a completed lacunar type defect is present within the right cerebellum. 4. Negative cervical CTA for significant flow-limiting stenosis or dissection. 5. Negative intracranial CTA for large vessel occlusion, aneurysm, or dissection  NG placed noted to have dark gastric contents.  Protonix ordered.  EKG per attending notes sinus rhythm no ST elevation or arrhythmia noted.  Troponins elevated 268  repeat troponin 310 consultation to cardiology would not recommend heparin at this time recommend treating metabolic encephalopathy causes.  CK also elevated patient did receive fluids placed on lactated Ringer's at 125  NG tube placed by nursing gastric contents dark in color Protonix ordered.  Acute kidney injury received IV fluids  Based on patient's clinical presentation history and symptoms consistent with  Acute respiratory failure with hypercapnia requiring intubation  Metabolic encephalopathy  Acute kidney injury  Sepsis with encephalopathy kidney injury respiratory failure IV antibiotics IV fluids 30 mg/kg cultures pending  Rhabdomyolysis  Patient seen evaluated with attending physician    Intubation assisted with the attending physician.  Central line placed by oncoming physician Dr. Casas    PROCEDURES  Unless otherwise noted below, none  Intubation    Performed by: YUN Galdamez-CNP  Authorized by: Andrew Akhtar DO    Consent:     Consent obtained:  Emergent situation    Alternatives discussed:  Alternative treatment  Universal protocol:     Procedure explained and questions answered to patient or proxy's satisfaction: yes      Relevant documents present and verified: yes      Test results available: yes      Imaging studies available: yes      Required blood products, implants, devices, and special equipment available: yes      Site/side marked: yes      Immediately prior to procedure, a time out was called: yes      Patient identity confirmed:  Arm band and verbally with patient  Pre-procedure details:     Indications: airway protection and respiratory failure      Patient status:  Altered mental status    Look externally: no concerns      Look externally comment:  Facial rash    Mouth opening - incisor distance:  3 or more finger widths    Obstruction: none      Neck mobility: normal      Pharmacologic strategy: RSI      Induction agents:  Etomidate    Paralytics:   Rocuronium  Procedure details:     Preoxygenation:  BiLevel    CPR in progress: no      Number of attempts:  1  Successful intubation attempt details:     Intubation method:  Oral    Intubation technique: video assisted      Laryngoscope blade:  Mac 3    Bougie used: no      Tube size (mm):  8.0    Tube type:  Cuffed    Tube visualized through cords: yes    Placement assessment:     ETT at teeth/gumline (cm):  22    Tube secured with:  ETT dent    Breath sounds:  Equal    Placement verification: chest rise, colorimetric ETCO2, direct visualization, equal breath sounds, numeric ETCO2, tube exhalation and waveform ETCO2      CXR findings:  Appropriate position  Post-procedure details:     Procedure completion:  Tolerated well, no immediate complications  Comments:      Dentures were removed when BiPAP was placed.  No lacerations noted to the tongue or lips before or after procedure.  Patient does have a red rash to the face no change  Critical Care    Performed by: YUN Galdamez-CNP  Authorized by: Andrew Akhtar DO    Critical care provider statement:     Critical care time (minutes):  45    Critical care time was exclusive of:  Separately billable procedures and treating other patients and teaching time    Critical care was necessary to treat or prevent imminent or life-threatening deterioration of the following conditions:  Respiratory failure, renal failure, sepsis, metabolic crisis, cardiac failure, shock and CNS failure or compromise    Critical care was time spent personally by me on the following activities:  Blood draw for specimens, development of treatment plan with patient or surrogate, discussions with consultants, evaluation of patient's response to treatment, examination of patient, interpretation of cardiac output measurements, obtaining history from patient or surrogate, ordering and performing treatments and interventions, ordering and review of laboratory studies, ordering and review  of radiographic studies, pulse oximetry, re-evaluation of patient's condition and review of old charts    Care discussed with: admitting provider    Critical care time secondary to encephalopathy acute respiratory failure with hypoxia sepsis, electrolyte abnormality requiring frequent monitoring patient is critical at this time.  Requiring intubation central line IV antibiotics IV fluids close monitoring    CONSULTS:   IP CONSULT TO PULMONOLOGY  PHARMACY TO DOSE VANCO  IP CONSULT TO GASTROENTEROLOGY      ED Course as of 05/31/25 1607   Sat May 31, 2025   1103 EKG of interpretation shows normal sinus rhythm, rate of 68 bpm.  Normal axis.  QTc is 431 ms, MD interval 150.  No ST elevation or depression, no acute ischemic pattern.  No STEMI.  Patient with an artifact present in V3.  Otherwise unremarkable EKG. [NT]   1130 POCT pH, Arterial(!!): 7.13  BiPAP ordered.  Monitor closely reevaluate in 1 hour repeat ABG [TB]   1217 Troponin I, High Sensitivity(!!): 268  Discussed with cardiology team Dr. Inman.  Advised no heparin at this time patient is chest pain-free.  No EKG changes.  Suspect this is more metabolic related.  Will see patient tomorrow no further recommendations at this time [TB]   1257 POTASSIUM(!!): 6.4  Hemolyzed repeat potassium pending [TB]   1358 POCT pH, Arterial(!!): 7.08  Patient intubated [TB]   1359 Creatine Kinase(!): 1,851  Lactated Ringer's at maintenance dose [TB]   1359 POCT Potassium, Arterial: 4.8  Repeat potassium 6.6 also hemolyzed however the blood gas showed a potassium of 4.8 [TB]   1432 Patient updated on patient's care had called and was given phone number for contact of [phone]  Reported the patient is chronically in a wheelchair.  Normally has difficulty moving her legs.  Does not drink alcohol.  Reports she has had a cough for some time nothing abnormal.  He is unsure exactly what happened thought it might be related to her smoking updated him on patient's current illness as  well as intubation no further questions at this time [TB]   1433 Patient accepted to hospitalist service by Dr. Bacon ICU level of care [TB]   1505 Central line placed by attending physician Dr. Casas [TB]   1505 Admitting physician also spoke with patient's son confirm patient is a full code.  Smokes denies any alcohol use has been intubated in the past secondary to pneumonia [TB]   1513 NG placed by nursing.  Gastric contents dark in color.  Protonix ordered [TB]   1513 I performed a sepsis reperfusion exam on Patricia Pruitt on 5/31/2025 3:13 PM    A targeted fluid bolus of 2961 was given, if adult patient did not receive a 30cc/kg fluid bolus  Blood pressure 100/64 heart rate 67.  Patient is intubated at this time.  Central line has been placed  JOVANNI Galdamez  [TB]      ED Course User Index  [NT] Andrew Akhtar DO  [TB] JOVANNI Galdamez         Diagnoses as of 05/31/25 1607   Metabolic encephalopathy   Acute kidney injury   Sepsis with encephalopathy, due to unspecified organism, unspecified whether septic shock present (Multi)   Acute respiratory failure with hypercapnia   Non-traumatic rhabdomyolysis         This patient has remained hemodynamically stable during their ED course.      Critical Care: 45       Counseling:  The emergency provider has spoken with the patient and family updated by telephone discussed today’s results, in addition to providing specific details for the plan of care and counseling regarding the diagnosis and prognosis.  Questions are answered at this time and they are agreeable with the plan.         --------------------------------- IMPRESSION AND DISPOSITION ---------------------------------    IMPRESSION  1. Metabolic encephalopathy    2. Acute kidney injury    3. Sepsis with encephalopathy, due to unspecified organism, unspecified whether septic shock present (Multi)    4. Acute respiratory failure with hypercapnia    5. Non-traumatic rhabdomyolysis         DISPOSITION  Disposition: Admit ICU  Patient condition is critical        NOTE: This report was transcribed using voice recognition software. Every effort was made to ensure accuracy; however, inadvertent computerized transcription errors may be present      YUN Galdamez-MANAV  05/31/25 [id number]

## 2025-05-31 NOTE — NURSING NOTE
Unable to complete train of four due to baseline reading not obtained prior to nimbex initiation. There is 4/4 at 10 only, which would indicate that nmb is adequate. Patient is unresponsive with no reflexes observed

## 2025-05-31 NOTE — CONSULTS
Inpatient consult to Pulmonology  Consult performed by: YUN De Los Santos-CNP  Consult ordered by: Shelbi Bacon DO  Reason for consult: Respiratory failure      Reason For Consult  Respiratory failure    History Of Present Illness  Patient sedated and ventilated. Past medical history, history of present illness obtained from Hospitalist Dr. Bacon and EMR.  Patricia Pruitt is a 65 y.o. female presenting with a past medical history of chronic hypoxic respiratory failure; on 5 L nasal cannula oxygen continuously who reportedly ran out of some time ago, COPD, diabetes mellitus type 2, CVA who presented to [facility] Emergency Department today via squad after her  found her slumped over the kitchen table and unresponsive. She was  somnolent and minimally responsive upon arrive to [facility] ED but was able to answer some simple questions. A arterial blood gas was obtain in the ED which showed respiratory acidosis and she was place on continuous BiPAP 100% FiO2. A CT angio head and neck with an without contrast was obtained and was negative for acute change in the head but did show a background of prominent white matter demyelination which  is more than what is expected in a patient of this age.  A CT abdomen pelvis showed complete atelectasis of the right middle lobe, small superimposed areas of consolidation in the right lower lobe and possibly within the atelectatic right lower lobe. She was given a 3 L  IV fluid bolus and emperic broad spectrum antibiotics were given. Repeat arterial blood gas showed worsening acidosis/hypoxia and she was intubated for acute respiratory failure and she was subsequently intubated in the ED and admitted to the ICU.     Past Medical History  Diagnosis Date    Chronic hypoxic respiratory failure     5L at home but has run out    COPD (chronic obstructive pulmonary disease) (Multi)     Diabetes mellitus (Multi)     Stroke (Multi)      Surgical  History    Procedure Laterality Date     SECTION, LOW TRANSVERSE      TONSILLECTOMY          Social History  Tobacco Use    Smoking status: Every Day     Types: Cigarettes       Family History  No family history on file.    Allergies  Patient has no allergy information on record.    Review of Systems   Unable to obtain  Physical Exam  Vitals and nursing note reviewed.   Constitutional:       Appearance: She is obese. She is ill-appearing.   HENT:      Head: Normocephalic and atraumatic.      Nose: Nose normal.      Mouth/Throat:      Mouth: Mucous membranes are dry.   Eyes:      Extraocular Movements: Extraocular movements intact.      Conjunctiva/sclera: Conjunctivae normal.      Pupils: Pupils are equal, round, and reactive to light.   Cardiovascular:      Rate and Rhythm: Normal rate and regular rhythm.      Pulses: Normal pulses.      Heart sounds: Normal heart sounds.   Pulmonary:      Effort: Pulmonary effort is normal.      Comments: Mechanically ventilated with #8.0 ETT taped 22 @ lip. Lungs diminished but clear.   Abdominal:      General: Bowel sounds are normal.      Palpations: Abdomen is soft.      Comments: OG in place and clamped.   Musculoskeletal:         General: Normal range of motion.      Right lower leg: Edema present.      Left lower leg: Edema present.   Skin:     Capillary Refill: Capillary refill takes less than 2 seconds.      Comments: Cold and mottled.   Neurological:      Mental Status: She is alert.      Comments: Unable to assess.  Patient sedated/ventilated   Psychiatric:      Comments: Unable to assess.          Vital Signs  Blood pressure 100/64, pulse 67, temperature 36.6 °C (97.8 °F), temperature source Axillary, resp. rate 13, weight 93.1 kg (205 lb 4 oz), SpO2 (!) 89%.  Oxygen Therapy  SpO2: (!) 89 %  Medical Gas Therapy: Supplemental oxygen  Medical Gas Delivery Method: Nasal cannula     Vent Settings:  Vent Mode: Pressure regulated volume control/assist control  S RR:   [16] 16  S VT:  [450 mL] 450 mL  PEEP/CPAP (cm H2O):  [5 cm H20] 5 cm H20  MAP (cm H2O):  [12] 12     Scheduled medications:  cefepime, 1 g, intravenous, q12h  doxycycline, 100 mg, intravenous, q12h  insulin lispro, 0-5 Units, subcutaneous, TID AC  oxygen, , inhalation, Continuous - Inhalation  pantoprazole, 40 mg, intravenous, BID    Continuous medications:  dexmedeTOMIDine, 0.2-1.5 mcg/kg/hr, Last Rate: 0.2 mcg/kg/hr (05/31/25 1420)  norepinephrine, 0.01-1 mcg/kg/min, Last Rate: 0.04 mcg/kg/min (05/31/25 1603)  sodium chloride 0.9%, 100 mL/hr, Last Rate: 100 mL/hr (05/31/25 1559)    PRN medications: acetaminophen, alteplase, oxygen, vancomycin    Relevant Results  Results for orders placed or performed during the hospital encounter of 05/31/25 (from the past 24 hours)   BLOOD GAS ARTERIAL FULL PANEL   Result Value Ref Range    POCT pH, Arterial 7.13 (LL) 7.38 - 7.42 pH    POCT pCO2, Arterial 80 (HH) 38 - 42 mm Hg    POCT pO2, Arterial 84 (L) 85 - 95 mm Hg    POCT SO2, Arterial 98 94 - 100 %    POCT Oxy Hemoglobin, Arterial 85.2 (L) 94.0 - 98.0 %    POCT Hematocrit Calculated, Arterial 56.0 (H) 36.0 - 46.0 %    POCT Sodium, Arterial 131 (L) 136 - 145 mmol/L    POCT Potassium, Arterial 5.8 (H) 3.5 - 5.3 mmol/L    POCT Chloride, Arterial 95 (L) 98 - 107 mmol/L    POCT Ionized Calcium, Arterial 1.09 (L) 1.10 - 1.33 mmol/L    POCT Glucose, Arterial 151 (H) 74 - 99 mg/dL    POCT Lactate, Arterial 6.0 (HH) 0.4 - 2.0 mmol/L    POCT Base Excess, Arterial -5.5 (L) -2.0 - 3.0 mmol/L    POCT HCO3 Calculated, Arterial 26.6 (H) 22.0 - 26.0 mmol/L    POCT Hemoglobin, Arterial 18.5 (H) 12.0 - 16.0 g/dL    POCT Anion Gap, Arterial 15 10 - 25 mmo/L    Patient Temperature 37.0 degrees Celsius    FiO2 21 %   CBC and Auto Differential   Result Value Ref Range    WBC 14.3 (H) 4.4 - 11.3 x10*3/uL    nRBC 0.0 0.0 - 0.0 /100 WBCs    RBC 5.55 (H) 4.00 - 5.20 x10*6/uL    Hemoglobin 18.1 (H) 12.0 - 16.0 g/dL    Hematocrit 60.3 (H) 36.0 -  46.0 %     (H) 80 - 100 fL    MCH 32.6 26.0 - 34.0 pg    MCHC 30.0 (L) 32.0 - 36.0 g/dL    RDW 14.6 (H) 11.5 - 14.5 %    Platelets 167 150 - 450 x10*3/uL    Neutrophils % 83.9 40.0 - 80.0 %    Immature Granulocytes %, Automated 0.6 0.0 - 0.9 %    Lymphocytes % 9.9 13.0 - 44.0 %    Monocytes % 5.5 2.0 - 10.0 %    Eosinophils % 0.0 0.0 - 6.0 %    Basophils % 0.1 0.0 - 2.0 %    Neutrophils Absolute 11.99 (H) 1.20 - 7.70 x10*3/uL    Immature Granulocytes Absolute, Automated 0.08 0.00 - 0.70 x10*3/uL    Lymphocytes Absolute 1.42 1.20 - 4.80 x10*3/uL    Monocytes Absolute 0.79 0.10 - 1.00 x10*3/uL    Eosinophils Absolute 0.00 0.00 - 0.70 x10*3/uL    Basophils Absolute 0.02 0.00 - 0.10 x10*3/uL   Comprehensive Metabolic Panel   Result Value Ref Range    Glucose 140 (H) 74 - 99 mg/dL    Sodium 135 (L) 136 - 145 mmol/L    Potassium 6.4 (HH) 3.5 - 5.3 mmol/L    Chloride 95 (L) 98 - 107 mmol/L    Bicarbonate 20 (L) 21 - 32 mmol/L    Anion Gap 26 (H) 10 - 20 mmol/L    Urea Nitrogen 20 6 - 23 mg/dL    Creatinine 2.16 (H) 0.50 - 1.05 mg/dL    eGFR 26 (L) >60 mL/min/1.73m*2    Calcium 8.6 8.6 - 10.3 mg/dL    Albumin 4.2 3.4 - 5.0 g/dL    Alkaline Phosphatase 80 33 - 110 U/L    Total Protein 7.8 6.4 - 8.2 g/dL    AST 77 (H) 9 - 39 U/L    Bilirubin, Total 0.5 0.0 - 1.2 mg/dL    ALT 33 7 - 45 U/L   Troponin I, High Sensitivity   Result Value Ref Range    Troponin I, High Sensitivity 268 (HH) 0 - 13 ng/L   Lipase   Result Value Ref Range    Lipase 40 9 - 82 U/L   Ammonia   Result Value Ref Range    Ammonia 76 (H) 16 - 53 umol/L   Ethanol   Result Value Ref Range    Alcohol <10 <=10 mg/dL   Drug Screen, Urine   Result Value Ref Range    Amphetamine Screen, Urine Presumptive Negative Presumptive Negative    Barbiturate Screen, Urine Presumptive Negative Presumptive Negative    Benzodiazepines Screen, Urine Presumptive Negative Presumptive Negative    Cannabinoid Screen, Urine Presumptive Negative Presumptive Negative    Cocaine  Metabolite Screen, Urine Presumptive Negative Presumptive Negative    Fentanyl Screen, Urine Presumptive Negative Presumptive Negative    Opiate Screen, Urine Presumptive Negative Presumptive Negative    Oxycodone Screen, Urine Presumptive Negative Presumptive Negative    PCP Screen, Urine Presumptive Negative Presumptive Negative    Methadone Screen, Urine Presumptive Negative Presumptive Negative   Urinalysis with Reflex Culture and Microscopic   Result Value Ref Range    Color, Urine Yellow Light-Yellow, Yellow, Dark-Yellow    Appearance, Urine Clear Clear    Specific Gravity, Urine 1.025 1.005 - 1.035    pH, Urine 5.5 5.0, 5.5, 6.0, 6.5, 7.0, 7.5, 8.0    Protein, Urine 30 (1+) (A) NEGATIVE, 10 (TRACE), 20 (TRACE) mg/dL    Glucose, Urine Normal Normal mg/dL    Blood, Urine NEGATIVE NEGATIVE mg/dL    Ketones, Urine NEGATIVE NEGATIVE mg/dL    Bilirubin, Urine NEGATIVE NEGATIVE mg/dL    Urobilinogen, Urine Normal Normal mg/dL    Nitrite, Urine NEGATIVE NEGATIVE    Leukocyte Esterase, Urine NEGATIVE NEGATIVE   Urinalysis Microscopic   Result Value Ref Range    WBC, Urine 1-5 1-5, NONE /HPF    RBC, Urine 1-2 NONE, 1-2, 3-5 /HPF    Squamous Epithelial Cells, Urine 1-9 (SPARSE) Reference range not established. /HPF    Bacteria, Urine 1+ (A) NONE SEEN /HPF    Mucus, Urine FEW Reference range not established. /LPF   Lactate   Result Value Ref Range    Lactate 6.9 (HH) 0.4 - 2.0 mmol/L   Blood Gas Lactic Acid, Venous   Result Value Ref Range    POCT Lactate, Venous 8.4 (HH) 0.4 - 2.0 mmol/L   Troponin I, High Sensitivity   Result Value Ref Range    Troponin I, High Sensitivity 310 (HH) 0 - 13 ng/L   Potassium   Result Value Ref Range    Potassium 6.6 (HH) 3.5 - 5.3 mmol/L   Creatine Kinase   Result Value Ref Range    Creatine Kinase 1,851 (H) 0 - 215 U/L   Magnesium   Result Value Ref Range    Magnesium 1.92 1.60 - 2.40 mg/dL   Lactate   Result Value Ref Range    Lactate 4.2 (HH) 0.4 - 2.0 mmol/L   BLOOD GAS ARTERIAL FULL  PANEL   Result Value Ref Range    POCT pH, Arterial 7.08 (LL) 7.38 - 7.42 pH    POCT pCO2, Arterial 94 (HH) 38 - 42 mm Hg    POCT pO2, Arterial 78 (L) 85 - 95 mm Hg    POCT SO2, Arterial 96 94 - 100 %    POCT Oxy Hemoglobin, Arterial 85.6 (L) 94.0 - 98.0 %    POCT Hematocrit Calculated, Arterial 53.0 (H) 36.0 - 46.0 %    POCT Sodium, Arterial 130 (L) 136 - 145 mmol/L    POCT Potassium, Arterial 4.8 3.5 - 5.3 mmol/L    POCT Chloride, Arterial 96 (L) 98 - 107 mmol/L    POCT Ionized Calcium, Arterial 1.04 (L) 1.10 - 1.33 mmol/L    POCT Glucose, Arterial 152 (H) 74 - 99 mg/dL    POCT Lactate, Arterial 3.2 (H) 0.4 - 2.0 mmol/L    POCT Base Excess, Arterial -5.6 (L) -2.0 - 3.0 mmol/L    POCT HCO3 Calculated, Arterial 27.9 (H) 22.0 - 26.0 mmol/L    POCT Hemoglobin, Arterial 17.6 (H) 12.0 - 16.0 g/dL    POCT Anion Gap, Arterial 11 10 - 25 mmo/L    Patient Temperature 37.0 degrees Celsius    FiO2 60 %    Ipap CMH2O 16.0 cm H2O    Epap CMH2O 6.0 cm H2O    Site of Arterial Puncture Radial Right     Cristopher's Test Positive    Blood Gas Lactic Acid, Venous   Result Value Ref Range    POCT Lactate, Venous 6.8 (HH) 0.4 - 2.0 mmol/L   Blood Gas Arterial Full Panel   Result Value Ref Range    POCT pH, Arterial 7.17 (LL) 7.38 - 7.42 pH    POCT pCO2, Arterial 77 (HH) 38 - 42 mm Hg    POCT pO2, Arterial 64 (L) 85 - 95 mm Hg    POCT SO2, Arterial 92 (L) 94 - 100 %    POCT Oxy Hemoglobin, Arterial 83.8 (L) 94.0 - 98.0 %    POCT Hematocrit Calculated, Arterial 53.0 (H) 36.0 - 46.0 %    POCT Sodium, Arterial 130 (L) 136 - 145 mmol/L    POCT Potassium, Arterial 5.1 3.5 - 5.3 mmol/L    POCT Chloride, Arterial 98 98 - 107 mmol/L    POCT Ionized Calcium, Arterial 1.08 (L) 1.10 - 1.33 mmol/L    POCT Glucose, Arterial 190 (H) 74 - 99 mg/dL    POCT Lactate, Arterial 2.3 (H) 0.4 - 2.0 mmol/L    POCT Base Excess, Arterial -3.2 (L) -2.0 - 3.0 mmol/L    POCT HCO3 Calculated, Arterial 28.1 (H) 22.0 - 26.0 mmol/L    POCT Hemoglobin, Arterial 17.6 (H)  12.0 - 16.0 g/dL    POCT Anion Gap, Arterial 9 (L) 10 - 25 mmo/L    Patient Temperature 37.0 degrees Celsius    FiO2 100 %    Ventilator Mode A/C     Ventilator Rate 16 bpm    Tidal Volume 500 mL    Peep CHM2O 8.0 cm H2O    Site of Arterial Puncture Radial Right     Cristopher's Test Positive    Blood Gas Lactic Acid, Venous   Result Value Ref Range    POCT Lactate, Venous 2.2 (H) 0.4 - 2.0 mmol/L      XR chest 1 view  Result Date: 5/31/2025  FINDINGS: Endotracheal tube tip terminates roughly 2.5 cm superior to the sarah. Enteric tube is in place with the tip directed below the diaphragm but not imaged. Flattening of the hemidiaphragms suggests COPD. Cardiac silhouette upper limits of normal for size. Mild pulmonary vascular congestion. Small bibasal pleural effusions with associated atelectasis. No visible pneumothorax. *Lines and tubes as above. *Mild pulmonary vascular congestion with small bilateral pleural effusions. Signed by Juan Francisco Gonzalez MD    CT abdomen pelvis wo IV contrast  Result Date: 5/31/2025  FINDINGS: Please note that the evaluation of vessels, lymph nodes and organs is limited without intravenous contrast.  LOWER CHEST: No cardiomegaly.  No pericardial effusion.  Complete atelectasis of the right middle lobe.  Small superimposed areas of consolidation in the right lower lobe and possibly within the atelectatic right lower lobe.  Coronary artery calcifications  ABDOMEN:  LIVER: No hepatomegaly.  Slight nodular contour the liver raising the possibility of underlying cirrhosis.  There is fatty infiltration of the liver.  BILE DUCTS: No intrahepatic or extrahepatic biliary ductal dilatation.  GALLBLADDER: The gallbladder is present without gallstones. STOMACH: No abnormalities identified.  PANCREAS: No masses or ductal dilatation.  SPLEEN: No splenomegaly or focal splenic lesion.  ADRENAL GLANDS: No thickening or nodules.  KIDNEYS AND URETERS: Kidneys are normal in size and location.  No renal or ureteral  calculi.  Inferior pole left renal angiomyolipoma measuring 4.8 cm x 4.5 cm.  Small right renal cyst measuring 2.3 cm.  PELVIS:  BLADDER: No abnormalities identified.  REPRODUCTIVE ORGANS: No abnormalities identified.  BOWEL: No abnormalities identified.  Appendix is normal.  VESSELS: No abnormalities identified.  Abdominal aorta is normal in caliber.  PERITONEUM/RETROPERITONEUM/LYMPH NODES: No free fluid.  No pneumoperitoneum. No lymphadenopathy.  ABDOMINAL WALL: No abnormalities identified. SOFT TISSUES: No abnormalities identified.  BONES: No acute fracture or aggressive osseous lesion. 1.Complete atelectasis of the right middle lobe. Small superimposed areas of consolidation in the right lower lobe and possibly within the atelectatic right lower lobe. Correlate clinically for possible pneumonia. 2.Slight nodular contour the liver raising the possibility of underlying cirrhosis. There is fatty infiltration of the liver. 3.Inferior pole left renal angiomyolipoma measuring 4.8 cm x 4.5 cm.     XR chest 1 view  Result Date: 5/31/2025  FINDINGS: CARDIOMEDIASTINAL SILHOUETTE: Heart is mildly enlarged with increased pulmonary vascularity.  LUNGS: Hazy opacities in the perihilar regions and medial lung bases.  ABDOMEN: No remarkable upper abdominal findings.  BONES: No acute osseous changes.Increased pulmonary vascularity. Hazy opacities in the perihilar regions and medial lung bases. Correlate clinically for pneumonia.     CT angio head and neck w and wo IV contrast  Result Date: 5/31/2025  FINDINGS: Head:   Parenchyma: A completed lacunar type defect is present within the right cerebellum. There is a background of prominent white matter demyelination. A small encephalomalacia defect is present within the posterior left temporal lobe seen on series 5, image 18. no definite loss of gray-white differentiation to suggest acute or subacute transcortical infarct. No acute intracranial hemorrhage.   Ventricles: There is no  hydrocephalus.   Extra-axial spaces: No abnormal extra-axial fluid collection. Basal cisterns are patent.   Orbits: The imaged orbits are unremarkable.   Sinuses and mastoid air cells: The paranasal sinuses are aerated. The middle ears and mastoid air cells are aerated.   Skull: There are no depressed skull fractures or destructive bone lesions.   Soft tissues: Unremarkable   CTA NECK FINDINGS:   Imaged aortic arch: There is a typical branching pattern. Atherosclerotic changes are present without significant flow-limiting stenosis.   Right common and cervical internal carotid arteries:There are minimal atherosclerotic changes without significant flow-limiting stenosis.   Left common and cervical internal carotid arteries:There are minimal atherosclerotic changes without significant flow-limiting stenosis.   Right cervical vertebral artery: There is no significant flow-limiting stenosis.   Left cervical vertebral artery:There is no significant flow-limiting stenosis.   Soft tissues of the neck: No neck masses are noted.   Chest: The lungs are aerated. A right upper lobe calcified granuloma is present.   Osseous structures: No destructive lesions are present.   CTA HEAD FINDINGS:   Vascular malformations: There is no evidence of aneurysm formation or arteriovenous malformation.   Cavernous segments:  Atherosclerotic changes are present without significant flow-limiting stenosis.   Right anterior circulation: There is no significant flow-limiting stenosis, aneurysm, or other vascular abnormality.   Left anterior circulation: There is no significant flow-limiting stenosis, aneurysm, or other vascular abnormality.   Posterior circulation:  There is no significant flow-limiting stenosis, aneurysm, or other vascular abnormality.   Anterior and Posterior communicating arteries: An infundibulum is associated with each of the PCOM arteries. This represents a normal anatomical variant.   Other: The major dural sinuses are  normally opacified.  1. Negative head CT for acute change. 2. There is a background of prominent white matter demyelination. This is more than what is expected in a patient of this age. Whether this represents a primary process such as MS or a secondary process is indeterminate. Would recommend follow-up with a brain MRI examination with contrast. 3. A small encephalomalacia defect is present within the posterior left temporal lobe seen on series 5, image 18.  a completed lacunar type defect is present within the right cerebellum. 4. Negative cervical CTA for significant flow-limiting stenosis or dissection. 5. Negative intracranial CTA for large vessel occlusion, aneurysm, or dissection.             Assessment/Plan   Acute hypoxic, hypercapnic respiratory failure  Continue mechanical ventilation-wean per ICU protocol Precedex drip with target parameters of RASS 0 to -2  CT angio pending to rule out pulmonary embolism    Metabolic encephalopathy  Multifactorial  Neuro checks every 4 hours    Pneumonia  Likely aspiration  Continue emperic IV cefepime, IV doxycycline, IV vancomycin  Follow-up sputum, urine cultures    Acute kidney injury  Baseline creatinine ~0.6  Continue 0.9% sodium chloride at 100 mL/hr  Avoid nephrotoxic agents    Prophylaxis  SCDs in place  Protonix    Code Status: Full Code    Shanthi Guy, APRN-CNP  Pulmonary & Critical Care Medicine

## 2025-06-01 ENCOUNTER — APPOINTMENT (OUTPATIENT)
Dept: RADIOLOGY | Facility: HOSPITAL | Age: 65
DRG: 870 | End: 2025-06-01
Payer: COMMERCIAL

## 2025-06-01 ENCOUNTER — DOCUMENTATION (OUTPATIENT)
Dept: CRITICAL CARE MEDICINE | Facility: HOSPITAL | Age: 65
End: 2025-06-01
Payer: COMMERCIAL

## 2025-06-01 ENCOUNTER — APPOINTMENT (OUTPATIENT)
Dept: CARDIOLOGY | Facility: HOSPITAL | Age: 65
DRG: 870 | End: 2025-06-01
Payer: COMMERCIAL

## 2025-06-01 LAB
ALBUMIN SERPL BCP-MCNC: 3.5 G/DL (ref 3.4–5)
ALP SERPL-CCNC: 60 U/L (ref 33–136)
ALT SERPL W P-5'-P-CCNC: 193 U/L (ref 7–45)
AMORPH CRY #/AREA UR COMP ASSIST: ABNORMAL /HPF
ANION GAP BLDA CALCULATED.4IONS-SCNC: 11 MMO/L (ref 10–25)
ANION GAP BLDA CALCULATED.4IONS-SCNC: 9 MMO/L (ref 10–25)
ANION GAP BLDV CALCULATED.4IONS-SCNC: 4 MMOL/L (ref 10–25)
ANION GAP SERPL CALCULATED.3IONS-SCNC: 11 MMOL/L (ref 10–20)
ANION GAP SERPL CALCULATED.3IONS-SCNC: 12 MMOL/L (ref 10–20)
ANION GAP SERPL CALCULATED.3IONS-SCNC: 13 MMOL/L (ref 10–20)
ANION GAP SERPL CALCULATED.3IONS-SCNC: 16 MMOL/L (ref 10–20)
ANION GAP SERPL CALCULATED.3IONS-SCNC: 16 MMOL/L (ref 10–20)
APPARATUS: ABNORMAL
APPARATUS: ABNORMAL
APPEARANCE UR: CLEAR
APTT PPP: 23.5 SECONDS (ref 22–32.5)
AST SERPL W P-5'-P-CCNC: 345 U/L (ref 9–39)
BACTERIA #/AREA URNS AUTO: ABNORMAL /HPF
BACTERIA BLD CULT: ABNORMAL
BACTERIA BLD CULT: ABNORMAL
BASE EXCESS BLDA CALC-SCNC: 0.2 MMOL/L (ref -2–3)
BASE EXCESS BLDA CALC-SCNC: 0.3 MMOL/L (ref -2–3)
BASE EXCESS BLDV CALC-SCNC: 3.6 MMOL/L (ref -2–3)
BILIRUB SERPL-MCNC: 0.6 MG/DL (ref 0–1.2)
BILIRUB UR STRIP.AUTO-MCNC: NEGATIVE MG/DL
BNP SERPL-MCNC: 1423 PG/ML (ref 0–99)
BODY TEMPERATURE: 37 DEGREES CELSIUS
BUN SERPL-MCNC: 27 MG/DL (ref 6–23)
BUN SERPL-MCNC: 27 MG/DL (ref 6–23)
BUN SERPL-MCNC: 28 MG/DL (ref 6–23)
BUN SERPL-MCNC: 30 MG/DL (ref 6–23)
BUN SERPL-MCNC: 32 MG/DL (ref 6–23)
CA-I BLDA-SCNC: 1.11 MMOL/L (ref 1.1–1.33)
CA-I BLDA-SCNC: 1.12 MMOL/L (ref 1.1–1.33)
CA-I BLDV-SCNC: 1.1 MMOL/L (ref 1.1–1.33)
CALCIUM SERPL-MCNC: 8.1 MG/DL (ref 8.6–10.3)
CALCIUM SERPL-MCNC: 8.3 MG/DL (ref 8.6–10.3)
CALCIUM SERPL-MCNC: 8.4 MG/DL (ref 8.6–10.3)
CARDIAC TROPONIN I PNL SERPL HS: 1128 NG/L (ref 0–13)
CARDIAC TROPONIN I PNL SERPL HS: 1319 NG/L (ref 0–13)
CHLORIDE BLDA-SCNC: 93 MMOL/L (ref 98–107)
CHLORIDE BLDA-SCNC: 96 MMOL/L (ref 98–107)
CHLORIDE BLDV-SCNC: 95 MMOL/L (ref 98–107)
CHLORIDE SERPL-SCNC: 94 MMOL/L (ref 98–107)
CHLORIDE SERPL-SCNC: 95 MMOL/L (ref 98–107)
CHLORIDE SERPL-SCNC: 96 MMOL/L (ref 98–107)
CO2 SERPL-SCNC: 28 MMOL/L (ref 21–32)
CO2 SERPL-SCNC: 28 MMOL/L (ref 21–32)
CO2 SERPL-SCNC: 32 MMOL/L (ref 21–32)
CO2 SERPL-SCNC: 33 MMOL/L (ref 21–32)
CO2 SERPL-SCNC: 34 MMOL/L (ref 21–32)
COLOR UR: COLORLESS
CREAT SERPL-MCNC: 1.26 MG/DL (ref 0.5–1.05)
CREAT SERPL-MCNC: 1.44 MG/DL (ref 0.5–1.05)
CREAT SERPL-MCNC: 1.57 MG/DL (ref 0.5–1.05)
CREAT SERPL-MCNC: 1.65 MG/DL (ref 0.5–1.05)
CREAT SERPL-MCNC: 1.75 MG/DL (ref 0.5–1.05)
CRITICAL CALL TIME: 35
CRITICAL CALL TIME: 400
CRITICAL CALLED BY: ABNORMAL
CRITICAL CALLED BY: ABNORMAL
CRITICAL CALLED TO: ABNORMAL
CRITICAL CALLED TO: ABNORMAL
CRITICAL READ BACK: ABNORMAL
CRITICAL READ BACK: ABNORMAL
EGFRCR SERPLBLD CKD-EPI 2021: 32 ML/MIN/1.73M*2
EGFRCR SERPLBLD CKD-EPI 2021: 34 ML/MIN/1.73M*2
EGFRCR SERPLBLD CKD-EPI 2021: 36 ML/MIN/1.73M*2
EGFRCR SERPLBLD CKD-EPI 2021: 40 ML/MIN/1.73M*2
EGFRCR SERPLBLD CKD-EPI 2021: 47 ML/MIN/1.73M*2
ERYTHROCYTE [DISTWIDTH] IN BLOOD BY AUTOMATED COUNT: 14.3 % (ref 11.5–14.5)
ERYTHROCYTE [DISTWIDTH] IN BLOOD BY AUTOMATED COUNT: 14.6 % (ref 11.5–14.5)
EST. AVERAGE GLUCOSE BLD GHB EST-MCNC: 128 MG/DL
FLUAV RNA RESP QL NAA+PROBE: NOT DETECTED
FLUBV RNA RESP QL NAA+PROBE: NOT DETECTED
GLUCOSE BLD MANUAL STRIP-MCNC: 134 MG/DL (ref 74–99)
GLUCOSE BLD MANUAL STRIP-MCNC: 140 MG/DL (ref 74–99)
GLUCOSE BLD MANUAL STRIP-MCNC: 143 MG/DL (ref 74–99)
GLUCOSE BLD MANUAL STRIP-MCNC: 144 MG/DL (ref 74–99)
GLUCOSE BLD MANUAL STRIP-MCNC: 146 MG/DL (ref 74–99)
GLUCOSE BLD MANUAL STRIP-MCNC: 146 MG/DL (ref 74–99)
GLUCOSE BLD MANUAL STRIP-MCNC: 155 MG/DL (ref 74–99)
GLUCOSE BLD MANUAL STRIP-MCNC: 161 MG/DL (ref 74–99)
GLUCOSE BLD MANUAL STRIP-MCNC: 163 MG/DL (ref 74–99)
GLUCOSE BLD MANUAL STRIP-MCNC: 164 MG/DL (ref 74–99)
GLUCOSE BLD MANUAL STRIP-MCNC: 168 MG/DL (ref 74–99)
GLUCOSE BLD MANUAL STRIP-MCNC: 171 MG/DL (ref 74–99)
GLUCOSE BLD MANUAL STRIP-MCNC: 174 MG/DL (ref 74–99)
GLUCOSE BLD MANUAL STRIP-MCNC: 243 MG/DL (ref 74–99)
GLUCOSE BLD MANUAL STRIP-MCNC: 268 MG/DL (ref 74–99)
GLUCOSE BLD MANUAL STRIP-MCNC: 363 MG/DL (ref 74–99)
GLUCOSE BLDA-MCNC: 302 MG/DL (ref 74–99)
GLUCOSE BLDA-MCNC: 384 MG/DL (ref 74–99)
GLUCOSE BLDV-MCNC: 156 MG/DL (ref 74–99)
GLUCOSE SERPL-MCNC: 128 MG/DL (ref 74–99)
GLUCOSE SERPL-MCNC: 149 MG/DL (ref 74–99)
GLUCOSE SERPL-MCNC: 281 MG/DL (ref 74–99)
GLUCOSE SERPL-MCNC: 281 MG/DL (ref 74–99)
GLUCOSE SERPL-MCNC: 362 MG/DL (ref 74–99)
GLUCOSE UR STRIP.AUTO-MCNC: NORMAL MG/DL
GRAM STN SPEC: ABNORMAL
HBA1C MFR BLD: 6.1 % (ref ?–5.7)
HCO3 BLDA-SCNC: 30.6 MMOL/L (ref 22–26)
HCO3 BLDA-SCNC: 31.2 MMOL/L (ref 22–26)
HCO3 BLDV-SCNC: 34.3 MMOL/L (ref 22–26)
HCT VFR BLD AUTO: 51.7 % (ref 36–46)
HCT VFR BLD AUTO: 53.6 % (ref 36–46)
HCT VFR BLD EST: 48 % (ref 36–46)
HCT VFR BLD EST: 50 % (ref 36–46)
HCT VFR BLD EST: 52 % (ref 36–46)
HGB BLD-MCNC: 15.8 G/DL (ref 12–16)
HGB BLD-MCNC: 16.2 G/DL (ref 12–16)
HGB BLDA-MCNC: 16.7 G/DL (ref 12–16)
HGB BLDA-MCNC: 17.3 G/DL (ref 12–16)
HGB BLDV-MCNC: 15.9 G/DL (ref 12–16)
HOLD SPECIMEN: NORMAL
INHALED O2 CONCENTRATION: 70 %
INHALED O2 CONCENTRATION: 80 %
INHALED O2 CONCENTRATION: 80 %
INR PPP: 1.2 (ref 0.9–1.2)
KETONES UR STRIP.AUTO-MCNC: NEGATIVE MG/DL
LACTATE BLDA-SCNC: 4.9 MMOL/L (ref 0.4–2)
LACTATE BLDA-SCNC: 5 MMOL/L (ref 0.4–2)
LACTATE BLDV-SCNC: 1.5 MMOL/L (ref 0.4–2)
LEUKOCYTE ESTERASE UR QL STRIP.AUTO: NEGATIVE
MAGNESIUM SERPL-MCNC: 1.93 MG/DL (ref 1.6–2.4)
MAGNESIUM SERPL-MCNC: 1.95 MG/DL (ref 1.6–2.4)
MAGNESIUM SERPL-MCNC: 2.01 MG/DL (ref 1.6–2.4)
MAGNESIUM SERPL-MCNC: 2.05 MG/DL (ref 1.6–2.4)
MAGNESIUM SERPL-MCNC: 2.27 MG/DL (ref 1.6–2.4)
MCH RBC QN AUTO: 32 PG (ref 26–34)
MCH RBC QN AUTO: 32 PG (ref 26–34)
MCHC RBC AUTO-ENTMCNC: 30.2 G/DL (ref 32–36)
MCHC RBC AUTO-ENTMCNC: 30.6 G/DL (ref 32–36)
MCV RBC AUTO: 105 FL (ref 80–100)
MCV RBC AUTO: 106 FL (ref 80–100)
MUCOUS THREADS #/AREA URNS AUTO: ABNORMAL /LPF
NITRITE UR QL STRIP.AUTO: NEGATIVE
NRBC BLD-RTO: 0.1 /100 WBCS (ref 0–0)
NRBC BLD-RTO: 0.1 /100 WBCS (ref 0–0)
OXYHGB MFR BLDA: 92.2 % (ref 94–98)
OXYHGB MFR BLDA: 94.9 % (ref 94–98)
OXYHGB MFR BLDV: 87.6 % (ref 45–75)
PCO2 BLDA: 73 MM HG (ref 38–42)
PCO2 BLDA: 78 MM HG (ref 38–42)
PCO2 BLDV: 80 MM HG (ref 41–51)
PEEP CMH2O: 5 CM H2O
PEEP CMH2O: 8 CM H2O
PH BLDA: 7.21 PH (ref 7.38–7.42)
PH BLDA: 7.23 PH (ref 7.38–7.42)
PH BLDV: 7.24 PH (ref 7.33–7.43)
PH UR STRIP.AUTO: 5.5 [PH]
PHOSPHATE SERPL-MCNC: 2.9 MG/DL (ref 2.5–4.9)
PHOSPHATE SERPL-MCNC: 4 MG/DL (ref 2.5–4.9)
PHOSPHATE SERPL-MCNC: 4 MG/DL (ref 2.5–4.9)
PHOSPHATE SERPL-MCNC: 4.1 MG/DL (ref 2.5–4.9)
PHOSPHATE SERPL-MCNC: 5.2 MG/DL (ref 2.5–4.9)
PLATELET # BLD AUTO: 177 X10*3/UL (ref 150–450)
PLATELET # BLD AUTO: 234 X10*3/UL (ref 150–450)
PO2 BLDA: 75 MM HG (ref 85–95)
PO2 BLDA: 96 MM HG (ref 85–95)
PO2 BLDV: 59 MM HG (ref 35–45)
POTASSIUM BLDA-SCNC: 3.4 MMOL/L (ref 3.5–5.3)
POTASSIUM BLDA-SCNC: 4.1 MMOL/L (ref 3.5–5.3)
POTASSIUM BLDV-SCNC: 3.4 MMOL/L (ref 3.5–5.3)
POTASSIUM SERPL-SCNC: 3.5 MMOL/L (ref 3.5–5.3)
POTASSIUM SERPL-SCNC: 3.6 MMOL/L (ref 3.5–5.3)
POTASSIUM SERPL-SCNC: 3.6 MMOL/L (ref 3.5–5.3)
POTASSIUM SERPL-SCNC: 4.2 MMOL/L (ref 3.5–5.3)
POTASSIUM SERPL-SCNC: 4.3 MMOL/L (ref 3.5–5.3)
PROT SERPL-MCNC: 6.4 G/DL (ref 6.4–8.2)
PROT UR STRIP.AUTO-MCNC: ABNORMAL MG/DL
PROTHROMBIN TIME: 12.7 SECONDS (ref 9.3–12.7)
RBC # BLD AUTO: 4.94 X10*6/UL (ref 4–5.2)
RBC # BLD AUTO: 5.07 X10*6/UL (ref 4–5.2)
RBC # UR STRIP.AUTO: ABNORMAL MG/DL
RBC #/AREA URNS AUTO: ABNORMAL /HPF
RSV RNA RESP QL NAA+PROBE: NOT DETECTED
SAO2 % BLDA: 96 % (ref 94–100)
SAO2 % BLDA: 99 % (ref 94–100)
SAO2 % BLDV: 90 % (ref 45–75)
SARS-COV-2 RNA RESP QL NAA+PROBE: NOT DETECTED
SODIUM BLDA-SCNC: 131 MMOL/L (ref 136–145)
SODIUM BLDA-SCNC: 132 MMOL/L (ref 136–145)
SODIUM BLDV-SCNC: 130 MMOL/L (ref 136–145)
SODIUM SERPL-SCNC: 134 MMOL/L (ref 136–145)
SODIUM SERPL-SCNC: 135 MMOL/L (ref 136–145)
SODIUM SERPL-SCNC: 135 MMOL/L (ref 136–145)
SODIUM SERPL-SCNC: 136 MMOL/L (ref 136–145)
SODIUM SERPL-SCNC: 136 MMOL/L (ref 136–145)
SP GR UR STRIP.AUTO: >1.05
SPECIMEN DRAWN FROM PATIENT: ABNORMAL
SPECIMEN DRAWN FROM PATIENT: ABNORMAL
SQUAMOUS #/AREA URNS AUTO: ABNORMAL /HPF
TIDAL VOLUME: 450 ML
TIDAL VOLUME: 450 ML
TSH SERPL-ACNC: 0.8 MIU/L (ref 0.44–3.98)
UROBILINOGEN UR STRIP.AUTO-MCNC: NORMAL MG/DL
VANCOMYCIN SERPL-MCNC: 13 UG/ML (ref 5–20)
VENTILATOR MODE: ABNORMAL
VENTILATOR MODE: ABNORMAL
VENTILATOR RATE: 24 BPM
VENTILATOR RATE: 24 BPM
WBC # BLD AUTO: 28.6 X10*3/UL (ref 4.4–11.3)
WBC # BLD AUTO: 29.9 X10*3/UL (ref 4.4–11.3)
WBC #/AREA URNS AUTO: ABNORMAL /HPF

## 2025-06-01 PROCEDURE — 80048 BASIC METABOLIC PNL TOTAL CA: CPT | Mod: CCI

## 2025-06-01 PROCEDURE — 80202 ASSAY OF VANCOMYCIN: CPT

## 2025-06-01 PROCEDURE — 84100 ASSAY OF PHOSPHORUS: CPT

## 2025-06-01 PROCEDURE — 87899 AGENT NOS ASSAY W/OPTIC: CPT | Mod: WESLAB

## 2025-06-01 PROCEDURE — 2500000004 HC RX 250 GENERAL PHARMACY W/ HCPCS (ALT 636 FOR OP/ED): Mod: JZ

## 2025-06-01 PROCEDURE — 36600 WITHDRAWAL OF ARTERIAL BLOOD: CPT

## 2025-06-01 PROCEDURE — 71045 X-RAY EXAM CHEST 1 VIEW: CPT | Performed by: RADIOLOGY

## 2025-06-01 PROCEDURE — 81001 URINALYSIS AUTO W/SCOPE: CPT

## 2025-06-01 PROCEDURE — 85610 PROTHROMBIN TIME: CPT

## 2025-06-01 PROCEDURE — 84145 PROCALCITONIN (PCT): CPT | Mod: WESLAB

## 2025-06-01 PROCEDURE — 84484 ASSAY OF TROPONIN QUANT: CPT

## 2025-06-01 PROCEDURE — 99222 1ST HOSP IP/OBS MODERATE 55: CPT | Performed by: INTERNAL MEDICINE

## 2025-06-01 PROCEDURE — 87637 SARSCOV2&INF A&B&RSV AMP PRB: CPT

## 2025-06-01 PROCEDURE — 36620 INSERTION CATHETER ARTERY: CPT

## 2025-06-01 PROCEDURE — 82947 ASSAY GLUCOSE BLOOD QUANT: CPT

## 2025-06-01 PROCEDURE — 83735 ASSAY OF MAGNESIUM: CPT

## 2025-06-01 PROCEDURE — 37799 UNLISTED PX VASCULAR SURGERY: CPT

## 2025-06-01 PROCEDURE — 84132 ASSAY OF SERUM POTASSIUM: CPT

## 2025-06-01 PROCEDURE — 84132 ASSAY OF SERUM POTASSIUM: CPT | Performed by: NURSE PRACTITIONER

## 2025-06-01 PROCEDURE — 71045 X-RAY EXAM CHEST 1 VIEW: CPT

## 2025-06-01 PROCEDURE — 93010 ELECTROCARDIOGRAM REPORT: CPT | Performed by: INTERNAL MEDICINE

## 2025-06-01 PROCEDURE — 94003 VENT MGMT INPAT SUBQ DAY: CPT

## 2025-06-01 PROCEDURE — 87081 CULTURE SCREEN ONLY: CPT | Mod: WESLAB

## 2025-06-01 PROCEDURE — 36415 COLL VENOUS BLD VENIPUNCTURE: CPT | Performed by: NURSE PRACTITIONER

## 2025-06-01 PROCEDURE — 2500000001 HC RX 250 WO HCPCS SELF ADMINISTERED DRUGS (ALT 637 FOR MEDICARE OP)

## 2025-06-01 PROCEDURE — 99291 CRITICAL CARE FIRST HOUR: CPT

## 2025-06-01 PROCEDURE — 2020000001 HC ICU ROOM DAILY

## 2025-06-01 PROCEDURE — 87449 NOS EACH ORGANISM AG IA: CPT | Mod: WESLAB

## 2025-06-01 PROCEDURE — 94640 AIRWAY INHALATION TREATMENT: CPT

## 2025-06-01 PROCEDURE — 2500000002 HC RX 250 W HCPCS SELF ADMINISTERED DRUGS (ALT 637 FOR MEDICARE OP, ALT 636 FOR OP/ED)

## 2025-06-01 PROCEDURE — 2500000005 HC RX 250 GENERAL PHARMACY W/O HCPCS

## 2025-06-01 PROCEDURE — 2500000004 HC RX 250 GENERAL PHARMACY W/ HCPCS (ALT 636 FOR OP/ED): Mod: JZ | Performed by: NURSE PRACTITIONER

## 2025-06-01 PROCEDURE — 87205 SMEAR GRAM STAIN: CPT | Mod: WESLAB

## 2025-06-01 PROCEDURE — 85027 COMPLETE CBC AUTOMATED: CPT

## 2025-06-01 PROCEDURE — 93005 ELECTROCARDIOGRAM TRACING: CPT

## 2025-06-01 PROCEDURE — 83880 ASSAY OF NATRIURETIC PEPTIDE: CPT | Performed by: NURSE PRACTITIONER

## 2025-06-01 RX ORDER — METOLAZONE 5 MG/1
10 TABLET ORAL ONCE
Status: DISCONTINUED | OUTPATIENT
Start: 2025-06-01 | End: 2025-06-01

## 2025-06-01 RX ORDER — INSULIN GLARGINE 100 [IU]/ML
15 INJECTION, SOLUTION SUBCUTANEOUS EVERY 24 HOURS
Status: DISCONTINUED | OUTPATIENT
Start: 2025-06-01 | End: 2025-06-06 | Stop reason: HOSPADM

## 2025-06-01 RX ORDER — NYSTATIN 100000 [USP'U]/G
1 POWDER TOPICAL 2 TIMES DAILY
Status: DISCONTINUED | OUTPATIENT
Start: 2025-06-01 | End: 2025-06-06 | Stop reason: HOSPADM

## 2025-06-01 RX ORDER — DEXTROSE 50 % IN WATER (D50W) INTRAVENOUS SYRINGE
25
Status: DISCONTINUED | OUTPATIENT
Start: 2025-06-01 | End: 2025-06-06 | Stop reason: HOSPADM

## 2025-06-01 RX ORDER — DEXTROSE 50 % IN WATER (D50W) INTRAVENOUS SYRINGE
12.5
Status: DISCONTINUED | OUTPATIENT
Start: 2025-06-01 | End: 2025-06-06 | Stop reason: HOSPADM

## 2025-06-01 RX ORDER — IPRATROPIUM BROMIDE AND ALBUTEROL SULFATE 2.5; .5 MG/3ML; MG/3ML
3 SOLUTION RESPIRATORY (INHALATION)
Status: DISCONTINUED | OUTPATIENT
Start: 2025-06-01 | End: 2025-06-06 | Stop reason: HOSPADM

## 2025-06-01 RX ORDER — GABAPENTIN 600 MG/1
600 TABLET ORAL 3 TIMES DAILY
Status: DISCONTINUED | OUTPATIENT
Start: 2025-06-01 | End: 2025-06-06 | Stop reason: HOSPADM

## 2025-06-01 RX ORDER — INSULIN LISPRO 100 [IU]/ML
0-15 INJECTION, SOLUTION INTRAVENOUS; SUBCUTANEOUS EVERY 4 HOURS
Status: DISCONTINUED | OUTPATIENT
Start: 2025-06-01 | End: 2025-06-01

## 2025-06-01 RX ORDER — VANCOMYCIN 1.25 G/250ML
1750 INJECTION, SOLUTION INTRAVENOUS ONCE
Status: COMPLETED | OUTPATIENT
Start: 2025-06-01 | End: 2025-06-01

## 2025-06-01 RX ORDER — INSULIN LISPRO 100 [IU]/ML
0-10 INJECTION, SOLUTION INTRAVENOUS; SUBCUTANEOUS EVERY 4 HOURS
Status: DISCONTINUED | OUTPATIENT
Start: 2025-06-01 | End: 2025-06-01

## 2025-06-01 RX ORDER — POTASSIUM CHLORIDE 14.9 MG/ML
20 INJECTION INTRAVENOUS ONCE
Status: COMPLETED | OUTPATIENT
Start: 2025-06-01 | End: 2025-06-01

## 2025-06-01 RX ORDER — INSULIN LISPRO 100 [IU]/ML
0-15 INJECTION, SOLUTION INTRAVENOUS; SUBCUTANEOUS EVERY 4 HOURS
Status: DISCONTINUED | OUTPATIENT
Start: 2025-06-02 | End: 2025-06-04

## 2025-06-01 RX ORDER — FLUTICASONE FUROATE AND VILANTEROL 100; 25 UG/1; UG/1
1 POWDER RESPIRATORY (INHALATION)
Status: DISCONTINUED | OUTPATIENT
Start: 2025-06-01 | End: 2025-06-06 | Stop reason: HOSPADM

## 2025-06-01 RX ORDER — SODIUM CHLORIDE FOR INHALATION 3 %
3 VIAL, NEBULIZER (ML) INHALATION
Status: DISCONTINUED | OUTPATIENT
Start: 2025-06-01 | End: 2025-06-06 | Stop reason: HOSPADM

## 2025-06-01 RX ORDER — POLYETHYLENE GLYCOL 3350 17 G/17G
17 POWDER, FOR SOLUTION ORAL DAILY
Status: DISCONTINUED | OUTPATIENT
Start: 2025-06-01 | End: 2025-06-03

## 2025-06-01 RX ADMIN — PIPERACILLIN SODIUM AND TAZOBACTAM SODIUM 4.5 G: 4; .5 INJECTION, SOLUTION INTRAVENOUS at 16:20

## 2025-06-01 RX ADMIN — SODIUM CHLORIDE SOLN NEBU 3% 3 ML: 3 NEBU SOLN at 19:32

## 2025-06-01 RX ADMIN — METHYLPREDNISOLONE SODIUM SUCCINATE 60 MG: 125 INJECTION, POWDER, FOR SOLUTION INTRAMUSCULAR; INTRAVENOUS at 02:42

## 2025-06-01 RX ADMIN — HEPARIN SODIUM 5000 UNITS: 5000 INJECTION, SOLUTION INTRAVENOUS; SUBCUTANEOUS at 08:25

## 2025-06-01 RX ADMIN — Medication 50 MCG/HR: at 07:21

## 2025-06-01 RX ADMIN — PROPOFOL 15 MCG/KG/MIN: 10 INJECTION, EMULSION INTRAVENOUS at 19:56

## 2025-06-01 RX ADMIN — NOREPINEPHRINE BITARTRATE 0.35 MCG/KG/MIN: 8 INJECTION, SOLUTION INTRAVENOUS at 00:00

## 2025-06-01 RX ADMIN — INSULIN HUMAN 2 UNITS/HR: 1 INJECTION, SOLUTION INTRAVENOUS at 07:37

## 2025-06-01 RX ADMIN — METHYLPREDNISOLONE SODIUM SUCCINATE 40 MG: 40 INJECTION, POWDER, FOR SOLUTION INTRAMUSCULAR; INTRAVENOUS at 15:18

## 2025-06-01 RX ADMIN — HEPARIN SODIUM 5000 UNITS: 5000 INJECTION, SOLUTION INTRAVENOUS; SUBCUTANEOUS at 01:34

## 2025-06-01 RX ADMIN — NOREPINEPHRINE BITARTRATE 0.3 MCG/KG/MIN: 8 INJECTION, SOLUTION INTRAVENOUS at 03:48

## 2025-06-01 RX ADMIN — INSULIN GLARGINE 15 UNITS: 100 INJECTION, SOLUTION SUBCUTANEOUS at 20:51

## 2025-06-01 RX ADMIN — CISATRACURIUM BESYLATE 10 MCG/KG/MIN: 200 INJECTION, SOLUTION INTRAVENOUS at 01:28

## 2025-06-01 RX ADMIN — POTASSIUM CHLORIDE 20 MEQ: 14.9 INJECTION, SOLUTION INTRAVENOUS at 20:51

## 2025-06-01 RX ADMIN — VASOPRESSIN 0.03 UNITS/MIN: 0.2 INJECTION INTRAVENOUS at 00:00

## 2025-06-01 RX ADMIN — WHITE PETROLATUM 57.7 %-MINERAL OIL 31.9 % EYE OINTMENT 1 APPLICATION: at 06:45

## 2025-06-01 RX ADMIN — WHITE PETROLATUM 57.7 %-MINERAL OIL 31.9 % EYE OINTMENT 1 APPLICATION: at 01:33

## 2025-06-01 RX ADMIN — PANTOPRAZOLE SODIUM 40 MG: 40 INJECTION, POWDER, FOR SOLUTION INTRAVENOUS at 07:00

## 2025-06-01 RX ADMIN — VANCOMYCIN 1750 MG: 1.25 INJECTION, SOLUTION INTRAVENOUS at 12:04

## 2025-06-01 RX ADMIN — NOREPINEPHRINE BITARTRATE 0.22 MCG/KG/MIN: 8 INJECTION, SOLUTION INTRAVENOUS at 10:16

## 2025-06-01 RX ADMIN — PROPOFOL 25 MCG/KG/MIN: 10 INJECTION, EMULSION INTRAVENOUS at 09:05

## 2025-06-01 RX ADMIN — IPRATROPIUM BROMIDE AND ALBUTEROL SULFATE 3 ML: 2.5; .5 SOLUTION RESPIRATORY (INHALATION) at 00:33

## 2025-06-01 RX ADMIN — METHYLPREDNISOLONE SODIUM SUCCINATE 40 MG: 40 INJECTION, POWDER, FOR SOLUTION INTRAMUSCULAR; INTRAVENOUS at 20:51

## 2025-06-01 RX ADMIN — Medication 70 PERCENT: at 07:33

## 2025-06-01 RX ADMIN — Medication 50 PERCENT: at 19:32

## 2025-06-01 RX ADMIN — SODIUM CHLORIDE SOLN NEBU 3% 3 ML: 3 NEBU SOLN at 12:25

## 2025-06-01 RX ADMIN — AZITHROMYCIN MONOHYDRATE 500 MG: 500 INJECTION, POWDER, LYOPHILIZED, FOR SOLUTION INTRAVENOUS at 01:28

## 2025-06-01 RX ADMIN — HEPARIN SODIUM 5000 UNITS: 5000 INJECTION, SOLUTION INTRAVENOUS; SUBCUTANEOUS at 15:20

## 2025-06-01 RX ADMIN — POLYETHYLENE GLYCOL 3350 17 G: 17 POWDER, FOR SOLUTION ORAL at 08:28

## 2025-06-01 RX ADMIN — Medication 50 MCG/HR: at 00:00

## 2025-06-01 RX ADMIN — SODIUM CHLORIDE SOLN NEBU 3% 3 ML: 3 NEBU SOLN at 00:33

## 2025-06-01 RX ADMIN — SODIUM BICARBONATE 100 ML/HR: 84 INJECTION, SOLUTION INTRAVENOUS at 00:00

## 2025-06-01 RX ADMIN — IPRATROPIUM BROMIDE AND ALBUTEROL SULFATE 3 ML: 2.5; .5 SOLUTION RESPIRATORY (INHALATION) at 19:32

## 2025-06-01 RX ADMIN — PIPERACILLIN SODIUM AND TAZOBACTAM SODIUM 4.5 G: 4; .5 INJECTION, SOLUTION INTRAVENOUS at 22:24

## 2025-06-01 RX ADMIN — NYSTATIN 1 APPLICATION: 100000 POWDER TOPICAL at 08:25

## 2025-06-01 RX ADMIN — INSULIN LISPRO 10 UNITS: 100 INJECTION, SOLUTION INTRAVENOUS; SUBCUTANEOUS at 03:47

## 2025-06-01 RX ADMIN — PROPOFOL 25 MCG/KG/MIN: 10 INJECTION, EMULSION INTRAVENOUS at 01:39

## 2025-06-01 RX ADMIN — SODIUM CHLORIDE SOLN NEBU 3% 3 ML: 3 NEBU SOLN at 07:32

## 2025-06-01 RX ADMIN — IPRATROPIUM BROMIDE AND ALBUTEROL SULFATE 3 ML: 2.5; .5 SOLUTION RESPIRATORY (INHALATION) at 12:25

## 2025-06-01 RX ADMIN — IPRATROPIUM BROMIDE AND ALBUTEROL SULFATE 3 ML: 2.5; .5 SOLUTION RESPIRATORY (INHALATION) at 07:33

## 2025-06-01 RX ADMIN — METHYLPREDNISOLONE SODIUM SUCCINATE 60 MG: 125 INJECTION, POWDER, FOR SOLUTION INTRAMUSCULAR; INTRAVENOUS at 08:25

## 2025-06-01 RX ADMIN — PIPERACILLIN SODIUM AND TAZOBACTAM SODIUM 4.5 G: 4; .5 INJECTION, SOLUTION INTRAVENOUS at 11:17

## 2025-06-01 RX ADMIN — NYSTATIN 1 APPLICATION: 100000 POWDER TOPICAL at 20:51

## 2025-06-01 RX ADMIN — VASOPRESSIN 0.03 UNITS/MIN: 0.2 INJECTION INTRAVENOUS at 03:21

## 2025-06-01 SDOH — SOCIAL STABILITY: SOCIAL INSECURITY: HAVE YOU HAD ANY THOUGHTS OF HARMING ANYONE ELSE?: UNABLE TO ASSESS

## 2025-06-01 SDOH — SOCIAL STABILITY: SOCIAL INSECURITY: ARE THERE ANY APPARENT SIGNS OF INJURIES/BEHAVIORS THAT COULD BE RELATED TO ABUSE/NEGLECT?: UNABLE TO ASSESS

## 2025-06-01 SDOH — SOCIAL STABILITY: SOCIAL INSECURITY: WITHIN THE LAST YEAR, HAVE YOU BEEN AFRAID OF YOUR PARTNER OR EX-PARTNER?: PATIENT UNABLE TO ANSWER

## 2025-06-01 SDOH — SOCIAL STABILITY: SOCIAL INSECURITY: DOES ANYONE TRY TO KEEP YOU FROM HAVING/CONTACTING OTHER FRIENDS OR DOING THINGS OUTSIDE YOUR HOME?: UNABLE TO ASSESS

## 2025-06-01 SDOH — SOCIAL STABILITY: SOCIAL INSECURITY: DO YOU FEEL UNSAFE GOING BACK TO THE PLACE WHERE YOU ARE LIVING?: UNABLE TO ASSESS

## 2025-06-01 SDOH — SOCIAL STABILITY: SOCIAL INSECURITY: DO YOU FEEL ANYONE HAS EXPLOITED OR TAKEN ADVANTAGE OF YOU FINANCIALLY OR OF YOUR PERSONAL PROPERTY?: UNABLE TO ASSESS

## 2025-06-01 SDOH — SOCIAL STABILITY: SOCIAL INSECURITY: WERE YOU ABLE TO COMPLETE ALL THE BEHAVIORAL HEALTH SCREENINGS?: NO

## 2025-06-01 SDOH — SOCIAL STABILITY: SOCIAL INSECURITY: HAVE YOU HAD THOUGHTS OF HARMING ANYONE ELSE?: UNABLE TO ASSESS

## 2025-06-01 SDOH — SOCIAL STABILITY: SOCIAL INSECURITY: HAS ANYONE EVER THREATENED TO HURT YOUR FAMILY OR YOUR PETS?: UNABLE TO ASSESS

## 2025-06-01 SDOH — SOCIAL STABILITY: SOCIAL INSECURITY: ABUSE: ADULT

## 2025-06-01 SDOH — SOCIAL STABILITY: SOCIAL INSECURITY: ARE YOU OR HAVE YOU BEEN THREATENED OR ABUSED PHYSICALLY, EMOTIONALLY, OR SEXUALLY BY ANYONE?: UNABLE TO ASSESS

## 2025-06-01 ASSESSMENT — ACTIVITIES OF DAILY LIVING (ADL)
DRESSING YOURSELF: UNABLE TO ASSESS
ADEQUATE_TO_COMPLETE_ADL: UNABLE TO ASSESS
PATIENT'S MEMORY ADEQUATE TO SAFELY COMPLETE DAILY ACTIVITIES?: UNABLE TO ASSESS
WALKS IN HOME: UNABLE TO ASSESS
FEEDING YOURSELF: UNABLE TO ASSESS
TOILETING: UNABLE TO ASSESS
BATHING: UNABLE TO ASSESS
JUDGMENT_ADEQUATE_SAFELY_COMPLETE_DAILY_ACTIVITIES: UNABLE TO ASSESS
ASSISTIVE_DEVICE: WHEELCHAIR
LACK_OF_TRANSPORTATION: PATIENT UNABLE TO ANSWER
GROOMING: UNABLE TO ASSESS
HEARING - LEFT EAR: UNABLE TO ASSESS
HEARING - RIGHT EAR: UNABLE TO ASSESS

## 2025-06-01 ASSESSMENT — PAIN - FUNCTIONAL ASSESSMENT
PAIN_FUNCTIONAL_ASSESSMENT: CPOT (CRITICAL CARE PAIN OBSERVATION TOOL)

## 2025-06-01 ASSESSMENT — LIFESTYLE VARIABLES
AUDIT-C TOTAL SCORE: -1
SKIP TO QUESTIONS 9-10: 0
HOW MANY STANDARD DRINKS CONTAINING ALCOHOL DO YOU HAVE ON A TYPICAL DAY: PATIENT UNABLE TO ANSWER
HOW OFTEN DO YOU HAVE 6 OR MORE DRINKS ON ONE OCCASION: PATIENT UNABLE TO ANSWER
AUDIT-C TOTAL SCORE: -1
HOW OFTEN DO YOU HAVE A DRINK CONTAINING ALCOHOL: PATIENT UNABLE TO ANSWER

## 2025-06-01 ASSESSMENT — COGNITIVE AND FUNCTIONAL STATUS - GENERAL
MOBILITY SCORE: 6
CLIMB 3 TO 5 STEPS WITH RAILING: TOTAL
EATING MEALS: TOTAL
STANDING UP FROM CHAIR USING ARMS: TOTAL
DAILY ACTIVITIY SCORE: 6
TOILETING: TOTAL
DRESSING REGULAR UPPER BODY CLOTHING: TOTAL
PERSONAL GROOMING: TOTAL
WALKING IN HOSPITAL ROOM: TOTAL
TURNING FROM BACK TO SIDE WHILE IN FLAT BAD: TOTAL
MOVING TO AND FROM BED TO CHAIR: TOTAL
DRESSING REGULAR LOWER BODY CLOTHING: TOTAL
PATIENT BASELINE BEDBOUND: UNABLE TO ASSESS AT THIS TIME
HELP NEEDED FOR BATHING: TOTAL
MOVING FROM LYING ON BACK TO SITTING ON SIDE OF FLAT BED WITH BEDRAILS: TOTAL

## 2025-06-01 ASSESSMENT — COLUMBIA-SUICIDE SEVERITY RATING SCALE - C-SSRS
2. HAVE YOU ACTUALLY HAD ANY THOUGHTS OF KILLING YOURSELF?: NO
6. HAVE YOU EVER DONE ANYTHING, STARTED TO DO ANYTHING, OR PREPARED TO DO ANYTHING TO END YOUR LIFE?: NO
1. IN THE PAST MONTH, HAVE YOU WISHED YOU WERE DEAD OR WISHED YOU COULD GO TO SLEEP AND NOT WAKE UP?: NO

## 2025-06-01 NOTE — NURSING NOTE
RT is given the 3% saline via ETT/lavage per Dr. Mckinney orders. Dr. Mckinney was notified of recent ABG results, vent settings are being adjusted by RT per verbal orders.

## 2025-06-01 NOTE — SIGNIFICANT EVENT
Called by Dr. Bacon and advised of patient's acute decompensation.  Patient admitted to the medical ICU with acute hypoxic respiratory failure after being found down by her .  Patient was initially started on bilevel ventilation when she arrived to the ED and her blood gas did not improve.  Patient was subsequently intubated.  Postintubation ABG showed partial improvement.  Upon arrival to the medical floor patient had problems ventilating and oxygenating.  She required 100% FiO2 to maintain sats above 88%.  Furthermore, PEEP was escalated from 5-8.  Patient had elevated peak pressures in the 30s and upper 40s.  Plateaus were in the low 20s.  Patient had high resistance issues.  I discussed ventilator settings with Dr. Bacon and respiratory therapy at bedside.  We titrated the ventilator multiple times in order to help better optimize patient's breathing.  Patient displayed double stacking and was air trapping with possible auto PEEP due to prolonged exhalation phase and increasing peak pressures..  Patient was not on any sedation and was becoming hypotensive.  Levophed was started in order to support her blood pressure.  Furthermore, patient was going into a junctional rhythm that displayed bradycardia and an irregular regular rate.  She became hemodynamically unstable and we disconnected her from the ventilator.  At this point in time I asked RT to push down on the patient's chest to make sure that she is completely exhaled.  Bag-valve-mask ventilation was initiated.  Per respiratory therapist patient was easy to bag.  SpO2 improved.  Blood pressure improved with Levophed which was taken to 0.30 mcg/kg/min.  We also gave 2 A of bicarbonate and 1 g of sodium chloride.  Patient was Dot-code at this point.  She did come out of her junctional rhythm and showed a normal sinus rhythm in the 90s.  Bicarb gtt. was elected to start at this point due to possible hyperkalemia and pH of 6.90.  Patient was  also paralyzed with 100 mg of rocuronium.  After establishing some hemodynamic stability patient was started on propofol and fentanyl.  I asked that these doses not be changed as we have not paralyzed the patient.  At the time patient was paralyzed she was in CO2 narcosis with severe respiratory acidosis and a RASS of -5.  We initiated cisatracurium at a fixed dose.  Furthermore, we discussed concerns for pulmonary embolism.  Patient finally became stable enough to travel off the floor and get a CT PE scan which I reviewed the wet read 4.  Does not appear that patient has an acute pulmonary embolism but she does have a right middle lobe pneumonia with collapse and consolidation as well as a left lower lobe pneumonia with consolidation and collapse.  Her small airways appear to have secretions within them and she appears to be bronchospastic.  We elected to give 3 rounds of albuterol back-to-back.  Furthermore I advised the respiratory therapist to perform bag valve lavage with 3% hypertonic saline and aggressive suctioning.  Patient was not given steroids in the ED.  She was given 125 mg of Solu-Medrol one-time followed by scheduled 60 mg every 6 hours.  Furthermore patient escalated pressors to vasopressin.  I did opt to give her stress dose steroids for mineralocorticoid effect to help with her blood pressure due to the increased stress and to continue following sepsis protocols.  She was given 100 mg of Solu-Cortef one-time and will need 60 mg every 6 hours thereafter.  Finally patient was already given broad-spectrum antibiotics and blood cultures were collected.  A respiratory culture needs to be collected and a procalcitonin as well.  Strep pneumo Legionella antigens should also be collected.  I did talk to Dr. Rehman at [facility] ICU and patient was accepted for transfer for higher level of care with 24-hour in house critical care support.  Repeat ABG was performed and showed improvement in her pH of  7.10 and her pCO2 came down to 100.  Patient still does have elevated peak pressures in the 40s which I will tolerate as long as she is ventilating and oxygenating.  Her PaO2 is greater than 120.  I asked RT to decrease the PEEP to 5 with improvement in her peak pressures.  Furthermore we decreased her FiO2 to 80% and are targeting SpO2 greater than 88%. Dr. Bacon advised me that Dr. Patrick will be taking over for her.  She also advised me that nursing will herself would be updating the family about transfer and change in health status.  Patient will benefit from aggressive bronchopulmonary hygiene.  Furthermore we will continue albuterol every 2 hours to help break bronchospasm.  Patient to remain paralyzed for transport and we can consider de-escalating paralysis once ABG corrects to appropriate levels.     Plan:  - Transfer to [facility] ICU  - Continue Solu-Medrol 60 mg every 6 hours  - Continue albuterol nebulizers every 2 hours  - Aggressive bronchopulmonary hygiene with hypertonic saline and suctioning  - Collect respiratory culture, strep pneumo urine antigen, Legionella urine antigen, and procalcitonin.  - Follow-up blood cultures  - Continue broad-spectrum antibiotics and de-escalate per cultures  - Continue sedatives (fentanyl and propofol) at current rates do not change until patient is off of paralytics.  - Patient is status post 100 mg of rocuronium and placed on fixed dose cisatracurium.  No role for train-of-four's.   - Continue vasopressors and uptitrate as needed to maintain MAP greater than 65.  Currently on Levophed and vasopressin  - Was given one-time dose of Solu-Cortef 100 mg.  Will follow with 50 mg of Solu-Cortef every 6 hours  -Plan to repeat ABG, BMP, magnesium and CBC upon arrival to [facility] ICU  -Patient will need echocardiogram; troponin is elevated at 1300.  Will continue to trend as well.  - Ordered 2 g of magnesium for low magnesium of 1.68 with previous  arrhythmia as mentioned above    I have reviewed and evaluated the most recent data and results, personally examined the patient, and formulated the plan of care as presented above. This patient was critically ill and required continued critical care treatment. Teaching and any separately billable procedures are not included in the time calculation.    Billing Provider Critical Care Time: 85 minutes    Fred Mckinney MD  Pulmonary & Critical Care Attending   [phone]:[id number]

## 2025-06-01 NOTE — PROGRESS NOTES
[facility]    Patricia Pruitt is a 65 y.o. year old female who Pharmacy has been consulted for vancomycin dosing for Pneumonia/sepsis. Based on the patient's indication and renal status this patient will be dosed based on a target level of Other Indication: 15-20 mcg/mL    Patient is currently in [facility]    Last vancomycin dose (incl. date and time): 1500 mg on 5/31 at 12:00    Vancomycin level (incl. date and time): 13.0 mcg/mL on 6/1 at 4:15    Lab Results   Component Value Date    VANCORANDOM 13.0 06/01/2025       Visit Vitals  /60   Pulse 81   Temp 37.6 °C (99.7 °F) (Oral)   Resp 20           Lab Results   Component Value Date    CREATININE 1.57 (H) 06/01/2025    CREATININE 1.65 (H) 06/01/2025    CREATININE 1.75 (H) 06/01/2025    CREATININE 1.81 (H) 05/31/2025       I/O last 3 completed shifts:  In: 1173.4 (12.7 mL/kg) [I.V.:923.4 (10 mL/kg); IV Piggyback:250]  Out: 435 (4.7 mL/kg) [Urine:435 (0.1 mL/kg/hr)]  Weight: 92.6 kg     Assessment/Plan     Level is below target trough goal.   re-dose vancomycin with one time order of 1750 mg 6/1 @ 12:00  Random level within 24 hours will be obtained on 6/2 at 5:00. May be obtained sooner if clinically indicated.   Will continue to monitor renal function daily while on vancomycin and order serum creatinine at least every 48 hours if not already ordered.  Follow for continued vancomycin needs, clinical response, and signs/symptoms of toxicity.     Cruzito Pichardo, [facility]

## 2025-06-01 NOTE — CONSULTS
Inpatient consult to Cardiology  Consult performed by: Gerber Crain MD  Consult ordered by: YUN Calixto-CNP  Reason for consult: Consulted for underlying junctional rhythm as well as elevated troponin  Assessment/Recommendations: 65-year patient with multiple comorbid condition with a COPD as well as sepsis currently on vasopressors.  Patient has elevated troponin more likely due to underlying sepsis as well as COPD more likely demand ischemia.  Monitor patient back in sinus rhythm.  Continue current IV antibiotic as well as vasopressors.  Check 2D echocardiogram.  We will follow with you.        History Of Present Illness:    Patricia Pruitt is a 65 y.o. female history of COPD, history of diabetes, hypertension-history of CVA in the past.  Came to the emergency room at [facility] with unresponsive.  Patient become hypoxic and intubated.  Patient transferred to [facility] ICU.  Patient found to right middle lobe as well as superimposed consolidation right lower lobe also seen.  Also some atelectasis in right lower lobe.  Admit to ICU due to worsening short of breath.  Will monitor patient become hypotensive as well as junctional rhythm during hypoxia episode.  Now currently on the vasopressor.  Patient back in sinus rhythm currently sedated.  Also had extensive mucous plugging right middle to lower lobe.  Patient had troponin elevated to 1100 more likely due to demand ischemia.  Last Recorded Vitals:  Vitals:    25 0900 25 1000 25 1100 25 1200   BP:       Pulse: 81 80 78 83   Resp: 20 20 20 20   Temp:    36.4 °C (97.5 °F)   TempSrc:    Temporal   SpO2: 91% 94% 94% 92%   Weight:       Height:           Past Medical History:  She has a past medical history of Chronic hypoxic respiratory failure, COPD (chronic obstructive pulmonary disease) (Multi), Diabetes mellitus (Multi), and Stroke (Multi).    Past Surgical History:  She has a past surgical history that includes  section,  low transverse and Tonsillectomy.      Social History:  She reports that she has been smoking cigarettes. She does not have any smokeless tobacco history on file. No history on file for alcohol use and drug use.    Family History:  Family History[1]     Allergies:  Codeine, Morphine, and Penicillins    ROS: See HPI  Notable due to patient currently intubated on the vent.  Inpatient Medications:  Scheduled Medications[2]  Outpatient Medications:  Current Outpatient Medications   Medication Instructions    acetaminophen (TYLENOL) 650 mg, oral, Every 6 hours PRN    ALBUTEROL INHL 90 mcg, inhalation, Every 4 hours PRN    fluticasone-umeclidin-vilanter (Trelegy Ellipta) 100-62.5-25 mcg blister with device 1 puff, inhalation, Daily    gabapentin (NEURONTIN) 600 mg, oral, 3 times daily    guaiFENesin (MUCINEX) 1,200 mg, oral, 2 times daily, Do not crush, chew, or split.    ipratropium-albuteroL (Duo-Neb) 0.5-2.5 mg/3 mL nebulizer solution 3 mL, nebulization, 4 times daily RT    nystatin (Mycostatin) 100,000 unit/gram powder 1 Application, Topical, 2 times daily    oxygen (O2) 4 L/min, inhalation, Continuous    pantoprazole (PROTONIX) 40 mg, oral, Daily before breakfast, Do not crush, chew, or split.       Last Recorded Vitals  /60   Pulse 83   Temp 36.4 °C (97.5 °F) (Temporal)   Resp 20   Wt 92.6 kg (204 lb 2.3 oz)   SpO2 92%     Intake/Output Summary (Last 24 hours) at 6/1/2025 1219  Last data filed at 6/1/2025 1202  Gross per 24 hour   Intake 1518.87 ml   Output 755 ml   Net 763.87 ml       Physical Exam:  HEENT: Normocephalic/atraumatic pupils equal react light, patient is ill-appearing  Neck exam mild JVD, no bruit  Lung exam decree bedside bilaterally few wheezing, few crackles at the bases  Cardiac exam is regular rhythm S1-S2, soft systolic murmur heard.   Abdomen soft nontender, nondistended  Extremities no clubbing, cyanosis but trace edema  Neuro exam currently sedated and paralyzed  Last Labs:  CBC -  6/1/2025:  4:15 AM  28.6 15.8 177    51.7      CMP - 6/1/2025:  7:05 AM  8.3 6.4 345 --- 0.6   4.0 3.5 193 60      PTT - 6/1/2025: 12:32 AM  1.2   12.7 23.5     Troponin I, High Sensitivity   Date/Time Value Ref Range Status   06/01/2025 07:05 AM 1,128 (HH) 0 - 13 ng/L Final     Comment:     Previous result verified on 5/31/2025 1150 on specimen/case [id number] called with component TRPHS for procedure Troponin I, High Sensitivity with value 268 ng/L.   06/01/2025 12:32 AM 1,319 (HH) 0 - 13 ng/L Final     Comment:     Previous result verified on 5/31/2025 1150 on specimen/case [id number] called with component TRPHS for procedure Troponin I, High Sensitivity with value 268 ng/L.   05/31/2025 08:37 PM 1,314 (HH) 0 - 13 ng/L Final     Comment:     Previous result verified on 5/31/2025 1150 on specimen/case [id number] called with component TRPHS for procedure Troponin I, High Sensitivity with value 268 ng/L.     BNP   Date/Time Value Ref Range Status   06/01/2025 04:15 AM 1,423 (H) 0 - 99 pg/mL Final          XR chest 1 view  Narrative: Interpreted By:  Ruth Andre,   STUDY:  XR CHEST 1 VIEW;  6/1/2025 1:22 am      INDICATION:  Signs/Symptoms:Line/tube placement.          COMPARISON:  05/31/2025      ACCESSION NUMBER(S):  [id number]      ORDERING CLINICIAN:  LAKESHIA POOL      FINDINGS:  AP radiograph of the chest was provided. Rotation partially limits  evaluation      Right IJ central venous catheter with tip projecting over the distal  SV C. Endotracheal tube terminates approximately 2 cm above the  sarah. Enteric tube courses below the diaphragm with tip and  side-port in the left upper quadrant.      CARDIOMEDIASTINAL SILHOUETTE:  Cardiomediastinal silhouette is normal in size and configuration.      LUNGS:  Lungs are slightly hyperinflated. Possible left basilar atelectasis  or consolidation. Mild interstitial prominence. No sizable pleural  effusion or pneumothorax.      ABDOMEN:  No  remarkable upper abdominal findings.      BONES:  No acute osseous changes.      Impression: 1.  Medical devices as detailed.  2. Possible left basilar atelectasis or consolidation              MACRO:  None      Signed by: Ruth Andre 6/1/2025 3:18 AM  Dictation workstation:   [id number]      Assessment & Plan  Acute respiratory failure    Assessment/Plan   65-year-old female patient has a bowel history of COPD, acute respiratory failure as well as pneumonia.  1.  Elevate troponin: More likely due to demand ischemia type II MI.  Continue DVT prophylaxis.  Check 2D echocardiogram.  2.  Hypotension/sepsis: Continue current IV antibiotic as well as vasopressors.  3.  Pneumonia/COPD: Continue current antibiotic as well as bronchodilators.  Also continue Solu-Medrol.  Critical care time is spent at bedside includes review of diagnostic tests, labs, and radiographs, serial assessments and management of hemodynamics, EKGs, old echoes, cardiac work-up and coordination of care.  Assessment, impression and plans are reflected in the note above as well as the orders.    Code Status:  Full Code  I spent 60 minutes in the professional and overall care of this patient.  Gerber Crain MD         [1] No family history on file.  [2]   Scheduled medications   Medication Dose Route Frequency    azithromycin  500 mg intravenous q24h    [Held by provider] tiotropium  2 puff inhalation Daily    And    [Held by provider] fluticasone furoate-vilanteroL  1 puff inhalation Daily    [Held by provider] gabapentin  600 mg oral TID    heparin (porcine)  5,000 Units subcutaneous q8h    ipratropium-albuteroL  3 mL nebulization q6h    methylPREDNISolone sodium succinate (PF)  40 mg intravenous q6h    nystatin  1 Application Topical BID    oxygen   inhalation Continuous - Inhalation    pantoprazole  40 mg oral Daily before breakfast    Or    pantoprazole  40 mg intravenous Daily before breakfast    piperacillin-tazobactam  4.5 g intravenous  q6h    polyethylene glycol  17 g oral Daily    sodium chloride  3 mL nebulization q6h    vancomycin  1,750 mg intravenous Once

## 2025-06-01 NOTE — CARE PLAN
Problem: Pain - Adult  Goal: Verbalizes/displays adequate comfort level or baseline comfort level  Outcome: Progressing     Problem: Safety - Adult  Goal: Free from fall injury  Outcome: Progressing     Problem: Discharge Planning  Goal: Discharge to home or other facility with appropriate resources  Outcome: Progressing     Problem: Chronic Conditions and Co-morbidities  Goal: Patient's chronic conditions and co-morbidity symptoms are monitored and maintained or improved  Outcome: Progressing     Problem: Nutrition  Goal: Nutrient intake appropriate for maintaining nutritional needs  Outcome: Progressing     Problem: Skin  Goal: Decreased wound size/increased tissue granulation at next dressing change  Outcome: Progressing  Flowsheets (Taken 6/1/2025 1002)  Decreased wound size/increased tissue granulation at next dressing change:   Promote sleep for wound healing   Protective dressings over bony prominences  Goal: Participates in plan/prevention/treatment measures  Outcome: Progressing  Flowsheets (Taken 6/1/2025 1002)  Participates in plan/prevention/treatment measures: Elevate heels  Goal: Prevent/manage excess moisture  Outcome: Progressing  Flowsheets (Taken 6/1/2025 1002)  Prevent/manage excess moisture:   Cleanse incontinence/protect with barrier cream   Moisturize dry skin  Goal: Prevent/minimize sheer/friction injuries  Outcome: Progressing  Flowsheets (Taken 6/1/2025 1002)  Prevent/minimize sheer/friction injuries:   HOB 30 degrees or less   Turn/reposition every 2 hours/use positioning/transfer devices   Complete micro-shifts as needed if patient unable. Adjust patient position to relieve pressure points, not a full turn   Use pull sheet  Goal: Promote/optimize nutrition  Outcome: Progressing  Flowsheets (Taken 6/1/2025 1002)  Promote/optimize nutrition: Discuss with provider if NPO > 2 days  Goal: Promote skin healing  Outcome: Progressing  Flowsheets (Taken 6/1/2025 1002)  Promote skin healing:    Assess skin/pad under line(s)/device(s)   Ensure correct size (line/device) and apply per  instructions   Protective dressings over bony prominences   Rotate device position/do not position patient on device   Turn/reposition every 2 hours/use positioning/transfer devices     Problem: Diabetes  Goal: Achieve decreasing blood glucose levels by end of shift  Outcome: Progressing  Goal: Increase stability of blood glucose readings by end of shift  Outcome: Progressing  Goal: Decrease in ketones present in urine by end of shift  Outcome: Progressing  Goal: Maintain electrolyte levels within acceptable range throughout shift  Outcome: Progressing  Goal: Maintain glucose levels >70mg/dl to <250mg/dl throughout shift  Outcome: Progressing  Goal: No changes in neurological exam by end of shift  Outcome: Progressing  Goal: Learn about and adhere to nutrition recommendations by end of shift  Outcome: Progressing  Goal: Vital signs within normal range for age by end of shift  Outcome: Progressing  Goal: Increase self care and/or family involovement by end of shift  Outcome: Progressing  Goal: Receive DSME education by end of shift  Outcome: Progressing     Problem: Knowledge Deficit  Goal: Patient/family/caregiver demonstrates understanding of disease process, treatment plan, medications, and discharge instructions  Outcome: Progressing     Problem: Mechanical Ventilation  Goal: Patient Will Maintain Patent Airway  Outcome: Progressing  Goal: Oral health is maintained or improved  Outcome: Progressing  Goal: Tracheostomy will be managed safely  Outcome: Progressing  Goal: ET tube will be managed safely  Outcome: Progressing  Goal: Ability to express needs and understand communication  Outcome: Progressing  Goal: Mobility/activity is maintained at optimum level for patient  Outcome: Progressing     Problem: Respiratory  Goal: Clear secretions with interventions this shift  Outcome: Progressing  Goal: Minimize  anxiety/maximize coping throughout shift  Outcome: Progressing  Goal: Minimal/no exertional discomfort or dyspnea this shift  Outcome: Progressing  Goal: No signs of respiratory distress (eg. Use of accessory muscles. Peds grunting)  Outcome: Progressing  Goal: Patent airway maintained this shift  Outcome: Progressing  Goal: Tolerate mechanical ventilation evidenced by VS/agitation level this shift  Outcome: Progressing  Goal: Tolerate pulmonary toileting this shift  Outcome: Progressing  Goal: Verbalize decreased shortness of breath this shift  Outcome: Progressing  Goal: Wean oxygen to maintain O2 saturation per order/standard this shift  Outcome: Progressing  Goal: Increase self care and/or family involvement in next 24 hours  Outcome: Progressing      The clinical goals for the shift include remain hemodynamically stable

## 2025-06-01 NOTE — PROCEDURES
Arterial Line    Consent: no, placed emergently     Indications: continuous hemodynamic monitoring, frequent ABGs    Performed by: Teresa Medina PA-C      Details:     Sterility: Chlorhexidine skin prep. Hat and mask on myself and assistant(s). Antiseptic hand foam. Sterile gown, gloves and drape.  Local anesthesia: 2 mL of 1% lidocaine subcutaneously.  Ultrasound-guided insertion: Yes    A time out was performed and the patient was identified.  Correct procedure and site verified.     The patient was prepped and draped in the normal sterile fashion.  The ultrasound probe was draped with a sterile probe cover. Using ultrasound guidance, the Right radial artery was identified and cannulated.  There was good pulsatile flow through the needle.  A wire was then introduced into the artery.  The needle was removed and using the seldinger technique, the catheter was advanced over the wire.  The catheter was then secured with sutures and connected to the monitor with a good arterial waveform noted.  A dressing was applied.      The patient tolerated the procedure well.

## 2025-06-01 NOTE — CONSULTS
Vancomycin Dosing by Pharmacy- [facility]    Patricia Pruitt is a 65 y.o. year old female who Pharmacy has been consulted for vancomycin dosing for pneumonia/sepsis. Based on the patient's indication and renal status this patient will be dosed based on a target level of Other Indication: 15-20 mcg/mL    Patient is currently in [facility].    Initial Dosin mg x 1 at an outside hospital    Visit Vitals  /60   Pulse 77   Temp 36.4 °C (97.5 °F) (Oral)   Resp 24           Lab Results   Component Value Date    CREATININE 1.81 (H) 2025    CREATININE 1.92 (H) 2025    CREATININE 2.16 (H) 2025       No intake/output data recorded.    Lab Results   Component Value Date    PATIENTTEMP 37.0 2025    PATIENTTEMP 37.0 2025    PATIENTTEMP 37.0 2025          Assessment/Plan     Random level within 24 hours of first dose will be obtained on  at 0500. May be obtained sooner if clinically indicated.   Will continue to monitor renal function daily while on vancomycin and order serum creatinine at least every 48 hours if not already ordered.  Follow for continued vancomycin needs, clinical response, and signs/symptoms of toxicity.     Joey Trivedi, PharmD

## 2025-06-01 NOTE — DISCHARGE SUMMARY
Discharge Diagnosis  Problem List[1]  Metabolic encephalopathy  Acute respiratory failure  Pneumonia  Shock  NSTEMI  Mechanical ventilation    Issues Requiring Follow-Up  To be determined    Discharge Meds     Your medication list        ASK your doctor about these medications        Instructions Last Dose Given Next Dose Due   acetaminophen 325 mg tablet  Commonly known as: Tylenol           ALBUTEROL INHL           gabapentin 600 mg tablet  Commonly known as: Neurontin           guaiFENesin 600 mg 12 hr tablet  Commonly known as: Mucinex           ipratropium-albuteroL 0.5-2.5 mg/3 mL nebulizer solution  Commonly known as: Duo-Neb           nystatin 100,000 unit/gram powder  Commonly known as: Mycostatin           oxygen gas therapy  Commonly known as: O2           pantoprazole 40 mg EC tablet  Commonly known as: ProtoNix           Trelegy Ellipta 100-62.5-25 mcg blister with device  Generic drug: fluticasone-umeclidin-vilanter                    Test Results Pending At Discharge  Pending Labs       Order Current Status    Extra Urine Gray Tube Collected (05/31/25 1503)    MRSA Surveillance for Vancomycin De-escalation, PCR Collected (05/31/25 7819)    Staphylococcus Aureus/MRSA Colonization, Culture In process    Troponin I Series, High Sensitivity (0, 1 HR) In process    Urinalysis with Reflex Culture and Microscopic In process    Blood Culture Preliminary result    Blood Culture Preliminary result            Hospital Course  This patient with advanced COPD who continues to smoke presented to the emergency room in the morning when she was found by her  unresponsive.  She was intubated in the emergency room a central line was placed by the emergency room physician.  She was started on pressors as she was severely hypotensive.  Even after intubation ventilating her was challenging due to significant mucous plugging.  This was done with the help of the intensivist.  Patient underwent CT angio chest which  demonstrated no significant blood clots.  Arrangements are made by my dayshift colleague for the patient to be transferred to [facility] ICU initially left at about 10:30 PM in critical condition on pressors and mechanical ventilation.  Repeated gases showed however some improvement.    Pertinent Physical Exam At Time of Discharge  Patient was evaluated several times by me from the timeframe of 7 PM to 10 PM she was paralyzed with miotic equal size pupils  Chest bilateral breath sounds bilateral rhonchi heart regular accelerated junctional rhythm on monitor  Abdomen soft  Extremities perfused no edema  Neurologic exam obviously quite limited      Lab work included repeat Bertoni in the 1300s repeat BMP with improved potassium magnesium low normal replaced    Outpatient Follow-Up  To be determined    Time spent on discharge arrangement:  45 minutes    Marie Patrick MD         [1]   Patient Active Problem List  Diagnosis    Metabolic encephalopathy    Acute kidney injury    Acute respiratory failure with hypercapnia    Sepsis with encephalopathy (Multi)    Non-traumatic rhabdomyolysis    Acute on chronic respiratory failure with hypoxia and hypercapnia    NSTEMI (non-ST elevated myocardial infarction) (Multi)    Hypotension due to drugs

## 2025-06-01 NOTE — CARE PLAN
The patient's goals for the shift include  unable to voice due to medical condition    The clinical goals for the shift include improved hemodyanmics, no cardiac ectopy, and improved lab values      Problem: Pain - Adult  Goal: Verbalizes/displays adequate comfort level or baseline comfort level  Outcome: Progressing     Problem: Safety - Adult  Goal: Free from fall injury  Outcome: Progressing     Problem: Discharge Planning  Goal: Discharge to home or other facility with appropriate resources  Outcome: Progressing     Problem: Chronic Conditions and Co-morbidities  Goal: Patient's chronic conditions and co-morbidity symptoms are monitored and maintained or improved  Outcome: Progressing     Problem: Nutrition  Goal: Nutrient intake appropriate for maintaining nutritional needs  Outcome: Progressing     Problem: Diabetes  Goal: Achieve decreasing blood glucose levels by end of shift  Outcome: Progressing  Goal: Increase stability of blood glucose readings by end of shift  Outcome: Progressing  Goal: Decrease in ketones present in urine by end of shift  Outcome: Progressing  Goal: Maintain electrolyte levels within acceptable range throughout shift  Outcome: Progressing  Goal: Maintain glucose levels >70mg/dl to <250mg/dl throughout shift  Outcome: Progressing  Goal: No changes in neurological exam by end of shift  Outcome: Progressing  Goal: Learn about and adhere to nutrition recommendations by end of shift  Outcome: Progressing  Goal: Vital signs within normal range for age by end of shift  Outcome: Progressing  Goal: Increase self care and/or family involovement by end of shift  Outcome: Progressing  Goal: Receive DSME education by end of shift  Outcome: Progressing     Problem: Knowledge Deficit  Goal: Patient/family/caregiver demonstrates understanding of disease process, treatment plan, medications, and discharge instructions  Outcome: Progressing     Problem: Mechanical Ventilation  Goal: Patient Will  Maintain Patent Airway  Outcome: Progressing  Goal: Oral health is maintained or improved  Outcome: Progressing  Goal: ET tube will be managed safely  Outcome: Progressing  Goal: Ability to express needs and understand communication  Outcome: Progressing  Goal: Mobility/activity is maintained at optimum level for patient  Outcome: Progressing     Problem: Skin  Goal: Decreased wound size/increased tissue granulation at next dressing change  Outcome: Progressing  Flowsheets (Taken 5/31/2025 2256)  Decreased wound size/increased tissue granulation at next dressing change:   Promote sleep for wound healing   Protective dressings over bony prominences  Goal: Participates in plan/prevention/treatment measures  Outcome: Progressing  Flowsheets (Taken 5/31/2025 2256)  Participates in plan/prevention/treatment measures: Elevate heels  Goal: Prevent/manage excess moisture  Outcome: Progressing  Flowsheets (Taken 5/31/2025 2256)  Prevent/manage excess moisture:   Moisturize dry skin   Cleanse incontinence/protect with barrier cream   Monitor for/manage infection if present   Follow provider orders for dressing changes  Goal: Prevent/minimize sheer/friction injuries  Outcome: Progressing  Flowsheets (Taken 5/31/2025 2256)  Prevent/minimize sheer/friction injuries:   Use pull sheet   HOB 30 degrees or less   Turn/reposition every 2 hours/use positioning/transfer devices   Complete micro-shifts as needed if patient unable. Adjust patient position to relieve pressure points, not a full turn  Goal: Promote/optimize nutrition  Outcome: Progressing  Flowsheets (Taken 5/31/2025 2256)  Promote/optimize nutrition: Discuss with provider if NPO > 2 days  Goal: Promote skin healing  Outcome: Progressing  Flowsheets (Taken 5/31/2025 2256)  Promote skin healing:   Turn/reposition every 2 hours/use positioning/transfer devices   Protective dressings over bony prominences   Assess skin/pad under line(s)/device(s)   Rotate device position/do  not position patient on device     Problem: Fall/Injury  Goal: Not fall by end of shift  Outcome: Progressing  Goal: Be free from injury by end of the shift  Outcome: Progressing  Goal: Verbalize understanding of personal risk factors for fall in the hospital  Outcome: Progressing  Goal: Verbalize understanding of risk factor reduction measures to prevent injury from fall in the home  Outcome: Progressing  Goal: Use assistive devices by end of the shift  Outcome: Progressing  Goal: Pace activities to prevent fatigue by end of the shift  Outcome: Progressing     Problem: Respiratory  Goal: Tolerate mechanical ventilation evidenced by VS/agitation level this shift  Outcome: Progressing  Goal: Wean oxygen to maintain O2 saturation per order/standard this shift  Outcome: Progressing     Problem: Acute Kidney Failure  Goal: Electrolytes/labs return to normal range  Outcome: Progressing  Goal: No signs of infection  Outcome: Progressing  Goal: No signs of neurosensory, musculoskeletal, cardiac changes  Outcome: Progressing  Goal: Stable weight and I&O  Outcome: Progressing  Goal: Tolerates renal replacement therapy  Outcome: Progressing  Goal: Wean vasopressor/achieve hemodynamic stability  Outcome: Progressing     Problem: Infection prevention/bleeding  Goal: Infection s/sx managed  Outcome: Progressing  Goal: No further progression of infection  Outcome: Progressing  Goal: No signs of bleeding  Outcome: Progressing  Goal: Normal coagulation studies  Outcome: Progressing     Problem: Perfusion/Cardiac  Goal: Adequate perfusion to organs/extremities  Outcome: Progressing  Goal: Hemodynamically stable  Outcome: Progressing  Goal: No cardiac arrhythmias  Outcome: Progressing     Problem: Respiratory/Oxygenation  Goal: No signs of respiratory compromise  Outcome: Progressing  Goal: Tolerates activity without increased O2 demands  Outcome: Progressing     Problem: Neuro/Coping  Goal: Minimal anxiety; utilize coping  mechanisms  Outcome: Progressing  Goal: No signs of neurological compromise  Outcome: Progressing  Goal: Increase self care/family involvement  Outcome: Progressing     Problem: Fluid/Electrolyte/Nutrition  Goal: Fluid balance within 1 liter of normovolemia  Outcome: Progressing  Goal: No signs of renal failure  Outcome: Progressing  Goal: Normal electrolyte levels  Outcome: Progressing  Goal: Normal glucose levels  Outcome: Progressing  Goal: Tolerates nutritional intake  Outcome: Progressing

## 2025-06-01 NOTE — H&P
[facility] Critical Care Medicine       Date:  6/1/2025  Patient:  Patricia Pruitt  YOB: 1960  MRN:  [id number]   Admit Date:  5/31/2025      History of Present Illness:  Patricia Pruitt is a 65 y.o. year old female patient with Past Medical History of CHF unknown EF, COPD on 4L baseline O2, DM, HTN, CVA presented to the emergency department at [facility] after being found at home unresponsive by her family. Per chart review the patient's  found her at home slumped over in her wheelchair with her face on a desk. EMS transported patient to the ED on a nonrebreather where she was confused but able to answer some questions. While in the emergency department that patient declined and required intubation after worsening acidosis and hypoxia. She was fluid resuscitated with 3L NS and started on empiric antibiotics. CT head was negative for acute findings. CT AP with complete atelectasis of the right middle lobe, small superimposed areas of consolidation in the right lower lobe and possibly within the atelectatic right lower lobe.    She was then admitted to the ICU at [facility] and continued to decline. She started pulling low tidal volumes on the ventilator and was becoming more acidotic and hypercapnic. A paralytic was recommended to help her overcome her restrictive lung state. She received duonebs and then became hypotensive and bradycardic in a junctional rhythm. She was started on Levophed and given another fluid bolus, as well as IV calcium chloride and 2 amps or bicarb. Her heart rate stabilized after. CTA chest obtained after patient stabilized, without evidence of PE how extensive mucous plugging to the right middle and lower lobe, as well as left lower bronchus. Imaging also concerning for pneumonia. Patient recommended for transfer to [facility] ICU for further care and monitoring.       Interval ICU Events:  5/31: Admitted to [facility] ICU intubated for acute hypoxic respiratory failure,  "paralyzed and sedated.     Medical History:  Medical History[1]  Surgical History[2]  Prescriptions Prior to Admission[3]  Codeine, Morphine, and Penicillins  Social History[4]  Family History[5]    Review of Systems:  14 point review of systems was completed and negative except for those specially mention in my HPI    Physical Exam:    Heart Rate:  [56-93]   Temp:  [36.3 °C (97.3 °F)-36.6 °C (97.8 °F)]   Resp:  [12-25]   BP: ()/(32-94)   Height:  [157.5 cm (5' 2\")]   Weight:  [93.1 kg (205 lb 4 oz)-98.7 kg (217 lb 9.5 oz)]   SpO2:  [88 %-100 %]     Physical Exam  Vitals reviewed.   Constitutional:       Appearance: She is ill-appearing.      Interventions: She is sedated, chemically paralyzed and intubated.   HENT:      Head: Normocephalic and atraumatic.      Right Ear: External ear normal.      Left Ear: External ear normal.      Nose: Nose normal.      Mouth/Throat:      Mouth: Mucous membranes are moist.      Pharynx: Oropharynx is clear.      Comments: ETT & OGT  Eyes:      General: Lids are normal.   Cardiovascular:      Rate and Rhythm: Normal rate. Rhythm irregular.      Pulses:           Dorsalis pedis pulses are detected w/ Doppler on the right side and detected w/ Doppler on the left side.      Heart sounds: Normal heart sounds.   Pulmonary:      Effort: She is intubated.      Breath sounds: Decreased breath sounds and wheezing present.   Abdominal:      General: Bowel sounds are decreased.      Palpations: Abdomen is soft.   Genitourinary:     Comments: Indwelling urinary catheter   Musculoskeletal:      Right lower leg: No edema.      Left lower leg: No edema.   Skin:     General: Skin is warm and dry.      Capillary Refill: Capillary refill takes less than 2 seconds.   Neurological:      Comments: DAILY- intubated and sedated    Psychiatric:      Comments: DAILY- intubated and sedated          Objective:    I have reviewed all medications, laboratory results, and imaging pertinent for today's " encounter    Assessment/Plan:    I am currently managing this critically ill patient for the following problems:    Neuro/Psych/Pain Ctrl/Sedation:  #Acute metabolic encephalopathy vs CO2 narcosis  #Hx CVA  -Tylenol PRN pain   -Train of four while on Nimbex drip   -Propofol and Fentanyl for sedation   -CAM ICU/Delirium protocol   -Hold gabapentin     Respiratory/ENT:  #Acute hypercarpnic hypoxic respiratory failure  #Pneumonia  #COPD on 4L Baseline NC  -Check ABG on arrival   -Antibiotics per ID section   -Duonebs and 3% Saline breathing treatment   -Solumedrol 60 mg q6hrs  -Oxygen Sat <92%  -Wean O2 as patient tolerates     Cardiovascular:  #Hypotension/undifferentiated shock   #Elevated troponin   #Hx HTN, CHF  -Echo in AM, unknown EF  -EKG PRN  -Continuous cardiac monitoring  -Troponin 1319, likely elevated in setting of hypoxia/demand ischemia   -Consult cardiology for junctional rhythm   -Fluids resuscitated with 5L crystalloid   -Continue levophed and vasopressin   -MAP goal >65    GI:  -NPO  -PPI  -Miralax PRN  -Dietary consult  -OGT to LIWS     Renal/Volume Status (Intra & Extravascular):  #? Rhabdomyolysis   #SARAH  #Dehydration   #Hypocalcemia  #Hypomagnesemia   -Creatinine 2.16 on admission, improved to 1.75  -Strict I&O  -Monitor and replete electrolytes   -Avoid nephrotoxic medications   -CK 1,851->3,434  -Magnesium 1.68, given 2gm magnesium IV  -Ionized calcium 1.04, given 1gm calcium gluconate IV    -Daily BMP  -Urine specific gravity >1.05    Endocrine  #Hx Diabetes Mellitus   -Check hemoglobin A1C  -Check TSH  -SSI  -Q4hr glucose checks while NPO    Infectious Disease:  #Pneumonia   #Septic Shock  -Leukocytosis 29.9  -Lactate 6.9->5.0  -Empiric vancomycin, azithromycin, and levofloxacin, deescalate as able   -Send Sputum culture  -Send UA   -Send Urine antigens  -Covid/Flu/RSV negative   -Send procal   -Blood cultures sent at OSH, will follow     Heme/Onc:  -No acute issues  -Daily  CBC    OBGYN/MSK:  -Wheelchair bound at home  -PT/OT when more medically stable     Ethics/Code Status:  Full Code     :  DVT Prophylaxis: SCDs & HSQ  GI Prophylaxis: PPI  Bowel Regimen: Mirlax PRN  Diet: NPO  CVC: Right IJ  Fina: Right Radial  Mederos: yes placed at tripoint   Restraints: none  Dispo: ICU    Critical Care Time:  70 minutes spent in preparing to see patient (I.e. review of medical records), evaluation of diagnostics (I.e. labs, imaging, etc.), documentation, discussing plan of care with patient/ family/ caregiver, and/ or coordination of care with multidisciplinary team. Time does not include completion of procedure time.       YUN Calixto-CNP  Critical Care Medicine  [facility]     Disclaimer: Documentation completed with the information available at the time of input. Parts of this note may have been scribed or generated using voice dictation software, Dragon.  Homophonic errors may exist.  Please contact me directly if clarification is needed. The times in the chart may not be reflective of actual patient care times, interventions, or procedures. Documentation occurs after the physical care of the patient.           [1]   Past Medical History:  Diagnosis Date    Chronic hypoxic respiratory failure     5L at home but has run out    COPD (chronic obstructive pulmonary disease) (Multi)     Diabetes mellitus (Multi)     Stroke (Multi)    [2]   Past Surgical History:  Procedure Laterality Date     SECTION, LOW TRANSVERSE      TONSILLECTOMY     [3]   Medications Prior to Admission   Medication Sig Dispense Refill Last Dose/Taking    acetaminophen (Tylenol) 325 mg tablet Take 2 tablets (650 mg) by mouth every 6 hours if needed for mild pain (1 - 3).       ALBUTEROL INHL Inhale 90 mcg every 4 hours if needed (2 puffs).       fluticasone-umeclidin-vilanter (Trelegy Ellipta) 100-62.5-25 mcg blister with device Inhale 1 puff once daily.       gabapentin  (Neurontin) 600 mg tablet Take 1 tablet (600 mg) by mouth 3 times a day.       guaiFENesin (Mucinex) 600 mg 12 hr tablet Take 2 tablets (1,200 mg) by mouth 2 times a day. Do not crush, chew, or split.       ipratropium-albuteroL (Duo-Neb) 0.5-2.5 mg/3 mL nebulizer solution Take 3 mL by nebulization 4 times a day.       nystatin (Mycostatin) 100,000 unit/gram powder Apply 1 Application topically 2 times a day.       oxygen (O2) gas therapy Inhale 4 L/min at 240,000 mL/hr continuously.       pantoprazole (ProtoNix) 40 mg EC tablet Take 1 tablet (40 mg) by mouth once daily in the morning. Take before meals. Do not crush, chew, or split.      [4]   Social History  Tobacco Use    Smoking status: Every Day     Types: Cigarettes   [5] No family history on file.

## 2025-06-01 NOTE — CARE PLAN
The clinical goals for the shift include Remain hemodynamically stable    Over the shift, the patient did make progress toward the following goals.       Problem: Skin  Goal: Decreased wound size/increased tissue granulation at next dressing change  Outcome: Progressing  Flowsheets (Taken 6/1/2025 0635)  Decreased wound size/increased tissue granulation at next dressing change:   Promote sleep for wound healing   Protective dressings over bony prominences  Goal: Participates in plan/prevention/treatment measures  Outcome: Progressing  Flowsheets (Taken 6/1/2025 0635)  Participates in plan/prevention/treatment measures: Elevate heels  Goal: Prevent/manage excess moisture  Outcome: Progressing  Flowsheets (Taken 6/1/2025 0635)  Prevent/manage excess moisture:   Cleanse incontinence/protect with barrier cream   Moisturize dry skin  Goal: Prevent/minimize sheer/friction injuries  Outcome: Progressing  Flowsheets (Taken 6/1/2025 0635)  Prevent/minimize sheer/friction injuries:   Complete micro-shifts as needed if patient unable. Adjust patient position to relieve pressure points, not a full turn   HOB 30 degrees or less   Turn/reposition every 2 hours/use positioning/transfer devices   Use pull sheet  Goal: Promote/optimize nutrition  Outcome: Progressing  Flowsheets (Taken 6/1/2025 0635)  Promote/optimize nutrition: Discuss with provider if NPO > 2 days  Goal: Promote skin healing  Outcome: Progressing  Flowsheets (Taken 6/1/2025 0635)  Promote skin healing:   Ensure correct size (line/device) and apply per  instructions   Protective dressings over bony prominences   Assess skin/pad under line(s)/device(s)   Rotate device position/do not position patient on device   Turn/reposition every 2 hours/use positioning/transfer devices

## 2025-06-01 NOTE — ED PROCEDURE NOTE
Procedure  Critical Care    Performed by: Andrew Akhtar DO  Authorized by: Andrew Akhtar DO    Critical care provider statement:     Critical care time (minutes):  42    Critical care time was exclusive of:  Separately billable procedures and treating other patients    Critical care was necessary to treat or prevent imminent or life-threatening deterioration of the following conditions:  Respiratory failure and sepsis    Critical care was time spent personally by me on the following activities:  Ordering and performing treatments and interventions, ordering and review of laboratory studies, ordering and review of radiographic studies, pulse oximetry, re-evaluation of patient's condition, review of old charts, examination of patient, evaluation of patient's response to treatment and obtaining history from patient or surrogate    Care discussed with: admitting provider                 Andrew Akhtar DO  06/01/25 0649

## 2025-06-01 NOTE — NURSING NOTE
Critical care transport arrived and assumed care at this time. Report called to [facility] to Belinda GOMEZ

## 2025-06-02 ENCOUNTER — APPOINTMENT (OUTPATIENT)
Dept: CARDIOLOGY | Facility: HOSPITAL | Age: 65
DRG: 870 | End: 2025-06-02
Payer: COMMERCIAL

## 2025-06-02 ENCOUNTER — APPOINTMENT (OUTPATIENT)
Dept: RADIOLOGY | Facility: HOSPITAL | Age: 65
DRG: 870 | End: 2025-06-02
Payer: COMMERCIAL

## 2025-06-02 VITALS
WEIGHT: 213.63 LBS | DIASTOLIC BLOOD PRESSURE: 60 MMHG | SYSTOLIC BLOOD PRESSURE: 101 MMHG | OXYGEN SATURATION: 97 % | BODY MASS INDEX: 39.31 KG/M2 | TEMPERATURE: 98.4 F | RESPIRATION RATE: 22 BRPM | HEIGHT: 62 IN | HEART RATE: 73 BPM

## 2025-06-02 LAB
ALBUMIN SERPL BCP-MCNC: 3 G/DL (ref 3.4–5)
ALBUMIN SERPL BCP-MCNC: 4.2 G/DL (ref 3.4–5)
ALP SERPL-CCNC: 46 U/L (ref 33–136)
ALP SERPL-CCNC: 80 U/L (ref 33–136)
ALT SERPL W P-5'-P-CCNC: 227 U/L (ref 7–45)
ALT SERPL W P-5'-P-CCNC: 33 U/L (ref 7–45)
ANION GAP BLDA CALCULATED.4IONS-SCNC: 3 MMO/L (ref 10–25)
ANION GAP BLDV CALCULATED.4IONS-SCNC: 6 MMOL/L (ref 10–25)
ANION GAP SERPL CALCULATED.3IONS-SCNC: 11 MMOL/L (ref 10–20)
ANION GAP SERPL CALCULATED.3IONS-SCNC: 26 MMOL/L (ref 10–20)
AORTIC VALVE MEAN GRADIENT: 17 MMHG
AORTIC VALVE PEAK VELOCITY: 3.25 M/S
APPARATUS: ABNORMAL
ARTERIAL PATENCY WRIST A: NEGATIVE
AST SERPL W P-5'-P-CCNC: 198 U/L (ref 9–39)
AST SERPL W P-5'-P-CCNC: 77 U/L (ref 9–39)
ATRIAL RATE: 68 BPM
AV PEAK GRADIENT: 42 MMHG
AVA (PEAK VEL): 1.18 CM2
AVA (VTI): 1.23 CM2
BASE EXCESS BLDA CALC-SCNC: 8.7 MMOL/L (ref -2–3)
BASE EXCESS BLDV CALC-SCNC: 7.5 MMOL/L (ref -2–3)
BILIRUB SERPL-MCNC: 0.5 MG/DL (ref 0–1.2)
BILIRUB SERPL-MCNC: 0.5 MG/DL (ref 0–1.2)
BODY TEMPERATURE: 37 DEGREES CELSIUS
BODY TEMPERATURE: 37 DEGREES CELSIUS
BUN SERPL-MCNC: 20 MG/DL (ref 6–23)
BUN SERPL-MCNC: 32 MG/DL (ref 6–23)
CA-I BLD-SCNC: 1.09 MMOL/L (ref 1.1–1.33)
CA-I BLDA-SCNC: 1.23 MMOL/L (ref 1.1–1.33)
CA-I BLDV-SCNC: 1.14 MMOL/L (ref 1.1–1.33)
CALCIUM SERPL-MCNC: 8.6 MG/DL (ref 8.6–10.3)
CALCIUM SERPL-MCNC: 8.6 MG/DL (ref 8.6–10.3)
CHLORIDE BLDA-SCNC: 97 MMOL/L (ref 98–107)
CHLORIDE BLDV-SCNC: 93 MMOL/L (ref 98–107)
CHLORIDE SERPL-SCNC: 95 MMOL/L (ref 98–107)
CHLORIDE SERPL-SCNC: 95 MMOL/L (ref 98–107)
CK SERPL-CCNC: 608 U/L (ref 0–215)
CO2 SERPL-SCNC: 20 MMOL/L (ref 21–32)
CO2 SERPL-SCNC: 34 MMOL/L (ref 21–32)
CREAT SERPL-MCNC: 1 MG/DL (ref 0.5–1.05)
CREAT SERPL-MCNC: 2.16 MG/DL (ref 0.5–1.05)
EGFRCR SERPLBLD CKD-EPI 2021: 25 ML/MIN/1.73M*2
EGFRCR SERPLBLD CKD-EPI 2021: 62 ML/MIN/1.73M*2
EJECTION FRACTION APICAL 4 CHAMBER: 57.5
EJECTION FRACTION: 58 %
ERYTHROCYTE [DISTWIDTH] IN BLOOD BY AUTOMATED COUNT: 14.2 % (ref 11.5–14.5)
GLUCOSE BLD MANUAL STRIP-MCNC: 128 MG/DL (ref 74–99)
GLUCOSE BLD MANUAL STRIP-MCNC: 136 MG/DL (ref 74–99)
GLUCOSE BLD MANUAL STRIP-MCNC: 151 MG/DL (ref 74–99)
GLUCOSE BLD MANUAL STRIP-MCNC: 154 MG/DL (ref 74–99)
GLUCOSE BLD MANUAL STRIP-MCNC: 167 MG/DL (ref 74–99)
GLUCOSE BLD MANUAL STRIP-MCNC: 167 MG/DL (ref 74–99)
GLUCOSE BLDA-MCNC: 153 MG/DL (ref 74–99)
GLUCOSE BLDV-MCNC: 157 MG/DL (ref 74–99)
GLUCOSE SERPL-MCNC: 140 MG/DL (ref 74–99)
GLUCOSE SERPL-MCNC: 151 MG/DL (ref 74–99)
HCO3 BLDA-SCNC: 35.5 MMOL/L (ref 22–26)
HCO3 BLDV-SCNC: 34.9 MMOL/L (ref 22–26)
HCT VFR BLD AUTO: 44.5 % (ref 36–46)
HCT VFR BLD EST: 44 % (ref 36–46)
HCT VFR BLD EST: 44 % (ref 36–46)
HGB BLD-MCNC: 14.1 G/DL (ref 12–16)
HGB BLDA-MCNC: 14.7 G/DL (ref 12–16)
HGB BLDV-MCNC: 14.8 G/DL (ref 12–16)
INHALED O2 CONCENTRATION: 40 %
INHALED O2 CONCENTRATION: 40 %
LACTATE BLDA-SCNC: 1.2 MMOL/L (ref 0.4–2)
LACTATE BLDV-SCNC: 1.3 MMOL/L (ref 0.4–2)
LEFT VENTRICLE INTERNAL DIMENSION DIASTOLE: 4.45 CM (ref 3.5–6)
LEFT VENTRICULAR OUTFLOW TRACT DIAMETER: 2 CM
LEGIONELLA AG UR QL: NEGATIVE
LV EJECTION FRACTION BIPLANE: 59 %
MAGNESIUM SERPL-MCNC: 1.98 MG/DL (ref 1.6–2.4)
MCH RBC QN AUTO: 31.8 PG (ref 26–34)
MCHC RBC AUTO-ENTMCNC: 31.7 G/DL (ref 32–36)
MCV RBC AUTO: 101 FL (ref 80–100)
MITRAL VALVE E/A RATIO: 0.82
MRSA DNA SPEC QL NAA+PROBE: NOT DETECTED
NRBC BLD-RTO: 0 /100 WBCS (ref 0–0)
OXYHGB MFR BLDA: 95.6 % (ref 94–98)
OXYHGB MFR BLDV: 80.5 % (ref 45–75)
P AXIS: 65 DEGREES
P OFFSET: 196 MS
P ONSET: 142 MS
PCO2 BLDA: 56 MM HG (ref 38–42)
PCO2 BLDV: 59 MM HG (ref 41–51)
PEEP CMH2O: 8 CM H2O
PH BLDA: 7.41 PH (ref 7.38–7.42)
PH BLDV: 7.38 PH (ref 7.33–7.43)
PHOSPHATE SERPL-MCNC: 2.6 MG/DL (ref 2.5–4.9)
PLATELET # BLD AUTO: 105 X10*3/UL (ref 150–450)
PO2 BLDA: 86 MM HG (ref 85–95)
PO2 BLDV: 49 MM HG (ref 35–45)
POTASSIUM BLDA-SCNC: 4.4 MMOL/L (ref 3.5–5.3)
POTASSIUM BLDV-SCNC: 3.7 MMOL/L (ref 3.5–5.3)
POTASSIUM SERPL-SCNC: 3.7 MMOL/L (ref 3.5–5.3)
POTASSIUM SERPL-SCNC: 6.4 MMOL/L (ref 3.5–5.3)
PR INTERVAL: 150 MS
PRESSURE SUPPORT: 5 CM H2O
PROCALCITONIN SERPL-MCNC: 1.18 NG/ML
PROT SERPL-MCNC: 5.5 G/DL (ref 6.4–8.2)
PROT SERPL-MCNC: 7.8 G/DL (ref 6.4–8.2)
Q ONSET: 217 MS
QRS COUNT: 11 BEATS
QRS DURATION: 92 MS
QT INTERVAL: 406 MS
QTC CALCULATION(BAZETT): 431 MS
QTC FREDERICIA: 423 MS
R AXIS: 93 DEGREES
RBC # BLD AUTO: 4.43 X10*6/UL (ref 4–5.2)
RIGHT VENTRICLE FREE WALL PEAK S': 9.14 CM/S
RIGHT VENTRICLE PEAK SYSTOLIC PRESSURE: 17 MMHG
S PNEUM AG UR QL: NEGATIVE
SAO2 % BLDA: 98 % (ref 94–100)
SAO2 % BLDV: 82 % (ref 45–75)
SODIUM BLDA-SCNC: 131 MMOL/L (ref 136–145)
SODIUM BLDV-SCNC: 130 MMOL/L (ref 136–145)
SODIUM SERPL-SCNC: 135 MMOL/L (ref 136–145)
SODIUM SERPL-SCNC: 136 MMOL/L (ref 136–145)
SPECIMEN DRAWN FROM PATIENT: ABNORMAL
SPONTANEOUS TIDAL VOLUME: 479 ML
T AXIS: 88 DEGREES
T OFFSET: 420 MS
TRICUSPID ANNULAR PLANE SYSTOLIC EXCURSION: 2.6 CM
VANCOMYCIN SERPL-MCNC: 20 UG/ML (ref 5–20)
VENTILATOR MODE: ABNORMAL
VENTRICULAR RATE: 68 BPM
WBC # BLD AUTO: 20.5 X10*3/UL (ref 4.4–11.3)

## 2025-06-02 PROCEDURE — 2500000004 HC RX 250 GENERAL PHARMACY W/ HCPCS (ALT 636 FOR OP/ED)

## 2025-06-02 PROCEDURE — 94640 AIRWAY INHALATION TREATMENT: CPT

## 2025-06-02 PROCEDURE — 93306 TTE W/DOPPLER COMPLETE: CPT | Performed by: INTERNAL MEDICINE

## 2025-06-02 PROCEDURE — 94003 VENT MGMT INPAT SUBQ DAY: CPT

## 2025-06-02 PROCEDURE — 2500000001 HC RX 250 WO HCPCS SELF ADMINISTERED DRUGS (ALT 637 FOR MEDICARE OP)

## 2025-06-02 PROCEDURE — 2500000002 HC RX 250 W HCPCS SELF ADMINISTERED DRUGS (ALT 637 FOR MEDICARE OP, ALT 636 FOR OP/ED)

## 2025-06-02 PROCEDURE — 82550 ASSAY OF CK (CPK): CPT | Performed by: STUDENT IN AN ORGANIZED HEALTH CARE EDUCATION/TRAINING PROGRAM

## 2025-06-02 PROCEDURE — 93306 TTE W/DOPPLER COMPLETE: CPT

## 2025-06-02 PROCEDURE — 99291 CRITICAL CARE FIRST HOUR: CPT | Performed by: STUDENT IN AN ORGANIZED HEALTH CARE EDUCATION/TRAINING PROGRAM

## 2025-06-02 PROCEDURE — 82947 ASSAY GLUCOSE BLOOD QUANT: CPT

## 2025-06-02 PROCEDURE — 84132 ASSAY OF SERUM POTASSIUM: CPT | Performed by: STUDENT IN AN ORGANIZED HEALTH CARE EDUCATION/TRAINING PROGRAM

## 2025-06-02 PROCEDURE — 2500000004 HC RX 250 GENERAL PHARMACY W/ HCPCS (ALT 636 FOR OP/ED): Mod: JZ | Performed by: NURSE PRACTITIONER

## 2025-06-02 PROCEDURE — 84132 ASSAY OF SERUM POTASSIUM: CPT

## 2025-06-02 PROCEDURE — 82330 ASSAY OF CALCIUM: CPT

## 2025-06-02 PROCEDURE — 71045 X-RAY EXAM CHEST 1 VIEW: CPT | Performed by: RADIOLOGY

## 2025-06-02 PROCEDURE — 2500000005 HC RX 250 GENERAL PHARMACY W/O HCPCS

## 2025-06-02 PROCEDURE — 2020000001 HC ICU ROOM DAILY

## 2025-06-02 PROCEDURE — 71045 X-RAY EXAM CHEST 1 VIEW: CPT

## 2025-06-02 PROCEDURE — 36600 WITHDRAWAL OF ARTERIAL BLOOD: CPT

## 2025-06-02 PROCEDURE — 85027 COMPLETE CBC AUTOMATED: CPT

## 2025-06-02 PROCEDURE — 99233 SBSQ HOSP IP/OBS HIGH 50: CPT | Performed by: INTERNAL MEDICINE

## 2025-06-02 PROCEDURE — 80202 ASSAY OF VANCOMYCIN: CPT

## 2025-06-02 PROCEDURE — 2500000004 HC RX 250 GENERAL PHARMACY W/ HCPCS (ALT 636 FOR OP/ED): Performed by: PHARMACIST

## 2025-06-02 PROCEDURE — 83735 ASSAY OF MAGNESIUM: CPT

## 2025-06-02 PROCEDURE — 84100 ASSAY OF PHOSPHORUS: CPT

## 2025-06-02 RX ORDER — POTASSIUM CHLORIDE 1.5 G/1.58G
40 POWDER, FOR SOLUTION ORAL ONCE
Status: COMPLETED | OUTPATIENT
Start: 2025-06-02 | End: 2025-06-02

## 2025-06-02 RX ORDER — CALCIUM GLUCONATE 20 MG/ML
2 INJECTION, SOLUTION INTRAVENOUS ONCE
Status: COMPLETED | OUTPATIENT
Start: 2025-06-02 | End: 2025-06-02

## 2025-06-02 RX ORDER — VANCOMYCIN 1.5 G/300ML
1500 INJECTION, SOLUTION INTRAVENOUS EVERY 24 HOURS
Status: DISCONTINUED | OUTPATIENT
Start: 2025-06-02 | End: 2025-06-02

## 2025-06-02 RX ADMIN — CALCIUM GLUCONATE 2 G: 20 INJECTION, SOLUTION INTRAVENOUS at 05:28

## 2025-06-02 RX ADMIN — HEPARIN SODIUM 5000 UNITS: 5000 INJECTION, SOLUTION INTRAVENOUS; SUBCUTANEOUS at 16:17

## 2025-06-02 RX ADMIN — PIPERACILLIN SODIUM AND TAZOBACTAM SODIUM 4.5 G: 4; .5 INJECTION, SOLUTION INTRAVENOUS at 04:16

## 2025-06-02 RX ADMIN — HEPARIN SODIUM 5000 UNITS: 5000 INJECTION, SOLUTION INTRAVENOUS; SUBCUTANEOUS at 00:48

## 2025-06-02 RX ADMIN — PANTOPRAZOLE SODIUM 40 MG: 40 INJECTION, POWDER, FOR SOLUTION INTRAVENOUS at 06:37

## 2025-06-02 RX ADMIN — METHYLPREDNISOLONE SODIUM SUCCINATE 40 MG: 40 INJECTION, POWDER, FOR SOLUTION INTRAMUSCULAR; INTRAVENOUS at 20:05

## 2025-06-02 RX ADMIN — PIPERACILLIN SODIUM AND TAZOBACTAM SODIUM 4.5 G: 4; .5 INJECTION, SOLUTION INTRAVENOUS at 16:17

## 2025-06-02 RX ADMIN — NYSTATIN 1 APPLICATION: 100000 POWDER TOPICAL at 08:43

## 2025-06-02 RX ADMIN — INSULIN LISPRO 3 UNITS: 100 INJECTION, SOLUTION INTRAVENOUS; SUBCUTANEOUS at 16:44

## 2025-06-02 RX ADMIN — Medication 40 PERCENT: at 07:35

## 2025-06-02 RX ADMIN — IPRATROPIUM BROMIDE AND ALBUTEROL SULFATE 3 ML: 2.5; .5 SOLUTION RESPIRATORY (INHALATION) at 11:50

## 2025-06-02 RX ADMIN — METHYLPREDNISOLONE SODIUM SUCCINATE 40 MG: 40 INJECTION, POWDER, FOR SOLUTION INTRAMUSCULAR; INTRAVENOUS at 16:44

## 2025-06-02 RX ADMIN — INSULIN LISPRO 3 UNITS: 100 INJECTION, SOLUTION INTRAVENOUS; SUBCUTANEOUS at 11:39

## 2025-06-02 RX ADMIN — POLYETHYLENE GLYCOL 3350 17 G: 17 POWDER, FOR SOLUTION ORAL at 08:43

## 2025-06-02 RX ADMIN — SODIUM CHLORIDE SOLN NEBU 3% 3 ML: 3 NEBU SOLN at 23:30

## 2025-06-02 RX ADMIN — SODIUM CHLORIDE SOLN NEBU 3% 3 ML: 3 NEBU SOLN at 19:31

## 2025-06-02 RX ADMIN — Medication 40 PERCENT: at 19:33

## 2025-06-02 RX ADMIN — NYSTATIN 1 APPLICATION: 100000 POWDER TOPICAL at 20:04

## 2025-06-02 RX ADMIN — INSULIN LISPRO 3 UNITS: 100 INJECTION, SOLUTION INTRAVENOUS; SUBCUTANEOUS at 04:16

## 2025-06-02 RX ADMIN — AZITHROMYCIN MONOHYDRATE 500 MG: 500 INJECTION, POWDER, LYOPHILIZED, FOR SOLUTION INTRAVENOUS at 00:47

## 2025-06-02 RX ADMIN — IPRATROPIUM BROMIDE AND ALBUTEROL SULFATE 3 ML: 2.5; .5 SOLUTION RESPIRATORY (INHALATION) at 07:34

## 2025-06-02 RX ADMIN — SODIUM CHLORIDE SOLN NEBU 3% 3 ML: 3 NEBU SOLN at 00:23

## 2025-06-02 RX ADMIN — VANCOMYCIN 1.5 G: 1.5 INJECTION, SOLUTION INTRAVENOUS at 08:43

## 2025-06-02 RX ADMIN — METHYLPREDNISOLONE SODIUM SUCCINATE 40 MG: 40 INJECTION, POWDER, FOR SOLUTION INTRAMUSCULAR; INTRAVENOUS at 03:31

## 2025-06-02 RX ADMIN — SODIUM CHLORIDE SOLN NEBU 3% 3 ML: 3 NEBU SOLN at 11:50

## 2025-06-02 RX ADMIN — IPRATROPIUM BROMIDE AND ALBUTEROL SULFATE 3 ML: 2.5; .5 SOLUTION RESPIRATORY (INHALATION) at 19:31

## 2025-06-02 RX ADMIN — POTASSIUM CHLORIDE 40 MEQ: 1.5 POWDER, FOR SOLUTION ORAL at 05:28

## 2025-06-02 RX ADMIN — INSULIN LISPRO 3 UNITS: 100 INJECTION, SOLUTION INTRAVENOUS; SUBCUTANEOUS at 20:04

## 2025-06-02 RX ADMIN — SODIUM CHLORIDE SOLN NEBU 3% 3 ML: 3 NEBU SOLN at 07:34

## 2025-06-02 RX ADMIN — METHYLPREDNISOLONE SODIUM SUCCINATE 40 MG: 40 INJECTION, POWDER, FOR SOLUTION INTRAMUSCULAR; INTRAVENOUS at 08:43

## 2025-06-02 RX ADMIN — INSULIN GLARGINE 15 UNITS: 100 INJECTION, SOLUTION SUBCUTANEOUS at 20:04

## 2025-06-02 RX ADMIN — PIPERACILLIN SODIUM AND TAZOBACTAM SODIUM 4.5 G: 4; .5 INJECTION, SOLUTION INTRAVENOUS at 11:23

## 2025-06-02 RX ADMIN — PROPOFOL 10 MCG/KG/MIN: 10 INJECTION, EMULSION INTRAVENOUS at 18:42

## 2025-06-02 RX ADMIN — PIPERACILLIN SODIUM AND TAZOBACTAM SODIUM 4.5 G: 4; .5 INJECTION, SOLUTION INTRAVENOUS at 23:39

## 2025-06-02 RX ADMIN — IPRATROPIUM BROMIDE AND ALBUTEROL SULFATE 3 ML: 2.5; .5 SOLUTION RESPIRATORY (INHALATION) at 00:23

## 2025-06-02 RX ADMIN — HEPARIN SODIUM 5000 UNITS: 5000 INJECTION, SOLUTION INTRAVENOUS; SUBCUTANEOUS at 08:43

## 2025-06-02 RX ADMIN — IPRATROPIUM BROMIDE AND ALBUTEROL SULFATE 3 ML: 2.5; .5 SOLUTION RESPIRATORY (INHALATION) at 23:30

## 2025-06-02 ASSESSMENT — PAIN - FUNCTIONAL ASSESSMENT
PAIN_FUNCTIONAL_ASSESSMENT: CPOT (CRITICAL CARE PAIN OBSERVATION TOOL)

## 2025-06-02 ASSESSMENT — PAIN SCALES - GENERAL: PAINLEVEL_OUTOF10: 0 - NO PAIN

## 2025-06-02 NOTE — ASSESSMENT & PLAN NOTE
Patient has a borderline history of CHF, COPD.  Now with a history of diabetes, sepsis.  Now off the vasopressors.  Positive blood culture for Staphylococcus hominis.  Currently underwent creatinine is 2.1 as well as a potassium of 6.4.  Patient currently on IV antibiotic including Zithromax as well as a bronchodilators and steroids.  Patient also on Zosyn and vancomycin.  As needed diuretics.  Continued on propofol.  Echocardiogram ejection fraction in normal range.  Elevated troponin more likely due to demand ischemia.  Less likely occlusive event.  Will follow along as needed.  DVT prophylaxis.

## 2025-06-02 NOTE — CARE PLAN
The patient's goals for the shift include DAILY, Intubated    The clinical goals for the shift include remain hemodynamically stable, wean sedation, extubation

## 2025-06-02 NOTE — PROGRESS NOTES
Vancomycin Dosing by Pharmacy- Cessation of Therapy    Consult to pharmacy for vancomycin dosing has been discontinued by the prescriber, pharmacy will sign off at this time.    Please call pharmacy if there are further questions or re-enter a consult if vancomycin is resumed.     Fernie Mai, DuaneD

## 2025-06-02 NOTE — CARE PLAN
Problem: Respiratory  Goal: Clear secretions with interventions this shift  Outcome: Progressing  Goal: Minimize anxiety/maximize coping throughout shift  Outcome: Progressing  Goal: Minimal/no exertional discomfort or dyspnea this shift  Outcome: Progressing  Goal: No signs of respiratory distress (eg. Use of accessory muscles. Peds grunting)  Outcome: Progressing  Goal: Patent airway maintained this shift  Outcome: Progressing  Goal: Tolerate mechanical ventilation evidenced by VS/agitation level this shift  Outcome: Progressing  Goal: Tolerate pulmonary toileting this shift  Outcome: Progressing  Goal: Wean oxygen to maintain O2 saturation per order/standard this shift  Outcome: Progressing

## 2025-06-02 NOTE — PROGRESS NOTES
Vancomycin Dosing by Pharmacy - FOLLOW-UP - changing from SARAH to AUC dosing    Patricia Pruitt is a 65 y.o. year old female who Pharmacy has been consulted for vancomycin dosing for pneumonia and pos blood cultures. Based on the patient's indication and renal status this patient will be dosed based on a goal level of 15-20.     Renal function is currently improving.    Current vancomycin dose: Dose by level    Most recent random level: 20.0 mcg/mL    Visit Vitals  /60   Pulse 74   Temp 36.8 °C (98.2 °F) (Axillary)   Resp 22           Lab Results   Component Value Date    CREATININE 1.00 06/02/2025    CREATININE 1.26 (H) 06/01/2025    CREATININE 1.44 (H) 06/01/2025    CREATININE 1.57 (H) 06/01/2025       I/O last 3 completed shifts:  In: 2303.5 (23.8 mL/kg) [I.V.:1503.5 (15.5 mL/kg); IV Piggyback:800]  Out: 1195 (12.3 mL/kg) [Urine:1195 (0.3 mL/kg/hr)]  Weight: 96.9 kg       Assessment/Plan     Changing from SARAH to AUC dosing (scr improving). Initiate 1500mg q24h with estimated AUC = 561.    This dosing regimen is predicted by [employer] to result in the following pharmacokinetic parameters:  Loading dose: N/A  Regimen: 1500 mg IV every 24 hours.  Start time: 12:04 on 06/02/2025  Exposure target: AUC24 (range) 400-600 mg/L.hr   AZH20-66: 557 mg/L.hr  AUC24,ss: 561 mg/L.hr  Probability of AUC24 > 400: 98 %  Ctrough,ss: 15.5 mg/L  Probability of Ctrough,ss > 20: 16 %    The next level will be obtained on 6/3 at 0500. May be obtained sooner if clinically indicated.   Will continue to monitor renal function daily while on vancomycin and order serum creatinine at least every 48 hours if not already ordered.  Follow for continued vancomycin needs, clinical response, and signs/symptoms of toxicity.     Fernie Mai, DuaneD

## 2025-06-02 NOTE — CARE PLAN
The clinical goals for the shift include Remain hemodynamically stable    Over the shift, the patient did make progress toward the following goals.       Problem: Pain - Adult  Goal: Verbalizes/displays adequate comfort level or baseline comfort level  Outcome: Progressing     Problem: Safety - Adult  Goal: Free from fall injury  Outcome: Progressing     Problem: Skin  Goal: Decreased wound size/increased tissue granulation at next dressing change  Outcome: Progressing  Flowsheets (Taken 6/1/2025 2305)  Decreased wound size/increased tissue granulation at next dressing change:   Promote sleep for wound healing   Protective dressings over bony prominences  Goal: Participates in plan/prevention/treatment measures  Outcome: Progressing  Flowsheets (Taken 6/1/2025 2305)  Participates in plan/prevention/treatment measures: Elevate heels  Goal: Prevent/manage excess moisture  Outcome: Progressing  Flowsheets (Taken 6/1/2025 2305)  Prevent/manage excess moisture:   Cleanse incontinence/protect with barrier cream   Moisturize dry skin  Goal: Prevent/minimize sheer/friction injuries  Outcome: Progressing  Flowsheets (Taken 6/1/2025 2305)  Prevent/minimize sheer/friction injuries:   Complete micro-shifts as needed if patient unable. Adjust patient position to relieve pressure points, not a full turn   HOB 30 degrees or less   Turn/reposition every 2 hours/use positioning/transfer devices   Use pull sheet  Goal: Promote/optimize nutrition  Outcome: Progressing  Flowsheets (Taken 6/1/2025 2305)  Promote/optimize nutrition: Discuss with provider if NPO > 2 days  Goal: Promote skin healing  Outcome: Progressing  Flowsheets (Taken 6/1/2025 2305)  Promote skin healing:   Assess skin/pad under line(s)/device(s)   Ensure correct size (line/device) and apply per  instructions   Protective dressings over bony prominences   Rotate device position/do not position patient on device     Problem: Safety - Medical  Restraint  Goal: Remains free of injury from restraints (Restraint for Interference with Medical Device)  Outcome: Progressing  Flowsheets (Taken 6/1/2025 2305)  Remains free of injury from restraints (restraint for interference with medical device):   Determine that other, less restrictive measures have been tried or would not be effective before applying the restraint   Evaluate the patient's condition at the time of restraint application   Inform patient/family regarding the reason for restraint   Every 2 hours: Monitor safety, psychosocial status, comfort, nutrition and hydration  Goal: Free from restraint(s) (Restraint for Interference with Medical Device)  Outcome: Progressing  Flowsheets (Taken 6/1/2025 2305)  Free from restraint(s) (restraint for interference with medical device):   ONCE/SHIFT or MINIMUM Every 12 hours: Assess and document the continuing need for restraints   Every 24 hours: Continued use of restraint requires Licensed Independent Practitioner to perform face to face examination and written order   Identify and implement measures to help patient regain control

## 2025-06-02 NOTE — CARE PLAN
The patient's goals for the shift include DAILY     The clinical goals for the shift include remain hemodynamically stable, wean sedation, extubation    Over the shift, the patient did not make progress toward the following goals. Barriers to progression include vented and sedated, level 1 mobility. Recommendations to address these barriers include wean sedation.     Problem: Mechanical Ventilation  Goal: Ability to express needs and understand communication  6/2/2025 1711 by Flakita Alvarado RN  Outcome: Not Progressing  6/2/2025 1226 by Flakita Alvarado RN  Outcome: Progressing  6/2/2025 1156 by Flakita Alvarado RN  Outcome: Progressing  6/2/2025 1150 by Flakita Alvarado RN  Outcome: Progressing  6/2/2025 1147 by Flakita Alvarado RN  Outcome: Progressing  Goal: Mobility/activity is maintained at optimum level for patient  6/2/2025 1711 by Flakita Alvarado RN  Outcome: Not Progressing  6/2/2025 1226 by Flakita Alvarado RN  Outcome: Progressing  6/2/2025 1156 by Flakita Alvarado RN  Outcome: Progressing  6/2/2025 1150 by Flakita Alvarado RN  Outcome: Progressing  6/2/2025 1147 by Flakita Alvarado RN  Outcome: Progressing

## 2025-06-02 NOTE — PROGRESS NOTES
I have seen the patient either independently or with an associated resident physician or advanced practice provider.    Patient is a 65-year-old female with a past medical history of CHF, COPD on 4 L baseline nasal cannula, DM, hypertension, CVA who presents to [facility] for respiratory failure with hypoxia and hypercapnia.  Etiology of respiratory failure appears to be an acute exacerbation of COPD.  Spontaneous breathing trial was aborted today due to excess secretions and marginal respiratory acidosis.      Physical Exam  Constitutional:       General: She is not in acute distress.     Appearance: She is ill-appearing.   HENT:      Mouth/Throat:      Mouth: Mucous membranes are moist.   Cardiovascular:      Rate and Rhythm: Normal rate and regular rhythm.   Pulmonary:      Effort: No respiratory distress.   Abdominal:      General: There is no distension.      Palpations: Abdomen is soft.   Musculoskeletal:      Right lower leg: No edema.      Left lower leg: No edema.   Skin:     General: Skin is warm and dry.   Neurological:      Comments: Sedated, arouses to noxious stimuli.         Neuro: Acute metabolic encephalopathy.  History of CVA.  Been sedation with propofol infusion, discontinued fentanyl drip.  Ordered IV Dilaudid as needed with CPOT protocol.   Cardiac: Undifferentiated shock resolved.  History of hypertension, CHF.  Echo pending today, cardiology on consult for earlier junctional rhythm.  No longer requiring vasopressor support.  Maintain MAP greater than 65.  Pulmonary: Acute respiratory failure with hypoxia and hypercapnia.  Pneumonia.  Acute exacerbation of COPD.  Continue scheduled DuoNebs and hypertonic saline.  Continue lung protective ventilator strategy.  Continue Solu-Medrol 40 mg every 6 hours.  Gastrointestinal: No acute issues.  Continue PPI bowel regimen as needed.  Initiating tube feeds this afternoon.  Transaminitis improved  Renal: Rhabdomyolysis resolved.  Acute kidney injury.   Monitor daily RFP, strict I/O's.  Endocrine: History of DM.  Continue SSI.  Glycemic control acceptable over last 24 hours.  Hematology: No acute issues.  Continue DVT prophylaxis with subcutaneous heparin.  Infectious Disease: Pneumonia.  Continue IV Zosyn, vancomycin.  Blood cultures positive x 2 for gram-positive cocci in clusters.  (one culture identified as Staphylococcus hominis, suspect contamination).  Anticipate 7-day antibiotic course.  Musculoskeletal: PT/OT on consult.    Lines/Tubes/Drains: Right IJ CVC, right radial arterial line.  Prophylaxis: Subcutaneous heparin, PPI    Disposition: ICU    ABCDEF Checklist  Analgesia: Spontaneous awakening trial to be pursued if clinically appropriate. RASS goal reviewed if applicable.  Breathing: Spontaneous breathing trial to be pursued if clinically appropriate. Mechanical power of assisted ventilation reviewed if applicable.  Choice of analgesia/sedation: Analgesic and sedative agents adjusted per clinical context.   Delirium assessed by CAM, will avoid exacerbating factors   Early mobility and exercise: Physical and occupational therapy engaged   Family: Plan of care, overall trajectory of patient shared with family. Questions elicited and answered as appropriate.       Due to the high probability of life threatening clinical decompensation, the patient required critical care time evaluating and managing this patient.  Critical care time included obtaining a history, examining the patient, ordering and reviewing studies, discussing, developing, and implementing a management plan, evaluating the patient's response to treatment, and discussion with other care team providers. I saw and evaluated the patient myself.  Critical care time was performed exclusive of billable procedures.    Critical care time: 45 minutes

## 2025-06-02 NOTE — PROGRESS NOTES
Subjective Data:  Patient currently sedated and off the vasopressors.  On monitor patient is a sinus rhythm.  Underlying CHF, COPD with hypoxia.  History of CVA.  Overnight Events:    Off the vasopressors  Objective   Last Recorded Vitals  /60   Pulse 83   Temp 36.4 °C (97.5 °F) (Axillary)   Resp 16   Wt 91.1 kg (200 lb 13.4 oz) Comment: weighed on PM shift  SpO2 90%     Intake/Output Summary (Last 24 hours) at 6/2/2025 1320  Last data filed at 6/2/2025 1300  Gross per 24 hour   Intake 1944.3 ml   Output 1820 ml   Net 124.3 ml     Physical Exam:  HEENT: Normocephalic/atraumatic pupils equal react light  Neck exam mild JVD, no bruit  Lung exam decreased breath sound bilaterally, few crackles at the bases  Cardiac exam is regular rhythm S1-S2, soft systolic murmur heard.   Abdomen soft nontender, nondistended  Extremities no clubbing, cyanosis, trace edema  Neuro exam currently sedated.  Image Results  Electrocardiogram, 12-lead PRN ACS symptoms  Normal sinus rhythm  Rightward axis  Nonspecific ST and T wave abnormality  Abnormal ECG  No previous ECGs available  Confirmed by Gerber Crain ([id number]) on 6/2/2025 12:48:55 PM  Transthoracic Echo Complete             [place] [address]             Phone [phone]    TRANSTHORACIC ECHOCARDIOGRAM REPORT    Patient Name:       MELA HERNANDEZ           Lexie Physician:    [id number] Gerber Crain MD  Study Date:         6/2/2025             Ordering Provider:    [id number] MIGUEL A REN  MRN/PID:            [id number]             Fellow:  Accession#:         [id number]         Nurse:  Date of Birth/Age:  1960 / 65 years Sonographer:          Mariah Su RDCS  Gender Assigned at  F                    Additional Staff:  Birth:  Height:              157.48 cm            Admit Date:  Weight:             90.72 kg             Admission Status:     Inpatient -                                                                 Routine  BSA / BMI:          1.91 m2 / 36.58      Department Location:  [facility] ICU                      kg/m2  Blood Pressure: 101 /60 mmHg    Study Type:    TRANSTHORACIC ECHO (TTE) COMPLETE  Diagnosis/ICD: Non ST elevation (NSTEMI) myocardial infarction-I21.4;                 Bacteremia-R78.81; Heart failure, unspecified-I50.9  Indication:    shock, bacteremia, chf, nstemi  CPT Codes:     Echo Complete w Full [id number]    Patient History:  Smoker:            Current.  Diabetes:          Yes  BMI:               Obese >30  Pertinent History: COPD, HTN and CVA. bacteremia, shock, nstemi, resp failure,                     vent, sepsis, enceph, hector, rhabdo, pna, bmi 36.    Study Detail: The following Echo studies were performed: 2D, M-Mode, Doppler and                color flow. Technically challenging study due to prominent lung                artifact and body habitus.       PHYSICIAN INTERPRETATION:  Left Ventricle: Left ventricular ejection fraction is normal by visual estimate at 55-60%. There are no regional wall motion abnormalities. The left ventricular cavity size is normal. There is mild increased septal and normal posterior left ventricular wall thickness. Spectral Doppler shows a Grade I (impaired relaxation pattern) of left ventricular diastolic filling with normal left atrial filling pressure.  Left Atrium: The left atrial size is moderately dilated.  Right Ventricle: The right ventricle is upper limits of normal in size. There is normal right ventricular global systolic function.  Right Atrium: The right atrial size is upper limits of normal.  Aortic Valve: The aortic valve is probably trileaflet. The aortic valve area by VTI is 1.23 cmï¿½ with a peak velocity of 3.25 m/s. The peak and mean gradients are 39 mmHg and  17 mmHg, respectively, with a dimensionless index of 0.39. There is moderate aortic valve cusp calcification. There is evidence of mild to moderate aortic valve stenosis.  There is mild aortic valve regurgitation.  Mitral Valve: The mitral valve is mildly thickened. There is moderate calcification of the anterior and posterior mitral valve leaflets. There is trace mitral valve regurgitation. The E Vmax is 1.18 m/s.  Tricuspid Valve: The tricuspid valve is structurally normal. There is mild tricuspid regurgitation. The doppler estimated RVSP is within normal limits with a right ventricular systolic pressure of 29 mmHg.  Pulmonic Valve: The pulmonic valve is not well visualized. There is no indication of pulmonic valve regurgitation.  Pericardium: Trivial pericardial effusion.  Aorta: The aortic root was not well visualized.  Systemic Veins: The inferior vena cava appears mildly dilated, IVC inspiratory collapse is absent.       CONCLUSIONS:   1. Left ventricular ejection fraction is normal by visual estimate at 55-60%.   2. Spectral Doppler shows a Grade I (impaired relaxation pattern) of left ventricular diastolic filling with normal left atrial filling pressure.   3. There is normal right ventricular global systolic function.   4. The left atrial size is moderately dilated.   5. There is moderate calcification of the anterior and posterior mitral valve leaflets.   6. The doppler estimated RVSP is within normal limits with a right ventricular systolic pressure of 29 mmHg.   7. Mild to moderate aortic valve stenosis. The peak and mean gradients are 42 mmHg and 17 mmHg respectively.   8. There is moderate aortic valve cusp calcification.   9. Mild aortic valve regurgitation.  10. The inferior vena cava appears mildly dilated, IVC inspiratory collapse is absent.    QUANTITATIVE DATA SUMMARY:     2D MEASUREMENTS:             Normal Ranges:  LAs:             4.30 cm     (2.7-4.0cm)  IVSd:            1.01 cm      (0.6-1.1cm)  LVPWd:           0.92 cm     (0.6-1.1cm)  LVIDd:           4.45 cm     (3.9-5.9cm)  LVIDs:           3.21 cm  LV Mass Index:   74.8 g/m2  LVEDV Index:     41.42 ml/m2  LV % FS          27.9 %       LV SYSTOLIC FUNCTION:                       Normal Ranges:  EF-A4C View:    58 % (>=55%)  EF-A2C View:    58 %  EF-Biplane:     59 %  EF-Visual:      58 %  LV EF Reported: 58 %       LV DIASTOLIC FUNCTION:           Normal Ranges:  MV Peak E:             1.18 m/s  (0.7-1.2 m/s)  MV Peak A:             1.44 m/s  (0.42-0.7 m/s)  E/A Ratio:             0.82      (1.0-2.2)  MV e'                  0.079 m/s (>8.0)  MV lateral e'          0.09 m/s  MV medial e'           0.06 m/s  E/e' Ratio:            14.86     (<8.0)  a'                     0.09 m/s       MITRAL VALVE:          Normal Ranges:  MV DT:        230 msec (150-240msec)       AORTIC VALVE:                      Normal Ranges:  AoV Vmax:                3.25 m/s  (<=1.7m/s)  AoV Peak P.2 mmHg (<20mmHg)  AoV Mean P.0 mmHg (1.7-11.5mmHg)  LVOT Max Tripp:            1.22 m/s  (<=1.1m/s)  AoV VTI:                 64.30 cm  (18-25cm)  LVOT VTI:                25.20 cm  LVOT Diameter:           2.00 cm   (1.8-2.4cm)  AoV Area, VTI:           1.23 cm2  (2.5-5.5cm2)  AoV Area,Vmax:           1.18 cm2  (2.5-4.5cm2)  AoV Dimensionless Index: 0.39       RIGHT VENTRICLE:  TAPSE: 25.7 mm  RV s'  0.09 m/s       TRICUSPID VALVE/RVSP:          Normal Ranges:  Peak TR Velocity:     1.87 m/s  Est. RA Pressure:     15 mmHg  RV Syst Pressure:     29 mmHg  (< 30mmHg)  IVC Diam:             2.21 cm       PULMONIC VALVE:          Normal Ranges:  PV Accel Time:  119 msec (>120ms)  PV Max Tripp:     1.1 m/s  (0.6-0.9m/s)  PV Max P.9 mmHg       [id number] Gerber Crain MD  Electronically signed on 2025 at 10:46:25 AM       ** Final **  ECG 12 Lead  Accelerated Junctional rhythm  ST & T wave abnormality, consider inferior ischemia  Prolonged  QT  Abnormal ECG  No previous ECGs available  XR chest 1 view  Narrative: Interpreted By:  Manny Andersen,   STUDY:  XR CHEST 1 VIEW;  6/2/2025 5:25 am      INDICATION:  Signs/Symptoms:Continued intubation.          COMPARISON:  Plain film yesterday. CT chest 05/31/2020.      ACCESSION NUMBER(S):  [id number]      ORDERING CLINICIAN:  EMMANUEL GARDNER      FINDINGS:  Endotracheal tube in good position. Stable right IJ approach CVC. Tip  of the OG tube is difficult to visualize. No pneumothorax. No large  pleural effusion. Discoid atelectasis left lower lung field. Slight  interval improvement aeration left lung base.      Impression: Slight interval improvement aeration left lung base.      ET tube in good position.      MACRO:  None      Signed by: Manny Andersen 6/2/2025 8:25 AM  Dictation workstation:   [id number]    Last Labs:  CBC - 6/2/2025:  4:03 AM  20.5 14.1 105    44.5      CMP - 6/2/2025:  4:03 AM  8.6 5.5 198 --- 0.5   2.6 3.0 227 46      PTT - 6/1/2025: 12:32 AM  1.2   12.7 23.5     Inpatient Medications:  Scheduled Medications[1]  Assessment & Plan  Acute respiratory failure  Patient has a borderline history of CHF, COPD.  Now with a history of diabetes, sepsis.  Now off the vasopressors.  Positive blood culture for Staphylococcus hominis.  Currently underwent creatinine is 2.1 as well as a potassium of 6.4.  Patient currently on IV antibiotic including Zithromax as well as a bronchodilators and steroids.  Patient also on Zosyn and vancomycin.  As needed diuretics.  Continued on propofol.  Echocardiogram ejection fraction in normal range.  Elevated troponin more likely due to demand ischemia.  Less likely occlusive event.  Will follow along as needed.  DVT prophylaxis.    Code Status:  Full Code  I spent 40 minutes in the professional and overall care of this patient.  Gerber Crain MD           [1]   Scheduled medications   Medication Dose Route Frequency    azithromycin  500 mg intravenous q24h     [Held by provider] tiotropium  2 puff inhalation Daily    And    [Held by provider] fluticasone furoate-vilanteroL  1 puff inhalation Daily    [Held by provider] gabapentin  600 mg oral TID    heparin (porcine)  5,000 Units subcutaneous q8h    insulin glargine  15 Units subcutaneous q24h    insulin lispro  0-15 Units subcutaneous q4h    ipratropium-albuteroL  3 mL nebulization q6h    methylPREDNISolone sodium succinate (PF)  40 mg intravenous q6h    nystatin  1 Application Topical BID    oxygen   inhalation Continuous - Inhalation    pantoprazole  40 mg oral Daily before breakfast    Or    pantoprazole  40 mg intravenous Daily before breakfast    perflutren lipid microspheres  0.5-10 mL of dilution intravenous Once in imaging    perflutren lipid microspheres  0.5-10 mL of dilution intravenous Once in imaging    perflutren protein A microsphere  0.5 mL intravenous Once in imaging    piperacillin-tazobactam  4.5 g intravenous q6h    polyethylene glycol  17 g oral Daily    sodium chloride  3 mL nebulization q6h    sulfur hexafluoride microsphr  2 mL intravenous Once in imaging    vancomycin  1,500 mg intravenous q24h

## 2025-06-03 ENCOUNTER — APPOINTMENT (OUTPATIENT)
Dept: RADIOLOGY | Facility: HOSPITAL | Age: 65
DRG: 870 | End: 2025-06-03
Payer: COMMERCIAL

## 2025-06-03 PROBLEM — J96.02 ACUTE RESPIRATORY FAILURE WITH HYPOXIA AND HYPERCARBIA: Status: ACTIVE | Noted: 2025-06-01

## 2025-06-03 PROBLEM — J96.01 ACUTE RESPIRATORY FAILURE WITH HYPOXIA AND HYPERCARBIA: Status: ACTIVE | Noted: 2025-06-01

## 2025-06-03 LAB
ALBUMIN SERPL BCP-MCNC: 3 G/DL (ref 3.4–5)
ALP SERPL-CCNC: 45 U/L (ref 33–136)
ALT SERPL W P-5'-P-CCNC: 198 U/L (ref 7–45)
ANION GAP SERPL CALCULATED.3IONS-SCNC: 10 MMOL/L (ref 10–20)
AST SERPL W P-5'-P-CCNC: 119 U/L (ref 9–39)
BASOPHILS # BLD AUTO: 0.01 X10*3/UL (ref 0–0.1)
BASOPHILS NFR BLD AUTO: 0.1 %
BILIRUB SERPL-MCNC: 0.6 MG/DL (ref 0–1.2)
BUN SERPL-MCNC: 30 MG/DL (ref 6–23)
CA-I BLD-SCNC: 1.14 MMOL/L (ref 1.1–1.33)
CALCIUM SERPL-MCNC: 9.1 MG/DL (ref 8.6–10.3)
CHLORIDE SERPL-SCNC: 97 MMOL/L (ref 98–107)
CO2 SERPL-SCNC: 35 MMOL/L (ref 21–32)
CREAT SERPL-MCNC: 0.76 MG/DL (ref 0.5–1.05)
EGFRCR SERPLBLD CKD-EPI 2021: 86 ML/MIN/1.73M*2
EOSINOPHIL # BLD AUTO: 0 X10*3/UL (ref 0–0.7)
EOSINOPHIL NFR BLD AUTO: 0 %
ERYTHROCYTE [DISTWIDTH] IN BLOOD BY AUTOMATED COUNT: 14 % (ref 11.5–14.5)
GLUCOSE BLD MANUAL STRIP-MCNC: 127 MG/DL (ref 74–99)
GLUCOSE BLD MANUAL STRIP-MCNC: 134 MG/DL (ref 74–99)
GLUCOSE BLD MANUAL STRIP-MCNC: 153 MG/DL (ref 74–99)
GLUCOSE BLD MANUAL STRIP-MCNC: 157 MG/DL (ref 74–99)
GLUCOSE BLD MANUAL STRIP-MCNC: 162 MG/DL (ref 74–99)
GLUCOSE BLD MANUAL STRIP-MCNC: 191 MG/DL (ref 74–99)
GLUCOSE SERPL-MCNC: 135 MG/DL (ref 74–99)
HCT VFR BLD AUTO: 42.8 % (ref 36–46)
HGB BLD-MCNC: 14 G/DL (ref 12–16)
IMM GRANULOCYTES # BLD AUTO: 0.15 X10*3/UL (ref 0–0.7)
IMM GRANULOCYTES NFR BLD AUTO: 0.9 % (ref 0–0.9)
LYMPHOCYTES # BLD AUTO: 0.72 X10*3/UL (ref 1.2–4.8)
LYMPHOCYTES NFR BLD AUTO: 4.5 %
MAGNESIUM SERPL-MCNC: 1.99 MG/DL (ref 1.6–2.4)
MCH RBC QN AUTO: 32 PG (ref 26–34)
MCHC RBC AUTO-ENTMCNC: 32.7 G/DL (ref 32–36)
MCV RBC AUTO: 98 FL (ref 80–100)
MONOCYTES # BLD AUTO: 0.5 X10*3/UL (ref 0.1–1)
MONOCYTES NFR BLD AUTO: 3.1 %
NEUTROPHILS # BLD AUTO: 14.67 X10*3/UL (ref 1.2–7.7)
NEUTROPHILS NFR BLD AUTO: 91.4 %
NRBC BLD-RTO: 0 /100 WBCS (ref 0–0)
PHOSPHATE SERPL-MCNC: 1.9 MG/DL (ref 2.5–4.9)
PLATELET # BLD AUTO: 115 X10*3/UL (ref 150–450)
POTASSIUM SERPL-SCNC: 4.5 MMOL/L (ref 3.5–5.3)
PROT SERPL-MCNC: 5.7 G/DL (ref 6.4–8.2)
RBC # BLD AUTO: 4.38 X10*6/UL (ref 4–5.2)
SODIUM SERPL-SCNC: 137 MMOL/L (ref 136–145)
STAPHYLOCOCCUS SPEC CULT: NORMAL
STAPHYLOCOCCUS SPEC CULT: NORMAL
WBC # BLD AUTO: 16.1 X10*3/UL (ref 4.4–11.3)

## 2025-06-03 PROCEDURE — 80053 COMPREHEN METABOLIC PANEL: CPT

## 2025-06-03 PROCEDURE — 82947 ASSAY GLUCOSE BLOOD QUANT: CPT

## 2025-06-03 PROCEDURE — 51701 INSERT BLADDER CATHETER: CPT

## 2025-06-03 PROCEDURE — 94669 MECHANICAL CHEST WALL OSCILL: CPT

## 2025-06-03 PROCEDURE — 97162 PT EVAL MOD COMPLEX 30 MIN: CPT | Mod: GP

## 2025-06-03 PROCEDURE — 99291 CRITICAL CARE FIRST HOUR: CPT

## 2025-06-03 PROCEDURE — 9420000001 HC RT PATIENT EDUCATION 5 MIN

## 2025-06-03 PROCEDURE — 2500000005 HC RX 250 GENERAL PHARMACY W/O HCPCS

## 2025-06-03 PROCEDURE — 36415 COLL VENOUS BLD VENIPUNCTURE: CPT

## 2025-06-03 PROCEDURE — 2500000005 HC RX 250 GENERAL PHARMACY W/O HCPCS: Performed by: STUDENT IN AN ORGANIZED HEALTH CARE EDUCATION/TRAINING PROGRAM

## 2025-06-03 PROCEDURE — 2500000001 HC RX 250 WO HCPCS SELF ADMINISTERED DRUGS (ALT 637 FOR MEDICARE OP)

## 2025-06-03 PROCEDURE — 94640 AIRWAY INHALATION TREATMENT: CPT

## 2025-06-03 PROCEDURE — 37799 UNLISTED PX VASCULAR SURGERY: CPT

## 2025-06-03 PROCEDURE — 97530 THERAPEUTIC ACTIVITIES: CPT | Mod: GO

## 2025-06-03 PROCEDURE — 2500000004 HC RX 250 GENERAL PHARMACY W/ HCPCS (ALT 636 FOR OP/ED)

## 2025-06-03 PROCEDURE — 2500000002 HC RX 250 W HCPCS SELF ADMINISTERED DRUGS (ALT 637 FOR MEDICARE OP, ALT 636 FOR OP/ED)

## 2025-06-03 PROCEDURE — 85025 COMPLETE CBC W/AUTO DIFF WBC: CPT

## 2025-06-03 PROCEDURE — 2500000004 HC RX 250 GENERAL PHARMACY W/ HCPCS (ALT 636 FOR OP/ED): Mod: JZ

## 2025-06-03 PROCEDURE — 99232 SBSQ HOSP IP/OBS MODERATE 35: CPT | Performed by: INTERNAL MEDICINE

## 2025-06-03 PROCEDURE — 94667 MNPJ CHEST WALL 1ST: CPT

## 2025-06-03 PROCEDURE — 83735 ASSAY OF MAGNESIUM: CPT

## 2025-06-03 PROCEDURE — 97110 THERAPEUTIC EXERCISES: CPT | Mod: GP

## 2025-06-03 PROCEDURE — 71045 X-RAY EXAM CHEST 1 VIEW: CPT

## 2025-06-03 PROCEDURE — 84100 ASSAY OF PHOSPHORUS: CPT

## 2025-06-03 PROCEDURE — 71045 X-RAY EXAM CHEST 1 VIEW: CPT | Performed by: RADIOLOGY

## 2025-06-03 PROCEDURE — 97166 OT EVAL MOD COMPLEX 45 MIN: CPT | Mod: GO

## 2025-06-03 PROCEDURE — 2500000004 HC RX 250 GENERAL PHARMACY W/ HCPCS (ALT 636 FOR OP/ED): Mod: JZ | Performed by: NURSE PRACTITIONER

## 2025-06-03 PROCEDURE — 94003 VENT MGMT INPAT SUBQ DAY: CPT

## 2025-06-03 PROCEDURE — 2020000001 HC ICU ROOM DAILY

## 2025-06-03 PROCEDURE — 87040 BLOOD CULTURE FOR BACTERIA: CPT | Mod: WESLAB

## 2025-06-03 PROCEDURE — 82330 ASSAY OF CALCIUM: CPT

## 2025-06-03 RX ORDER — SENNOSIDES 8.8 MG/5ML
10 LIQUID ORAL 2 TIMES DAILY
Status: DISCONTINUED | OUTPATIENT
Start: 2025-06-03 | End: 2025-06-04

## 2025-06-03 RX ORDER — POLYETHYLENE GLYCOL 3350 17 G/17G
17 POWDER, FOR SOLUTION ORAL 2 TIMES DAILY
Status: DISCONTINUED | OUTPATIENT
Start: 2025-06-03 | End: 2025-06-04

## 2025-06-03 RX ADMIN — SODIUM PHOSPHATE, MONOBASIC, MONOHYDRATE AND SODIUM PHOSPHATE, DIBASIC, ANHYDROUS 21 MMOL: 276; 142 INJECTION, SOLUTION INTRAVENOUS at 09:02

## 2025-06-03 RX ADMIN — INSULIN LISPRO 3 UNITS: 100 INJECTION, SOLUTION INTRAVENOUS; SUBCUTANEOUS at 20:19

## 2025-06-03 RX ADMIN — SENNOSIDES 10 ML: 8.8 LIQUID ORAL at 20:23

## 2025-06-03 RX ADMIN — IPRATROPIUM BROMIDE AND ALBUTEROL SULFATE 3 ML: 2.5; .5 SOLUTION RESPIRATORY (INHALATION) at 11:02

## 2025-06-03 RX ADMIN — PANTOPRAZOLE SODIUM 40 MG: 40 INJECTION, POWDER, FOR SOLUTION INTRAVENOUS at 06:40

## 2025-06-03 RX ADMIN — PIPERACILLIN SODIUM AND TAZOBACTAM SODIUM 4.5 G: 4; .5 INJECTION, SOLUTION INTRAVENOUS at 11:13

## 2025-06-03 RX ADMIN — PIPERACILLIN SODIUM AND TAZOBACTAM SODIUM 4.5 G: 4; .5 INJECTION, SOLUTION INTRAVENOUS at 16:00

## 2025-06-03 RX ADMIN — HEPARIN SODIUM 5000 UNITS: 5000 INJECTION, SOLUTION INTRAVENOUS; SUBCUTANEOUS at 08:36

## 2025-06-03 RX ADMIN — PROPOFOL 5 MCG/KG/MIN: 10 INJECTION, EMULSION INTRAVENOUS at 11:30

## 2025-06-03 RX ADMIN — IPRATROPIUM BROMIDE AND ALBUTEROL SULFATE 3 ML: 2.5; .5 SOLUTION RESPIRATORY (INHALATION) at 20:12

## 2025-06-03 RX ADMIN — POLYETHYLENE GLYCOL 3350 17 G: 17 POWDER, FOR SOLUTION ORAL at 20:19

## 2025-06-03 RX ADMIN — NYSTATIN 1 APPLICATION: 100000 POWDER TOPICAL at 20:19

## 2025-06-03 RX ADMIN — SODIUM CHLORIDE SOLN NEBU 3% 3 ML: 3 NEBU SOLN at 23:39

## 2025-06-03 RX ADMIN — METHYLPREDNISOLONE SODIUM SUCCINATE 40 MG: 40 INJECTION, POWDER, FOR SOLUTION INTRAMUSCULAR; INTRAVENOUS at 03:12

## 2025-06-03 RX ADMIN — SODIUM CHLORIDE SOLN NEBU 3% 3 ML: 3 NEBU SOLN at 11:02

## 2025-06-03 RX ADMIN — Medication 40 PERCENT: at 08:04

## 2025-06-03 RX ADMIN — PIPERACILLIN SODIUM AND TAZOBACTAM SODIUM 4.5 G: 4; .5 INJECTION, SOLUTION INTRAVENOUS at 23:16

## 2025-06-03 RX ADMIN — IPRATROPIUM BROMIDE AND ALBUTEROL SULFATE 3 ML: 2.5; .5 SOLUTION RESPIRATORY (INHALATION) at 08:03

## 2025-06-03 RX ADMIN — AZITHROMYCIN MONOHYDRATE 500 MG: 500 INJECTION, POWDER, LYOPHILIZED, FOR SOLUTION INTRAVENOUS at 00:21

## 2025-06-03 RX ADMIN — IPRATROPIUM BROMIDE AND ALBUTEROL SULFATE 3 ML: 2.5; .5 SOLUTION RESPIRATORY (INHALATION) at 23:39

## 2025-06-03 RX ADMIN — Medication 50 PERCENT: at 20:12

## 2025-06-03 RX ADMIN — SODIUM CHLORIDE SOLN NEBU 3% 3 ML: 3 NEBU SOLN at 20:12

## 2025-06-03 RX ADMIN — INSULIN GLARGINE 15 UNITS: 100 INJECTION, SOLUTION SUBCUTANEOUS at 20:19

## 2025-06-03 RX ADMIN — METHYLPREDNISOLONE SODIUM SUCCINATE 40 MG: 40 INJECTION, POWDER, FOR SOLUTION INTRAMUSCULAR; INTRAVENOUS at 20:19

## 2025-06-03 RX ADMIN — PROPOFOL 15 MCG/KG/MIN: 10 INJECTION, EMULSION INTRAVENOUS at 23:53

## 2025-06-03 RX ADMIN — NYSTATIN 1 APPLICATION: 100000 POWDER TOPICAL at 08:37

## 2025-06-03 RX ADMIN — INSULIN LISPRO 3 UNITS: 100 INJECTION, SOLUTION INTRAVENOUS; SUBCUTANEOUS at 08:37

## 2025-06-03 RX ADMIN — HEPARIN SODIUM 5000 UNITS: 5000 INJECTION, SOLUTION INTRAVENOUS; SUBCUTANEOUS at 23:16

## 2025-06-03 RX ADMIN — INSULIN LISPRO 3 UNITS: 100 INJECTION, SOLUTION INTRAVENOUS; SUBCUTANEOUS at 00:22

## 2025-06-03 RX ADMIN — INSULIN LISPRO 3 UNITS: 100 INJECTION, SOLUTION INTRAVENOUS; SUBCUTANEOUS at 15:57

## 2025-06-03 RX ADMIN — SODIUM CHLORIDE SOLN NEBU 3% 3 ML: 3 NEBU SOLN at 08:03

## 2025-06-03 RX ADMIN — PIPERACILLIN SODIUM AND TAZOBACTAM SODIUM 4.5 G: 4; .5 INJECTION, SOLUTION INTRAVENOUS at 04:27

## 2025-06-03 RX ADMIN — HEPARIN SODIUM 5000 UNITS: 5000 INJECTION, SOLUTION INTRAVENOUS; SUBCUTANEOUS at 00:20

## 2025-06-03 RX ADMIN — METHYLPREDNISOLONE SODIUM SUCCINATE 40 MG: 40 INJECTION, POWDER, FOR SOLUTION INTRAMUSCULAR; INTRAVENOUS at 08:37

## 2025-06-03 RX ADMIN — HEPARIN SODIUM 5000 UNITS: 5000 INJECTION, SOLUTION INTRAVENOUS; SUBCUTANEOUS at 15:50

## 2025-06-03 ASSESSMENT — COGNITIVE AND FUNCTIONAL STATUS - GENERAL
MOBILITY SCORE: 6
DAILY ACTIVITIY SCORE: 6
TURNING FROM BACK TO SIDE WHILE IN FLAT BAD: TOTAL
WALKING IN HOSPITAL ROOM: TOTAL
HELP NEEDED FOR BATHING: TOTAL
MOVING TO AND FROM BED TO CHAIR: TOTAL
CLIMB 3 TO 5 STEPS WITH RAILING: TOTAL
STANDING UP FROM CHAIR USING ARMS: TOTAL
MOVING FROM LYING ON BACK TO SITTING ON SIDE OF FLAT BED WITH BEDRAILS: TOTAL
EATING MEALS: TOTAL
TOILETING: TOTAL
PERSONAL GROOMING: TOTAL
DRESSING REGULAR UPPER BODY CLOTHING: TOTAL
DRESSING REGULAR LOWER BODY CLOTHING: TOTAL

## 2025-06-03 ASSESSMENT — PAIN SCALES - GENERAL
PAINLEVEL_OUTOF10: 0 - NO PAIN
PAINLEVEL_OUTOF10: 4
PAINLEVEL_OUTOF10: 1
PAINLEVEL_OUTOF10: 4
PAINLEVEL_OUTOF10: 0 - NO PAIN

## 2025-06-03 ASSESSMENT — PAIN - FUNCTIONAL ASSESSMENT
PAIN_FUNCTIONAL_ASSESSMENT: CPOT (CRITICAL CARE PAIN OBSERVATION TOOL)
PAIN_FUNCTIONAL_ASSESSMENT: FLACC (FACE, LEGS, ACTIVITY, CRY, CONSOLABILITY)
PAIN_FUNCTIONAL_ASSESSMENT: FLACC (FACE, LEGS, ACTIVITY, CRY, CONSOLABILITY)
PAIN_FUNCTIONAL_ASSESSMENT: CPOT (CRITICAL CARE PAIN OBSERVATION TOOL)

## 2025-06-03 ASSESSMENT — ACTIVITIES OF DAILY LIVING (ADL): BATHING_ASSISTANCE: TOTAL

## 2025-06-03 NOTE — NURSING NOTE
Vascular access note    Patient with Rt internal jugular TLC, dressing D&I, medial and distal lumens flush easily and with positive blood return, clamped and curos caps applied, proximal lumen infusing without difficulty.

## 2025-06-03 NOTE — CONSULTS
Wound Care Consult     Visit Date: 6/2/2025      Patient Name: Patricia Pruitt         MRN: [id number]             Reason for Consult: Bilateral buttocks, Abdomen, Left buttock adjacent to larger area of interest, and left foot         Wound History: Defer to the patient's chart      Pertinent Labs: Defer to the patient's chart       Assessment:  Wound 05/31/25 Other (Comments) Buttock Right (Active)   Date First Assessed/Time First Assessed: 05/31/25 2324   Primary Wound Type: (c) Other (Comments)  Location: Buttock  Wound Location Orientation: Right      Assessments 6/3/2025  6:00 AM   Present on Admission to Healthcare Facility Yes   Site Assessment Blanchable erythema;Clean;Dry;Intact;Pink   Shape irregular   Wound Length (cm) 6.4 cm   Wound Width (cm) 4 cm   Wound Surface Area (cm^2) 20.11 cm^2   Wound Depth (cm) 0 cm   Wound Volume (cm^3) 0 cm^3   State of Healing Epithelialized;Closed wound edges   Wound Bed Epithelium (%) 100 %       Active Orders   Date Order Priority Status Authorizing Provider   06/01/25 0646 Inpatient Consult to Wound and Ostomy Nurse Routine Active Gordon Donohue MD     - Reason for Consult?:    Wound       Wound 05/31/25 Other (Comments) Buttock Left (Active)   Date First Assessed/Time First Assessed: 05/31/25 2324   Primary Wound Type: (c) Other (Comments)  Location: Buttock  Wound Location Orientation: Left      Assessments 6/3/2025  6:00 AM   Present on Admission to Healthcare Facility Yes   Site Assessment Blanchable erythema;Dry;Clean;Intact;Pink   Shape rounded irregular   Wound Length (cm) 7.1 cm   Wound Width (cm) 6.8 cm   Wound Surface Area (cm^2) 37.92 cm^2   Wound Depth (cm) 0 cm   Wound Volume (cm^3) 0 cm^3   State of Healing Closed wound edges;Epithelialized   Wound Bed Epithelium (%) 100 %       Active Orders   Date Order Priority Status Authorizing Provider   06/01/25 0646 Inpatient Consult to Wound and Ostomy Nurse Routine Active Gordon Donohue MD     - Reason for  Consult?:    Wound       Wound 06/02/25  Buttock Left;Other (Comment) (Active)   Date First Assessed/Time First Assessed: 06/02/25 0900   Present on Original Admission: Yes  Primary Wound Type: (c)   Location: Buttock  Wound Location Orientation: (c) Left;Other (Comment)      Assessments 6/3/2025  6:00 AM   Present on Admission to Healthcare Facility Yes   Shape round   Wound Length (cm) 0.7 cm   Wound Width (cm) 0.7 cm   Wound Surface Area (cm^2) 0.38 cm^2   Wound Depth (cm) 0 cm   Wound Volume (cm^3) 0 cm^3   State of Healing Closed wound edges;Epithelialized       No associated orders.       Wound 06/02/25 Other (Comments) Abdomen Lower;Medial;Mid (Active)   Date First Assessed/Time First Assessed: 06/02/25 0900   Present on Original Admission: Yes  Primary Wound Type: (c) Other (Comments)  Location: Abdomen  Wound Location Orientation: Lower;Medial;Mid      Assessments 6/3/2025  6:00 AM   Present on Admission to Healthcare Facility Yes   Site Assessment Clean;Denuded;Dry;Excoriated;Granulation;Pink;Red   Shape linear track   Wound Length (cm) 0.5 cm   Wound Width (cm) 28 cm   Wound Surface Area (cm^2) 11 cm^2   Wound Depth (cm) 0.1 cm   Wound Volume (cm^3) 0.733 cm^3   State of Healing Early/partial granulation   Wound Bed Granulation (%) 99 %   Margins Well-defined edges       No associated orders.       Wound 06/02/25 Other (Comments) Foot Anterior;Left;Dorsal foot (Active)   Date First Assessed/Time First Assessed: 06/02/25 0900   Present on Original Admission: Yes  Primary Wound Type: (c) Other (Comments)  Location: Foot  Wound Location Orientation: Anterior;Left;Dorsal foot  Wound Description (Comments): blister      Assessments 6/3/2025  6:00 AM   Present on Admission to Healthcare Facility Yes   Site Assessment Clean;Dry;Intact   State of Healing Closed wound edges       No associated orders.   Khris at time of wound consult assessment : 13    Current preventative measures and additionally recommended  preventative measures.    Foam padding to all vulnerable bony prominences, including but not limited to the sacrum, and bilateral heels.  Nicolas-View boots to bilateral feet / heels  Turn and position the patient every 2 hours including micro shifts.  Promote max patient mobility as able , safe, and tolerated .  EHOB Waffle pressure off loading over-lay to bed and chair surfaces.  Mange and monitor for: friction, shear, and moisture related skin impairment issues/injuries.  Nutritional consult.                        Wound Plan: Areas of potential increased risk :    Wash all affected areas with soap and water and pat dry.    1) Bilateral buttocks : keep clean and dry, apply foam border dressing , and evaluate at every shift.  2) area of interest adjacent to left buttock skin issue: keep clean and dry. Apply foam border dressing, and evaluate at every shift.  3) Abdomen: apply Mepitel-One and keep area clean and dry. Change Mepitel-One every 4-5 days.  4) Left foot dorsal plantar: keep clean and dry, protect with foam heel dressing and Nicolas-View boots . Apply moisturizer to dry skin. Evaluate are at every shift.    Follow up plan: Follow up to wound care recommendations, and skin issues progress within 14 days from initial consult        Kendall Zhao RN  6/3/2025  7:05 AM

## 2025-06-03 NOTE — PROGRESS NOTES
Spiritual Care Visit  Spiritual Care Request    Reason for Visit:  Routine Visit: Introduction     Request Received From:       Focus of Care:  Visited With: Patient         Refer to :          Spiritual Care Assessment    Spiritual Assessment:                      Care Provided:  Intended Effects: Convey a calming presence, Establish rapport and connectedness, Lessen anxiety, Promote sense of peace, Build relationship of care and support  Methods: Offer support  Interventions: [place]    Sense of Community and or Zoroastrian Affiliation:  No Denominational on file         Addressed Needs/Concerns and/or Raudel Through:  Zoroastrian Encounters  Zoroastrian Needs: Prayer, Literature, Sacred text       Outcome:        Advance Directives:         Spiritual Care Annotation      Annotation: Patient received a visit from the Spiritual care volunteer while admitted.  This patient was offered emotional and spiritual support no other needs were expressed. Spiritual care will remain available for support as requested.     Volunter Initial : Tez WILLIAMSON    Reviewed and Submitted by Chaplain Cage

## 2025-06-03 NOTE — ASSESSMENT & PLAN NOTE
Patient has a borderline history of CHF, COPD.  Now with a history of diabetes, sepsis.  Now off the vasopressors.  Positive blood culture for Staphylococcus hominis.  Currently underwent creatinine is 2.1 as well as a potassium of 6.4.  Patient currently on IV antibiotic including Zithromax as well as a bronchodilators and steroids.  Patient also on Zosyn and vancomycin.  As needed diuretics.  Continued on propofol.  Echocardiogram ejection fraction in normal range.  Elevated troponin more likely due to demand ischemia.  Less likely occlusive event.  Will follow along as needed.  DVT prophylaxis.  6/3: Patient as above continue current antibiotic as well as DVT prophylaxis.  As needed IV Lasix.  Chest x-ray bilateral infiltrate.  Patient currently off vasopressors.

## 2025-06-03 NOTE — CARE PLAN
The patient's goals for the shift include      The clinical goals for the shift include pt will tolerate vent wean this shift    Over the shift, the patient did make progress towards the goal of this shift. She did tolerate a 2 hour vent wean period but overall the decision was made to not extubate due to increased secretions. Recommend to continue to attempt vent weans daily as appropriate, monitor vitals, oxygenation, labs, and fluid balance.

## 2025-06-03 NOTE — PROGRESS NOTES
Occupational Therapy    Evaluation/Treatment    Patient Name: Patricia Pruitt  MRN: [id number]  Department: [address] ICU  Room: 21/21-A  Today's Date: 06/03/25  Time Calculation  Start Time: 1335  Stop Time: 1400  Time Calculation (min): 25 min       Assessment:  OT Assessment: OT order received, chart reviewed, evaluation completed. Pt demonstrated deficits in ADLs, aerobic capacity, cognition, and functional transfers, mobility and would benefit from acute OT services to facilitate return to [facility].  Prognosis: Fair  Barriers to Discharge Home: Caregiver assistance, Cognition needs, Physical needs  Caregiver Assistance: Caregiver assistance needed per identified barriers - however, level of patient's required assistance exceeds assistance available at home  Cognition Needs: 24hr supervision for safety awareness needed, Insight of patient limited regarding functional ability/needs, Cognition-related high falls risk  Physical Needs: 24hr mobility assistance needed, 24hr ADL assistance needed, High falls risk due to function or environment  Evaluation/Treatment Tolerance: Patient limited by fatigue  Medical Staff Made Aware: Yes  End of Session Communication: Bedside nurse  End of Session Patient Position: Bed, 4 rail up, Alarm on (restraints in place per nursing)  OT Assessment Results: Decreased ADL status, Decreased upper extremity range of motion, Decreased upper extremity strength, Decreased safe judgment during ADL, Decreased cognition, Decreased endurance, Decreased fine motor control, Decreased functional mobility, Decreased gross motor control, Decreased IADLs, Decreased trunk control for functional activities  Prognosis: Fair  Evaluation/Treatment Tolerance: Patient limited by fatigue  Medical Staff Made Aware: Yes  Strengths: Housing layout  Barriers to Participation: Comorbidities  Plan:  Treatment Interventions: ADL retraining, Functional transfer training, UE strengthening/ROM, Endurance training, Cognitive  reorientation, Patient/family training, Equipment evaluation/education, Neuromuscular reeducation, Compensatory technique education  OT Frequency: 4 times per week  OT Discharge Recommendations: Moderate intensity level of continued care  Equipment Recommended upon Discharge:  (TBD)  OT Recommended Transfer Status: Dependent  OT - OK to Discharge: Yes  Treatment Interventions: ADL retraining, Functional transfer training, UE strengthening/ROM, Endurance training, Cognitive reorientation, Patient/family training, Equipment evaluation/education, Neuromuscular reeducation, Compensatory technique education    Subjective   Current Problem:  1. Acute respiratory failure        2. NSTEMI (non-ST elevated myocardial infarction) (Multi)  Transthoracic Echo Complete    Transthoracic Echo Complete    CANCELED: Transthoracic Echo Complete    CANCELED: Transthoracic Echo Complete      3. Bacteremia due to Gram-positive bacteria  Transthoracic Echo Complete    Transthoracic Echo Complete      4. Congestive heart failure, unspecified HF chronicity, unspecified heart failure type  Transthoracic Echo Complete    Transthoracic Echo Complete        OT Visit Info:  OT Received On: 06/03/25  General Visit Info:   General  Reason for Referral: activities of daily living, Acute respiratory failure.  Referred By: JOVANNI Barajas  Past Medical History Relevant to Rehab: CHF unknown EF, COPD on 4L baseline O2, DM, HTN, CVA  Family/Caregiver Present: No  Co-Treatment: PT  Co-Treatment Reason: medically complex patient in ICU requiring two skilled therapists for safe pt mobility  Prior to Session Communication: Bedside nurse  Patient Position Received: Bed, 4 rail up, Alarm on  Preferred Learning Style: auditory  General Comment: Pt is a 64 yo woman admit after being found face down on a desk in her wheelchair. Pt intubated on arrival to [facility] ED, transferred to [facility] for higher level of care.   Precautions:  Hearing/Visual  Limitations: Hearing appears WFL, unable to fully assess vision due to impaired cognitionand ability to follow commands  Medical Precautions: Fall precautions, Oxygen therapy device and L/min (+JESUS wrist restraints)  Precautions Comment: ETT, PEEP 5 FIO2 50%     Date/Time Vitals Session Patient Position Pulse Resp SpO2 BP MAP (mmHg)    06/03/25 1400 --  --  76  22  95 %  --  --     06/03/25 1430 --  --  69  22  95 %  --  --     06/03/25 1500 --  --  71  22  95 %  --  --     06/03/25 1530 --  --  71  13  95 %  --  --           Vital Signs Comment: Pre therapy-HR 79, SPO2 95% /72 MAP 98, post-HR 80 SPO2 97% /73      Pain:  Pain Assessment  Pain Assessment: FLACC (Face, Legs, Activity, Cry, Consolability)  0-10 (Numeric) Pain Score: 4  Pain Location: Arm (R, L UE with range)  Pain Interventions: Repositioned  Response to Interventions: Resting quietly (nursing alerted of pain with UE range)    Objective   Cognition:  Overall Cognitive Status: Impaired  Orientation Level: Unable to assess (Pt intubated, difficulty following simple commands, mildly agitated)           Home Living:  Type of Home:  (Efficiency apartment)  Lives With: Spouse  Home Adaptive Equipment: Wheelchair-manual  Home Layout: One level  Home Access: Level entry  Home Living Comments: Pt unable to provide house information due to being intubated, no family available in room.  Prior Function:  Level of [place]: Unable to asses at this time  Prior Function Comments: unknown PLOF, no information available in chart at time of evaluation other than pt uses a wheelchair  IADL History:     ADL:  Eating Assistance: Total  Grooming Assistance: Total  Grooming Deficit:  (Pt rewuired total assist for washing face seated upright in bed.)  Bathing Assistance: Total  Bathing Deficit:  (projected due to current functional status)  UE Dressing Assistance: Total  LE Dressing Assistance: Total  LE Dressing Deficit: Don/doff L sock, Don/doff R  sock  Toileting Assistance with Device: Total    Activity Tolerance:  Endurance: Tolerates less than 10 min exercise with changes in vital signs  Activity Tolerance Comments: RR increased to 34 with bed mobility  Rate of Perceived Exertion (RPE): 5/10  Functional Standing Tolerance:     Bed Mobility/Transfers: Bed Mobility  Bed Mobility: Yes  Bed Mobility 1  Bed Mobility 1: Supine to sitting, Sitting to supine, Long sit  Level of Assistance 1: Maximum assistance, +2  Bed Mobility Comments 1: Pt required max A x2 and use of draw sheet for two trials of long sitting in bed, pt able to hold up head, unable to assist by holding rails, tolerated approx 10 seconds each trial with extended rest break between.    Transfers  Transfer: No (not appropriate due to lethargy)    Functional Mobility:  Functional Mobility  Functional Mobility Performed: No    Sensation:  Light Touch: Partial deficits in the LLE, Partial deficits in the RLE, Partial deficits in the LUE, Partial deficits in the RUE  Sensation Comment: appears hypersensitive to touch  Strength:  Strength Comments: Pt resistive to ROM/MMT due to pain, unable to fully assess.    Coordination:  Movements are Fluid and Coordinated: No  Upper Body Coordination: limited functional UE movement   Hand Function:  Hand Function  Gross Grasp: Impaired  Coordination: Impaired  Extremities: RUE   RUE :  (Unable to fully assess B UES at pt resistant to ROM and becoming agitated.)     Outcome Measures: [facility] Daily Activity  Putting on and taking off regular lower body clothing: Total  Bathing (including washing, rinsing, drying): Total  Putting on and taking off regular upper body clothing: Total  Toileting, which includes using toilet, bedpan or urinal: Total  Taking care of personal grooming such as brushing teeth: Total  Eating Meals: Total  Daily Activity - Total Score: 6        Education Documentation  ADL Training, taught by Rizwana Delgadillo OT at 6/3/2025  3:35 PM.  Learner:  Patient  Readiness: Acceptance  Method: Explanation  Response: No Evidence of Learning  Comment: Pt edu on OT POC      Goals:  Encounter Problems       Encounter Problems (Active)       OT Goals       ADLs (Progressing)       Start:  06/03/25    Expected End:  06/27/25       Pt will complete ADL tasks with Masha,  using AE as needed, in order to complete self-care tasks.           Functional transfers (Progressing)       Start:  06/03/25    Expected End:  06/27/25       Pt will perform functional transfers at min A level to/from chair/wheelchair.           Therapeutic exercise (Progressing)       Start:  06/03/25    Expected End:  06/27/25       Pt will perform upper body therapeutic exercises all joints/planes of motion with min A to increase strength for improved ADL performance

## 2025-06-03 NOTE — CARE PLAN
Problem: Respiratory  Goal: Clear secretions with interventions this shift  6/3/2025 0431 by Andrew Anne RRT  Outcome: Progressing  6/2/2025 1938 by Andrew Anne RRT  Outcome: Progressing  Goal: Minimize anxiety/maximize coping throughout shift  6/3/2025 0431 by Andrew Anne RRT  Outcome: Progressing  6/2/2025 1938 by Andrew Anne RRT  Outcome: Progressing  Goal: Minimal/no exertional discomfort or dyspnea this shift  6/3/2025 0431 by Andrew Anne RRT  Outcome: Progressing  6/2/2025 1938 by Andrew Anne RRT  Outcome: Progressing  Goal: No signs of respiratory distress (eg. Use of accessory muscles. Peds grunting)  6/3/2025 0431 by Andrew Anne RRT  Outcome: Progressing  6/2/2025 1938 by Andrew Anne RRT  Outcome: Progressing  Goal: Patent airway maintained this shift  6/3/2025 0431 by Andrew Anne RRT  Outcome: Progressing  6/2/2025 1938 by Andrew Anne RRT  Outcome: Progressing  Goal: Tolerate mechanical ventilation evidenced by VS/agitation level this shift  6/3/2025 0431 by Andrew Anne RRT  Outcome: Progressing  6/2/2025 1938 by Andrew Anne RRT  Outcome: Progressing  Goal: Tolerate pulmonary toileting this shift  6/3/2025 0431 by Andrew Anne RRT  Outcome: Progressing  6/2/2025 1938 by Andrew Anne RRT  Outcome: Progressing  Goal: Wean oxygen to maintain O2 saturation per order/standard this shift  6/3/2025 0431 by Andrew Anne RRT  Outcome: Progressing  6/2/2025 1938 by Andrew Anne RRT  Outcome: Progressing

## 2025-06-03 NOTE — PROGRESS NOTES
Subjective Data:  Patient alert and awake.  Ongoing weaning.  Still have excess secretion.  As needed IV Lasix.  Patient currently on subcu heparin as well as a steroids underlying history of cardiomyopathy contributing for elevated troponin.  Optimize pulmonary status.  Chest x-ray suggested bilateral parenchymal infiltrate.  Overnight Events:    None  Objective   Last Recorded Vitals  /60   Pulse 71   Temp 36.8 °C (98.2 °F) (Axillary)   Resp 22   Wt 95.3 kg (210 lb 1.6 oz)   SpO2 95%     Intake/Output Summary (Last 24 hours) at 6/3/2025 1523  Last data filed at 6/3/2025 1400  Gross per 24 hour   Intake 1436.4 ml   Output 1500 ml   Net -63.6 ml     Physical Exam:  HEENT: Normocephalic/atraumatic pupils equal react light  Neck exam mild JVD, no bruit  Lung exam decreased breath sound bilaterally as well as a few rhonchi's, few crackles at the bases  Cardiac exam is regular rhythm S1-S2, soft systolic murmur heard.   Abdomen soft nontender, nondistended  Extremities no clubbing, cyanosis, trace edema  Neuro exam alert  Image Results  XR chest 1 view  Narrative: Interpreted By:  Mignon Hoover,   STUDY:  XR CHEST 1 VIEW;  6/3/2025 5:55 am      INDICATION:  Signs/Symptoms:intubated, pneumonia + COPD exacerbation.      COMPARISON:  06/02/2025      ACCESSION NUMBER(S):  [id number]      ORDERING CLINICIAN:  RAYO SCHREIBER      FINDINGS:  Resolution is somewhat limited. The inspiration is somewhat  suboptimal.      The cardiac silhouette may be normal size.      There is diffuse bilateral parenchymal infiltration.      An endotracheal tube is present. Tip appears to be above the sarah.  There is a nasogastric tube terminates below the diaphragm.      Venous catheter is present on the right with tip in the region of the  superior vena cava.      The bones are not well seen.      COMPARISON OF FINDING:  The lungs may be worse.      Impression: Limited exam. Suggestion of bilateral parenchymal infiltration.       MACRO:  none      Signed by: Mignon Hoover 6/3/2025 7:47 AM  Dictation workstation:   [id number]    Last Labs:  CBC - 6/3/2025:  4:09 AM  16.1 14.0 115    42.8      CMP - 6/3/2025:  4:09 AM  9.1 5.7 119 --- 0.6   1.9 3.0 198 45      PTT - 6/1/2025: 12:32 AM  1.2   12.7 23.5     Inpatient Medications:  Scheduled Medications[1]  Assessment & Plan  Acute respiratory failure  Patient has a borderline history of CHF, COPD.  Now with a history of diabetes, sepsis.  Now off the vasopressors.  Positive blood culture for Staphylococcus hominis.  Currently underwent creatinine is 2.1 as well as a potassium of 6.4.  Patient currently on IV antibiotic including Zithromax as well as a bronchodilators and steroids.  Patient also on Zosyn and vancomycin.  As needed diuretics.  Continued on propofol.  Echocardiogram ejection fraction in normal range.  Elevated troponin more likely due to demand ischemia.  Less likely occlusive event.  Will follow along as needed.  DVT prophylaxis.  6/3: Patient as above continue current antibiotic as well as DVT prophylaxis.  As needed IV Lasix.  Chest x-ray bilateral infiltrate.  Patient currently off vasopressors.    Code Status:  Full Code  I spent 40 minutes in the professional and overall care of this patient.  Gerber Crain MD           [1]   Scheduled medications   Medication Dose Route Frequency    [Held by provider] tiotropium  2 puff inhalation Daily    And    [Held by provider] fluticasone furoate-vilanteroL  1 puff inhalation Daily    [Held by provider] gabapentin  600 mg oral TID    heparin (porcine)  5,000 Units subcutaneous q8h    insulin glargine  15 Units subcutaneous q24h    insulin lispro  0-15 Units subcutaneous q4h    ipratropium-albuteroL  3 mL nebulization q6h    methylPREDNISolone sodium succinate (PF)  40 mg intravenous q12h    nystatin  1 Application Topical BID    oxygen   inhalation Continuous - Inhalation    pantoprazole  40 mg oral Daily before breakfast    Or     pantoprazole  40 mg intravenous Daily before breakfast    piperacillin-tazobactam  4.5 g intravenous q6h    polyethylene glycol  17 g oral Daily    sodium chloride  3 mL nebulization q6h

## 2025-06-03 NOTE — PROGRESS NOTES
Physical Therapy    Physical Therapy Evaluation & Treatment    Patient Name: Patricia Pruitt  MRN: [id number]  Department: [address] ICU  Room: 21/21-A  Today's Date: 6/3/2025   Time Calculation  Start Time: 1336  Stop Time: 1400  Time Calculation (min): 24 min    Assessment/Plan   PT Assessment  PT Assessment Results: Decreased strength, Decreased range of motion, Decreased endurance, Impaired balance, Decreased mobility, Decreased coordination, Decreased cognition, Decreased safety awareness, Impaired judgement, Impaired vision, Impaired hearing, Impaired sensation, Impaired tone, Obesity, Decreased skin integrity, Pain  Rehab Prognosis: Good  Barriers to Discharge Home: Physical needs  Physical Needs: 24hr mobility assistance needed, 24hr ADL assistance needed  Evaluation/Treatment Tolerance: Patient limited by fatigue  Medical Staff Made Aware: Yes  Strengths: Housing layout  Barriers to Participation: Comorbidities  End of Session Communication: Bedside nurse  Assessment Comment: The patient is a 65 y.o. female admitted to the hospital for respiratory failure; pt is now intubated with FiO2 of 50% and PEEP of 5. The patient currently requires maxAx2 for all bed mobility and repositioning; difficulty following simple VCs. The patient would continue to benefit from skilled therapy services to address functional deficits.  End of Session Patient Position: Bed, 4 rail up, Alarm on (shyla wrist restraints intact)   IP OR SWING BED PT PLAN  Inpatient or Swing Bed: Inpatient  PT Plan  Treatment/Interventions: Bed mobility, Transfer training, Gait training, Balance training, Neuromuscular re-education, Strengthening, Endurance training, Range of motion, Therapeutic exercise, Therapeutic activity, Home exercise program  PT Plan: Ongoing PT  PT Frequency: 5 times per week  PT Discharge Recommendations: Moderate intensity level of continued care  Equipment Recommended upon Discharge: Wheeled walker, Wheelchair  PT Recommended  Transfer Status: Assist x2, Total assist  PT - OK to Discharge: Yes      Subjective     PT Visit Info:  PT Received On: 06/03/25  General Visit Information:  General  Reason for Referral: mobility impairment due to resp failure  Referred By: JOVANNI Barajas  Past Medical History Relevant to Rehab: CHF unknown EF, COPD on 4L baseline O2, DM, HTN, CVA  Family/Caregiver Present: No  Co-Treatment: OT  Co-Treatment Reason: medically complex patient in ICU requiring two skilled therapists for safe pt mobility  Prior to Session Communication: Bedside nurse  Patient Position Received: Bed, 4 rail up, Alarm on  Preferred Learning Style: auditory  General Comment: Pt is a 64 yo woman admit after being found face down on a desk in her wheelchair. Pt intubated on arrival to [facility] ED, transferred to [facility] [place] operated by [facility] for higher level of care.  Home Living:  Home Living  Type of Home: Other (Comment) ([address] Apartment)  Lives With: Spouse  Home Adaptive Equipment: Wheelchair-manual  Home Layout: One level  Home Access: Level entry  Home Living Comments: Pt unable to provide house information due to being intubated, no family available in room.  Prior Level of Function:  Prior Function Per Pt/Caregiver Report  Level of [place]: Unable to asses at this time  Prior Function Comments: unknown PLOF, no information available in chart at time of evaluation other than pt uses a wheelchair  Precautions:  Precautions  Hearing/Visual Limitations: Hearing appears WFL, unable to fully assess vision due to impaired cognitionand ability to follow commands  Medical Precautions: Fall precautions, Oxygen therapy device and L/min  Precautions Comment: ETT: PEEP 5 FIO2 50%; shyla soft wrist restraints; +a-line, +telemetry, +menjivar catheter, +NG tube, +IV     Date/Time Vitals Session Patient Position Pulse Resp SpO2 BP MAP (mmHg)    06/03/25 1400 --  --  76  22  95 %  --  --     06/03/25 1430 --  --  69  22  95 %  --  --     06/03/25 1500  --  --  71  22  95 %  --  --     06/03/25 1530 --  --  71  13  95 %  --  --           Vital Signs Comment: Pre therapy-HR 79, SPO2 95% /72 MAP 98, post-HR 80 SPO2 97% /73     Objective   Pain:  Pain Assessment  Pain Assessment: FLACC (Face, Legs, Activity, Cry, Consolability)  0-10 (Numeric) Pain Score: 4  Pain Location: Arm  Pain Interventions: Repositioned  Response to Interventions: Resting quietly  Cognition:  Cognition  Overall Cognitive Status: Impaired  Orientation Level: Unable to assess (Pt intubated, difficulty following simple commands, mildly agitated)    General Assessments:  General Observation  General Observation: able to nod head yes/no; decreased skin integrity    Activity Tolerance  Endurance: Decreased tolerance for upright activites  Activity Tolerance Comments: RR increased to 34 with minimal activity  Rate of Perceived Exertion (RPE): 5/10    Sensation  Light Touch: Partial deficits in the RUE, Partial deficits in the LUE, Partial deficits in the RLE, Partial deficits in the LLE  Sensation Comment: appears hypersensitive to touch    Strength  Strength Comments: Pt intermittently resistive to AAROM BLE; difficult to accurately assess  Coordination  Coordination Comment: decreased rate and accuracy of intentional movement BLE    Postural Control  Posture Comment: rounded shoulders    Static Sitting Balance  Static Sitting-Balance Support: Feet supported, Bilateral upper extremity supported  Static Sitting-Level of Assistance: Maximum assistance  Static Sitting-Comment/Number of Minutes: maxAx2 for long sitting in supine; approx. 15 sec x 2 trials  Functional Assessments:  Bed Mobility  Bed Mobility: Yes  Bed Mobility 1  Bed Mobility 1: Supine to sitting, Sitting to supine, Long sit  Level of Assistance 1: Maximum assistance, +2  Bed Mobility Comments 1: Pt required max A x2 and use of draw sheet for two trials of long sitting in bed, pt able to hold up head, unable to assist  by holding rails, tolerated approx 15 seconds each trial with extended rest break between.    Transfers  Transfer: No (not able to attempt due to lethargy)  Extremity/Trunk Assessments:  RLE   RLE :  (gross impairment; limited ROM at end range; difficult to accurately assess)  LLE   LLE :  (gross impairment; limited ROM at end range; difficult to accurately assess)  Treatments:  Therapeutic Exercise  Therapeutic Exercise Performed: Yes  Therapeutic Exercise Activity 1: PROM/AAROM completed in all planes for BLE x 5 reps with pt in supine; VCs provided for pt participation    Bed Mobility  Bed Mobility: Yes  Bed Mobility 1  Bed Mobility 1: Supine to sitting, Sitting to supine, Long sit  Level of Assistance 1: Maximum assistance, +2  Bed Mobility Comments 1: Pt required max A x2 and use of draw sheet for two trials of long sitting in bed, pt able to hold up head, unable to assist by holding rails, tolerated approx 15 seconds each trial with extended rest break between.    Transfers  Transfer: No (not able to attempt due to lethargy)  Outcome Measures:  [facility] Basic Mobility  Turning from your back to your side while in a flat bed without using bedrails: Total  Moving from lying on your back to sitting on the side of a flat bed without using bedrails: Total  Moving to and from bed to chair (including a wheelchair): Total  Standing up from a chair using your arms (e.g. wheelchair or bedside chair): Total  To walk in hospital room: Total  Climbing 3-5 steps with railing: Total  Basic Mobility - Total Score: 6    FSS-ICU  Ambulation: Unable to attempt due to weakness  Rolling: Unable to perform  Sitting: Unable to perform  Transfer Sit-to-Stand: Unable to perform  Transfer Supine-to-Sit: Unable to perform  Total Score: 0    Encounter Problems       Encounter Problems (Active)       PT Problem       Strength (Progressing)       Start:  06/03/25    Expected End:  07/03/25       The patient will demonstrate an overall  strength of >4/5 in BLE to assist with completion of functional mobility.           Bed Mobility (Progressing)       Start:  06/03/25    Expected End:  07/03/25       The patient will be able to complete bed mobility at a [place] level with use of bed rails.           Bed <> Chair Transfer (Progressing)       Start:  06/03/25    Expected End:  07/03/25       The patient will be able to transfer from bed <> chair with LRAD at a min A level by Dc to facilitate functional mobility.          Ambulation (Not Progressing)       Start:  06/03/25    Expected End:  07/03/25       The patient will be able to ambulate at a [place] level for >15ftx1 with LRAD.             Pain - Adult              Education Documentation  Mobility Training, taught by Dulce Maria Hdez PT at 6/3/2025  3:32 PM.  Learner: Patient  Readiness: Acceptance  Method: Explanation  Response: Needs Reinforcement  Comment: education provided to patient on PT POC    Education Comments  No comments found.

## 2025-06-03 NOTE — PROGRESS NOTES
Source of History:  Patient    Chief complaint:  Emesis (STATES SHE HASNT EATEN IN A WEAK DUE TO LACK OF APPETITE AND HAS BEEN VOMITING THE LAST THREE DAYS. NAUSEA WITH ATTEMPTING TO EAT. REPORTS A HEADACHE. )      HPI:  Tricia Reynoso is a 25 y.o. female presenting with decreased appetite, nausea and vomiting for the past 3 days.  She states that whenever she tries to eat she is only able to hold down for short period of time before she throws it back up.  She is also reporting a headache.  She states that this is her typical headache and she frequently gets headaches.  No visual changes.  She is not taken any medications for her symptoms nor does she routinely take any medication for her headaches.  She is also complaining of lower abdominal pain described as like menstrual cramps.  No fever, chills, constipation diarrhea, dysuria or abnormal vaginal discharge    This is the extent to the patients complaints today here in the emergency department.    ROS: As per HPI and below:  General: No fever.  No chills.  Eyes: No visual changes.  ENT: No sore throat. No ear pain  Head: No headache.    Chest: No shortness of breath.  Cardiovascular: No chest pain.  Abdomen: + abdominal pain.  + nausea and vomiting.  Genito-Urinary: No abnormal urination.  Neurologic: No focal weakness.  No numbness.  MSK: no back pain.  Integument: No rashes or lesions.  Hematologic: No easy bruising.  Endocrine: No excessive thirst or urination.    Review of patient's allergies indicates:  No Known Allergies    PMH:  As per HPI and below:  Past Medical History:   Diagnosis Date     history     Anemia     Chlamydia     Eczema     Gonorrhea     Trichomoniasis      Past Surgical History:   Procedure Laterality Date    CHOLECYSTECTOMY      THERAPEUTIC   2021       Social History     Tobacco Use    Smoking status: Never    Smokeless tobacco: Never   Substance Use Topics    Alcohol use: Yes     Alcohol/week: 0.0 standard  [facility] Critical Care Medicine       Date:  6/3/2025  Patient:  Patricia Pruitt  YOB: 1960  MRN:  [id number]   Admit Date:  5/31/2025  Hospital Length of Stay: 3   ICU Length of Stay: 2d 9h       No chief complaint on file.        History of Present Illness:  Patricia Pruitt is a 65 y.o. year old female patient with past medical history of  CHF, COPD on 4 L baseline nasal cannula, DM, hypertension, CVA who presents to [facility] ICU for respiratory failure with hypoxia and hypercapnia. Etiology of respiratory failure appears to be an acute exacerbation of COPD.     Pt denies chest pain, nausea, vomiting, diarrhea, constipation, abdominal pain, fevers, headache. She is awake this am and following commands. Currently on a wean.        Interval ICU Events:  6/2: Spontaneous breathing trial was aborted today due to excess secretions and marginal respiratory acidosis.     6/3: Weaned this am for three hours. Will remain intubated due to excess secretions. PT/OT to eval today.     Objective     Medical History:  Medical History[1]  Surgical History[2]  Prescriptions Prior to Admission[3]  Codeine, Morphine, and Penicillins  Social History[4]  Family History[5]    Hospital Medications:    Continuous Medications[6]    Current Medications[7]    Review of Systems:  14 point review of systems was completed and negative except for those specially mention in my HPI    Physical Exam:    Heart Rate:  [70-87]   Temp:  [36.2 °C (97.1 °F)-36.6 °C (97.9 °F)]   Resp:  [11-29]   Weight:  [95.3 kg (210 lb 1.6 oz)]   SpO2:  [88 %-97 %]     Physical Exam  Constitutional:       General: She is not in acute distress.     Appearance: She is ill-appearing.   HENT:      Head: Normocephalic.      Mouth/Throat:      Mouth: Mucous membranes are moist.   Eyes:      Extraocular Movements: Extraocular movements intact.      Conjunctiva/sclera: Conjunctivae normal.   Cardiovascular:      Rate and Rhythm: Normal rate and regular rhythm.       "drinks    Drug use: Yes     Types: Marijuana       Physical Exam:    /84 (BP Location: Left arm, Patient Position: Sitting)   Pulse 89   Temp 98.2 °F (36.8 °C) (Oral)   Resp 18   Ht 5' 1" (1.549 m)   Wt 49.9 kg (110 lb)   SpO2 100%   BMI 20.78 kg/m²   Nursing note and vital signs reviewed.  Appearance: No acute distress.  Eyes: No conjunctival injection.  ENT: Oropharynx clear.    Chest/ Respiratory: Clear to auscultation bilaterally.  Good air movement.  No wheezes.  No rhonchi. No rales. No accessory muscle use.  Cardiovascular: Regular rate and rhythm.  No murmurs. No gallops. No rubs.  Abdomen: Soft.  Not distended.  Nontender.  No guarding.  No rebound. Non-peritoneal.  Musculoskeletal: Good range of motion all joints.  No deformities.  Neck supple.  No meningismus.  Skin: No rashes seen.  Good turgor.  No abrasions.  No ecchymoses.  Neurologic: Motor intact.  Sensation intact.  Cerebellar intact.  Cranial nerves intact.  Mental Status:  Alert and oriented x 3.  Appropriate, conversant    Labs that have been ordered have been independently reviewed and interpreted by myself.        Labs Reviewed   CBC W/ AUTO DIFFERENTIAL - Abnormal; Notable for the following components:       Result Value    MCH 31.6 (*)     All other components within normal limits   COMPREHENSIVE METABOLIC PANEL - Abnormal; Notable for the following components:    CO2 21 (*)     All other components within normal limits   URINALYSIS, REFLEX TO URINE CULTURE - Abnormal; Notable for the following components:    Appearance, UA Hazy (*)     Protein, UA 1+ (*)     Ketones, UA 3+ (*)     Urobilinogen, UA 2.0-3.0 (*)     All other components within normal limits    Narrative:     Specimen Source->Urine   DRUG SCREEN PANEL, URINE EMERGENCY - Abnormal; Notable for the following components:    THC Presumptive Positive (*)     Creatinine, Urine 325.8 (*)     All other components within normal limits    Narrative:     Specimen Source->Urine " Pulses: Normal pulses.   Pulmonary:      Effort: No respiratory distress.      Breath sounds: No wheezing, rhonchi or rales.      Comments: Intubated, weaning  Abdominal:      General: Bowel sounds are normal. There is no distension.      Palpations: Abdomen is soft.      Tenderness: There is no abdominal tenderness. There is no guarding.   Musculoskeletal:         General: No swelling, deformity or signs of injury.      Cervical back: Normal range of motion.   Skin:     General: Skin is warm and dry.   Neurological:      Comments: intubated   Psychiatric:         Mood and Affect: Mood normal.         Behavior: Behavior normal.         Objective:    I have reviewed all medications, laboratory results, and imaging pertinent for today's encounter.    Vent Mode: Pressure support  FiO2 (%):  [40 %-50 %] 50 %  S RR:  [22] 22  S VT:  [450 mL] 450 mL  PEEP/CPAP (cm H2O):  [5 cm H20-8 cm H20] 5 cm H20  VA SUP:  [5 cm H20] 5 cm H20  MAP (cm H2O):  [6.7-14] 6.7      Intake/Output Summary (Last 24 hours) at 6/3/2025 0858  Last data filed at 6/3/2025 0800  Gross per 24 hour   Intake 1320.47 ml   Output 1960 ml   Net -639.53 ml       Daily Labs:  CBC:   Results from last 7 days   Lab Units 06/03/25  0409 06/02/25  0403   WBC AUTO x10*3/uL 16.1* 20.5*   HEMOGLOBIN g/dL 14.0 14.1   HEMATOCRIT % 42.8 44.5   MCV fL 98 101*     BMP:    Results from last 7 days   Lab Units 06/03/25  0409 06/02/25  0403   SODIUM mmol/L 137 136   POTASSIUM mmol/L 4.5 3.7   CHLORIDE mmol/L 97* 95*   CO2 mmol/L 35* 34*   BUN mg/dL 30* 32*   CREATININE mg/dL 0.76 1.00   CALCIUM mg/dL 9.1 8.6   GLUCOSE mg/dL 135* 151*   MAGNESIUM mg/dL 1.99 1.98     ABG:   Results from last 7 days   Lab Units 06/02/25  1202 06/01/25  0351   POCT PH, ARTERIAL pH 7.41 7.23*   POCT PCO2, ARTERIAL mm Hg 56* 73*   POCT PO2, ARTERIAL mm Hg 86 96*   POCT SO2, ARTERIAL % 98 99   POCT HCO3 CALCULATED, ARTERIAL mmol/L 35.5* 30.6*   POCT LACTATE, ARTERIAL mmol/L 1.2 4.9*      VBG:    POCT URINE PREGNANCY - Abnormal; Notable for the following components:    POC Preg Test, Ur Positive (*)     All other components within normal limits   LIPASE   HCG, QUANTITATIVE    Narrative:     Release to patient->Immediate   URINALYSIS MICROSCOPIC    Narrative:     Specimen Source->Urine   GROUP & RH       Imaging Results              US OB <14 Wks TransAbd & TransVag, Single Gestation (XPD) (Final result)  Result time 02/13/23 17:30:16      Final result by Elizabeth Mauro MD (02/13/23 17:30:16)                   Impression:      Single intrauterine pregnancy which corresponds to a 6 weeks 0 days gestation by crown rump length. Fetal heart rate is 97 BPM.  TONO by ultrasound 10/09/2023.      Electronically signed by: Elizabeth Mauro MD  Date:    02/13/2023  Time:    17:30               Narrative:    EXAMINATION:  US OB <14 WEEKS, TRANSABDOM & TRANSVAG, SINGLE GESTATION (XPD)    CLINICAL HISTORY:  pelvic pain and +UPT r/o ectopic;    TECHNIQUE:  Transabdominal sonography of the pelvis was performed, followed by transvaginal sonography to better evaluate the uterus and ovaries.    COMPARISON:  None.    FINDINGS:  The uterus measures 8 x 4 x 5 cm. Uterine parenchyma is heterogenous in echotexture. In the uterine cavity there is a gestational sac with fetal pole which measures 0.3 cm by crown rump length and corresponds to a 6 weeks 0 days gestation. Fetal heart rate is 97 BPM. The yolk sac measures 0.2 cm.    The right ovary measures 2 x 2 x 3 cm. The left ovary measures 3 x 3 x 3 cm. Arterial and venous flow are preserved bilaterally. A suspected corpus luteum cyst measuring 2 cm is seen in the left ovary. No significant free fluid is seen.                                      Initial Impression/ Differential Dx:  Urgent evaluation of 25 y.o. female presenting with lower abdominal pain and nausea for the past week. Patient is afebrile, not toxic appearing and hemodynamically stable.  Although patient is    Results from last 7 days   Lab Units 06/02/25  0403 06/01/25  1146   POCT PH, VENOUS pH 7.38 7.24*   POCT PCO2, VENOUS mm Hg 59* 80*   POCT PO2, VENOUS mm Hg 49* 59*   POCT SO2, VENOUS % 82* 90*   POCT HCO3 CALCULATED, VENOUS mmol/L 34.9* 34.3*   POCT LACTATE, VENOUS mmol/L 1.3 1.5             Assessment/Plan:    I am currently managing this critically ill patient for the following problems:    Neurology/Psychiatry/Pain Control/Sedation:   #Acute metabolic encephalopathy- improving  Hx CVA  - Pain Control: acetaminophen PRN  - Sedation/Analgesia: currently off pending extubation    Respiratory/ENT:  #Acute respiratory failure with hypoxia and hypercapnia.   #Pneumonia.   #Acute exacerbation of COPD.   - Supplemental O2: intubated currently weaning-- hold off on extubation d/t secretions  - solumedrol 40mg q6h--> change to Q12  - Pulm hygiene- duonebs, hypertonic saline- add volera    Cardiovascular:   Undifferentiated shock- resolved.   Hx of hypertension, CHF   - Continuous cardiac monitoring   - EKGs PRN for ACS symptoms, arrhythmias     Gastrointestinal:  No acute issues  - Diet: TF  - BR: miralax  - GI Prophylaxis: PPI    Renal/Volume Status/Electrolytes:  #Rhabdomyolysis- resolved  #SARAH- resolved  - Replete electrolytes to maintain K >4.0 and Mg >2.0  - Daily RFP, Mg    Endocrinology:  Hx T2DM  - Home diabetic regimen: none  - SSI q4hrs while NPO   - Hypoglycemia protocol PRN     Infectious Disease:  #pneumonia  - Antibiotics: zosyn,   - Blood cultures: positive x 2 for staph hominis.   - Monitor SIRS criteria    Hematology/Oncology:  No acute issues  - Baseline Hgb  - Transfuse if Hgb <7.0 or symptomatic anemia  - Daily CBC    OBGYN:  No acute issues    MSK/Skin:  No acute issues  - PT/OT eval   - ICU skin protocol, padded pressure points  - Q2hr turns    Incidental Findings/Outpatient Follow-up Recs:    Ethics/Code Status:  Full Code    :  DVT Prophylaxis: SQH, SCDs  GI Prophylaxis:  PPI  Bowel Regimen: miralx     Diet: TF  CVC: yes   Fina: yes   Mederos: external  Restraints: no  Disposition: full code       Critical Care Time:  35 minutes spent in preparing to see patient (I.e. labs, imaging, etc.), documentation, discussion plan of care with patient/family/caregiver, and/or coordination of care with multidisciplinary team including the attending. Time does not include completion of procedure time.     Plan of care discussed with Dr. Davidson    Disclaimer: Documentation completed with the information available at the time of input. Parts of this note may have been scribed or generated using voice dictation software, Dragon.  Homophonic errors may exist.  Please contact me directly if clarification is needed.     Alie MALCOLM-CNP  Pulmonary & Critical Care Medicine   [facility]          [1]   Past Medical History:  Diagnosis Date    Chronic hypoxic respiratory failure     5L at home but has run out    COPD (chronic obstructive pulmonary disease) (Multi)     Diabetes mellitus (Multi)     Stroke (Multi)    [2]   Past Surgical History:  Procedure Laterality Date     SECTION, LOW TRANSVERSE      TONSILLECTOMY     [3]   Medications Prior to Admission   Medication Sig Dispense Refill Last Dose/Taking    acetaminophen (Tylenol) 325 mg tablet Take 2 tablets (650 mg) by mouth every 6 hours if needed for mild pain (1 - 3).       ALBUTEROL INHL Inhale 90 mcg every 4 hours if needed (2 puffs).       fluticasone-umeclidin-vilanter (Trelegy Ellipta) 100-62.5-25 mcg blister with device Inhale 1 puff once daily.       gabapentin (Neurontin) 600 mg tablet Take 1 tablet (600 mg) by mouth 3 times a day.       guaiFENesin (Mucinex) 600 mg 12 hr tablet Take 2 tablets (1,200 mg) by mouth 2 times a day. Do not crush, chew, or split.       ipratropium-albuteroL (Duo-Neb) 0.5-2.5 mg/3 mL nebulizer solution Take 3 mL by nebulization 4 times a day.       nystatin (Mycostatin) 100,000  complaining of abdominal pain, abdomen is soft, nondistended and with no tenderness.  Labs ordered from tele triage have resulted at time of initial assessment and CBC and CMP are grossly unremarkable.  During my interview, UPT found to be positive.  Upon further discussion with the patient, LMP 12/28/2022.  Symptoms likely secondary to pregnancy.  Given reported lower abdominal pain and pregnancy, plan to add on beta hCG, ABO Rh and transvaginal ultrasound to rule out ectopic.    Differential Diagnosis includes, but is not limited to:  Pregnancy complication (threatened AB, inevitable AB, incomplete Ab, missed AB, uterine rupture, ectopic pregnancy, placental abruption, placenta previa), ovarian cyst/torsion, STD, electrolyte abnormality, anemia, dehydration, viral illness, food-borne illness, morning sickness     MDM:        ED Course as of 02/13/23 1751   Mon Feb 13, 2023   1626 ABO: B [CU]   1728 Rh Type: POS  RhoGAM not indicated [CU]   1728 Marijuana (THC) Metabolite(!): Presumptive Positive  Be a contributing source of patient's presentation.  Counseled her to cease smoking as well as drinking alcohol to start taking a prenatal vitamin. [CU]   1729 After antiemetic, patient tolerating p.o. [CU]   1729 Leukocytes, UA: Negative [CU]   1729 NITRITE UA: Negative [CU]   1729 Occult Blood UA: Negative [CU]   1729 Ketones, UA(!): 3+  Ketonuria, IVF infusing [CU]   1740 HCG Quant: 52598 [CU]   1740 US OB <14 Wks TransAbd & TransVag, Single Gestation (XPD)  Single intrauterine pregnancy which corresponds to a 6 weeks 0 days gestation by crown rump length. Fetal heart rate is 97 BPM.  TONO by ultrasound 10/09/2023. [CU]      ED Course User Index  [CU] Danish Kennedy NP                   Diagnostic Impression:    1. Nausea and vomiting, unspecified vomiting type    2. Less than 8 weeks gestation of pregnancy    3. Abdominal cramping affecting pregnancy    4. Dehydration         ED Disposition Condition    Discharge  Good            ED Prescriptions       Medication Sig Dispense Start Date End Date Auth. Provider    prenatal vit no.130-iron-folic (PRENATAL VITAMIN) 27 mg iron- 800 mcg Tab Take 1 tablet by mouth once daily. 30 tablet 2/13/2023 -- Danish Kennedy NP    ondansetron (ZOFRAN) 4 MG tablet Take 1 tablet (4 mg total) by mouth every 6 (six) hours as needed for Nausea. 30 tablet 2/13/2023 -- Danish Kennedy NP          Follow-up Information       Follow up With Specialties Details Why Contact Info    ИВАН Mckeon MD Obstetrics and Gynecology, Obstetrics, Gynecology   [address] [address]  [phone]               Danish Kennedy NP  02/13/23 5724     unit/gram powder Apply 1 Application topically 2 times a day.       oxygen (O2) gas therapy Inhale 4 L/min at 240,000 mL/hr continuously.       pantoprazole (ProtoNix) 40 mg EC tablet Take 1 tablet (40 mg) by mouth once daily in the morning. Take before meals. Do not crush, chew, or split.      [4]   Social History  Tobacco Use    Smoking status: Every Day     Types: Cigarettes   [5] No family history on file.  [6] propofol, 0-50 mcg/kg/min, Last Rate: Stopped (06/03/25 0630)  [7]   Current Facility-Administered Medications:     acetaminophen (Tylenol) oral liquid 650 mg, 650 mg, oral, q6h PRN, JOVANNI Calixto    alteplase (Cathflo Activase) injection 2 mg, 2 mg, intra-catheter, PRN, JOVANNI Calixto    dextrose 50 % injection 12.5 g, 12.5 g, intravenous, q15 min PRN, JOVANNI Arnold    dextrose 50 % injection 25 g, 25 g, intravenous, q15 min PRN, JOVANNI Arnold    [Held by provider] tiotropium (Spiriva Respimat) 2.5 mcg/actuation inhaler 2 puff, 2 puff, inhalation, Daily **AND** [Held by provider] fluticasone furoate-vilanteroL (Breo Ellipta) 100-25 mcg/dose inhaler 1 puff, 1 puff, inhalation, Daily, JOVANNI Calixto    [Held by provider] gabapentin (Neurontin) tablet 600 mg, 600 mg, oral, TID, JOVANNI Calixto    glucagon (Glucagen) injection 1 mg, 1 mg, intramuscular, q15 min PRN, JOVANNI Arnold    glucagon (Glucagen) injection 1 mg, 1 mg, intramuscular, q15 min PRN, JOVANNI Arnold    heparin (porcine) injection 5,000 Units, 5,000 Units, subcutaneous, q8h, JOVANNI Calixto, 5,000 Units at 06/03/25 0836    HYDROmorphone (Dilaudid) injection 0.2 mg, 0.2 mg, intravenous, q3h PRN, Timoteo Davidson,     insulin glargine (Lantus) injection 15 Units, 15 Units, subcutaneous, q24h, Drake Rodriguez, APRN-CNP, 15 Units at 06/02/25 2004    insulin lispro injection 0-15 Units, 0-15 Units, subcutaneous, q4h, Drake Rodriguez,  YUN-CNP, 3 Units at 06/03/25 0837    ipratropium-albuteroL (Duo-Neb) 0.5-2.5 mg/3 mL nebulizer solution 3 mL, 3 mL, nebulization, q6h, JOVANNI Calixto, 3 mL at 06/03/25 0803    methylPREDNISolone sod succinate (SOLU-Medrol) 40 mg/mL injection 40 mg, 40 mg, intravenous, q6h, JOVANNI Arnold, 40 mg at 06/03/25 0837    nystatin (Mycostatin) 100,000 unit/gram powder 1 Application, 1 Application, Topical, BID, JOVANNI Calixto, 1 Application at 06/03/25 0837    oxygen (O2) therapy, , inhalation, Continuous - Inhalation, Timoteo Davidson, DO, 40 percent at 06/03/25 0804    pantoprazole (ProtoNix) EC tablet 40 mg, 40 mg, oral, Daily before breakfast **OR** pantoprazole (Protonix) injection 40 mg, 40 mg, intravenous, Daily before breakfast, JOVANNI Calixto, 40 mg at 06/03/25 0640    piperacillin-tazobactam (Zosyn) 4.5 g in dextrose (iso)  mL, 4.5 g, intravenous, q6h, JOVANNI Arnold, Stopped at 06/03/25 0458    polyethylene glycol (Glycolax, Miralax) packet 17 g, 17 g, oral, Daily, JOVANNI Calixto, 17 g at 06/02/25 0843    propofol (Diprivan) infusion, 0-50 mcg/kg/min, intravenous, Continuous, JOVANNI Calixto, Stopped at 06/03/25 0630    sodium chloride 3 % nebulizer solution 3 mL, 3 mL, nebulization, q6h, YUN Calixto-CNP, 3 mL at 06/03/25 0803    sodium phosphate 21 mmol in sodium chloride 0.9% 250 mL IV, 21 mmol, intravenous, Once, ALLI EcholsCNP

## 2025-06-03 NOTE — CARE PLAN
The clinical goals for the shift include Remain hemodynamically stable    Over the shift, the patient did make progress toward the following goals.      Problem: Pain - Adult  Goal: Verbalizes/displays adequate comfort level or baseline comfort level  Outcome: Progressing     Problem: Safety - Adult  Goal: Free from fall injury  Outcome: Progressing     Problem: Skin  Goal: Decreased wound size/increased tissue granulation at next dressing change  Outcome: Progressing  Flowsheets (Taken 6/2/2025 2107)  Decreased wound size/increased tissue granulation at next dressing change:   Promote sleep for wound healing   Protective dressings over bony prominences  Goal: Participates in plan/prevention/treatment measures  Outcome: Progressing  Flowsheets (Taken 6/2/2025 2107)  Participates in plan/prevention/treatment measures:   Elevate heels   Discuss with provider PT/OT consult  Goal: Prevent/manage excess moisture  Outcome: Progressing  Flowsheets (Taken 6/2/2025 2107)  Prevent/manage excess moisture:   Cleanse incontinence/protect with barrier cream   Moisturize dry skin   Follow provider orders for dressing changes   Monitor for/manage infection if present  Goal: Prevent/minimize sheer/friction injuries  Outcome: Progressing  Flowsheets (Taken 6/2/2025 2107)  Prevent/minimize sheer/friction injuries:   Complete micro-shifts as needed if patient unable. Adjust patient position to relieve pressure points, not a full turn   HOB 30 degrees or less   Turn/reposition every 2 hours/use positioning/transfer devices   Use pull sheet  Goal: Promote/optimize nutrition  Outcome: Progressing  Flowsheets (Taken 6/2/2025 2107)  Promote/optimize nutrition: Monitor/record intake including meals  Goal: Promote skin healing  Outcome: Progressing  Flowsheets (Taken 6/2/2025 2107)  Promote skin healing:   Assess skin/pad under line(s)/device(s)   Ensure correct size (line/device) and apply per  instructions   Protective dressings  over bony prominences   Rotate device position/do not position patient on device   Turn/reposition every 2 hours/use positioning/transfer devices     Problem: Safety - Medical Restraint  Goal: Remains free of injury from restraints (Restraint for Interference with Medical Device)  6/2/2025 2107 by Belinda Torrez RN  Flowsheets (Taken 6/2/2025 2107)  Remains free of injury from restraints (restraint for interference with medical device):   Determine that other, less restrictive measures have been tried or would not be effective before applying the restraint   Evaluate the patient's condition at the time of restraint application   Inform patient/family regarding the reason for restraint   Every 2 hours: Monitor safety, psychosocial status, comfort, nutrition and hydration  6/2/2025 2107 by Belinda Torrez RN  Outcome: Progressing  Flowsheets (Taken 6/2/2025 2107)  Remains free of injury from restraints (restraint for interference with medical device):   Determine that other, less restrictive measures have been tried or would not be effective before applying the restraint   Evaluate the patient's condition at the time of restraint application   Inform patient/family regarding the reason for restraint   Every 2 hours: Monitor safety, psychosocial status, comfort, nutrition and hydration  Goal: Free from restraint(s) (Restraint for Interference with Medical Device)  6/2/2025 2107 by Belinda Torrez RN  Flowsheets (Taken 6/2/2025 2107)  Free from restraint(s) (restraint for interference with medical device):   ONCE/SHIFT or MINIMUM Every 12 hours: Assess and document the continuing need for restraints   Every 24 hours: Continued use of restraint requires Licensed Independent Practitioner to perform face to face examination and written order   Identify and implement measures to help patient regain control  6/2/2025 2107 by Belinda Torrez RN  Outcome: Progressing  Flowsheets (Taken 6/2/2025 2107)  Free from restraint(s)  (restraint for interference with medical device):   ONCE/SHIFT or MINIMUM Every 12 hours: Assess and document the continuing need for restraints   Every 24 hours: Continued use of restraint requires Licensed Independent Practitioner to perform face to face examination and written order   Identify and implement measures to help patient regain control

## 2025-06-04 ENCOUNTER — APPOINTMENT (OUTPATIENT)
Dept: RADIOLOGY | Facility: HOSPITAL | Age: 65
DRG: 870 | End: 2025-06-04
Payer: COMMERCIAL

## 2025-06-04 LAB
ALBUMIN SERPL BCP-MCNC: 3.1 G/DL (ref 3.4–5)
ALP SERPL-CCNC: 43 U/L (ref 33–136)
ALT SERPL W P-5'-P-CCNC: 157 U/L (ref 7–45)
ANION GAP BLDA CALCULATED.4IONS-SCNC: 2 MMO/L (ref 10–25)
ANION GAP BLDV CALCULATED.4IONS-SCNC: 0 MMOL/L (ref 10–25)
ANION GAP SERPL CALCULATED.3IONS-SCNC: 9 MMOL/L (ref 10–20)
APPARATUS: ABNORMAL
ARTERIAL PATENCY WRIST A: POSITIVE
AST SERPL W P-5'-P-CCNC: 79 U/L (ref 9–39)
BACTERIA SPEC RESP CULT: NO GROWTH
BASE EXCESS BLDA CALC-SCNC: 15.2 MMOL/L (ref -2–3)
BASE EXCESS BLDV CALC-SCNC: 19.9 MMOL/L (ref -2–3)
BASOPHILS # BLD AUTO: 0 X10*3/UL (ref 0–0.1)
BASOPHILS NFR BLD AUTO: 0 %
BILIRUB SERPL-MCNC: 0.7 MG/DL (ref 0–1.2)
BODY TEMPERATURE: 37 DEGREES CELSIUS
BODY TEMPERATURE: 37 DEGREES CELSIUS
BUN SERPL-MCNC: 34 MG/DL (ref 6–23)
CA-I BLD-SCNC: 1.15 MMOL/L (ref 1.1–1.33)
CA-I BLDA-SCNC: 1.21 MMOL/L (ref 1.1–1.33)
CA-I BLDV-SCNC: 1.17 MMOL/L (ref 1.1–1.33)
CALCIUM SERPL-MCNC: 9 MG/DL (ref 8.6–10.3)
CHLORIDE BLDA-SCNC: 95 MMOL/L (ref 98–107)
CHLORIDE BLDV-SCNC: 92 MMOL/L (ref 98–107)
CHLORIDE SERPL-SCNC: 96 MMOL/L (ref 98–107)
CO2 SERPL-SCNC: 38 MMOL/L (ref 21–32)
CREAT SERPL-MCNC: 0.73 MG/DL (ref 0.5–1.05)
EGFRCR SERPLBLD CKD-EPI 2021: 90 ML/MIN/1.73M*2
EOSINOPHIL # BLD AUTO: 0 X10*3/UL (ref 0–0.7)
EOSINOPHIL NFR BLD AUTO: 0 %
ERYTHROCYTE [DISTWIDTH] IN BLOOD BY AUTOMATED COUNT: 14 % (ref 11.5–14.5)
GLUCOSE BLD MANUAL STRIP-MCNC: 108 MG/DL (ref 74–99)
GLUCOSE BLD MANUAL STRIP-MCNC: 117 MG/DL (ref 74–99)
GLUCOSE BLD MANUAL STRIP-MCNC: 124 MG/DL (ref 74–99)
GLUCOSE BLD MANUAL STRIP-MCNC: 131 MG/DL (ref 74–99)
GLUCOSE BLD MANUAL STRIP-MCNC: 147 MG/DL (ref 74–99)
GLUCOSE BLD MANUAL STRIP-MCNC: 150 MG/DL (ref 74–99)
GLUCOSE BLD MANUAL STRIP-MCNC: 187 MG/DL (ref 74–99)
GLUCOSE BLDA-MCNC: 128 MG/DL (ref 74–99)
GLUCOSE BLDV-MCNC: 98 MG/DL (ref 74–99)
GLUCOSE SERPL-MCNC: 176 MG/DL (ref 74–99)
GRAM STN SPEC: NORMAL
GRAM STN SPEC: NORMAL
HCO3 BLDA-SCNC: 42.2 MMOL/L (ref 22–26)
HCO3 BLDV-SCNC: 48 MMOL/L (ref 22–26)
HCT VFR BLD AUTO: 43.1 % (ref 36–46)
HCT VFR BLD EST: 45 % (ref 36–46)
HCT VFR BLD EST: 48 % (ref 36–46)
HGB BLD-MCNC: 13.9 G/DL (ref 12–16)
HGB BLDA-MCNC: 16 G/DL (ref 12–16)
HGB BLDV-MCNC: 15 G/DL (ref 12–16)
IMM GRANULOCYTES # BLD AUTO: 0.09 X10*3/UL (ref 0–0.7)
IMM GRANULOCYTES NFR BLD AUTO: 0.8 % (ref 0–0.9)
INHALED O2 CONCENTRATION: 36 %
INHALED O2 CONCENTRATION: 50 %
LACTATE BLDA-SCNC: 1.1 MMOL/L (ref 0.4–2)
LACTATE BLDV-SCNC: 0.9 MMOL/L (ref 0.4–2)
LYMPHOCYTES # BLD AUTO: 0.61 X10*3/UL (ref 1.2–4.8)
LYMPHOCYTES NFR BLD AUTO: 5.3 %
MAGNESIUM SERPL-MCNC: 2.02 MG/DL (ref 1.6–2.4)
MCH RBC QN AUTO: 31.9 PG (ref 26–34)
MCHC RBC AUTO-ENTMCNC: 32.3 G/DL (ref 32–36)
MCV RBC AUTO: 99 FL (ref 80–100)
MONOCYTES # BLD AUTO: 0.47 X10*3/UL (ref 0.1–1)
MONOCYTES NFR BLD AUTO: 4.1 %
NEUTROPHILS # BLD AUTO: 10.39 X10*3/UL (ref 1.2–7.7)
NEUTROPHILS NFR BLD AUTO: 89.8 %
NRBC BLD-RTO: 0.2 /100 WBCS (ref 0–0)
OXYHGB MFR BLDA: 96.4 % (ref 94–98)
OXYHGB MFR BLDV: 88.2 % (ref 45–75)
PCO2 BLDA: 58 MM HG (ref 38–42)
PCO2 BLDV: 66 MM HG (ref 41–51)
PEEP CMH2O: 5 CM H2O
PH BLDA: 7.47 PH (ref 7.38–7.42)
PH BLDV: 7.47 PH (ref 7.33–7.43)
PHOSPHATE SERPL-MCNC: 3 MG/DL (ref 2.5–4.9)
PLATELET # BLD AUTO: 115 X10*3/UL (ref 150–450)
PO2 BLDA: 153 MM HG (ref 85–95)
PO2 BLDV: 59 MM HG (ref 35–45)
POTASSIUM BLDA-SCNC: 4.3 MMOL/L (ref 3.5–5.3)
POTASSIUM BLDV-SCNC: 3.9 MMOL/L (ref 3.5–5.3)
POTASSIUM SERPL-SCNC: 4.3 MMOL/L (ref 3.5–5.3)
PRESSURE SUPPORT: 5 CM H2O
PROT SERPL-MCNC: 5.6 G/DL (ref 6.4–8.2)
Q ONSET: 215 MS
QRS COUNT: 13 BEATS
QRS DURATION: 96 MS
QT INTERVAL: 460 MS
QTC CALCULATION(BAZETT): 520 MS
QTC FREDERICIA: 499 MS
R AXIS: 90 DEGREES
RBC # BLD AUTO: 4.36 X10*6/UL (ref 4–5.2)
SAO2 % BLDA: 100 % (ref 94–100)
SAO2 % BLDV: 90 % (ref 45–75)
SODIUM BLDA-SCNC: 135 MMOL/L (ref 136–145)
SODIUM BLDV-SCNC: 136 MMOL/L (ref 136–145)
SODIUM SERPL-SCNC: 139 MMOL/L (ref 136–145)
SPECIMEN DRAWN FROM PATIENT: ABNORMAL
T AXIS: 96 DEGREES
T OFFSET: 445 MS
VENTILATOR MODE: ABNORMAL
VENTRICULAR RATE: 77 BPM
WBC # BLD AUTO: 11.6 X10*3/UL (ref 4.4–11.3)

## 2025-06-04 PROCEDURE — 9420000001 HC RT PATIENT EDUCATION 5 MIN

## 2025-06-04 PROCEDURE — 37799 UNLISTED PX VASCULAR SURGERY: CPT

## 2025-06-04 PROCEDURE — 36600 WITHDRAWAL OF ARTERIAL BLOOD: CPT

## 2025-06-04 PROCEDURE — 99232 SBSQ HOSP IP/OBS MODERATE 35: CPT | Performed by: INTERNAL MEDICINE

## 2025-06-04 PROCEDURE — 82947 ASSAY GLUCOSE BLOOD QUANT: CPT

## 2025-06-04 PROCEDURE — 84132 ASSAY OF SERUM POTASSIUM: CPT

## 2025-06-04 PROCEDURE — 94668 MNPJ CHEST WALL SBSQ: CPT

## 2025-06-04 PROCEDURE — 99291 CRITICAL CARE FIRST HOUR: CPT

## 2025-06-04 PROCEDURE — 2500000001 HC RX 250 WO HCPCS SELF ADMINISTERED DRUGS (ALT 637 FOR MEDICARE OP)

## 2025-06-04 PROCEDURE — 2500000004 HC RX 250 GENERAL PHARMACY W/ HCPCS (ALT 636 FOR OP/ED): Performed by: NURSE PRACTITIONER

## 2025-06-04 PROCEDURE — 71045 X-RAY EXAM CHEST 1 VIEW: CPT

## 2025-06-04 PROCEDURE — 85025 COMPLETE CBC W/AUTO DIFF WBC: CPT

## 2025-06-04 PROCEDURE — 80053 COMPREHEN METABOLIC PANEL: CPT

## 2025-06-04 PROCEDURE — 94669 MECHANICAL CHEST WALL OSCILL: CPT

## 2025-06-04 PROCEDURE — 71045 X-RAY EXAM CHEST 1 VIEW: CPT | Performed by: RADIOLOGY

## 2025-06-04 PROCEDURE — 94003 VENT MGMT INPAT SUBQ DAY: CPT

## 2025-06-04 PROCEDURE — 2500000002 HC RX 250 W HCPCS SELF ADMINISTERED DRUGS (ALT 637 FOR MEDICARE OP, ALT 636 FOR OP/ED)

## 2025-06-04 PROCEDURE — 2500000004 HC RX 250 GENERAL PHARMACY W/ HCPCS (ALT 636 FOR OP/ED)

## 2025-06-04 PROCEDURE — 2500000004 HC RX 250 GENERAL PHARMACY W/ HCPCS (ALT 636 FOR OP/ED): Performed by: STUDENT IN AN ORGANIZED HEALTH CARE EDUCATION/TRAINING PROGRAM

## 2025-06-04 PROCEDURE — 2500000005 HC RX 250 GENERAL PHARMACY W/O HCPCS

## 2025-06-04 PROCEDURE — 94640 AIRWAY INHALATION TREATMENT: CPT

## 2025-06-04 PROCEDURE — 2500000005 HC RX 250 GENERAL PHARMACY W/O HCPCS: Performed by: STUDENT IN AN ORGANIZED HEALTH CARE EDUCATION/TRAINING PROGRAM

## 2025-06-04 PROCEDURE — 82330 ASSAY OF CALCIUM: CPT

## 2025-06-04 PROCEDURE — 2500000004 HC RX 250 GENERAL PHARMACY W/ HCPCS (ALT 636 FOR OP/ED): Mod: JZ

## 2025-06-04 PROCEDURE — 84100 ASSAY OF PHOSPHORUS: CPT

## 2025-06-04 PROCEDURE — 83735 ASSAY OF MAGNESIUM: CPT

## 2025-06-04 PROCEDURE — 2020000001 HC ICU ROOM DAILY

## 2025-06-04 RX ORDER — POLYETHYLENE GLYCOL 3350 17 G/17G
17 POWDER, FOR SOLUTION ORAL DAILY PRN
Status: DISCONTINUED | OUTPATIENT
Start: 2025-06-04 | End: 2025-06-06 | Stop reason: HOSPADM

## 2025-06-04 RX ORDER — DICLOFENAC SODIUM 10 MG/G
4 GEL TOPICAL 4 TIMES DAILY PRN
Status: DISCONTINUED | OUTPATIENT
Start: 2025-06-04 | End: 2025-06-06 | Stop reason: HOSPADM

## 2025-06-04 RX ORDER — METHOCARBAMOL 100 MG/ML
500 INJECTION, SOLUTION INTRAMUSCULAR; INTRAVENOUS ONCE
Status: COMPLETED | OUTPATIENT
Start: 2025-06-04 | End: 2025-06-04

## 2025-06-04 RX ORDER — FUROSEMIDE 10 MG/ML
40 INJECTION INTRAMUSCULAR; INTRAVENOUS ONCE
Status: COMPLETED | OUTPATIENT
Start: 2025-06-04 | End: 2025-06-04

## 2025-06-04 RX ORDER — INSULIN LISPRO 100 [IU]/ML
0-10 INJECTION, SOLUTION INTRAVENOUS; SUBCUTANEOUS EVERY 4 HOURS
Status: DISCONTINUED | OUTPATIENT
Start: 2025-06-04 | End: 2025-06-06 | Stop reason: HOSPADM

## 2025-06-04 RX ADMIN — IPRATROPIUM BROMIDE AND ALBUTEROL SULFATE 3 ML: 2.5; .5 SOLUTION RESPIRATORY (INHALATION) at 20:50

## 2025-06-04 RX ADMIN — Medication 50 PERCENT: at 07:45

## 2025-06-04 RX ADMIN — SENNOSIDES 10 ML: 8.8 LIQUID ORAL at 08:09

## 2025-06-04 RX ADMIN — METHOCARBAMOL 500 MG: 1000 INJECTION, SOLUTION INTRAMUSCULAR; INTRAVENOUS at 20:57

## 2025-06-04 RX ADMIN — PANTOPRAZOLE SODIUM 40 MG: 40 INJECTION, POWDER, FOR SOLUTION INTRAVENOUS at 06:16

## 2025-06-04 RX ADMIN — HYDROMORPHONE HYDROCHLORIDE 0.2 MG: 1 INJECTION, SOLUTION INTRAMUSCULAR; INTRAVENOUS; SUBCUTANEOUS at 20:46

## 2025-06-04 RX ADMIN — METHYLPREDNISOLONE SODIUM SUCCINATE 40 MG: 40 INJECTION, POWDER, FOR SOLUTION INTRAMUSCULAR; INTRAVENOUS at 20:44

## 2025-06-04 RX ADMIN — Medication 6 L/MIN: at 12:08

## 2025-06-04 RX ADMIN — SODIUM CHLORIDE SOLN NEBU 3% 3 ML: 3 NEBU SOLN at 07:44

## 2025-06-04 RX ADMIN — POLYETHYLENE GLYCOL 3350 17 G: 17 POWDER, FOR SOLUTION ORAL at 08:08

## 2025-06-04 RX ADMIN — HYDROMORPHONE HYDROCHLORIDE 0.2 MG: 1 INJECTION, SOLUTION INTRAMUSCULAR; INTRAVENOUS; SUBCUTANEOUS at 16:50

## 2025-06-04 RX ADMIN — PIPERACILLIN SODIUM AND TAZOBACTAM SODIUM 4.5 G: 4; .5 INJECTION, SOLUTION INTRAVENOUS at 11:11

## 2025-06-04 RX ADMIN — SODIUM CHLORIDE SOLN NEBU 3% 3 ML: 3 NEBU SOLN at 20:47

## 2025-06-04 RX ADMIN — METHYLPREDNISOLONE SODIUM SUCCINATE 40 MG: 40 INJECTION, POWDER, FOR SOLUTION INTRAMUSCULAR; INTRAVENOUS at 08:05

## 2025-06-04 RX ADMIN — INSULIN LISPRO 3 UNITS: 100 INJECTION, SOLUTION INTRAVENOUS; SUBCUTANEOUS at 04:50

## 2025-06-04 RX ADMIN — IPRATROPIUM BROMIDE AND ALBUTEROL SULFATE 3 ML: 2.5; .5 SOLUTION RESPIRATORY (INHALATION) at 11:23

## 2025-06-04 RX ADMIN — HEPARIN SODIUM 5000 UNITS: 5000 INJECTION, SOLUTION INTRAVENOUS; SUBCUTANEOUS at 16:40

## 2025-06-04 RX ADMIN — PIPERACILLIN SODIUM AND TAZOBACTAM SODIUM 4.5 G: 4; .5 INJECTION, SOLUTION INTRAVENOUS at 23:10

## 2025-06-04 RX ADMIN — FUROSEMIDE 40 MG: 10 INJECTION INTRAMUSCULAR; INTRAVENOUS at 11:16

## 2025-06-04 RX ADMIN — NYSTATIN 1 APPLICATION: 100000 POWDER TOPICAL at 08:09

## 2025-06-04 RX ADMIN — PIPERACILLIN SODIUM AND TAZOBACTAM SODIUM 4.5 G: 4; .5 INJECTION, SOLUTION INTRAVENOUS at 17:34

## 2025-06-04 RX ADMIN — PIPERACILLIN SODIUM AND TAZOBACTAM SODIUM 4.5 G: 4; .5 INJECTION, SOLUTION INTRAVENOUS at 04:50

## 2025-06-04 RX ADMIN — Medication 4 L/MIN: at 20:51

## 2025-06-04 RX ADMIN — SODIUM CHLORIDE SOLN NEBU 3% 3 ML: 3 NEBU SOLN at 11:23

## 2025-06-04 RX ADMIN — HEPARIN SODIUM 5000 UNITS: 5000 INJECTION, SOLUTION INTRAVENOUS; SUBCUTANEOUS at 08:06

## 2025-06-04 RX ADMIN — DICLOFENAC SODIUM 4 G: 10 GEL TOPICAL at 20:57

## 2025-06-04 RX ADMIN — IPRATROPIUM BROMIDE AND ALBUTEROL SULFATE 3 ML: 2.5; .5 SOLUTION RESPIRATORY (INHALATION) at 07:44

## 2025-06-04 RX ADMIN — NYSTATIN 1 APPLICATION: 100000 POWDER TOPICAL at 20:44

## 2025-06-04 SDOH — SOCIAL STABILITY: SOCIAL NETWORK: HOW OFTEN DO YOU GET TOGETHER WITH FRIENDS OR RELATIVES?: PATIENT UNABLE TO ANSWER

## 2025-06-04 SDOH — HEALTH STABILITY: PHYSICAL HEALTH: ON AVERAGE, HOW MANY MINUTES DO YOU ENGAGE IN EXERCISE AT THIS LEVEL?: 0 MIN

## 2025-06-04 SDOH — SOCIAL STABILITY: SOCIAL INSECURITY: WITHIN THE LAST YEAR, HAVE YOU BEEN AFRAID OF YOUR PARTNER OR EX-PARTNER?: PATIENT UNABLE TO ANSWER

## 2025-06-04 SDOH — HEALTH STABILITY: MENTAL HEALTH
DO YOU FEEL STRESS - TENSE, RESTLESS, NERVOUS, OR ANXIOUS, OR UNABLE TO SLEEP AT NIGHT BECAUSE YOUR MIND IS TROUBLED ALL THE TIME - THESE DAYS?: PATIENT UNABLE TO ANSWER

## 2025-06-04 SDOH — HEALTH STABILITY: MENTAL HEALTH: HOW MANY DRINKS CONTAINING ALCOHOL DO YOU HAVE ON A TYPICAL DAY WHEN YOU ARE DRINKING?: PATIENT DOES NOT DRINK

## 2025-06-04 SDOH — HEALTH STABILITY: MENTAL HEALTH: HOW OFTEN DO YOU HAVE A DRINK CONTAINING ALCOHOL?: NEVER

## 2025-06-04 SDOH — SOCIAL STABILITY: SOCIAL INSECURITY: ARE YOU MARRIED, WIDOWED, DIVORCED, SEPARATED, NEVER MARRIED, OR LIVING WITH A PARTNER?: MARRIED

## 2025-06-04 SDOH — ECONOMIC STABILITY: HOUSING INSECURITY: IN THE PAST 12 MONTHS, HOW MANY TIMES HAVE YOU MOVED WHERE YOU WERE LIVING?: 0

## 2025-06-04 SDOH — ECONOMIC STABILITY: HOUSING INSECURITY: AT ANY TIME IN THE PAST 12 MONTHS, WERE YOU HOMELESS OR LIVING IN A SHELTER (INCLUDING NOW)?: NO

## 2025-06-04 SDOH — ECONOMIC STABILITY: FOOD INSECURITY
HOW HARD IS IT FOR YOU TO PAY FOR THE VERY BASICS LIKE FOOD, HOUSING, MEDICAL CARE, AND HEATING?: PATIENT UNABLE TO ANSWER

## 2025-06-04 SDOH — ECONOMIC STABILITY: HOUSING INSECURITY: IN THE LAST 12 MONTHS, WAS THERE A TIME WHEN YOU WERE NOT ABLE TO PAY THE MORTGAGE OR RENT ON TIME?: NO

## 2025-06-04 SDOH — SOCIAL STABILITY: SOCIAL NETWORK: IN A TYPICAL WEEK, HOW MANY TIMES DO YOU TALK ON THE PHONE WITH FAMILY, FRIENDS, OR NEIGHBORS?: PATIENT UNABLE TO ANSWER

## 2025-06-04 SDOH — SOCIAL STABILITY: SOCIAL NETWORK: HOW OFTEN DO YOU ATTEND MEETINGS OF THE CLUBS OR ORGANIZATIONS YOU BELONG TO?: PATIENT UNABLE TO ANSWER

## 2025-06-04 SDOH — HEALTH STABILITY: MENTAL HEALTH: HOW OFTEN DO YOU HAVE SIX OR MORE DRINKS ON ONE OCCASION?: NEVER

## 2025-06-04 SDOH — HEALTH STABILITY: PHYSICAL HEALTH
HOW OFTEN DO YOU NEED TO HAVE SOMEONE HELP YOU WHEN YOU READ INSTRUCTIONS, PAMPHLETS, OR OTHER WRITTEN MATERIAL FROM YOUR DOCTOR OR PHARMACY?: PATIENT UNABLE TO RESPOND

## 2025-06-04 SDOH — SOCIAL STABILITY: SOCIAL NETWORK: HOW OFTEN DO YOU ATTEND CHURCH OR RELIGIOUS SERVICES?: PATIENT UNABLE TO ANSWER

## 2025-06-04 SDOH — SOCIAL STABILITY: SOCIAL NETWORK
DO YOU BELONG TO ANY CLUBS OR ORGANIZATIONS SUCH AS CHURCH GROUPS, UNIONS, FRATERNAL OR ATHLETIC GROUPS, OR SCHOOL GROUPS?: PATIENT UNABLE TO ANSWER

## 2025-06-04 SDOH — ECONOMIC STABILITY: TRANSPORTATION INSECURITY: IN THE PAST 12 MONTHS, HAS LACK OF TRANSPORTATION KEPT YOU FROM MEDICAL APPOINTMENTS OR FROM GETTING MEDICATIONS?: NO

## 2025-06-04 SDOH — HEALTH STABILITY: PHYSICAL HEALTH: ON AVERAGE, HOW MANY DAYS PER WEEK DO YOU ENGAGE IN MODERATE TO STRENUOUS EXERCISE (LIKE A BRISK WALK)?: 0 DAYS

## 2025-06-04 ASSESSMENT — PAIN SCALES - GENERAL
PAINLEVEL_OUTOF10: 8
PAINLEVEL_OUTOF10: 2
PAINLEVEL_OUTOF10: 0 - NO PAIN
PAINLEVEL_OUTOF10: 0 - NO PAIN
PAINLEVEL_OUTOF10: 7

## 2025-06-04 ASSESSMENT — PAIN DESCRIPTION - DESCRIPTORS
DESCRIPTORS: ACHING;BURNING
DESCRIPTORS: ACHING;DISCOMFORT

## 2025-06-04 ASSESSMENT — PAIN - FUNCTIONAL ASSESSMENT
PAIN_FUNCTIONAL_ASSESSMENT: CPOT (CRITICAL CARE PAIN OBSERVATION TOOL)
PAIN_FUNCTIONAL_ASSESSMENT: CPOT (CRITICAL CARE PAIN OBSERVATION TOOL)
PAIN_FUNCTIONAL_ASSESSMENT: 0-10
PAIN_FUNCTIONAL_ASSESSMENT: CPOT (CRITICAL CARE PAIN OBSERVATION TOOL)

## 2025-06-04 ASSESSMENT — PAIN DESCRIPTION - LOCATION: LOCATION: HIP

## 2025-06-04 ASSESSMENT — ACTIVITIES OF DAILY LIVING (ADL)
LACK_OF_TRANSPORTATION: NO
LACK_OF_TRANSPORTATION: NO

## 2025-06-04 ASSESSMENT — LIFESTYLE VARIABLES
SKIP TO QUESTIONS 9-10: 1
AUDIT-C TOTAL SCORE: 0

## 2025-06-04 ASSESSMENT — PAIN DESCRIPTION - ORIENTATION: ORIENTATION: LEFT

## 2025-06-04 NOTE — PROGRESS NOTES
[facility] Critical Care Medicine       Date:  6/4/2025  Patient:  Patricia Pruitt  YOB: 1960  MRN:  [id number]   Admit Date:  5/31/2025  Hospital Length of Stay: 4   ICU Length of Stay: 3d 9h       No chief complaint on file.        History of Present Illness:  Patricia Pruitt is a 65 y.o. year old female patient with past medical history of  CHF, COPD on 4 L baseline nasal cannula, DM, hypertension, CVA who presents to [facility], [place], operated by [facility] ICU for respiratory failure with hypoxia and hypercapnia. Etiology of respiratory failure appears to be an acute exacerbation of COPD.     Pt denies chest pain, nausea, vomiting, diarrhea, constipation, abdominal pain, fevers, headache. She is awake this am and following commands. Plan for extubation today.      Interval ICU Events:  6/2: Spontaneous breathing trial was aborted today due to excess secretions and marginal respiratory acidosis.     6/3: Weaned this am for three hours. Will remain intubated due to excess secretions. PT/OT to eval today.     6/4: No acute events overnight. Secretions appear to be less today. Plan to extubate pending SBT.     Objective     Medical History:  Medical History[1]  Surgical History[2]  Prescriptions Prior to Admission[3]  Codeine, Morphine, and Penicillins  Social History[4]  Family History[5]    Hospital Medications:    Continuous Medications[6]    Current Medications[7]    Review of Systems:  14 point review of systems was completed and negative except for those specially mention in my HPI    Physical Exam:    Heart Rate:  [63-87]   Temp:  [36.8 °C (98.2 °F)-37.3 °C (99.1 °F)]   Resp:  [13-23]   BP: (108-133)/(56-77)   Weight:  [94.8 kg (208 lb 15.9 oz)]   SpO2:  [87 %-98 %]     Physical Exam  Constitutional:       General: She is not in acute distress.     Appearance: She is ill-appearing.   HENT:      Head: Normocephalic.      Mouth/Throat:      Mouth: Mucous membranes are moist.   Eyes:      Extraocular Movements: Extraocular movements  intact.      Conjunctiva/sclera: Conjunctivae normal.   Cardiovascular:      Rate and Rhythm: Normal rate and regular rhythm.      Pulses: Normal pulses.   Pulmonary:      Effort: No respiratory distress.      Breath sounds: Rhonchi present. No wheezing or rales.      Comments: Intubated  Abdominal:      General: Bowel sounds are normal. There is no distension.      Palpations: Abdomen is soft.      Tenderness: There is no abdominal tenderness. There is no guarding.   Musculoskeletal:         General: No swelling, deformity or signs of injury.      Cervical back: Normal range of motion.   Skin:     General: Skin is warm and dry.   Neurological:      Motor: Weakness present.      Comments: intubated   Psychiatric:         Mood and Affect: Mood normal.         Behavior: Behavior normal.         Objective:    I have reviewed all medications, laboratory results, and imaging pertinent for today's encounter.    Vent Mode: Pressure regulated volume control/assist control  FiO2 (%):  [50 %] 50 %  S RR:  [22] 22  S VT:  [450 mL] 450 mL  PEEP/CPAP (cm H2O):  [5 cm H20] 5 cm H20  MAP (cm H2O):  [9-10] 9.4      Intake/Output Summary (Last 24 hours) at 6/4/2025 0820  Last data filed at 6/4/2025 0600  Gross per 24 hour   Intake 1502.51 ml   Output 350 ml   Net 1152.51 ml       Daily Labs:  CBC:   Results from last 7 days   Lab Units 06/04/25  0446 06/03/25  0409   WBC AUTO x10*3/uL 11.6* 16.1*   HEMOGLOBIN g/dL 13.9 14.0   HEMATOCRIT % 43.1 42.8   MCV fL 99 98     BMP:    Results from last 7 days   Lab Units 06/04/25  0446 06/03/25  0409   SODIUM mmol/L 139 137   POTASSIUM mmol/L 4.3 4.5   CHLORIDE mmol/L 96* 97*   CO2 mmol/L 38* 35*   BUN mg/dL 34* 30*   CREATININE mg/dL 0.73 0.76   CALCIUM mg/dL 9.0 9.1   GLUCOSE mg/dL 176* 135*   MAGNESIUM mg/dL 2.02 1.99     ABG:   Results from last 7 days   Lab Units 06/02/25  1202 06/01/25  0351   POCT PH, ARTERIAL pH 7.41 7.23*   POCT PCO2, ARTERIAL mm Hg 56* 73*   POCT PO2, ARTERIAL mm Hg  86 96*   POCT SO2, ARTERIAL % 98 99   POCT HCO3 CALCULATED, ARTERIAL mmol/L 35.5* 30.6*   POCT LACTATE, ARTERIAL mmol/L 1.2 4.9*      VBG:   Results from last 7 days   Lab Units 06/02/25  0403 06/01/25  1146   POCT PH, VENOUS pH 7.38 7.24*   POCT PCO2, VENOUS mm Hg 59* 80*   POCT PO2, VENOUS mm Hg 49* 59*   POCT SO2, VENOUS % 82* 90*   POCT HCO3 CALCULATED, VENOUS mmol/L 34.9* 34.3*   POCT LACTATE, VENOUS mmol/L 1.3 1.5             Assessment/Plan:    I am currently managing this critically ill patient for the following problems:    Neurology/Psychiatry/Pain Control/Sedation:   #Acute metabolic encephalopathy- improving  Hx CVA  - Pain Control: acetaminophen PRN  - Sedation/Analgesia: currently off pending extubation    Respiratory/ENT:  #Acute respiratory failure with hypoxia and hypercapnia.   #Pneumonia.   #Acute exacerbation of COPD.   - Supplemental O2: intubated plan to extubate pending SBT  - solumedrol 40mg q6h--> change to Q12  - Pulm hygiene- duonebs, hypertonic saline- add volera    Cardiovascular:   Undifferentiated shock- resolved.   Hx of hypertension, CHF   - Continuous cardiac monitoring   - EKGs PRN for ACS symptoms, arrhythmias   - lasix 40mg today    Gastrointestinal:  No acute issues  - Diet: TF  - BR: miralax  - GI Prophylaxis: PPI    Renal/Volume Status/Electrolytes:  #Rhabdomyolysis- resolved  #SARAH- resolved  - Replete electrolytes to maintain K >4.0 and Mg >2.0  - Daily RFP, Mg    Endocrinology:  Hx T2DM  - Home diabetic regimen: none  - SSI q4hrs while NPO   - Hypoglycemia protocol PRN     Infectious Disease:  #pneumonia  - Antibiotics: zosyn,   - Blood cultures: positive x 2 for staph hominis.   - Monitor SIRS criteria    Hematology/Oncology:  No acute issues  - Baseline Hgb  - Transfuse if Hgb <7.0 or symptomatic anemia  - Daily CBC    OBGYN:  No acute issues    MSK/Skin:  No acute issues  - PT/OT eval   - ICU skin protocol, padded pressure points  - Q2hr turns    Incidental  Findings/Outpatient Follow-up Recs:    Ethics/Code Status:  Full Code    :  DVT Prophylaxis: SQH, SCDs  GI Prophylaxis: PPI  Bowel Regimen: miralx     Diet: TF  CVC: yes   [place]: yes   Mederos: external  Restraints: no  Disposition:  ICU       Critical Care Time:  35 minutes spent in preparing to see patient (I.e. labs, imaging, etc.), documentation, discussion plan of care with patient/family/caregiver, and/or coordination of care with multidisciplinary team including the attending. Time does not include completion of procedure time.     Plan of care discussed with Dr. Davidson    Disclaimer: Documentation completed with the information available at the time of input. Parts of this note may have been scribed or generated using voice dictation software, Dragon.  Homophonic errors may exist.  Please contact me directly if clarification is needed.     Alie MALCOLM-CNP  Pulmonary & Critical Care Medicine   [facility]          [1]   Past Medical History:  Diagnosis Date    Chronic hypoxic respiratory failure     5L at home but has run out    COPD (chronic obstructive pulmonary disease) (Multi)     Diabetes mellitus (Multi)     Stroke (Multi)    [2]   Past Surgical History:  Procedure Laterality Date     SECTION, LOW TRANSVERSE      TONSILLECTOMY     [3]   Medications Prior to Admission   Medication Sig Dispense Refill Last Dose/Taking    acetaminophen (Tylenol) 325 mg tablet Take 2 tablets (650 mg) by mouth every 6 hours if needed for mild pain (1 - 3).       ALBUTEROL INHL Inhale 90 mcg every 4 hours if needed (2 puffs).       fluticasone-umeclidin-vilanter (Trelegy Ellipta) 100-62.5-25 mcg blister with device Inhale 1 puff once daily.       gabapentin (Neurontin) 600 mg tablet Take 1 tablet (600 mg) by mouth 3 times a day.       guaiFENesin (Mucinex) 600 mg 12 hr tablet Take 2 tablets (1,200 mg) by mouth 2 times a day. Do not crush, chew, or split.        ipratropium-albuteroL (Duo-Neb) 0.5-2.5 mg/3 mL nebulizer solution Take 3 mL by nebulization 4 times a day.       nystatin (Mycostatin) 100,000 unit/gram powder Apply 1 Application topically 2 times a day.       oxygen (O2) gas therapy Inhale 4 L/min at 240,000 mL/hr continuously.       pantoprazole (ProtoNix) 40 mg EC tablet Take 1 tablet (40 mg) by mouth once daily in the morning. Take before meals. Do not crush, chew, or split.      [4]   Social History  Tobacco Use    Smoking status: Every Day     Types: Cigarettes   [5] No family history on file.  [6] propofol, 0-50 mcg/kg/min, Last Rate: 15 mcg/kg/min (06/04/25 0600)     [7]   Current Facility-Administered Medications:     acetaminophen (Tylenol) oral liquid 650 mg, 650 mg, oral, q6h PRN, JOVANNI Calixto    alteplase (Cathflo Activase) injection 2 mg, 2 mg, intra-catheter, PRN, JOVANNI Calixto    dextrose 50 % injection 12.5 g, 12.5 g, intravenous, q15 min PRN, JOVANNI Arnold    dextrose 50 % injection 25 g, 25 g, intravenous, q15 min PRN, YUN Arnold-CNP    [Held by provider] tiotropium (Spiriva Respimat) 2.5 mcg/actuation inhaler 2 puff, 2 puff, inhalation, Daily **AND** [Held by provider] fluticasone furoate-vilanteroL (Breo Ellipta) 100-25 mcg/dose inhaler 1 puff, 1 puff, inhalation, Daily, JOVANNI Calixto    [Held by provider] gabapentin (Neurontin) tablet 600 mg, 600 mg, oral, TID, JOVANNI Calixto    glucagon (Glucagen) injection 1 mg, 1 mg, intramuscular, q15 min PRN, JOVANNI Arnold    glucagon (Glucagen) injection 1 mg, 1 mg, intramuscular, q15 min PRN, JOVANNI Arnold    heparin (porcine) injection 5,000 Units, 5,000 Units, subcutaneous, q8h, Merry Patterson, APRN-CNP, 5,000 Units at 06/04/25 0806    HYDROmorphone (Dilaudid) injection 0.2 mg, 0.2 mg, intravenous, q3h PRN, Timoteo Davidson,     insulin glargine (Lantus) injection 15 Units, 15 Units,  subcutaneous, q24h, JOVANNI Echols, 15 Units at 06/03/25 2019    insulin lispro injection 0-15 Units, 0-15 Units, subcutaneous, q4h, JOVANNI Echols, 3 Units at 06/04/25 0450    ipratropium-albuteroL (Duo-Neb) 0.5-2.5 mg/3 mL nebulizer solution 3 mL, 3 mL, nebulization, q6h, JOVANNI Calixto, 3 mL at 06/04/25 0744    methylPREDNISolone sod succinate (SOLU-Medrol) 40 mg/mL injection 40 mg, 40 mg, intravenous, q12h, Maira Eugenia Shirley PA-C, 40 mg at 06/04/25 0805    nystatin (Mycostatin) 100,000 unit/gram powder 1 Application, 1 Application, Topical, BID, JOVANNI Calixto, 1 Application at 06/04/25 0809    oxygen (O2) therapy, , inhalation, Continuous - Inhalation, Timoteo Davidson, , 50 percent at 06/04/25 0745    pantoprazole (ProtoNix) EC tablet 40 mg, 40 mg, oral, Daily before breakfast **OR** pantoprazole (Protonix) injection 40 mg, 40 mg, intravenous, Daily before breakfast, JOVANNI Calixto, 40 mg at 06/04/25 0616    piperacillin-tazobactam (Zosyn) 4.5 g in dextrose (iso)  mL, 4.5 g, intravenous, q6h, JOVANNI Arnold, Stopped at 06/04/25 0520    polyethylene glycol (Glycolax, Miralax) packet 17 g, 17 g, oral, BID, JOVANNI Echols, 17 g at 06/04/25 0808    propofol (Diprivan) infusion, 0-50 mcg/kg/min, intravenous, Continuous, JOVANNI Calixto, Last Rate: 8.38 mL/hr at 06/04/25 0600, 15 mcg/kg/min at 06/04/25 0600    senna (Senokot) 8.8 mg/5 mL syrup 10 mL, 10 mL, oral, BID, JOVANNI Echols, 10 mL at 06/04/25 0809    sodium chloride 3 % nebulizer solution 3 mL, 3 mL, nebulization, q6h, JOVANNI Calixto, 3 mL at 06/04/25 0744

## 2025-06-04 NOTE — PROGRESS NOTES
Subjective Data:  Patient underlying sepsis.  On event as well as sedated.  Planning for weaning today.  Overnight Events:    Monitor sinus rhythm  Objective   Last Recorded Vitals  /73   Pulse 74   Temp 36.5 °C (97.7 °F) (Axillary)   Resp 15   Wt 92.7 kg (204 lb 5.9 oz)   SpO2 97%     Intake/Output Summary (Last 24 hours) at 6/4/2025 1505  Last data filed at 6/4/2025 1400  Gross per 24 hour   Intake 966.02 ml   Output 2700 ml   Net -1733.98 ml     Physical Exam:  HEENT: Normocephalic/atraumatic pupils equal react light  Neck exam mild JVD, no bruit  Lung exam few rhonchi's, few crackles at the bases  Cardiac exam is regular rhythm S1-S2, soft systolic murmur heard.   Abdomen soft nontender, nondistended  Extremities no clubbing, cyanosis, trace edema  Neuro exam light sedation  Image Results  ECG 12 Lead  Normal sinus rhythm    ST & T wave abnormality, consider inferior ischemia  Prolonged QT  Abnormal ECG  No previous ECGs available  Confirmed by Dominic Leal ([id number]) on 6/4/2025 10:43:26 AM  XR chest 1 view  Narrative: Interpreted By:  Brianne Waite,   STUDY:  XR CHEST 1 VIEW 6/4/2025 4:59 am      INDICATION:  Signs/Symptoms:Continued intubation      COMPARISON:  06/03/2025      ACCESSION NUMBER(S):  [id number]      ORDERING CLINICIAN:  EMMANUEL GARDNER      TECHNIQUE:  AP semi-erect view of the chest at bedside      FINDINGS:  The tip of the endotracheal tube is located 1 cm above the sarah  with nasogastric tube descending below diaphragm. Right jugular CVP  line terminates in right atrium.      Cardiac size is stable.      There is mild pulmonary vascular congestion appearing unchanged with  small bilateral pleural effusions blunting the lateral costophrenic  angles.      Impression: Stable mild pulmonary vascular congestion with small bilateral  pleural effusions.      Tip of the endotracheal tube 1 cm above sarah.      Signed by: Brianne Waite 6/4/2025 7:56 AM  Dictation workstation:    [id number]    Last Labs:  CBC - 6/4/2025:  4:46 AM  11.6 13.9 115    43.1      CMP - 6/4/2025:  4:46 AM  9.0 5.6 79 --- 0.7   3.0 3.1 157 43      PTT - 6/1/2025: 12:32 AM  1.2   12.7 23.5     Inpatient Medications:  Scheduled Medications[1]  Assessment & Plan  Acute respiratory failure  Patient has a borderline history of CHF, COPD.  Now with a history of diabetes, sepsis.  Now off the vasopressors.  Positive blood culture for Staphylococcus hominis.  Currently underwent creatinine is 2.1 as well as a potassium of 6.4.  Patient currently on IV antibiotic including Zithromax as well as a bronchodilators and steroids.  Patient also on Zosyn and vancomycin.  As needed diuretics.  Continued on propofol.  Echocardiogram ejection fraction in normal range.  Elevated troponin more likely due to demand ischemia.  Less likely occlusive event.  Will follow along as needed.  DVT prophylaxis.  6/3: Patient as above continue current antibiotic as well as DVT prophylaxis.  As needed IV Lasix.  Chest x-ray bilateral infiltrate.  Patient currently off vasopressors.  6/4: Patient has both sepsis.  Weaning today.  Continue bronchodilator as well as steroids and antibiotic.  Currently on a propofol infusion.  Stable cardiac wise.  DVT precaution.  Will follow-up as needed.  No further cardiac input at the moment.  Elevated troponin more likely due to demand ischemia.    Code Status:  Full Code  I spent 40 minutes in the professional and overall care of this patient.  Gerber Crain MD           [1]   Scheduled medications   Medication Dose Route Frequency    [Held by provider] tiotropium  2 puff inhalation Daily    And    [Held by provider] fluticasone furoate-vilanteroL  1 puff inhalation Daily    [Held by provider] gabapentin  600 mg oral TID    heparin (porcine)  5,000 Units subcutaneous q8h    insulin glargine  15 Units subcutaneous q24h    insulin lispro  0-15 Units subcutaneous q4h    ipratropium-albuteroL  3 mL nebulization  q6h    methylPREDNISolone sodium succinate (PF)  40 mg intravenous q12h    nystatin  1 Application Topical BID    oxygen   inhalation Continuous - Inhalation    pantoprazole  40 mg oral Daily before breakfast    Or    pantoprazole  40 mg intravenous Daily before breakfast    piperacillin-tazobactam  4.5 g intravenous q6h    polyethylene glycol  17 g oral BID    senna  10 mL oral BID    sodium chloride  3 mL nebulization q6h

## 2025-06-04 NOTE — PROGRESS NOTES
Physical Therapy                 Therapy Communication Note    Patient Name: Patricia Pruitt  MRN: [id number]  Department: [address] ICU  Room: 21/21-A  Today's Date: 6/4/2025     Discipline: Physical Therapy    Missed Visit: PT Missed Visit: Yes     Missed Visit Reason: Missed Visit Reason:  Cancel PT Treatment - per nursing, pt recently extubated and not appropriate for therapy treatment due to being mildly agitated and fatigued. Requests therapy return another date.

## 2025-06-04 NOTE — CARE PLAN
Problem: Pain - Adult  Goal: Verbalizes/displays adequate comfort level or baseline comfort level  6/4/2025 1106 by Sammy Barrios RN  Outcome: Progressing  6/4/2025 1106 by Sammy Barrios RN  Outcome: Progressing     Problem: Safety - Adult  Goal: Free from fall injury  6/4/2025 1106 by Sammy Barrios RN  Outcome: Progressing  6/4/2025 1106 by Sammy Barrios RN  Outcome: Progressing     Problem: Discharge Planning  Goal: Discharge to home or other facility with appropriate resources  6/4/2025 1106 by Sammy Barrios RN  Outcome: Progressing  6/4/2025 1106 by Sammy Barrios RN  Outcome: Progressing     Problem: Chronic Conditions and Co-morbidities  Goal: Patient's chronic conditions and co-morbidity symptoms are monitored and maintained or improved  6/4/2025 1106 by Sammy Barrios RN  Outcome: Progressing  6/4/2025 1106 by Sammy Barrios RN  Outcome: Progressing     Problem: Nutrition  Goal: Nutrient intake appropriate for maintaining nutritional needs  6/4/2025 1106 by Sammy Barrios RN  Outcome: Progressing  6/4/2025 1106 by Sammy Barrios RN  Outcome: Progressing     Problem: Skin  Goal: Decreased wound size/increased tissue granulation at next dressing change  6/4/2025 1106 by Sammy Barrios RN  Outcome: Progressing  Flowsheets (Taken 6/4/2025 1106)  Decreased wound size/increased tissue granulation at next dressing change:   Promote sleep for wound healing   Protective dressings over bony prominences  6/4/2025 1106 by Sammy Barrios RN  Outcome: Progressing  Flowsheets (Taken 6/4/2025 1106)  Decreased wound size/increased tissue granulation at next dressing change:   Promote sleep for wound healing   Protective dressings over bony prominences  Goal: Participates in plan/prevention/treatment measures  6/4/2025 1106 by Sammy Barrios RN  Outcome: Progressing  Flowsheets (Taken 6/4/2025 1106)  Participates in plan/prevention/treatment measures:   Elevate heels   Discuss with provider PT/OT  consult  6/4/2025 1106 by Sammy Barrios RN  Outcome: Progressing  Flowsheets (Taken 6/4/2025 1106)  Participates in plan/prevention/treatment measures:   Elevate heels   Discuss with provider PT/OT consult  Goal: Prevent/manage excess moisture  6/4/2025 1106 by Sammy Barrios RN  Outcome: Progressing  Flowsheets (Taken 6/4/2025 1106)  Prevent/manage excess moisture:   Cleanse incontinence/protect with barrier cream   Moisturize dry skin  6/4/2025 1106 by Sammy Barrios RN  Outcome: Progressing  Flowsheets (Taken 6/4/2025 1106)  Prevent/manage excess moisture:   Cleanse incontinence/protect with barrier cream   Moisturize dry skin  Goal: Prevent/minimize sheer/friction injuries  6/4/2025 1106 by Sammy Barrios RN  Outcome: Progressing  Flowsheets (Taken 6/4/2025 1106)  Prevent/minimize sheer/friction injuries:   Complete micro-shifts as needed if patient unable. Adjust patient position to relieve pressure points, not a full turn   HOB 30 degrees or less   Turn/reposition every 2 hours/use positioning/transfer devices   Use pull sheet  6/4/2025 1106 by Sammy Barrios RN  Outcome: Progressing  Flowsheets (Taken 6/4/2025 1106)  Prevent/minimize sheer/friction injuries:   Complete micro-shifts as needed if patient unable. Adjust patient position to relieve pressure points, not a full turn   HOB 30 degrees or less   Turn/reposition every 2 hours/use positioning/transfer devices   Use pull sheet  Goal: Promote/optimize nutrition  6/4/2025 1106 by Sammy Barrios RN  Outcome: Progressing  Flowsheets (Taken 6/4/2025 1106)  Promote/optimize nutrition: Monitor/record intake including meals  6/4/2025 1106 by Sammy Barrios RN  Outcome: Progressing  Flowsheets (Taken 6/4/2025 1106)  Promote/optimize nutrition: Monitor/record intake including meals  Goal: Promote skin healing  6/4/2025 1106 by Sammy Barrios RN  Outcome: Progressing  Flowsheets (Taken 6/4/2025 1106)  Promote skin healing:   Assess skin/pad under  line(s)/device(s)   Ensure correct size (line/device) and apply per  instructions   Protective dressings over bony prominences   Rotate device position/do not position patient on device   Turn/reposition every 2 hours/use positioning/transfer devices  6/4/2025 1106 by Sammy Barrios RN  Outcome: Progressing  Flowsheets (Taken 6/4/2025 1106)  Promote skin healing:   Assess skin/pad under line(s)/device(s)   Ensure correct size (line/device) and apply per  instructions   Protective dressings over bony prominences   Rotate device position/do not position patient on device   Turn/reposition every 2 hours/use positioning/transfer devices     Problem: Diabetes  Goal: Achieve decreasing blood glucose levels by end of shift  6/4/2025 1106 by Sammy Barrios RN  Outcome: Progressing  6/4/2025 1106 by Sammy Barrios RN  Outcome: Progressing  Goal: Increase stability of blood glucose readings by end of shift  6/4/2025 1106 by Sammy Barrios RN  Outcome: Progressing  6/4/2025 1106 by Sammy Barrios RN  Outcome: Progressing  Goal: Decrease in ketones present in urine by end of shift  6/4/2025 1106 by Sammy Barrios RN  Outcome: Progressing  6/4/2025 1106 by Sammy Barrios RN  Outcome: Progressing  Goal: Maintain electrolyte levels within acceptable range throughout shift  6/4/2025 1106 by Sammy Barrios RN  Outcome: Progressing  6/4/2025 1106 by Sammy Barrios RN  Outcome: Progressing  Goal: Maintain glucose levels >70mg/dl to <250mg/dl throughout shift  6/4/2025 1106 by Sammy Barrios RN  Outcome: Progressing  6/4/2025 1106 by Sammy Barrios RN  Outcome: Progressing  Goal: No changes in neurological exam by end of shift  6/4/2025 1106 by Sammy Barrios RN  Outcome: Progressing  6/4/2025 1106 by Sammy Barrios RN  Outcome: Progressing  Goal: Vital signs within normal range for age by end of shift  6/4/2025 1106 by Sammy Barrios RN  Outcome: Progressing  6/4/20252025 1106 by Sammy Barrios,  RN  Outcome: Progressing  Goal: Increase self care and/or family involovement by end of shift  6/4/2025 1106 by Sammy Barrios RN  Outcome: Progressing  6/4/2025 1106 by Sammy Barrios RN  Outcome: Progressing  Goal: Receive DSME education by end of shift  6/4/2025 1106 by Sammy Barrios RN  Outcome: Progressing  6/4/2025 1106 by Sammy Barrios RN  Outcome: Progressing     Problem: Knowledge Deficit  Goal: Patient/family/caregiver demonstrates understanding of disease process, treatment plan, medications, and discharge instructions  6/4/2025 1106 by Sammy Barrios RN  Outcome: Progressing  6/4/2025 1106 by Sammy Barrios RN  Outcome: Progressing     Problem: Mechanical Ventilation  Goal: Patient Will Maintain Patent Airway  6/4/2025 1106 by Sammy Barrios RN  Outcome: Progressing  6/4/2025 1106 by Sammy Barrios RN  Outcome: Progressing  Goal: Oral health is maintained or improved  6/4/2025 1106 by Sammy Barrios RN  Outcome: Progressing  6/4/2025 1106 by Sammy Barrios RN  Outcome: Progressing  Goal: ET tube will be managed safely  6/4/2025 1106 by Sammy Barrios RN  Outcome: Progressing  6/4/2025 1106 by Sammy Barrios RN  Outcome: Progressing  Goal: Ability to express needs and understand communication  6/4/2025 1106 by Sammy Barrios RN  Outcome: Progressing  6/4/2025 1106 by Sammy Brarios RN  Outcome: Progressing  Goal: Mobility/activity is maintained at optimum level for patient  6/4/2025 1106 by Sammy Barrios RN  Outcome: Progressing  6/4/2025 1106 by Sammy Barrios RN  Outcome: Progressing     Problem: Respiratory  Goal: Clear secretions with interventions this shift  6/4/2025 1106 by Sammy Barrios RN  Outcome: Progressing  6/4/2025 1106 by Sammy Barrios RN  Outcome: Progressing  Goal: Minimize anxiety/maximize coping throughout shift  6/4/2025 1106 by Sammy Barrios RN  Outcome: Progressing  6/4/2025 1106 by Sammy Barrios, RN  Outcome: Progressing  Goal: Minimal/no exertional  discomfort or dyspnea this shift  6/4/2025 1106 by Sammy Barrios RN  Outcome: Progressing  6/4/2025 1106 by Sammy Barrios RN  Outcome: Progressing  Goal: No signs of respiratory distress (eg. Use of accessory muscles. Peds grunting)  6/4/2025 1106 by Sammy Barrios RN  Outcome: Progressing  6/4/2025 1106 by Sammy Barrios RN  Outcome: Progressing  Goal: Patent airway maintained this shift  6/4/2025 1106 by Sammy Barrios RN  Outcome: Progressing  6/4/2025 1106 by Sammy Barrios RN  Outcome: Progressing  Goal: Tolerate mechanical ventilation evidenced by VS/agitation level this shift  6/4/2025 1106 by Sammy Barrios RN  Outcome: Progressing  6/4/2025 1106 by Sammy Barrios RN  Outcome: Progressing  Goal: Tolerate pulmonary toileting this shift  6/4/2025 1106 by Sammy Barrios RN  Outcome: Progressing  6/4/2025 1106 by Sammy Barrios RN  Outcome: Progressing  Goal: Wean oxygen to maintain O2 saturation per order/standard this shift  6/4/2025 1106 by Sammy Barrios RN  Outcome: Progressing  6/4/2025 1106 by Sammy Barrios RN  Outcome: Progressing     Problem: Safety - Medical Restraint  Goal: Remains free of injury from restraints (Restraint for Interference with Medical Device)  6/4/2025 1108 by Sammy Barrios RN  Flowsheets (Taken 6/4/2025 1108)  Remains free of injury from restraints (restraint for interference with medical device):   Determine that other, less restrictive measures have been tried or would not be effective before applying the restraint   Evaluate the patient's condition at the time of restraint application   Inform patient/family regarding the reason for restraint   Every 2 hours: Monitor safety, psychosocial status, comfort, nutrition and hydration  6/4/2025 1106 by Sammy Barrios RN  Outcome: Progressing  6/4/2025 1106 by Sammy Barrios RN  Outcome: Progressing  Goal: Free from restraint(s) (Restraint for Interference with Medical Device)  6/4/2025 1108 by Sammy Barrios  RN  Flowsheets (Taken 6/4/2025 1108)  Free from restraint(s) (restraint for interference with medical device):   ONCE/SHIFT or MINIMUM Every 12 hours: Assess and document the continuing need for restraints   Every 24 hours: Continued use of restraint requires Licensed Independent Practitioner to perform face to face examination and written order   Identify and implement measures to help patient regain control  6/4/2025 1106 by Sammy Barrios RN  Outcome: Progressing  6/4/2025 1106 by Sammy Barrios RN  Outcome: Progressing     Problem: Fall/Injury  Goal: Not fall by end of shift  6/4/2025 1106 by Sammy Barrios RN  Outcome: Progressing  6/4/2025 1106 by Sammy Barrios RN  Outcome: Progressing  Goal: Be free from injury by end of the shift  6/4/2025 1106 by Sammy Barrios RN  Outcome: Progressing  6/4/2025 1106 by Sammy Barrios RN  Outcome: Progressing    The clinical goals for the shift include remain hemodynamically stable

## 2025-06-04 NOTE — PROGRESS NOTES
TCC spoke to patient's sister, Susi. Assessment complete. Patient lives with her spouse, Pierre, in an efficiency house. Patient is dependent, requiring assistance from her spouse. Patient uses a wheelchair. Patient wears oxygen at night. Patient no longer drives, her spouse does the transporting and shopping. Patient smokes cigarettes. Patient follows Dr. Fernie Erickson. Patient fills prescriptions at [facility] in [place]. At this time there is not a safe discharge plan in place. Will follow.      06/04/25 7075   Discharge Planning   Living Arrangements Spouse/significant other   Support Systems Spouse/significant other   Type of Residence Private residence   Home or Post Acute Services Other (Comment)   Expected Discharge Disposition [place]  (TBD)   Does the patient need discharge transport arranged? No   Financial Resource Strain   How hard is it for you to pay for the very basics like food, housing, medical care, and heating? Pt Unable   Housing Stability   In the last 12 months, was there a time when you were not able to pay the mortgage or rent on time? N   In the past 12 months, how many times have you moved where you were living? 0   At any time in the past 12 months, were you homeless or living in a shelter (including now)? N   Transportation Needs   In the past 12 months, has lack of transportation kept you from medical appointments or from getting medications? no   In the past 12 months, has lack of transportation kept you from meetings, work, or from getting things needed for daily living? No

## 2025-06-04 NOTE — SIGNIFICANT EVENT
RT extubated patient to 6L NC, Spo2 92%. RN and intensivist at bedside. Voice intact and no stridor present.   1.85

## 2025-06-04 NOTE — PROGRESS NOTES
Occupational Therapy                 Therapy Communication Note    Patient Name: Patricia Pruitt  MRN: [id number]  Department: [facility] 3 E ICU  Room: 21/21-A  Today's Date: 6/4/2025     Discipline: Occupational Therapy    Missed Visit: OT Missed Visit: Yes     Missed Visit Reason: Missed Visit Reason: Other (Comment) (Cancel per nursing, pt recently extubated and not appropriate for therapy treatment due to being mildly agitated and fatigued. Requests therapy return another date.)    Missed Time: Cancel    Comment:

## 2025-06-04 NOTE — PROGRESS NOTES
06/04/25 1536   Physical Activity   On average, how many days per week do you engage in moderate to strenuous exercise (like a brisk walk)? 0 days   On average, how many minutes do you engage in exercise at this level? 0 min   Financial Resource Strain   How hard is it for you to pay for the very basics like food, housing, medical care, and heating? Pt Unable   Housing Stability   In the last 12 months, was there a time when you were not able to pay the mortgage or rent on time? N   In the past 12 months, how many times have you moved where you were living? 0   At any time in the past 12 months, were you homeless or living in a shelter (including now)? N   Transportation Needs   In the past 12 months, has lack of transportation kept you from medical appointments or from getting medications? no   In the past 12 months, has lack of transportation kept you from meetings, work, or from getting things needed for daily living? No   Food Insecurity   Within the past 12 months, you worried that your food would run out before you got the money to buy more. Pt Unable   Within the past 12 months, the food you bought just didn't last and you didn't have money to get more. Pt Unable   Stress   Do you feel stress - tense, restless, nervous, or anxious, or unable to sleep at night because your mind is troubled all the time - these days? Pt Unable   Social Connections   In a typical week, how many times do you talk on the phone with family, friends, or neighbors? Pt Unable   How often do you get together with friends or relatives? Pt Unable   How often do you attend Hinduism or Spiritism services? Pt Unable   Do you belong to any clubs or organizations such as Hinduism groups, unions, fraternal or athletic groups, or school groups? Pt Unable   How often do you attend meetings of the clubs or organizations you belong to? Pt Unable   Are you , , , , never , or living with a partner?     Intimate Partner Violence   Within the last year, have you been afraid of your partner or ex-partner? Pt Unable   Within the last year, have you been humiliated or emotionally abused in other ways by your partner or ex-partner? Pt Unable   Within the last year, have you been kicked, hit, slapped, or otherwise physically hurt by your partner or ex-partner? Pt Unable   Within the last year, have you been raped or forced to have any kind of sexual activity by your partner or ex-partner? Pt Unable   Alcohol Use   Q1: How often do you have a drink containing alcohol? Never   Q2: How many drinks containing alcohol do you have on a typical day when you are drinking? None   Q3: How often do you have six or more drinks on one occasion? Never   Utilities   In the past 12 months has the electric, gas, oil, or water company threatened to shut off services in your home? Pt Unable   Health Literacy   How often do you need to have someone help you when you read instructions, pamphlets, or other written material from your doctor or pharmacy? Patient unable to respond   Follow-Ups   We make community resources available to all of our patients to assist with everyday needs. We may be able to connect you with those resources. Would you be interested? N

## 2025-06-04 NOTE — PROCEDURES
Vascular Access Team Procedure Note     Visit Date: 6/4/2025      Patient Name: Patricia Pruitt         MRN: [id number]             Procedure: Pt has a RIJ TLC, there is a moderate amt of dried bloody drainage under drsg, is also loose around edges, will change drsg. Drsg has been changed using sterile technique, site without any redness, swelling or drainage, all three blue caps changed brisk blood return noted to all three lumens and they all flush easily with NS, both brown and blue lumens clamped and curos caps applied, the white lumen reconnected to IV fluids.                           Susi Lamb, RN  6/4/2025  5:39 PM

## 2025-06-04 NOTE — CARE PLAN
The clinical goals for the shift include Remain hemodynamically stable    Over the shift, the patient did make progress toward the following goals.       Problem: Pain - Adult  Goal: Verbalizes/displays adequate comfort level or baseline comfort level  Outcome: Progressing     Problem: Safety - Adult  Goal: Free from fall injury  Outcome: Progressing     Problem: Skin  Goal: Decreased wound size/increased tissue granulation at next dressing change  Outcome: Progressing  Flowsheets (Taken 6/3/2025 2343)  Decreased wound size/increased tissue granulation at next dressing change:   Promote sleep for wound healing   Protective dressings over bony prominences  Goal: Participates in plan/prevention/treatment measures  Outcome: Progressing  Flowsheets (Taken 6/3/2025 2343)  Participates in plan/prevention/treatment measures:   Elevate heels   Discuss with provider PT/OT consult  Goal: Prevent/manage excess moisture  Outcome: Progressing  Flowsheets (Taken 6/3/2025 2343)  Prevent/manage excess moisture:   Cleanse incontinence/protect with barrier cream   Moisturize dry skin  Goal: Prevent/minimize sheer/friction injuries  Outcome: Progressing  Flowsheets (Taken 6/3/2025 2343)  Prevent/minimize sheer/friction injuries:   Complete micro-shifts as needed if patient unable. Adjust patient position to relieve pressure points, not a full turn   HOB 30 degrees or less   Turn/reposition every 2 hours/use positioning/transfer devices   Use pull sheet  Goal: Promote/optimize nutrition  Outcome: Progressing  Flowsheets (Taken 6/3/2025 2343)  Promote/optimize nutrition: Monitor/record intake including meals  Goal: Promote skin healing  Outcome: Progressing  Flowsheets (Taken 6/3/2025 2343)  Promote skin healing:   Ensure correct size (line/device) and apply per  instructions   Assess skin/pad under line(s)/device(s)   Rotate device position/do not position patient on device   Protective dressings over bony prominences    Turn/reposition every 2 hours/use positioning/transfer devices     Problem: Safety - Medical Restraint  Goal: Remains free of injury from restraints (Restraint for Interference with Medical Device)  Outcome: Progressing  Flowsheets (Taken 6/3/2025 2343)  Remains free of injury from restraints (restraint for interference with medical device):   Determine that other, less restrictive measures have been tried or would not be effective before applying the restraint   Evaluate the patient's condition at the time of restraint application   Inform patient/family regarding the reason for restraint   Every 2 hours: Monitor safety, psychosocial status, comfort, nutrition and hydration  Goal: Free from restraint(s) (Restraint for Interference with Medical Device)  Outcome: Progressing  Flowsheets (Taken 6/3/2025 2343)  Free from restraint(s) (restraint for interference with medical device):   ONCE/SHIFT or MINIMUM Every 12 hours: Assess and document the continuing need for restraints   Every 24 hours: Continued use of restraint requires Licensed Independent Practitioner to perform face to face examination and written order   Identify and implement measures to help patient regain control

## 2025-06-04 NOTE — ASSESSMENT & PLAN NOTE
Patient has a borderline history of CHF, COPD.  Now with a history of diabetes, sepsis.  Now off the vasopressors.  Positive blood culture for Staphylococcus hominis.  Currently underwent creatinine is 2.1 as well as a potassium of 6.4.  Patient currently on IV antibiotic including Zithromax as well as a bronchodilators and steroids.  Patient also on Zosyn and vancomycin.  As needed diuretics.  Continued on propofol.  Echocardiogram ejection fraction in normal range.  Elevated troponin more likely due to demand ischemia.  Less likely occlusive event.  Will follow along as needed.  DVT prophylaxis.  6/3: Patient as above continue current antibiotic as well as DVT prophylaxis.  As needed IV Lasix.  Chest x-ray bilateral infiltrate.  Patient currently off vasopressors.  6/4: Patient has both sepsis.  Weaning today.  Continue bronchodilator as well as steroids and antibiotic.  Currently on a propofol infusion.  Stable cardiac wise.  DVT precaution.  Will follow-up as needed.  No further cardiac input at the moment.

## 2025-06-05 ENCOUNTER — APPOINTMENT (OUTPATIENT)
Dept: RADIOLOGY | Facility: HOSPITAL | Age: 65
DRG: 870 | End: 2025-06-05
Payer: COMMERCIAL

## 2025-06-05 LAB
ALBUMIN SERPL BCP-MCNC: 3.3 G/DL (ref 3.4–5)
ALP SERPL-CCNC: 45 U/L (ref 33–136)
ALT SERPL W P-5'-P-CCNC: 157 U/L (ref 7–45)
ANION GAP SERPL CALCULATED.3IONS-SCNC: 10 MMOL/L (ref 10–20)
AST SERPL W P-5'-P-CCNC: 95 U/L (ref 9–39)
BASOPHILS # BLD AUTO: 0.01 X10*3/UL (ref 0–0.1)
BASOPHILS NFR BLD AUTO: 0.1 %
BILIRUB DIRECT SERPL-MCNC: 0.8 MG/DL (ref 0–0.3)
BILIRUB SERPL-MCNC: 1.6 MG/DL (ref 0–1.2)
BUN SERPL-MCNC: 31 MG/DL (ref 6–23)
CA-I BLD-SCNC: 1.08 MMOL/L (ref 1.1–1.33)
CALCIUM SERPL-MCNC: 9.1 MG/DL (ref 8.6–10.3)
CHLORIDE SERPL-SCNC: 91 MMOL/L (ref 98–107)
CO2 SERPL-SCNC: 44 MMOL/L (ref 21–32)
CREAT SERPL-MCNC: 0.7 MG/DL (ref 0.5–1.05)
EGFRCR SERPLBLD CKD-EPI 2021: >90 ML/MIN/1.73M*2
EOSINOPHIL # BLD AUTO: 0 X10*3/UL (ref 0–0.7)
EOSINOPHIL NFR BLD AUTO: 0 %
ERYTHROCYTE [DISTWIDTH] IN BLOOD BY AUTOMATED COUNT: 13.9 % (ref 11.5–14.5)
GLUCOSE BLD MANUAL STRIP-MCNC: 112 MG/DL (ref 74–99)
GLUCOSE BLD MANUAL STRIP-MCNC: 137 MG/DL (ref 74–99)
GLUCOSE BLD MANUAL STRIP-MCNC: 149 MG/DL (ref 74–99)
GLUCOSE BLD MANUAL STRIP-MCNC: 215 MG/DL (ref 74–99)
GLUCOSE BLD MANUAL STRIP-MCNC: 85 MG/DL (ref 74–99)
GLUCOSE BLD MANUAL STRIP-MCNC: 91 MG/DL (ref 74–99)
GLUCOSE SERPL-MCNC: 105 MG/DL (ref 74–99)
HCT VFR BLD AUTO: 46.7 % (ref 36–46)
HGB BLD-MCNC: 14.6 G/DL (ref 12–16)
IMM GRANULOCYTES # BLD AUTO: 0.07 X10*3/UL (ref 0–0.7)
IMM GRANULOCYTES NFR BLD AUTO: 0.8 % (ref 0–0.9)
LYMPHOCYTES # BLD AUTO: 1.02 X10*3/UL (ref 1.2–4.8)
LYMPHOCYTES NFR BLD AUTO: 11.1 %
MAGNESIUM SERPL-MCNC: 2.03 MG/DL (ref 1.6–2.4)
MCH RBC QN AUTO: 32 PG (ref 26–34)
MCHC RBC AUTO-ENTMCNC: 31.3 G/DL (ref 32–36)
MCV RBC AUTO: 102 FL (ref 80–100)
MONOCYTES # BLD AUTO: 0.7 X10*3/UL (ref 0.1–1)
MONOCYTES NFR BLD AUTO: 7.6 %
NEUTROPHILS # BLD AUTO: 7.43 X10*3/UL (ref 1.2–7.7)
NEUTROPHILS NFR BLD AUTO: 80.4 %
NRBC BLD-RTO: 0 /100 WBCS (ref 0–0)
PHOSPHATE SERPL-MCNC: 4.6 MG/DL (ref 2.5–4.9)
PLATELET # BLD AUTO: 103 X10*3/UL (ref 150–450)
POTASSIUM SERPL-SCNC: 4.2 MMOL/L (ref 3.5–5.3)
PROT SERPL-MCNC: 6.1 G/DL (ref 6.4–8.2)
RBC # BLD AUTO: 4.56 X10*6/UL (ref 4–5.2)
SODIUM SERPL-SCNC: 141 MMOL/L (ref 136–145)
WBC # BLD AUTO: 9.2 X10*3/UL (ref 4.4–11.3)

## 2025-06-05 PROCEDURE — 2500000001 HC RX 250 WO HCPCS SELF ADMINISTERED DRUGS (ALT 637 FOR MEDICARE OP): Performed by: INTERNAL MEDICINE

## 2025-06-05 PROCEDURE — 82248 BILIRUBIN DIRECT: CPT

## 2025-06-05 PROCEDURE — 94640 AIRWAY INHALATION TREATMENT: CPT

## 2025-06-05 PROCEDURE — 2500000004 HC RX 250 GENERAL PHARMACY W/ HCPCS (ALT 636 FOR OP/ED): Performed by: STUDENT IN AN ORGANIZED HEALTH CARE EDUCATION/TRAINING PROGRAM

## 2025-06-05 PROCEDURE — 97110 THERAPEUTIC EXERCISES: CPT | Mod: GP

## 2025-06-05 PROCEDURE — 74230 X-RAY XM SWLNG FUNCJ C+: CPT

## 2025-06-05 PROCEDURE — 76705 ECHO EXAM OF ABDOMEN: CPT | Performed by: STUDENT IN AN ORGANIZED HEALTH CARE EDUCATION/TRAINING PROGRAM

## 2025-06-05 PROCEDURE — 94668 MNPJ CHEST WALL SBSQ: CPT

## 2025-06-05 PROCEDURE — 2500000004 HC RX 250 GENERAL PHARMACY W/ HCPCS (ALT 636 FOR OP/ED)

## 2025-06-05 PROCEDURE — 2500000005 HC RX 250 GENERAL PHARMACY W/O HCPCS

## 2025-06-05 PROCEDURE — 94667 MNPJ CHEST WALL 1ST: CPT

## 2025-06-05 PROCEDURE — 1200000002 HC GENERAL ROOM WITH TELEMETRY DAILY

## 2025-06-05 PROCEDURE — 82330 ASSAY OF CALCIUM: CPT

## 2025-06-05 PROCEDURE — 2500000004 HC RX 250 GENERAL PHARMACY W/ HCPCS (ALT 636 FOR OP/ED): Mod: JZ

## 2025-06-05 PROCEDURE — 2500000002 HC RX 250 W HCPCS SELF ADMINISTERED DRUGS (ALT 637 FOR MEDICARE OP, ALT 636 FOR OP/ED)

## 2025-06-05 PROCEDURE — 94669 MECHANICAL CHEST WALL OSCILL: CPT

## 2025-06-05 PROCEDURE — 74230 X-RAY XM SWLNG FUNCJ C+: CPT | Performed by: RADIOLOGY

## 2025-06-05 PROCEDURE — 2500000004 HC RX 250 GENERAL PHARMACY W/ HCPCS (ALT 636 FOR OP/ED): Performed by: INTERNAL MEDICINE

## 2025-06-05 PROCEDURE — 82947 ASSAY GLUCOSE BLOOD QUANT: CPT

## 2025-06-05 PROCEDURE — 80053 COMPREHEN METABOLIC PANEL: CPT

## 2025-06-05 PROCEDURE — 83735 ASSAY OF MAGNESIUM: CPT

## 2025-06-05 PROCEDURE — 99291 CRITICAL CARE FIRST HOUR: CPT

## 2025-06-05 PROCEDURE — 97530 THERAPEUTIC ACTIVITIES: CPT | Mod: GP

## 2025-06-05 PROCEDURE — 2500000004 HC RX 250 GENERAL PHARMACY W/ HCPCS (ALT 636 FOR OP/ED): Performed by: NURSE PRACTITIONER

## 2025-06-05 PROCEDURE — 85025 COMPLETE CBC W/AUTO DIFF WBC: CPT

## 2025-06-05 PROCEDURE — 84100 ASSAY OF PHOSPHORUS: CPT

## 2025-06-05 PROCEDURE — 92611 MOTION FLUOROSCOPY/SWALLOW: CPT | Mod: GN | Performed by: SPEECH-LANGUAGE PATHOLOGIST

## 2025-06-05 PROCEDURE — 2500000004 HC RX 250 GENERAL PHARMACY W/ HCPCS (ALT 636 FOR OP/ED): Mod: JZ | Performed by: INTERNAL MEDICINE

## 2025-06-05 PROCEDURE — 92610 EVALUATE SWALLOWING FUNCTION: CPT | Mod: GN

## 2025-06-05 PROCEDURE — 2500000005 HC RX 250 GENERAL PHARMACY W/O HCPCS: Performed by: HOSPITALIST

## 2025-06-05 PROCEDURE — 76705 ECHO EXAM OF ABDOMEN: CPT

## 2025-06-05 PROCEDURE — 9420000001 HC RT PATIENT EDUCATION 5 MIN

## 2025-06-05 RX ORDER — ATORVASTATIN CALCIUM 40 MG/1
40 TABLET, FILM COATED ORAL NIGHTLY
Status: DISCONTINUED | OUTPATIENT
Start: 2025-06-05 | End: 2025-06-06 | Stop reason: HOSPADM

## 2025-06-05 RX ORDER — CALCIUM GLUCONATE 20 MG/ML
2 INJECTION, SOLUTION INTRAVENOUS ONCE
Status: COMPLETED | OUTPATIENT
Start: 2025-06-05 | End: 2025-06-05

## 2025-06-05 RX ADMIN — BARIUM SULFATE 70 ML: 400 SUSPENSION ORAL at 14:00

## 2025-06-05 RX ADMIN — HEPARIN SODIUM 5000 UNITS: 5000 INJECTION, SOLUTION INTRAVENOUS; SUBCUTANEOUS at 00:10

## 2025-06-05 RX ADMIN — HYDROMORPHONE HYDROCHLORIDE 0.2 MG: 1 INJECTION, SOLUTION INTRAMUSCULAR; INTRAVENOUS; SUBCUTANEOUS at 10:37

## 2025-06-05 RX ADMIN — BARIUM SULFATE 5 ML: 400 SUSPENSION ORAL at 14:10

## 2025-06-05 RX ADMIN — INSULIN LISPRO 4 UNITS: 100 INJECTION, SOLUTION INTRAVENOUS; SUBCUTANEOUS at 16:41

## 2025-06-05 RX ADMIN — NYSTATIN 1 APPLICATION: 100000 POWDER TOPICAL at 08:49

## 2025-06-05 RX ADMIN — NYSTATIN 1 APPLICATION: 100000 POWDER TOPICAL at 23:37

## 2025-06-05 RX ADMIN — METHYLPREDNISOLONE SODIUM SUCCINATE 40 MG: 40 INJECTION, POWDER, FOR SOLUTION INTRAMUSCULAR; INTRAVENOUS at 08:47

## 2025-06-05 RX ADMIN — METHYLPREDNISOLONE SODIUM SUCCINATE 40 MG: 40 INJECTION, POWDER, FOR SOLUTION INTRAMUSCULAR; INTRAVENOUS at 23:36

## 2025-06-05 RX ADMIN — BARIUM SULFATE 700 MG: 700 TABLET ORAL at 14:15

## 2025-06-05 RX ADMIN — ATORVASTATIN CALCIUM 40 MG: 40 TABLET, FILM COATED ORAL at 23:34

## 2025-06-05 RX ADMIN — Medication 4 L/MIN: at 08:15

## 2025-06-05 RX ADMIN — HEPARIN SODIUM 5000 UNITS: 5000 INJECTION, SOLUTION INTRAVENOUS; SUBCUTANEOUS at 23:38

## 2025-06-05 RX ADMIN — IPRATROPIUM BROMIDE AND ALBUTEROL SULFATE 3 ML: 2.5; .5 SOLUTION RESPIRATORY (INHALATION) at 08:15

## 2025-06-05 RX ADMIN — SODIUM CHLORIDE SOLN NEBU 3% 3 ML: 3 NEBU SOLN at 19:34

## 2025-06-05 RX ADMIN — ATORVASTATIN CALCIUM 40 MG: 40 TABLET, FILM COATED ORAL at 23:38

## 2025-06-05 RX ADMIN — SODIUM CHLORIDE SOLN NEBU 3% 3 ML: 3 NEBU SOLN at 00:02

## 2025-06-05 RX ADMIN — METHYLPREDNISOLONE SODIUM SUCCINATE 40 MG: 40 INJECTION, POWDER, FOR SOLUTION INTRAMUSCULAR; INTRAVENOUS at 23:37

## 2025-06-05 RX ADMIN — PIPERACILLIN SODIUM AND TAZOBACTAM SODIUM 4.5 G: 4; .5 INJECTION, SOLUTION INTRAVENOUS at 10:13

## 2025-06-05 RX ADMIN — IPRATROPIUM BROMIDE AND ALBUTEROL SULFATE 3 ML: 2.5; .5 SOLUTION RESPIRATORY (INHALATION) at 00:04

## 2025-06-05 RX ADMIN — HEPARIN SODIUM 5000 UNITS: 5000 INJECTION, SOLUTION INTRAVENOUS; SUBCUTANEOUS at 08:48

## 2025-06-05 RX ADMIN — BARIUM SULFATE 90 ML: 0.81 POWDER, FOR SUSPENSION ORAL at 14:00

## 2025-06-05 RX ADMIN — Medication 6 L/MIN: at 19:37

## 2025-06-05 RX ADMIN — HEPARIN SODIUM 5000 UNITS: 5000 INJECTION, SOLUTION INTRAVENOUS; SUBCUTANEOUS at 16:41

## 2025-06-05 RX ADMIN — CALCIUM GLUCONATE 2 G: 20 INJECTION, SOLUTION INTRAVENOUS at 05:45

## 2025-06-05 RX ADMIN — DICLOFENAC SODIUM 4 G: 10 GEL TOPICAL at 12:27

## 2025-06-05 RX ADMIN — HYDROMORPHONE HYDROCHLORIDE 0.2 MG: 1 INJECTION, SOLUTION INTRAMUSCULAR; INTRAVENOUS; SUBCUTANEOUS at 05:01

## 2025-06-05 RX ADMIN — PIPERACILLIN SODIUM AND TAZOBACTAM SODIUM 4.5 G: 4; .5 INJECTION, SOLUTION INTRAVENOUS at 05:01

## 2025-06-05 RX ADMIN — IPRATROPIUM BROMIDE AND ALBUTEROL SULFATE 3 ML: 2.5; .5 SOLUTION RESPIRATORY (INHALATION) at 19:38

## 2025-06-05 RX ADMIN — HYDROMORPHONE HYDROCHLORIDE 0.2 MG: 1 INJECTION, SOLUTION INTRAMUSCULAR; INTRAVENOUS; SUBCUTANEOUS at 14:43

## 2025-06-05 RX ADMIN — IPRATROPIUM BROMIDE AND ALBUTEROL SULFATE 3 ML: 2.5; .5 SOLUTION RESPIRATORY (INHALATION) at 11:24

## 2025-06-05 RX ADMIN — PIPERACILLIN SODIUM AND TAZOBACTAM SODIUM 4.5 G: 4; .5 INJECTION, SOLUTION INTRAVENOUS at 16:41

## 2025-06-05 RX ADMIN — SODIUM CHLORIDE SOLN NEBU 3% 3 ML: 3 NEBU SOLN at 11:24

## 2025-06-05 RX ADMIN — BARIUM SULFATE 10 ML: 400 PASTE ORAL at 14:15

## 2025-06-05 RX ADMIN — SODIUM CHLORIDE SOLN NEBU 3% 3 ML: 3 NEBU SOLN at 08:15

## 2025-06-05 ASSESSMENT — PAIN DESCRIPTION - LOCATION
LOCATION: HIP

## 2025-06-05 ASSESSMENT — COGNITIVE AND FUNCTIONAL STATUS - GENERAL
MOVING FROM LYING ON BACK TO SITTING ON SIDE OF FLAT BED WITH BEDRAILS: A LOT
WALKING IN HOSPITAL ROOM: TOTAL
STANDING UP FROM CHAIR USING ARMS: TOTAL
TURNING FROM BACK TO SIDE WHILE IN FLAT BAD: A LOT
MOBILITY SCORE: 8
MOVING TO AND FROM BED TO CHAIR: TOTAL
CLIMB 3 TO 5 STEPS WITH RAILING: TOTAL

## 2025-06-05 ASSESSMENT — PAIN SCALES - GENERAL
PAINLEVEL_OUTOF10: 0 - NO PAIN
PAINLEVEL_OUTOF10: 0 - NO PAIN
PAINLEVEL_OUTOF10: 7
PAINLEVEL_OUTOF10: 0 - NO PAIN
PAINLEVEL_OUTOF10: 7
PAINLEVEL_OUTOF10: 0 - NO PAIN
PAINLEVEL_OUTOF10: 7
PAINLEVEL_OUTOF10: 0 - NO PAIN
PAINLEVEL_OUTOF10: 6

## 2025-06-05 ASSESSMENT — PAIN DESCRIPTION - ORIENTATION
ORIENTATION: LEFT

## 2025-06-05 ASSESSMENT — PAIN - FUNCTIONAL ASSESSMENT
PAIN_FUNCTIONAL_ASSESSMENT: 0-10

## 2025-06-05 ASSESSMENT — PAIN DESCRIPTION - DESCRIPTORS
DESCRIPTORS: ACHING;BURNING

## 2025-06-05 NOTE — PROGRESS NOTES
[facility] Critical Care Medicine       Date:  6/5/2025  Patient:  Patricia Pruitt  YOB: 1960  MRN:  [id number]   Admit Date:  5/31/2025  Hospital Length of Stay: 5   ICU Length of Stay: 4d 8h       No chief complaint on file.        History of Present Illness:  Patricia Pruitt is a 65 y.o. year old female patient with past medical history of  CHF, COPD on 4 L baseline nasal cannula, DM, hypertension, CVA who presents to [facility] ICU for respiratory failure with hypoxia and hypercapnia. Etiology of respiratory failure appears to be an acute exacerbation of COPD.     Pt denies chest pain, nausea, vomiting, diarrhea, constipation, abdominal pain, fevers, headache. She is awake this am and following commands. Plan for extubation today.      Interval ICU Events:  6/2: Spontaneous breathing trial was aborted today due to excess secretions and marginal respiratory acidosis.     6/3: Weaned this am for three hours. Will remain intubated due to excess secretions. PT/OT to eval today.     6/4: No acute events overnight. Secretions appear to be less today. Plan to extubate pending SBT.     6/5: No acute events overnight. Doing well on 4L NC. Failed swallow with speech today, will need MBSS. Likely downgrade today.     Objective     Medical History:  Medical History[1]  Surgical History[2]  Prescriptions Prior to Admission[3]  Codeine, Morphine, and Penicillins  Social History[4]  Family History[5]    Hospital Medications:    Continuous Medications[6]    Current Medications[7]    Review of Systems:  14 point review of systems was completed and negative except for those specially mention in my HPI    Physical Exam:    Heart Rate:  [63-88]   Temp:  [35.8 °C (96.5 °F)-36.7 °C (98.1 °F)]   Resp:  [10-33]   BP: ()/()   Weight:  [91.1 kg (200 lb 13.4 oz)-92.7 kg (204 lb 5.9 oz)]   SpO2:  [90 %-98 %]     Physical Exam  Constitutional:       General: She is not in acute distress.     Appearance: She is ill-appearing.    HENT:      Head: Normocephalic.      Mouth/Throat:      Mouth: Mucous membranes are moist.   Eyes:      Extraocular Movements: Extraocular movements intact.      Conjunctiva/sclera: Conjunctivae normal.   Cardiovascular:      Rate and Rhythm: Normal rate and regular rhythm.      Pulses: Normal pulses.   Pulmonary:      Effort: No respiratory distress.      Breath sounds: Rhonchi present. No wheezing or rales.      Comments: Diminished throughout  Abdominal:      General: Bowel sounds are normal. There is no distension.      Palpations: Abdomen is soft.      Tenderness: There is no abdominal tenderness. There is no guarding.   Musculoskeletal:         General: No swelling, deformity or signs of injury.      Cervical back: Normal range of motion.   Skin:     General: Skin is warm and dry.   Neurological:      Mental Status: She is oriented to person, place, and time.      Motor: Weakness present.   Psychiatric:         Mood and Affect: Mood normal.         Behavior: Behavior normal.         Objective:    I have reviewed all medications, laboratory results, and imaging pertinent for today's encounter.    Vent Mode: Pressure support  PEEP/CPAP (cm H2O):  [5 cm H20] 5 cm H20  OK SUP:  [5 cm H20] 5 cm H20  MAP (cm H2O):  [6.3-6.6] 6.6      Intake/Output Summary (Last 24 hours) at 6/5/2025 0753  Last data filed at 6/5/2025 0646  Gross per 24 hour   Intake 540.08 ml   Output 4700 ml   Net -4159.92 ml       Daily Labs:  CBC:   Results from last 7 days   Lab Units 06/05/25  0512 06/04/25  0446   WBC AUTO x10*3/uL 9.2 11.6*   HEMOGLOBIN g/dL 14.6 13.9   HEMATOCRIT % 46.7* 43.1   MCV fL 102* 99     BMP:    Results from last 7 days   Lab Units 06/05/25  0512 06/04/25  0446   SODIUM mmol/L 141 139   POTASSIUM mmol/L 4.2 4.3   CHLORIDE mmol/L 91* 96*   CO2 mmol/L 44* 38*   BUN mg/dL 31* 34*   CREATININE mg/dL 0.70 0.73   CALCIUM mg/dL 9.1 9.0   GLUCOSE mg/dL 105* 176*   MAGNESIUM mg/dL 2.03 2.02     ABG:   Results from last 7  days   Lab Units 06/04/25  1142 06/02/25  1202   POCT PH, ARTERIAL pH 7.47* 7.41   POCT PCO2, ARTERIAL mm Hg 58* 56*   POCT PO2, ARTERIAL mm Hg 153* 86   POCT SO2, ARTERIAL % 100 98   POCT HCO3 CALCULATED, ARTERIAL mmol/L 42.2* 35.5*   POCT LACTATE, ARTERIAL mmol/L 1.1 1.2      VBG:   Results from last 7 days   Lab Units 06/04/25 2038 06/02/25  0403   POCT PH, VENOUS pH 7.47* 7.38   POCT PCO2, VENOUS mm Hg 66* 59*   POCT PO2, VENOUS mm Hg 59* 49*   POCT SO2, VENOUS % 90* 82*   POCT HCO3 CALCULATED, VENOUS mmol/L 48.0* 34.9*   POCT LACTATE, VENOUS mmol/L 0.9 1.3             Assessment/Plan:    I am currently managing this critically ill patient for the following problems:    Neurology/Psychiatry/Pain Control/Sedation:   #Acute metabolic encephalopathy- improving  Hx CVA  - Pain Control: acetaminophen PRN  - Sedation/Analgesia: currently off pending extubation    Respiratory/ENT:  #Acute respiratory failure with hypoxia and hypercapnia.   #Pneumonia.   #Acute exacerbation of COPD.   - Supplemental O2: 6L NC  - solumedrol 40mg Q12  - Pulm hygiene- duonebs, hypertonic saline- add volera    Cardiovascular:   Undifferentiated shock- resolved.   Hx of hypertension, CHF   - Continuous cardiac monitoring   - EKGs PRN for ACS symptoms, arrhythmias     Gastrointestinal:  No acute issues  - Diet: NPO pending swallow study  - BR: miralax  - GI Prophylaxis: PPI  - RUQ ultrasound today for elevated bili    Renal/Volume Status/Electrolytes:  #Rhabdomyolysis- resolved  #SARAH- resolved  - Replete electrolytes to maintain K >4.0 and Mg >2.0  - Daily RFP, Mg    Endocrinology:  Hx T2DM  - Home diabetic regimen: none  - SSI q4hrs while NPO   - Hypoglycemia protocol PRN     Infectious Disease:  #pneumonia  - Antibiotics: zosyn,   - Blood cultures: positive x 2 for staph hominis.   - Monitor SIRS criteria    Hematology/Oncology:  No acute issues  - Baseline Hgb  - Transfuse if Hgb <7.0 or symptomatic anemia  - Daily CBC    OBGYN:  No acute  issues    MSK/Skin:  No acute issues  - PT/OT eval   - ICU skin protocol, padded pressure points  - Q2hr turns    Incidental Findings/Outpatient Follow-up Recs:    Ethics/Code Status:  Full Code    :  DVT Prophylaxis: SQH, SCDs  GI Prophylaxis: PPI  Bowel Regimen: miralax     Diet: TF  CVC: yes   Macy: yes   Mederos: external  Restraints: no  Disposition:  ICU       Critical Care Time:  35 minutes spent in preparing to see patient (I.e. labs, imaging, etc.), documentation, discussion plan of care with patient/family/caregiver, and/or coordination of care with multidisciplinary team including the attending. Time does not include completion of procedure time.     Plan of care discussed with Dr. Davidson    Disclaimer: Documentation completed with the information available at the time of input. Parts of this note may have been scribed or generated using voice dictation software, Dragon.  Homophonic errors may exist.  Please contact me directly if clarification is needed.     Alie MALCOLM-CNP  Pulmonary & Critical Care Medicine   [facility]          [1]   Past Medical History:  Diagnosis Date    Chronic hypoxic respiratory failure     5L at home but has run out    COPD (chronic obstructive pulmonary disease) (Multi)     Diabetes mellitus (Multi)     Stroke (Multi)    [2]   Past Surgical History:  Procedure Laterality Date     SECTION, LOW TRANSVERSE      TONSILLECTOMY     [3]   Medications Prior to Admission   Medication Sig Dispense Refill Last Dose/Taking    acetaminophen (Tylenol) 325 mg tablet Take 2 tablets (650 mg) by mouth every 6 hours if needed for mild pain (1 - 3).       ALBUTEROL INHL Inhale 90 mcg every 4 hours if needed (2 puffs).       fluticasone-umeclidin-vilanter (Trelegy Ellipta) 100-62.5-25 mcg blister with device Inhale 1 puff once daily.       gabapentin (Neurontin) 600 mg tablet Take 1 tablet (600 mg) by mouth 3 times a day.       guaiFENesin  (Mucinex) 600 mg 12 hr tablet Take 2 tablets (1,200 mg) by mouth 2 times a day. Do not crush, chew, or split.       ipratropium-albuteroL (Duo-Neb) 0.5-2.5 mg/3 mL nebulizer solution Take 3 mL by nebulization 4 times a day.       nystatin (Mycostatin) 100,000 unit/gram powder Apply 1 Application topically 2 times a day.       oxygen (O2) gas therapy Inhale 4 L/min at 240,000 mL/hr continuously.       pantoprazole (ProtoNix) 40 mg EC tablet Take 1 tablet (40 mg) by mouth once daily in the morning. Take before meals. Do not crush, chew, or split.      [4]   Social History  Tobacco Use    Smoking status: Every Day     Types: Cigarettes   [5] No family history on file.  [6]    [7]   Current Facility-Administered Medications:     acetaminophen (Tylenol) oral liquid 650 mg, 650 mg, oral, q6h PRN, JOVANNI Calixto    alteplase (Cathflo Activase) injection 2 mg, 2 mg, intra-catheter, PRN, JOVANNI Calixto    dextrose 50 % injection 12.5 g, 12.5 g, intravenous, q15 min PRN, JOVANNI Arnold    dextrose 50 % injection 25 g, 25 g, intravenous, q15 min PRN, JOVANNI Arnold    diclofenac sodium (Voltaren) 1 % gel 4 g, 4 g, Topical, 4x daily PRN, JOVANNI Echols, 4 g at 06/04/25 2057    [Held by provider] tiotropium (Spiriva Respimat) 2.5 mcg/actuation inhaler 2 puff, 2 puff, inhalation, Daily **AND** [Held by provider] fluticasone furoate-vilanteroL (Breo Ellipta) 100-25 mcg/dose inhaler 1 puff, 1 puff, inhalation, Daily, JOVANNI Calixto    [Held by provider] gabapentin (Neurontin) tablet 600 mg, 600 mg, oral, TID, JOVANNI Calixto    glucagon (Glucagen) injection 1 mg, 1 mg, intramuscular, q15 min PRN, JOVANNI Arnold    glucagon (Glucagen) injection 1 mg, 1 mg, intramuscular, q15 min PRN, JOVANNI Arnold    heparin (porcine) injection 5,000 Units, 5,000 Units, subcutaneous, q8h, JOVANNI Calixto, 5,000 Units at 06/05/25  0010    HYDROmorphone (Dilaudid) injection 0.2 mg, 0.2 mg, intravenous, q3h PRN, Timoteo Davidson, , 0.2 mg at 06/05/25 0501    [Held by provider] insulin glargine (Lantus) injection 15 Units, 15 Units, subcutaneous, q24h, JOVANNI Echols, 15 Units at 06/03/25 2019    insulin lispro injection 0-10 Units, 0-10 Units, subcutaneous, q4h, JOVANNI Echols    ipratropium-albuteroL (Duo-Neb) 0.5-2.5 mg/3 mL nebulizer solution 3 mL, 3 mL, nebulization, q6h, JOVANNI Calixto, 3 mL at 06/05/25 0004    methylPREDNISolone sod succinate (SOLU-Medrol) 40 mg/mL injection 40 mg, 40 mg, intravenous, q12h, Maria Eugenia Shirley PA-C, 40 mg at 06/04/25 2044    nystatin (Mycostatin) 100,000 unit/gram powder 1 Application, 1 Application, Topical, BID, JOVANNI Calixto, 1 Application at 06/04/25 2044    oxygen (O2) therapy, , inhalation, Continuous - Inhalation, JOVANNI Barajas, 4 L/min at 06/04/25 2051    piperacillin-tazobactam (Zosyn) 4.5 g in dextrose (iso)  mL, 4.5 g, intravenous, q6h, JOVANNI Arnold, Stopped at 06/05/25 0531    polyethylene glycol (Glycolax, Miralax) packet 17 g, 17 g, oral, Daily PRN, JOVANNI Echols    sodium chloride 3 % nebulizer solution 3 mL, 3 mL, nebulization, q6h, JOVANNI Calixto, 3 mL at 06/05/25 0002

## 2025-06-05 NOTE — PROGRESS NOTES
Patient not medically clear. Patient to transfer out of ICU. TCC met with patient to discuss discharge plans. Patient declining SNF and HHC but would like to see how she is tomorrow. Patient reports that she has her spouse at home to assist her. At this time there is not a safe discharge plan in place. Will follow.      06/05/25 8291   Discharge Planning   Home or Post Acute Services Other (Comment)   Expected Discharge Disposition [place]  (TBD)   Does the patient need discharge transport arranged? No

## 2025-06-05 NOTE — CARE PLAN
The clinical goals for the shift include Remain comfortable and get adequate sleep    Over the shift, the patient did make progress toward the following goals.       Problem: Pain - Adult  Goal: Verbalizes/displays adequate comfort level or baseline comfort level  Outcome: Progressing     Problem: Safety - Adult  Goal: Free from fall injury  Outcome: Progressing     Problem: Skin  Goal: Decreased wound size/increased tissue granulation at next dressing change  Outcome: Progressing  Flowsheets (Taken 6/5/2025 0326)  Decreased wound size/increased tissue granulation at next dressing change:   Promote sleep for wound healing   Protective dressings over bony prominences  Goal: Participates in plan/prevention/treatment measures  Outcome: Progressing  Flowsheets (Taken 6/5/2025 0326)  Participates in plan/prevention/treatment measures:   Elevate heels   Discuss with provider PT/OT consult   Increase activity/out of bed for meals  Goal: Prevent/manage excess moisture  Outcome: Progressing  Flowsheets (Taken 6/5/2025 0326)  Prevent/manage excess moisture:   Cleanse incontinence/protect with barrier cream   Moisturize dry skin  Goal: Prevent/minimize sheer/friction injuries  Outcome: Progressing  Flowsheets (Taken 6/5/2025 0326)  Prevent/minimize sheer/friction injuries:   Complete micro-shifts as needed if patient unable. Adjust patient position to relieve pressure points, not a full turn   HOB 30 degrees or less   Increase activity/out of bed for meals   Turn/reposition every 2 hours/use positioning/transfer devices   Use pull sheet  Goal: Promote/optimize nutrition  Outcome: Progressing  Flowsheets (Taken 6/5/2025 0326)  Promote/optimize nutrition: Discuss with provider if NPO > 2 days  Goal: Promote skin healing  Outcome: Progressing  Flowsheets (Taken 6/5/2025 0326)  Promote skin healing:   Assess skin/pad under line(s)/device(s)   Ensure correct size (line/device) and apply per  instructions   Protective  dressings over bony prominences   Rotate device position/do not position patient on device   Turn/reposition every 2 hours/use positioning/transfer devices

## 2025-06-05 NOTE — SIGNIFICANT EVENT
RT attempted volara again per CNP request, patientl asted 5 minutes before her sats dropped to 84%, RT terminated treatment

## 2025-06-05 NOTE — PROGRESS NOTES
Occupational Therapy                 Therapy Communication Note    Patient Name: Patricia Pruitt  MRN: [id number]  Department: [facility]  Room: 19/19-A  Today's Date: 6/5/2025     Discipline: Occupational Therapy    Missed Visit: OT Missed Visit: Yes     Missed Visit Reason: Missed Visit Reason: Patient in a medical procedure (Pt off the floor at [facility])    Missed Time: Cancel    Comment:

## 2025-06-05 NOTE — NURSING NOTE
Vascular Access note    On rounding, R internal jugular triple lumen catheter dressing is current and intact with a small amount of old blood at puncture site. One lumen in use, infusing without difficulty. Remaining lumens flush easily with brisk blood return. Lumens not in use clamped with new Curos caps applied.

## 2025-06-05 NOTE — PROGRESS NOTES
Physical Therapy    Physical Therapy Treatment    Patient Name: Patricia Pruitt  MRN: [id number]  Department: [address] ICU  Room: 19/19-A  Today's Date: 6/5/2025  Time Calculation  Start Time: 1010  Stop Time: 1033  Time Calculation (min): 23 min         Assessment/Plan   PT Assessment  PT Assessment Results: Decreased strength, Decreased range of motion, Decreased endurance, Impaired balance, Decreased mobility, Decreased coordination, Decreased cognition, Decreased safety awareness, Impaired judgement, Impaired vision, Impaired hearing, Impaired sensation, Impaired tone, Obesity, Decreased skin integrity, Pain  Rehab Prognosis: Good  Barriers to Discharge Home: Physical needs  Physical Needs: 24hr mobility assistance needed, 24hr ADL assistance needed  Evaluation/Treatment Tolerance: Patient limited by pain  Medical Staff Made Aware: Yes  Strengths: Support of Caregivers  Barriers to Participation: Comorbidities  End of Session Communication: Bedside nurse  Assessment Comment: The patient continues to require mod-maxAx2 for bed mobility and would continue to benefit from skilled therapy to improve strength and functional mobility. The patient is also very limited in functional mobility due to pain.  End of Session Patient Position: Bed, 3 rail up, Alarm on  PT Plan  Inpatient/Swing Bed or Outpatient: Inpatient  PT Plan  Treatment/Interventions: Bed mobility, Transfer training, Gait training, Balance training, Neuromuscular re-education, Strengthening, Range of motion, Endurance training, Therapeutic exercise, Therapeutic activity, Home exercise program, Positioning, Postural re-education  PT Plan: Ongoing PT  PT Frequency: 5 times per week  PT Discharge Recommendations: Moderate intensity level of continued care  Equipment Recommended upon Discharge: Wheeled walker, Wheelchair  PT Recommended Transfer Status: Assist x2  PT - OK to Discharge: Yes    PT Visit Info:  PT Received On: 06/05/25  Response to Previous Treatment:  "Patient with no complaints from previous session.     General Visit Information:   General  Family/Caregiver Present: No  Prior to Session Communication: Bedside nurse  Patient Position Received: Bed, 3 rail up, Alarm on  Preferred Learning Style: auditory, verbal, visual  General Comment: Pt is a 64 yo woman admit after being found face down on a desk in her wheelchair. Pt intubated on arrival to [facility] ED, transferred to [facility] for higher level of care. Patient cleared by nurse for therapy follow-up. Patient was supine in bed and clear for mobility.    Subjective   Precautions:  Precautions  Hearing/Visual Limitations: Hearing and vision grossly WFL  Medical Precautions: Fall precautions, Oxygen therapy device and L/min (pt on 4L of O2)     Date/Time Vitals Session Patient Position Pulse Resp SpO2 BP MAP (mmHg)    06/05/25 1000 --  --  68  14  96 %  136/70  89     06/05/25 1100 --  --  73  11  93 %  138/58  81           Vital Signs Comment: Pre-Therapy: HR: 70bpm RR: N16 SpO2: 97% BP: 136/70 MAP: 89 Post: HR 70bpm RR: 16 SpO2: 97%     Objective   Pain:  Pain Assessment  Pain Assessment: 0-10  0-10 (Numeric) Pain Score: 7  Pain Type: Other (Comment) (pt stated L side hurt \"because of kidneys\")  Pain Location: Back (\"L side:)  Cognition:  Cognition  Overall Cognitive Status: Within Functional Limits  Orientation Level: Oriented X4  Attention: Within Functional Limits  Insight: Mild  Processing Speed: Within funtional limits  Coordination:  Movements are Fluid and Coordinated: No (pt moves slowly)  Postural Control:  Postural Control  Postural Control: Impaired  Head Control: weak neck mm and struggled to keep head lifted from bed in supine  Posture Comment: pt has rounded shoulders and is flexed and forward  Static Sitting Balance  Static Sitting-Balance Support: Bilateral upper extremity supported, Feet unsupported  Static Sitting-Level of Assistance: Close supervision, Contact guard  Static " Sitting-Comment/Number of Minutes: pt sat for approx. 1 min before lying back down due to pain  Extremity/Trunk Assessments:  RLE   RLE :  (pt LE strength is grossly 2+/5 and able to complete heel slides independently with slight limitations in ROM)  LLE   LLE :  (pt LE strength is grossly 2+/5 and able to complete heel slides independently with slight limitations in ROM)  Activity Tolerance:  Activity Tolerance  Endurance: Decreased tolerance for upright activites  Activity Tolerance Comments: pt cannot tolerate upright mobility currently due to weakness and pain  Rate of Perceived Exertion (RPE): 6/10  Treatments:  Therapeutic Exercise  Therapeutic Exercise Performed: Yes  Therapeutic Exercise Activity 1: ankle pumps JESUS 10x  Therapeutic Exercise Activity 2: heel slides JESUS x10    Therapeutic Activity  Therapeutic Activity Performed: Yes  Therapeutic Activity 1: Patient sat  EOB; pt had forward trunk lean and R sided lean with head and neck facing down; pt sat EOB approx. 1 min. before returning to supine         Bed Mobility  Bed Mobility: Yes  Bed Mobility 1  Bed Mobility 1: Supine to sitting, Rolling right, Log roll  Level of Assistance 1: Moderate assistance, Maximum assistance, +2  Bed Mobility Comments 1: pt required mod-maxAx2 to sit EOB due to weak trunk mm and unable to shift hips forward without assistance; pt needed VC to grab railings to ensure proper sequencing.    Ambulation/Gait Training  Ambulation/Gait Training Performed: No    Outcome Measures:  [facility] Basic Mobility  Turning from your back to your side while in a flat bed without using bedrails: A lot  Moving from lying on your back to sitting on the side of a flat bed without using bedrails: A lot  Moving to and from bed to chair (including a wheelchair): Total  Standing up from a chair using your arms (e.g. wheelchair or bedside chair): Total  To walk in hospital room: Total  Climbing 3-5 steps with railing: Total  Basic Mobility - Total Score:  8    FSS-ICU  Ambulation: Unable to attempt due to weakness  Rolling: Maximal assistance (performs 25% - 49% of task)  Sitting: Supervision or set-up only  Transfer Sit-to-Stand: Unable to perform  Transfer Supine-to-Sit: Maximal assistance (performs 25% - 49% of task)  Total Score: 9    Education Documentation  Mobility Training, taught by ILYA Salvador at 6/5/2025 11:07 AM.  Learner: Patient  Readiness: Acceptance  Method: Explanation  Response: Verbalizes Understanding  Comment: Pt educated on PT POC.    Education Comments  No comments found.        OP EDUCATION:       Encounter Problems       Encounter Problems (Active)       PT Problem       Strength (Progressing)       Start:  06/03/25    Expected End:  07/03/25       The patient will demonstrate an overall strength of >4/5 in BLE to assist with completion of functional mobility.           Bed Mobility (Progressing)       Start:  06/03/25    Expected End:  07/03/25       The patient will be able to complete bed mobility at a Olu level with use of bed rails.           Bed <> Chair Transfer (Progressing)       Start:  06/03/25    Expected End:  07/03/25       The patient will be able to transfer from bed <> chair with LRAD at a min A level by Dc to facilitate functional mobility.          Ambulation (Not Progressing)       Start:  06/03/25    Expected End:  07/03/25       The patient will be able to ambulate at a Olu level for >15ftx1 with LRAD.             Pain - Adult

## 2025-06-05 NOTE — PROCEDURES
Speech-Language Pathology    Speech-Language Pathology    Inpatient Modified Barium Swallow Study    Patient Name: Patricia Pruitt  MRN: [id number]  : 1960  Today's Date: 25  Time Calculation  Start Time: 1350  Stop Time: 1415  Time Calculation (min): 25 min      Modified Barium Swallow Study completed. Informed verbal consent obtained prior to completion of exam. The study was completed per protocol with various liquid barium consistencies, pudding, solids and a 13mm barium tablet.  A 1.9 cm or .75 inch (outer diameter) ring was placed on the chin in the lateral view and on the lateral, left side of the neck in the a-p view in order to complete objective measurements during swallowing. The anatomic structures and function of the oropharynx, larynx, hypopharynx and cervical esophagus were evaluated.    SLP: STEPHANY Marquez   Contact info: [employer] chat; phone: [phone]      Reason for Referral: r/o aspiration in setting of prolonged intubation  Patient Hx: Patricia Pruitt is a 65 y.o. year old female patient with Past Medical History of CHF unknown EF, COPD on 4L baseline O2, DM, HTN, CVA presented to the emergency department at [facility] after being found at home unresponsive by her family. Per chart review the patient's  found her at home slumped over in her wheelchair with her face on a desk. EMS transported patient to the ED on a nonrebreather where she was confused but able to answer some questions. While in the emergency department that patient declined and required intubation after worsening acidosis and hypoxia. She was fluid resuscitated with 3L NS and started on empiric antibiotics. CT head was negative for acute findings. CT AP with complete atelectasis of the right middle lobe, small superimposed areas of consolidation in the right lower lobe and possibly within the atelectatic right lower lobe.     She was then admitted to the ICU at [facility] [place] and continued to decline. She  started pulling low tidal volumes on the ventilator and was becoming more acidotic and hypercapnic. A paralytic was recommended to help her overcome her restrictive lung state. She received duonebs and then became hypotensive and bradycardic in a junctional rhythm. She was started on Levophed and given another fluid bolus, as well as IV calcium chloride and 2 amps or bicarb. Her heart rate stabilized after. CTA chest obtained after patient stabilized, without evidence of PE how extensive mucous plugging to the right middle and lower lobe, as well as left lower bronchus. Imaging also concerning for pneumonia. Patient recommended for transfer to [facility] ICU for further care and monitoring.   Respiratory Status: 02 via NC  Current diet: NPO    Pain:  Pain Scale: 0-10  Ratin  Location: spine  Type: chronic      DIET RECOMMENDATIONS:   - Regular (IDDSI Level 7)  - Thin liquids (IDDSI Level 0)    STRATEGIES:  - Upright for all PO intake      SLP PLAN:  Skilled SLP Services: Skilled SLP intervention for dysphagia is warranted.  SLP Frequency: 1x per week  Duration: 3 weeks  Treatment/Interventions:   - Diet tolerance/advancement  - Patient/caregiver education  STG:    Patient will tolerate current diet without noted pulmonary compromise or evidence of pulmonary sequela as noted in patient chart and/or reported by patient/family.   Discussed POC: Patient  Discussed Risks/Benefits: Yes  Patient/Caregiver Agreeable: Yes    Education Provided: Results and recommendations per MBSS, with video review; recommendations and POC at this time. Verbal understanding and agreement given on all accounts.     Treatment Provided Today: ST provided extensive education and training to pt/pt family regarding anatomy/physiology of swallow function, risk factors of aspiration/aspiration pna & how to mitigate factors, diet modifications, and the use of compensatory swallow strategies to promote pt safety upon PO intake including . In  addition, ST provided instruction/training on oropharyngeal exercises (re: effortful swallow).     Additional Medical Consults Suggested:   - No new disciplines indicated    Repeat Study: Per MD or treating SLP       Mechanics of the Swallow Summary:  ORAL PHASE:  Lip Closure - No labial escape/anterior loss of bolus   Tongue Control During Bolus Hold - Cohesive bolus between tongue to palatal seal   Bolus prep/mastication - Slow prolonged mastication with complete re-collection necessary   Bolus transport/lingual motion - Brisk tongue motion for A-P movement of the bolus   Oral residue - Residue collection on oral structure     PHARYNGEAL PHASE:  Initiation of pharyngeal swallow - Bolus head at posterior angle of ramus   Soft palate elevation - No bolus between soft palate/pharyngeal wall   Laryngeal elevation - Complete superior movement of thyroid cartilage with contact of arytenoids to epiglottic petiole   Anterior hyoid excursion - Complete anterior movement   Epiglottic movement - Complete inversion    Laryngeal vestibule closure - Complete - no air/contrast in laryngeal vestibule   Pharyngeal stripping wave - Complete  Pharyngeal contraction (A/P view) - Complete  Pharyngoesophageal segment opening - Complete distension and complete duration/no obstruction of flow of bolus   Tongue base retraction - Trace column of contrast or air between tongue base and pharyngeal wall   Pharyngeal residue - Trace residue within or on the pharyngeal structures     ESOPHAGEAL PHASE:  Esophageal clearance - Complete clearance     SLP Impressions with Severity Rating:   Pt presents with boarderline oral stage dysphagia upon completion of modified barium swallow study this date. Swallowing physiology is detailed above. Impairments most impacting swallowing safety and efficiency include piece meal swallow with solids, d/t poo dentition but effectively cleared. Patient demonstrated transient laryngeal penetration with thin liquids  with consecutive sips. No further penetration was observed for any other consistency, and no aspiration was visualized during study. Small cricopharyngeal bar was observed, and Pt was unable swallow pill, spit out, stated it was too big    *Of note: The A-P bolus follow-through is not intended to be utilized as a diagnostic assessment of the esophagus, rather a tool to observe the biomechanical aspects of the swallow continuum and to inform the need for further evaluation by medical specialists, as applicable.     Strategies attempted- n/a  OUTCOME MEASURES:  Functional Oral Intake Scale  Functional Oral Intake Scale: Level 7        total oral diet with no restrictions       Rosenbek's Penetration Aspiration Scale  Thin Liquids: 2. PENETRATION that CLEARS - contrast enter airway, above vocal cords, no residue  During the Swallow  Nectar Thick Liquids: 1. NO ASPIRATION & NO PENETRATION - no aspiration, contrast does not enter airway  Puree: 1. NO ASPIRATION & NO PENETRATION - no aspiration, contrast does not enter airway  Solids: 1. NO ASPIRATION & NO PENETRATION - no aspiration, contrast does not enter airway

## 2025-06-05 NOTE — PROGRESS NOTES
Speech-Language Pathology  Adult Inpatient Clinical Bedside Swallow Evaluation    Patient Name: Patricia Pruitt  MRN: [id number]  Today's Date: 6/5/2025   Start Time: 927  Stop Time: 945  Time Calculation (min): 18 min    History of Present Illness:   Patricia Pruitt is a 65 y.o. year old female patient with Past Medical History of CHF unknown EF, COPD on 4L baseline O2, DM, HTN, CVA presented to the emergency department at [facility] after being found at home unresponsive by her family. Per chart review the patient's  found her at home slumped over in her wheelchair with her face on a desk. EMS transported patient to the ED on a nonrebreather where she was confused but able to answer some questions. While in the emergency department that patient declined and required intubation after worsening acidosis and hypoxia. She was fluid resuscitated with 3L NS and started on empiric antibiotics. CT head was negative for acute findings. CT AP with complete atelectasis of the right middle lobe, small superimposed areas of consolidation in the right lower lobe and possibly within the atelectatic right lower lobe.     She was then admitted to the ICU at [facility] [place] and continued to decline. She started pulling low tidal volumes on the ventilator and was becoming more acidotic and hypercapnic. A paralytic was recommended to help her overcome her restrictive lung state. She received duonebs and then became hypotensive and bradycardic in a junctional rhythm. She was started on Levophed and given another fluid bolus, as well as IV calcium chloride and 2 amps or bicarb. Her heart rate stabilized after. CTA chest obtained after patient stabilized, without evidence of PE how extensive mucous plugging to the right middle and lower lobe, as well as left lower bronchus. Imaging also concerning for pneumonia. Patient recommended for transfer to [facility] ICU for further care and monitoring.     Assessment:   Clinical bedside swallow  evaluation completed. Pt cleared for participation by RN who reports good tolerance of ice chips. Pt is A&O x4, pleasant and participative resting in bed. Pt reports chronic back pain and wrist pain. Pt reports hx of COPD requiring 4-5L of O2 at home. Pt endorses hx of chronic PNA, coughing with liquid intake. Pt has scattered dentition in poor condition and reports dentures are broken. Pt has strong volitional cough.    Pt given ice chips x3, straw sips of water x3, 3 oz water protocol. Pt with overt coughing on straw sips/3 oz water protocol. No further trials given due to overt s/s of aspiration at bedside.     SLP recommends NPO with 10 ice chips an hour after frequent aggressive oral care to reduce colonization of bacteria in the oropharynx. An MBSS is recommended prior to initiation of PO diet. MD and RN made aware.      Recommendations:  NPO with 10 ice chips an hour after frequent aggressive oral care   MBSS is recommended prior to initiation of PO diet     Goal:   Pt will tolerate least restrictive diet with no overt clinical s/s aspiration 100% of the time.   Start Date: 6/5/25  End Date: 7/5/25  Status: Goal Initiated this date       Plan:  SLP Services Indicated: Yes  Frequency: 2x week  Discussed POC with patient  SLP - OK to Discharge    Pain:   0-10  0 = No pain.     Inpatient Education:  Extensive education provided to patient regarding current swallow function, recommendations/results, and POC.      Consultations/Referrals/Coordination of Services:   N/A

## 2025-06-05 NOTE — CARE PLAN
Problem: Pain - Adult  Goal: Verbalizes/displays adequate comfort level or baseline comfort level  Outcome: Progressing     Problem: Safety - Adult  Goal: Free from fall injury  Outcome: Progressing     Problem: Discharge Planning  Goal: Discharge to home or other facility with appropriate resources  Outcome: Progressing     Problem: Chronic Conditions and Co-morbidities  Goal: Patient's chronic conditions and co-morbidity symptoms are monitored and maintained or improved  Outcome: Progressing     Problem: Nutrition  Goal: Nutrient intake appropriate for maintaining nutritional needs  Outcome: Progressing     Problem: Skin  Goal: Decreased wound size/increased tissue granulation at next dressing change  Outcome: Progressing  Flowsheets (Taken 6/5/2025 1117)  Decreased wound size/increased tissue granulation at next dressing change:   Promote sleep for wound healing   Protective dressings over bony prominences  Goal: Participates in plan/prevention/treatment measures  Outcome: Progressing  Flowsheets (Taken 6/5/2025 1117)  Participates in plan/prevention/treatment measures:   Discuss with provider PT/OT consult   Elevate heels   Increase activity/out of bed for meals  Goal: Prevent/manage excess moisture  Outcome: Progressing  Flowsheets (Taken 6/5/2025 1117)  Prevent/manage excess moisture:   Cleanse incontinence/protect with barrier cream   Follow provider orders for dressing changes   Moisturize dry skin   Monitor for/manage infection if present  Goal: Prevent/minimize sheer/friction injuries  Outcome: Progressing  Flowsheets (Taken 6/5/2025 1117)  Prevent/minimize sheer/friction injuries:   Complete micro-shifts as needed if patient unable. Adjust patient position to relieve pressure points, not a full turn   HOB 30 degrees or less   Increase activity/out of bed for meals   Turn/reposition every 2 hours/use positioning/transfer devices   Use pull sheet  Goal: Promote/optimize nutrition  Outcome:  Progressing  Flowsheets (Taken 6/5/2025 1117)  Promote/optimize nutrition: Discuss with provider if NPO > 2 days  Goal: Promote skin healing  Outcome: Progressing  Flowsheets (Taken 6/5/2025 1117)  Promote skin healing:   Assess skin/pad under line(s)/device(s)   Ensure correct size (line/device) and apply per  instructions   Protective dressings over bony prominences   Rotate device position/do not position patient on device   Turn/reposition every 2 hours/use positioning/transfer devices     Problem: Diabetes  Goal: Achieve decreasing blood glucose levels by end of shift  Outcome: Progressing  Goal: Increase stability of blood glucose readings by end of shift  Outcome: Progressing  Goal: Decrease in ketones present in urine by end of shift  Outcome: Progressing  Goal: Maintain electrolyte levels within acceptable range throughout shift  Outcome: Progressing  Goal: Maintain glucose levels >70mg/dl to <250mg/dl throughout shift  Outcome: Progressing  Goal: No changes in neurological exam by end of shift  Outcome: Progressing  Goal: Vital signs within normal range for age by end of shift  Outcome: Progressing  Goal: Increase self care and/or family involovement by end of shift  Outcome: Progressing  Goal: Receive DSME education by end of shift  Outcome: Progressing     Problem: Knowledge Deficit  Goal: Patient/family/caregiver demonstrates understanding of disease process, treatment plan, medications, and discharge instructions  Outcome: Progressing     Problem: Mechanical Ventilation  Goal: Patient Will Maintain Patent Airway  Outcome: Progressing  Goal: Oral health is maintained or improved  Outcome: Progressing  Goal: ET tube will be managed safely  Outcome: Progressing  Goal: Ability to express needs and understand communication  Outcome: Progressing  Goal: Mobility/activity is maintained at optimum level for patient  Outcome: Progressing     Problem: Respiratory  Goal: Clear secretions with  interventions this shift  Outcome: Progressing  Goal: Minimize anxiety/maximize coping throughout shift  Outcome: Progressing  Goal: Minimal/no exertional discomfort or dyspnea this shift  Outcome: Progressing  Goal: No signs of respiratory distress (eg. Use of accessory muscles. Peds grunting)  Outcome: Progressing  Goal: Patent airway maintained this shift  Outcome: Progressing  Goal: Tolerate mechanical ventilation evidenced by VS/agitation level this shift  Outcome: Progressing  Goal: Tolerate pulmonary toileting this shift  Outcome: Progressing  Goal: Wean oxygen to maintain O2 saturation per order/standard this shift  Outcome: Progressing     Problem: Fall/Injury  Goal: Not fall by end of shift  Outcome: Progressing  Goal: Be free from injury by end of the shift  Outcome: Progressing     Problem: Pain  Goal: Takes deep breaths with improved pain control throughout the shift  Outcome: Progressing  Goal: Turns in bed with improved pain control throughout the shift  Outcome: Progressing  Goal: Walks with improved pain control throughout the shift  Outcome: Progressing  Goal: Performs ADL's with improved pain control throughout shift  Outcome: Progressing  Goal: Participates in PT with improved pain control throughout the shift  Outcome: Progressing  Goal: Free from opioid side effects throughout the shift  Outcome: Progressing  Goal: Free from acute confusion related to pain meds throughout the shift  Outcome: Progressing      The clinical goals for the shift include remain comfortable

## 2025-06-05 NOTE — PROGRESS NOTES
"Nutrition Follow up Note    Nutrition Assessment      Patient extubated 6/4. Spoke with RN. Tube feed stopped. NPO pending MBSS this afternoon. Will continue to follow and monitor nutrition needs.    Nutrition History:  Food and Nutrient History: NPO     Anthropometrics:  Ht: 157.5 cm (5' 2\"), Wt: 89.2 kg (196 lb 10.4 oz), BMI: 35.96  IBW/kg (Dietitian Calculated): 50 kg  Percent of IBW: 182 %     Weight Change:  Daily Weight  06/05/25 : 89.2 kg (196 lb 10.4 oz)        Nutrition Focused Physical Exam Findings: defer: not appropriate, no visible deficits     Nutrition Significant Labs:  Lab Results   Component Value Date    WBC 9.2 06/05/2025    HGB 14.6 06/05/2025    HCT 46.7 (H) 06/05/2025     (L) 06/05/2025     (H) 06/05/2025    AST 95 (H) 06/05/2025     06/05/2025    K 4.2 06/05/2025    CL 91 (L) 06/05/2025    CREATININE 0.70 06/05/2025    BUN 31 (H) 06/05/2025    CO2 44 (HH) 06/05/2025    TSH 0.80 05/31/2025    INR 1.2 06/01/2025    HGBA1C 6.1 (H) 05/31/2025     Nutrition Specific Medications:  Scheduled Medications[1]  Continuous Medications[2]    Dietary Orders (From admission, onward)       Start     Ordered    06/02/25 1306  Enteral feeding with NPO 50; 100; Every 4 hours  Diet effective now        Comments: Start at 20 ml/hr, increase by 10 ml/hr every 4 hours until goal of 50 ml/hr.   Question Answer Comment   Tube feeding formula: Vital AF 1.2    Tube feeding continuous rate (mL/hr): 50    Tube feeding flush (mL): 100    Flush frequency: Every 4 hours        06/02/25 1306    06/01/25 0641  May Not Participate in Room Service  ( ROOM SERVICE MAY NOT PARTICIPATE)  Once        Question:  .  Answer:  Yes    06/01/25 0640                   Estimated Needs:   Estimated Energy Needs  Total Energy Estimated Needs in 24 hours (kCal):  (1800)  Energy Estimated Needs per kg Body Weight in 24 hours (kCal/kg): 30 kCal/kg  Method for Estimating Needs: Adj Wt    Estimated Protein Needs  Total " Protein Estimated Needs in 24 Hours (g):  (72-90)  Protein Estimated Needs per kg Body Weight in 24 Hours (g/kg):  (1.2-1.5)  Method for Estimating 24 Hour Protein Needs: Adj Wt    Estimated Fluid Needs  Total Fluid Estimated Needs in 24 Hours (mL): 1800 mL  Method for Estimating 24 Hour Fluid Needs: 1 mL/kcal or per MD     Nutrition Diagnosis   Nutrition Diagnosis:  Malnutrition Diagnosis  Patient has Malnutrition Diagnosis: No    Nutrition Diagnosis  Patient has Nutrition Diagnosis: Yes  Diagnosis Status (1): Active  Nutrition Diagnosis 1: Inadequate oral intake  Related to (1): decreased ability to consume sufficient energy  As Evidenced by (1): NPO/Mechanical Ventilation     Nutrition Interventions/Recommendations   Nutrition Interventions and Recommendations:  Nutrition Prescription: Nutrition prescription for oral nutrition    Nutrition Recommendations:  Individualized Nutrition Prescription Provided for : Advance per SLP recommendations    Nutrition Interventions/Goals:   Food and/or Nutrient Delivery Interventions  Interventions: Meals and snacks  Meals and Snacks: General healthful diet  Goal: advance as able    Education Documentation  No documentation found.            Nutrition Monitoring and Evaluation   Monitoring/Evaluation:   Food/Nutrient Related History Monitoring  Monitoring and Evaluation Plan: Estimated Energy Intake  Estimated Energy Intake: Other criteria  Criteria: monitoring for diet order    Goal Status: New goal(s) identified    Follow Up  Time Spent (min): 30 minutes  Last Date of Nutrition Visit: 06/05/25  Nutrition Follow-Up Needed?: 3-5 days  Follow up Comment: 6/10/25          [1] atorvastatin, 40 mg, oral, Nightly  [Held by provider] tiotropium, 2 puff, inhalation, Daily   And  [Held by provider] fluticasone furoate-vilanteroL, 1 puff, inhalation, Daily  [Held by provider] gabapentin, 600 mg, oral, TID  heparin (porcine), 5,000 Units, subcutaneous, q8h  [Held by provider] insulin  glargine, 15 Units, subcutaneous, q24h  insulin lispro, 0-10 Units, subcutaneous, q4h  ipratropium-albuteroL, 3 mL, nebulization, q6h  methylPREDNISolone sodium succinate (PF), 40 mg, intravenous, q12h  nystatin, 1 Application, Topical, BID  oxygen, , inhalation, Continuous - Inhalation  piperacillin-tazobactam, 4.5 g, intravenous, q6h  sodium chloride, 3 mL, nebulization, q6h    [2]

## 2025-06-05 NOTE — NURSING NOTE
Wound Wednesday full head to toe skin assessment completed. See LDA avatar for wounds. Patient on waffle mattress. Sacral and heel protectors in place. Q2 turns in place.

## 2025-06-06 ENCOUNTER — HOSPITAL ENCOUNTER (INPATIENT)
Facility: HOSPITAL | Age: 65
End: 2025-06-06
Attending: STUDENT IN AN ORGANIZED HEALTH CARE EDUCATION/TRAINING PROGRAM | Admitting: INTERNAL MEDICINE
Payer: COMMERCIAL

## 2025-06-06 ENCOUNTER — APPOINTMENT (OUTPATIENT)
Dept: RADIOLOGY | Facility: HOSPITAL | Age: 65
End: 2025-06-06
Payer: COMMERCIAL

## 2025-06-06 ENCOUNTER — APPOINTMENT (OUTPATIENT)
Dept: CARDIOLOGY | Facility: HOSPITAL | Age: 65
End: 2025-06-06
Payer: COMMERCIAL

## 2025-06-06 ENCOUNTER — PHARMACY VISIT (OUTPATIENT)
Dept: PHARMACY | Facility: CLINIC | Age: 65
End: 2025-06-06
Payer: COMMERCIAL

## 2025-06-06 VITALS
TEMPERATURE: 97.9 F | BODY MASS INDEX: 36.19 KG/M2 | SYSTOLIC BLOOD PRESSURE: 136 MMHG | HEART RATE: 82 BPM | WEIGHT: 196.65 LBS | DIASTOLIC BLOOD PRESSURE: 52 MMHG | OXYGEN SATURATION: 96 % | RESPIRATION RATE: 17 BRPM | HEIGHT: 62 IN

## 2025-06-06 DIAGNOSIS — M25.512 CHRONIC LEFT SHOULDER PAIN: ICD-10-CM

## 2025-06-06 DIAGNOSIS — J98.11 ATELECTASIS: ICD-10-CM

## 2025-06-06 DIAGNOSIS — E11.610 TYPE 2 DIABETES MELLITUS WITH DIABETIC NEUROPATHIC ARTHROPATHY, WITHOUT LONG-TERM CURRENT USE OF INSULIN (MULTI): ICD-10-CM

## 2025-06-06 DIAGNOSIS — I10 PRIMARY HYPERTENSION: ICD-10-CM
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
## 2025-06-06 DIAGNOSIS — J18.9 HAP (HOSPITAL-ACQUIRED PNEUMONIA): Primary | ICD-10-CM

## 2025-06-06 DIAGNOSIS — Z99.81 SUPPLEMENTAL OXYGEN DEPENDENT: ICD-10-CM

## 2025-06-06 DIAGNOSIS — Z78.9 DECREASED ACTIVITIES OF DAILY LIVING (ADL): ICD-10-CM

## 2025-06-06 DIAGNOSIS — R53.1 GENERALIZED WEAKNESS: ICD-10-CM

## 2025-06-06 DIAGNOSIS — J96.11 CHRONIC RESPIRATORY FAILURE WITH HYPOXIA: ICD-10-CM

## 2025-06-06 DIAGNOSIS — F17.200 TOBACCO DEPENDENCE: ICD-10-CM

## 2025-06-06 DIAGNOSIS — G89.29 CHRONIC LEFT SHOULDER PAIN: ICD-10-CM

## 2025-06-06 LAB
ALBUMIN SERPL BCP-MCNC: 3.7 G/DL (ref 3.4–5)
ALP SERPL-CCNC: 47 U/L (ref 33–136)
ALT SERPL W P-5'-P-CCNC: 123 U/L (ref 7–45)
ANION GAP SERPL CALCULATED.3IONS-SCNC: 11 MMOL/L (ref 10–20)
AST SERPL W P-5'-P-CCNC: 49 U/L (ref 9–39)
BACTERIA BLD AEROBE CULT: ABNORMAL
BACTERIA BLD CULT: ABNORMAL
BASOPHILS # BLD AUTO: 0.02 X10*3/UL (ref 0–0.1)
BASOPHILS NFR BLD AUTO: 0.2 %
BILIRUB SERPL-MCNC: 1.1 MG/DL (ref 0–1.2)
BUN SERPL-MCNC: 33 MG/DL (ref 6–23)
CALCIUM SERPL-MCNC: 9.3 MG/DL (ref 8.6–10.3)
CHLORIDE SERPL-SCNC: 95 MMOL/L (ref 98–107)
CO2 SERPL-SCNC: 37 MMOL/L (ref 21–32)
CREAT SERPL-MCNC: 0.96 MG/DL (ref 0.5–1.05)
EGFRCR SERPLBLD CKD-EPI 2021: 65 ML/MIN/1.73M*2
EOSINOPHIL # BLD AUTO: 0.01 X10*3/UL (ref 0–0.7)
EOSINOPHIL NFR BLD AUTO: 0.1 %
ERYTHROCYTE [DISTWIDTH] IN BLOOD BY AUTOMATED COUNT: 13.7 % (ref 11.5–14.5)
FLUAV RNA RESP QL NAA+PROBE: NOT DETECTED
FLUBV RNA RESP QL NAA+PROBE: NOT DETECTED
GLUCOSE BLD MANUAL STRIP-MCNC: 170 MG/DL (ref 74–99)
GLUCOSE BLD MANUAL STRIP-MCNC: 174 MG/DL (ref 74–99)
GLUCOSE BLD MANUAL STRIP-MCNC: 227 MG/DL (ref 74–99)
GLUCOSE BLD MANUAL STRIP-MCNC: 76 MG/DL (ref 74–99)
GLUCOSE SERPL-MCNC: 120 MG/DL (ref 74–99)
GRAM STN SPEC: ABNORMAL
HCT VFR BLD AUTO: 51.9 % (ref 36–46)
HGB BLD-MCNC: 16.2 G/DL (ref 12–16)
IMM GRANULOCYTES # BLD AUTO: 0.1 X10*3/UL (ref 0–0.7)
IMM GRANULOCYTES NFR BLD AUTO: 0.8 % (ref 0–0.9)
LYMPHOCYTES # BLD AUTO: 1.7 X10*3/UL (ref 1.2–4.8)
LYMPHOCYTES NFR BLD AUTO: 13.3 %
MAGNESIUM SERPL-MCNC: 2.14 MG/DL (ref 1.6–2.4)
MCH RBC QN AUTO: 31.4 PG (ref 26–34)
MCHC RBC AUTO-ENTMCNC: 31.2 G/DL (ref 32–36)
MCV RBC AUTO: 101 FL (ref 80–100)
MONOCYTES # BLD AUTO: 1.27 X10*3/UL (ref 0.1–1)
MONOCYTES NFR BLD AUTO: 9.9 %
NEUTROPHILS # BLD AUTO: 9.71 X10*3/UL (ref 1.2–7.7)
NEUTROPHILS NFR BLD AUTO: 75.7 %
NRBC BLD-RTO: 0 /100 WBCS (ref 0–0)
PLATELET # BLD AUTO: 118 X10*3/UL (ref 150–450)
POTASSIUM SERPL-SCNC: 4.4 MMOL/L (ref 3.5–5.3)
PROT SERPL-MCNC: 6.8 G/DL (ref 6.4–8.2)
RBC # BLD AUTO: 5.16 X10*6/UL (ref 4–5.2)
SARS-COV-2 RNA RESP QL NAA+PROBE: NOT DETECTED
SODIUM SERPL-SCNC: 139 MMOL/L (ref 136–145)
WBC # BLD AUTO: 12.8 X10*3/UL (ref 4.4–11.3)

## 2025-06-06 PROCEDURE — 92526 ORAL FUNCTION THERAPY: CPT | Mod: GN | Performed by: SPEECH-LANGUAGE PATHOLOGIST

## 2025-06-06 PROCEDURE — 93005 ELECTROCARDIOGRAM TRACING: CPT

## 2025-06-06 PROCEDURE — 71250 CT THORAX DX C-: CPT | Performed by: SURGERY

## 2025-06-06 PROCEDURE — 85025 COMPLETE CBC W/AUTO DIFF WBC: CPT | Performed by: STUDENT IN AN ORGANIZED HEALTH CARE EDUCATION/TRAINING PROGRAM

## 2025-06-06 PROCEDURE — 2500000004 HC RX 250 GENERAL PHARMACY W/ HCPCS (ALT 636 FOR OP/ED): Performed by: INTERNAL MEDICINE

## 2025-06-06 PROCEDURE — 97530 THERAPEUTIC ACTIVITIES: CPT | Mod: GO,CO

## 2025-06-06 PROCEDURE — 94640 AIRWAY INHALATION TREATMENT: CPT

## 2025-06-06 PROCEDURE — 83735 ASSAY OF MAGNESIUM: CPT | Performed by: STUDENT IN AN ORGANIZED HEALTH CARE EDUCATION/TRAINING PROGRAM

## 2025-06-06 PROCEDURE — 96365 THER/PROPH/DIAG IV INF INIT: CPT

## 2025-06-06 PROCEDURE — 99239 HOSP IP/OBS DSCHRG MGMT >30: CPT | Performed by: INTERNAL MEDICINE

## 2025-06-06 PROCEDURE — 2500000002 HC RX 250 W HCPCS SELF ADMINISTERED DRUGS (ALT 637 FOR MEDICARE OP, ALT 636 FOR OP/ED): Performed by: INTERNAL MEDICINE

## 2025-06-06 PROCEDURE — 97535 SELF CARE MNGMENT TRAINING: CPT | Mod: GO,CO

## 2025-06-06 PROCEDURE — 2500000004 HC RX 250 GENERAL PHARMACY W/ HCPCS (ALT 636 FOR OP/ED): Performed by: STUDENT IN AN ORGANIZED HEALTH CARE EDUCATION/TRAINING PROGRAM

## 2025-06-06 PROCEDURE — 9420000001 HC RT PATIENT EDUCATION 5 MIN

## 2025-06-06 PROCEDURE — 97110 THERAPEUTIC EXERCISES: CPT | Mod: GP,CQ

## 2025-06-06 PROCEDURE — 36415 COLL VENOUS BLD VENIPUNCTURE: CPT | Performed by: STUDENT IN AN ORGANIZED HEALTH CARE EDUCATION/TRAINING PROGRAM

## 2025-06-06 PROCEDURE — 71250 CT THORAX DX C-: CPT

## 2025-06-06 PROCEDURE — 87636 SARSCOV2 & INF A&B AMP PRB: CPT | Performed by: STUDENT IN AN ORGANIZED HEALTH CARE EDUCATION/TRAINING PROGRAM

## 2025-06-06 PROCEDURE — 99232 SBSQ HOSP IP/OBS MODERATE 35: CPT

## 2025-06-06 PROCEDURE — 99285 EMERGENCY DEPT VISIT HI MDM: CPT | Mod: 25 | Performed by: STUDENT IN AN ORGANIZED HEALTH CARE EDUCATION/TRAINING PROGRAM

## 2025-06-06 PROCEDURE — 2500000005 HC RX 250 GENERAL PHARMACY W/O HCPCS: Performed by: INTERNAL MEDICINE

## 2025-06-06 PROCEDURE — 2500000001 HC RX 250 WO HCPCS SELF ADMINISTERED DRUGS (ALT 637 FOR MEDICARE OP): Performed by: INTERNAL MEDICINE

## 2025-06-06 PROCEDURE — 93010 ELECTROCARDIOGRAM REPORT: CPT | Performed by: INTERNAL MEDICINE

## 2025-06-06 PROCEDURE — 87081 CULTURE SCREEN ONLY: CPT | Mod: TRILAB | Performed by: STUDENT IN AN ORGANIZED HEALTH CARE EDUCATION/TRAINING PROGRAM

## 2025-06-06 PROCEDURE — 94664 DEMO&/EVAL PT USE INHALER: CPT

## 2025-06-06 PROCEDURE — 2500000004 HC RX 250 GENERAL PHARMACY W/ HCPCS (ALT 636 FOR OP/ED): Mod: JZ | Performed by: INTERNAL MEDICINE

## 2025-06-06 PROCEDURE — 97530 THERAPEUTIC ACTIVITIES: CPT | Mod: GP,CQ

## 2025-06-06 PROCEDURE — 82947 ASSAY GLUCOSE BLOOD QUANT: CPT

## 2025-06-06 PROCEDURE — 80053 COMPREHEN METABOLIC PANEL: CPT | Performed by: STUDENT IN AN ORGANIZED HEALTH CARE EDUCATION/TRAINING PROGRAM

## 2025-06-06 PROCEDURE — RXMED WILLOW AMBULATORY MEDICATION CHARGE

## 2025-06-06 PROCEDURE — 2500000002 HC RX 250 W HCPCS SELF ADMINISTERED DRUGS (ALT 637 FOR MEDICARE OP, ALT 636 FOR OP/ED)

## 2025-06-06 RX ORDER — PREDNISONE 10 MG/1
TABLET ORAL
Qty: 15 TABLET | Refills: 0 | Status: SHIPPED | OUTPATIENT
Start: 2025-06-06 | End: 2025-06-16

## 2025-06-06 RX ORDER — SPIRONOLACTONE 25 MG/1
12.5 TABLET ORAL 2 TIMES DAILY
Qty: 30 TABLET | Refills: 0 | Status: SHIPPED | OUTPATIENT
Start: 2025-06-06 | End: 2025-07-06

## 2025-06-06 RX ORDER — FLUTICASONE FUROATE, UMECLIDINIUM BROMIDE AND VILANTEROL TRIFENATATE 100; 62.5; 25 UG/1; UG/1; UG/1
1 POWDER RESPIRATORY (INHALATION) DAILY
Qty: 60 EACH | Refills: 0 | Status: SHIPPED | OUTPATIENT
Start: 2025-06-06 | End: 2025-07-06

## 2025-06-06 RX ORDER — ATORVASTATIN CALCIUM 40 MG/1
40 TABLET, FILM COATED ORAL NIGHTLY
Qty: 30 TABLET | Refills: 0 | Status: SHIPPED | OUTPATIENT
Start: 2025-06-06 | End: 2025-07-06

## 2025-06-06 RX ORDER — SPIRONOLACTONE 25 MG/1
12.5 TABLET ORAL 2 TIMES DAILY
Status: DISCONTINUED | OUTPATIENT
Start: 2025-06-06 | End: 2025-06-06 | Stop reason: HOSPADM

## 2025-06-06 RX ORDER — CEFTRIAXONE 2 G/50ML
2 INJECTION, SOLUTION INTRAVENOUS ONCE
Status: COMPLETED | OUTPATIENT
Start: 2025-06-06 | End: 2025-06-07

## 2025-06-06 RX ORDER — IPRATROPIUM BROMIDE AND ALBUTEROL SULFATE 2.5; .5 MG/3ML; MG/3ML
3 SOLUTION RESPIRATORY (INHALATION)
Qty: 360 ML | Refills: 0 | Status: SHIPPED | OUTPATIENT
Start: 2025-06-06 | End: 2025-07-06

## 2025-06-06 RX ORDER — ALBUTEROL SULFATE 90 UG/1
2 INHALANT RESPIRATORY (INHALATION) EVERY 4 HOURS PRN
Qty: 6.7 G | Refills: 0 | Status: SHIPPED | OUTPATIENT
Start: 2025-06-06

## 2025-06-06 RX ADMIN — SODIUM CHLORIDE SOLN NEBU 3% 3 ML: 3 NEBU SOLN at 02:26

## 2025-06-06 RX ADMIN — PIPERACILLIN SODIUM AND TAZOBACTAM SODIUM 4.5 G: 4; .5 INJECTION, SOLUTION INTRAVENOUS at 01:02

## 2025-06-06 RX ADMIN — IPRATROPIUM BROMIDE AND ALBUTEROL SULFATE 3 ML: 2.5; .5 SOLUTION RESPIRATORY (INHALATION) at 11:57

## 2025-06-06 RX ADMIN — SPIRONOLACTONE 12.5 MG: 25 TABLET ORAL at 13:08

## 2025-06-06 RX ADMIN — SODIUM CHLORIDE SOLN NEBU 3% 3 ML: 3 NEBU SOLN at 07:21

## 2025-06-06 RX ADMIN — IPRATROPIUM BROMIDE AND ALBUTEROL SULFATE 3 ML: 2.5; .5 SOLUTION RESPIRATORY (INHALATION) at 07:21

## 2025-06-06 RX ADMIN — Medication 4 L/MIN: at 07:22

## 2025-06-06 RX ADMIN — INSULIN LISPRO 2 UNITS: 100 INJECTION, SOLUTION INTRAVENOUS; SUBCUTANEOUS at 08:38

## 2025-06-06 RX ADMIN — INSULIN LISPRO 4 UNITS: 100 INJECTION, SOLUTION INTRAVENOUS; SUBCUTANEOUS at 12:32

## 2025-06-06 RX ADMIN — NYSTATIN 1 APPLICATION: 100000 POWDER TOPICAL at 08:24

## 2025-06-06 RX ADMIN — PIPERACILLIN SODIUM AND TAZOBACTAM SODIUM 4.5 G: 4; .5 INJECTION, SOLUTION INTRAVENOUS at 11:58

## 2025-06-06 RX ADMIN — HEPARIN SODIUM 5000 UNITS: 5000 INJECTION, SOLUTION INTRAVENOUS; SUBCUTANEOUS at 08:23

## 2025-06-06 RX ADMIN — IPRATROPIUM BROMIDE AND ALBUTEROL SULFATE 3 ML: 2.5; .5 SOLUTION RESPIRATORY (INHALATION) at 02:26

## 2025-06-06 RX ADMIN — CEFTRIAXONE 2 G: 2 INJECTION, SOLUTION INTRAVENOUS at 23:44

## 2025-06-06 RX ADMIN — METHYLPREDNISOLONE SODIUM SUCCINATE 40 MG: 40 INJECTION, POWDER, FOR SOLUTION INTRAMUSCULAR; INTRAVENOUS at 08:23

## 2025-06-06 RX ADMIN — HYDROMORPHONE HYDROCHLORIDE 0.2 MG: 1 INJECTION, SOLUTION INTRAMUSCULAR; INTRAVENOUS; SUBCUTANEOUS at 08:23

## 2025-06-06 RX ADMIN — PIPERACILLIN SODIUM AND TAZOBACTAM SODIUM 4.5 G: 4; .5 INJECTION, SOLUTION INTRAVENOUS at 06:28

## 2025-06-06 RX ADMIN — SODIUM CHLORIDE SOLN NEBU 3% 3 ML: 3 NEBU SOLN at 11:57

## 2025-06-06 ASSESSMENT — COGNITIVE AND FUNCTIONAL STATUS - GENERAL
MOVING FROM LYING ON BACK TO SITTING ON SIDE OF FLAT BED WITH BEDRAILS: A LITTLE
STANDING UP FROM CHAIR USING ARMS: A LOT
MOBILITY SCORE: 12
CLIMB 3 TO 5 STEPS WITH RAILING: TOTAL
TURNING FROM BACK TO SIDE WHILE IN FLAT BAD: TOTAL
DRESSING REGULAR UPPER BODY CLOTHING: A LITTLE
MOVING TO AND FROM BED TO CHAIR: TOTAL
MOVING TO AND FROM BED TO CHAIR: A LOT
DRESSING REGULAR LOWER BODY CLOTHING: A LOT
TURNING FROM BACK TO SIDE WHILE IN FLAT BAD: A LITTLE
WALKING IN HOSPITAL ROOM: TOTAL
EATING MEALS: A LITTLE
MOBILITY SCORE: 13
STANDING UP FROM CHAIR USING ARMS: TOTAL
MOVING TO AND FROM BED TO CHAIR: A LOT
HELP NEEDED FOR BATHING: A LOT
DRESSING REGULAR LOWER BODY CLOTHING: TOTAL
WALKING IN HOSPITAL ROOM: A LOT
STANDING UP FROM CHAIR USING ARMS: A LOT
CLIMB 3 TO 5 STEPS WITH RAILING: TOTAL
DRESSING REGULAR UPPER BODY CLOTHING: A LOT
TURNING FROM BACK TO SIDE WHILE IN FLAT BAD: A LITTLE
TOILETING: TOTAL
TOILETING: A LOT
DAILY ACTIVITIY SCORE: 13
MOVING FROM LYING ON BACK TO SITTING ON SIDE OF FLAT BED WITH BEDRAILS: A LITTLE
CLIMB 3 TO 5 STEPS WITH RAILING: TOTAL
HELP NEEDED FOR BATHING: A LOT
PERSONAL GROOMING: A LITTLE
MOBILITY SCORE: 6
WALKING IN HOSPITAL ROOM: TOTAL
MOVING FROM LYING ON BACK TO SITTING ON SIDE OF FLAT BED WITH BEDRAILS: TOTAL
DAILY ACTIVITIY SCORE: 14
PERSONAL GROOMING: A LOT

## 2025-06-06 ASSESSMENT — PAIN SCALES - GENERAL
PAINLEVEL_OUTOF10: 7
PAINLEVEL_OUTOF10: 0 - NO PAIN
PAINLEVEL_OUTOF10: 7
PAINLEVEL_OUTOF10: 10 - WORST POSSIBLE PAIN

## 2025-06-06 ASSESSMENT — PAIN - FUNCTIONAL ASSESSMENT
PAIN_FUNCTIONAL_ASSESSMENT: 0-10

## 2025-06-06 ASSESSMENT — PAIN DESCRIPTION - DESCRIPTORS
DESCRIPTORS: ACHING
DESCRIPTORS: ACHING

## 2025-06-06 ASSESSMENT — PAIN DESCRIPTION - PAIN TYPE: TYPE: ACUTE PAIN

## 2025-06-06 ASSESSMENT — PAIN DESCRIPTION - ONSET: ONSET: GRADUAL

## 2025-06-06 ASSESSMENT — PAIN DESCRIPTION - FREQUENCY: FREQUENCY: CONSTANT/CONTINUOUS

## 2025-06-06 ASSESSMENT — ACTIVITIES OF DAILY LIVING (ADL): HOME_MANAGEMENT_TIME_ENTRY: 12

## 2025-06-06 ASSESSMENT — PAIN DESCRIPTION - PROGRESSION: CLINICAL_PROGRESSION: NOT CHANGED

## 2025-06-06 ASSESSMENT — PAIN DESCRIPTION - LOCATION: LOCATION: GENERALIZED

## 2025-06-06 NOTE — CARE PLAN
The patient's goals for the shift include POSSIBLE discharge, MAINTIAN ADEQUATE OXYGENATION    The clinical goals for the shift include MAINTAIN SAFETY, POSSIBLE D/C      Problem: Pain - Adult  Goal: Verbalizes/displays adequate comfort level or baseline comfort level  Outcome: Progressing     Problem: Safety - Adult  Goal: Free from fall injury  Outcome: Progressing     Problem: Discharge Planning  Goal: Discharge to home or other facility with appropriate resources  Outcome: Progressing     Problem: Skin  Goal: Prevent/minimize sheer/friction injuries  Outcome: Progressing  Flowsheets (Taken 6/6/2025 1000)  Prevent/minimize sheer/friction injuries:   HOB 30 degrees or less   Increase activity/out of bed for meals   Turn/reposition every 2 hours/use positioning/transfer devices   Use pull sheet     Problem: Diabetes  Goal: Achieve decreasing blood glucose levels by end of shift  Outcome: Progressing

## 2025-06-06 NOTE — PROGRESS NOTES
Patient with an active discharge. Patient to have home oxygen certification prior to discharge. There is concern that patient does not have working oxygen equipment in the home. Once home cert is completed and oxygen delivered to her home, if needed, then patient can safely discharge. Patient declining HHC and SNF. Patient may need ride home, floor to set up. Will follow as needed.      06/06/25 1411   Discharge Planning   Home or Post Acute Services None   Expected Discharge Disposition Home   Does the patient need discharge transport arranged? Yes   RoundTrip coordination needed? Yes

## 2025-06-06 NOTE — CARE PLAN
The patient's goals for the shift include      The clinical goals for the shift include: MAINTAIN SAFETY AND COMFORT, CONTINUE OXYGEN THERAPY, PREVENT FALLS, SUPPORT SKIN INTEGRITY, EDUCATE AND ORIENT      Problem: Pain - Adult  Goal: Verbalizes/displays adequate comfort level or baseline comfort level  Outcome: Progressing     Problem: Safety - Adult  Goal: Free from fall injury  Outcome: Progressing     Problem: Discharge Planning  Goal: Discharge to home or other facility with appropriate resources  Outcome: Progressing     Problem: Chronic Conditions and Co-morbidities  Goal: Patient's chronic conditions and co-morbidity symptoms are monitored and maintained or improved  Outcome: Progressing     Problem: Nutrition  Goal: Nutrient intake appropriate for maintaining nutritional needs  Outcome: Progressing     Problem: Skin  Goal: Decreased wound size/increased tissue granulation at next dressing change  6/6/2025 0513 by Dutch Velasquez RN  Outcome: Progressing  Flowsheets (Taken 6/6/2025 0513)  Decreased wound size/increased tissue granulation at next dressing change: Protective dressings over bony prominences  6/6/2025 0513 by Dutch Velasquez RN  Outcome: Progressing  Flowsheets (Taken 6/6/2025 0513)  Decreased wound size/increased tissue granulation at next dressing change: Protective dressings over bony prominences  Goal: Participates in plan/prevention/treatment measures  6/6/2025 0513 by Dutch Velasquez RN  Outcome: Progressing  Flowsheets (Taken 6/6/2025 0513)  Participates in plan/prevention/treatment measures: Elevate heels  6/6/2025 0513 by Dutch Velasquez RN  Outcome: Progressing  Flowsheets (Taken 6/6/2025 0513)  Participates in plan/prevention/treatment measures: Elevate heels  Goal: Prevent/manage excess moisture  6/6/2025 0513 by Dutch Velasquez RN  Outcome: Progressing  Flowsheets (Taken 6/6/2025 0513)  Prevent/manage excess moisture:   Moisturize dry  skin   Cleanse incontinence/protect with barrier cream  6/6/2025 0513 by Dutch Velasquez RN  Outcome: Progressing  Flowsheets (Taken 6/6/2025 0513)  Prevent/manage excess moisture:   Moisturize dry skin   Cleanse incontinence/protect with barrier cream  Goal: Prevent/minimize sheer/friction injuries  6/6/2025 0513 by Dutch Velasquez RN  Outcome: Progressing  Flowsheets (Taken 6/6/2025 0513)  Prevent/minimize sheer/friction injuries:   HOB 30 degrees or less   Increase activity/out of bed for meals   Turn/reposition every 2 hours/use positioning/transfer devices  6/6/2025 0513 by Dutch Velasquez RN  Outcome: Progressing  Flowsheets (Taken 6/6/2025 0513)  Prevent/minimize sheer/friction injuries:   HOB 30 degrees or less   Increase activity/out of bed for meals   Turn/reposition every 2 hours/use positioning/transfer devices  Goal: Promote/optimize nutrition  6/6/2025 0513 by Dutch Velasquez RN  Outcome: Progressing  Flowsheets (Taken 6/6/2025 0513)  Promote/optimize nutrition:   Assist with feeding   Offer water/supplements/favorite foods  6/6/2025 0513 by Dutch Velasquez RN  Outcome: Progressing  Flowsheets (Taken 6/6/2025 0513)  Promote/optimize nutrition:   Assist with feeding   Offer water/supplements/favorite foods  Goal: Promote skin healing  6/6/2025 0513 by Dutch Velasquez RN  Outcome: Progressing  Flowsheets (Taken 6/6/2025 0513)  Promote skin healing:   Assess skin/pad under line(s)/device(s)   Turn/reposition every 2 hours/use positioning/transfer devices  6/6/2025 0513 by Dutch Velasquez RN  Outcome: Progressing  Flowsheets (Taken 6/6/2025 0513)  Promote skin healing:   Assess skin/pad under line(s)/device(s)   Turn/reposition every 2 hours/use positioning/transfer devices     Problem: Diabetes  Goal: Achieve decreasing blood glucose levels by end of shift  Outcome: Progressing  Goal: Increase stability of blood glucose readings by end of  shift  Outcome: Progressing  Goal: Decrease in ketones present in urine by end of shift  Outcome: Progressing  Goal: Maintain electrolyte levels within acceptable range throughout shift  Outcome: Progressing  Goal: Maintain glucose levels >70mg/dl to <250mg/dl throughout shift  Outcome: Progressing  Goal: No changes in neurological exam by end of shift  Outcome: Progressing  Goal: Vital signs within normal range for age by end of shift  Outcome: Progressing  Goal: Increase self care and/or family involovement by end of shift  Outcome: Progressing  Goal: Receive DSME education by end of shift  Outcome: Progressing     Problem: Knowledge Deficit  Goal: Patient/family/caregiver demonstrates understanding of disease process, treatment plan, medications, and discharge instructions  Outcome: Progressing     Problem: Mechanical Ventilation  Goal: Patient Will Maintain Patent Airway  Outcome: Progressing  Goal: Oral health is maintained or improved  Outcome: Progressing  Goal: ET tube will be managed safely  Outcome: Progressing  Goal: Ability to express needs and understand communication  Outcome: Progressing  Goal: Mobility/activity is maintained at optimum level for patient  Outcome: Progressing     Problem: Respiratory  Goal: Clear secretions with interventions this shift  Outcome: Progressing  Goal: Minimize anxiety/maximize coping throughout shift  Outcome: Progressing  Goal: Minimal/no exertional discomfort or dyspnea this shift  Outcome: Progressing  Goal: No signs of respiratory distress (eg. Use of accessory muscles. Peds grunting)  Outcome: Progressing  Goal: Patent airway maintained this shift  Outcome: Progressing  Goal: Tolerate mechanical ventilation evidenced by VS/agitation level this shift  Outcome: Progressing  Goal: Tolerate pulmonary toileting this shift  Outcome: Progressing  Goal: Wean oxygen to maintain O2 saturation per order/standard this shift  Outcome: Progressing     Problem: Fall/Injury  Goal:  Not fall by end of shift  Outcome: Progressing  Goal: Be free from injury by end of the shift  Outcome: Progressing     Problem: Pain  Goal: Takes deep breaths with improved pain control throughout the shift  Outcome: Progressing  Goal: Turns in bed with improved pain control throughout the shift  Outcome: Progressing  Goal: Walks with improved pain control throughout the shift  Outcome: Progressing  Goal: Performs ADL's with improved pain control throughout shift  Outcome: Progressing  Goal: Participates in PT with improved pain control throughout the shift  Outcome: Progressing  Goal: Free from opioid side effects throughout the shift  Outcome: Progressing  Goal: Free from acute confusion related to pain meds throughout the shift  Outcome: Progressing

## 2025-06-06 NOTE — DISCHARGE SUMMARY
Discharge Diagnosis  Acute respiratory failure           Issues Requiring Follow-Up  Confirm home oxygen by oxygen supply company prior to discharge.  Follow-up with primary pulmonologist.    Discharge Meds     Medication List      START taking these medications     albuterol 90 mcg/actuation inhaler; Commonly known as: Proventil HFA;   Inhale 2 puffs every 4 hours if needed for wheezing or shortness of   breath.   atorvastatin 40 mg tablet; Commonly known as: Lipitor; Take 1 tablet (40   mg) by mouth once daily at bedtime.   predniSONE 10 mg tablet; Commonly known as: Deltasone; Take 5 tablets by   mouth every other day for 2 days, 4 tablets every other day x2 days, 3   tablets every other day x2 days, 2 tablets every other day x2 days, THEN 1   tablet every other day x2 days.; Start taking on: June 6, 2025     CONTINUE taking these medications     acetaminophen 325 mg tablet; Commonly known as: Tylenol   ALBUTEROL INHL   gabapentin 600 mg tablet; Commonly known as: Neurontin   guaiFENesin 600 mg 12 hr tablet; Commonly known as: Mucinex   ipratropium-albuteroL 0.5-2.5 mg/3 mL nebulizer solution; Commonly known   as: Duo-Neb; Inhale 1 vial (3 mL) by nebulization 4 times a day.   nystatin 100,000 unit/gram powder; Commonly known as: Mycostatin   oxygen gas therapy; Commonly known as: O2   pantoprazole 40 mg EC tablet; Commonly known as: ProtoNix   Trelegy Ellipta 100-62.5-25 mcg blister with device; Generic drug:   fluticasone-umeclidin-vilanter; Inhale 1 puff once daily.       Test Results Pending At Discharge  Pending Labs       Order Current Status    Extra Tubes In process    Extra Tubes In process    PST Top In process    SST TOP In process    Blood Culture Preliminary result    Blood Culture Preliminary result            Hospital Course   Admission note reports that patientwith past medical history of CHF, COPD on 4 L baseline nasal cannula, DM, hypertension, CVA who presents to [facility] ICU for respiratory  failure with hypoxia and hypercapnia. Etiology of respiratory failure appears to be an acute exacerbation of COPD .  She was treated for COPD exacerbation with IV antibiotics, IV steroids, nebulizer and required ventilator support.  She was weaned from the ventilator and extubated on 6/5/2025.  She has done well postextubation and maintained on home oxygen of 4 L nasal cannula.  Pulse oximetry on day of discharge is normal on 4 L nasal cannula.  She reports she is breathing at her normal baseline on discharge.  She has completed course of antibiotics during hospitalization and follow-up chest x-ray has shown no acute infiltrates.  She will continue nasal cannula oxygen 4 L continuously and this is reordered since there is a question of ambulatory oxygen available at home and this will be reviewed by medical supply company.  She will complete course of prednisone taper on discharge.  She will continue long and short acting inhalers and nebulizers.  She will follow-up with her primary pulmonologist.    Pertinent Physical Exam At Time of Discharge  Physical Exam  Constitutional:       Appearance: Normal appearance.   HENT:      Head: Normocephalic and atraumatic.      Nose: Nose normal.      Mouth/Throat:      Mouth: Mucous membranes are moist.      Pharynx: Oropharynx is clear.   Eyes:      Extraocular Movements: Extraocular movements intact.      Conjunctiva/sclera: Conjunctivae normal.      Pupils: Pupils are equal, round, and reactive to light.   Cardiovascular:      Rate and Rhythm: Normal rate and regular rhythm.      Pulses: Normal pulses.      Heart sounds: Normal heart sounds.   Pulmonary:      Effort: Pulmonary effort is normal.      Breath sounds: Normal breath sounds.   Abdominal:      General: Abdomen is flat. Bowel sounds are normal.      Palpations: Abdomen is soft.      Tenderness: There is no abdominal tenderness.   Musculoskeletal:         General: Normal range of motion.      Cervical back: Normal  range of motion and neck supple.   Skin:     General: Skin is warm and dry.   Neurological:      General: No focal deficit present.      Mental Status: She is alert and oriented to person, place, and time.   Psychiatric:         Mood and Affect: Mood normal.         Behavior: Behavior normal.         Thought Content: Thought content normal.         Outpatient Follow-Up  No future appointments.  Discharge time is 45 minutes    Manuelito Timmons MD

## 2025-06-06 NOTE — PROGRESS NOTES
"Patricia Pruitt is a 65 y.o. female on day 6 of admission presenting with Acute respiratory failure.    Subjective   Patient was transferred out of the ICU yesterday. She states to having no chest pain, pressure, palpitations, or tightness. Patient states that she feels that her breathing is better, denies shortness of breath. Patient is on 4L nasal cannula, which is her baseline O2 therapy at home.       Objective     Physical Exam  Vitals and nursing note reviewed.   Constitutional:       General: She is not in acute distress.  HENT:      Head: Normocephalic and atraumatic.      Mouth/Throat:      Mouth: Mucous membranes are moist.      Pharynx: Oropharynx is clear.   Eyes:      Extraocular Movements: Extraocular movements intact.   Cardiovascular:      Rate and Rhythm: Normal rate and regular rhythm.      Heart sounds: No murmur heard.     No friction rub. No gallop.      Comments: On telemetry patient is sinus rhythm with occasional PVCs noted heart rate in the 70s  Pulmonary:      Effort: No respiratory distress.      Breath sounds: No stridor. No wheezing, rhonchi or rales.      Comments: Patient is on 4 L nasal cannula.  Bilateral upper lobes are clear, lateral lower lobes are clear/diminished  Chest:      Chest wall: No tenderness.   Abdominal:      General: Bowel sounds are normal.      Palpations: Abdomen is soft.   Musculoskeletal:         General: No swelling. Normal range of motion.      Cervical back: Normal range of motion and neck supple.      Right lower leg: No edema.      Left lower leg: No edema.   Skin:     General: Skin is warm and dry.      Capillary Refill: Capillary refill takes less than 2 seconds.   Neurological:      Mental Status: She is alert and oriented to person, place, and time.         Last Recorded Vitals  Blood pressure 145/64, pulse 75, temperature 36.5 °C (97.7 °F), temperature source Oral, resp. rate 18, height 1.575 m (5' 2\"), weight 89.2 kg (196 lb 10.4 oz), SpO2 " 96%.  Intake/Output last 3 Shifts:  I/O last 3 completed shifts:  In: 620 (7 mL/kg) [P.O.:120; IV Piggyback:500]  Out: 1300 (14.6 mL/kg) [Urine:1300 (0.4 mL/kg/hr)]  Weight: 89.2 kg     Relevant Results  Results for orders placed or performed during the hospital encounter of 05/31/25 (from the past 24 hours)   POCT GLUCOSE   Result Value Ref Range    POCT Glucose 215 (H) 74 - 99 mg/dL   POCT GLUCOSE   Result Value Ref Range    POCT Glucose 137 (H) 74 - 99 mg/dL   POCT GLUCOSE   Result Value Ref Range    POCT Glucose 85 74 - 99 mg/dL   POCT GLUCOSE   Result Value Ref Range    POCT Glucose 76 74 - 99 mg/dL   POCT GLUCOSE   Result Value Ref Range    POCT Glucose 170 (H) 74 - 99 mg/dL   POCT GLUCOSE   Result Value Ref Range    POCT Glucose 174 (H) 74 - 99 mg/dL   POCT GLUCOSE   Result Value Ref Range    POCT Glucose 227 (H) 74 - 99 mg/dL      Assessment & Plan  Acute respiratory failure  Patient has a borderline history of CHF, COPD.  Now with a history of diabetes, sepsis.  Now off the vasopressors.  Positive blood culture for Staphylococcus hominis.  Currently underwent creatinine is 2.1 as well as a potassium of 6.4.  Patient currently on IV antibiotic including Zithromax as well as a bronchodilators and steroids.  Patient also on Zosyn and vancomycin.  As needed diuretics.  Continued on propofol.  Echocardiogram ejection fraction in normal range.  Elevated troponin more likely due to demand ischemia.  Less likely occlusive event.  Will follow along as needed.  DVT prophylaxis.  6/3: Patient as above continue current antibiotic as well as DVT prophylaxis.  As needed IV Lasix.  Chest x-ray bilateral infiltrate.  Patient currently off vasopressors.  6/4: Patient has both sepsis.  Weaning today.  Continue bronchodilator as well as steroids and antibiotic.  Currently on a propofol infusion.  Stable cardiac wise.  DVT precaution.  Will follow-up as needed.  No further cardiac input at the moment.  6/6: Patient was  transferred out of the ICU yesterday. She states to having no chest pain, pressure, palpitations, or tightness. Patient states that she feels that her breathing is better, denies shortness of breath. Patient is on 4L nasal cannula, which is her baseline O2 therapy at home. Vitals this morning show a heart rate of 75, blood pressure 145/64, satting 96% on 4 L nasal cannula. On telemetry sinus rhythm in the 70s with PVCs noted. No new labs were drawn this morning. After speaking with my collaborating physician, Dr. Crain, will start patient on oral spironolactone 12.5 mg twice daily for diuresis in the setting of HFpEF.  Continue atorvastatin 40 mg daily and spironolactone 12.5 mg twice daily.  Patient should follow-up with cardiologist Dr. Crain in 1 to 2 weeks after hospital discharge.  At this time cardiology will sign off as we do not have any further recommendations for this patient.       I spent 35 minutes in the professional and overall care of this patient.      Thais Reece, APRN-CNP

## 2025-06-06 NOTE — NURSING NOTE
Upon rounding right internal jugular TLC with current CHG dressing dry and intact. All three lumens with brisk blood return and flush easily, all are clamped with slide clamp and Curos caps intact. RN states pt to be discharged today and line will be removed.

## 2025-06-06 NOTE — PROGRESS NOTES
Physical Therapy    Physical Therapy Treatment    Patient Name: Patricia Pruitt  MRN: [id number]  Department: [address]  Room: [address] [place] [address]  Today's Date: 6/6/2025  Time Calculation  Start Time: 0751  Stop Time: 0815  Time Calculation (min): 24 min         Assessment/Plan   PT Assessment  Barriers to Discharge Home: Physical needs  Physical Needs: 24hr mobility assistance needed, 24hr ADL assistance needed  End of Session Communication: Bedside nurse  Assessment Comment: Pt continues to present with mobility and strength deficits, pt would benefit from continued skilled therapy services to increase strength and improve functional mobility.  End of Session Patient Position: Up in chair, Alarm on  PT Plan  Inpatient/Swing Bed or Outpatient: Inpatient  PT Plan  Treatment/Interventions: Bed mobility, Transfer training, Gait training, Balance training, Neuromuscular re-education, Strengthening, Range of motion, Endurance training, Therapeutic exercise, Therapeutic activity, Home exercise program, Positioning, Postural re-education  PT Plan: Ongoing PT  PT Frequency: 5 times per week  PT Discharge Recommendations: Moderate intensity level of continued care  Equipment Recommended upon Discharge: Wheeled walker, Wheelchair  PT Recommended Transfer Status: Assist x2  PT - OK to Discharge: Yes    PT Visit Info:  PT Received On: 06/06/25     General Visit Information:   General  Prior to Session Communication: Bedside nurse  Patient Position Received: Bed, 3 rail up, Alarm off, not on at start of session  General Comment: Cleared by nursing to be seen for therapy, pt agreeable with tx, supine in bed upon arrival.    Subjective   Precautions:  Precautions  Medical Precautions: Fall precautions    Objective   Pain:  Pain Assessment  Pain Assessment: 0-10  0-10 (Numeric) Pain Score: 7  Pain Type: Chronic pain  Pain Location: Back  Pain Interventions: Repositioned  Response to Interventions: No change in pain (RN  notified)    Cognition:  Cognition  Overall Cognitive Status: Impaired  Orientation Level: Disoriented to place, Disoriented to time     Postural Control:  Static Sitting Balance  Static Sitting-Balance Support: Bilateral upper extremity supported, Feet unsupported  Static Sitting-Level of Assistance: Close supervision, Contact guard  Static Sitting-Comment/Number of Minutes: Fair+ seated static balance  Static Standing Balance  Static Standing-Balance Support: Bilateral upper extremity supported  Static Standing-Level of Assistance: Minimum assistance  Static Standing-Comment/Number of Minutes: Fair static standing balance with bilateral UE support on walker.     Treatments:  Therapeutic Exercise  Therapeutic Exercise Performed: Yes  Therapeutic Exercise Activity 1: Bilateral ankle pumps x15  Therapeutic Exercise Activity 2: Bilateral hip flexion x15  Therapeutic Exercise Activity 3: Bilateral knee extension x15  Therapeutic Exercise Activity 4: Resisted hip abd/add 15    Bed Mobility  Bed Mobility: Yes  Bed Mobility 1  Bed Mobility 1: Supine to sitting  Level of Assistance 1: Moderate assistance, Minimal verbal cues  Bed Mobility Comments 1: Mod assist for trunk during supine to sit, pt able to bring LE's off EOB without assist from therapist.    Ambulation/Gait Training  Ambulation/Gait Training Performed: Yes  Ambulation/Gait Training 1  Surface 1: Level tile  Device 1: Rolling walker  Assistance 1: Moderate assistance  Quality of Gait 1: Wide base of support, Diminished heel strike, Decreased step length, Shuffling gait, Forward flexed posture, Soft knee(s)  Comments/Distance (ft) 1: 5-6 steps (bed to chair) with wheeled walker, requires assist to maneuver walker, mod assist x2 for balance.    Transfers  Transfer: Yes  Transfer 1  Transfer From 1: Sit to  Transfer to 1: Stand  Technique 1: Sit to stand, Stand to sit  Transfer Device 1: Walker  Transfer Level of Assistance 1: Moderate assistance, Minimal verbal  cues, +2  Trials/Comments 1: Mod assist x2 for trunk up during sit to stand, cues for proper hand placement, decreased eccentric control in sitting.    Outcome Measures:  [facility] Basic Mobility  Turning from your back to your side while in a flat bed without using bedrails: A little  Moving from lying on your back to sitting on the side of a flat bed without using bedrails: A little  Moving to and from bed to chair (including a wheelchair): A lot  Standing up from a chair using your arms (e.g. wheelchair or bedside chair): A lot  To walk in hospital room: A lot  Climbing 3-5 steps with railing: Total  Basic Mobility - Total Score: 13    Education Documentation  Mobility Training, taught by Irma Palacios PTA at 6/6/2025  9:31 AM.  Learner: Patient  Readiness: Acceptance  Method: Explanation  Response: Verbalizes Understanding    Education Comments  06/06/25 0931 Irma Palacios PTA  Patient educated on the importance of mobility and participation with therapy. Educated on safety and use of call light for assistance.         Encounter Problems       Encounter Problems (Active)       PT Problem       Strength (Progressing)       Start:  06/03/25    Expected End:  07/03/25       The patient will demonstrate an overall strength of >4/5 in BLE to assist with completion of functional mobility.           Bed Mobility (Progressing)       Start:  06/03/25    Expected End:  07/03/25       The patient will be able to complete bed mobility at a [place] level with use of bed rails.           Bed <> Chair Transfer (Progressing)       Start:  06/03/25    Expected End:  07/03/25       The patient will be able to transfer from bed <> chair with LRAD at a min A level by Dc to facilitate functional mobility.          Ambulation (Progressing)       Start:  06/03/25    Expected End:  07/03/25       The patient will be able to ambulate at a Olu level for >15ftx1 with LRAD.             Pain - Adult

## 2025-06-06 NOTE — CARE PLAN
Problem: Respiratory  Goal: Clear secretions with interventions this shift  Outcome: Progressing  Goal: Tolerate pulmonary toileting this shift  Outcome: Progressing

## 2025-06-06 NOTE — PROGRESS NOTES
Occupational Therapy    OT Treatment    Patient Name: Patricia Pruitt  MRN: [id number]  Department: [address]  Room: [address] [place] [address]  Today's Date: 6/6/2025  Time Calculation  Start Time: 0743  Stop Time: 0808  Time Calculation (min): 25 min        Assessment:  OT Assessment: Tolerated session fairly, demonstrating progression towards POC with pt expressing increased back pain with RN made aware. Pt would benefit from continued skilled OT services to improve strength, balance, and functional tolerance to increase independence with ADL tasks  Prognosis: Fair  Barriers to Discharge Home: Caregiver assistance, Cognition needs, Physical needs  Caregiver Assistance: Caregiver assistance needed per identified barriers - however, level of patient's required assistance exceeds assistance available at home  Cognition Needs: 24hr supervision for safety awareness needed, Insight of patient limited regarding functional ability/needs, Cognition-related high falls risk  Physical Needs: 24hr mobility assistance needed, 24hr ADL assistance needed, High falls risk due to function or environment  Evaluation/Treatment Tolerance: Patient limited by pain  End of Session Communication: Bedside nurse  End of Session Patient Position: Up in chair, Alarm on  OT Assessment Results: Decreased ADL status, Decreased upper extremity range of motion, Decreased upper extremity strength, Decreased safe judgment during ADL, Decreased cognition, Decreased endurance, Decreased fine motor control, Decreased functional mobility, Decreased gross motor control, Decreased IADLs, Decreased trunk control for functional activities  Prognosis: Fair  Evaluation/Treatment Tolerance: Patient limited by pain  Plan:  Treatment Interventions: ADL retraining, Functional transfer training, UE strengthening/ROM, Endurance training, Cognitive reorientation, Patient/family training, Equipment evaluation/education, Neuromuscular reeducation, Compensatory technique education  OT  Frequency: 4 times per week  OT Discharge Recommendations: Moderate intensity level of continued care  Equipment Recommended upon Discharge: Wheeled walker, Wheelchair  OT Recommended Transfer Status: Dependent  OT - OK to Discharge: Yes  Treatment Interventions: ADL retraining, Functional transfer training, UE strengthening/ROM, Endurance training, Cognitive reorientation, Patient/family training, Equipment evaluation/education, Neuromuscular reeducation, Compensatory technique education    Subjective   OT Visit Info:  OT Received On: 06/06/25  General Visit Info:  General  Reason for Referral: activities of daily living, Acute respiratory failure.  Prior to Session Communication: Bedside nurse  Patient Position Received: Bed, 3 rail up, Alarm off, not on at start of session  General Comment: Cleared for therapy per RN. Pt supine in bed upon arrival and agreeable to tx  Precautions:  Medical Precautions: Fall precautions    Pain:  Pain Assessment  Pain Assessment: 0-10  0-10 (Numeric) Pain Score: 7  Pain Type: Chronic pain  Pain Location: Back  Pain Interventions: Repositioned  Response to Interventions: No change in pain (RN aware)    Objective    Cognition:  Cognition  Overall Cognitive Status: Impaired  Orientation Level: Disoriented to time, Disoriented to place  Safety/Judgement: Exceptions to WFL  Insight: Moderate  Impulsive: Mildly  Processing Speed: Delayed  Coordination:  Movements are Fluid and Coordinated: No  Upper Body Coordination: limited functional UE movement  Activities of Daily Living: Feeding  Feeding Comments: assist ordering breakfast    Grooming  Grooming Level of Assistance: Setup, Close supervision, Moderate assistance  Grooming Where Assessed: Chair  Grooming Comments: s/u for face washing task and to don dentures. Mod A for hair brushing task d/t increased knotting with encouragement to continue task throughout the day in effort to decrease knotting    LE Dressing  LE Dressing: Yes  Sock  Level of Assistance: Dependent  LE Dressing Where Assessed: Bed level  LE Dressing Comments: assist to don socks    Bed Mobility/Transfers: Bed Mobility  Bed Mobility: Yes  Bed Mobility 1  Bed Mobility 1: Supine to sitting  Level of Assistance 1: Moderate assistance, +2, Minimal verbal cues  Bed Mobility Comments 1: Pt able to bring BLE off EOB with assist for trunk elevation with cues to pull self up and to scoot hips to EOB    Transfers  Transfer: Yes  Transfer 1  Technique 1: Sit to stand, Stand to sit  Transfer Device 1: Walker  Transfer Level of Assistance 1: Moderate assistance, +2  Trials/Comments 1: assist for trunk elevation and forward weightshifting with cues for proper hand placement, demonstrating increased retropulsion in sitting with decreased eccentric lowering    Functional Mobility:  Functional Mobility  Functional Mobility Performed: Yes  Functional Mobility 1  Device 1: Rolling walker  Assistance 1: Moderate assistance (x2)  Comments 1: Tolerated taking ~5 steps at bedside at FWW with cues for step/walker sequencing and postural alignment with pt demonstrating poor carryover with increased retropulsion, demonstrating fair-/poor balance    Standing Balance:  Dynamic Standing Balance  Dynamic Standing-Level of Assistance: Moderate assistance (x2)  Dynamic Standing-Comments: fair-/poor balance during functional mobility  Outcome Measures:[facility] Daily Activity  Putting on and taking off regular lower body clothing: Total  Bathing (including washing, rinsing, drying): A lot  Putting on and taking off regular upper body clothing: A little  Toileting, which includes using toilet, bedpan or urinal: Total  Taking care of personal grooming such as brushing teeth: A little  Eating Meals: A little  Daily Activity - Total Score: 13    Education Documentation  ADL Training, taught by JO Rivero at 6/6/2025  9:44 AM.  Learner: Patient  Readiness: Acceptance  Method: Explanation  Response:  Verbalizes Understanding, Needs Reinforcement  Comment: educated on safety awareness    Education Comments  No comments found.    Problem: OT Goals  Goal: ADLs  Description: Pt will complete ADL tasks with Masha,  using AE as needed, in order to complete self-care tasks.    Outcome: Progressing  Goal: Functional transfers  Description: Pt will perform functional transfers at min A level to/from chair/wheelchair.    Outcome: Progressing  Goal: Therapeutic exercise  Description: Pt will perform upper body therapeutic exercises all joints/planes of motion with min A to increase strength for improved ADL performance    Outcome: Progressing

## 2025-06-06 NOTE — PROGRESS NOTES
Speech-Language Pathology    Speech-Language Pathology Clinical Swallow Treatment    Patient Name: Patricia Pruitt  MRN: [id number]  : 1960  Today's Date: 25  Start Time: 1340  Stop Time: 1352  Time Calculation (min): 12 min    ASSESSMENT  SLP TX Intervention Outcome: Making Progress Towards Goals  Treatment Tolerance: Patient tolerated treatment well    Impressions: Pt is safely tolerating current diet without incidence per pt and RN report.. No further skilled dysphgia treatment indicated.  PLAN  Is MBSS recommended? No, due to recent MBSS completed.  Recommended solid consistency: Regular (IDDSI level 7)  Recommended liquid consistency: Thin (IDDSI 0)  Recommended medication administration: Whole in thin liquid  Safe swallow strategies:  - Upright positioning for all PO intake  - Slow rate of intake    Discharge recommendation: Home with no further SLP  Inpatient/Swing Bed or Outpatient: Inpatient  SLP TX Plan: Discharge from Speech Therapy  SLP Plan: No skilled SLP        Next Treatment Priority: diet rivka,educ          SUBJECTIVE  Prior to Session Communication: Bedside nurse  RN cleared pt to participate in session and reported no difficulty with swallowing, discharging home today  Respiratory status: Supplemental oxygen via NC  Positioning: Upright in bed  Pt was alert, pleasant, and cooperative for session.    Pain Assessment  Pain Assessment: 0-10  0-10 (Numeric) Pain Score: 7  Pain Type: Chronic pain  Pain Location: Back  Pain Orientation: Left  Orientation: Oriented to situation and Ox4  Ability to follow functional commands: WFL    OBJECTIVE  Therapeutic Swallow Intervention : PO Trials  STG:    Patient will tolerate current diet without noted pulmonary compromise or evidence of pulmonary sequela as noted in patient chart and/or reported by patient/family.    GOAL START: 2025    GOAL END:25   Status: Goal met   Progress this date: Pt is tolerating current diet without pulmonary compromise,  per RN report, pt stating no difficulty with swallowing, Pt consumed 2 patrick doon cookies and approx 4 oz thin liquids, without s/s aspiration in 100% of trials observed    Treatment/Education:  Results and recommendations were relayed to: Patient and Bedside nurse  Education provided: Yes   Learner: Patient   Barriers to learning: None   Method of teaching: Verbal   Topic: role of ST and recommended safe swallow strategies   Outcome of teaching: Pt verbalized understanding

## 2025-06-07 ENCOUNTER — APPOINTMENT (OUTPATIENT)
Dept: RADIOLOGY | Facility: HOSPITAL | Age: 65
End: 2025-06-07
Payer: COMMERCIAL

## 2025-06-07 PROBLEM — R53.81 DEBILITY: Status: ACTIVE | Noted: 2023-11-13

## 2025-06-07 PROBLEM — D69.6 THROMBOCYTOPENIA: Status: ACTIVE | Noted: 2025-06-07

## 2025-06-07 PROBLEM — J96.11 CHRONIC RESPIRATORY FAILURE WITH HYPOXIA: Status: ACTIVE | Noted: 2025-06-07

## 2025-06-07 PROBLEM — M25.512 LEFT SHOULDER PAIN: Status: ACTIVE | Noted: 2025-06-07

## 2025-06-07 PROBLEM — F17.200 TOBACCO DEPENDENCE: Status: ACTIVE | Noted: 2025-06-07

## 2025-06-07 PROBLEM — J18.9 HAP (HOSPITAL-ACQUIRED PNEUMONIA): Status: ACTIVE | Noted: 2025-06-07

## 2025-06-07 PROBLEM — I35.0 MODERATE AORTIC VALVE STENOSIS: Status: ACTIVE | Noted: 2025-06-07
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
## 2025-06-07 LAB
ALBUMIN SERPL BCP-MCNC: 3.3 G/DL (ref 3.4–5)
ALP SERPL-CCNC: 48 U/L (ref 33–136)
ALT SERPL W P-5'-P-CCNC: 96 U/L (ref 7–45)
ANION GAP SERPL CALCULATED.3IONS-SCNC: 8 MMOL/L (ref 10–20)
APPARATUS: ABNORMAL
APPEARANCE UR: CLEAR
ARTERIAL PATENCY WRIST A: POSITIVE
AST SERPL W P-5'-P-CCNC: 32 U/L (ref 9–39)
BACTERIA #/AREA URNS AUTO: ABNORMAL /HPF
BACTERIA BLD CULT: NORMAL
BACTERIA BLD CULT: NORMAL
BASE EXCESS BLDA CALC-SCNC: 15.1 MMOL/L (ref -2–3)
BASOPHILS # BLD AUTO: 0.02 X10*3/UL (ref 0–0.1)
BASOPHILS NFR BLD AUTO: 0.2 %
BILIRUB SERPL-MCNC: 0.7 MG/DL (ref 0–1.2)
BILIRUB UR STRIP.AUTO-MCNC: NEGATIVE MG/DL
BNP SERPL-MCNC: 358 PG/ML (ref 0–99)
BODY TEMPERATURE: 37 DEGREES CELSIUS
BUN SERPL-MCNC: 28 MG/DL (ref 6–23)
CALCIUM SERPL-MCNC: 8.8 MG/DL (ref 8.6–10.3)
CHLORIDE SERPL-SCNC: 96 MMOL/L (ref 98–107)
CO2 SERPL-SCNC: 39 MMOL/L (ref 21–32)
COLOR UR: YELLOW
CREAT SERPL-MCNC: 0.59 MG/DL (ref 0.5–1.05)
EGFRCR SERPLBLD CKD-EPI 2021: >90 ML/MIN/1.73M*2
EOSINOPHIL # BLD AUTO: 0.06 X10*3/UL (ref 0–0.7)
EOSINOPHIL NFR BLD AUTO: 0.5 %
ERYTHROCYTE [DISTWIDTH] IN BLOOD BY AUTOMATED COUNT: 13.8 % (ref 11.5–14.5)
GLUCOSE BLD MANUAL STRIP-MCNC: 117 MG/DL (ref 74–99)
GLUCOSE BLD MANUAL STRIP-MCNC: 153 MG/DL (ref 74–99)
GLUCOSE BLD MANUAL STRIP-MCNC: 165 MG/DL (ref 74–99)
GLUCOSE BLD MANUAL STRIP-MCNC: 247 MG/DL (ref 74–99)
GLUCOSE SERPL-MCNC: 114 MG/DL (ref 74–99)
GLUCOSE UR STRIP.AUTO-MCNC: NORMAL MG/DL
HCO3 BLDA-SCNC: 41.5 MMOL/L (ref 22–26)
HCT VFR BLD AUTO: 49.6 % (ref 36–46)
HGB BLD-MCNC: 15.3 G/DL (ref 12–16)
IMM GRANULOCYTES # BLD AUTO: 0.08 X10*3/UL (ref 0–0.7)
IMM GRANULOCYTES NFR BLD AUTO: 0.7 % (ref 0–0.9)
INHALED O2 CONCENTRATION: 36 %
KETONES UR STRIP.AUTO-MCNC: NEGATIVE MG/DL
LEUKOCYTE ESTERASE UR QL STRIP.AUTO: ABNORMAL
LYMPHOCYTES # BLD AUTO: 2.1 X10*3/UL (ref 1.2–4.8)
LYMPHOCYTES NFR BLD AUTO: 17.8 %
MCH RBC QN AUTO: 31.4 PG (ref 26–34)
MCHC RBC AUTO-ENTMCNC: 30.8 G/DL (ref 32–36)
MCV RBC AUTO: 102 FL (ref 80–100)
MONOCYTES # BLD AUTO: 0.94 X10*3/UL (ref 0.1–1)
MONOCYTES NFR BLD AUTO: 7.9 %
MRSA DNA SPEC QL NAA+PROBE: NOT DETECTED
MUCOUS THREADS #/AREA URNS AUTO: ABNORMAL /LPF
NEUTROPHILS # BLD AUTO: 8.63 X10*3/UL (ref 1.2–7.7)
NEUTROPHILS NFR BLD AUTO: 72.9 %
NITRITE UR QL STRIP.AUTO: NEGATIVE
NRBC BLD-RTO: 0 /100 WBCS (ref 0–0)
OXYHGB MFR BLDA: 89 % (ref 94–98)
PCO2 BLDA: 57 MM HG (ref 38–42)
PH BLDA: 7.47 PH (ref 7.38–7.42)
PH UR STRIP.AUTO: 6.5 [PH]
PLATELET # BLD AUTO: 113 X10*3/UL (ref 150–450)
PO2 BLDA: 59 MM HG (ref 85–95)
POTASSIUM SERPL-SCNC: 4 MMOL/L (ref 3.5–5.3)
PROT SERPL-MCNC: 5.9 G/DL (ref 6.4–8.2)
PROT UR STRIP.AUTO-MCNC: NEGATIVE MG/DL
RBC # BLD AUTO: 4.88 X10*6/UL (ref 4–5.2)
RBC # UR STRIP.AUTO: NEGATIVE MG/DL
RBC #/AREA URNS AUTO: ABNORMAL /HPF
SAO2 % BLDA: 93 % (ref 94–100)
SODIUM SERPL-SCNC: 139 MMOL/L (ref 136–145)
SP GR UR STRIP.AUTO: 1.02
SPECIMEN DRAWN FROM PATIENT: ABNORMAL
SQUAMOUS #/AREA URNS AUTO: ABNORMAL /HPF
UROBILINOGEN UR STRIP.AUTO-MCNC: ABNORMAL MG/DL
VANCOMYCIN SERPL-MCNC: 16 UG/ML (ref 5–20)
WBC # BLD AUTO: 11.8 X10*3/UL (ref 4.4–11.3)
WBC #/AREA URNS AUTO: ABNORMAL /HPF

## 2025-06-07 PROCEDURE — 2500000001 HC RX 250 WO HCPCS SELF ADMINISTERED DRUGS (ALT 637 FOR MEDICARE OP): Performed by: INTERNAL MEDICINE

## 2025-06-07 PROCEDURE — 73030 X-RAY EXAM OF SHOULDER: CPT | Mod: LT

## 2025-06-07 PROCEDURE — 82805 BLOOD GASES W/O2 SATURATION: CPT

## 2025-06-07 PROCEDURE — 94667 MNPJ CHEST WALL 1ST: CPT

## 2025-06-07 PROCEDURE — 99222 1ST HOSP IP/OBS MODERATE 55: CPT

## 2025-06-07 PROCEDURE — 94640 AIRWAY INHALATION TREATMENT: CPT

## 2025-06-07 PROCEDURE — 87640 STAPH A DNA AMP PROBE: CPT

## 2025-06-07 PROCEDURE — 99223 1ST HOSP IP/OBS HIGH 75: CPT | Performed by: INTERNAL MEDICINE

## 2025-06-07 PROCEDURE — 1200000002 HC GENERAL ROOM WITH TELEMETRY DAILY

## 2025-06-07 PROCEDURE — 87086 URINE CULTURE/COLONY COUNT: CPT | Mod: TRILAB | Performed by: STUDENT IN AN ORGANIZED HEALTH CARE EDUCATION/TRAINING PROGRAM

## 2025-06-07 PROCEDURE — 97166 OT EVAL MOD COMPLEX 45 MIN: CPT | Mod: GO

## 2025-06-07 PROCEDURE — 2500000004 HC RX 250 GENERAL PHARMACY W/ HCPCS (ALT 636 FOR OP/ED)

## 2025-06-07 PROCEDURE — 94664 DEMO&/EVAL PT USE INHALER: CPT

## 2025-06-07 PROCEDURE — 2500000001 HC RX 250 WO HCPCS SELF ADMINISTERED DRUGS (ALT 637 FOR MEDICARE OP): Performed by: NURSE PRACTITIONER

## 2025-06-07 PROCEDURE — 36600 WITHDRAWAL OF ARTERIAL BLOOD: CPT

## 2025-06-07 PROCEDURE — 80202 ASSAY OF VANCOMYCIN: CPT

## 2025-06-07 PROCEDURE — 87899 AGENT NOS ASSAY W/OPTIC: CPT | Mod: TRILAB | Performed by: INTERNAL MEDICINE

## 2025-06-07 PROCEDURE — 97530 THERAPEUTIC ACTIVITIES: CPT | Mod: GO

## 2025-06-07 PROCEDURE — 36415 COLL VENOUS BLD VENIPUNCTURE: CPT | Performed by: INTERNAL MEDICINE

## 2025-06-07 PROCEDURE — 87075 CULTR BACTERIA EXCEPT BLOOD: CPT | Mod: TRILAB | Performed by: INTERNAL MEDICINE

## 2025-06-07 PROCEDURE — 94669 MECHANICAL CHEST WALL OSCILL: CPT

## 2025-06-07 PROCEDURE — 82947 ASSAY GLUCOSE BLOOD QUANT: CPT

## 2025-06-07 PROCEDURE — 85025 COMPLETE CBC W/AUTO DIFF WBC: CPT | Performed by: INTERNAL MEDICINE

## 2025-06-07 PROCEDURE — 83880 ASSAY OF NATRIURETIC PEPTIDE: CPT

## 2025-06-07 PROCEDURE — 2500000005 HC RX 250 GENERAL PHARMACY W/O HCPCS

## 2025-06-07 PROCEDURE — 87449 NOS EACH ORGANISM AG IA: CPT | Mod: TRILAB | Performed by: INTERNAL MEDICINE

## 2025-06-07 PROCEDURE — 84145 PROCALCITONIN (PCT): CPT | Mod: TRILAB

## 2025-06-07 PROCEDURE — 87641 MR-STAPH DNA AMP PROBE: CPT

## 2025-06-07 PROCEDURE — 96375 TX/PRO/DX INJ NEW DRUG ADDON: CPT

## 2025-06-07 PROCEDURE — 92610 EVALUATE SWALLOWING FUNCTION: CPT | Mod: GN

## 2025-06-07 PROCEDURE — 81001 URINALYSIS AUTO W/SCOPE: CPT | Performed by: STUDENT IN AN ORGANIZED HEALTH CARE EDUCATION/TRAINING PROGRAM

## 2025-06-07 PROCEDURE — 5A09357 ASSISTANCE WITH RESPIRATORY VENTILATION, LESS THAN 24 CONSECUTIVE HOURS, CONTINUOUS POSITIVE AIRWAY PRESSURE: ICD-10-PCS | Performed by: NURSE PRACTITIONER

## 2025-06-07 PROCEDURE — 2500000002 HC RX 250 W HCPCS SELF ADMINISTERED DRUGS (ALT 637 FOR MEDICARE OP, ALT 636 FOR OP/ED): Performed by: INTERNAL MEDICINE

## 2025-06-07 PROCEDURE — 2500000004 HC RX 250 GENERAL PHARMACY W/ HCPCS (ALT 636 FOR OP/ED): Performed by: INTERNAL MEDICINE

## 2025-06-07 PROCEDURE — 9420000001 HC RT PATIENT EDUCATION 5 MIN

## 2025-06-07 PROCEDURE — 2500000004 HC RX 250 GENERAL PHARMACY W/ HCPCS (ALT 636 FOR OP/ED): Performed by: STUDENT IN AN ORGANIZED HEALTH CARE EDUCATION/TRAINING PROGRAM

## 2025-06-07 PROCEDURE — 84075 ASSAY ALKALINE PHOSPHATASE: CPT | Performed by: INTERNAL MEDICINE

## 2025-06-07 PROCEDURE — 94660 CPAP INITIATION&MGMT: CPT

## 2025-06-07 PROCEDURE — 2500000002 HC RX 250 W HCPCS SELF ADMINISTERED DRUGS (ALT 637 FOR MEDICARE OP, ALT 636 FOR OP/ED)

## 2025-06-07 PROCEDURE — 71045 X-RAY EXAM CHEST 1 VIEW: CPT

## 2025-06-07 PROCEDURE — 2500000005 HC RX 250 GENERAL PHARMACY W/O HCPCS: Performed by: INTERNAL MEDICINE

## 2025-06-07 PROCEDURE — 73030 X-RAY EXAM OF SHOULDER: CPT | Mod: LEFT SIDE | Performed by: RADIOLOGY

## 2025-06-07 PROCEDURE — 71045 X-RAY EXAM CHEST 1 VIEW: CPT | Performed by: RADIOLOGY

## 2025-06-07 PROCEDURE — 87040 BLOOD CULTURE FOR BACTERIA: CPT | Mod: TRILAB | Performed by: INTERNAL MEDICINE

## 2025-06-07 RX ORDER — CEFEPIME HYDROCHLORIDE 2 G/50ML
2 INJECTION, SOLUTION INTRAVENOUS EVERY 8 HOURS
Status: DISPENSED | OUTPATIENT
Start: 2025-06-07 | End: 2025-06-14

## 2025-06-07 RX ORDER — SPIRONOLACTONE 25 MG/1
12.5 TABLET ORAL DAILY
Status: DISPENSED | OUTPATIENT
Start: 2025-06-07

## 2025-06-07 RX ORDER — ACETAMINOPHEN 160 MG/5ML
650 SOLUTION ORAL EVERY 4 HOURS PRN
Status: ACTIVE | OUTPATIENT
Start: 2025-06-07

## 2025-06-07 RX ORDER — CALCIUM CARBONATE 200(500)MG
1000 TABLET,CHEWABLE ORAL 4 TIMES DAILY PRN
Status: DISPENSED | OUTPATIENT
Start: 2025-06-07

## 2025-06-07 RX ORDER — ALBUTEROL SULFATE 0.83 MG/ML
2.5 SOLUTION RESPIRATORY (INHALATION) EVERY 6 HOURS PRN
Status: DISPENSED | OUTPATIENT
Start: 2025-06-07

## 2025-06-07 RX ORDER — IBUPROFEN 200 MG
1 TABLET ORAL DAILY
Status: DISPENSED | OUTPATIENT
Start: 2025-06-07

## 2025-06-07 RX ORDER — ACETAMINOPHEN 325 MG/1
650 TABLET ORAL EVERY 4 HOURS PRN
Status: DISPENSED | OUTPATIENT
Start: 2025-06-07

## 2025-06-07 RX ORDER — HEPARIN SODIUM 5000 [USP'U]/ML
5000 INJECTION, SOLUTION INTRAVENOUS; SUBCUTANEOUS EVERY 12 HOURS
Status: DISPENSED | OUTPATIENT
Start: 2025-06-07

## 2025-06-07 RX ORDER — TALC
3 POWDER (GRAM) TOPICAL NIGHTLY PRN
Status: ACTIVE | OUTPATIENT
Start: 2025-06-07

## 2025-06-07 RX ORDER — PANTOPRAZOLE SODIUM 40 MG/1
40 TABLET, DELAYED RELEASE ORAL DAILY
Status: DISPENSED | OUTPATIENT
Start: 2025-06-07

## 2025-06-07 RX ORDER — PREDNISONE 10 MG/1
10 TABLET ORAL DAILY
Status: ON HOLD | COMMUNITY

## 2025-06-07 RX ORDER — GUAIFENESIN 600 MG/1
600 TABLET, EXTENDED RELEASE ORAL EVERY 12 HOURS PRN
Status: ACTIVE | OUTPATIENT
Start: 2025-06-07

## 2025-06-07 RX ORDER — METRONIDAZOLE 500 MG/100ML
500 INJECTION, SOLUTION INTRAVENOUS EVERY 8 HOURS
Status: DISPENSED | OUTPATIENT
Start: 2025-06-07

## 2025-06-07 RX ORDER — ACETAMINOPHEN 650 MG/1
650 SUPPOSITORY RECTAL EVERY 4 HOURS PRN
Status: ACTIVE | OUTPATIENT
Start: 2025-06-07

## 2025-06-07 RX ORDER — ALBUTEROL SULFATE 0.83 MG/ML
2.5 SOLUTION RESPIRATORY (INHALATION)
Status: DISPENSED | OUTPATIENT
Start: 2025-06-07

## 2025-06-07 RX ORDER — ONDANSETRON HYDROCHLORIDE 2 MG/ML
4 INJECTION, SOLUTION INTRAVENOUS EVERY 8 HOURS PRN
Status: ACTIVE | OUTPATIENT
Start: 2025-06-07

## 2025-06-07 RX ORDER — HYDROCODONE BITARTRATE AND ACETAMINOPHEN 5; 325 MG/1; MG/1
1 TABLET ORAL EVERY 6 HOURS PRN
Refills: 0 | Status: ACTIVE | OUTPATIENT
Start: 2025-06-07

## 2025-06-07 RX ORDER — ONDANSETRON 4 MG/1
4 TABLET, ORALLY DISINTEGRATING ORAL EVERY 8 HOURS PRN
Status: ACTIVE | OUTPATIENT
Start: 2025-06-07

## 2025-06-07 RX ORDER — BENZONATATE 100 MG/1
100 CAPSULE ORAL 3 TIMES DAILY PRN
Status: ACTIVE | OUTPATIENT
Start: 2025-06-07

## 2025-06-07 RX ORDER — VANCOMYCIN 1 G/200ML
1000 INJECTION, SOLUTION INTRAVENOUS EVERY 12 HOURS
Status: DISCONTINUED | OUTPATIENT
Start: 2025-06-07 | End: 2025-06-07

## 2025-06-07 RX ORDER — VANCOMYCIN HYDROCHLORIDE 1 G/20ML
INJECTION, POWDER, LYOPHILIZED, FOR SOLUTION INTRAVENOUS DAILY PRN
Status: DISCONTINUED | OUTPATIENT
Start: 2025-06-07 | End: 2025-06-07

## 2025-06-07 RX ORDER — SPIRONOLACTONE 25 MG/1
12.5 TABLET ORAL 2 TIMES DAILY
Status: ON HOLD | COMMUNITY

## 2025-06-07 RX ORDER — FLUTICASONE FUROATE AND VILANTEROL 100; 25 UG/1; UG/1
1 POWDER RESPIRATORY (INHALATION)
Status: DISPENSED | OUTPATIENT
Start: 2025-06-07

## 2025-06-07 RX ORDER — HEPARIN SODIUM 5000 [USP'U]/ML
5000 INJECTION, SOLUTION INTRAVENOUS; SUBCUTANEOUS EVERY 8 HOURS
Status: DISCONTINUED | OUTPATIENT
Start: 2025-06-07 | End: 2025-06-07

## 2025-06-07 RX ORDER — POLYETHYLENE GLYCOL 3350 17 G/17G
17 POWDER, FOR SOLUTION ORAL DAILY PRN
Status: ACTIVE | OUTPATIENT
Start: 2025-06-07

## 2025-06-07 RX ORDER — SODIUM CHLORIDE FOR INHALATION 3 %
3 VIAL, NEBULIZER (ML) INHALATION
Status: DISPENSED | OUTPATIENT
Start: 2025-06-07

## 2025-06-07 RX ORDER — ATORVASTATIN CALCIUM 40 MG/1
40 TABLET, FILM COATED ORAL NIGHTLY
Status: ON HOLD | COMMUNITY

## 2025-06-07 RX ORDER — GUAIFENESIN/DEXTROMETHORPHAN 100-10MG/5
5 SYRUP ORAL EVERY 4 HOURS PRN
Status: ACTIVE | OUTPATIENT
Start: 2025-06-07

## 2025-06-07 RX ORDER — GABAPENTIN 600 MG/1
600 TABLET ORAL 3 TIMES DAILY PRN
Status: ACTIVE | OUTPATIENT
Start: 2025-06-07

## 2025-06-07 RX ORDER — LIDOCAINE 560 MG/1
1 PATCH PERCUTANEOUS; TOPICAL; TRANSDERMAL DAILY
Status: DISPENSED | OUTPATIENT
Start: 2025-06-07

## 2025-06-07 RX ADMIN — PANTOPRAZOLE SODIUM 40 MG: 40 TABLET, DELAYED RELEASE ORAL at 08:25

## 2025-06-07 RX ADMIN — Medication 4 L/MIN: at 01:19

## 2025-06-07 RX ADMIN — HEPARIN SODIUM 5000 UNITS: 5000 INJECTION, SOLUTION INTRAVENOUS; SUBCUTANEOUS at 14:57

## 2025-06-07 RX ADMIN — ALBUTEROL SULFATE 2.5 MG: 2.5 SOLUTION RESPIRATORY (INHALATION) at 19:43

## 2025-06-07 RX ADMIN — HEPARIN SODIUM 5000 UNITS: 5000 INJECTION, SOLUTION INTRAVENOUS; SUBCUTANEOUS at 01:39

## 2025-06-07 RX ADMIN — METHYLPREDNISOLONE SODIUM SUCCINATE 40 MG: 40 INJECTION, POWDER, FOR SOLUTION INTRAMUSCULAR; INTRAVENOUS at 10:20

## 2025-06-07 RX ADMIN — METRONIDAZOLE 500 MG: 500 INJECTION, SOLUTION INTRAVENOUS at 21:19

## 2025-06-07 RX ADMIN — SODIUM CHLORIDE SOLN NEBU 3% 3 ML: 3 NEBU SOLN at 19:43

## 2025-06-07 RX ADMIN — VANCOMYCIN 1000 MG: 1 INJECTION, SOLUTION INTRAVENOUS at 13:41

## 2025-06-07 RX ADMIN — FLUTICASONE FUROATE AND VILANTEROL TRIFENATATE 1 PUFF: 100; 25 POWDER RESPIRATORY (INHALATION) at 07:59

## 2025-06-07 RX ADMIN — TIOTROPIUM BROMIDE INHALATION SPRAY 2 PUFF: 3.12 SPRAY, METERED RESPIRATORY (INHALATION) at 07:58

## 2025-06-07 RX ADMIN — METHYLPREDNISOLONE SODIUM SUCCINATE 40 MG: 40 INJECTION, POWDER, FOR SOLUTION INTRAMUSCULAR; INTRAVENOUS at 21:19

## 2025-06-07 RX ADMIN — Medication 4 L/MIN: at 19:45

## 2025-06-07 RX ADMIN — CALCIUM CARBONATE (ANTACID) CHEW TAB 500 MG 2 TABLET: 500 CHEW TAB at 18:19

## 2025-06-07 RX ADMIN — DOXYCYCLINE 100 MG: 100 INJECTION, POWDER, LYOPHILIZED, FOR SOLUTION INTRAVENOUS at 00:25

## 2025-06-07 RX ADMIN — VANCOMYCIN 1000 MG: 1 INJECTION, SOLUTION INTRAVENOUS at 01:32

## 2025-06-07 RX ADMIN — ALBUTEROL SULFATE 2.5 MG: 2.5 SOLUTION RESPIRATORY (INHALATION) at 14:00

## 2025-06-07 RX ADMIN — CEFEPIME HYDROCHLORIDE 2 G: 2 INJECTION, SOLUTION INTRAVENOUS at 16:44

## 2025-06-07 RX ADMIN — Medication 4 L/MIN: at 07:59

## 2025-06-07 RX ADMIN — CEFEPIME HYDROCHLORIDE 2 G: 2 INJECTION, SOLUTION INTRAVENOUS at 06:38

## 2025-06-07 RX ADMIN — METRONIDAZOLE 500 MG: 500 INJECTION, SOLUTION INTRAVENOUS at 12:49

## 2025-06-07 RX ADMIN — LIDOCAINE 4% 1 PATCH: 40 PATCH TOPICAL at 08:22

## 2025-06-07 RX ADMIN — SPIRONOLACTONE 12.5 MG: 25 TABLET ORAL at 08:25

## 2025-06-07 RX ADMIN — ACETAMINOPHEN 650 MG: 325 TABLET ORAL at 01:38

## 2025-06-07 SDOH — ECONOMIC STABILITY: INCOME INSECURITY: IN THE PAST 12 MONTHS HAS THE ELECTRIC, GAS, OIL, OR WATER COMPANY THREATENED TO SHUT OFF SERVICES IN YOUR HOME?: NO

## 2025-06-07 SDOH — ECONOMIC STABILITY: FOOD INSECURITY: WITHIN THE PAST 12 MONTHS, THE FOOD YOU BOUGHT JUST DIDN'T LAST AND YOU DIDN'T HAVE MONEY TO GET MORE.: OFTEN TRUE

## 2025-06-07 SDOH — SOCIAL STABILITY: SOCIAL INSECURITY: HAS ANYONE EVER THREATENED TO HURT YOUR FAMILY OR YOUR PETS?: NO

## 2025-06-07 SDOH — SOCIAL STABILITY: SOCIAL INSECURITY: WITHIN THE LAST YEAR, HAVE YOU BEEN AFRAID OF YOUR PARTNER OR EX-PARTNER?: NO

## 2025-06-07 SDOH — SOCIAL STABILITY: SOCIAL INSECURITY: ARE YOU OR HAVE YOU BEEN THREATENED OR ABUSED PHYSICALLY, EMOTIONALLY, OR SEXUALLY BY ANYONE?: NO

## 2025-06-07 SDOH — SOCIAL STABILITY: SOCIAL INSECURITY: WERE YOU ABLE TO COMPLETE ALL THE BEHAVIORAL HEALTH SCREENINGS?: YES

## 2025-06-07 SDOH — ECONOMIC STABILITY: FOOD INSECURITY: WITHIN THE PAST 12 MONTHS, YOU WORRIED THAT YOUR FOOD WOULD RUN OUT BEFORE YOU GOT THE MONEY TO BUY MORE.: OFTEN TRUE

## 2025-06-07 SDOH — SOCIAL STABILITY: SOCIAL INSECURITY: WITHIN THE LAST YEAR, HAVE YOU BEEN HUMILIATED OR EMOTIONALLY ABUSED IN OTHER WAYS BY YOUR PARTNER OR EX-PARTNER?: NO

## 2025-06-07 SDOH — SOCIAL STABILITY: SOCIAL INSECURITY: DO YOU FEEL ANYONE HAS EXPLOITED OR TAKEN ADVANTAGE OF YOU FINANCIALLY OR OF YOUR PERSONAL PROPERTY?: NO

## 2025-06-07 SDOH — SOCIAL STABILITY: SOCIAL INSECURITY: HAVE YOU HAD THOUGHTS OF HARMING ANYONE ELSE?: NO

## 2025-06-07 SDOH — SOCIAL STABILITY: SOCIAL INSECURITY: DOES ANYONE TRY TO KEEP YOU FROM HAVING/CONTACTING OTHER FRIENDS OR DOING THINGS OUTSIDE YOUR HOME?: NO

## 2025-06-07 SDOH — SOCIAL STABILITY: SOCIAL INSECURITY: ABUSE: ADULT

## 2025-06-07 SDOH — SOCIAL STABILITY: SOCIAL INSECURITY: DO YOU FEEL UNSAFE GOING BACK TO THE PLACE WHERE YOU ARE LIVING?: NO

## 2025-06-07 SDOH — SOCIAL STABILITY: SOCIAL INSECURITY: ARE THERE ANY APPARENT SIGNS OF INJURIES/BEHAVIORS THAT COULD BE RELATED TO ABUSE/NEGLECT?: NO

## 2025-06-07 ASSESSMENT — ACTIVITIES OF DAILY LIVING (ADL)
DRESSING YOURSELF: NEEDS ASSISTANCE
BATHING: NEEDS ASSISTANCE
BATHING_ASSISTANCE: MAXIMAL
HEARING - LEFT EAR: FUNCTIONAL
TOILETING: NEEDS ASSISTANCE
JUDGMENT_ADEQUATE_SAFELY_COMPLETE_DAILY_ACTIVITIES: YES
WALKS IN HOME: NEEDS ASSISTANCE
ADL_ASSISTANCE: INDEPENDENT
PATIENT'S MEMORY ADEQUATE TO SAFELY COMPLETE DAILY ACTIVITIES?: YES
ASSISTIVE_DEVICE: WHEELCHAIR
FEEDING YOURSELF: NEEDS ASSISTANCE
GROOMING: NEEDS ASSISTANCE
ADEQUATE_TO_COMPLETE_ADL: YES
LACK_OF_TRANSPORTATION: NO
HEARING - RIGHT EAR: FUNCTIONAL

## 2025-06-07 ASSESSMENT — PAIN - FUNCTIONAL ASSESSMENT
PAIN_FUNCTIONAL_ASSESSMENT: 0-10
PAIN_FUNCTIONAL_ASSESSMENT: FLACC (FACE, LEGS, ACTIVITY, CRY, CONSOLABILITY)
PAIN_FUNCTIONAL_ASSESSMENT: 0-10

## 2025-06-07 ASSESSMENT — LIFESTYLE VARIABLES
HOW MANY STANDARD DRINKS CONTAINING ALCOHOL DO YOU HAVE ON A TYPICAL DAY: PATIENT DOES NOT DRINK
AUDIT-C TOTAL SCORE: 0
HOW OFTEN DO YOU HAVE A DRINK CONTAINING ALCOHOL: NEVER
HOW OFTEN DO YOU HAVE 6 OR MORE DRINKS ON ONE OCCASION: NEVER
SKIP TO QUESTIONS 9-10: 1
AUDIT-C TOTAL SCORE: 0

## 2025-06-07 ASSESSMENT — COGNITIVE AND FUNCTIONAL STATUS - GENERAL
PERSONAL GROOMING: A LITTLE
HELP NEEDED FOR BATHING: A LOT
WALKING IN HOSPITAL ROOM: A LOT
TURNING FROM BACK TO SIDE WHILE IN FLAT BAD: A LITTLE
DRESSING REGULAR LOWER BODY CLOTHING: A LOT
STANDING UP FROM CHAIR USING ARMS: A LOT
TOILETING: TOTAL
EATING MEALS: A LITTLE
PERSONAL GROOMING: A LOT
DRESSING REGULAR UPPER BODY CLOTHING: A LOT
DAILY ACTIVITIY SCORE: 13
PATIENT BASELINE BEDBOUND: NO
HELP NEEDED FOR BATHING: TOTAL
EATING MEALS: A LITTLE
DRESSING REGULAR LOWER BODY CLOTHING: TOTAL
MOVING TO AND FROM BED TO CHAIR: A LOT
TOILETING: A LOT
DAILY ACTIVITIY SCORE: 11
DRESSING REGULAR UPPER BODY CLOTHING: A LOT
CLIMB 3 TO 5 STEPS WITH RAILING: A LOT
MOBILITY SCORE: 14
MOVING FROM LYING ON BACK TO SITTING ON SIDE OF FLAT BED WITH BEDRAILS: A LITTLE

## 2025-06-07 ASSESSMENT — ENCOUNTER SYMPTOMS
COUGH: 1
FEVER: 0
NAUSEA: 0
SHORTNESS OF BREATH: 0
DIARRHEA: 0
DIFFICULTY URINATING: 0
CHEST TIGHTNESS: 0
ABDOMINAL PAIN: 0
CHILLS: 0
WOUND: 0
FATIGUE: 0
VOMITING: 0

## 2025-06-07 ASSESSMENT — PAIN SCALES - GENERAL
PAINLEVEL_OUTOF10: 0 - NO PAIN
PAINLEVEL_OUTOF10: 6
PAINLEVEL_OUTOF10: 0 - NO PAIN

## 2025-06-07 ASSESSMENT — PATIENT HEALTH QUESTIONNAIRE - PHQ9
1. LITTLE INTEREST OR PLEASURE IN DOING THINGS: NOT AT ALL
2. FEELING DOWN, DEPRESSED OR HOPELESS: MORE THAN HALF THE DAYS
SUM OF ALL RESPONSES TO PHQ9 QUESTIONS 1 & 2: 2

## 2025-06-07 NOTE — PROGRESS NOTES
Speech-Language Pathology    SLP Adult Inpatient Speech-Language Pathology Clinical Swallow Evaluation    Patient Name: Subha Miller  MRN: [id number]  Today's Date: 6/7/2025   Time Calculation  Start Time: 1415  Stop Time: 1430  Time Calculation (min): 15 min         History Of Present Illness  Subha Miller is a 65 y.o. female presenting with Left shoulder Pain and generalized body aches.  The patient was just discharged from [facility] earlier today to home.  She stated that she was there for over 1 week and was intubated on the mechanical ventilator in a coma. Per ED she has no pulmonary symptoms at this time and denies any fever/chills,  symptoms, or neuro dysfunction of extremities. Denies any headache, vision changes, dysarthria. Notices generalized weakness and unable to care for self and lives with her  who is unable to care for her at this time. States she was offered skilled rehab but declined at the time and now believes she does need further assistance in management.         The patient was just hospitalized at [facility] from June 1, 2025 to June 6, 2025.  He was discharged to home on June 6, 2025.  She declined skilled nursing facility at that time.     Patient's vital signs upon arrival to the ED were noted for Tmax 36.2, pulse rate 91, respiratory rate 20, /73.  Patient was saturating 90% on supplemental oxygen by nasal cannula at 4 L.     Imaging:  CT chest wo IV contrast 6/6/25  IMPRESSION:  There is near complete interval resolution of consolidation  previously seen in the left upper lobe, with a small region of the  ground-glass and consolidative opacity along the left heart border  (series 7, image [id number]), which could relate to residual pneumonia  or developing scarring.      There is new/progressive consolidation in the left lower lobe with  associated volume loss, likely representing combination of  atelectasis and pneumonia, probably aspiration pneumonia, noting  patchy  opacification of the left lower lobe bronchial branches, and  fluid layering in the proximal left mainstem bronchus.      There is also patchy opacification of the right middle and lower lobe  bronchial branches, with complete atelectasis of the right middle  lobe. Endobronchial lesion resulting in postobstructive atelectasis  cannot be excluded. Pulmonology consultation suggested.      Background of moderate emphysema. Platelike opacities in the lower  lungs suggesting atelectasis or scar. Trace pleural effusions. No  pneumothorax. Cardiomegaly. Cholelithiasis.    Recommendations:  Risk for Aspiration: Yes (in setting of supplemental oxygen dependence)  Additional Recommendations: Dysphagia treatment  Solid Diet Recommendations : Regular (IDDSI Level 7)  Liquid Diet Recommendations: Thin (IDDSI Level 0)  Compensatory Swallowing Strategies: Upright 90 degrees as possible for all oral intake, Decrease distractions during eating/feeding, Small bites/sips, Eat/feed slowly  Medication Administration Recommendations: Whole, With Liquid (one at a time)  Follow up treatments: Diet tolerance monitoring, Patient/family education      Assessment:  Assessment Results: Grossly intact oropharyngeal swallow during bedside swallow exam. Cannot rule out silent aspiration at bedside. Recommend pt resume regular/thins diet. SLP to follow to ensure optimal safety/tolerance for least restrictive diet. SLP to determine whether MBSS is warranted in follow up visits.  Prognosis: Good  Medical Staff Made Aware: Yes  Strengths: Motivation  Barriers: Comorbidities      Plan:  Inpatient/Swing Bed or Outpatient: Inpatient  Treatment/Interventions: Bolus trials, Assess diet tolerance, Diet recommendations (determine need for MBSS)  SLP Plan: Skilled SLP  SLP Frequency: 2x per week  Duration: 2 weeks  SLP Discharge Recommendations: Other (Comment) (TBD based on progress)  Diet Recommendations: Solid, Liquid  Solid Consistency: Regular (IDDSI  Level 7)  Liquid Consistency: Thin (IDDSI Level 0)  Next Treatment Priority: diet lyly at meal if able, ?MBSS  Discussed POC: Patient, Nursing, Physician  Discussed Risks/Benefits: Yes, Patient  Patient/Caregiver Agreeable: Yes  SLP - OK to Discharge: No    Goals:  Long Term:   Pt will safely consume least restrictive diet with no overt clinical s/sx aspiration in % of opportunities given distant supervision.   Status: initial 6/7/25  Progress: upgraded to regular/thins on eval  Short term:   Pt will safely consume regular/thins diet with no overt clinical s/sx aspiration in 90% of instances and no worsening of respiratory status across 2 follow up visits.   Pt will use learned compensatory strategies with min verbal cueing in order to optimize safety and independence in 80% of opportunities.   SLP will determine need for MBSS based on follow up bedside treatments  Status: initial 6/7/25  Progress: no overt s/sx aspiration noted     Subjective   Pt awake and alert, oriented to general place/time/situation. SLP consulted for swallow eval due to concern for aspiration related PNA. Pt pleasant and participatory. Reports no history of dysphagia, odynophagia, and PNA.     SLP Visit Info:  SLP Received On: 06/07/25    General Visit Information:  Patient Class: Inpatient  Living Environment: Home, Live with __ ()  Ordering Physician: Dr. Palmer  Reason for Referral: assess swallow function in setting of RLL PNA  Referred By: Dr. Palmer  Past Medical History Relevant to Rehab: CHF, COPD on 4 L baseline nasal cannula, DM, hypertension, CVA  Prior Level of Function: WFL  Developmental Status: Age Appropriate  Prior to Session Communication: Bedside nurse  Reviewed Procedures and Risks: Yes  Date of Onset: 06/06/25  Date of Order: 06/07/25  [employer] Diet: regular/thins  Current Diet : soft and bite sized/mildly thick  Dysphagia Diagnosis: Within functional limits        Objective     Baseline Assessment:  Respiratory  Status: Oxygen via nasal cannula (4LPM)  History of Intubation: No  Behavior/Cognition: Alert, Cooperative  Vision: Functional for self-feeding  Patient Positioning: Upright in Bed  Baseline Vocal Quality: Normal  Volitional Cough: Strong      Pain:  Pain Assessment  Pain Assessment: 0-10  0-10 (Numeric) Pain Score: 0 - No pain    Oral/Motor Assessment:  Oral Hygiene: appears intact  Dentition: Some Missing Teeth  Oral Motor: Within Functional Limits      Consistencies Trialed:  Consistencies Trialed: Yes  Consistencies Trialed: Ice Chips, Thin (IDDSI Level 0) - Straw, Thin (IDDSI Level 0) - Cup, Thin (IDDSI Level 0) - Spoon, Pureed/extremely thick (IDDSI Level 4), Regular (IDDSI Level 7)  -ice chips x2  -thin water by teaspoon x2 sips  -thin water by cup x3 sips  -thin water by straw x4 sips  -puree x3 bites  -regular solid x2 moderate sized bites    Clinical Observations:  Patient Positioning: Upright in Bed  Management of Oral Secretions: Adequate  Clinical Observation Comment: Pt presents with grossly intact oropharyngeal swallow.   -Oral swallow c/b adequate bolus acceptance and containment. Timely mastication and bolus formation. Adequate a/p transfer and oral clearance.   -Pharyngeal swallow c/b timely swallow initiation. Complete hyolaryngeal elevation and excursion on palpation. No overt clinical s/sx aspiration noted. No change in vocal quality or respiratory status. Cannot rule out silent aspiration at bedside.

## 2025-06-07 NOTE — ASSESSMENT & PLAN NOTE
Related to recent hospitalization/illness   Consult PT/OT   Agreeable to considering placement if recommended

## 2025-06-07 NOTE — PROGRESS NOTES
"Subha Miller is a 65 y.o. female on day 0 of admission presenting with HAP (hospital-acquired pneumonia).    Subjective   Patient resting in bed. Feeling better this morning. Denies chest pain, feels breathing is improving. Denies abdominal pain, fevers, chills.        Objective     Physical Exam  Constitutional:       Appearance: She is ill-appearing.   HENT:      Head: Normocephalic and atraumatic.      Mouth/Throat:      Mouth: Mucous membranes are moist.      Pharynx: Oropharynx is clear.   Eyes:      Extraocular Movements: Extraocular movements intact.      Conjunctiva/sclera: Conjunctivae normal.      Pupils: Pupils are equal, round, and reactive to light.   Cardiovascular:      Rate and Rhythm: Normal rate and regular rhythm.      Pulses: Normal pulses.      Heart sounds: Normal heart sounds.   Pulmonary:      Effort: Pulmonary effort is normal.      Breath sounds: Rhonchi present.   Abdominal:      General: Bowel sounds are normal.      Palpations: Abdomen is soft.      Tenderness: There is no abdominal tenderness.   Musculoskeletal:         General: Normal range of motion.      Cervical back: Normal range of motion and neck supple.   Skin:     General: Skin is warm and dry.      Capillary Refill: Capillary refill takes less than 2 seconds.   Neurological:      General: No focal deficit present.      Mental Status: She is alert and oriented to person, place, and time.   Psychiatric:         Mood and Affect: Mood normal.         Behavior: Behavior normal.         Last Recorded Vitals  Blood pressure 114/68, pulse 74, temperature 35.8 °C (96.4 °F), temperature source Temporal, resp. rate 18, height 1.6 m (5' 3\"), weight 90.7 kg (200 lb), SpO2 91%.  Intake/Output last 3 Shifts:  I/O last 3 completed shifts:  In: 50 (0.6 mL/kg) [IV Piggyback:50]  Out: - (0 mL/kg)   Weight: 90.7 kg     Relevant Results  Results for orders placed or performed during the hospital encounter of 06/06/25 (from the past 24 hours) "   Sars-CoV-2 and Influenza A/B PCR   Result Value Ref Range    Flu A Result Not Detected Not Detected    Flu B Result Not Detected Not Detected    Coronavirus 2019, PCR Not Detected Not Detected   CBC and Auto Differential   Result Value Ref Range    WBC 12.8 (H) 4.4 - 11.3 x10*3/uL    nRBC 0.0 0.0 - 0.0 /100 WBCs    RBC 5.16 4.00 - 5.20 x10*6/uL    Hemoglobin 16.2 (H) 12.0 - 16.0 g/dL    Hematocrit 51.9 (H) 36.0 - 46.0 %     (H) 80 - 100 fL    MCH 31.4 26.0 - 34.0 pg    MCHC 31.2 (L) 32.0 - 36.0 g/dL    RDW 13.7 11.5 - 14.5 %    Platelets 118 (L) 150 - 450 x10*3/uL    Neutrophils % 75.7 40.0 - 80.0 %    Immature Granulocytes %, Automated 0.8 0.0 - 0.9 %    Lymphocytes % 13.3 13.0 - 44.0 %    Monocytes % 9.9 2.0 - 10.0 %    Eosinophils % 0.1 0.0 - 6.0 %    Basophils % 0.2 0.0 - 2.0 %    Neutrophils Absolute 9.71 (H) 1.20 - 7.70 x10*3/uL    Immature Granulocytes Absolute, Automated 0.10 0.00 - 0.70 x10*3/uL    Lymphocytes Absolute 1.70 1.20 - 4.80 x10*3/uL    Monocytes Absolute 1.27 (H) 0.10 - 1.00 x10*3/uL    Eosinophils Absolute 0.01 0.00 - 0.70 x10*3/uL    Basophils Absolute 0.02 0.00 - 0.10 x10*3/uL   Magnesium   Result Value Ref Range    Magnesium 2.14 1.60 - 2.40 mg/dL   Comprehensive metabolic panel   Result Value Ref Range    Glucose 120 (H) 74 - 99 mg/dL    Sodium 139 136 - 145 mmol/L    Potassium 4.4 3.5 - 5.3 mmol/L    Chloride 95 (L) 98 - 107 mmol/L    Bicarbonate 37 (H) 21 - 32 mmol/L    Anion Gap 11 10 - 20 mmol/L    Urea Nitrogen 33 (H) 6 - 23 mg/dL    Creatinine 0.96 0.50 - 1.05 mg/dL    eGFR 65 >60 mL/min/1.73m*2    Calcium 9.3 8.6 - 10.3 mg/dL    Albumin 3.7 3.4 - 5.0 g/dL    Alkaline Phosphatase 47 33 - 136 U/L    Total Protein 6.8 6.4 - 8.2 g/dL    AST 49 (H) 9 - 39 U/L    Bilirubin, Total 1.1 0.0 - 1.2 mg/dL     (H) 7 - 45 U/L   Urinalysis with Reflex Culture and Microscopic   Result Value Ref Range    Color, Urine Yellow Light-Yellow, Yellow, Dark-Yellow    Appearance, Urine Clear  Clear    Specific Gravity, Urine 1.020 1.005 - 1.035    pH, Urine 6.5 5.0, 5.5, 6.0, 6.5, 7.0, 7.5, 8.0    Protein, Urine NEGATIVE NEGATIVE, 10 (TRACE), 20 (TRACE) mg/dL    Glucose, Urine Normal Normal mg/dL    Blood, Urine NEGATIVE NEGATIVE mg/dL    Ketones, Urine NEGATIVE NEGATIVE mg/dL    Bilirubin, Urine NEGATIVE NEGATIVE mg/dL    Urobilinogen, Urine 3 (1+) (A) Normal mg/dL    Nitrite, Urine NEGATIVE NEGATIVE    Leukocyte Esterase, Urine 75 Dang/uL (A) NEGATIVE   Microscopic Only, Urine   Result Value Ref Range    WBC, Urine 1-5 1-5, NONE /HPF    RBC, Urine 1-2 NONE, 1-2, 3-5 /HPF    Squamous Epithelial Cells, Urine 1-9 (SPARSE) Reference range not established. /HPF    Bacteria, Urine 1+ (A) NONE SEEN /HPF    Mucus, Urine FEW Reference range not established. /LPF   CBC and Auto Differential   Result Value Ref Range    WBC 11.8 (H) 4.4 - 11.3 x10*3/uL    nRBC 0.0 0.0 - 0.0 /100 WBCs    RBC 4.88 4.00 - 5.20 x10*6/uL    Hemoglobin 15.3 12.0 - 16.0 g/dL    Hematocrit 49.6 (H) 36.0 - 46.0 %     (H) 80 - 100 fL    MCH 31.4 26.0 - 34.0 pg    MCHC 30.8 (L) 32.0 - 36.0 g/dL    RDW 13.8 11.5 - 14.5 %    Platelets 113 (L) 150 - 450 x10*3/uL    Neutrophils % 72.9 40.0 - 80.0 %    Immature Granulocytes %, Automated 0.7 0.0 - 0.9 %    Lymphocytes % 17.8 13.0 - 44.0 %    Monocytes % 7.9 2.0 - 10.0 %    Eosinophils % 0.5 0.0 - 6.0 %    Basophils % 0.2 0.0 - 2.0 %    Neutrophils Absolute 8.63 (H) 1.20 - 7.70 x10*3/uL    Immature Granulocytes Absolute, Automated 0.08 0.00 - 0.70 x10*3/uL    Lymphocytes Absolute 2.10 1.20 - 4.80 x10*3/uL    Monocytes Absolute 0.94 0.10 - 1.00 x10*3/uL    Eosinophils Absolute 0.06 0.00 - 0.70 x10*3/uL    Basophils Absolute 0.02 0.00 - 0.10 x10*3/uL   Comprehensive metabolic panel   Result Value Ref Range    Glucose 114 (H) 74 - 99 mg/dL    Sodium 139 136 - 145 mmol/L    Potassium 4.0 3.5 - 5.3 mmol/L    Chloride 96 (L) 98 - 107 mmol/L    Bicarbonate 39 (H) 21 - 32 mmol/L    Anion Gap 8 (L) 10  - 20 mmol/L    Urea Nitrogen 28 (H) 6 - 23 mg/dL    Creatinine 0.59 0.50 - 1.05 mg/dL    eGFR >90 >60 mL/min/1.73m*2    Calcium 8.8 8.6 - 10.3 mg/dL    Albumin 3.3 (L) 3.4 - 5.0 g/dL    Alkaline Phosphatase 48 33 - 136 U/L    Total Protein 5.9 (L) 6.4 - 8.2 g/dL    AST 32 9 - 39 U/L    Bilirubin, Total 0.7 0.0 - 1.2 mg/dL    ALT 96 (H) 7 - 45 U/L   Vancomycin   Result Value Ref Range    Vancomycin 16.0 5.0 - 20.0 ug/mL   POCT GLUCOSE   Result Value Ref Range    POCT Glucose 117 (H) 74 - 99 mg/dL   POCT GLUCOSE   Result Value Ref Range    POCT Glucose 153 (H) 74 - 99 mg/dL   B-type natriuretic peptide   Result Value Ref Range     (H) 0 - 99 pg/mL   Blood Gas Arterial   Result Value Ref Range    POCT pH, Arterial 7.47 (H) 7.38 - 7.42 pH    POCT pCO2, Arterial 57 (H) 38 - 42 mm Hg    POCT pO2, Arterial 59 (L) 85 - 95 mm Hg    POCT SO2, Arterial 93 (L) 94 - 100 %    POCT Oxy Hemoglobin, Arterial 89.0 (L) 94.0 - 98.0 %    POCT Base Excess, Arterial 15.1 (H) -2.0 - 3.0 mmol/L    POCT HCO3 Calculated, Arterial 41.5 (H) 22.0 - 26.0 mmol/L    Patient Temperature 37.0 degrees Celsius    FiO2 36 %    Apparatus CANNULA     Site of Arterial Puncture Radial Left     Sudhakar's Test Positive      XR shoulder left 2+ views  Result Date: 6/7/2025  Interpreted By:  Jamie Fuller, STUDY: XR SHOULDER LEFT 2+ VIEWS;  6/7/2025 7:46 am   INDICATION: Signs/Symptoms:Pain.   COMPARISON: None.   ACCESSION NUMBER(S): [id number]   ORDERING CLINICIAN: BRANDAN RAGLAND   FINDINGS: Bony structures:  Intact   Joint spaces:  There appears to be mild osteophytosis at the glenoid indicating mild osteoarthritis. The acromioclavicular joint is fairly well maintained.   Soft tissues:  Unremarkable without significant edema or radiodense foreign body   Other:  Opacity left lower lung and base is probably from an effusion with atelectasis.       Mild osteoarthritis.   MACRO: None   Signed by: Jamie Fuller 6/7/2025 1:40 PM Dictation workstation:    [id number]    CT chest wo IV contrast  Result Date: 6/6/2025  Interpreted By:  Cory Riley, STUDY: CT CHEST WO IV CONTRAST;  6/6/2025 9:39 pm   INDICATION: Signs/Symptoms:gen weakness.     COMPARISON: CT chest with contrast from 02/01/2025.   ACCESSION NUMBER(S): [id number]   ORDERING CLINICIAN: SHANI WELLS   TECHNIQUE: Helical data acquisition of the chest was obtained  without IV contrast material.  Images were reformatted in axial, coronal, and sagittal planes.   FINDINGS: LUNGS AND AIRWAYS: The trachea and central airways are patent. No endobronchial lesion.   There is near complete interval resolution of consolidation previously seen in the left upper lobe, with a small region of the ground-glass and consolidative opacity along the left heart border (series 7, image [id number]), which could relate to residual pneumonia or developing scarring.   There is new/progressive consolidation in the left lower lobe with associated volume loss, likely representing combination of atelectasis and pneumonia, probably aspiration pneumonia, noting patchy opacification of the left lower lobe bronchial branches, and fluid layering in the proximal left mainstem bronchus.   There is also patchy opacification of the right middle and lower lobe bronchial branches, with complete atelectasis of the right middle lobe. Endobronchial lesion resulting in postobstructive atelectasis cannot be excluded. Pulmonology consultation suggested.   Background of moderate emphysema. Platelike opacities in the lower lungs suggesting atelectasis or scar. Trace pleural effusions. No pneumothorax.   MEDIASTINUM AND MARISOL, LOWER NECK AND AXILLA: The visualized thyroid gland is within normal limits.   No pathologically enlarged lymph nodes are seen. Calcified right hilar node, in keeping with sequelae of chronic granulomatous disease.   Esophagus appears within normal limits as seen. Small sliding hiatal hernia.   HEART AND VESSELS: The thoracic  aorta is of normal course and caliber with mild vascular calcifications.   Main pulmonary artery is dilated, suggesting pulmonary arterial hypertension.   Mild coronary artery calcifications are seen. The study is not optimized for evaluation of coronary arteries.   Heart is enlarged. Mitral annular calcification.   No evidence of pericardial effusion.   UPPER ABDOMEN: The visualized subdiaphragmatic structures demonstrate no acute findings. Cholelithiasis without evidence of acute cholecystitis.   CHEST WALL AND OSSEOUS STRUCTURES: There are no suspicious osseous lesions. Moderate to severe disc degeneration of the thoracic spine.       There is near complete interval resolution of consolidation previously seen in the left upper lobe, with a small region of the ground-glass and consolidative opacity along the left heart border (series 7, image [id number]), which could relate to residual pneumonia or developing scarring.   There is new/progressive consolidation in the left lower lobe with associated volume loss, likely representing combination of atelectasis and pneumonia, probably aspiration pneumonia, noting patchy opacification of the left lower lobe bronchial branches, and fluid layering in the proximal left mainstem bronchus.   There is also patchy opacification of the right middle and lower lobe bronchial branches, with complete atelectasis of the right middle lobe. Endobronchial lesion resulting in postobstructive atelectasis cannot be excluded. Pulmonology consultation suggested.   Background of moderate emphysema. Platelike opacities in the lower lungs suggesting atelectasis or scar. Trace pleural effusions. No pneumothorax.   Cardiomegaly.   Cholelithiasis.   MACRO: None   Signed by: Cory Riley 6/6/2025 10:43 PM Dictation workstation:   [id number]      Assessment & Plan  HAP (hospital-acquired pneumonia)  Admit to telemetry unit  She remains at her baseline oxygen requirement at this time  We will continue  patient's home trilogy treatment  Albuterol bronchodilator as needed  IV cefepime, vanco   Follow-up blood culture and sputum culture  CT results: Although there is resolution of left upper lobe pneumonia she has worsening bilateral lobe pneumonia and a right middle lobe pneumonia.  Endobronchial lesion is suggested.  Pulmonology and ID consults - appreciate recs   Incentive spirometer  Monitor streptococcal, Legionella urine antigen levels  Monitor cultures results   Low dose systemic IV corticosteroids  SLP Evaluation requested- appreciate recs     COPD (chronic obstructive pulmonary disease) (Multi)  Continue management as above    Transaminasemia  Unclear etiology  Avoid hepatotoxic agents  Consider right upper quadrant ultrasound if these worsen  Patient did have elevation of her LFTs in the past upon chart review  This might be chronic in nature    Left shoulder pain  No obvious pathology on CT imaging  Will order left portable shoulder x-ray for a.m.  Analgesia as needed  Lidoderm patch    Thrombocytopenia  Will monitor platelet count closely    Chronic respiratory failure with hypoxia  Patient remains on her baseline oxygen requirement at this time    Hypertension  Continue to monitor BP and adjust antihypertensive medications accordingly    Moderate aortic valve stenosis  Stable     Tobacco dependence  Smoking cessation counseling  NRT ordered  Debility  Related to recent hospitalization/illness   Consult PT/OT   Agreeable to considering placement if recommended     DVT prophylaxis   Heparin     Cheryl Dowling, APRN-CNP

## 2025-06-07 NOTE — ASSESSMENT & PLAN NOTE
Unclear etiology  Avoid hepatotoxic agents  Consider right upper quadrant ultrasound if these worsen  Patient did have elevation of her LFTs in the past upon chart review  This might be chronic in nature

## 2025-06-07 NOTE — H&P
History Of Present Illness      Subha Miller is a 65 y.o. female presenting with Left shoulder Pain and generalized body aches.  The patient was just discharged from [facility] earlier today to home.  She stated that she was there for over 1 week and was intubated on the mechanical ventilator in a coma. Per ED she has no pulmonary symptoms at this time and denies any fever/chills,  symptoms, or neuro dysfunction of extremities. Denies any headache, vision changes, dysarthria. Notices generalized weakness and unable to care for self and lives with her  who is unable to care for her at this time. States she was offered skilled rehab but declined at the time and now believes she does need further assistance in management.       The patient was just hospitalized at [facility] from June 1, 2025 to June 6, 2025.  He was discharged to home on June 6, 2025.  She declined skilled nursing facility at that time.    Patient's vital signs upon arrival to the ED were noted for Tmax 36.2, pulse rate 91, respiratory rate 20, /73.  Patient was saturating 90% on supplemental oxygen by nasal cannula at 4 L.     The patient's ED diagnostic workup was noted for a leukocytosis of 12.8.  H&H was hemoconcentrated 16.2/51.9, respectively.  The patient's platelet count was low normal at 118.  There was a minimal neutrophil predominance.  The blood chemistry was noted for glucose of 120.  Chloride was 95 and the bicarbonate was 37.  The BUN was elevated 33.  AST and ALT were 49 and 123, respectively.  A troponin level and a BNP level were not obtained.  Rapid influenza coronavirus PCR testing were pan negative.  MRSA swab was obtained in the ED.  A CT chest without IV contrast was obtained in the ED.  This note for the following:         IMPRESSION:     There is near complete interval resolution of consolidation     previously seen in the left upper lobe, with a small region of the     ground-glass and consolidative  "opacity along the left heart border     (series 7, image [id number]), which could relate to residual pneumonia     or developing scarring.     There is new/progressive consolidation in the left lower lobe with     associated volume loss, likely representing combination of     atelectasis and pneumonia, probably aspiration pneumonia, noting     patchy opacification of the left lower lobe bronchial branches, and     fluid layering in the proximal left mainstem bronchus.     There is also patchy opacification of the right middle and lower lobe     bronchial branches, with complete atelectasis of the right middle     lobe. Endobronchial lesion resulting in postobstructive atelectasis     cannot be excluded. Pulmonology consultation suggested.            Background of moderate emphysema. Platelike opacities in the lower     lungs suggesting atelectasis or scar. Trace pleural effusions. No     pneumothorax.       Cardiomegaly.         Cholelithiasis.       In the ED the patient was administered Rocephin 2 g IV piggyback x 1 as well as doxycycline 100 mg IV piggyback x 1.       Discharge summary at that time stated the following:    \"Admission note reports that patientwith past medical history of CHF, COPD on 4 L baseline nasal cannula, DM, hypertension, CVA who presents to [facility] ICU for respiratory failure with hypoxia and hypercapnia. Etiology of respiratory failure appears to be an acute exacerbation of COPD .  She was treated for COPD exacerbation with IV antibiotics, IV steroids, nebulizer and required ventilator support.  She was weaned from the ventilator and extubated on 6/5/2025.  She has done well postextubation and maintained on home oxygen of 4 L nasal cannula.  Pulse oximetry on day of discharge is normal on 4 L nasal cannula.  She reports she is breathing at her normal baseline on discharge.  She has completed course of antibiotics during hospitalization and follow-up chest x-ray has shown no acute " "infiltrates.  She will continue nasal cannula oxygen 4 L continuously and this is reordered since there is a question of ambulatory oxygen available at home and this will be reviewed by medical supply company.  She will complete course of prednisone taper on discharge.  She will continue long and short acting inhalers and nebulizers.  She will follow-up with her primary pulmonologist.\"       Past Medical History      Past Medical History:   Diagnosis Date    CHF (congestive heart failure)     COPD (chronic obstructive pulmonary disease) (Multi)     Diabetes mellitus (Multi)     Hypertension     Stroke (Multi)          Surgical History        Past Surgical History:   Procedure Laterality Date    BACK SURGERY      CARDIAC CATHETERIZATION N/A 2023    Procedure: Left Heart Cath;  Surgeon: Andria Campo MD;  Location: [facility]iac [facility] Lab;  Service: Cardiovascular;  Laterality: N/A;     SECTION, LOW TRANSVERSE      TONSILLECTOMY            Social History    She reports that she has been smoking cigarettes. She has a 12.5 pack-year smoking history. She has never used smokeless tobacco. She reports that she does not drink alcohol and does not use drugs.      Family History      No family history on file.       Allergies      Penicillins, Codeine, and Morphine      Review of Systems    14-point ROS otherwise negative, as per HPI/Interval History.    General: No change in weight. Yes to weakness. No Fevers/Chills/Night Sweats   Skin: No skin/hair/nail changes. No rashes or sores.  Head:  No trauma. No Headache/nasuea/vomitting.   Eyes: No visual changes. No tearing. No itching.   Ears: No hearing loss. No tinnitus. No vertigo. No discharge.  Nose, Sinuses: No rhinorrhea, No nasal congestion. No epistaxis.  Mouth, Throat, Neck: No bleeding gums, hoarseness, sore throat or swollen neck  Cardiac: No palpitations. No NUÑEZ. No PND. No Orthopnea.   Respiratory: No Shortness of Breath. No wheezing. No cough. No " "hemoptysis.   GI: No nausea/vomiting. No indigestion. No diarrhea. No constipation.   Musculoskeletal: Left shoulder pain  Extremities: No numbness or tingling. No paresthesias.   Urinary: No change in urinary frequency. No change in hesitancy. No hematuria. No incontinence.       Physical Exam      Constitutional:  Pleasant  Eyes: PERRL, EOMI,   ENMT: mucous membranes moist  Head/Neck: Neck supple, No JVD,   Respiratory/Thorax: Bilateral upper wheeze and rhonchi.  Diminished breath sounds left lung base  Cardiovascular: Regular, rate and rhythm, no murmurs  Gastrointestinal: Soft, non-distended, +BS.  Musculoskeletal: ROM intact, no joint swelling, normal strength  Extremities: peripheral pulses intact; no edema  Neurological: Alert and Oriented x 3; no focal deficits; gross motor and sensation intact; CN II-XII intact. No asterixis.  Psychological: Appropriate mood and behavior  Skin: No lesions, No rashes.         Last Recorded Vitals  Blood pressure 115/75, pulse 75, temperature 36.2 °C (97.2 °F), temperature source Temporal, resp. rate 20, height 1.6 m (5' 3\"), weight 90.7 kg (200 lb), SpO2 96%.    Relevant Results    Lab Results   Component Value Date    WBC 12.8 (H) 06/06/2025    HGB 16.2 (H) 06/06/2025    HCT 51.9 (H) 06/06/2025     (H) 06/06/2025     (L) 06/06/2025       Lab Results   Component Value Date    GLUCOSE 120 (H) 06/06/2025    CALCIUM 9.3 06/06/2025     06/06/2025    K 4.4 06/06/2025    CO2 37 (H) 06/06/2025    CL 95 (L) 06/06/2025    BUN 33 (H) 06/06/2025    CREATININE 0.96 06/06/2025       Lab Results   Component Value Date    HGBA1C 6.4 (A) 09/22/2023         No CT head results found for the past 12 months      Scheduled medications  Scheduled Medications[1]  Continuous medications  Continuous Medications[2]  PRN medications  PRN Medications[3]      Subha Miller is a 65 y.o. female presenting with Left shoulder Pain and generalized body aches.  The patient was just discharged " from [facility] earlier today to home.  She stated that she was there for over 1 week and was intubated on the mechanical ventilator in a coma. Per ED she has no pulmonary symptoms at this time and denies any fever/chills,  symptoms, or neuro dysfunction of extremities. Denies any headache, vision changes, dysarthria. Notices generalized weakness and unable to care for self and lives with her  who is unable to care for her at this time. States she was offered skilled rehab but declined at the time and now believes she does need further assistance in management.     The patient was just hospitalized at [facility] from June 1, 2025 to June 6, 2025.  He was discharged to home on June 6, 2025.  She declined skilled nursing facility at that time.  Patient admitted for further evaluation and management.    Assessment/Plan   Assessment & Plan  HAP (hospital-acquired pneumonia)  Admit to telemetry unit  She remains at her baseline oxygen requirement at this time  We will continue patient's home trilogy treatment  Albuterol bronchodilator as needed  Is not appear the patient was discharged on any oral antibiotics  Will start the patient empiric IV antibiotics for HCAP  Follow-up blood culture and sputum culture  CT results reviewed.  Although there is resolution of left upper lobe pneumonia she has worsening bilateral lobe pneumonia and a right middle lobe pneumonia.  Endobronchial lesion is suggested.  Pulmonary consultation requested to consider early bronchoscopy  Maintain supplemental oxygen at 4L  Incentive spirometer  Will order streptococcal Legionella urine antigen levels  Low dose systemic IV corticosteroids  SLP Evaluation requested--Modified diet     COPD (chronic obstructive pulmonary disease) (Multi)  Continue management as above    Transaminasemia  Unclear etiology  Avoid hepatotoxic agents  Consider right upper quadrant ultrasound if these worsen  Patient did have elevation of her LFTs in  the past upon chart review  This might be chronic in nature    Left shoulder pain  No obvious pathology on CT imaging  Will order left portable shoulder x-ray for a.m.  Analgesia as needed  Lidoderm patch    Thrombocytopenia  Will monitor platelet count closely    Chronic respiratory failure with hypoxia  Patient remains on her baseline oxygen requirement at this time    Hypertension  Continue to monitor BP and adjust antihypertensive medications accordingly    Moderate aortic valve stenosis  Tubulous volume management    Tobacco dependence  Smoking cessation counseling  NRT ordered      GI /DVT prophylaxis  Continue home oral PPI therapy  Heparin subcu monitor platelets closely        Disposition:  Continue to monitor inpatient, await consultant recommendations, await test results, and await clinical improvement      This Dictation was Transcribed using a Nuance Dragon Voice Recognition System Device (with Compatible Computer + Software) and as such may contain Grammatical Errors and Unintentional Typing Misprints.      I spent 42 minutes in the professional and overall care of this patient.      Samuel Palmer MD           [1] cefepime, 2 g, intravenous, q8h  doxycycline, 100 mg, intravenous, Once  tiotropium, 2 puff, inhalation, Daily   And  fluticasone furoate-vilanteroL, 1 puff, inhalation, Daily  heparin (porcine), 5,000 Units, subcutaneous, q8h  oxygen, , inhalation, Continuous - Inhalation  pantoprazole, 40 mg, oral, Daily  spironolactone, 12.5 mg, oral, Daily  vancomycin, 1,000 mg, intravenous, q12h     [2]    [3] PRN medications: acetaminophen **OR** acetaminophen **OR** acetaminophen, albuterol, benzocaine-menthol, dextromethorphan-guaifenesin, gabapentin, guaiFENesin, melatonin, ondansetron **OR** ondansetron, polyethylene glycol, vancomycin

## 2025-06-07 NOTE — CONSULTS
Inpatient consult to Infectious Diseases  Consult performed by: Samantha Schneider, APRN-CNP  Consult ordered by: Samuel Palmer MD  Reason for consult: HCAP          Primary MD: Brent León MD    History Of Present Illness  Subha Miller is a 65 y.o. female presenting with generalized weakness.  She has a past medical history of DM, CHF, COPD on 4LNC at baseline.  She was recently admitted to [facility] for acute on chronic respiratory failure-she was intubated for pneumonia and COPD exacerbation.  She was discharged yesterday, she declined rehab at that time.  After being at home she was unable to take care of herself.  She presented back to the ED yesterday.  She was found to have a WBC of 12.8.  CT of the chest showed resolution of JORGE consolidation and new consolidation of the left lower lobe, possibly aspiration pneumonia. Endobronchial lesion not excluded, pulmonology is consulted.  She was started on IV cefepime and IV vancomycin.  She is afebrile. Denies fever or chills.  Denies shortness of breath, reports a nonproductive cough. Lynne chest pain.  Denies nausea, vomiting, diarrhea, abdominal pain  Reports mild to moderate left shoulder pain-denies any antecedent injury.  No redness or swelling of the shoulder. Read of left shoulder x-ray is pending.     Past Medical History  She has a past medical history of CHF (congestive heart failure), COPD (chronic obstructive pulmonary disease) (Multi), Diabetes mellitus (Multi), Hypertension, and Stroke (Multi).    Surgical History  She has a past surgical history that includes Back surgery; Tonsillectomy;  section, low transverse; and Cardiac catheterization (N/A, 2023).     Social History     Occupational History    Not on file   Tobacco Use    Smoking status: Every Day     Current packs/day: 0.50     Average packs/day: 0.5 packs/day for 25.0 years (12.5 ttl pk-yrs)     Types: Cigarettes    Smokeless tobacco: Never   Vaping Use    Vaping  "status: Never Used   Substance and Sexual Activity    Alcohol use: Never    Drug use: Never    Sexual activity: Not Currently     Travel History   Travel since 05/07/25    No documented travel since 05/07/25              Family History  Family History[1]  Allergies  Penicillins, Codeine, and Morphine     Immunization History   Administered Date(s) Administered    Flu vaccine, trivalent, preservative free, HIGH-DOSE, age 65y+ (Fluzone) 02/04/2025    Moderna SARS-CoV-2 Vaccination 07/05/2021, 08/07/2021     Medications  Home medications:  Prescriptions Prior to Admission[2]  Current medications:  Scheduled medications  Scheduled Medications[3]  Continuous medications  Continuous Medications[4]  PRN medications  PRN Medications[5]    Review of Systems   Constitutional:  Negative for chills, fatigue and fever.   Respiratory:  Positive for cough. Negative for chest tightness and shortness of breath.    Cardiovascular:  Negative for chest pain and leg swelling.   Gastrointestinal:  Negative for abdominal pain, diarrhea, nausea and vomiting.   Genitourinary:  Negative for difficulty urinating.   Musculoskeletal:         Left shoulder pain   Skin:  Negative for rash and wound.        Objective  Range of Vitals (last 24 hours)  Heart Rate:  [72-91]   Temp:  [36.2 °C (97.2 °F)-36.7 °C (98.1 °F)]   Resp:  [18-20]   BP: (113-146)/(52-81)   Height:  [160 cm (5' 3\")]   Weight:  [90.7 kg (200 lb)]   SpO2:  [90 %-96 %]   Daily Weight  06/06/25 : 90.7 kg (200 lb)    Body mass index is 35.43 kg/m².     Physical Exam  Constitutional:       Appearance: Normal appearance.   HENT:      Head: Normocephalic and atraumatic.   Eyes:      Extraocular Movements: Extraocular movements intact.      Conjunctiva/sclera: Conjunctivae normal.   Cardiovascular:      Rate and Rhythm: Normal rate.   Pulmonary:      Effort: Pulmonary effort is normal.      Breath sounds: Rhonchi present.   Abdominal:      General: Bowel sounds are normal.      " "Palpations: Abdomen is soft.      Tenderness: There is no abdominal tenderness.   Musculoskeletal:         General: No swelling. Normal range of motion.      Cervical back: Normal range of motion and neck supple.      Right lower leg: No edema.      Left lower leg: No edema.      Comments: Left shoulder tenderness   Skin:     General: Skin is warm and dry.   Neurological:      General: No focal deficit present.      Mental Status: She is alert and oriented to person, place, and time.   Psychiatric:         Mood and Affect: Mood normal.         Behavior: Behavior normal.          Relevant Results    Labs  Results from last 72 hours   Lab Units 06/07/25  0553 06/06/25  2101   WBC AUTO x10*3/uL 11.8* 12.8*   HEMOGLOBIN g/dL 15.3 16.2*   HEMATOCRIT % 49.6* 51.9*   PLATELETS AUTO x10*3/uL 113* 118*   NEUTROS PCT AUTO % 72.9 75.7   LYMPHS PCT AUTO % 17.8 13.3   MONOS PCT AUTO % 7.9 9.9   EOS PCT AUTO % 0.5 0.1     Results from last 72 hours   Lab Units 06/07/25  0553 06/06/25  2101   SODIUM mmol/L 139 139   POTASSIUM mmol/L 4.0 4.4   CHLORIDE mmol/L 96* 95*   CO2 mmol/L 39* 37*   BUN mg/dL 28* 33*   CREATININE mg/dL 0.59 0.96   GLUCOSE mg/dL 114* 120*   CALCIUM mg/dL 8.8 9.3   ANION GAP mmol/L 8* 11   EGFR mL/min/1.73m*2 >90 65     Results from last 72 hours   Lab Units 06/07/25  0553 06/06/25  2101   ALK PHOS U/L 48 47   BILIRUBIN TOTAL mg/dL 0.7 1.1   PROTEIN TOTAL g/dL 5.9* 6.8   ALT U/L 96* 123*   AST U/L 32 49*   ALBUMIN g/dL 3.3* 3.7     Estimated Creatinine Clearance: 98.9 mL/min (by C-G formula based on SCr of 0.59 mg/dL).  C-Reactive Protein   Date Value Ref Range Status   02/02/2025 1.75 (H) <1.00 mg/dL Final   01/02/2024 0.66 <1.00 mg/dL Final   01/01/2024 0.71 <1.00 mg/dL Final     Sedimentation Rate   Date Value Ref Range Status   02/02/2025 37 (H) 0 - 30 mm/h Final   01/01/2024 27 0 - 30 mm/h Final     No results found for: \"HIV1X2\", \"HIVCONF\", \"SBDYFQ3TT\"  No results found for: \"HEPCABINIT\", \"HEPCAB\", " "\"HCVPCRQUANT\"  Microbiology  Urine culture pending  Blood culture pending  Legionella antigen pending  Streptococcus pneumoniae antigen pending  Imaging  CT chest wo IV contrast  Result Date: 6/6/2025  Interpreted By:  Cory Riley, STUDY: CT CHEST WO IV CONTRAST;  6/6/2025 9:39 pm   INDICATION: Signs/Symptoms:gen weakness.     COMPARISON: CT chest with contrast from 02/01/2025.   ACCESSION NUMBER(S): [id number]   ORDERING CLINICIAN: SHANI WELLS   TECHNIQUE: Helical data acquisition of the chest was obtained  without IV contrast material.  Images were reformatted in axial, coronal, and sagittal planes.   FINDINGS: LUNGS AND AIRWAYS: The trachea and central airways are patent. No endobronchial lesion.   There is near complete interval resolution of consolidation previously seen in the left upper lobe, with a small region of the ground-glass and consolidative opacity along the left heart border (series 7, image [id number]), which could relate to residual pneumonia or developing scarring.   There is new/progressive consolidation in the left lower lobe with associated volume loss, likely representing combination of atelectasis and pneumonia, probably aspiration pneumonia, noting patchy opacification of the left lower lobe bronchial branches, and fluid layering in the proximal left mainstem bronchus.   There is also patchy opacification of the right middle and lower lobe bronchial branches, with complete atelectasis of the right middle lobe. Endobronchial lesion resulting in postobstructive atelectasis cannot be excluded. Pulmonology consultation suggested.   Background of moderate emphysema. Platelike opacities in the lower lungs suggesting atelectasis or scar. Trace pleural effusions. No pneumothorax.   MEDIASTINUM AND MARISOL, LOWER NECK AND AXILLA: The visualized thyroid gland is within normal limits.   No pathologically enlarged lymph nodes are seen. Calcified right hilar node, in keeping with sequelae of chronic " granulomatous disease.   Esophagus appears within normal limits as seen. Small sliding hiatal hernia.   HEART AND VESSELS: The thoracic aorta is of normal course and caliber with mild vascular calcifications.   Main pulmonary artery is dilated, suggesting pulmonary arterial hypertension.   Mild coronary artery calcifications are seen. The study is not optimized for evaluation of coronary arteries.   Heart is enlarged. Mitral annular calcification.   No evidence of pericardial effusion.   UPPER ABDOMEN: The visualized subdiaphragmatic structures demonstrate no acute findings. Cholelithiasis without evidence of acute cholecystitis.   CHEST WALL AND OSSEOUS STRUCTURES: There are no suspicious osseous lesions. Moderate to severe disc degeneration of the thoracic spine.       There is near complete interval resolution of consolidation previously seen in the left upper lobe, with a small region of the ground-glass and consolidative opacity along the left heart border (series 7, image [id number]), which could relate to residual pneumonia or developing scarring.   There is new/progressive consolidation in the left lower lobe with associated volume loss, likely representing combination of atelectasis and pneumonia, probably aspiration pneumonia, noting patchy opacification of the left lower lobe bronchial branches, and fluid layering in the proximal left mainstem bronchus.   There is also patchy opacification of the right middle and lower lobe bronchial branches, with complete atelectasis of the right middle lobe. Endobronchial lesion resulting in postobstructive atelectasis cannot be excluded. Pulmonology consultation suggested.   Background of moderate emphysema. Platelike opacities in the lower lungs suggesting atelectasis or scar. Trace pleural effusions. No pneumothorax.   Cardiomegaly.   Cholelithiasis.   MACRO: None   Signed by: Cory Riley 6/6/2025 10:43 PM Dictation workstation:   [id number]      Assessment/Plan    Pneumonia - gram negative versus gram positive  Chronic respiratory failure-on 4LNC at baseline, recently intubated during previous hospitalization  COPD  Abnormal CT chest-possible endobronchial lesion  Left shoulder pain - unclear etiology, x-ray is pending      Continue IV vancomycin  Continue IV cefepime  MRSA nares PCR  Sputum culture if able  Monitor WBC and temperature  Monitor renal function  Follow-up blood culture  Follow-up streptococcus pneumoniae and legionella antigen  Oxygen as needed  Supportive care  Pulmonary consult  Further recommendations based on pending work-up    Discussed with Dr. Shaver    This is a complex infectious disease issue and the following was performed today (for more details please see the above note): Management decisions reflecting the added complexity (e.g., changes in antimicrobial therapy, infection control strategies).        Samantha Schneider, APRN-CNP         [1] No family history on file.  [2]   Medications Prior to Admission   Medication Sig Dispense Refill Last Dose/Taking    acetaminophen (Tylenol) 325 mg tablet Take 2 tablets (650 mg) by mouth every 6 hours if needed for mild pain (1 - 3). 30 tablet 0     albuterol 90 mcg/actuation inhaler Inhale 2 puffs every 4 hours if needed for wheezing. 18 g 0     atorvastatin (Lipitor) 40 mg tablet Take 1 tablet (40 mg) by mouth once daily at bedtime.       fluticasone-umeclidin-vilanter (TRELEGY-ELLIPTA) 100-62.5-25 mcg blister with device INHALE 1 PUFF BY MOUTH ONCE DAILY. 30 each 0     gabapentin (Neurontin) 600 mg tablet Take 1 tablet (600 mg) by mouth 3 times a day.       guaiFENesin (Mucinex) 600 mg 12 hr tablet Take 1 tablet (600 mg) by mouth 2 times a day. Do not crush, chew, or split. 20 tablet 0     ipratropium-albuteroL (Duo-Neb) 0.5-2.5 mg/3 mL nebulizer solution Take 3 mL by nebulization 4 times a day.       nystatin (Mycostatin) 100,000 unit/gram powder Apply 1 Application topically 2 times a day.       oxygen  (O2) gas therapy Inhale 4 L/min once every 24 hours.       pantoprazole (ProtoNix) 40 mg EC tablet Take 1 tablet (40 mg) by mouth once daily. Do not crush, chew, or split.       predniSONE (Deltasone) 10 mg tablet Take 1 tablet (10 mg) by mouth once daily. Taper dose starting with 50mg       spironolactone (Aldactone) 25 mg tablet Take 0.5 tablets (12.5 mg) by mouth 2 times a day.      [3] cefepime, 2 g, intravenous, q8h  tiotropium, 2 puff, inhalation, Daily   And  fluticasone furoate-vilanteroL, 1 puff, inhalation, Daily  heparin (porcine), 5,000 Units, subcutaneous, q12h  lidocaine, 1 patch, transdermal, Daily  methylPREDNISolone sodium succinate (PF), 40 mg, intravenous, q12h  metroNIDAZOLE, 500 mg, intravenous, q8h  nicotine, 1 patch, transdermal, Daily  oxygen, , inhalation, Continuous - Inhalation  pantoprazole, 40 mg, oral, Daily  spironolactone, 12.5 mg, oral, Daily  vancomycin, 1,000 mg, intravenous, q12h    [4]    [5] PRN medications: acetaminophen **OR** acetaminophen **OR** acetaminophen, albuterol, benzocaine-menthol, benzonatate, dextromethorphan-guaifenesin, gabapentin, guaiFENesin, HYDROcodone-acetaminophen, melatonin, ondansetron ODT **OR** ondansetron, polyethylene glycol, vancomycin

## 2025-06-07 NOTE — ASSESSMENT & PLAN NOTE
Admit to telemetry unit  She remains at her baseline oxygen requirement at this time  We will continue patient's home trilogy treatment  Albuterol bronchodilator as needed  IV cefepime, vanco   Follow-up blood culture and sputum culture  CT results: Although there is resolution of left upper lobe pneumonia she has worsening bilateral lobe pneumonia and a right middle lobe pneumonia.  Endobronchial lesion is suggested.  Pulmonology and ID consults - appreciate recs   Incentive spirometer  Monitor streptococcal, Legionella urine antigen levels  Monitor cultures results   Low dose systemic IV corticosteroids  SLP Evaluation requested- appreciate recs

## 2025-06-07 NOTE — PROGRESS NOTES
Occupational Therapy    Evaluation/Treatment    Patient Name: Subha Miller  MRN: [id number]  Department: [facility] 3 S  Room: [address] [place], [place] [address]  Today's Date: 06/07/25  Time Calculation  Start Time: 1432  Stop Time: 1510  Time Calculation (min): 38 min     Assessment:  OT Assessment: Patient is a 65 year old female admitted with hospital acquired PNA, L shoulder pain (imaging negative for acute findings), COPD, HTN, chronic respiratory failure, moderate aortic valve stenosis, thrombocytopenia. Patient is presenting with a significant decline in strength, balance, activity tolerance, and function, resulting in an increased need for assist with ADL tasks and assist of 2 for functional txfers. She is a high fall risk. Recommend skilled OT to maximize patient's safety and independence with daily tasks.  Prognosis: Fair  Barriers to Discharge Home: Caregiver assistance, Cognition needs, Physical needs  Caregiver Assistance: Caregiver assistance needed per identified barriers - however, level of patient's required assistance exceeds assistance available at home  Cognition Needs: Insight of patient limited regarding functional ability/needs  Physical Needs: 24hr mobility assistance needed, 24hr ADL assistance needed, High falls risk due to function or environment  Evaluation/Treatment Tolerance: Patient limited by pain  End of Session Communication: Bedside nurse  End of Session Patient Position: Bed, 3 rail up, Alarm on  OT Assessment Results: Decreased ADL status, Decreased upper extremity range of motion, Decreased upper extremity strength, Decreased safe judgment during ADL, Decreased endurance, Decreased sensation, Decreased functional mobility, Decreased gross motor control  Prognosis: Fair  Evaluation/Treatment Tolerance: Patient limited by pain  Strengths: Premorbid level of function  Barriers to Participation: Comorbidities  Plan:  Treatment Interventions: ADL retraining, Functional transfer training, UE strengthening/ROM,  Endurance training, Patient/family training, Equipment evaluation/education, Neuromuscular reeducation, Compensatory technique education  OT Frequency: 3 times per week  OT Discharge Recommendations: Moderate intensity level of continued care  Equipment Recommended upon Discharge: Wheelchair  OT Recommended Transfer Status: Assist of 2  OT - OK to Discharge: Yes  Treatment Interventions: ADL retraining, Functional transfer training, UE strengthening/ROM, Endurance training, Patient/family training, Equipment evaluation/education, Neuromuscular reeducation, Compensatory technique education    Subjective   Current Problem:  1. HAP (hospital-acquired pneumonia)        2. Generalized weakness        3. Atelectasis        4. Supplemental oxygen dependent        5. Chronic respiratory failure with hypoxia  Referral to Pulmonology        OT Visit Info:  OT Received On: 06/07/25  General Visit Info:   General  Reason for Referral: decline in ADLs, hospital acquired PNA  Referred By: Dr. Palmer  Past Medical History Relevant to Rehab: CHF, COPD on 4 L baseline nasal cannula, DM, hypertension, CVA  Family/Caregiver Present: No  Prior to Session Communication: Bedside nurse  Patient Position Received: Bed, 3 rail up, Alarm on  Preferred Learning Style: verbal, visual  General Comment: Patient is a 65 year old female who presented to the ED with L shoulder pain. Per EMR, patient was recently at [facility] from 6/1/2025-6/6/2025 and was intubated in coma. She was discharged home. Patient admitted with hospital acquired PNA, L shoulder pain (imaging negative for acute findings), COPD, HTN, chronic respiratory failure, moderate aortic valve stenosis, thrombocytopenia. Patient is cleared by nursing for therapy. Patient in bed upon arrival and agreeable to participate. +4L O2 via NC   Precautions:  Hearing/Visual Limitations: glasses  Medical Precautions: Fall precautions, Oxygen therapy device and L/min (4L O2 via NC)    Pain:  Pain  Assessment  Pain Assessment: FLACC (Face, Legs, Activity, Cry, Consolability)  0-10 (Numeric) Pain Score: 6  Pain Type: Acute pain  Pain Location: Shoulder (RN aware)  Clinical Progression: Not changed    Objective   Cognition:  Orientation Level: Oriented X4  Cognition Comments: able to follow commands with increased cues and time. decreased insight noted  Safety/Judgement:  (decreased)  Insight: Moderate  Impulsive: Mildly  Processing Speed: Delayed     Home Living:  Type of Home: House  Lives With: Spouse  Home Adaptive Equipment: Wheelchair-manual, Walker rolling or standard  Home Layout: One level  Home Access: Level entry  Bathroom Shower/Tub: Tub/shower unit  Bathroom Toilet: Standard  Bathroom Equipment: Grab bars in shower  Home Living Comments: patient with a recent admission at [facility] from 6/1/2025 to 6/6/2025.  Prior Function:  Level of [place]: Independent with ADLs and functional transfers, Independent with homemaking with ambulation  Receives Help From: Family  ADL Assistance: Independent (prior to recent hospitalization)  Homemaking Assistance: Needs assistance (spouse manages)  Ambulatory Assistance:  (independent with stand pivot txfers, wheelchair for mobility)  IADL History:  Homemaking Responsibilities: No  IADL Comments: spouse manages  ADL:  Eating Assistance: Stand by  Eating Deficit:  (all items within reach)  Grooming Assistance: Minimal  Grooming Deficit: Setup, Supervision/safety, Increased time to complete (seated, per clinical judgement)  Bathing Assistance: Maximal  Bathing Deficit:  (per clinical judgement)  UE Dressing Assistance: Moderate  UE Dressing Deficit: Pull around back, Pull down in back  LE Dressing Assistance: Total  LE Dressing Deficit: Don/doff R sock, Don/doff L sock  Toileting Assistance with Device: Total  Toileting Deficit: Incontinent  Activities of Daily Living: Toileting  Toileting Comments: patient unaware of incontinence, brief and bedding soiled.  dependent assist to manage and don clean brief  Activity Tolerance:  Endurance: Decreased tolerance for upright activites  Activity Tolerance Comments: limited by pain    Bed Mobility/Transfers: Bed Mobility  Bed Mobility: Yes  Bed Mobility 1  Bed Mobility 1: Supine to sitting  Level of Assistance 1: Moderate assistance  Bed Mobility Comments 1: assist to move B LE and raise trunk  Bed Mobility 2  Bed Mobility  2: Sitting to supine  Level of Assistance 2: Maximum assistance  Bed Mobility Comments 2: assist to raise B LE and support trunk  Bed Mobility 3  Bed Mobility Comments 3: patient unable to roll in bed due to pain. bridges buttocks to adjust brief    Transfers  Transfer: Yes  Transfer 1  Transfer From 1: Bed to  Transfer to 1: Chair with arms  Technique 1: Stand pivot  Transfer Level of Assistance 1: Maximum assistance, +2  Trials/Comments 1: patient declines use of walker despite cues  Transfers 2  Transfer From 2: Chair with arms to  Transfer to 2: Bed  Technique 2: Stand pivot  Transfer Level of Assistance 2: Maximum assistance, +2  Trials/Comments 2: patient declines use of walker or milka steady despite cues. arm in arm assist completed    Functional Mobility:  Functional Mobility  Functional Mobility Performed: No (patient unable)  Sitting Balance:  Static Sitting Balance  Static Sitting-Balance Support: Feet supported, Bilateral upper extremity supported  Static Sitting-Level of Assistance: Close supervision    Therapy/Activity: Therapeutic Activity  Therapeutic Activity Performed: Yes  Therapeutic Activity 1: patient txfers from supine to EOB with Mod A with assistance to raise her trunk. she is seated EOB and is educated on the importance of OOB activity participate. decreased insight noted when patient attempts to decline assist to the chair despite cues and education, she continues to try. Ultimatelty, patient requires Max Ax2 to pivot from EOB to the chair. once in the chair, attempts to stand  "patient to don brief. patient unable to stand. education provided on use of milka steady or walker, with patient continuing to decline, stating \"just let me try\" Patient again requires Max Ax2 with arm in arm assist to txfer from the chair to EOB.    Sensation:  Sensation Comment: patient reports tinlging in her L hand  Strength:  Strength Comments: B UE impaired grossly. unable to formally test due to pain  Coordination:  Movements are Fluid and Coordinated: No  Coordination Comment: decreased overall   Hand Function:  Hand Function  Gross Grasp: Functional  Coordination: Functional  Extremities: RUE   RUE : Exceptions to WFL (decreased shoulder ROM, generalized weakness) and LUE   LUE: Exceptions to WFL (decreased shoulder ROM, generalized weakness)    Outcome Measures: [facility] Daily Activity  Putting on and taking off regular lower body clothing: Total  Bathing (including washing, rinsing, drying): Total  Putting on and taking off regular upper body clothing: A lot  Toileting, which includes using toilet, bedpan or urinal: Total  Taking care of personal grooming such as brushing teeth: A little  Eating Meals: A little  Daily Activity - Total Score: 11    Education Documentation  Body Mechanics, taught by Stacy Babb OT at 6/7/2025  3:25 PM.  Learner: Patient  Readiness: Acceptance  Method: Explanation, Demonstration  Response: Needs Reinforcement  Comment: Education on OT POC. Education on safe functional txfers, fall risks, and importance of OOB activity participation    Precautions, taught by Stacy Babb OT at 6/7/2025  3:25 PM.  Learner: Patient  Readiness: Acceptance  Method: Explanation, Demonstration  Response: Needs Reinforcement  Comment: Education on OT POC. Education on safe functional txfers, fall risks, and importance of OOB activity participation    ADL Training, taught by Stacy Babb OT at 6/7/2025  3:25 PM.  Learner: Patient  Readiness: Acceptance  Method: Explanation, " Demonstration  Response: Needs Reinforcement  Comment: Education on OT POC. Education on safe functional txfers, fall risks, and importance of OOB activity participation    Education Comments  No comments found.      Goals:  Encounter Problems       Encounter Problems (Active)       OT Goals       UB ADLs (Progressing)       Start:  06/07/25    Expected End:  06/28/25       Patient will complete UB ADL tasks, with set-up using AE need in order to increase patient's safety and independence with self-care tasks.          LB ADLs (Progressing)       Start:  06/07/25    Expected End:  06/28/25       Patient will complete ADL tasks, with minimal assist  using AE need in order to increase patient's safety and independence with self-care tasks.          Functional Transfers (Progressing)       Start:  06/07/25    Expected End:  06/28/25       Patient will complete functional transfers with minimal assist  using least restrictive device, in order to increase patient's safety and independence with daily tasks.          Activity Tolerance  (Progressing)       Start:  06/07/25    Expected End:  06/28/25       Patient will demonstrate the ability to participate in functional activity at least >/= 25 minutes in order to increase patient's safety and independence with daily tasks.          Standing Tolerance  (Progressing)       Start:  06/07/25    Expected End:  06/28/25       Patient will demonstrate the ability to stand at least >/= 2 minutes to increase safety and independence with functional transfers.

## 2025-06-07 NOTE — ED PROVIDER NOTES
Emergency Department Provider Note       History of Present Illness     History provided by: Patient  Limitations to History: None  External Records Reviewed with Brief Summary: see MDM    HPI:  Subha Miller is a 65 y.o. female patient presents ED for generalized weakness inability to care for herself after being discharged from the hospital from significant hospitalization of COPD exacerbation requiring intubation.  Patient notes no pulmonary symptoms at this time and denies any fever/chills,  symptoms, or neuro dysfunction of extremities.  Denies any headache, vision changes, dysarthria.  Notices generalized weakness and unable to care for self and lives with her  who is unable to care for her at this time.  States she was offered skilled rehab but declined at the time and now believes she does need further assistance in management.    Physical Exam   Triage vitals:  T 36.2 °C (97.2 °F)  HR 91  /73  RR 20  O2 (!) 90 % Supplemental oxygen    Physical Exam  Vitals and nursing note reviewed.   Constitutional:       General: She is not in acute distress.     Appearance: Normal appearance. She is normal weight. She is not ill-appearing.   HENT:      Nose: Nose normal.      Mouth/Throat:      Mouth: Mucous membranes are moist.      Pharynx: Oropharynx is clear.   Eyes:      General: No scleral icterus.     Pupils: Pupils are equal, round, and reactive to light.   Cardiovascular:      Rate and Rhythm: Normal rate and regular rhythm.   Pulmonary:      Effort: Pulmonary effort is normal.      Breath sounds: Normal breath sounds.   Abdominal:      General: Abdomen is protuberant.      Tenderness: There is no abdominal tenderness.   Skin:     General: Skin is warm and dry.   Neurological:      General: No focal deficit present.      Mental Status: She is alert and oriented to person, place, and time.      Cranial Nerves: Cranial nerves 2-12 are intact.      Sensory: Sensation is intact.      Motor: Motor  function is intact.      Comments: Gross motor/strength/sensation intact x 4 although globally weak without any focality, deferred ambulation with concern for fall and injury           Medical Decision Making & ED Course   Medical Decision Makin y.o. female presented to the ED for evaluation of generalized weakness after being hospitalized with concerning PMHx of NSTEMI, COPD, DM, CHF,.  I personally reviewed and interpreted VS, labs, images, and EKG which are as stated above in the ED course.    Assessment/evaluation ***.    After thorough review of established clinical assessment and results, I considered {CONSIDERED:59278},  but given no immediate concerning findings for significant pathology or impending decompensation, any further advanced imaging/testing would not provide any relevant clinical information to the patients presenting condition/symptoms and place patient at risk for unintentional harm with increased risk of side affects, potential for increase financial burden, and continued unnecessary distress.    Prescribed ***.  After receiving an appropriate exam, clinical work-up, and necessary interventions/treatment, Patient is appropriate for {Disposition:53918} at this time due to {Disposition Reason:08411}.  Patient was encouraged to ask any questions or for clarification of today's ED encounter.  Patient is agreeable to plan of care. Discussed with Patient today's results/findings and likely diagnosis, provided appropriate RTED precautions along with recommended follow-up with {Follow-Up:96840}.      Per Chart Review: ED encounter and hospitalization on 2025 admitted to the ICU for severe COPD exacerbation requiring intubation.  Patient improved throughout hospital course and weaned from ventilator support of 2025, was treated with IV antibiotics and pulmonary meds, patient at baseline 4L NC and pulmonary effort, completed IV antibiotics and steroid course while in hospital, discharged  "home with plan for follow-up with pulmonologist, was recommended for SNF for rehab but refused, LOS 6 days.      Parts of this chart have been completed using voice-to-tect recognition software. Please excuse any errors of transcription that were missed for editing/correcting.  ----  {Scoring Tools Utilized:73625}    Differential diagnoses considered include but are not limited to: ***    Social Determinants of Health which Significantly Impact Care: {Social Determinants of Health which Significantly Impact Care:40778} {The following actions were taken to address these social determinants (Optional):35404}    EKG Independent Interpretation: {EKG Independent Interpretation:05098}    Independent Result Review and Interpretation: {Independent Result Review and Interpretation:29635::\"Relevant laboratory and radiographic results were reviewed and independently interpreted by myself.  As necessary, they are commented on in the ED Course.\"}    Chronic conditions affecting the patient's care: {Chronic conditions affecting the patient's care:41411::\"As documented above in MDM\"}    The patient was discussed with the following consultants/services: {The patient was discussed with the following consultants/services:03904}    Care Considerations: {Care Considerations:83361::\"As documented above in MDM\"}    ED Course:  ED Course as of 06/06/25 2110 Fri Jun 06, 2025 2102 VS notable for borderline hypoxia on baseline supplemental O2 4L NC in the setting of generalized weakness but no endorsement of cardiopulmonary symptoms, remaining VSS [BC]   2102 I personally reviewed and interpreted the EKG @2102: NSR 82, normal axis/intervals and no appreciable ischemia, non-specific TW findings, and prior EKG on 2/3/2025 reviewed without any appreciable specific/identifiable changes [BC]      ED Course User Index  [BC] Abdirizak James MD       Disposition   {ED Disposition:50229}    Procedures   Procedures    {ED Provider Level " (Optional):65173}    Abdirizak James MD  Emergency Medicine

## 2025-06-07 NOTE — CARE PLAN
The patient's goals for the shift include      The clinical goals for the shift include Get some sleep    Over the shift, the patient did not make progress toward the following goals. Barriers to progression include   Problem: Skin  Goal: Decreased wound size/increased tissue granulation at next dressing change  Flowsheets (Taken 6/7/2025 0335)  Decreased wound size/increased tissue granulation at next dressing change:   Promote sleep for wound healing   Protective dressings over bony prominences   Utilize specialty bed per algorithm  Goal: Participates in plan/prevention/treatment measures  Flowsheets (Taken 6/7/2025 0335)  Participates in plan/prevention/treatment measures:   Discuss with provider PT/OT consult   Elevate heels   Increase activity/out of bed for meals  Goal: Prevent/manage excess moisture  Flowsheets (Taken 6/7/2025 0335)  Prevent/manage excess moisture:   Moisturize dry skin   Monitor for/manage infection if present   Cleanse incontinence/protect with barrier cream  Goal: Prevent/minimize sheer/friction injuries  Flowsheets (Taken 6/7/2025 0335)  Prevent/minimize sheer/friction injuries:   Use pull sheet   Turn/reposition every 2 hours/use positioning/transfer devices   HOB 30 degrees or less   Increase activity/out of bed for meals  Goal: Promote/optimize nutrition  Flowsheets (Taken 6/7/2025 0335)  Promote/optimize nutrition:   Discuss with provider if NPO > 2 days   Assist with feeding   Consume > 50% meals/supplements   Monitor/record intake including meals  Goal: Promote skin healing  Flowsheets (Taken 6/7/2025 0335)  Promote skin healing:   Turn/reposition every 2 hours/use positioning/transfer devices   Assess skin/pad under line(s)/device(s)   . Recommendations to address these barriers include .

## 2025-06-07 NOTE — ASSESSMENT & PLAN NOTE
Admit to telemetry unit  She remains at her baseline oxygen requirement at this time  We will continue patient's home trilogy treatment  Albuterol bronchodilator as needed  Is not appear the patient was discharged on any oral antibiotics  Will start the patient empiric IV antibiotics for HCAP  Follow-up blood culture and sputum culture  CT results reviewed.  Although there is resolution of left upper lobe pneumonia she has worsening bilateral lobe pneumonia and a right middle lobe pneumonia.  Endobronchial lesion is suggested.  Pulmonary consultation requested to consider early bronchoscopy  Maintain supplemental oxygen at 4L  Incentive spirometer  Will order streptococcal Legionella urine antigen levels  Low dose systemic IV corticosteroids  SLP Evaluation requested--Modified diet

## 2025-06-07 NOTE — CONSULTS
Vancomycin Dosing by Pharmacy- INITIAL    Subha Miller is a 65 y.o. year old female who Pharmacy has been consulted for vancomycin dosing for pneumonia. Based on the patient's indication and renal status this patient will be dosed based on a goal AUC of 400-600.     Renal function is currently stable.    Visit Vitals  /75   Pulse 75   Temp 36.2 °C (97.2 °F) (Temporal)   Resp 20        Lab Results   Component Value Date    CREATININE 0.96 2025    CREATININE 0.62 2025    CREATININE 0.63 2025    CREATININE 0.77 2025        Patient weight is as follows:   Vitals:    25   Weight: 90.7 kg (200 lb)       Cultures:  No results found for the encounter in last 14 days.        No intake/output data recorded.  I/O during current shift:  I/O this shift:  In: 50 [IV Piggyback:50]  Out: -     Temp (24hrs), Av.2 °C (97.2 °F), Min:36.2 °C (97.2 °F), Max:36.2 °C (97.2 °F)         Assessment/Plan     Patient will not be given a loading dose.  Will initiate vancomycin maintenance, 1000 mg every 12 hours.    This dosing regimen is predicted by [employer] to result in the following pharmacokinetic parameters:  Loading dose: N/A  Regimen: 1000 mg IV every 12 hours.  Start time: 01:00 on 2025  Exposure target: AUC24 (range) 400-600 mg/L.hr   DAR73-20: 422 mg/L.hr  AUC24,ss: 566 mg/L.hr  Probability of AUC24 > 400: 85 %  Ctrough,ss: 19 mg/L  Probability of Ctrough,ss > 20: 45 %      Follow-up level will be ordered on 25 at 0500 unless clinically indicated sooner.  Will continue to monitor renal function daily while on vancomycin and order serum creatinine at least every 48 hours if not already ordered.  Follow for continued vancomycin needs, clinical response, and signs/symptoms of toxicity.       Yohana Wheatley, [facility]

## 2025-06-07 NOTE — PROGRESS NOTES
Vancomycin Dosing by Pharmacy- FOLLOW UP    Subha Miller is a 65 y.o. year old female who Pharmacy has been consulted for vancomycin dosing for pneumonia. Based on the patient's indication and renal status this patient is being dosed based on a goal AUC of 400-600.     Renal function is currently stable.    Current vancomycin dose: 1000 mg given every 12 hours    Estimated vancomycin AUC on current dose: 541 mg/L.hr     Visit Vitals  /78 (BP Location: Left arm, Patient Position: Lying)   Pulse 75   Temp 36.5 °C (97.7 °F) (Temporal)   Resp 20        Lab Results   Component Value Date    CREATININE 0.59 2025    CREATININE 0.96 2025    CREATININE 0.62 2025    CREATININE 0.63 2025        Patient weight is as follows:   Vitals:    25   Weight: 90.7 kg (200 lb)       Cultures:  No results found for the encounter in last 14 days.       I/O last 3 completed shifts:  In: 50 (0.6 mL/kg) [IV Piggyback:50]  Out: - (0 mL/kg)   Weight: 90.7 kg   I/O during current shift:  No intake/output data recorded.    Temp (24hrs), Av.4 °C (97.5 °F), Min:36.2 °C (97.2 °F), Max:36.5 °C (97.7 °F)      Assessment/Plan    Within goal AUC range. Continue current vancomycin regimen.    This dosing regimen is predicted by [employer] to result in the following pharmacokinetic parameters:  <<<<<[employer] DATA>>>>>  Loading dose: N/A  Regimen: 1000 mg IV every 12 hours.  Start time: 13:32 on 2025  Exposure target: AUC24 (range) 400-600 mg/L.hr   EBI93-06: 514 mg/L.hr  AUC24,ss: 541 mg/L.hr  Probability of AUC24 > 400: 93 %  Ctrough,ss: 14.5 mg/L  Probability of Ctrough,ss > 20: 14 %    The next level will be obtained on 6/10 at 0500. May be obtained sooner if clinically indicated.   Will continue to monitor renal function daily while on vancomycin and order serum creatinine at least every 48 hours if not already ordered.  Follow for continued vancomycin needs, clinical response, and signs/symptoms of  toxicity.       GIOVANNI Diego, PharmD

## 2025-06-07 NOTE — ED TRIAGE NOTES
Patient presents to ED from home for left shoulder pain withy all over body pain. Patient was just discharged from hospital. She states she was in a coma. She refused rehab but now thinks it was a mistake.

## 2025-06-07 NOTE — ASSESSMENT & PLAN NOTE
No obvious pathology on CT imaging  Will order left portable shoulder x-ray for a.m.  Analgesia as needed  Lidoderm patch

## 2025-06-07 NOTE — CONSULTS
Pulmonary Consultation Note   Subjective    Subha Miller is a 65 y.o. year old female patient known with  CHF, COPD on 4 L baseline Recent hospitalization with intubation, diabetes, hypertension, CVA. admitted on 2025 with following left shoulder pain and generalized bodyaches pulmonary was consulted for endobronchial lesion.    History of Present Illness:  Patient presented to [facility] ED due to generalized bodyaches and left shoulder pain.  She was recently intubated at [facility] from 2025 to 2025 she was discharged on 2025 to home on baseline 4 L.  She was treated for pneumonia and significant mucous plugging.  SLP evaluation was completed and patient had no overt signs and symptoms of aspiration or penetration.  Patient was discharged home declining skilled nursing facility    In the ED patient was on baseline 4 L nasal cannula satting 90% afebrile 36.2 °C heart rate 91 respiration rate 20 blood pressure 134/73.  Labs pertinent chloride 96 bicarbonate 37 BUN 33 creatinine 0.96  AST 49 WBC 12.8 platelets 118 UA essentially negative CT chest showing complete resolution of left upper lobe consolidation with small region of groundglass and consolidative opacities with a new progressive consolidation in the left lower lobe concerning for aspiration pneumonia with patchy opacifications in the right middle and lower lobe bronchial branches with complete atelectasis of the right middle lobe cannot exclude endobronchial lesion and postobstructive atelectasis moderate emphysema.        Past Medical History  She has a past medical history of CHF (congestive heart failure), COPD (chronic obstructive pulmonary disease) (Multi), Diabetes mellitus (Multi), Hypertension, and Stroke (Multi).    Surgical History  She has a past surgical history that includes Back surgery; Tonsillectomy;  section, low transverse; and Cardiac catheterization (N/A, 2023).     Social History  She reports that she  "has been smoking cigarettes. She has a 12.5 pack-year smoking history. She has never used smokeless tobacco. She reports that she does not drink alcohol and does not use drugs.  Cigarette smoking history:   Marijuana use:  Vaping:  Pets:   Asbestos:  Other specific exposure:      Family History  Family History[1]     Allergies  Penicillins, Codeine, and Morphine    Review of Systems         Meds    Scheduled medications  Scheduled Medications[2]  Continuous medications  Continuous Medications[3]  PRN medications  PRN Medications[4]     Objective      Last Recorded Vitals  /68 (BP Location: Left arm, Patient Position: Lying)   Pulse 74   Temp 35.8 °C (96.4 °F) (Temporal)   Resp 18   Wt 90.7 kg (200 lb)   SpO2 93%     Blood pressure 114/68, pulse 74, temperature 35.8 °C (96.4 °F), temperature source Temporal, resp. rate 18, height 1.6 m (5' 3\"), weight 90.7 kg (200 lb), SpO2 93%.   Physical Exam   GENERAL: Ill-appearing. well nourished. No respiratory distress  HEAD/SINUSES: 4 L nasal cannula  LUNGS: Diffuse rhonchi and expiratory wheezing throughout bilateral lung fields  CARDIAC: normal S1 and S2; no gallops, rubs or murmurs. Regular rate and rhythm  EXTREMITIES: No edema  NEURO: Forgetful but alert and oriented  SKIN: Defer  PSYCH: Normal affect    Intake/Output Summary (Last 24 hours) at 6/7/2025 1250  Last data filed at 6/7/2025 0952  Gross per 24 hour   Intake 290 ml   Output --   Net 290 ml     Labs:   Results from last 72 hours   Lab Units 06/07/25  0553 06/06/25  2101   SODIUM mmol/L 139 139   POTASSIUM mmol/L 4.0 4.4   CHLORIDE mmol/L 96* 95*   CO2 mmol/L 39* 37*   BUN mg/dL 28* 33*   CREATININE mg/dL 0.59 0.96   GLUCOSE mg/dL 114* 120*   CALCIUM mg/dL 8.8 9.3   ANION GAP mmol/L 8* 11   EGFR mL/min/1.73m*2 >90 65      Results from last 72 hours   Lab Units 06/07/25  0553 06/06/25  2101   WBC AUTO x10*3/uL 11.8* 12.8*   HEMOGLOBIN g/dL 15.3 16.2*   HEMATOCRIT % 49.6* 51.9*   PLATELETS AUTO x10*3/uL " 113* 118*   NEUTROS PCT AUTO % 72.9 75.7   LYMPHS PCT AUTO % 17.8 13.3   MONOS PCT AUTO % 7.9 9.9   EOS PCT AUTO % 0.5 0.1               Micro/ID:   Lab Results   Component Value Date    URINECULTURE NO SIGNIFICANT GROWTH. 09/19/2023    BLOODCULT No growth at 4 days -  FINAL REPORT 02/01/2025    BLOODCULT No growth at 4 days -  FINAL REPORT 02/01/2025     Summary of key imaging results from the last 24 hours  TTE -  1. Left ventricular ejection fraction is normal by visual estimate at 55-60%.   2. Spectral Doppler shows a Grade I (impaired relaxation pattern) of left ventricular diastolic filling with normal left atrial filling pressure.   3. There is normal right ventricular global systolic function.   4. The left atrial size is moderately dilated.   5. There is moderate calcification of the anterior and posterior mitral valve leaflets.   6. The doppler estimated RVSP is within normal limits with a right ventricular systolic pressure of 29 mmHg.   7. Mild to moderate aortic valve stenosis. The peak and mean gradients are 42 mmHg and 17 mmHg respectively.   8. There is moderate aortic valve cusp calcification.   9. Mild aortic valve regurgitation.  10. The inferior vena cava appears mildly dilate  CT chest There is near complete interval resolution of consolidation  previously seen in the left upper lobe, with a small region of the  ground-glass and consolidative opacity along the left heart border  (series 7, image [id number]), which could relate to residual pneumonia  or developing scarring.      There is new/progressive consolidation in the left lower lobe with  associated volume loss, likely representing combination of  atelectasis and pneumonia, probably aspiration pneumonia, noting  patchy opacification of the left lower lobe bronchial branches, and  fluid layering in the proximal left mainstem bronchus.      There is also patchy opacification of the right middle and lower lobe  bronchial branches, with complete  atelectasis of the right middle  lobe. Endobronchial lesion resulting in postobstructive atelectasis  cannot be excluded. Pulmonology consultation suggested.      Background of moderate emphysema. Platelike opacities in the lower  lungs suggesting atelectasis or scar. Trace pleural effusions. No  pneumothorax.      Cardiomegaly.      Cholelithiasis    Impression   Subha Millre is a 65 y.o. year old female patient is being seen by the pulmonary service for   Acute on chronic respiratory failure with hypoxia and hypercapnia  Pneumonia-consistent with aspiration pneumonia improving from prior CT chest  Acute exacerbation of COPD -on 4 L baseline at home states she follows with a pulmonologist but she is unsure of his name   Question endobronchial lesion - RML collapse, with air seen low suspicion for endobronchial lesion - bronchopulmonary hygiene, IS and cough and deep breath. CT chest in 8 weeks    Recommendations   As follows:  Continue bronchopulmonary hygiene with incentive spirometry and Acapella, add albuterol and 3% nebulized saline to aid in airway clearance  Maintain pulse oximetry greater than 90% patient's baseline is 4 L  Continue steroids  Sputum culture if obtainable  Urine Legionella Streptococcus pneumonia antigens MRSA and blood cultures pending  Continue cefepime and Flagyl can de-escalate vancomycin based on MRSA surveillance  Check procalcitonin level   Chest x-ray  Nicotine patch now and on discharge  ABG due to chronic hypercapnia patient should have a NIV on discharge  Follow-up CT chest in 6 to 8 weeks to assure resolution of pneumonia  Referral placed to pulmonary follow-up in 2 weeks  Smoking cessation      AILYN Garcia-CNP   06/07/25 at 12:50 PM     -Only the Medical problems listed under impression were addressed today.   -Please contact primary team for all other concerns and medical problem  -Thank you for your consult     Disclaimer: Documentation completed with the  information available at the time of input. Parts of this note may have been scribed or generated using voice dictation software, Dragon.  Homophonic errors may exist.  Please contact me directly if clarification is needed. The times in the chart may not be reflective of actual patient care times, interventions, or procedures. Documentation occurs after the physical care of the patient.           [1] No family history on file.  [2] cefepime, 2 g, intravenous, q8h  tiotropium, 2 puff, inhalation, Daily   And  fluticasone furoate-vilanteroL, 1 puff, inhalation, Daily  heparin (porcine), 5,000 Units, subcutaneous, q12h  lidocaine, 1 patch, transdermal, Daily  methylPREDNISolone sodium succinate (PF), 40 mg, intravenous, q12h  metroNIDAZOLE, 500 mg, intravenous, q8h  nicotine, 1 patch, transdermal, Daily  oxygen, , inhalation, Continuous - Inhalation  pantoprazole, 40 mg, oral, Daily  spironolactone, 12.5 mg, oral, Daily  vancomycin, 1,000 mg, intravenous, q12h  [3]    [4] PRN medications: acetaminophen **OR** acetaminophen **OR** acetaminophen, albuterol, benzocaine-menthol, benzonatate, dextromethorphan-guaifenesin, gabapentin, guaiFENesin, HYDROcodone-acetaminophen, melatonin, ondansetron ODT **OR** ondansetron, polyethylene glycol, vancomycin

## 2025-06-07 NOTE — PROGRESS NOTES
Vancomycin Dosing by Pharmacy- Cessation of Therapy    Consult to pharmacy for vancomycin dosing has been discontinued by the prescriber, pharmacy will sign off at this time.    Please call pharmacy if there are further questions or re-enter a consult if vancomycin is resumed.     Kenroy Mercedes, PharmD

## 2025-06-08 VITALS
WEIGHT: 200 LBS | TEMPERATURE: 97.7 F | HEIGHT: 63 IN | HEART RATE: 78 BPM | SYSTOLIC BLOOD PRESSURE: 113 MMHG | OXYGEN SATURATION: 97 % | RESPIRATION RATE: 20 BRPM | DIASTOLIC BLOOD PRESSURE: 62 MMHG | BODY MASS INDEX: 35.44 KG/M2

## 2025-06-08 LAB
ANION GAP SERPL CALCULATED.3IONS-SCNC: 9 MMOL/L (ref 10–20)
BACTERIA BLD CULT: NORMAL
BACTERIA UR CULT: NORMAL
BUN SERPL-MCNC: 25 MG/DL (ref 6–23)
CALCIUM SERPL-MCNC: 9.1 MG/DL (ref 8.6–10.3)
CHLORIDE SERPL-SCNC: 100 MMOL/L (ref 98–107)
CO2 SERPL-SCNC: 33 MMOL/L (ref 21–32)
CREAT SERPL-MCNC: 0.51 MG/DL (ref 0.5–1.05)
EGFRCR SERPLBLD CKD-EPI 2021: >90 ML/MIN/1.73M*2
ERYTHROCYTE [DISTWIDTH] IN BLOOD BY AUTOMATED COUNT: 13.7 % (ref 11.5–14.5)
GLUCOSE BLD MANUAL STRIP-MCNC: 125 MG/DL (ref 74–99)
GLUCOSE BLD MANUAL STRIP-MCNC: 248 MG/DL (ref 74–99)
GLUCOSE BLD MANUAL STRIP-MCNC: 350 MG/DL (ref 74–99)
GLUCOSE SERPL-MCNC: 122 MG/DL (ref 74–99)
HCT VFR BLD AUTO: 50.2 % (ref 36–46)
HGB BLD-MCNC: 15.7 G/DL (ref 12–16)
LEGIONELLA AG UR QL: NEGATIVE
MCH RBC QN AUTO: 31.2 PG (ref 26–34)
MCHC RBC AUTO-ENTMCNC: 31.3 G/DL (ref 32–36)
MCV RBC AUTO: 100 FL (ref 80–100)
NRBC BLD-RTO: 0 /100 WBCS (ref 0–0)
PLATELET # BLD AUTO: 135 X10*3/UL (ref 150–450)
POTASSIUM SERPL-SCNC: 4.7 MMOL/L (ref 3.5–5.3)
RBC # BLD AUTO: 5.03 X10*6/UL (ref 4–5.2)
S PNEUM AG UR QL: POSITIVE
SODIUM SERPL-SCNC: 137 MMOL/L (ref 136–145)
WBC # BLD AUTO: 12.6 X10*3/UL (ref 4.4–11.3)

## 2025-06-08 PROCEDURE — 85027 COMPLETE CBC AUTOMATED: CPT | Performed by: NURSE PRACTITIONER

## 2025-06-08 PROCEDURE — 94669 MECHANICAL CHEST WALL OSCILL: CPT

## 2025-06-08 PROCEDURE — 2500000004 HC RX 250 GENERAL PHARMACY W/ HCPCS (ALT 636 FOR OP/ED): Performed by: INTERNAL MEDICINE

## 2025-06-08 PROCEDURE — 97163 PT EVAL HIGH COMPLEX 45 MIN: CPT | Mod: GP

## 2025-06-08 PROCEDURE — 2500000001 HC RX 250 WO HCPCS SELF ADMINISTERED DRUGS (ALT 637 FOR MEDICARE OP): Performed by: INTERNAL MEDICINE

## 2025-06-08 PROCEDURE — 97530 THERAPEUTIC ACTIVITIES: CPT | Mod: GP

## 2025-06-08 PROCEDURE — 80048 BASIC METABOLIC PNL TOTAL CA: CPT | Performed by: NURSE PRACTITIONER

## 2025-06-08 PROCEDURE — 94640 AIRWAY INHALATION TREATMENT: CPT

## 2025-06-08 PROCEDURE — 94668 MNPJ CHEST WALL SBSQ: CPT

## 2025-06-08 PROCEDURE — 82947 ASSAY GLUCOSE BLOOD QUANT: CPT

## 2025-06-08 PROCEDURE — 36415 COLL VENOUS BLD VENIPUNCTURE: CPT | Performed by: NURSE PRACTITIONER

## 2025-06-08 PROCEDURE — 2500000004 HC RX 250 GENERAL PHARMACY W/ HCPCS (ALT 636 FOR OP/ED)

## 2025-06-08 PROCEDURE — 2500000005 HC RX 250 GENERAL PHARMACY W/O HCPCS: Performed by: INTERNAL MEDICINE

## 2025-06-08 PROCEDURE — 2500000002 HC RX 250 W HCPCS SELF ADMINISTERED DRUGS (ALT 637 FOR MEDICARE OP, ALT 636 FOR OP/ED): Performed by: INTERNAL MEDICINE

## 2025-06-08 PROCEDURE — 2500000004 HC RX 250 GENERAL PHARMACY W/ HCPCS (ALT 636 FOR OP/ED): Performed by: NURSE PRACTITIONER

## 2025-06-08 PROCEDURE — 99232 SBSQ HOSP IP/OBS MODERATE 35: CPT

## 2025-06-08 PROCEDURE — 1200000002 HC GENERAL ROOM WITH TELEMETRY DAILY

## 2025-06-08 PROCEDURE — 2500000002 HC RX 250 W HCPCS SELF ADMINISTERED DRUGS (ALT 637 FOR MEDICARE OP, ALT 636 FOR OP/ED)

## 2025-06-08 PROCEDURE — 99233 SBSQ HOSP IP/OBS HIGH 50: CPT | Performed by: NURSE PRACTITIONER

## 2025-06-08 PROCEDURE — 2500000001 HC RX 250 WO HCPCS SELF ADMINISTERED DRUGS (ALT 637 FOR MEDICARE OP): Performed by: NURSE PRACTITIONER

## 2025-06-08 PROCEDURE — S4991 NICOTINE PATCH NONLEGEND: HCPCS | Performed by: INTERNAL MEDICINE

## 2025-06-08 PROCEDURE — 2500000005 HC RX 250 GENERAL PHARMACY W/O HCPCS

## 2025-06-08 RX ORDER — CYCLOBENZAPRINE HCL 10 MG
5 TABLET ORAL 3 TIMES DAILY PRN
Status: DISPENSED | OUTPATIENT
Start: 2025-06-08

## 2025-06-08 RX ADMIN — SODIUM CHLORIDE SOLN NEBU 3% 3 ML: 3 NEBU SOLN at 19:32

## 2025-06-08 RX ADMIN — ALBUTEROL SULFATE 2.5 MG: 2.5 SOLUTION RESPIRATORY (INHALATION) at 07:54

## 2025-06-08 RX ADMIN — CEFEPIME HYDROCHLORIDE 2 G: 2 INJECTION, SOLUTION INTRAVENOUS at 01:10

## 2025-06-08 RX ADMIN — CEFEPIME HYDROCHLORIDE 2 G: 2 INJECTION, SOLUTION INTRAVENOUS at 17:20

## 2025-06-08 RX ADMIN — ALBUTEROL SULFATE 2.5 MG: 2.5 SOLUTION RESPIRATORY (INHALATION) at 13:14

## 2025-06-08 RX ADMIN — METRONIDAZOLE 500 MG: 500 INJECTION, SOLUTION INTRAVENOUS at 04:56

## 2025-06-08 RX ADMIN — METHYLPREDNISOLONE SODIUM SUCCINATE 30 MG: 40 INJECTION, POWDER, FOR SOLUTION INTRAMUSCULAR; INTRAVENOUS at 21:59

## 2025-06-08 RX ADMIN — ACETAMINOPHEN 650 MG: 325 TABLET ORAL at 09:17

## 2025-06-08 RX ADMIN — FLUTICASONE FUROATE AND VILANTEROL TRIFENATATE 1 PUFF: 100; 25 POWDER RESPIRATORY (INHALATION) at 07:56

## 2025-06-08 RX ADMIN — HEPARIN SODIUM 5000 UNITS: 5000 INJECTION, SOLUTION INTRAVENOUS; SUBCUTANEOUS at 17:56

## 2025-06-08 RX ADMIN — CYCLOBENZAPRINE 5 MG: 10 TABLET, FILM COATED ORAL at 20:37

## 2025-06-08 RX ADMIN — CEFEPIME HYDROCHLORIDE 2 G: 2 INJECTION, SOLUTION INTRAVENOUS at 09:18

## 2025-06-08 RX ADMIN — METHYLPREDNISOLONE SODIUM SUCCINATE 40 MG: 40 INJECTION, POWDER, FOR SOLUTION INTRAMUSCULAR; INTRAVENOUS at 09:18

## 2025-06-08 RX ADMIN — SODIUM CHLORIDE SOLN NEBU 3% 3 ML: 3 NEBU SOLN at 13:15

## 2025-06-08 RX ADMIN — PANTOPRAZOLE SODIUM 40 MG: 40 TABLET, DELAYED RELEASE ORAL at 09:19

## 2025-06-08 RX ADMIN — Medication 4 L/MIN: at 20:07

## 2025-06-08 RX ADMIN — NICOTINE 1 PATCH: 14 PATCH, EXTENDED RELEASE TRANSDERMAL at 09:16

## 2025-06-08 RX ADMIN — HEPARIN SODIUM 5000 UNITS: 5000 INJECTION, SOLUTION INTRAVENOUS; SUBCUTANEOUS at 04:56

## 2025-06-08 RX ADMIN — TIOTROPIUM BROMIDE INHALATION SPRAY 2 PUFF: 3.12 SPRAY, METERED RESPIRATORY (INHALATION) at 07:55

## 2025-06-08 RX ADMIN — METRONIDAZOLE 500 MG: 500 INJECTION, SOLUTION INTRAVENOUS at 20:21

## 2025-06-08 RX ADMIN — ALBUTEROL SULFATE 2.5 MG: 2.5 SOLUTION RESPIRATORY (INHALATION) at 19:32

## 2025-06-08 RX ADMIN — Medication 4 L/MIN: at 07:54

## 2025-06-08 RX ADMIN — SPIRONOLACTONE 12.5 MG: 25 TABLET ORAL at 09:17

## 2025-06-08 RX ADMIN — SODIUM CHLORIDE SOLN NEBU 3% 3 ML: 3 NEBU SOLN at 07:55

## 2025-06-08 RX ADMIN — LIDOCAINE 4% 1 PATCH: 40 PATCH TOPICAL at 09:17

## 2025-06-08 RX ADMIN — METRONIDAZOLE 500 MG: 500 INJECTION, SOLUTION INTRAVENOUS at 13:12

## 2025-06-08 RX ADMIN — ACETAMINOPHEN 650 MG: 325 TABLET ORAL at 20:37

## 2025-06-08 ASSESSMENT — PAIN SCALES - GENERAL
PAINLEVEL_OUTOF10: 7
PAINLEVEL_OUTOF10: 0 - NO PAIN
PAINLEVEL_OUTOF10: 7

## 2025-06-08 ASSESSMENT — COGNITIVE AND FUNCTIONAL STATUS - GENERAL
STANDING UP FROM CHAIR USING ARMS: A LOT
MOVING FROM LYING ON BACK TO SITTING ON SIDE OF FLAT BED WITH BEDRAILS: A LITTLE
STANDING UP FROM CHAIR USING ARMS: A LOT
EATING MEALS: A LITTLE
TURNING FROM BACK TO SIDE WHILE IN FLAT BAD: A LITTLE
DRESSING REGULAR LOWER BODY CLOTHING: A LOT
DAILY ACTIVITIY SCORE: 13
TOILETING: A LOT
MOVING FROM LYING ON BACK TO SITTING ON SIDE OF FLAT BED WITH BEDRAILS: A LITTLE
TURNING FROM BACK TO SIDE WHILE IN FLAT BAD: A LITTLE
EATING MEALS: A LOT
MOBILITY SCORE: 14
HELP NEEDED FOR BATHING: A LOT
MOVING TO AND FROM BED TO CHAIR: A LOT
DRESSING REGULAR UPPER BODY CLOTHING: A LOT
MOBILITY SCORE: 10
STANDING UP FROM CHAIR USING ARMS: A LOT
DAILY ACTIVITIY SCORE: 12
PERSONAL GROOMING: A LOT
TOILETING: A LOT
DRESSING REGULAR UPPER BODY CLOTHING: A LOT
CLIMB 3 TO 5 STEPS WITH RAILING: TOTAL
CLIMB 3 TO 5 STEPS WITH RAILING: A LOT
HELP NEEDED FOR BATHING: A LOT
MOVING TO AND FROM BED TO CHAIR: A LOT
TURNING FROM BACK TO SIDE WHILE IN FLAT BAD: A LOT
WALKING IN HOSPITAL ROOM: A LOT
MOBILITY SCORE: 14
CLIMB 3 TO 5 STEPS WITH RAILING: A LOT
WALKING IN HOSPITAL ROOM: A LOT
DRESSING REGULAR LOWER BODY CLOTHING: A LOT
WALKING IN HOSPITAL ROOM: A LOT
MOVING TO AND FROM BED TO CHAIR: A LOT
PERSONAL GROOMING: A LOT
MOVING FROM LYING ON BACK TO SITTING ON SIDE OF FLAT BED WITH BEDRAILS: TOTAL

## 2025-06-08 ASSESSMENT — PAIN - FUNCTIONAL ASSESSMENT
PAIN_FUNCTIONAL_ASSESSMENT: 0-10

## 2025-06-08 ASSESSMENT — PAIN DESCRIPTION - LOCATION: LOCATION: GENERALIZED

## 2025-06-08 ASSESSMENT — ACTIVITIES OF DAILY LIVING (ADL): ADL_ASSISTANCE: INDEPENDENT

## 2025-06-08 NOTE — CARE PLAN
The patient's goals for the shift include      The clinical goals for the shift include antibiotics    Problem: Pain - Adult  Goal: Verbalizes/displays adequate comfort level or baseline comfort level  6/8/2025 1538 by Maricel Ansari RN  Outcome: Progressing  6/8/2025 1538 by Maricel Ansari RN  Outcome: Progressing  6/8/2025 1537 by Maricel Ansari RN  Outcome: Progressing     Problem: Safety - Adult  Goal: Free from fall injury  6/8/2025 1538 by Maricel Ansari RN  Outcome: Progressing  6/8/2025 1538 by Maricel Ansari RN  Outcome: Progressing  6/8/2025 1537 by Maricel Ansari RN  Outcome: Progressing     Problem: Discharge Planning  Goal: Discharge to home or other facility with appropriate resources  6/8/2025 1538 by Maricel Ansari RN  Outcome: Progressing  6/8/2025 1538 by Maricel Ansari RN  Outcome: Progressing  6/8/2025 1537 by Maricel Ansari RN  Outcome: Progressing     Problem: Chronic Conditions and Co-morbidities  Goal: Patient's chronic conditions and co-morbidity symptoms are monitored and maintained or improved  6/8/2025 1538 by Maricel Ansari RN  Outcome: Progressing  6/8/2025 1538 by Maricel Ansari RN  Outcome: Progressing  6/8/2025 1537 by Maricel Ansari RN  Outcome: Progressing     Problem: Nutrition  Goal: Nutrient intake appropriate for maintaining nutritional needs  6/8/2025 1538 by Maricel Ansari RN  Outcome: Progressing  6/8/2025 1538 by Maricel Ansari RN  Outcome: Progressing  6/8/2025 1537 by Maricel Ansari RN  Outcome: Progressing     Problem: Skin  Goal: Decreased wound size/increased tissue granulation at next dressing change  6/8/2025 1538 by Maricel Ansari RN  Outcome: Progressing  6/8/2025 1538 by Maricel Ansari RN  Outcome: Progressing  6/8/2025 1537 by Maricel Ansari RN  Outcome: Progressing  Goal: Participates in plan/prevention/treatment measures  6/8/2025 1538 by Maricel Ansari RN  Outcome: Progressing  6/8/2025 1538 by Maricel Ansari RN  Outcome: Progressing  6/8/2025 1537 by Maricel  DANIEL Ansari  Outcome: Progressing  Goal: Prevent/manage excess moisture  6/8/2025 1538 by Maricel Ansari RN  Outcome: Progressing  6/8/2025 1538 by Maricel Ansari RN  Outcome: Progressing  6/8/2025 1537 by Maricel Ansari RN  Outcome: Progressing  Goal: Prevent/minimize sheer/friction injuries  6/8/2025 1538 by Maricel Ansari RN  Outcome: Progressing  6/8/2025 1538 by Maricel Ansari RN  Outcome: Progressing  6/8/2025 1537 by Maricel Ansari RN  Outcome: Progressing  Goal: Promote/optimize nutrition  6/8/2025 1538 by Maricel Ansari RN  Outcome: Progressing  6/8/2025 1538 by Maricel Ansari RN  Outcome: Progressing  6/8/2025 1537 by Maricel Ansari RN  Outcome: Progressing  Goal: Promote skin healing  6/8/2025 1538 by Maricel Ansari RN  Outcome: Progressing  6/8/2025 1538 by Maricel Ansari RN  Outcome: Progressing  6/8/2025 1537 by Maricel Ansari RN  Outcome: Progressing     Problem: Pain  Goal: Takes deep breaths with improved pain control throughout the shift  6/8/2025 1538 by Maricel Ansari RN  Outcome: Progressing  6/8/2025 1538 by Maricel Ansari RN  Outcome: Progressing  6/8/2025 1537 by Maricel Ansari RN  Outcome: Progressing  Goal: Turns in bed with improved pain control throughout the shift  6/8/2025 1538 by Maricel Ansari RN  Outcome: Progressing  6/8/2025 1538 by Maricel Ansari RN  Outcome: Progressing  6/8/2025 1537 by Maricel Ansari RN  Outcome: Progressing  Goal: Walks with improved pain control throughout the shift  6/8/2025 1538 by Maricel Ansari RN  Outcome: Progressing  6/8/2025 1538 by Maricel Ansari RN  Outcome: Progressing  6/8/2025 1537 by Maricel Ansari RN  Outcome: Progressing  Goal: Performs ADL's with improved pain control throughout shift  6/8/2025 1538 by Maricel Ansari RN  Outcome: Progressing  6/8/2025 1538 by Maricel Ansari RN  Outcome: Progressing  6/8/2025 1537 by Maricel Ansari RN  Outcome: Progressing  Goal: Participates in PT with improved pain control throughout the shift  6/8/2025 1538  by Maricel Ansari RN  Outcome: Progressing  6/8/2025 1538 by Maricel Ansari RN  Outcome: Progressing  6/8/2025 1537 by Maricel Ansari RN  Outcome: Progressing  Goal: Free from opioid side effects throughout the shift  6/8/2025 1538 by Maricel Ansari RN  Outcome: Progressing  6/8/2025 1538 by Maricel Ansari RN  Outcome: Progressing  6/8/2025 1537 by Maricel Ansari RN  Outcome: Progressing  Goal: Free from acute confusion related to pain meds throughout the shift  6/8/2025 1538 by Maricel Ansari RN  Outcome: Progressing  6/8/2025 1538 by Maricel Ansari RN  Outcome: Progressing  6/8/2025 1537 by Maricel Ansari RN  Outcome: Progressing     Problem: Fall/Injury  Goal: Not fall by end of shift  6/8/2025 1538 by Maricel Ansari RN  Outcome: Progressing  6/8/2025 1538 by Maricel Ansari RN  Outcome: Progressing  6/8/2025 1537 by Maricel Ansari RN  Outcome: Progressing  Goal: Be free from injury by end of the shift  6/8/2025 1538 by Maricel Ansari RN  Outcome: Progressing  6/8/2025 1538 by Maricel Ansari RN  Outcome: Progressing  6/8/2025 1537 by Maricel Ansari RN  Outcome: Progressing  Goal: Verbalize understanding of personal risk factors for fall in the hospital  6/8/2025 1538 by Maricel Ansari RN  Outcome: Progressing  6/8/2025 1538 by Maricel Ansari RN  Outcome: Progressing  6/8/2025 1537 by Maricel Ansari RN  Outcome: Progressing  Goal: Verbalize understanding of risk factor reduction measures to prevent injury from fall in the home  6/8/2025 1538 by Maricel Ansari RN  Outcome: Progressing  6/8/2025 1538 by Maricel Ansari RN  Outcome: Progressing  6/8/2025 1537 by Maricel Ansari RN  Outcome: Progressing  Goal: Use assistive devices by end of the shift  6/8/2025 1538 by Maricel Ansari, DANIEL  Outcome: Progressing  6/8/2025 1538 by Maricel Ansari RN  Outcome: Progressing  6/8/2025 1537 by Maricel Ansari RN  Outcome: Progressing  Goal: Pace activities to prevent fatigue by end of the shift  6/8/2025 1538 by Maricel Ansari  RN  Outcome: Progressing  6/8/2025 1538 by Maricel Ansari RN  Outcome: Progressing  6/8/2025 1537 by Maricel Ansari RN  Outcome: Progressing     Problem: Pain  Goal: STG - Patient will verbalize an acceptable level of pain  6/8/2025 1538 by Maricel Ansari RN  Outcome: Progressing  6/8/2025 1538 by Maricel Ansari RN  Outcome: Progressing  6/8/2025 1537 by Maricel Ansari RN  Outcome: Progressing

## 2025-06-08 NOTE — PROGRESS NOTES
Physical Therapy    Physical Therapy Evaluation & Treatment    Patient Name: Subha Miller  MRN: [id number]  Department: [address]  Room: [address] [place], [place] [address]  Today's Date: 6/8/2025   Time Calculation  Start Time: 0920  Stop Time: 1000  Time Calculation (min): 40 min    Assessment/Plan   PT Assessment  PT Assessment Results: Decreased strength, Decreased endurance, Impaired balance, Decreased mobility, Decreased coordination, Obesity, Pain  Rehab Prognosis: Fair  Barriers to Discharge Home: Caregiver assistance, Physical needs  Caregiver Assistance: Caregiver assistance needed per identified barriers - however, level of patient's required assistance exceeds assistance available at home  Physical Needs: Ambulating household distances limited by function/safety, Intermittent ADL assistance needed, High falls risk due to function or environment  Evaluation/Treatment Tolerance: Patient limited by pain, Patient limited by fatigue  Medical Staff Made Aware: Yes  Strengths: Ability to acquire knowledge, Premorbid level of function  Barriers to Participation: Comorbidities  End of Session Communication: Bedside nurse  Assessment Comment: 65 year old female presenting with strength, endurance, and mobility deficits secondary to pain and PNA. Patient is greatly limited in her ability to perform bed mobility, functional transfers, and care for herself due to observed weakness and general body and L shoulder pain. Patient requires mod-max assist for all bed mobility, transfers, and ambulation with walker, and would benefit from moderate intensity rehab needs following discharge from hospital.  End of Session Patient Position: Bed, 3 rail up, Alarm on   IP OR SWING BED PT PLAN  Inpatient or Swing Bed: Inpatient  PT Plan  Treatment/Interventions: Bed mobility, Transfer training, Gait training, Strengthening, Endurance training, Therapeutic exercise, Therapeutic activity, Positioning, Postural re-education  PT Plan: Ongoing PT  PT Frequency: 4  times per week  PT Discharge Recommendations: Moderate intensity level of continued care  Equipment Recommended upon Discharge: Wheelchair  PT Recommended Transfer Status: Assistive device, Assist x2  PT - OK to Discharge: Yes      Subjective     PT Visit Info:  PT Received On: 06/08/25  General Visit Information:  General  Reason for Referral: Impaired mobility, hospital acquired PNA  Referred By: Dr. Palmer  Past Medical History Relevant to Rehab: CHF, COPD on 4 L baseline nasal cannula, DM, hypertension, CVA  Family/Caregiver Present: No  Prior to Session Communication: Bedside nurse  Patient Position Received: Bed, 3 rail up, Alarm on  Preferred Learning Style: verbal, visual  General Comment: Patient is a 65 year old female who presented to the ED with L shoulder pain. Per EMR, patient was recently at [facility] from 6/1/2025-6/6/2025 and was intubated in coma. She was discharged home. Patient admitted with hospital acquired PNA, L shoulder pain (imaging negative for acute findings), COPD, HTN, chronic respiratory failure, moderate aortic valve stenosis, thrombocytopenia. Patient is cleared by nursing for therapy. Patient in bed upon arrival and agreeable to participate. +4L O2 via NC  Home Living:  Home Living  Type of Home: House  Lives With: Spouse  Home Adaptive Equipment: Wheelchair-manual, Walker rolling or standard  Home Layout: One level  Home Access: Level entry  Bathroom Shower/Tub: Tub/shower unit  Bathroom Toilet: Standard  Bathroom Equipment: Grab bars in shower  Home Living Comments: patient with a recent admission at [facility] from 6/1/2025 to 6/6/2025  Prior Level of Function:  Prior Function Per Pt/Caregiver Report  Level of Cook: Independent with ADLs and functional transfers, Independent with homemaking with ambulation  Receives Help From: Family  ADL Assistance: Independent (prior to recent hospitalization at [facility])  Homemaking Assistance: Needs assistance (spouse  manages)  Ambulatory Assistance:  (independent with stand pivot txfers, wheelchair for mobility)  Precautions:  Precautions  Hearing/Visual Limitations: glasses  Medical Precautions: Fall precautions, Oxygen therapy device and L/min (4L O2 via NC)    Objective   Pain:  Pain Assessment  Pain Assessment: 0-10  0-10 (Numeric) Pain Score: 7  Pain Type: Chronic pain (L shoulder, clavicle, low back)  Effect of Pain on Daily Activities: L shoulder pain limits ability to transfer and ambulate with walker  Response to Interventions: No change in pain  Cognition:  Cognition  Orientation Level: Oriented X4  Cognition Comments: pleasant, cooperative  Impulsive: Mildly  Processing Speed: Delayed    General Assessments:    Activity Tolerance  Endurance: Decreased tolerance for upright activites  Activity Tolerance Comments: limited by pain    Sensation  Sensation Comment: denies N&T in bilateral LE    Coordination  Movements are Fluid and Coordinated: No  Coordination Comment: Decreased overall    Postural Control  Postural Control: Impaired  Posture Comment: Significant forward head, rounded shoulder, thoracic kyphosis, slouched posture    Static Sitting Balance  Static Sitting-Balance Support: Bilateral upper extremity supported  Static Sitting-Level of Assistance: Distant supervision  Dynamic Sitting Balance  Dynamic Sitting-Balance Support: Bilateral upper extremity supported  Dynamic Sitting-Level of Assistance: Close supervision    Static Standing Balance  Static Standing-Balance Support: Bilateral upper extremity supported  Static Standing-Level of Assistance: Moderate assistance  Dynamic Standing Balance  Dynamic Standing-Balance Support: Bilateral upper extremity supported  Dynamic Standing-Level of Assistance: Moderate assistance  Dynamic Standing-Comments: with bed<>chair transfers  Functional Assessments:       Bed Mobility  Bed Mobility: Yes  Bed Mobility 1  Bed Mobility 1: Supine to sitting  Level of Assistance 1:  Moderate assistance  Bed Mobility Comments 1: Assist to raise trunk  Bed Mobility 2  Bed Mobility  2: Sitting to supine  Level of Assistance 2: Moderate assistance  Bed Mobility Comments 2: assist to lift LE into bed and support/lower trunk  Bed Mobility 3  Bed Mobility Comments 3: unable to roll in bed due to pain    Transfers  Transfer: Yes  Transfer 1  Transfer From 1: Bed to  Transfer to 1: Chair with arms  Technique 1: Stand pivot  Transfer Device 1: Walker  Transfer Level of Assistance 1: Moderate assistance  Trials/Comments 1: Patient was able to perform sit to stand from elevated bed surface with mod assist, however needs maxA for sit to stand from standard armchair due to lower surface relative to bed, L shoulder pain with stand/sit transfers  Transfers 2  Transfer From 2: Chair with arms to  Transfer to 2: Bed  Technique 2: Stand pivot  Transfer Device 2: Walker  Transfer Level of Assistance 2: Moderate assistance  Trials/Comments 2: ModA x1 for trunk up to steady, poor endurance with standing on feet, max cues to shift posteriorly prior to sitting on bed, transfer performance limited by concurrent L shoulder/clavicular pain    Ambulation/Gait Training  Ambulation/Gait Training Performed: Yes  Ambulation/Gait Training 1  Surface 1: Level tile  Device 1: Rolling walker  Assistance 1: Moderate assistance  Quality of Gait 1: Decreased step length, Shuffling gait, Narrow base of support, Forward flexed posture  Comments/Distance (ft) 1: a few small steps while pivoting from bed<>chair, poor endurance on feet, unsteady, modA with walker for safety    Stairs  Stairs: No    Extremity/Trunk Assessments:    RLE   RLE : Exceptions to WFL  Strength RLE  RLE Overall Strength: Greater than or equal to 3/5 as evidenced by functional mobility  LLE   LLE : Exceptions to WFL  Strength LLE  LLE Overall Strength: Greater than or equal to 3/5 as evidenced by functional mobility  Treatments:    Bed Mobility  Bed Mobility:  Yes  Bed Mobility 1  Bed Mobility 1: Supine to sitting  Level of Assistance 1: Moderate assistance  Bed Mobility Comments 1: Assist to raise trunk  Bed Mobility 2  Bed Mobility  2: Sitting to supine  Level of Assistance 2: Moderate assistance  Bed Mobility Comments 2: assist to lift LE into bed and support/lower trunk  Bed Mobility 3  Bed Mobility Comments 3: unable to roll in bed due to pain    Ambulation/Gait Training  Ambulation/Gait Training Performed: Yes  Ambulation/Gait Training 1  Surface 1: Level tile  Device 1: Rolling walker  Assistance 1: Moderate assistance  Quality of Gait 1: Decreased step length, Shuffling gait, Narrow base of support, Forward flexed posture  Comments/Distance (ft) 1: a few small steps while pivoting from bed<>chair, poor endurance on feet, unsteady, modA with walker for safety  Transfers  Transfer: Yes  Transfer 1  Transfer From 1: Bed to  Transfer to 1: Chair with arms  Technique 1: Stand pivot  Transfer Device 1: Walker  Transfer Level of Assistance 1: Moderate assistance  Trials/Comments 1: Patient was able to perform sit to stand from elevated bed surface with mod assist, however needs maxA for sit to stand from standard armchair due to lower surface relative to bed, L shoulder pain with stand/sit transfers  Transfers 2  Transfer From 2: Chair with arms to  Transfer to 2: Bed  Technique 2: Stand pivot  Transfer Device 2: Walker  Transfer Level of Assistance 2: Moderate assistance  Trials/Comments 2: ModA x1 for trunk up to steady, poor endurance with standing on feet, max cues to shift posteriorly prior to sitting on bed, transfer performance limited by concurrent L shoulder/clavicular pain    Stairs  Stairs: No    Outcome Measures:  [facility] Basic Mobility  Turning from your back to your side while in a flat bed without using bedrails: Total  Moving from lying on your back to sitting on the side of a flat bed without using bedrails: A lot  Moving to and from bed to chair  (including a wheelchair): A lot  Standing up from a chair using your arms (e.g. wheelchair or bedside chair): A lot  To walk in hospital room: A lot  Climbing 3-5 steps with railing: Total  Basic Mobility - Total Score: 10    Encounter Problems       Encounter Problems (Active)       PT Transfers       STG - Transfer from bed to chair with Ramona and rolling walker for safety.  (Progressing)       Start:  06/08/25    Expected End:  06/22/25            STG - Patient to transfer to and from sit to supine with Ramona. (Progressing)       Start:  06/08/25    Expected End:  06/22/25            STG - Patient will perform bed mobility with Ramona (Progressing)       Start:  06/08/25    Expected End:  06/22/25            STG - Patient will transfer sit to and from stand with Ramona and rolling walker for safety.  (Progressing)       Start:  06/08/25    Expected End:  06/22/25               Pain       PT Goal 2 (Progressing)       Start:  06/08/25    Expected End:  06/22/25       Patient will report no greater than 4/10 generalized and L shoulder pain for less difficulty with bed mobility and transfers.              Pain - Adult              Education Documentation  Precautions, taught by Josue Hernandez PT at 6/8/2025 10:42 AM.  Learner: Patient  Readiness: Acceptance  Method: Explanation  Response: Verbalizes Understanding, Needs Reinforcement    Body Mechanics, taught by Josue Hernandez PT at 6/8/2025 10:42 AM.  Learner: Patient  Readiness: Acceptance  Method: Explanation  Response: Verbalizes Understanding, Needs Reinforcement    Mobility Training, taught by Josue Hernandez PT at 6/8/2025 10:42 AM.  Learner: Patient  Readiness: Acceptance  Method: Explanation  Response: Verbalizes Understanding, Needs Reinforcement    Education Comments  No comments found.

## 2025-06-08 NOTE — PROGRESS NOTES
Subha Miller is a 65 y.o. female on day 1 of admission presenting with HAP (hospital-acquired pneumonia).    Subjective   Interval History:   Afebrile, no chills  Intermittent productive cough  No chest pain  No shortness of breath at rest  On low-flow oxygen  No nausea vomiting or diarrhea    Review of Systems   All other systems reviewed and are negative.      Objective   Range of Vitals (last 24 hours)  Heart Rate:  [72-84]   Temp:  [36.1 °C (97 °F)-36.6 °C (97.9 °F)]   Resp:  [17-19]   BP: ()/(56-84)   SpO2:  [91 %-100 %]   Daily Weight  06/06/25 : 90.7 kg (200 lb)    Body mass index is 35.43 kg/m².    Physical Exam  Constitutional:       Appearance: Normal appearance.   HENT:      Head: Normocephalic and atraumatic.   Eyes:      Extraocular Movements: Extraocular movements intact.      Conjunctiva/sclera: Conjunctivae normal.   Cardiovascular:      Rate and Rhythm: Normal rate.   Pulmonary:      Effort: Pulmonary effort is normal.      Breath sounds: Rhonchi present.   Abdominal:      General: Bowel sounds are normal.      Palpations: Abdomen is soft.      Tenderness: There is no abdominal tenderness.   Musculoskeletal:         General: No swelling. Normal range of motion.      Cervical back: Normal range of motion and neck supple.      Right lower leg: No edema.      Left lower leg: No edema.      Comments: Left shoulder tenderness   Skin:     General: Skin is warm and dry.   Neurological:      General: No focal deficit present.      Mental Status: She is alert and oriented to person, place, and time.   Psychiatric:         Mood and Affect: Mood normal.         Behavior: Behavior normal.     Antibiotics  cefepime - 2 gram/50 mL  metroNIDAZOLE - 500 mg/100 mL    Relevant Results  Labs  Results from last 72 hours   Lab Units 06/08/25  0804 06/07/25  0553 06/06/25  2101   WBC AUTO x10*3/uL 12.6* 11.8* 12.8*   HEMOGLOBIN g/dL 15.7 15.3 16.2*   HEMATOCRIT % 50.2* 49.6* 51.9*   PLATELETS AUTO x10*3/uL 135* 113*  118*   NEUTROS PCT AUTO %  --  72.9 75.7   LYMPHS PCT AUTO %  --  17.8 13.3   MONOS PCT AUTO %  --  7.9 9.9   EOS PCT AUTO %  --  0.5 0.1     Results from last 72 hours   Lab Units 06/08/25  0727 06/07/25  0553 06/06/25  2101   SODIUM mmol/L 137 139 139   POTASSIUM mmol/L 4.7 4.0 4.4   CHLORIDE mmol/L 100 96* 95*   CO2 mmol/L 33* 39* 37*   BUN mg/dL 25* 28* 33*   CREATININE mg/dL 0.51 0.59 0.96   GLUCOSE mg/dL 122* 114* 120*   CALCIUM mg/dL 9.1 8.8 9.3   ANION GAP mmol/L 9* 8* 11   EGFR mL/min/1.73m*2 >90 >90 65     Results from last 72 hours   Lab Units 06/07/25  0553 06/06/25  2101   ALK PHOS U/L 48 47   BILIRUBIN TOTAL mg/dL 0.7 1.1   PROTEIN TOTAL g/dL 5.9* 6.8   ALT U/L 96* 123*   AST U/L 32 49*   ALBUMIN g/dL 3.3* 3.7     Estimated Creatinine Clearance: 114.4 mL/min (by C-G formula based on SCr of 0.51 mg/dL).  C-Reactive Protein   Date Value Ref Range Status   02/02/2025 1.75 (H) <1.00 mg/dL Final   01/02/2024 0.66 <1.00 mg/dL Final   01/01/2024 0.71 <1.00 mg/dL Final     Microbiology  Reviewed-positive urine streptococcal antigen, blood culture pending, negative is MRSA PCR  Imaging  XR chest 1 view  Result Date: 6/8/2025  Interpreted By:  Jamie Fuller, STUDY: XR CHEST 1 VIEW;  6/7/2025 4:15 pm   INDICATION: Signs/Symptoms:baseline LLL consolidation.   COMPARISON: 02/03/2025   ACCESSION NUMBER(S): [id number]   ORDERING CLINICIAN: MARYSE RAMIREZ   FINDINGS: CARDIOMEDIASTINAL SILHOUETTE AND VASCULATURE:   Cardiac size:  Within normal limits considering patient rotation. Aortic shadow:  Within normal limits.   Mediastinal contours: Within normal limits.   Pulmonary vasculature:  The central vasculature is unremarkable   LUNGS: Persistent left basilar retrocardiac opacification probably from consolidated infiltrate. There is also a probable small effusion but which has decreased.   ABDOMEN AND OTHER FINDINGS: No remarkable upper abdominal findings.   BONES: No acute osseous changes.       1.  As above    Signed by: Jamie Fuller 6/8/2025 1:58 PM Dictation workstation:   [id number]    XR shoulder left 2+ views  Result Date: 6/7/2025  Interpreted By:  Jamie Fuller, STUDY: XR SHOULDER LEFT 2+ VIEWS;  6/7/2025 7:46 am   INDICATION: Signs/Symptoms:Pain.   COMPARISON: None.   ACCESSION NUMBER(S): [id number]   ORDERING CLINICIAN: BRANDAN RAGLAND   FINDINGS: Bony structures:  Intact   Joint spaces:  There appears to be mild osteophytosis at the glenoid indicating mild osteoarthritis. The acromioclavicular joint is fairly well maintained.   Soft tissues:  Unremarkable without significant edema or radiodense foreign body   Other:  Opacity left lower lung and base is probably from an effusion with atelectasis.       Mild osteoarthritis.   MACRO: None   Signed by: Jamie Fuller 6/7/2025 1:40 PM Dictation workstation:   [id number]    CT chest wo IV contrast  Result Date: 6/6/2025  Interpreted By:  Cory Riley, STUDY: CT CHEST WO IV CONTRAST;  6/6/2025 9:39 pm   INDICATION: Signs/Symptoms:gen weakness.     COMPARISON: CT chest with contrast from 02/01/2025.   ACCESSION NUMBER(S): [id number]   ORDERING CLINICIAN: SHANI WELLS   TECHNIQUE: Helical data acquisition of the chest was obtained  without IV contrast material.  Images were reformatted in axial, coronal, and sagittal planes.   FINDINGS: LUNGS AND AIRWAYS: The trachea and central airways are patent. No endobronchial lesion.   There is near complete interval resolution of consolidation previously seen in the left upper lobe, with a small region of the ground-glass and consolidative opacity along the left heart border (series 7, image [id number]), which could relate to residual pneumonia or developing scarring.   There is new/progressive consolidation in the left lower lobe with associated volume loss, likely representing combination of atelectasis and pneumonia, probably aspiration pneumonia, noting patchy opacification of the left lower lobe bronchial branches, and  fluid layering in the proximal left mainstem bronchus.   There is also patchy opacification of the right middle and lower lobe bronchial branches, with complete atelectasis of the right middle lobe. Endobronchial lesion resulting in postobstructive atelectasis cannot be excluded. Pulmonology consultation suggested.   Background of moderate emphysema. Platelike opacities in the lower lungs suggesting atelectasis or scar. Trace pleural effusions. No pneumothorax.   MEDIASTINUM AND MARISOL, LOWER NECK AND AXILLA: The visualized thyroid gland is within normal limits.   No pathologically enlarged lymph nodes are seen. Calcified right hilar node, in keeping with sequelae of chronic granulomatous disease.   Esophagus appears within normal limits as seen. Small sliding hiatal hernia.   HEART AND VESSELS: The thoracic aorta is of normal course and caliber with mild vascular calcifications.   Main pulmonary artery is dilated, suggesting pulmonary arterial hypertension.   Mild coronary artery calcifications are seen. The study is not optimized for evaluation of coronary arteries.   Heart is enlarged. Mitral annular calcification.   No evidence of pericardial effusion.   UPPER ABDOMEN: The visualized subdiaphragmatic structures demonstrate no acute findings. Cholelithiasis without evidence of acute cholecystitis.   CHEST WALL AND OSSEOUS STRUCTURES: There are no suspicious osseous lesions. Moderate to severe disc degeneration of the thoracic spine.       There is near complete interval resolution of consolidation previously seen in the left upper lobe, with a small region of the ground-glass and consolidative opacity along the left heart border (series 7, image [id number]), which could relate to residual pneumonia or developing scarring.   There is new/progressive consolidation in the left lower lobe with associated volume loss, likely representing combination of atelectasis and pneumonia, probably aspiration pneumonia, noting patchy  opacification of the left lower lobe bronchial branches, and fluid layering in the proximal left mainstem bronchus.   There is also patchy opacification of the right middle and lower lobe bronchial branches, with complete atelectasis of the right middle lobe. Endobronchial lesion resulting in postobstructive atelectasis cannot be excluded. Pulmonology consultation suggested.   Background of moderate emphysema. Platelike opacities in the lower lungs suggesting atelectasis or scar. Trace pleural effusions. No pneumothorax.   Cardiomegaly.   Cholelithiasis.   MACRO: None   Signed by: Cory Riley 6/6/2025 10:43 PM Dictation workstation:   [id number]     Assessment/Plan   Pneumonia - gram negative versus gram positive-positive streptococcal antigen, aspiration also suspected, negative nasal MRSA PCR  Chronic respiratory failure-on 4LNC at baseline, recently intubated during previous hospitalization  COPD  Abnormal CT chest-possible endobronchial lesion  Left shoulder pain - unclear etiology, x-ray is pending        Discontinue vancomycin  IV Flagyl  Continue IV cefepime  Sputum culture if able  Monitor WBC and temperature  Monitor renal function  Follow-up blood culture  Oxygen as needed  Supportive care  Pulmonary follow-up  Further recommendations based on pending work-up     Discussed with Dr. Shaver     This is a complex infectious disease issue and the following was performed today (for more details please see the above note): Management decisions reflecting the added complexity (e.g., changes in antimicrobial therapy, infection control strategies).    Unruly Shaver MD

## 2025-06-08 NOTE — PROGRESS NOTES
Subha Miller is a 65 y.o. female on day 1 of admission presenting with HAP (hospital-acquired pneumonia).      Subjective   Patient seen and examined. Awake/alert/oriented. Denies chest pain, shortness of breath, nausea, or vomiting. Reports a cough productive of yellow sputum. Does report left shoulder pain.        Objective     Last Recorded Vitals  /59 (BP Location: Left arm, Patient Position: Lying)   Pulse 75   Temp 36.4 °C (97.5 °F) (Temporal)   Resp 18   Wt 90.7 kg (200 lb)   SpO2 97%   Intake/Output last 3 Shifts:    Intake/Output Summary (Last 24 hours) at 6/8/2025 1224  Last data filed at 6/7/2025 3341  Gross per 24 hour   Intake 100 ml   Output --   Net 100 ml       Admission Weight  Weight: 90.7 kg (200 lb) (06/06/25 2028)    Daily Weight  06/06/25 : 90.7 kg (200 lb)    Image Results  XR shoulder left 2+ views  Narrative: Interpreted By:  Jamie Fuller,   STUDY:  XR SHOULDER LEFT 2+ VIEWS;  6/7/2025 7:46 am      INDICATION:  Signs/Symptoms:Pain.      COMPARISON:  None.      ACCESSION NUMBER(S):  [id number]      ORDERING CLINICIAN:  BRANDAN RAGLAND      FINDINGS:  Bony structures:  Intact      Joint spaces:  There appears to be mild osteophytosis at the glenoid  indicating mild osteoarthritis. The acromioclavicular joint is fairly  well maintained.      Soft tissues:  Unremarkable without significant edema or radiodense  foreign body      Other:  Opacity left lower lung and base is probably from an effusion  with atelectasis.      Impression: Mild osteoarthritis.      MACRO:  None      Signed by: Jmaie Fuller 6/7/2025 1:40 PM  Dictation workstation:   [id number]      Physical Exam  Vitals reviewed.   Constitutional:       Appearance: Normal appearance.   HENT:      Head: Normocephalic and atraumatic.   Eyes:      Extraocular Movements: Extraocular movements intact.      Conjunctiva/sclera: Conjunctivae normal.   Cardiovascular:      Rate and Rhythm: Normal rate and regular rhythm.   Pulmonary:       Effort: Pulmonary effort is normal.      Breath sounds: Wheezing present. No rhonchi or rales.   Abdominal:      General: Bowel sounds are normal.      Palpations: Abdomen is soft.      Tenderness: There is no abdominal tenderness.   Skin:     General: Skin is warm and dry.   Neurological:      General: No focal deficit present.      Mental Status: She is alert and oriented to person, place, and time.         Relevant Results  Lab Results   Component Value Date    GLUCOSE 122 (H) 06/08/2025    CALCIUM 9.1 06/08/2025     06/08/2025    K 4.7 06/08/2025    CO2 33 (H) 06/08/2025     06/08/2025    BUN 25 (H) 06/08/2025    CREATININE 0.51 06/08/2025      Lab Results   Component Value Date    WBC 12.6 (H) 06/08/2025    HGB 15.7 06/08/2025    HCT 50.2 (H) 06/08/2025     06/08/2025     (L) 06/08/2025    XR shoulder left 2+ views  Result Date: 6/7/2025  Mild osteoarthritis.   MACRO: None   Signed by: Jamie Fuller 6/7/2025 1:40 PM Dictation workstation:   [id number]    CT chest wo IV contrast  Result Date: 6/6/2025  There is near complete interval resolution of consolidation previously seen in the left upper lobe, with a small region of the ground-glass and consolidative opacity along the left heart border (series 7, image [id number]), which could relate to residual pneumonia or developing scarring.   There is new/progressive consolidation in the left lower lobe with associated volume loss, likely representing combination of atelectasis and pneumonia, probably aspiration pneumonia, noting patchy opacification of the left lower lobe bronchial branches, and fluid layering in the proximal left mainstem bronchus.   There is also patchy opacification of the right middle and lower lobe bronchial branches, with complete atelectasis of the right middle lobe. Endobronchial lesion resulting in postobstructive atelectasis cannot be excluded. Pulmonology consultation suggested.   Background of moderate emphysema.  Platelike opacities in the lower lungs suggesting atelectasis or scar. Trace pleural effusions. No pneumothorax.   Cardiomegaly.   Cholelithiasis.   MACRO: None   Signed by: Cory Riley 6/6/2025 10:43 PM Dictation workstation:   [id number]               CT chest, XR shoulder, CBC, BMP results reviewed, vitals reviewed.       Assessment & Plan    Bacterial PNA, gram negative vs aspiration  Strep pneumo positive  CT imaging reviewed as above  Supplemental oxygen-at her baseline 4L  continue patient's home trilogy treatment  Albuterol as needed  IV cefepime, vanco stopped, MRSA PCR negative  Follow blood culture and sputum culture  Pulmonology and ID consults - appreciate recs   Incentive spirometer  Legionella negative  SLP Evaluation requested- appreciate recs-regular, think    COPD exacerbation  Supplemental oxygen-on baseline 4L/trilogy  steroids  Duonebs/Pulmicort  Monitor pulse ox  Consult pulmonary, appreciate recommendations     Transaminitis  Unclear etiology  Avoid hepatotoxic agents  Consider right upper quadrant ultrasound if these worsen  Patient did have elevation of her LFTs in the past upon chart review  This might be chronic in nature  improved    Left shoulder pain  No obvious pathology on CT imaging  XR showed mild osteoarthritis  Analgesia as needed  Lidoderm patch    Thrombocytopenia  Will monitor platelet count closely, improving    Chronic respiratory failure with hypoxia  Patient remains on her baseline oxygen requirement at this time    Hypertension  Continue to monitor BP and adjust antihypertensive medications accordingly    Moderate aortic valve stenosis  Stable     Tobacco dependence  Smoking cessation counseling  NRT ordered    Debility  Related to recent hospitalization/illness   Consult PT/OT   Agreeable to considering placement if recommended     Plan  Continue IV ATB  Follow cultures  ID and pulm following, appreciate recs  IV steroids-taper down from 40mg to 30mg BID-to oral on  discharge  Supplemental oxygen-at baseline  DVT prophylaxis  PT/OT  CBC and BMP in AM  Discharge planning to SNF when medically stable           AILYN Ibanze-CNP

## 2025-06-08 NOTE — PROGRESS NOTES
"Pulmonary Daily Progress Note   Subjective    Subha Miller is a 65 y.o. year old female patient known with  CHF, COPD on 4 L baseline Recent hospitalization with intubation, diabetes, hypertension, CVA. admitted on 6/6/2025 with following left shoulder pain and generalized bodyaches pulmonary was consulted for endobronchial lesion.     Interval History:  Feels much better today, wore bipap overnight    Meds    Scheduled medications  Scheduled Medications[1]  Continuous medications  Continuous Medications[2]  PRN medications  PRN Medications[3]     Objective    Blood pressure 105/59, pulse 75, temperature 36.4 °C (97.5 °F), temperature source Temporal, resp. rate 18, height 1.6 m (5' 3\"), weight 90.7 kg (200 lb), SpO2 97%.   Physical Exam   GENERAL: Ill-appearing. well nourished. No respiratory distress  HEAD/SINUSES: 4 L nasal cannula  LUNGS: Diffuse rhonchi and expiratory wheezing throughout bilateral lung fields  CARDIAC: normal S1 and S2; no gallops, rubs or murmurs. Regular rate and rhythm  EXTREMITIES: No edema  NEURO: Forgetful but alert and oriented  SKIN: Defer  PSYCH: Normal affect    Intake/Output Summary (Last 24 hours) at 6/8/2025 0900  Last data filed at 6/7/2025 2251  Gross per 24 hour   Intake 340 ml   Output --   Net 340 ml     Labs:   Results from last 72 hours   Lab Units 06/08/25  0727 06/07/25  0553 06/06/25  2101   SODIUM mmol/L 137 139 139   POTASSIUM mmol/L 4.7 4.0 4.4   CHLORIDE mmol/L 100 96* 95*   CO2 mmol/L 33* 39* 37*   BUN mg/dL 25* 28* 33*   CREATININE mg/dL 0.51 0.59 0.96   GLUCOSE mg/dL 122* 114* 120*   CALCIUM mg/dL 9.1 8.8 9.3   ANION GAP mmol/L 9* 8* 11   EGFR mL/min/1.73m*2 >90 >90 65      Results from last 72 hours   Lab Units 06/08/25  0804 06/07/25  0553 06/06/25  2101   WBC AUTO x10*3/uL 12.6* 11.8* 12.8*   HEMOGLOBIN g/dL 15.7 15.3 16.2*   HEMATOCRIT % 50.2* 49.6* 51.9*   PLATELETS AUTO x10*3/uL 135* 113* 118*   NEUTROS PCT AUTO %  --  72.9 75.7   LYMPHS PCT AUTO %  --  17.8 " 13.3   MONOS PCT AUTO %  --  7.9 9.9   EOS PCT AUTO %  --  0.5 0.1      Results from last 72 hours   Lab Units 06/07/25  1417   POCT PH, ARTERIAL pH 7.47*   POCT PCO2, ARTERIAL mm Hg 57*   POCT PO2, ARTERIAL mm Hg 59*   POCT SO2, ARTERIAL % 93*          Micro/ID:   Lab Results   Component Value Date    URINECULTURE NO SIGNIFICANT GROWTH. 09/19/2023    BLOODCULT Loaded on Instrument - Culture in progress 06/07/2025     Summary of key imaging results from the last 24 hours  TTE -  1. Left ventricular ejection fraction is normal by visual estimate at 55-60%.   2. Spectral Doppler shows a Grade I (impaired relaxation pattern) of left ventricular diastolic filling with normal left atrial filling pressure.   3. There is normal right ventricular global systolic function.   4. The left atrial size is moderately dilated.   5. There is moderate calcification of the anterior and posterior mitral valve leaflets.   6. The doppler estimated RVSP is within normal limits with a right ventricular systolic pressure of 29 mmHg.   7. Mild to moderate aortic valve stenosis. The peak and mean gradients are 42 mmHg and 17 mmHg respectively.   8. There is moderate aortic valve cusp calcification.   9. Mild aortic valve regurgitation.  10. The inferior vena cava appears mildly dilate  CT chest There is near complete interval resolution of consolidation  previously seen in the left upper lobe, with a small region of the  ground-glass and consolidative opacity along the left heart border  (series 7, image [id number]), which could relate to residual pneumonia  or developing scarring.      There is new/progressive consolidation in the left lower lobe with  associated volume loss, likely representing combination of  atelectasis and pneumonia, probably aspiration pneumonia, noting  patchy opacification of the left lower lobe bronchial branches, and  fluid layering in the proximal left mainstem bronchus.      There is also patchy opacification of the  right middle and lower lobe  bronchial branches, with complete atelectasis of the right middle  lobe. Endobronchial lesion resulting in postobstructive atelectasis  cannot be excluded. Pulmonology consultation suggested.      Background of moderate emphysema. Platelike opacities in the lower  lungs suggesting atelectasis or scar. Trace pleural effusions. No  pneumothorax.      Cardiomegaly.      Cholelithiasis    Impression   Subha Miller is a 65 y.o. year old female patient is being seen by the pulmonary service for   Acute on chronic respiratory failure with hypoxia and hypercapnia  Pneumonia-consistent with aspiration pneumonia improving from prior CT chest  Acute exacerbation of COPD -on 4 L baseline at home states she follows with a pulmonologist but she is unsure of his name              Question endobronchial lesion - RML collapse, with air bronchograms low suspicion for endobronchial lesion - bronchopulmonary hygiene, IS and cough and deep breath. CT chest in 8 weeks    Recommendations   As follows:  Continue bronchopulmonary hygiene with incentive spirometry and Acapella, add albuterol and 3% nebulized saline to aid in airway clearance  Maintain pulse oximetry greater than 90% patient's baseline is 4 L  Continue steroids plan for taper  Sputum culture if obtainable  Urine Legionella Streptococcus pneumonia antigens MRSA negative Vanco stopped and blood cultures pending  Continue cefepime and Flagyl stopped Vanco  Check procalcitonin level - recently treated for above pneumonia with Zosyn - Ct chest with improvement from 5/31 of note patient has two charts that are marked for merge  Nicotine patch now and on discharge  Follow-up CT chest in 6 to 8 weeks to assure resolution of pneumonia  Referral placed to pulmonary follow-up in 2 weeks  Smoking cessation  Bipap 15/5 on discharge to [facility]     AILYN Garcia-CNP   06/08/25 at 9:00 AM     -Only the Medical problems listed under impression were  addressed today.   -Please contact primary team for all other concerns and medical problem  -Thank you for your consult       Disclaimer: Documentation completed with the information available at the time of input. Parts of this note may have been scribed or generated using voice dictation software, Dragon.  Homophonic errors may exist.  Please contact me directly if clarification is needed. The times in the chart may not be reflective of actual patient care times, interventions, or procedures. Documentation occurs after the physical care of the patient.           [1] albuterol, 2.5 mg, nebulization, TID  cefepime, 2 g, intravenous, q8h  tiotropium, 2 puff, inhalation, Daily   And  fluticasone furoate-vilanteroL, 1 puff, inhalation, Daily  heparin (porcine), 5,000 Units, subcutaneous, q12h  lidocaine, 1 patch, transdermal, Daily  methylPREDNISolone sodium succinate (PF), 40 mg, intravenous, q12h  metroNIDAZOLE, 500 mg, intravenous, q8h  nicotine, 1 patch, transdermal, Daily  oxygen, , inhalation, Continuous - Inhalation  pantoprazole, 40 mg, oral, Daily  sodium chloride, 3 mL, nebulization, TID  spironolactone, 12.5 mg, oral, Daily  [2]    [3] PRN medications: acetaminophen **OR** acetaminophen **OR** acetaminophen, albuterol, benzocaine-menthol, benzonatate, calcium carbonate, dextromethorphan-guaifenesin, gabapentin, guaiFENesin, HYDROcodone-acetaminophen, melatonin, ondansetron ODT **OR** ondansetron, polyethylene glycol

## 2025-06-09 ENCOUNTER — APPOINTMENT (OUTPATIENT)
Dept: CARDIOLOGY | Facility: HOSPITAL | Age: 65
End: 2025-06-09
Payer: COMMERCIAL

## 2025-06-09 LAB
ANION GAP SERPL CALCULATED.3IONS-SCNC: 9 MMOL/L (ref 10–20)
BUN SERPL-MCNC: 25 MG/DL (ref 6–23)
CALCIUM SERPL-MCNC: 8.9 MG/DL (ref 8.6–10.3)
CHLORIDE SERPL-SCNC: 100 MMOL/L (ref 98–107)
CO2 SERPL-SCNC: 33 MMOL/L (ref 21–32)
CREAT SERPL-MCNC: 0.55 MG/DL (ref 0.5–1.05)
EGFRCR SERPLBLD CKD-EPI 2021: >90 ML/MIN/1.73M*2
ERYTHROCYTE [DISTWIDTH] IN BLOOD BY AUTOMATED COUNT: 13.5 % (ref 11.5–14.5)
GLUCOSE BLD MANUAL STRIP-MCNC: 152 MG/DL (ref 74–99)
GLUCOSE BLD MANUAL STRIP-MCNC: 236 MG/DL (ref 74–99)
GLUCOSE SERPL-MCNC: 172 MG/DL (ref 74–99)
HCT VFR BLD AUTO: 47.7 % (ref 36–46)
HGB BLD-MCNC: 14.7 G/DL (ref 12–16)
MCH RBC QN AUTO: 31.1 PG (ref 26–34)
MCHC RBC AUTO-ENTMCNC: 30.8 G/DL (ref 32–36)
MCV RBC AUTO: 101 FL (ref 80–100)
NRBC BLD-RTO: 0 /100 WBCS (ref 0–0)
PLATELET # BLD AUTO: 119 X10*3/UL (ref 150–450)
POTASSIUM SERPL-SCNC: 4.6 MMOL/L (ref 3.5–5.3)
PROCALCITONIN SERPL-MCNC: 0.06 NG/ML
RBC # BLD AUTO: 4.72 X10*6/UL (ref 4–5.2)
SODIUM SERPL-SCNC: 137 MMOL/L (ref 136–145)
STAPHYLOCOCCUS SPEC CULT: NORMAL
WBC # BLD AUTO: 12.8 X10*3/UL (ref 4.4–11.3)

## 2025-06-09 PROCEDURE — S4991 NICOTINE PATCH NONLEGEND: HCPCS | Performed by: INTERNAL MEDICINE

## 2025-06-09 PROCEDURE — 2500000005 HC RX 250 GENERAL PHARMACY W/O HCPCS

## 2025-06-09 PROCEDURE — 97535 SELF CARE MNGMENT TRAINING: CPT | Mod: GO,CO

## 2025-06-09 PROCEDURE — 80048 BASIC METABOLIC PNL TOTAL CA: CPT | Performed by: NURSE PRACTITIONER

## 2025-06-09 PROCEDURE — 1200000002 HC GENERAL ROOM WITH TELEMETRY DAILY

## 2025-06-09 PROCEDURE — 94640 AIRWAY INHALATION TREATMENT: CPT

## 2025-06-09 PROCEDURE — 2500000004 HC RX 250 GENERAL PHARMACY W/ HCPCS (ALT 636 FOR OP/ED)

## 2025-06-09 PROCEDURE — 2500000005 HC RX 250 GENERAL PHARMACY W/O HCPCS: Performed by: INTERNAL MEDICINE

## 2025-06-09 PROCEDURE — 94669 MECHANICAL CHEST WALL OSCILL: CPT

## 2025-06-09 PROCEDURE — 85027 COMPLETE CBC AUTOMATED: CPT | Performed by: NURSE PRACTITIONER

## 2025-06-09 PROCEDURE — 97530 THERAPEUTIC ACTIVITIES: CPT | Mod: GO,CO

## 2025-06-09 PROCEDURE — 2500000001 HC RX 250 WO HCPCS SELF ADMINISTERED DRUGS (ALT 637 FOR MEDICARE OP): Performed by: INTERNAL MEDICINE

## 2025-06-09 PROCEDURE — 2500000002 HC RX 250 W HCPCS SELF ADMINISTERED DRUGS (ALT 637 FOR MEDICARE OP, ALT 636 FOR OP/ED): Performed by: INTERNAL MEDICINE

## 2025-06-09 PROCEDURE — 2500000004 HC RX 250 GENERAL PHARMACY W/ HCPCS (ALT 636 FOR OP/ED): Performed by: INTERNAL MEDICINE

## 2025-06-09 PROCEDURE — 82947 ASSAY GLUCOSE BLOOD QUANT: CPT

## 2025-06-09 PROCEDURE — 93005 ELECTROCARDIOGRAM TRACING: CPT

## 2025-06-09 PROCEDURE — 99232 SBSQ HOSP IP/OBS MODERATE 35: CPT | Performed by: NURSE PRACTITIONER

## 2025-06-09 PROCEDURE — 2500000004 HC RX 250 GENERAL PHARMACY W/ HCPCS (ALT 636 FOR OP/ED): Performed by: NURSE PRACTITIONER

## 2025-06-09 PROCEDURE — 94668 MNPJ CHEST WALL SBSQ: CPT

## 2025-06-09 PROCEDURE — 36415 COLL VENOUS BLD VENIPUNCTURE: CPT | Performed by: NURSE PRACTITIONER

## 2025-06-09 PROCEDURE — 2500000002 HC RX 250 W HCPCS SELF ADMINISTERED DRUGS (ALT 637 FOR MEDICARE OP, ALT 636 FOR OP/ED)

## 2025-06-09 PROCEDURE — 2500000001 HC RX 250 WO HCPCS SELF ADMINISTERED DRUGS (ALT 637 FOR MEDICARE OP): Performed by: NURSE PRACTITIONER

## 2025-06-09 PROCEDURE — 87205 SMEAR GRAM STAIN: CPT | Mod: TRILAB | Performed by: INTERNAL MEDICINE

## 2025-06-09 PROCEDURE — 9420000001 HC RT PATIENT EDUCATION 5 MIN

## 2025-06-09 RX ORDER — MEROPENEM AND SODIUM CHLORIDE 1 G/50ML
1 INJECTION, SOLUTION INTRAVENOUS EVERY 8 HOURS
Status: DISCONTINUED | OUTPATIENT
Start: 2025-06-09 | End: 2025-06-11 | Stop reason: HOSPADM

## 2025-06-09 RX ADMIN — HYDROCODONE BITARTRATE AND ACETAMINOPHEN 1 TABLET: 5; 325 TABLET ORAL at 20:04

## 2025-06-09 RX ADMIN — Medication 4 L/MIN: at 17:32

## 2025-06-09 RX ADMIN — NICOTINE 1 PATCH: 14 PATCH, EXTENDED RELEASE TRANSDERMAL at 09:22

## 2025-06-09 RX ADMIN — SPIRONOLACTONE 12.5 MG: 25 TABLET ORAL at 09:23

## 2025-06-09 RX ADMIN — HYDROCODONE BITARTRATE AND ACETAMINOPHEN 1 TABLET: 5; 325 TABLET ORAL at 14:01

## 2025-06-09 RX ADMIN — HEPARIN SODIUM 5000 UNITS: 5000 INJECTION, SOLUTION INTRAVENOUS; SUBCUTANEOUS at 05:13

## 2025-06-09 RX ADMIN — METRONIDAZOLE 500 MG: 500 INJECTION, SOLUTION INTRAVENOUS at 11:25

## 2025-06-09 RX ADMIN — SODIUM CHLORIDE SOLN NEBU 3% 3 ML: 3 NEBU SOLN at 17:31

## 2025-06-09 RX ADMIN — CEFEPIME HYDROCHLORIDE 2 G: 2 INJECTION, SOLUTION INTRAVENOUS at 09:23

## 2025-06-09 RX ADMIN — HEPARIN SODIUM 5000 UNITS: 5000 INJECTION, SOLUTION INTRAVENOUS; SUBCUTANEOUS at 18:19

## 2025-06-09 RX ADMIN — METHYLPREDNISOLONE SODIUM SUCCINATE 30 MG: 40 INJECTION, POWDER, FOR SOLUTION INTRAMUSCULAR; INTRAVENOUS at 22:11

## 2025-06-09 RX ADMIN — CYCLOBENZAPRINE 5 MG: 10 TABLET, FILM COATED ORAL at 22:19

## 2025-06-09 RX ADMIN — MEROPENEM AND SODIUM CHLORIDE 1 G: 1 INJECTION, SOLUTION INTRAVENOUS at 18:18

## 2025-06-09 RX ADMIN — METHYLPREDNISOLONE SODIUM SUCCINATE 30 MG: 40 INJECTION, POWDER, FOR SOLUTION INTRAMUSCULAR; INTRAVENOUS at 09:22

## 2025-06-09 RX ADMIN — SODIUM CHLORIDE SOLN NEBU 3% 3 ML: 3 NEBU SOLN at 11:17

## 2025-06-09 RX ADMIN — Medication 4 L/MIN: at 08:20

## 2025-06-09 RX ADMIN — ALBUTEROL SULFATE 2.5 MG: 2.5 SOLUTION RESPIRATORY (INHALATION) at 17:31

## 2025-06-09 RX ADMIN — ALBUTEROL SULFATE 2.5 MG: 2.5 SOLUTION RESPIRATORY (INHALATION) at 11:17

## 2025-06-09 RX ADMIN — ALBUTEROL SULFATE 2.5 MG: 2.5 SOLUTION RESPIRATORY (INHALATION) at 08:14

## 2025-06-09 RX ADMIN — FLUTICASONE FUROATE AND VILANTEROL TRIFENATATE 1 PUFF: 100; 25 POWDER RESPIRATORY (INHALATION) at 08:16

## 2025-06-09 RX ADMIN — TIOTROPIUM BROMIDE INHALATION SPRAY 2 PUFF: 3.12 SPRAY, METERED RESPIRATORY (INHALATION) at 08:15

## 2025-06-09 RX ADMIN — PANTOPRAZOLE SODIUM 40 MG: 40 TABLET, DELAYED RELEASE ORAL at 09:23

## 2025-06-09 RX ADMIN — METRONIDAZOLE 500 MG: 500 INJECTION, SOLUTION INTRAVENOUS at 04:21

## 2025-06-09 RX ADMIN — SODIUM CHLORIDE SOLN NEBU 3% 3 ML: 3 NEBU SOLN at 08:14

## 2025-06-09 RX ADMIN — LIDOCAINE 4% 1 PATCH: 40 PATCH TOPICAL at 09:22

## 2025-06-09 RX ADMIN — CEFEPIME HYDROCHLORIDE 2 G: 2 INJECTION, SOLUTION INTRAVENOUS at 01:23

## 2025-06-09 SDOH — SOCIAL STABILITY: SOCIAL NETWORK
DO YOU BELONG TO ANY CLUBS OR ORGANIZATIONS SUCH AS CHURCH GROUPS, UNIONS, FRATERNAL OR ATHLETIC GROUPS, OR SCHOOL GROUPS?: NO

## 2025-06-09 SDOH — ECONOMIC STABILITY: HOUSING INSECURITY: IN THE PAST 12 MONTHS, HOW MANY TIMES HAVE YOU MOVED WHERE YOU WERE LIVING?: 0

## 2025-06-09 SDOH — SOCIAL STABILITY: SOCIAL INSECURITY: WITHIN THE LAST YEAR, HAVE YOU BEEN AFRAID OF YOUR PARTNER OR EX-PARTNER?: NO

## 2025-06-09 SDOH — SOCIAL STABILITY: SOCIAL INSECURITY: ARE YOU MARRIED, WIDOWED, DIVORCED, SEPARATED, NEVER MARRIED, OR LIVING WITH A PARTNER?: MARRIED

## 2025-06-09 SDOH — HEALTH STABILITY: PHYSICAL HEALTH: ON AVERAGE, HOW MANY DAYS PER WEEK DO YOU ENGAGE IN MODERATE TO STRENUOUS EXERCISE (LIKE A BRISK WALK)?: 0 DAYS

## 2025-06-09 SDOH — HEALTH STABILITY: MENTAL HEALTH: HOW OFTEN DO YOU HAVE A DRINK CONTAINING ALCOHOL?: NEVER

## 2025-06-09 SDOH — ECONOMIC STABILITY: FOOD INSECURITY: WITHIN THE PAST 12 MONTHS, THE FOOD YOU BOUGHT JUST DIDN'T LAST AND YOU DIDN'T HAVE MONEY TO GET MORE.: OFTEN TRUE

## 2025-06-09 SDOH — ECONOMIC STABILITY: HOUSING INSECURITY: IN THE LAST 12 MONTHS, WAS THERE A TIME WHEN YOU WERE NOT ABLE TO PAY THE MORTGAGE OR RENT ON TIME?: NO

## 2025-06-09 SDOH — HEALTH STABILITY: MENTAL HEALTH: HOW MANY DRINKS CONTAINING ALCOHOL DO YOU HAVE ON A TYPICAL DAY WHEN YOU ARE DRINKING?: PATIENT DOES NOT DRINK

## 2025-06-09 SDOH — ECONOMIC STABILITY: INCOME INSECURITY: IN THE PAST 12 MONTHS HAS THE ELECTRIC, GAS, OIL, OR WATER COMPANY THREATENED TO SHUT OFF SERVICES IN YOUR HOME?: NO

## 2025-06-09 SDOH — HEALTH STABILITY: MENTAL HEALTH: HOW OFTEN DO YOU HAVE SIX OR MORE DRINKS ON ONE OCCASION?: NEVER

## 2025-06-09 SDOH — ECONOMIC STABILITY: HOUSING INSECURITY: AT ANY TIME IN THE PAST 12 MONTHS, WERE YOU HOMELESS OR LIVING IN A SHELTER (INCLUDING NOW)?: NO

## 2025-06-09 SDOH — SOCIAL STABILITY: SOCIAL INSECURITY: WITHIN THE LAST YEAR, HAVE YOU BEEN HUMILIATED OR EMOTIONALLY ABUSED IN OTHER WAYS BY YOUR PARTNER OR EX-PARTNER?: NO

## 2025-06-09 SDOH — ECONOMIC STABILITY: FOOD INSECURITY: WITHIN THE PAST 12 MONTHS, YOU WORRIED THAT YOUR FOOD WOULD RUN OUT BEFORE YOU GOT THE MONEY TO BUY MORE.: OFTEN TRUE

## 2025-06-09 SDOH — ECONOMIC STABILITY: FOOD INSECURITY: HOW HARD IS IT FOR YOU TO PAY FOR THE VERY BASICS LIKE FOOD, HOUSING, MEDICAL CARE, AND HEATING?: SOMEWHAT HARD

## 2025-06-09 SDOH — HEALTH STABILITY: MENTAL HEALTH
DO YOU FEEL STRESS - TENSE, RESTLESS, NERVOUS, OR ANXIOUS, OR UNABLE TO SLEEP AT NIGHT BECAUSE YOUR MIND IS TROUBLED ALL THE TIME - THESE DAYS?: TO SOME EXTENT

## 2025-06-09 SDOH — HEALTH STABILITY: PHYSICAL HEALTH: ON AVERAGE, HOW MANY MINUTES DO YOU ENGAGE IN EXERCISE AT THIS LEVEL?: 0 MIN

## 2025-06-09 SDOH — SOCIAL STABILITY: SOCIAL NETWORK: HOW OFTEN DO YOU ATTEND MEETINGS OF THE CLUBS OR ORGANIZATIONS YOU BELONG TO?: NEVER

## 2025-06-09 SDOH — ECONOMIC STABILITY: TRANSPORTATION INSECURITY: IN THE PAST 12 MONTHS, HAS LACK OF TRANSPORTATION KEPT YOU FROM MEDICAL APPOINTMENTS OR FROM GETTING MEDICATIONS?: NO

## 2025-06-09 ASSESSMENT — COGNITIVE AND FUNCTIONAL STATUS - GENERAL
EATING MEALS: A LITTLE
DRESSING REGULAR UPPER BODY CLOTHING: A LITTLE
HELP NEEDED FOR BATHING: A LOT
DRESSING REGULAR LOWER BODY CLOTHING: A LOT
DAILY ACTIVITIY SCORE: 14
TOILETING: TOTAL
PERSONAL GROOMING: A LITTLE

## 2025-06-09 ASSESSMENT — PAIN - FUNCTIONAL ASSESSMENT
PAIN_FUNCTIONAL_ASSESSMENT: 0-10

## 2025-06-09 ASSESSMENT — PAIN SCALES - GENERAL
PAINLEVEL_OUTOF10: 0 - NO PAIN
PAINLEVEL_OUTOF10: 7
PAINLEVEL_OUTOF10: 8
PAINLEVEL_OUTOF10: 8
PAINLEVEL_OUTOF10: 7
PAINLEVEL_OUTOF10: 8
PAINLEVEL_OUTOF10: 7

## 2025-06-09 ASSESSMENT — ACTIVITIES OF DAILY LIVING (ADL)
BATHING_EQUIPMENT_NEEDED: LONG-HANDLED SPONGE
LACK_OF_TRANSPORTATION: NO
LACK_OF_TRANSPORTATION: NO
BATHING_WHERE_ASSESSED: EDGE OF BED;OTHER (COMMENT)
BATHING_LEVEL_OF_ASSISTANCE: MODERATE ASSISTANCE
HOME_MANAGEMENT_TIME_ENTRY: 24

## 2025-06-09 ASSESSMENT — PAIN DESCRIPTION - ORIENTATION: ORIENTATION: LEFT

## 2025-06-09 ASSESSMENT — LIFESTYLE VARIABLES
SKIP TO QUESTIONS 9-10: 1
AUDIT-C TOTAL SCORE: 0

## 2025-06-09 ASSESSMENT — PAIN DESCRIPTION - LOCATION: LOCATION: SHOULDER

## 2025-06-09 ASSESSMENT — PAIN SCALES - WONG BAKER: WONGBAKER_NUMERICALRESPONSE: NO HURT

## 2025-06-09 NOTE — PROGRESS NOTES
06/09/25 1528   [facility] Disability Status   Are you deaf or do you have serious difficulty hearing? N   Are you blind or do you have serious difficulty seeing, even when wearing glasses? N   Because of a physical, mental, or emotional condition, do you have serious difficulty concentrating, remembering, or making decisions? (5 years old or older) N   Do you have serious difficulty walking or climbing stairs? Y   Do you have serious difficulty dressing or bathing? Y   Because of a physical, mental, or emotional condition, do you have serious difficulty doing errands alone such as visiting the doctor? Y

## 2025-06-09 NOTE — PROGRESS NOTES
"Subha Miller is a 65 y.o. female on day 2 of admission presenting with HAP (hospital-acquired pneumonia).    Subjective   Pt seen and examined with nurse present.  Pt is complaining of pain in her LT shoulder.         Objective     Physical Exam  Constitutional:       General: She is not in acute distress.     Appearance: She is ill-appearing. She is not toxic-appearing.   Cardiovascular:      Rate and Rhythm: Normal rate and regular rhythm.      Pulses: Normal pulses.      Heart sounds: Normal heart sounds.   Pulmonary:      Effort: Pulmonary effort is normal. No respiratory distress.      Breath sounds: Normal breath sounds. No wheezing, rhonchi or rales.   Abdominal:      General: Bowel sounds are normal.      Palpations: Abdomen is soft.   Musculoskeletal:      Right lower leg: No edema.      Left lower leg: No edema.   Skin:     General: Skin is warm and dry.   Neurological:      General: No focal deficit present.      Mental Status: She is alert and oriented to person, place, and time.         Last Recorded Vitals  Blood pressure 108/56, pulse 87, temperature 36.6 °C (97.9 °F), temperature source Temporal, resp. rate 16, height 1.6 m (5' 3\"), weight 90.7 kg (200 lb), SpO2 96%.  Intake/Output last 3 Shifts:  I/O last 3 completed shifts:  In: 100 (1.1 mL/kg) [IV Piggyback:100]  Out: 600 (6.6 mL/kg) [Urine:600 (0.2 mL/kg/hr)]  Weight: 90.7 kg     Relevant Results                 CT chest  Impression:     There is near complete interval resolution of consolidation  previously seen in the left upper lobe, with a small region of the  ground-glass and consolidative opacity along the left heart border  (series 7, image [id number]), which could relate to residual pneumonia  or developing scarring.      There is new/progressive consolidation in the left lower lobe with  associated volume loss, likely representing combination of  atelectasis and pneumonia, probably aspiration pneumonia, noting  patchy opacification of the " left lower lobe bronchial branches, and  fluid layering in the proximal left mainstem bronchus.      There is also patchy opacification of the right middle and lower lobe  bronchial branches, with complete atelectasis of the right middle  lobe. Endobronchial lesion resulting in postobstructive atelectasis  cannot be excluded. Pulmonology consultation suggested.      Background of moderate emphysema. Platelike opacities in the lower  lungs suggesting atelectasis or scar. Trace pleural effusions. No  pneumothorax.      Cardiomegaly.      Cholelithiasis.                Assessment and Plan     PNA  -likely aspiration  -pulmology following   -IV cefepime, IV flagyl  -sputum cx if able  -ST recommend regular diet with thin liquids  -ID following    COPD with acute exacerbation  -pulmonology following   -IBD/ICS  -IV steroids    Tobacco Use   -encourage cessation  -Nicotine patch    LT shoulder pain  -Xray shows OA  -lidocaine patch    Disposition  Above plan discussed with pt               Anna Lambert, APRN-CNP

## 2025-06-09 NOTE — CARE PLAN
The patient's goals for the shift include      The clinical goals for the shift include The patient's resp status will remain stable during this shift    Over the shift, the patient did not make progress toward the following goals. Barriers to progression include . Recommendations to address these barriers include   Problem: Pain - Adult  Goal: Verbalizes/displays adequate comfort level or baseline comfort level  Outcome: Progressing     Problem: Safety - Adult  Goal: Free from fall injury  Outcome: Progressing     Problem: Discharge Planning  Goal: Discharge to home or other facility with appropriate resources  Outcome: Progressing     Problem: Chronic Conditions and Co-morbidities  Goal: Patient's chronic conditions and co-morbidity symptoms are monitored and maintained or improved  Outcome: Progressing     Problem: Nutrition  Goal: Nutrient intake appropriate for maintaining nutritional needs  Outcome: Progressing     Problem: Skin  Goal: Decreased wound size/increased tissue granulation at next dressing change  Outcome: Progressing  Goal: Participates in plan/prevention/treatment measures  Outcome: Progressing  Goal: Prevent/manage excess moisture  Outcome: Progressing  Goal: Prevent/minimize sheer/friction injuries  Outcome: Progressing  Goal: Promote/optimize nutrition  Outcome: Progressing  Goal: Promote skin healing  Outcome: Progressing     Problem: Pain  Goal: Takes deep breaths with improved pain control throughout the shift  Outcome: Progressing  Goal: Turns in bed with improved pain control throughout the shift  Outcome: Progressing  Goal: Walks with improved pain control throughout the shift  Outcome: Progressing  Goal: Performs ADL's with improved pain control throughout shift  Outcome: Progressing  Goal: Participates in PT with improved pain control throughout the shift  Outcome: Progressing  Goal: Free from opioid side effects throughout the shift  Outcome: Progressing  Goal: Free from acute  confusion related to pain meds throughout the shift  Outcome: Progressing     Problem: Fall/Injury  Goal: Not fall by end of shift  Outcome: Progressing  Goal: Be free from injury by end of the shift  Outcome: Progressing  Goal: Verbalize understanding of personal risk factors for fall in the hospital  Outcome: Progressing  Goal: Verbalize understanding of risk factor reduction measures to prevent injury from fall in the home  Outcome: Progressing  Goal: Use assistive devices by end of the shift  Outcome: Progressing  Goal: Pace activities to prevent fatigue by end of the shift  Outcome: Progressing     Problem: Pain  Goal: STG - Patient will verbalize an acceptable level of pain  Outcome: Progressing   .

## 2025-06-09 NOTE — PROGRESS NOTES
Subha Miller is a 65 y.o. female on day 2 of admission presenting with HAP (hospital-acquired pneumonia).    Subjective   Interval History:   Feeling breathing better  Intermittent productive cough  No shortness of breath at rest  No chest pain  On low-flow oxygen  No nausea vomiting or diarrhea  Left shoulder pain, worse with movement -xray showed mild osteoarthritis       Objective   Range of Vitals (last 24 hours)  Heart Rate:  [67-81]   Temp:  [36.1 °C (97 °F)-37 °C (98.6 °F)]   Resp:  [15-21]   BP: (109-139)/(61-75)   SpO2:  [92 %-99 %]   Daily Weight  06/06/25 : 90.7 kg (200 lb)    Body mass index is 35.43 kg/m².    Physical Exam  Constitutional:       Appearance: Normal appearance.   HENT:      Head: Normocephalic and atraumatic.   Eyes:      Extraocular Movements: Extraocular movements intact.      Conjunctiva/sclera: Conjunctivae normal.   Cardiovascular:      Rate and Rhythm: Normal rate.   Pulmonary:      Effort: Pulmonary effort is normal.      Breath sounds: Rhonchi present.   Abdominal:      General: Bowel sounds are normal.      Palpations: Abdomen is soft.      Tenderness: There is no abdominal tenderness.   Musculoskeletal:         General: No swelling. Normal range of motion.      Cervical back: Normal range of motion and neck supple.      Right lower leg: No edema.      Left lower leg: No edema.      Comments: Left shoulder tenderness with movement  Skin:     General: Skin is warm and dry.   Neurological:      General: No focal deficit present.      Mental Status: She is alert and oriented to person, place, and time.   Psychiatric:         Mood and Affect: Mood normal.         Behavior: Behavior normal.     Antibiotics  cefepime - 2 gram/50 mL  metroNIDAZOLE - 500 mg/100 mL    Relevant Results  Labs  Results from last 72 hours   Lab Units 06/09/25  0700 06/08/25  0804 06/07/25  0553 06/06/25  2101   WBC AUTO x10*3/uL 12.8* 12.6* 11.8* 12.8*   HEMOGLOBIN g/dL 14.7 15.7 15.3 16.2*   HEMATOCRIT % 47.7*  50.2* 49.6* 51.9*   PLATELETS AUTO x10*3/uL 119* 135* 113* 118*   NEUTROS PCT AUTO %  --   --  72.9 75.7   LYMPHS PCT AUTO %  --   --  17.8 13.3   MONOS PCT AUTO %  --   --  7.9 9.9   EOS PCT AUTO %  --   --  0.5 0.1     Results from last 72 hours   Lab Units 06/09/25  0700 06/08/25  0727 06/07/25  0553   SODIUM mmol/L 137 137 139   POTASSIUM mmol/L 4.6 4.7 4.0   CHLORIDE mmol/L 100 100 96*   CO2 mmol/L 33* 33* 39*   BUN mg/dL 25* 25* 28*   CREATININE mg/dL 0.55 0.51 0.59   GLUCOSE mg/dL 172* 122* 114*   CALCIUM mg/dL 8.9 9.1 8.8   ANION GAP mmol/L 9* 9* 8*   EGFR mL/min/1.73m*2 >90 >90 >90     Results from last 72 hours   Lab Units 06/07/25  0553 06/06/25  2101   ALK PHOS U/L 48 47   BILIRUBIN TOTAL mg/dL 0.7 1.1   PROTEIN TOTAL g/dL 5.9* 6.8   ALT U/L 96* 123*   AST U/L 32 49*   ALBUMIN g/dL 3.3* 3.7     Estimated Creatinine Clearance: 106.1 mL/min (by C-G formula based on SCr of 0.55 mg/dL).  C-Reactive Protein   Date Value Ref Range Status   02/02/2025 1.75 (H) <1.00 mg/dL Final   01/02/2024 0.66 <1.00 mg/dL Final   01/01/2024 0.71 <1.00 mg/dL Final     Microbiology  Reviewed-positive urine streptococcal antigen, blood culture pending, negative is MRSA PCR  Imaging  XR chest 1 view  Result Date: 6/8/2025  Interpreted By:  Jamie Fuller, STUDY: XR CHEST 1 VIEW;  6/7/2025 4:15 pm   INDICATION: Signs/Symptoms:baseline LLL consolidation.   COMPARISON: 02/03/2025   ACCESSION NUMBER(S): [id number]   ORDERING CLINICIAN: MARYSE RAMIREZ   FINDINGS: CARDIOMEDIASTINAL SILHOUETTE AND VASCULATURE:   Cardiac size:  Within normal limits considering patient rotation. Aortic shadow:  Within normal limits.   Mediastinal contours: Within normal limits.   Pulmonary vasculature:  The central vasculature is unremarkable   LUNGS: Persistent left basilar retrocardiac opacification probably from consolidated infiltrate. There is also a probable small effusion but which has decreased.   ABDOMEN AND OTHER FINDINGS: No remarkable  upper abdominal findings.   BONES: No acute osseous changes.       1.  As above   Signed by: Jamie Fuller 6/8/2025 1:58 PM Dictation workstation:   [id number]    XR shoulder left 2+ views  Result Date: 6/7/2025  Interpreted By:  Jamie Fuller, STUDY: XR SHOULDER LEFT 2+ VIEWS;  6/7/2025 7:46 am   INDICATION: Signs/Symptoms:Pain.   COMPARISON: None.   ACCESSION NUMBER(S): [id number]   ORDERING CLINICIAN: BRANDAN RAGLAND   FINDINGS: Bony structures:  Intact   Joint spaces:  There appears to be mild osteophytosis at the glenoid indicating mild osteoarthritis. The acromioclavicular joint is fairly well maintained.   Soft tissues:  Unremarkable without significant edema or radiodense foreign body   Other:  Opacity left lower lung and base is probably from an effusion with atelectasis.       Mild osteoarthritis.   MACRO: None   Signed by: Jamie Fuller 6/7/2025 1:40 PM Dictation workstation:   [id number]    CT chest wo IV contrast  Result Date: 6/6/2025  Interpreted By:  Cory Riley, STUDY: CT CHEST WO IV CONTRAST;  6/6/2025 9:39 pm   INDICATION: Signs/Symptoms:gen weakness.     COMPARISON: CT chest with contrast from 02/01/2025.   ACCESSION NUMBER(S): [id number]   ORDERING CLINICIAN: SHANI WELLS   TECHNIQUE: Helical data acquisition of the chest was obtained  without IV contrast material.  Images were reformatted in axial, coronal, and sagittal planes.   FINDINGS: LUNGS AND AIRWAYS: The trachea and central airways are patent. No endobronchial lesion.   There is near complete interval resolution of consolidation previously seen in the left upper lobe, with a small region of the ground-glass and consolidative opacity along the left heart border (series 7, image [id number]), which could relate to residual pneumonia or developing scarring.   There is new/progressive consolidation in the left lower lobe with associated volume loss, likely representing combination of atelectasis and pneumonia, probably aspiration  pneumonia, noting patchy opacification of the left lower lobe bronchial branches, and fluid layering in the proximal left mainstem bronchus.   There is also patchy opacification of the right middle and lower lobe bronchial branches, with complete atelectasis of the right middle lobe. Endobronchial lesion resulting in postobstructive atelectasis cannot be excluded. Pulmonology consultation suggested.   Background of moderate emphysema. Platelike opacities in the lower lungs suggesting atelectasis or scar. Trace pleural effusions. No pneumothorax.   MEDIASTINUM AND MARISOL, LOWER NECK AND AXILLA: The visualized thyroid gland is within normal limits.   No pathologically enlarged lymph nodes are seen. Calcified right hilar node, in keeping with sequelae of chronic granulomatous disease.   Esophagus appears within normal limits as seen. Small sliding hiatal hernia.   HEART AND VESSELS: The thoracic aorta is of normal course and caliber with mild vascular calcifications.   Main pulmonary artery is dilated, suggesting pulmonary arterial hypertension.   Mild coronary artery calcifications are seen. The study is not optimized for evaluation of coronary arteries.   Heart is enlarged. Mitral annular calcification.   No evidence of pericardial effusion.   UPPER ABDOMEN: The visualized subdiaphragmatic structures demonstrate no acute findings. Cholelithiasis without evidence of acute cholecystitis.   CHEST WALL AND OSSEOUS STRUCTURES: There are no suspicious osseous lesions. Moderate to severe disc degeneration of the thoracic spine.       There is near complete interval resolution of consolidation previously seen in the left upper lobe, with a small region of the ground-glass and consolidative opacity along the left heart border (series 7, image [id number]), which could relate to residual pneumonia or developing scarring.   There is new/progressive consolidation in the left lower lobe with associated volume loss, likely representing  combination of atelectasis and pneumonia, probably aspiration pneumonia, noting patchy opacification of the left lower lobe bronchial branches, and fluid layering in the proximal left mainstem bronchus.   There is also patchy opacification of the right middle and lower lobe bronchial branches, with complete atelectasis of the right middle lobe. Endobronchial lesion resulting in postobstructive atelectasis cannot be excluded. Pulmonology consultation suggested.   Background of moderate emphysema. Platelike opacities in the lower lungs suggesting atelectasis or scar. Trace pleural effusions. No pneumothorax.   Cardiomegaly.   Cholelithiasis.   MACRO: None   Signed by: Cory Riley 6/6/2025 10:43 PM Dictation workstation:   [id number]     Assessment/Plan   Pneumonia - gram negative versus gram positive-positive streptococcal antigen, aspiration also suspected, negative nasal MRSA PCR  Chronic respiratory failure-on 4LNC at baseline, recently intubated during previous hospitalization  COPD  Abnormal CT chest-possible endobronchial lesion  Left shoulder pain - unclear etiology, x-ray -mild osteoarthritis           IV Flagyl  Continue IV cefepime  Sputum culture if able  Monitor WBC and temperature  Monitor renal function  Follow-up blood culture  Oxygen as needed  Supportive care  Pulmonary follow-up  Further recommendations based on pending work-up    Addendum-Interval discussion with primary team-patient reported to becoming increasing confused-discontinue IV flagyl and cefepime-start IV meropenem-monitor mentation        AILYN Rowley-CNP

## 2025-06-09 NOTE — CONSULTS
"Nutrition Consult Note    Nutrition Assessment         Patient is a 65 y.o. female presenting with left shoulder pain and generalized body aches.  The patient was recently discharged from [facility] at which time her length of stay was greater than 1 week and required intubation with mechanical ventilation/coma (June 1-June 6 2025).  Patient reports that she is unable to care for herself at home, she does live with her  who is also unable to care for her or provide the assistance she needs/requires.    History of CHF, COPD on 4 L baseline Recent hospitalization with intubation, diabetes, hypertension, CVA.    Nutrition History:    Nutrition consult per Nursing Screen (stage 3/4 Pressure ulcer) completed by RD remote assessment.  Patient has wounds to buttocks (right/left).    Patient reports having a good appetite at home prior to admission. Breakfast this morning was eggs, toast and antoine. She lives at home with her  and due to increased weakness and inability to complete self cares she has new skin breakdown to her buttocks due to increased incontinence and inability to get herself up to the bathroom. Reviewed with patient the importance of adequate protein intake with her meals, she would like to try a protein suppelment (strawberry) with her meals trays during admission. She has had some mild weight loss in the last 6 months however states she is not concerned at this time stating she needed to lose weight, no nausea/vomiting or pain.        Anthropometrics:  Height: 160 cm (5' 3\")   Weight: 90.7 kg (200 lb)   BMI (Calculated): 35.44  IBW/kg (Dietitian Calculated): 52.3 kg  Weight History:   Wt Readings from Last 10 Encounters:   06/06/25 90.7 kg (200 lb)   02/02/25 86 kg (189 lb 9.5 oz)   02/01/25 99.8 kg (220 lb)   10/03/24 99.8 kg (220 lb)   09/18/24 99.8 kg (220 lb)   01/01/24 95.1 kg (209 lb 10.5 oz)   01/01/24 95.9 kg (211 lb 6.7 oz)   11/15/23 101 kg (222 lb 8 oz)   .  Weight Change " "%:  Weight History / % Weight Change: Patient denies any significant weight loss - admits to decreased strength and inability to complete ADL's.  Significant Weight Loss: No    Nutrition Focused Physical Exam Findings:  Unable to complete due to remote RD assessment.    Nutrition Significant Labs:  BMP Trend:   Results from last 7 days   Lab Units 06/09/25  0700 06/08/25  0727 06/07/25  0553 06/06/25  2101   GLUCOSE mg/dL 172* 122* 114* 120*   CALCIUM mg/dL 8.9 9.1 8.8 9.3   SODIUM mmol/L 137 137 139 139   POTASSIUM mmol/L 4.6 4.7 4.0 4.4   CO2 mmol/L 33* 33* 39* 37*   CHLORIDE mmol/L 100 100 96* 95*   BUN mg/dL 25* 25* 28* 33*   CREATININE mg/dL 0.55 0.51 0.59 0.96    , Vit D: No results found for: \"VITD25\" , Vit B12: No results found for: \"SZXSKMVC93\"     Nutrition Specific Medications:  Medications reviewed.    I/O:    ;      Dietary Orders (From admission, onward)       Start     Ordered    06/07/25 1451  Adult diet Regular; Thin 0  Diet effective now        Question Answer Comment   Diet type Regular    Fluid consistency Thin 0        06/07/25 1450    06/07/25 0251  May Participate in Room Service With Assistance  ( ROOM SERVICE MAY PARTICIPATE WITH ASSISTANCE)  Once        Question:  .  Answer:  Yes    06/07/25 0250                     Estimated Needs:   Total Energy Estimated Needs in 24 hours (kCal): 1541 kCal  Method for Estimating Needs: 17kcals/kg ABW  Total Protein Estimated Needs in 24 Hours (g): 78 g  Method for Estimating 24 Hour Protein Needs: 1.5grams/kg IBW  Total Fluid Estimated Needs in 24 Hours (mL): 1541 mL  Method for Estimating 24 Hour Fluid Needs: 1mL/Kcal        Nutrition Diagnosis   Malnutrition Diagnosis  Patient has Malnutrition Diagnosis: No    Nutrition Diagnosis  Patient has Nutrition Diagnosis: Yes  Diagnosis Status (1): New  Nutrition Diagnosis 1: Increased nutrient needs  Related to (1): (Protein) related to bilateral buttocks wounds  As Evidenced by (1): estimated protein needs " greater than reference standard.       Nutrition Interventions/Recommendations   Nutrition prescription for oral nutrition    Nutrition Recommendations:  Individualized Nutrition Prescription Provided for : Recommend Regular diet as ordered    Nutrition Interventions/Goals:   Meals and Snacks: General healthful diet  Goal: Patient will consume 75 percent of meals TID  Medical Food Supplement: Commercial beverage medical food supplement therapy  Goal: Patient will consume 2-3 Ensure Plus High Protein Supplements (350 calories/20grams protein each) daily.      Education Documentation  No documentation found.            Nutrition Monitoring and Evaluation   Food/Nutrient Related History Monitoring  Monitoring and Evaluation Plan: Intake / amount of food  Intake / Amount of food: Consumes at least 75% or more of meals/snacks/supplements              Physical Exam Findings  Monitoring and Evaluation Plan: Skin  Skin Finding: Impaired wound healing - Skin to heal    Goal Status: New goal(s) identified    Time Spent (min): 65 minutes      06/09/25 at 10:27 AM - TEMI FRAZIER RDN, LD

## 2025-06-09 NOTE — CARE PLAN
Problem: Pain - Adult  Goal: Verbalizes/displays adequate comfort level or baseline comfort level  Outcome: Progressing     Problem: Safety - Adult  Goal: Free from fall injury  Outcome: Progressing     Problem: Discharge Planning  Goal: Discharge to home or other facility with appropriate resources  Outcome: Progressing     Problem: Chronic Conditions and Co-morbidities  Goal: Patient's chronic conditions and co-morbidity symptoms are monitored and maintained or improved  Outcome: Progressing     Problem: Nutrition  Goal: Nutrient intake appropriate for maintaining nutritional needs  Outcome: Progressing     Problem: Skin  Goal: Decreased wound size/increased tissue granulation at next dressing change  6/8/2025 2257 by Amanda Pelletier RN  Flowsheets (Taken 6/8/2025 2257)  Decreased wound size/increased tissue granulation at next dressing change:   Promote sleep for wound healing   Protective dressings over bony prominences   Utilize specialty bed per algorithm  6/8/2025 2256 by Amanda Pelletier RN  Outcome: Progressing  Goal: Participates in plan/prevention/treatment measures  6/8/2025 2257 by Amanda Pelletier RN  Flowsheets (Taken 6/8/2025 2257)  Participates in plan/prevention/treatment measures:   Discuss with provider PT/OT consult   Elevate heels   Increase activity/out of bed for meals  6/8/2025 2256 by Amanda Pelletier RN  Outcome: Progressing  Goal: Prevent/manage excess moisture  6/8/2025 2257 by Amanda Pelletier RN  Flowsheets (Taken 6/8/2025 2257)  Prevent/manage excess moisture:   Cleanse incontinence/protect with barrier cream   Monitor for/manage infection if present  6/8/2025 2256 by Amanda Pelletier RN  Outcome: Progressing  Goal: Prevent/minimize sheer/friction injuries  6/8/2025 2257 by Amanda Pelletier RN  Flowsheets (Taken 6/8/2025 2257)  Prevent/minimize sheer/friction injuries:   Use pull sheet   Increase activity/out of bed for meals   HOB 30 degrees or less   Turn/reposition every 2 hours/use  positioning/transfer devices  6/8/2025 2256 by Amanda Pelletier RN  Outcome: Progressing  Goal: Promote/optimize nutrition  6/8/2025 2257 by Amanda Pelletier RN  Flowsheets (Taken 6/8/2025 2257)  Promote/optimize nutrition:   Assist with feeding   Consume > 50% meals/supplements   Monitor/record intake including meals  6/8/2025 2256 by Amanda Pelletier RN  Outcome: Progressing  Goal: Promote skin healing  6/8/2025 2257 by Amanda Pelletier RN  Flowsheets (Taken 6/8/2025 2257)  Promote skin healing:   Turn/reposition every 2 hours/use positioning/transfer devices   Protective dressings over bony prominences  6/8/2025 2256 by Amanda Pelletier RN  Outcome: Progressing     Problem: Pain  Goal: Takes deep breaths with improved pain control throughout the shift  Outcome: Progressing  Goal: Turns in bed with improved pain control throughout the shift  Outcome: Progressing  Goal: Walks with improved pain control throughout the shift  Outcome: Progressing  Goal: Performs ADL's with improved pain control throughout shift  Outcome: Progressing  Goal: Participates in PT with improved pain control throughout the shift  Outcome: Progressing  Goal: Free from opioid side effects throughout the shift  Outcome: Progressing  Goal: Free from acute confusion related to pain meds throughout the shift  Outcome: Progressing     Problem: Fall/Injury  Goal: Not fall by end of shift  Outcome: Progressing  Goal: Be free from injury by end of the shift  Outcome: Progressing  Goal: Verbalize understanding of personal risk factors for fall in the hospital  Outcome: Progressing  Goal: Verbalize understanding of risk factor reduction measures to prevent injury from fall in the home  Outcome: Progressing  Goal: Use assistive devices by end of the shift  Outcome: Progressing  Goal: Pace activities to prevent fatigue by end of the shift  Outcome: Progressing     Problem: Pain  Goal: STG - Patient will verbalize an acceptable level of pain  Outcome: Progressing    The patient's goals for the shift include      The clinical goals for the shift include The patient's resp status will remain stable during this shift    Over the shift, the patient did not make progress toward the following goals. Barriers to progression include . Recommendations to address these barriers include   Problem: Pain - Adult  Goal: Verbalizes/displays adequate comfort level or baseline comfort level  Outcome: Progressing     Problem: Safety - Adult  Goal: Free from fall injury  Outcome: Progressing     Problem: Discharge Planning  Goal: Discharge to home or other facility with appropriate resources  Outcome: Progressing     Problem: Chronic Conditions and Co-morbidities  Goal: Patient's chronic conditions and co-morbidity symptoms are monitored and maintained or improved  Outcome: Progressing     Problem: Nutrition  Goal: Nutrient intake appropriate for maintaining nutritional needs  Outcome: Progressing     Problem: Skin  Goal: Decreased wound size/increased tissue granulation at next dressing change  6/8/2025 2257 by Amanda Pelletier RN  Flowsheets (Taken 6/8/2025 2257)  Decreased wound size/increased tissue granulation at next dressing change:   Promote sleep for wound healing   Protective dressings over bony prominences   Utilize specialty bed per algorithm  6/8/2025 2256 by Amanda Pelletier RN  Outcome: Progressing  Goal: Participates in plan/prevention/treatment measures  6/8/2025 2257 by Amanda Pelletier RN  Flowsheets (Taken 6/8/2025 2257)  Participates in plan/prevention/treatment measures:   Discuss with provider PT/OT consult   Elevate heels   Increase activity/out of bed for meals  6/8/2025 2256 by Amanda Pelletier RN  Outcome: Progressing  Goal: Prevent/manage excess moisture  6/8/2025 2257 by Amanda Pelletier RN  Flowsheets (Taken 6/8/2025 2257)  Prevent/manage excess moisture:   Cleanse incontinence/protect with barrier cream   Monitor for/manage infection if present  6/8/2025 2256 by Amanda  DANIEL Pelletier  Outcome: Progressing  Goal: Prevent/minimize sheer/friction injuries  6/8/2025 2257 by Amanda Pelletier RN  Flowsheets (Taken 6/8/2025 2257)  Prevent/minimize sheer/friction injuries:   Use pull sheet   Increase activity/out of bed for meals   HOB 30 degrees or less   Turn/reposition every 2 hours/use positioning/transfer devices  6/8/2025 2256 by Amanda Pelletier RN  Outcome: Progressing  Goal: Promote/optimize nutrition  6/8/2025 2257 by Amanda Pelletier RN  Flowsheets (Taken 6/8/2025 2257)  Promote/optimize nutrition:   Assist with feeding   Consume > 50% meals/supplements   Monitor/record intake including meals  6/8/2025 2256 by Amanda Pelletier RN  Outcome: Progressing  Goal: Promote skin healing  6/8/2025 2257 by Amanda Pelletier RN  Flowsheets (Taken 6/8/2025 2257)  Promote skin healing:   Turn/reposition every 2 hours/use positioning/transfer devices   Protective dressings over bony prominences  6/8/2025 2256 by Amanda Pelletier RN  Outcome: Progressing     Problem: Pain  Goal: Takes deep breaths with improved pain control throughout the shift  Outcome: Progressing  Goal: Turns in bed with improved pain control throughout the shift  Outcome: Progressing  Goal: Walks with improved pain control throughout the shift  Outcome: Progressing  Goal: Performs ADL's with improved pain control throughout shift  Outcome: Progressing  Goal: Participates in PT with improved pain control throughout the shift  Outcome: Progressing  Goal: Free from opioid side effects throughout the shift  Outcome: Progressing  Goal: Free from acute confusion related to pain meds throughout the shift  Outcome: Progressing     Problem: Fall/Injury  Goal: Not fall by end of shift  Outcome: Progressing  Goal: Be free from injury by end of the shift  Outcome: Progressing  Goal: Verbalize understanding of personal risk factors for fall in the hospital  Outcome: Progressing  Goal: Verbalize understanding of risk factor reduction measures to  prevent injury from fall in the home  Outcome: Progressing  Goal: Use assistive devices by end of the shift  Outcome: Progressing  Goal: Pace activities to prevent fatigue by end of the shift  Outcome: Progressing     Problem: Pain  Goal: STG - Patient will verbalize an acceptable level of pain  Outcome: Progressing   .

## 2025-06-09 NOTE — PROGRESS NOTES
"Subha Miller is a 65 y.o. female on day 2 of admission seen in follow-up for acute on chronic hypoxic hypercapnic respiratory failure, pneumonia, acute COPD exacerbation, questionable endobronchial lesion    Subjective   On 3 L nasal cannula oxygen; O2 sats 95%.  Endorses improved breathing and mostly nonproductive cough.  Denies pain.  Afebrile    Objective     Physical Exam  Vitals and nursing note reviewed.   Constitutional:       Appearance: She is obese.   HENT:      Head: Normocephalic and atraumatic.      Nose: Nose normal.      Mouth/Throat:      Mouth: Mucous membranes are moist.   Eyes:      Extraocular Movements: Extraocular movements intact.      Conjunctiva/sclera: Conjunctivae normal.      Pupils: Pupils are equal, round, and reactive to light.   Cardiovascular:      Rate and Rhythm: Normal rate and regular rhythm.      Pulses: Normal pulses.      Heart sounds: Normal heart sounds.   Pulmonary:      Effort: Pulmonary effort is normal.      Comments: Coarse breath sounds with expiratory wheezes bilaterally.  Abdominal:      General: Bowel sounds are normal.      Palpations: Abdomen is soft.   Musculoskeletal:         General: Normal range of motion.   Skin:     General: Skin is warm and dry.      Capillary Refill: Capillary refill takes less than 2 seconds.   Neurological:      General: No focal deficit present.      Mental Status: She is alert and oriented to person, place, and time.   Psychiatric:         Mood and Affect: Mood normal.         Behavior: Behavior normal.         Last Recorded Vitals  Blood pressure 117/61, pulse 67, temperature 36.3 °C (97.3 °F), temperature source Temporal, resp. rate 15, height 1.6 m (5' 3\"), weight 90.7 kg (200 lb), SpO2 97%.      Intake/Output Summary (Last 24 hours) at 6/9/2025 9249  Last data filed at 6/9/2025 7759  Gross per 24 hour   Intake --   Output 600 ml   Net -600 ml      Scheduled medications:  albuterol, 2.5 mg, nebulization, TID  cefepime, 2 g, " intravenous, q8h  tiotropium, 2 puff, inhalation, Daily   And  fluticasone furoate-vilanteroL, 1 puff, inhalation, Daily  heparin (porcine), 5,000 Units, subcutaneous, q12h  lidocaine, 1 patch, transdermal, Daily  methylPREDNISolone sodium succinate (PF), 30 mg, intravenous, q12h  metroNIDAZOLE, 500 mg, intravenous, q8h  nicotine, 1 patch, transdermal, Daily  oxygen, , inhalation, Continuous - Inhalation  pantoprazole, 40 mg, oral, Daily  sodium chloride, 3 mL, nebulization, TID  spironolactone, 12.5 mg, oral, Daily    PRN medications: acetaminophen **OR** acetaminophen **OR** acetaminophen, albuterol, benzocaine-menthol, benzonatate, calcium carbonate, cyclobenzaprine, dextromethorphan-guaifenesin, gabapentin, guaiFENesin, HYDROcodone-acetaminophen, melatonin, ondansetron ODT **OR** ondansetron, polyethylene glycol    Relevant Results  Results for orders placed or performed during the hospital encounter of 06/06/25 (from the past 24 hours)   POCT GLUCOSE   Result Value Ref Range    POCT Glucose 350 (H) 74 - 99 mg/dL   POCT GLUCOSE   Result Value Ref Range    POCT Glucose 248 (H) 74 - 99 mg/dL   CBC   Result Value Ref Range    WBC 12.8 (H) 4.4 - 11.3 x10*3/uL    nRBC 0.0 0.0 - 0.0 /100 WBCs    RBC 4.72 4.00 - 5.20 x10*6/uL    Hemoglobin 14.7 12.0 - 16.0 g/dL    Hematocrit 47.7 (H) 36.0 - 46.0 %     (H) 80 - 100 fL    MCH 31.1 26.0 - 34.0 pg    MCHC 30.8 (L) 32.0 - 36.0 g/dL    RDW 13.5 11.5 - 14.5 %    Platelets 119 (L) 150 - 450 x10*3/uL   Basic Metabolic Panel   Result Value Ref Range    Glucose 172 (H) 74 - 99 mg/dL    Sodium 137 136 - 145 mmol/L    Potassium 4.6 3.5 - 5.3 mmol/L    Chloride 100 98 - 107 mmol/L    Bicarbonate 33 (H) 21 - 32 mmol/L    Anion Gap 9 (L) 10 - 20 mmol/L    Urea Nitrogen 25 (H) 6 - 23 mg/dL    Creatinine 0.55 0.50 - 1.05 mg/dL    eGFR >90 >60 mL/min/1.73m*2    Calcium 8.9 8.6 - 10.3 mg/dL   POCT GLUCOSE   Result Value Ref Range    POCT Glucose 152 (H) 74 - 99 mg/dL        XR  chest 1 view  Result Date: 6/8/2025  FINDINGS: CARDIOMEDIASTINAL SILHOUETTE AND VASCULATURE:   Cardiac size:  Within normal limits considering patient rotation. Aortic shadow:  Within normal limits.   Mediastinal contours: Within normal limits.   Pulmonary vasculature:  The central vasculature is unremarkable   LUNGS: Persistent left basilar retrocardiac opacification probably from consolidated infiltrate. There is also a probable small effusion but which has decreased.   ABDOMEN AND OTHER FINDINGS: No remarkable upper abdominal findings.   BONES: No acute osseous changes. 1.  As above.       XR shoulder left 2+ views  Result Date: 6/7/2025  FINDINGS: Bony structures:  Intact   Joint spaces:  There appears to be mild osteophytosis at the glenoid indicating mild osteoarthritis. The acromioclavicular joint is fairly well maintained.   Soft tissues: Unremarkable without significant edema or radiodense foreign body   Other:  Opacity left lower lung and base is probably from an effusion with atelectasis.  Mild osteoarthritis.      CT chest wo IV contrast  Result Date: 6/6/2025  FINDINGS: LUNGS AND AIRWAYS: The trachea and central airways are patent. No endobronchial lesion.   There is near complete interval resolution of consolidation previously seen in the left upper lobe, with a small region of the ground-glass and consolidative opacity along the left heart border (series 7, image [id number]), which could relate to residual pneumonia or developing scarring.   There is new/progressive consolidation in the left lower lobe with associated volume loss, likely representing combination of atelectasis and pneumonia, probably aspiration pneumonia, noting patchy opacification of the left lower lobe bronchial branches, and fluid layering in the proximal left mainstem bronchus.   There is also patchy opacification of the right middle and lower lobe bronchial branches, with complete atelectasis of the right middle lobe. Endobronchial  lesion resulting in postobstructive atelectasis cannot be excluded. Pulmonology consultation suggested.   Background of moderate emphysema. Platelike opacities in the lower lungs suggesting atelectasis or scar. Trace pleural effusions. No pneumothorax.   MEDIASTINUM AND MARISOL, LOWER NECK AND AXILLA: The visualized thyroid gland is within normal limits.   No pathologically enlarged lymph nodes are seen. Calcified right hilar node, in keeping with sequelae of chronic granulomatous disease.   Esophagus appears within normal limits as seen. Small sliding hiatal hernia.   HEART AND VESSELS: The thoracic aorta is of normal course and caliber with mild vascular calcifications.   Main pulmonary artery is dilated, suggesting pulmonary arterial hypertension.   Mild coronary artery calcifications are seen. The study is not optimized for evaluation of coronary arteries.   Heart is enlarged. Mitral annular calcification.   No evidence of pericardial effusion.   UPPER ABDOMEN: The visualized subdiaphragmatic structures demonstrate no acute findings. Cholelithiasis without evidence of acute cholecystitis.   CHEST WALL AND OSSEOUS STRUCTURES: There are no suspicious osseous lesions. Moderate to severe disc degeneration of the thoracic spine. There is near complete interval resolution of consolidation previously seen in the left upper lobe, with a small region of the ground-glass and consolidative opacity along the left heart border (series 7, image [id number]), which could relate to residual pneumonia or developing scarring.   There is new/progressive consolidation in the left lower lobe with associated volume loss, likely representing combination of atelectasis and pneumonia, probably aspiration pneumonia, noting patchy opacification of the left lower lobe bronchial branches, and fluid layering in the proximal left mainstem bronchus.   There is also patchy opacification of the right middle and lower lobe bronchial branches, with  complete atelectasis of the right middle lobe. Endobronchial lesion resulting in postobstructive atelectasis cannot be excluded. Pulmonology consultation suggested.   Background of moderate emphysema. Platelike opacities in the lower lungs suggesting atelectasis or scar. Trace pleural effusions. No pneumothorax.   Cardiomegaly.   Cholelithiasis.       Assessment & Plan    Acute on chronic hypoxic, hypercapnic respiratory failure  Oxygen at baseline  BiPAP nightly  Incentive spirometry/pulmonary hygiene/Acapella/3% nebulized saline    Aspiration pneumonia  Continue IV cefepime, IV metronidazole  Urine Legionella, MRSA negative  Strep pneumo positive  Procalcitonin: 0.06 ng/mL  Sputum if able  Follow-up cultures  Regular diet, thin liquids per ST  Infectious Disease follow-up    Acute COPD exacerbation  IBD/ICS  IV Solu-Medrol-will maintain current dose  Inpatient consultation to COPD navigator  Pulmonary function testing as outpatient  Outpatient pulmonary follow-up and 2 weeks    Questionable endobronchial lesion  CT scan chest with patchy opacification of the right middle and lower lobe bronchial branches, complete atelectasis of the right middle lobe. Endobronchial lesion resulting in postobstructive atelectasis cannot be excluded  Follow-up CT scan chest as outpatient    Tobacco use  Smoking cessation  Nicotine patch    Prophylaxis  Subcutaneous heparin  Protonix    Luciana Vazquez, APRN-CNP  Pulmonary & Critical Care Medicine

## 2025-06-09 NOTE — CARE PLAN
The patient's goals for the shift include  rest    The clinical goals for the shift include safety, pt/ot, antibiotics    Problem: Pain - Adult  Goal: Verbalizes/displays adequate comfort level or baseline comfort level  Outcome: Progressing     Problem: Safety - Adult  Goal: Free from fall injury  Outcome: Progressing     Problem: Discharge Planning  Goal: Discharge to home or other facility with appropriate resources  Outcome: Progressing     Problem: Chronic Conditions and Co-morbidities  Goal: Patient's chronic conditions and co-morbidity symptoms are monitored and maintained or improved  Outcome: Progressing     Problem: Nutrition  Goal: Nutrient intake appropriate for maintaining nutritional needs  Outcome: Progressing     Problem: Skin  Goal: Decreased wound size/increased tissue granulation at next dressing change  Outcome: Progressing  Goal: Participates in plan/prevention/treatment measures  Outcome: Progressing  Goal: Prevent/manage excess moisture  Outcome: Progressing  Goal: Prevent/minimize sheer/friction injuries  Outcome: Progressing  Goal: Promote/optimize nutrition  Outcome: Progressing  Goal: Promote skin healing  Outcome: Progressing     Problem: Pain  Goal: Takes deep breaths with improved pain control throughout the shift  Outcome: Progressing  Goal: Turns in bed with improved pain control throughout the shift  Outcome: Progressing  Goal: Walks with improved pain control throughout the shift  Outcome: Progressing  Goal: Performs ADL's with improved pain control throughout shift  Outcome: Progressing  Goal: Participates in PT with improved pain control throughout the shift  Outcome: Progressing  Goal: Free from opioid side effects throughout the shift  Outcome: Progressing  Goal: Free from acute confusion related to pain meds throughout the shift  Outcome: Progressing     Problem: Fall/Injury  Goal: Not fall by end of shift  Outcome: Progressing  Goal: Be free from injury by end of the  shift  Outcome: Progressing  Goal: Verbalize understanding of personal risk factors for fall in the hospital  Outcome: Progressing  Goal: Verbalize understanding of risk factor reduction measures to prevent injury from fall in the home  Outcome: Progressing  Goal: Use assistive devices by end of the shift  Outcome: Progressing  Goal: Pace activities to prevent fatigue by end of the shift  Outcome: Progressing        199.9

## 2025-06-09 NOTE — PROGRESS NOTES
Occupational Therapy    OT Treatment    Patient Name: Subha Miller  MRN: [id number]  Department: [address]  Room: [address] [place], [place] [address]  Today's Date: 6/9/2025  Time Calculation  Start Time: 1325  Stop Time: 1359  Time Calculation (min): 34 min        Assessment:  OT Assessment: Pt actively participating and progresisng with dresing, bathing, transfers to POC.  Pt guarded with LUE however active use in session within pain tolerace.  Will continue to address remaining deficits with skilled OT intervention .  Prognosis: Good  Barriers to Discharge Home: Caregiver assistance, Cognition needs, Physical needs  Caregiver Assistance: Caregiver assistance needed per identified barriers - however, level of patient's required assistance exceeds assistance available at home  Cognition Needs: Insight of patient limited regarding functional ability/needs  Physical Needs: 24hr mobility assistance needed, 24hr ADL assistance needed, High falls risk due to function or environment  Evaluation/Treatment Tolerance: Patient tolerated treatment well  Medical Staff Made Aware: Yes  End of Session Communication: Bedside nurse (re Pts discomfort)  End of Session Patient Position: Bed, 3 rail up, Alarm on (call light in reach all needs met.)  OT Assessment Results: Decreased ADL status, Decreased upper extremity range of motion, Decreased upper extremity strength, Decreased safe judgment during ADL, Decreased endurance, Decreased sensation, Decreased functional mobility, Decreased gross motor control  Prognosis: Good  Evaluation/Treatment Tolerance: Patient tolerated treatment well  Medical Staff Made Aware: Yes  Strengths: Ability to acquire knowledge, Attitude of self  Barriers to Participation: Comorbidities  Plan:  Treatment Interventions: ADL retraining, Functional transfer training, Endurance training, Patient/family training, Equipment evaluation/education, Compensatory technique education  OT Frequency: 3 times per week  OT Discharge Recommendations:  Moderate intensity level of continued care  Equipment Recommended upon Discharge: Wheelchair  OT Recommended Transfer Status: Assist of 1, Assist of 2  OT - OK to Discharge: Yes  Treatment Interventions: ADL retraining, Functional transfer training, Endurance training, Patient/family training, Equipment evaluation/education, Compensatory technique education    Subjective   OT Visit Info:  OT Received On: 06/09/25  General Visit Info:  General  Reason for Referral: Impaired mobility, hospital acquired PNA  Referred By: Dr. Palmer  Past Medical History Relevant to Rehab: CHF, COPD on 4 L baseline nasal cannula, DM, hypertension, CVA  Prior to Session Communication: Bedside nurse  Patient Position Received: Bed, 3 rail up, Alarm on  Preferred Learning Style: verbal, visual  General Comment: Patient is a 65 year old female who presented to the ED with L shoulder pain. Per EMR, patient was recently at [facility] from 6/1/2025-6/6/2025 and was intubated in coma. She was discharged home. Patient admitted with hospital acquired PNA, L shoulder pain (imaging negative for acute findings), COPD, HTN, chronic respiratory failure, moderate aortic valve stenosis, thrombocytopenia. Patient is cleared by nursing for therapy. Patient in bed upon arrival and agreeable to participate. +4L O2 via NC  Precautions:  Hearing/Visual Limitations: glasses  Medical Precautions: Fall precautions, Oxygen therapy device and L/min (4L O2 NC)            Pain:  Pain Assessment  Pain Assessment: 0-10  0-10 (Numeric) Pain Score: 7  Pain Type: Chronic pain  Pain Location: Back  Pain Orientation: Lower  Pain Interventions: Repositioned, Other (Comment) (Discussion with NSG)  Response to Interventions: Content/relaxed  Pain 2  Pain Score 2: 7  Pain Type 2: Acute pain  Pain Location 2: Shoulder  Pain Orientation 2: Left  Pain Interventions 2: Repositioned, Other (Comment) (Discussion with NSG)  Response to Interventions 2: Content/relaxed    Objective     Cognition:  Cognition  Orientation Level: Oriented X4  Cognition Comments: pleasant, cooperative  Insight: Mild  Impulsive: Mildly  Coordination:  Movements are Fluid and Coordinated: No  Coordination Comment: Decreased overall  Activities of Daily Living:      Grooming  Grooming Level of Assistance: Modified independent  Grooming Where Assessed: Chair  Grooming Comments: items in reach Pt brushing teeth, hair washing face    UE Bathing  UE Bathing Level of Assistance: Setup  UE Bathing Where Assessed: Edge of bed  UE Bathing Comments: sponge bathing    LE Bathing  LE Bathing Adaptive Equipment: Long-handled sponge (not present recommedation use instructed)  LE Bathing Level of Assistance: Moderate assistance  LE Bathing Where Assessed: Edge of bed, Other (Comment) (stance at RW)  LE Bathing Comments: assist B feet seated  unilateral hand support RW P wash josie area/butocks    UE Dressing  UE Dressing Level of Assistance: Minimum assistance  UE Dressing Where Assessed: Edge of bed  UE Dressing Comments: doff/don hospital gown assist d/t IV present    LE Dressing  LE Dressing: Yes  LE Dressing Adaptive Equipment: Sock aide  Pants Level of Assistance: Contact guard  LE Dressing Where Assessed: Edge of bed  LE Dressing Comments: don socks    Toileting  Toileting Adaptive Equipment: Cathertization equipment (+Purewick)  Toileting Level of Assistance: Minimum assistance  Where Assessed: Bed level  Toileting Comments: Pt incontinent urine  Functional Standing Tolerance:  Time: 1  minute  Activity: self care  Functional Standing Tolerance Comments: alternating unilaeral hand support secure surface  Bed Mobility/Transfers: Bed Mobility  Bed Mobility: Yes  Bed Mobility 1  Bed Mobility 1: Supine to sitting  Level of Assistance 1: Contact guard  Bed Mobility Comments 1: bed flat use rail effortful trunk up  Bed Mobility 2  Bed Mobility  2: Scooting  Level of Assistance 2: Distant supervision  Bed Mobility Comments 2: seated  to edge of flat bed    Transfers  Transfer: Yes  Transfer 1  Transfer From 1: Bed to  Transfer to 1: Chair with arms  Technique 1: Sit to stand, Stand to sit  Transfer Device 1: Walker, Gait belt (RW)  Transfer Level of Assistance 1: Moderate assistance  Trials/Comments 1: vc hand placement and body mechanics/controlled descent  Sitting Balance:  Dynamic Sitting Balance  Dynamic Sitting-Balance Support: Feet supported  Dynamic Sitting-Level of Assistance: Close supervision  Dynamic Sitting-Balance: Forward lean, Reaching for objects  Dynamic Sitting-Comments: during self care  Outcome Measures:[facility] Daily Activity  Putting on and taking off regular lower body clothing: A lot  Bathing (including washing, rinsing, drying): A lot  Putting on and taking off regular upper body clothing: A little  Toileting, which includes using toilet, bedpan or urinal: Total  Taking care of personal grooming such as brushing teeth: A little  Eating Meals: A little  Daily Activity - Total Score: 14    Education Documentation  Body Mechanics, taught by LIANE Briseno at 6/9/2025  2:13 PM.  Learner: Patient  Readiness: Acceptance  Method: Explanation, Demonstration, Teach-back  Response: Verbalizes Understanding, Demonstrated Understanding  Comment: Instructed Pt with adaptive ADL skills, balance strategies, transfer safety, body mechanics    Precautions, taught by LIANE Briseno at 6/9/2025  2:13 PM.  Learner: Patient  Readiness: Acceptance  Method: Explanation, Demonstration, Teach-back  Response: Verbalizes Understanding, Demonstrated Understanding  Comment: Instructed Pt with adaptive ADL skills, balance strategies, transfer safety, body mechanics    ADL Training, taught by LIANE Briseno at 6/9/2025  2:13 PM.  Learner: Patient  Readiness: Acceptance  Method: Explanation, Demonstration, Teach-back  Response: Verbalizes Understanding, Demonstrated Understanding  Comment: Instructed Pt with adaptive ADL skills, balance  strategies, transfer safety, body mechanics    Education Comments  No comments found.        OP EDUCATION:       Goals:  Encounter Problems       Encounter Problems (Active)       OT Goals       UB ADLs (Progressing)       Start:  06/07/25    Expected End:  06/28/25       Patient will complete UB ADL tasks, with set-up using AE need in order to increase patient's safety and independence with self-care tasks.          LB ADLs (Progressing)       Start:  06/07/25    Expected End:  06/28/25       Patient will complete ADL tasks, with minimal assist  using AE need in order to increase patient's safety and independence with self-care tasks.          Functional Transfers (Progressing)       Start:  06/07/25    Expected End:  06/28/25       Patient will complete functional transfers with minimal assist  using least restrictive device, in order to increase patient's safety and independence with daily tasks.          Activity Tolerance  (Progressing)       Start:  06/07/25    Expected End:  06/28/25       Patient will demonstrate the ability to participate in functional activity at least >/= 25 minutes in order to increase patient's safety and independence with daily tasks.          Standing Tolerance  (Progressing)       Start:  06/07/25    Expected End:  06/28/25       Patient will demonstrate the ability to stand at least >/= 2 minutes to increase safety and independence with functional transfers.

## 2025-06-09 NOTE — PROGRESS NOTES
06/09/25 1236   Discharge Planning   Living Arrangements Spouse/significant other   Support Systems Spouse/significant other   Assistance Needed L shoulder pain limits ability to transfer and ambulate with walker, requires more assistance   Type of Residence Private residence  (one level home)   Number of Stairs to Enter Residence 0   Do you have animals or pets at home? No   Who is requesting discharge planning? Provider   Home or Post Acute Services Post acute facilities (Rehab/SNF/etc)   Type of Post Acute Facility Services Skilled nursing   Expected Discharge Disposition SNF   Does the patient need discharge transport arranged? No   Financial Resource Strain   How hard is it for you to pay for the very basics like food, housing, medical care, and heating? Somewhat   Housing Stability   In the last 12 months, was there a time when you were not able to pay the mortgage or rent on time? N   In the past 12 months, how many times have you moved where you were living? 0   At any time in the past 12 months, were you homeless or living in a shelter (including now)? N   Transportation Needs   In the past 12 months, has lack of transportation kept you from medical appointments or from getting medications? no   In the past 12 months, has lack of transportation kept you from meetings, work, or from getting things needed for daily living? No   Patient Choice   Provider Choice list and CMS website (https://medicare.gov/care-compare#search) for post-acute Quality and Resource Measure Data were provided and reviewed with: Patient  (list of choices given)   Patient / Family choosing to utilize agency / facility established prior to hospitalization Yes   Stroke Family Assessment   Stroke Family Assessment Needed No   Intensity of Service   Intensity of Service 0-30 min     Patient is a 65 year old female who presented to the ED with L shoulder pain. Per EMR, patient was recently at [facility] from 6/1/2025-6/6/2025 and was  intubated in coma. She was discharged home. Patient admitted with hospital acquired PNA, L shoulder pain (imaging negative for acute findings), COPD, HTN, chronic respiratory failure, moderate aortic valve stenosis, thrombocytopenia.     Patient is having mobility deficits secondary to pain and PNA. Could not function at home.  having difficulty caring alfonzo her at home, live in one level apartment.   Patient is greatly limited in her ability to perform bed mobility, functional transfers, and care for herself due to observed weakness and general body and L shoulder pain. Patient requires mod-max assist for all bed mobility, transfers, and ambulation with walker, and would benefit from moderate intensity rehab.    Therapy Discharge Recommendations: Moderate intensity level of continued care  Equipment Recommended upon Discharge: Wheelchair  Recommended Transfer Status: Assist of 1, Assist of 2    Patient is open to Skilled rehab and list of choices were given.    PLAN: [facility] accepted patient and will need a precert

## 2025-06-10 LAB
BACTERIA SPEC RESP CULT: ABNORMAL
GRAM STN SPEC: ABNORMAL

## 2025-06-10 PROCEDURE — 2500000001 HC RX 250 WO HCPCS SELF ADMINISTERED DRUGS (ALT 637 FOR MEDICARE OP): Performed by: INTERNAL MEDICINE

## 2025-06-10 PROCEDURE — S4991 NICOTINE PATCH NONLEGEND: HCPCS | Performed by: INTERNAL MEDICINE

## 2025-06-10 PROCEDURE — 2500000004 HC RX 250 GENERAL PHARMACY W/ HCPCS (ALT 636 FOR OP/ED): Performed by: NURSE PRACTITIONER

## 2025-06-10 PROCEDURE — 2500000005 HC RX 250 GENERAL PHARMACY W/O HCPCS

## 2025-06-10 PROCEDURE — 94668 MNPJ CHEST WALL SBSQ: CPT

## 2025-06-10 PROCEDURE — 2500000005 HC RX 250 GENERAL PHARMACY W/O HCPCS: Performed by: INTERNAL MEDICINE

## 2025-06-10 PROCEDURE — 2500000002 HC RX 250 W HCPCS SELF ADMINISTERED DRUGS (ALT 637 FOR MEDICARE OP, ALT 636 FOR OP/ED): Performed by: INTERNAL MEDICINE

## 2025-06-10 PROCEDURE — 99232 SBSQ HOSP IP/OBS MODERATE 35: CPT | Performed by: NURSE PRACTITIONER

## 2025-06-10 PROCEDURE — 2500000004 HC RX 250 GENERAL PHARMACY W/ HCPCS (ALT 636 FOR OP/ED): Performed by: INTERNAL MEDICINE

## 2025-06-10 PROCEDURE — 9420000001 HC RT PATIENT EDUCATION 5 MIN

## 2025-06-10 PROCEDURE — 94640 AIRWAY INHALATION TREATMENT: CPT

## 2025-06-10 PROCEDURE — 1100000001 HC PRIVATE ROOM DAILY

## 2025-06-10 PROCEDURE — 2500000002 HC RX 250 W HCPCS SELF ADMINISTERED DRUGS (ALT 637 FOR MEDICARE OP, ALT 636 FOR OP/ED)

## 2025-06-10 PROCEDURE — 94669 MECHANICAL CHEST WALL OSCILL: CPT

## 2025-06-10 RX ADMIN — Medication 4 L/MIN: at 19:12

## 2025-06-10 RX ADMIN — NICOTINE 1 PATCH: 14 PATCH, EXTENDED RELEASE TRANSDERMAL at 08:48

## 2025-06-10 RX ADMIN — HEPARIN SODIUM 5000 UNITS: 5000 INJECTION, SOLUTION INTRAVENOUS; SUBCUTANEOUS at 17:33

## 2025-06-10 RX ADMIN — ALBUTEROL SULFATE 2.5 MG: 2.5 SOLUTION RESPIRATORY (INHALATION) at 07:56

## 2025-06-10 RX ADMIN — SODIUM CHLORIDE SOLN NEBU 3% 3 ML: 3 NEBU SOLN at 19:10

## 2025-06-10 RX ADMIN — ACETAMINOPHEN 650 MG: 325 TABLET ORAL at 06:38

## 2025-06-10 RX ADMIN — FLUTICASONE FUROATE AND VILANTEROL TRIFENATATE 1 PUFF: 100; 25 POWDER RESPIRATORY (INHALATION) at 07:59

## 2025-06-10 RX ADMIN — MEROPENEM AND SODIUM CHLORIDE 1 G: 1 INJECTION, SOLUTION INTRAVENOUS at 00:39

## 2025-06-10 RX ADMIN — MEROPENEM AND SODIUM CHLORIDE 1 G: 1 INJECTION, SOLUTION INTRAVENOUS at 17:33

## 2025-06-10 RX ADMIN — SODIUM CHLORIDE SOLN NEBU 3% 3 ML: 3 NEBU SOLN at 12:05

## 2025-06-10 RX ADMIN — SPIRONOLACTONE 12.5 MG: 25 TABLET ORAL at 08:48

## 2025-06-10 RX ADMIN — ALBUTEROL SULFATE 2.5 MG: 2.5 SOLUTION RESPIRATORY (INHALATION) at 19:10

## 2025-06-10 RX ADMIN — HEPARIN SODIUM 5000 UNITS: 5000 INJECTION, SOLUTION INTRAVENOUS; SUBCUTANEOUS at 05:57

## 2025-06-10 RX ADMIN — ACETAMINOPHEN 650 MG: 325 TABLET ORAL at 15:07

## 2025-06-10 RX ADMIN — TIOTROPIUM BROMIDE INHALATION SPRAY 2 PUFF: 3.12 SPRAY, METERED RESPIRATORY (INHALATION) at 07:58

## 2025-06-10 RX ADMIN — ALBUTEROL SULFATE 2.5 MG: 2.5 SOLUTION RESPIRATORY (INHALATION) at 12:05

## 2025-06-10 RX ADMIN — LIDOCAINE 4% 1 PATCH: 40 PATCH TOPICAL at 08:48

## 2025-06-10 RX ADMIN — MEROPENEM AND SODIUM CHLORIDE 1 G: 1 INJECTION, SOLUTION INTRAVENOUS at 08:49

## 2025-06-10 RX ADMIN — SODIUM CHLORIDE SOLN NEBU 3% 3 ML: 3 NEBU SOLN at 07:56

## 2025-06-10 RX ADMIN — Medication 4 L/MIN: at 07:58

## 2025-06-10 RX ADMIN — PANTOPRAZOLE SODIUM 40 MG: 40 TABLET, DELAYED RELEASE ORAL at 08:48

## 2025-06-10 ASSESSMENT — PAIN SCALES - WONG BAKER: WONGBAKER_NUMERICALRESPONSE: HURTS LITTLE MORE

## 2025-06-10 ASSESSMENT — COGNITIVE AND FUNCTIONAL STATUS - GENERAL
STANDING UP FROM CHAIR USING ARMS: A LOT
WALKING IN HOSPITAL ROOM: A LOT
HELP NEEDED FOR BATHING: A LITTLE
MOVING FROM LYING ON BACK TO SITTING ON SIDE OF FLAT BED WITH BEDRAILS: A LITTLE
PERSONAL GROOMING: A LITTLE
DRESSING REGULAR LOWER BODY CLOTHING: A LOT
DRESSING REGULAR UPPER BODY CLOTHING: A LITTLE
DAILY ACTIVITIY SCORE: 17
TOILETING: A LOT
MOBILITY SCORE: 12
MOVING TO AND FROM BED TO CHAIR: A LOT
CLIMB 3 TO 5 STEPS WITH RAILING: TOTAL
TURNING FROM BACK TO SIDE WHILE IN FLAT BAD: A LOT

## 2025-06-10 ASSESSMENT — PAIN SCALES - GENERAL
PAINLEVEL_OUTOF10: 7
PAINLEVEL_OUTOF10: 4
PAINLEVEL_OUTOF10: 8

## 2025-06-10 ASSESSMENT — PAIN DESCRIPTION - DESCRIPTORS: DESCRIPTORS: ACHING

## 2025-06-10 ASSESSMENT — PAIN - FUNCTIONAL ASSESSMENT
PAIN_FUNCTIONAL_ASSESSMENT: 0-10

## 2025-06-10 ASSESSMENT — PAIN DESCRIPTION - LOCATION: LOCATION: BACK

## 2025-06-10 NOTE — CARE PLAN
The patient's goals for the shift include      The clinical goals for the shift include The patient's pain will be under control by end of shift    Over the shift, the patient did not make progress toward the following goals. Barriers to progression include . Recommendations to address these barriers include   Problem: Pain - Adult  Goal: Verbalizes/displays adequate comfort level or baseline comfort level  Outcome: Progressing     Problem: Safety - Adult  Goal: Free from fall injury  Outcome: Progressing     Problem: Discharge Planning  Goal: Discharge to home or other facility with appropriate resources  Outcome: Progressing     Problem: Chronic Conditions and Co-morbidities  Goal: Patient's chronic conditions and co-morbidity symptoms are monitored and maintained or improved  Outcome: Progressing     Problem: Nutrition  Goal: Nutrient intake appropriate for maintaining nutritional needs  Outcome: Progressing     Problem: Skin  Goal: Decreased wound size/increased tissue granulation at next dressing change  Outcome: Progressing  Flowsheets (Taken 6/9/2025 2317)  Decreased wound size/increased tissue granulation at next dressing change:   Promote sleep for wound healing   Protective dressings over bony prominences   Utilize specialty bed per algorithm  Goal: Participates in plan/prevention/treatment measures  Outcome: Progressing  Flowsheets (Taken 6/9/2025 2317)  Participates in plan/prevention/treatment measures:   Discuss with provider PT/OT consult   Elevate heels   Increase activity/out of bed for meals  Goal: Prevent/manage excess moisture  Outcome: Progressing  Flowsheets (Taken 6/9/2025 2317)  Prevent/manage excess moisture:   Monitor for/manage infection if present   Cleanse incontinence/protect with barrier cream  Goal: Prevent/minimize sheer/friction injuries  Outcome: Progressing  Flowsheets (Taken 6/9/2025 2317)  Prevent/minimize sheer/friction injuries:   Use pull sheet   Increase activity/out of bed  for meals   HOB 30 degrees or less   Turn/reposition every 2 hours/use positioning/transfer devices  Goal: Promote/optimize nutrition  Outcome: Progressing  Flowsheets (Taken 6/9/2025 2317)  Promote/optimize nutrition:   Assist with feeding   Consume > 50% meals/supplements   Monitor/record intake including meals  Goal: Promote skin healing  Outcome: Progressing  Flowsheets (Taken 6/9/2025 2317)  Promote skin healing: Turn/reposition every 2 hours/use positioning/transfer devices     Problem: Pain  Goal: Takes deep breaths with improved pain control throughout the shift  Outcome: Progressing  Goal: Turns in bed with improved pain control throughout the shift  Outcome: Progressing  Goal: Walks with improved pain control throughout the shift  Outcome: Progressing  Goal: Performs ADL's with improved pain control throughout shift  Outcome: Progressing  Goal: Participates in PT with improved pain control throughout the shift  Outcome: Progressing  Goal: Free from opioid side effects throughout the shift  Outcome: Progressing  Goal: Free from acute confusion related to pain meds throughout the shift  Outcome: Progressing     Problem: Fall/Injury  Goal: Not fall by end of shift  Outcome: Progressing  Goal: Be free from injury by end of the shift  Outcome: Progressing  Goal: Verbalize understanding of personal risk factors for fall in the hospital  Outcome: Progressing  Goal: Verbalize understanding of risk factor reduction measures to prevent injury from fall in the home  Outcome: Progressing  Goal: Use assistive devices by end of the shift  Outcome: Progressing  Goal: Pace activities to prevent fatigue by end of the shift  Outcome: Progressing     Problem: Pain  Goal: STG - Patient will verbalize an acceptable level of pain  Outcome: Progressing   .

## 2025-06-10 NOTE — PROGRESS NOTES
Occupational Therapy                 Therapy Communication Note    Patient Name: Subha Miller  MRN: [id number]  Department: [facility] 3 S  Room: [address] [place], [place] [address]  Today's Date: 6/10/2025     Discipline: Occupational Therapy    Missed Visit:  Yes    Missed Visit Reason: Patient refused (Pt declining ADLs, OOB activity, and UB therex despite max encouragement and education.)    Missed Time: Attempt

## 2025-06-10 NOTE — PROGRESS NOTES
"Subha Miller is a 65 y.o. female on day 3 of admission seen in follow-up for acute on chronic hypoxic hypercapnic respiratory failure, pneumonia, acute COPD exacerbation, questionable endobronchial lesion    Subjective   On 4 L nasal cannula oxygen; O2 sats 98%. No respiratory complaints.  Denies pain.  Afebrile.    Objective     Physical Exam  Vitals and nursing note reviewed.   Constitutional:       Appearance: She is obese.   HENT:      Head: Normocephalic and atraumatic.      Nose: Nose normal.      Mouth/Throat:      Mouth: Mucous membranes are moist.   Eyes:      Extraocular Movements: Extraocular movements intact.      Conjunctiva/sclera: Conjunctivae normal.      Pupils: Pupils are equal, round, and reactive to light.   Cardiovascular:      Rate and Rhythm: Normal rate and regular rhythm.      Pulses: Normal pulses.      Heart sounds: Normal heart sounds.   Pulmonary:      Effort: Pulmonary effort is normal.      Breath sounds: Normal breath sounds.      Comments: Coarse breath sounds with expiratory wheezes bilaterally.  Abdominal:      General: Bowel sounds are normal.      Palpations: Abdomen is soft.   Musculoskeletal:         General: Normal range of motion.   Skin:     General: Skin is warm and dry.      Capillary Refill: Capillary refill takes less than 2 seconds.   Neurological:      General: No focal deficit present.      Mental Status: She is alert and oriented to person, place, and time.   Psychiatric:         Mood and Affect: Mood normal.         Behavior: Behavior normal.         Last Recorded Vitals  Blood pressure 121/66, pulse 72, temperature 36.1 °C (97 °F), temperature source Temporal, resp. rate 15, height 1.6 m (5' 3\"), weight 90.7 kg (200 lb), SpO2 98%.      Intake/Output Summary (Last 24 hours) at 6/10/2025 0820  Last data filed at 6/10/2025 0816  Gross per 24 hour   Intake 990 ml   Output 2675 ml   Net -1685 ml      Scheduled medications:  albuterol, 2.5 mg, nebulization, TID  cefepime, " 2 g, intravenous, q8h  tiotropium, 2 puff, inhalation, Daily   And  fluticasone furoate-vilanteroL, 1 puff, inhalation, Daily  heparin (porcine), 5,000 Units, subcutaneous, q12h  lidocaine, 1 patch, transdermal, Daily  methylPREDNISolone sodium succinate (PF), 30 mg, intravenous, q12h  metroNIDAZOLE, 500 mg, intravenous, q8h  nicotine, 1 patch, transdermal, Daily  oxygen, , inhalation, Continuous - Inhalation  pantoprazole, 40 mg, oral, Daily  sodium chloride, 3 mL, nebulization, TID  spironolactone, 12.5 mg, oral, Daily    PRN medications: acetaminophen **OR** acetaminophen **OR** acetaminophen, albuterol, benzocaine-menthol, benzonatate, calcium carbonate, cyclobenzaprine, dextromethorphan-guaifenesin, gabapentin, guaiFENesin, HYDROcodone-acetaminophen, melatonin, ondansetron ODT **OR** ondansetron, polyethylene glycol    Relevant Results  Results for orders placed or performed during the hospital encounter of 06/06/25 (from the past 24 hours)   POCT GLUCOSE   Result Value Ref Range    POCT Glucose 236 (H) 74 - 99 mg/dL        XR chest 1 view  Result Date: 6/8/2025  FINDINGS: CARDIOMEDIASTINAL SILHOUETTE AND VASCULATURE:   Cardiac size:  Within normal limits considering patient rotation. Aortic shadow:  Within normal limits.   Mediastinal contours: Within normal limits.   Pulmonary vasculature:  The central vasculature is unremarkable   LUNGS: Persistent left basilar retrocardiac opacification probably from consolidated infiltrate. There is also a probable small effusion but which has decreased.   ABDOMEN AND OTHER FINDINGS: No remarkable upper abdominal findings.   BONES: No acute osseous changes. 1.  As above.       XR shoulder left 2+ views  Result Date: 6/7/2025  FINDINGS: Bony structures:  Intact   Joint spaces:  There appears to be mild osteophytosis at the glenoid indicating mild osteoarthritis. The acromioclavicular joint is fairly well maintained.   Soft tissues: Unremarkable without significant edema or  radiodense foreign body   Other:  Opacity left lower lung and base is probably from an effusion with atelectasis.  Mild osteoarthritis.      CT chest wo IV contrast  Result Date: 6/6/2025  FINDINGS: LUNGS AND AIRWAYS: The trachea and central airways are patent. No endobronchial lesion.   There is near complete interval resolution of consolidation previously seen in the left upper lobe, with a small region of the ground-glass and consolidative opacity along the left heart border (series 7, image [id number]), which could relate to residual pneumonia or developing scarring.   There is new/progressive consolidation in the left lower lobe with associated volume loss, likely representing combination of atelectasis and pneumonia, probably aspiration pneumonia, noting patchy opacification of the left lower lobe bronchial branches, and fluid layering in the proximal left mainstem bronchus.   There is also patchy opacification of the right middle and lower lobe bronchial branches, with complete atelectasis of the right middle lobe. Endobronchial lesion resulting in postobstructive atelectasis cannot be excluded. Pulmonology consultation suggested.   Background of moderate emphysema. Platelike opacities in the lower lungs suggesting atelectasis or scar. Trace pleural effusions. No pneumothorax.   MEDIASTINUM AND MARISOL, LOWER NECK AND AXILLA: The visualized thyroid gland is within normal limits.   No pathologically enlarged lymph nodes are seen. Calcified right hilar node, in keeping with sequelae of chronic granulomatous disease.   Esophagus appears within normal limits as seen. Small sliding hiatal hernia.   HEART AND VESSELS: The thoracic aorta is of normal course and caliber with mild vascular calcifications.   Main pulmonary artery is dilated, suggesting pulmonary arterial hypertension.   Mild coronary artery calcifications are seen. The study is not optimized for evaluation of coronary arteries.   Heart is enlarged. Mitral  annular calcification.   No evidence of pericardial effusion.   UPPER ABDOMEN: The visualized subdiaphragmatic structures demonstrate no acute findings. Cholelithiasis without evidence of acute cholecystitis.   CHEST WALL AND OSSEOUS STRUCTURES: There are no suspicious osseous lesions. Moderate to severe disc degeneration of the thoracic spine. There is near complete interval resolution of consolidation previously seen in the left upper lobe, with a small region of the ground-glass and consolidative opacity along the left heart border (series 7, image [id number]), which could relate to residual pneumonia or developing scarring.   There is new/progressive consolidation in the left lower lobe with associated volume loss, likely representing combination of atelectasis and pneumonia, probably aspiration pneumonia, noting patchy opacification of the left lower lobe bronchial branches, and fluid layering in the proximal left mainstem bronchus.   There is also patchy opacification of the right middle and lower lobe bronchial branches, with complete atelectasis of the right middle lobe. Endobronchial lesion resulting in postobstructive atelectasis cannot be excluded. Pulmonology consultation suggested.   Background of moderate emphysema. Platelike opacities in the lower lungs suggesting atelectasis or scar. Trace pleural effusions. No pneumothorax.   Cardiomegaly.   Cholelithiasis.       Assessment & Plan    Acute on chronic hypoxic, hypercapnic respiratory failure  Oxygen at baseline  BiPAP nightly  Incentive spirometry/pulmonary hygiene/Acapella/3% nebulized saline    Aspiration pneumonia  Continue IV cefepime, IV meropenem  Urine Legionella, MRSA negative  Strep pneumo positive  Procalcitonin: 0.06 ng/mL  Follow-up cultures  Regular diet, thin liquids per ST  Infectious Disease follow-up    Acute COPD exacerbation  IBD/ICS  IV Solu-Medrol completed  Inpatient consultation to COPD navigator  Pulmonary function testing as  outpatient  Outpatient pulmonary follow-up and 2 weeks    Questionable endobronchial lesion  CT scan chest with patchy opacification of the right middle and lower lobe bronchial branches, complete atelectasis of the right middle lobe. Endobronchial lesion resulting in postobstructive atelectasis cannot be excluded  Follow-up CT scan chest as outpatient    Tobacco use  Smoking cessation  Nicotine patch    Prophylaxis  Subcutaneous heparin  Protonix    Discharge planning-SNF recommended, facilities in review    Luciana Vazquez, APRN-CNP  Pulmonary & Critical Care Medicine

## 2025-06-10 NOTE — PROGRESS NOTES
Subha iMller is a 65 y.o. female on day 3 of admission presenting with HAP (hospital-acquired pneumonia).    Subjective   Interval History:   Feeling breathing improving  Reports chronic back discomfort   Intermittent productive cough  No shortness of breath at rest  No chest pain  Remains on low-flow oxygen  No nausea vomiting or diarrhea      Objective   Range of Vitals (last 24 hours)  Heart Rate:  [72-92]   Temp:  [36.1 °C (97 °F)-36.9 °C (98.4 °F)]   Resp:  [14-20]   BP: (108-142)/(56-86)   SpO2:  [93 %-98 %]   Daily Weight  06/06/25 : 90.7 kg (200 lb)    Body mass index is 35.43 kg/m².    Physical Exam  Constitutional:       Appearance: Normal appearance.   HENT:      Head: Normocephalic and atraumatic.   Eyes:      Extraocular Movements: Extraocular movements intact.      Conjunctiva/sclera: Conjunctivae normal.   Cardiovascular:      Rate and Rhythm: Normal rate.   Pulmonary:      Effort: Pulmonary effort is normal.      Breath sounds: Decreased bilateral breath sounds.   Abdominal:      General: Bowel sounds are normal.      Palpations: Abdomen is soft.      Tenderness: There is no abdominal tenderness.   Musculoskeletal:         General: No swelling. Normal range of motion.      Cervical back: Normal range of motion and neck supple.      Right lower leg: No edema.      Left lower leg: No edema.      Comments: Left shoulder tenderness with movement  Skin:     General: Skin is warm and dry.   Neurological:      General: No focal deficit present.      Mental Status: She is alert and oriented to person, place, and time.   Psychiatric:         Mood and Affect: Mood normal.         Behavior: Behavior normal.     Antibiotics  meropenem - 1 gram/50 mL    Relevant Results  Labs  Results from last 72 hours   Lab Units 06/09/25  0700 06/08/25  0804   WBC AUTO x10*3/uL 12.8* 12.6*   HEMOGLOBIN g/dL 14.7 15.7   HEMATOCRIT % 47.7* 50.2*   PLATELETS AUTO x10*3/uL 119* 135*     Results from last 72 hours   Lab Units  06/09/25  0700 06/08/25  0727   SODIUM mmol/L 137 137   POTASSIUM mmol/L 4.6 4.7   CHLORIDE mmol/L 100 100   CO2 mmol/L 33* 33*   BUN mg/dL 25* 25*   CREATININE mg/dL 0.55 0.51   GLUCOSE mg/dL 172* 122*   CALCIUM mg/dL 8.9 9.1   ANION GAP mmol/L 9* 9*   EGFR mL/min/1.73m*2 >90 >90           Estimated Creatinine Clearance: 106.1 mL/min (by C-G formula based on SCr of 0.55 mg/dL).  C-Reactive Protein   Date Value Ref Range Status   02/02/2025 1.75 (H) <1.00 mg/dL Final   01/02/2024 0.66 <1.00 mg/dL Final   01/01/2024 0.71 <1.00 mg/dL Final     Microbiology  Reviewed-positive urine streptococcal antigen, blood culture pending, negative is MRSA PCR  Imaging  XR chest 1 view  Result Date: 6/8/2025  Interpreted By:  Jamie Fuller, STUDY: XR CHEST 1 VIEW;  6/7/2025 4:15 pm   INDICATION: Signs/Symptoms:baseline LLL consolidation.   COMPARISON: 02/03/2025   ACCESSION NUMBER(S): [id number]   ORDERING CLINICIAN: MARYSE RAMIREZ   FINDINGS: CARDIOMEDIASTINAL SILHOUETTE AND VASCULATURE:   Cardiac size:  Within normal limits considering patient rotation. Aortic shadow:  Within normal limits.   Mediastinal contours: Within normal limits.   Pulmonary vasculature:  The central vasculature is unremarkable   LUNGS: Persistent left basilar retrocardiac opacification probably from consolidated infiltrate. There is also a probable small effusion but which has decreased.   ABDOMEN AND OTHER FINDINGS: No remarkable upper abdominal findings.   BONES: No acute osseous changes.       1.  As above   Signed by: Jamie Fuller 6/8/2025 1:58 PM Dictation workstation:   [id number]    XR shoulder left 2+ views  Result Date: 6/7/2025  Interpreted By:  Jamie Fuller, STUDY: XR SHOULDER LEFT 2+ VIEWS;  6/7/2025 7:46 am   INDICATION: Signs/Symptoms:Pain.   COMPARISON: None.   ACCESSION NUMBER(S): [id number]   ORDERING CLINICIAN: BRANDAN RAGLAND   FINDINGS: Bony structures:  Intact   Joint spaces:  There appears to be mild osteophytosis at the  glenoid indicating mild osteoarthritis. The acromioclavicular joint is fairly well maintained.   Soft tissues:  Unremarkable without significant edema or radiodense foreign body   Other:  Opacity left lower lung and base is probably from an effusion with atelectasis.       Mild osteoarthritis.   MACRO: None   Signed by: Jamie Fuller 6/7/2025 1:40 PM Dictation workstation:   [id number]    CT chest wo IV contrast  Result Date: 6/6/2025  Interpreted By:  Cory Riley, STUDY: CT CHEST WO IV CONTRAST;  6/6/2025 9:39 pm   INDICATION: Signs/Symptoms:gen weakness.     COMPARISON: CT chest with contrast from 02/01/2025.   ACCESSION NUMBER(S): [id number]   ORDERING CLINICIAN: SHANI WELLS   TECHNIQUE: Helical data acquisition of the chest was obtained  without IV contrast material.  Images were reformatted in axial, coronal, and sagittal planes.   FINDINGS: LUNGS AND AIRWAYS: The trachea and central airways are patent. No endobronchial lesion.   There is near complete interval resolution of consolidation previously seen in the left upper lobe, with a small region of the ground-glass and consolidative opacity along the left heart border (series 7, image [id number]), which could relate to residual pneumonia or developing scarring.   There is new/progressive consolidation in the left lower lobe with associated volume loss, likely representing combination of atelectasis and pneumonia, probably aspiration pneumonia, noting patchy opacification of the left lower lobe bronchial branches, and fluid layering in the proximal left mainstem bronchus.   There is also patchy opacification of the right middle and lower lobe bronchial branches, with complete atelectasis of the right middle lobe. Endobronchial lesion resulting in postobstructive atelectasis cannot be excluded. Pulmonology consultation suggested.   Background of moderate emphysema. Platelike opacities in the lower lungs suggesting atelectasis or scar. Trace pleural  effusions. No pneumothorax.   MEDIASTINUM AND MARISOL, LOWER NECK AND AXILLA: The visualized thyroid gland is within normal limits.   No pathologically enlarged lymph nodes are seen. Calcified right hilar node, in keeping with sequelae of chronic granulomatous disease.   Esophagus appears within normal limits as seen. Small sliding hiatal hernia.   HEART AND VESSELS: The thoracic aorta is of normal course and caliber with mild vascular calcifications.   Main pulmonary artery is dilated, suggesting pulmonary arterial hypertension.   Mild coronary artery calcifications are seen. The study is not optimized for evaluation of coronary arteries.   Heart is enlarged. Mitral annular calcification.   No evidence of pericardial effusion.   UPPER ABDOMEN: The visualized subdiaphragmatic structures demonstrate no acute findings. Cholelithiasis without evidence of acute cholecystitis.   CHEST WALL AND OSSEOUS STRUCTURES: There are no suspicious osseous lesions. Moderate to severe disc degeneration of the thoracic spine.       There is near complete interval resolution of consolidation previously seen in the left upper lobe, with a small region of the ground-glass and consolidative opacity along the left heart border (series 7, image [id number]), which could relate to residual pneumonia or developing scarring.   There is new/progressive consolidation in the left lower lobe with associated volume loss, likely representing combination of atelectasis and pneumonia, probably aspiration pneumonia, noting patchy opacification of the left lower lobe bronchial branches, and fluid layering in the proximal left mainstem bronchus.   There is also patchy opacification of the right middle and lower lobe bronchial branches, with complete atelectasis of the right middle lobe. Endobronchial lesion resulting in postobstructive atelectasis cannot be excluded. Pulmonology consultation suggested.   Background of moderate emphysema. Platelike opacities in  the lower lungs suggesting atelectasis or scar. Trace pleural effusions. No pneumothorax.   Cardiomegaly.   Cholelithiasis.   MACRO: None   Signed by: Cory Riley 6/6/2025 10:43 PM Dictation workstation:   [id number]     Assessment/Plan   Pneumonia - gram negative versus gram positive-positive streptococcal antigen, aspiration also suspected, negative nasal MRSA PCR  Chronic respiratory failure-on 4LNC at baseline, recently intubated during previous hospitalization  Abnormal CT chest-possible endobronchial lesion  Left shoulder pain - unclear etiology, x-ray -mild osteoarthritis   Allergy penicillin   Thrombocytopenia         IV meropenem  Sputum culture pending   Monitor WBC and temperature  Monitor renal function  Follow-up blood culture  Oxygen as needed  Supportive care  Pulmonary follow-up  De-escalate based on pending work-up/sputum culture          Cristela Donnelly, APRN-CNP

## 2025-06-10 NOTE — PROGRESS NOTES
"Subha Miller is a 65 y.o. female on day 3 of admission presenting with HAP (hospital-acquired pneumonia).    Subjective   Patient seen and examined.  Patient says her dyspnea is improving.       Objective     Physical Exam  Constitutional:       General: She is not in acute distress.     Appearance: She is ill-appearing. She is not toxic-appearing.   Cardiovascular:      Rate and Rhythm: Normal rate and regular rhythm.      Pulses: Normal pulses.      Heart sounds: Normal heart sounds.   Pulmonary:      Effort: Pulmonary effort is normal. No respiratory distress.      Breath sounds: Normal breath sounds. No wheezing, rhonchi or rales.   Abdominal:      General: Bowel sounds are normal.      Palpations: Abdomen is soft.   Musculoskeletal:      Right lower leg: No edema.      Left lower leg: No edema.   Skin:     General: Skin is warm and dry.   Neurological:      General: No focal deficit present.      Mental Status: She is alert and oriented to person, place, and time.        Last Recorded Vitals  Blood pressure 111/60, pulse 79, temperature 36.5 °C (97.7 °F), temperature source Temporal, resp. rate 15, height 1.6 m (5' 3\"), weight 90.7 kg (200 lb), SpO2 97%.  Intake/Output last 3 Shifts:  I/O last 3 completed shifts:  In: 990 (10.9 mL/kg) [P.O.:840; IV Piggyback:150]  Out: 2750 (30.3 mL/kg) [Urine:2750 (0.8 mL/kg/hr)]  Weight: 90.7 kg     Relevant Results    Scheduled medications  Scheduled Medications[1]  Continuous medications  Continuous Medications[2]  PRN medications  PRN Medications[3]                    Assessment and Plan      PNA  -likely aspiration  -pulmology following   -IV cefepime  -sputum cx if able  -ST recommend regular diet with thin liquids  -ID following     COPD with acute exacerbation  -pulmonology following   -IBD/ICS  -IV steroids  -PFT on outpatient basis     Tobacco Use   -encourage cessation  -Nicotine patch     LT shoulder pain  -Xray shows OA  -lidocaine patch    Possible endobronchial " lesion  -Pulmonology following  -Follow-up CT scan as outpatient per pulmonology     Disposition  Above plan discussed with pt                       Anna Lambert, AILYN-CNP         [1] albuterol, 2.5 mg, nebulization, TID  tiotropium, 2 puff, inhalation, Daily   And  fluticasone furoate-vilanteroL, 1 puff, inhalation, Daily  heparin (porcine), 5,000 Units, subcutaneous, q12h  lidocaine, 1 patch, transdermal, Daily  meropenem, 1 g, intravenous, q8h  nicotine, 1 patch, transdermal, Daily  oxygen, , inhalation, Continuous - Inhalation  pantoprazole, 40 mg, oral, Daily  sodium chloride, 3 mL, nebulization, TID  spironolactone, 12.5 mg, oral, Daily  [2]    [3] PRN medications: acetaminophen **OR** acetaminophen **OR** acetaminophen, albuterol, benzocaine-menthol, benzonatate, calcium carbonate, cyclobenzaprine, dextromethorphan-guaifenesin, gabapentin, guaiFENesin, HYDROcodone-acetaminophen, melatonin, ondansetron ODT **OR** ondansetron, polyethylene glycol

## 2025-06-10 NOTE — CARE PLAN
Problem: Pain - Adult  Goal: Verbalizes/displays adequate comfort level or baseline comfort level  Outcome: Progressing     Problem: Safety - Adult  Goal: Free from fall injury  Outcome: Progressing     Problem: Discharge Planning  Goal: Discharge to home or other facility with appropriate resources  Outcome: Progressing     Problem: Chronic Conditions and Co-morbidities  Goal: Patient's chronic conditions and co-morbidity symptoms are monitored and maintained or improved  Outcome: Progressing     Problem: Nutrition  Goal: Nutrient intake appropriate for maintaining nutritional needs  Outcome: Progressing     Problem: Skin  Goal: Decreased wound size/increased tissue granulation at next dressing change  Outcome: Progressing  Goal: Participates in plan/prevention/treatment measures  Outcome: Progressing  Goal: Prevent/manage excess moisture  Outcome: Progressing  Goal: Prevent/minimize sheer/friction injuries  Outcome: Progressing  Goal: Promote/optimize nutrition  Outcome: Progressing  Flowsheets (Taken 6/10/2025 1638)  Promote/optimize nutrition:   Assist with feeding   Monitor/record intake including meals   Consume > 50% meals/supplements   Offer water/supplements/favorite foods   Discuss with provider if NPO > 2 days  Goal: Promote skin healing  Outcome: Progressing     Problem: Pain  Goal: Takes deep breaths with improved pain control throughout the shift  Outcome: Progressing  Goal: Turns in bed with improved pain control throughout the shift  Outcome: Progressing  Goal: Walks with improved pain control throughout the shift  Outcome: Progressing  Goal: Performs ADL's with improved pain control throughout shift  Outcome: Progressing  Goal: Participates in PT with improved pain control throughout the shift  Outcome: Progressing  Goal: Free from opioid side effects throughout the shift  Outcome: Progressing  Goal: Free from acute confusion related to pain meds throughout the shift  Outcome: Progressing      Problem: Fall/Injury  Goal: Not fall by end of shift  Outcome: Progressing  Goal: Be free from injury by end of the shift  Outcome: Progressing  Goal: Verbalize understanding of personal risk factors for fall in the hospital  Outcome: Progressing  Goal: Verbalize understanding of risk factor reduction measures to prevent injury from fall in the home  Outcome: Progressing  Goal: Use assistive devices by end of the shift  Outcome: Progressing  Goal: Pace activities to prevent fatigue by end of the shift  Outcome: Progressing     Problem: Pain  Goal: STG - Patient will verbalize an acceptable level of pain  Outcome: Progressing   The patient's goals for the shift include      The clinical goals for the shift include pain management, safety    Over the shift, the patient did not make progress toward the following goals. Barriers to progression include na. Recommendations to address these barriers include na.

## 2025-06-10 NOTE — PROGRESS NOTES
06/10/25 1837   Discharge Planning   Living Arrangements Spouse/significant other   Support Systems Spouse/significant other   Home or Post Acute Services Post acute facilities (Rehab/SNF/etc)   Type of Post Acute Facility Services Skilled nursing   Expected Discharge Disposition SNF  (Accepted at [facility])   Does the patient need discharge transport arranged? Yes   RoundTrip coordination needed? Yes   Has discharge transport been arranged? No     Was accepted to [facility] and precert approved and good from 6/10 to 6/17, auth#[id number]     PLAN: Discharge to [facility] and precert is approved.

## 2025-06-11 VITALS
RESPIRATION RATE: 16 BRPM | TEMPERATURE: 97.5 F | HEIGHT: 63 IN | HEART RATE: 79 BPM | WEIGHT: 200 LBS | DIASTOLIC BLOOD PRESSURE: 63 MMHG | SYSTOLIC BLOOD PRESSURE: 110 MMHG | BODY MASS INDEX: 35.44 KG/M2 | OXYGEN SATURATION: 96 %

## 2025-06-11 LAB
ALBUMIN SERPL BCP-MCNC: 3.2 G/DL (ref 3.4–5)
ALP SERPL-CCNC: 49 U/L (ref 33–136)
ALT SERPL W P-5'-P-CCNC: 48 U/L (ref 7–45)
ANION GAP SERPL CALCULATED.3IONS-SCNC: 8 MMOL/L (ref 10–20)
AST SERPL W P-5'-P-CCNC: 29 U/L (ref 9–39)
ATRIAL RATE: 82 BPM
BACTERIA BLD CULT: NORMAL
BASOPHILS # BLD AUTO: 0.02 X10*3/UL (ref 0–0.1)
BASOPHILS NFR BLD AUTO: 0.2 %
BILIRUB SERPL-MCNC: 0.6 MG/DL (ref 0–1.2)
BUN SERPL-MCNC: 24 MG/DL (ref 6–23)
CALCIUM SERPL-MCNC: 8.9 MG/DL (ref 8.6–10.3)
CHLORIDE SERPL-SCNC: 100 MMOL/L (ref 98–107)
CO2 SERPL-SCNC: 34 MMOL/L (ref 21–32)
CREAT SERPL-MCNC: 0.43 MG/DL (ref 0.5–1.05)
EGFRCR SERPLBLD CKD-EPI 2021: >90 ML/MIN/1.73M*2
EOSINOPHIL # BLD AUTO: 0.08 X10*3/UL (ref 0–0.7)
EOSINOPHIL NFR BLD AUTO: 0.7 %
ERYTHROCYTE [DISTWIDTH] IN BLOOD BY AUTOMATED COUNT: 14 % (ref 11.5–14.5)
GLUCOSE SERPL-MCNC: 135 MG/DL (ref 74–99)
HCT VFR BLD AUTO: 45.7 % (ref 36–46)
HGB BLD-MCNC: 14.1 G/DL (ref 12–16)
IMM GRANULOCYTES # BLD AUTO: 0.16 X10*3/UL (ref 0–0.7)
IMM GRANULOCYTES NFR BLD AUTO: 1.4 % (ref 0–0.9)
LYMPHOCYTES # BLD AUTO: 2.61 X10*3/UL (ref 1.2–4.8)
LYMPHOCYTES NFR BLD AUTO: 23.2 %
MCH RBC QN AUTO: 31.3 PG (ref 26–34)
MCHC RBC AUTO-ENTMCNC: 30.9 G/DL (ref 32–36)
MCV RBC AUTO: 102 FL (ref 80–100)
MONOCYTES # BLD AUTO: 0.82 X10*3/UL (ref 0.1–1)
MONOCYTES NFR BLD AUTO: 7.3 %
NEUTROPHILS # BLD AUTO: 7.57 X10*3/UL (ref 1.2–7.7)
NEUTROPHILS NFR BLD AUTO: 67.2 %
NRBC BLD-RTO: 0 /100 WBCS (ref 0–0)
P AXIS: 42 DEGREES
P OFFSET: 190 MS
P ONSET: 137 MS
PLATELET # BLD AUTO: 151 X10*3/UL (ref 150–450)
POTASSIUM SERPL-SCNC: 4.3 MMOL/L (ref 3.5–5.3)
PR INTERVAL: 164 MS
PROT SERPL-MCNC: 5.6 G/DL (ref 6.4–8.2)
Q ONSET: 219 MS
QRS COUNT: 14 BEATS
QRS DURATION: 84 MS
QT INTERVAL: 370 MS
QTC CALCULATION(BAZETT): 432 MS
QTC FREDERICIA: 410 MS
R AXIS: 55 DEGREES
RBC # BLD AUTO: 4.5 X10*6/UL (ref 4–5.2)
SODIUM SERPL-SCNC: 138 MMOL/L (ref 136–145)
T AXIS: 4 DEGREES
T OFFSET: 404 MS
VENTRICULAR RATE: 82 BPM
WBC # BLD AUTO: 11.3 X10*3/UL (ref 4.4–11.3)

## 2025-06-11 PROCEDURE — 2500000004 HC RX 250 GENERAL PHARMACY W/ HCPCS (ALT 636 FOR OP/ED): Performed by: NURSE PRACTITIONER

## 2025-06-11 PROCEDURE — 2500000005 HC RX 250 GENERAL PHARMACY W/O HCPCS: Performed by: INTERNAL MEDICINE

## 2025-06-11 PROCEDURE — 85025 COMPLETE CBC W/AUTO DIFF WBC: CPT | Performed by: NURSE PRACTITIONER

## 2025-06-11 PROCEDURE — 36415 COLL VENOUS BLD VENIPUNCTURE: CPT | Performed by: NURSE PRACTITIONER

## 2025-06-11 PROCEDURE — 94660 CPAP INITIATION&MGMT: CPT

## 2025-06-11 PROCEDURE — 99239 HOSP IP/OBS DSCHRG MGMT >30: CPT | Performed by: NURSE PRACTITIONER

## 2025-06-11 PROCEDURE — 94668 MNPJ CHEST WALL SBSQ: CPT

## 2025-06-11 PROCEDURE — 2500000005 HC RX 250 GENERAL PHARMACY W/O HCPCS

## 2025-06-11 PROCEDURE — 99232 SBSQ HOSP IP/OBS MODERATE 35: CPT | Performed by: NURSE PRACTITIONER

## 2025-06-11 PROCEDURE — 2500000002 HC RX 250 W HCPCS SELF ADMINISTERED DRUGS (ALT 637 FOR MEDICARE OP, ALT 636 FOR OP/ED)

## 2025-06-11 PROCEDURE — 2500000004 HC RX 250 GENERAL PHARMACY W/ HCPCS (ALT 636 FOR OP/ED): Performed by: INTERNAL MEDICINE

## 2025-06-11 PROCEDURE — S4991 NICOTINE PATCH NONLEGEND: HCPCS | Performed by: INTERNAL MEDICINE

## 2025-06-11 PROCEDURE — 2500000001 HC RX 250 WO HCPCS SELF ADMINISTERED DRUGS (ALT 637 FOR MEDICARE OP): Performed by: INTERNAL MEDICINE

## 2025-06-11 PROCEDURE — 80053 COMPREHEN METABOLIC PANEL: CPT | Performed by: NURSE PRACTITIONER

## 2025-06-11 PROCEDURE — 94640 AIRWAY INHALATION TREATMENT: CPT

## 2025-06-11 PROCEDURE — 2500000002 HC RX 250 W HCPCS SELF ADMINISTERED DRUGS (ALT 637 FOR MEDICARE OP, ALT 636 FOR OP/ED): Performed by: INTERNAL MEDICINE

## 2025-06-11 RX ORDER — IBUPROFEN 200 MG
1 TABLET ORAL DAILY
Start: 2025-06-12

## 2025-06-11 RX ORDER — GABAPENTIN 600 MG/1
600 TABLET ORAL 3 TIMES DAILY PRN
Qty: 9 TABLET | Refills: 0 | Status: SHIPPED | OUTPATIENT
Start: 2025-06-11

## 2025-06-11 RX ORDER — CEFUROXIME AXETIL 500 MG/1
500 TABLET ORAL 2 TIMES DAILY
Start: 2025-06-11 | End: 2025-06-13

## 2025-06-11 RX ORDER — SPIRONOLACTONE 25 MG/1
12.5 TABLET ORAL DAILY
Start: 2025-06-12

## 2025-06-11 RX ORDER — LIDOCAINE 560 MG/1
1 PATCH PERCUTANEOUS; TOPICAL; TRANSDERMAL DAILY
Start: 2025-06-12 | End: 2025-07-12

## 2025-06-11 RX ORDER — GABAPENTIN 600 MG/1
600 TABLET ORAL 3 TIMES DAILY PRN
Qty: 9 TABLET | Refills: 0 | Status: SHIPPED | OUTPATIENT
Start: 2025-06-11 | End: 2025-06-11

## 2025-06-11 RX ORDER — IPRATROPIUM BROMIDE AND ALBUTEROL SULFATE 2.5; .5 MG/3ML; MG/3ML
3 SOLUTION RESPIRATORY (INHALATION) 4 TIMES DAILY PRN
Start: 2025-06-11

## 2025-06-11 RX ORDER — GABAPENTIN 600 MG/1
600 TABLET ORAL 3 TIMES DAILY
Qty: 9 TABLET | Refills: 0 | Status: SHIPPED | OUTPATIENT
Start: 2025-06-11 | End: 2025-06-11 | Stop reason: HOSPADM

## 2025-06-11 RX ORDER — METRONIDAZOLE 500 MG/1
500 TABLET ORAL 3 TIMES DAILY
Start: 2025-06-11 | End: 2025-06-13

## 2025-06-11 RX ADMIN — HEPARIN SODIUM 5000 UNITS: 5000 INJECTION, SOLUTION INTRAVENOUS; SUBCUTANEOUS at 05:46

## 2025-06-11 RX ADMIN — LIDOCAINE 4% 1 PATCH: 40 PATCH TOPICAL at 08:36

## 2025-06-11 RX ADMIN — SODIUM CHLORIDE SOLN NEBU 3% 3 ML: 3 NEBU SOLN at 07:19

## 2025-06-11 RX ADMIN — Medication 36 PERCENT: at 07:19

## 2025-06-11 RX ADMIN — PANTOPRAZOLE SODIUM 40 MG: 40 TABLET, DELAYED RELEASE ORAL at 08:36

## 2025-06-11 RX ADMIN — ACETAMINOPHEN 650 MG: 325 TABLET ORAL at 00:04

## 2025-06-11 RX ADMIN — TIOTROPIUM BROMIDE INHALATION SPRAY 2 PUFF: 3.12 SPRAY, METERED RESPIRATORY (INHALATION) at 07:20

## 2025-06-11 RX ADMIN — ALBUTEROL SULFATE 2.5 MG: 2.5 SOLUTION RESPIRATORY (INHALATION) at 07:20

## 2025-06-11 RX ADMIN — MEROPENEM AND SODIUM CHLORIDE 1 G: 1 INJECTION, SOLUTION INTRAVENOUS at 01:23

## 2025-06-11 RX ADMIN — NICOTINE 1 PATCH: 14 PATCH, EXTENDED RELEASE TRANSDERMAL at 08:36

## 2025-06-11 RX ADMIN — Medication 36 PERCENT: at 07:18

## 2025-06-11 RX ADMIN — FLUTICASONE FUROATE AND VILANTEROL TRIFENATATE 1 PUFF: 100; 25 POWDER RESPIRATORY (INHALATION) at 07:20

## 2025-06-11 RX ADMIN — SODIUM CHLORIDE SOLN NEBU 3% 3 ML: 3 NEBU SOLN at 13:25

## 2025-06-11 RX ADMIN — MEROPENEM AND SODIUM CHLORIDE 1 G: 1 INJECTION, SOLUTION INTRAVENOUS at 08:36

## 2025-06-11 RX ADMIN — SPIRONOLACTONE 12.5 MG: 25 TABLET ORAL at 08:36

## 2025-06-11 RX ADMIN — ACETAMINOPHEN 650 MG: 325 TABLET ORAL at 08:37

## 2025-06-11 RX ADMIN — ALBUTEROL SULFATE 2.5 MG: 2.5 SOLUTION RESPIRATORY (INHALATION) at 13:25

## 2025-06-11 ASSESSMENT — PAIN DESCRIPTION - DESCRIPTORS: DESCRIPTORS: ACHING

## 2025-06-11 ASSESSMENT — PAIN SCALES - GENERAL
PAINLEVEL_OUTOF10: 0 - NO PAIN
PAINLEVEL_OUTOF10: 7
PAINLEVEL_OUTOF10: 0 - NO PAIN
PAINLEVEL_OUTOF10: 6

## 2025-06-11 ASSESSMENT — COGNITIVE AND FUNCTIONAL STATUS - GENERAL
MOVING FROM LYING ON BACK TO SITTING ON SIDE OF FLAT BED WITH BEDRAILS: A LITTLE
CLIMB 3 TO 5 STEPS WITH RAILING: TOTAL
TURNING FROM BACK TO SIDE WHILE IN FLAT BAD: A LOT
MOBILITY SCORE: 12
DAILY ACTIVITIY SCORE: 17
HELP NEEDED FOR BATHING: A LITTLE
STANDING UP FROM CHAIR USING ARMS: A LOT
MOVING TO AND FROM BED TO CHAIR: A LOT
DRESSING REGULAR UPPER BODY CLOTHING: A LITTLE
TOILETING: A LOT
WALKING IN HOSPITAL ROOM: A LOT
PERSONAL GROOMING: A LITTLE
DRESSING REGULAR LOWER BODY CLOTHING: A LOT

## 2025-06-11 ASSESSMENT — PAIN DESCRIPTION - ORIENTATION: ORIENTATION: LEFT

## 2025-06-11 ASSESSMENT — PAIN - FUNCTIONAL ASSESSMENT
PAIN_FUNCTIONAL_ASSESSMENT: FLACC (FACE, LEGS, ACTIVITY, CRY, CONSOLABILITY)
PAIN_FUNCTIONAL_ASSESSMENT: 0-10
PAIN_FUNCTIONAL_ASSESSMENT: FLACC (FACE, LEGS, ACTIVITY, CRY, CONSOLABILITY)
PAIN_FUNCTIONAL_ASSESSMENT: 0-10

## 2025-06-11 ASSESSMENT — PAIN DESCRIPTION - LOCATION: LOCATION: SHOULDER

## 2025-06-11 NOTE — PROGRESS NOTES
"Subha Miller is a 65 y.o. female on day 4 of admission seen in follow-up for acute on chronic hypoxic hypercapnic respiratory failure, pneumonia, acute COPD exacerbation, questionable endobronchial lesion    Subjective   On 4 L nasal cannula oxygen; O2 sats 98%. No respiratory complaints.  Denies pain.  Afebrile.    Objective     Physical Exam  Vitals and nursing note reviewed.   Constitutional:       Appearance: She is obese.   HENT:      Head: Normocephalic and atraumatic.      Nose: Nose normal.      Mouth/Throat:      Mouth: Mucous membranes are moist.   Eyes:      Extraocular Movements: Extraocular movements intact.      Conjunctiva/sclera: Conjunctivae normal.      Pupils: Pupils are equal, round, and reactive to light.   Cardiovascular:      Rate and Rhythm: Normal rate and regular rhythm.      Pulses: Normal pulses.      Heart sounds: Normal heart sounds.   Pulmonary:      Effort: Pulmonary effort is normal.      Breath sounds: Normal breath sounds.      Comments: Coarse breath sounds with expiratory wheezes bilaterally.  Abdominal:      General: Bowel sounds are normal.      Palpations: Abdomen is soft.   Musculoskeletal:         General: Normal range of motion.   Skin:     General: Skin is warm and dry.      Capillary Refill: Capillary refill takes less than 2 seconds.   Neurological:      General: No focal deficit present.      Mental Status: She is alert and oriented to person, place, and time.   Psychiatric:         Mood and Affect: Mood normal.         Behavior: Behavior normal.         Last Recorded Vitals  Blood pressure 110/63, pulse 79, temperature 36.4 °C (97.5 °F), temperature source Temporal, resp. rate 16, height 1.6 m (5' 3\"), weight 90.7 kg (200 lb), SpO2 96%.      Intake/Output Summary (Last 24 hours) at 6/11/2025 1451  Last data filed at 6/11/2025 1100  Gross per 24 hour   Intake 360 ml   Output 1850 ml   Net -1490 ml      Scheduled medications:  albuterol, 2.5 mg, nebulization, " TID  cefepime, 2 g, intravenous, q8h  tiotropium, 2 puff, inhalation, Daily   And  fluticasone furoate-vilanteroL, 1 puff, inhalation, Daily  heparin (porcine), 5,000 Units, subcutaneous, q12h  lidocaine, 1 patch, transdermal, Daily  methylPREDNISolone sodium succinate (PF), 30 mg, intravenous, q12h  metroNIDAZOLE, 500 mg, intravenous, q8h  nicotine, 1 patch, transdermal, Daily  oxygen, , inhalation, Continuous - Inhalation  pantoprazole, 40 mg, oral, Daily  sodium chloride, 3 mL, nebulization, TID  spironolactone, 12.5 mg, oral, Daily    PRN medications: acetaminophen **OR** acetaminophen **OR** acetaminophen, albuterol, benzocaine-menthol, benzonatate, calcium carbonate, cyclobenzaprine, dextromethorphan-guaifenesin, gabapentin, guaiFENesin, HYDROcodone-acetaminophen, melatonin, ondansetron ODT **OR** ondansetron, polyethylene glycol    Relevant Results  Results for orders placed or performed during the hospital encounter of 06/06/25 (from the past 24 hours)   Comprehensive Metabolic Panel   Result Value Ref Range    Glucose 135 (H) 74 - 99 mg/dL    Sodium 138 136 - 145 mmol/L    Potassium 4.3 3.5 - 5.3 mmol/L    Chloride 100 98 - 107 mmol/L    Bicarbonate 34 (H) 21 - 32 mmol/L    Anion Gap 8 (L) 10 - 20 mmol/L    Urea Nitrogen 24 (H) 6 - 23 mg/dL    Creatinine 0.43 (L) 0.50 - 1.05 mg/dL    eGFR >90 >60 mL/min/1.73m*2    Calcium 8.9 8.6 - 10.3 mg/dL    Albumin 3.2 (L) 3.4 - 5.0 g/dL    Alkaline Phosphatase 49 33 - 136 U/L    Total Protein 5.6 (L) 6.4 - 8.2 g/dL    AST 29 9 - 39 U/L    Bilirubin, Total 0.6 0.0 - 1.2 mg/dL    ALT 48 (H) 7 - 45 U/L   CBC and Auto Differential   Result Value Ref Range    WBC 11.3 4.4 - 11.3 x10*3/uL    nRBC 0.0 0.0 - 0.0 /100 WBCs    RBC 4.50 4.00 - 5.20 x10*6/uL    Hemoglobin 14.1 12.0 - 16.0 g/dL    Hematocrit 45.7 36.0 - 46.0 %     (H) 80 - 100 fL    MCH 31.3 26.0 - 34.0 pg    MCHC 30.9 (L) 32.0 - 36.0 g/dL    RDW 14.0 11.5 - 14.5 %    Platelets 151 150 - 450 x10*3/uL     Neutrophils % 67.2 40.0 - 80.0 %    Immature Granulocytes %, Automated 1.4 (H) 0.0 - 0.9 %    Lymphocytes % 23.2 13.0 - 44.0 %    Monocytes % 7.3 2.0 - 10.0 %    Eosinophils % 0.7 0.0 - 6.0 %    Basophils % 0.2 0.0 - 2.0 %    Neutrophils Absolute 7.57 1.20 - 7.70 x10*3/uL    Immature Granulocytes Absolute, Automated 0.16 0.00 - 0.70 x10*3/uL    Lymphocytes Absolute 2.61 1.20 - 4.80 x10*3/uL    Monocytes Absolute 0.82 0.10 - 1.00 x10*3/uL    Eosinophils Absolute 0.08 0.00 - 0.70 x10*3/uL    Basophils Absolute 0.02 0.00 - 0.10 x10*3/uL        XR chest 1 view  Result Date: 6/8/2025  FINDINGS: CARDIOMEDIASTINAL SILHOUETTE AND VASCULATURE:   Cardiac size:  Within normal limits considering patient rotation. Aortic shadow:  Within normal limits.   Mediastinal contours: Within normal limits.   Pulmonary vasculature:  The central vasculature is unremarkable   LUNGS: Persistent left basilar retrocardiac opacification probably from consolidated infiltrate. There is also a probable small effusion but which has decreased.   ABDOMEN AND OTHER FINDINGS: No remarkable upper abdominal findings.   BONES: No acute osseous changes. 1.  As above.       XR shoulder left 2+ views  Result Date: 6/7/2025  FINDINGS: Bony structures:  Intact   Joint spaces:  There appears to be mild osteophytosis at the glenoid indicating mild osteoarthritis. The acromioclavicular joint is fairly well maintained.   Soft tissues: Unremarkable without significant edema or radiodense foreign body   Other:  Opacity left lower lung and base is probably from an effusion with atelectasis.  Mild osteoarthritis.      CT chest wo IV contrast  Result Date: 6/6/2025  FINDINGS: LUNGS AND AIRWAYS: The trachea and central airways are patent. No endobronchial lesion.   There is near complete interval resolution of consolidation previously seen in the left upper lobe, with a small region of the ground-glass and consolidative opacity along the left heart border (series 7,  image [id number]), which could relate to residual pneumonia or developing scarring.   There is new/progressive consolidation in the left lower lobe with associated volume loss, likely representing combination of atelectasis and pneumonia, probably aspiration pneumonia, noting patchy opacification of the left lower lobe bronchial branches, and fluid layering in the proximal left mainstem bronchus.   There is also patchy opacification of the right middle and lower lobe bronchial branches, with complete atelectasis of the right middle lobe. Endobronchial lesion resulting in postobstructive atelectasis cannot be excluded. Pulmonology consultation suggested.   Background of moderate emphysema. Platelike opacities in the lower lungs suggesting atelectasis or scar. Trace pleural effusions. No pneumothorax.   MEDIASTINUM AND MARISOL, LOWER NECK AND AXILLA: The visualized thyroid gland is within normal limits.   No pathologically enlarged lymph nodes are seen. Calcified right hilar node, in keeping with sequelae of chronic granulomatous disease.   Esophagus appears within normal limits as seen. Small sliding hiatal hernia.   HEART AND VESSELS: The thoracic aorta is of normal course and caliber with mild vascular calcifications.   Main pulmonary artery is dilated, suggesting pulmonary arterial hypertension.   Mild coronary artery calcifications are seen. The study is not optimized for evaluation of coronary arteries.   Heart is enlarged. Mitral annular calcification.   No evidence of pericardial effusion.   UPPER ABDOMEN: The visualized subdiaphragmatic structures demonstrate no acute findings. Cholelithiasis without evidence of acute cholecystitis.   CHEST WALL AND OSSEOUS STRUCTURES: There are no suspicious osseous lesions. Moderate to severe disc degeneration of the thoracic spine. There is near complete interval resolution of consolidation previously seen in the left upper lobe, with a small region of the ground-glass and  consolidative opacity along the left heart border (series 7, image [id number]), which could relate to residual pneumonia or developing scarring.   There is new/progressive consolidation in the left lower lobe with associated volume loss, likely representing combination of atelectasis and pneumonia, probably aspiration pneumonia, noting patchy opacification of the left lower lobe bronchial branches, and fluid layering in the proximal left mainstem bronchus.   There is also patchy opacification of the right middle and lower lobe bronchial branches, with complete atelectasis of the right middle lobe. Endobronchial lesion resulting in postobstructive atelectasis cannot be excluded. Pulmonology consultation suggested.   Background of moderate emphysema. Platelike opacities in the lower lungs suggesting atelectasis or scar. Trace pleural effusions. No pneumothorax.   Cardiomegaly.   Cholelithiasis.       Assessment & Plan    Acute on chronic hypoxic, hypercapnic respiratory failure  Oxygen at baseline  BiPAP nightly  Incentive spirometry/pulmonary hygiene/Acapella/3% nebulized saline    Aspiration pneumonia  Long-term plan de-escalate to oral cefuroxime, oral Flagyl to 6/13  Urine Legionella, MRSA negative  Strep pneumo positive  Procalcitonin: 0.06 ng/mL  Follow-up cultures  Regular diet, thin liquids per   Infectious Disease follow-up    Acute COPD exacerbation  IBD/ICS  IV Solu-Medrol completed  Inpatient consultation to COPD navigator  Pulmonary function testing as outpatient  Outpatient pulmonary follow-up in 2 weeks    Questionable endobronchial lesion  CT scan chest with patchy opacification of the right middle and lower lobe bronchial branches, complete atelectasis of the right middle lobe. Endobronchial lesion resulting in postobstructive atelectasis cannot be excluded  Follow-up CT scan chest as outpatient    Tobacco use  Smoking cessation  Nicotine patch    Prophylaxis  Subcutaneous  heparin  Protonix    Discharge planning-[facility]    Stable for discharge from a pulmonary standpoint  Follow-up with Dr. Lou in 2 weeks to discuss timing of CT scan chest      AILYN Abdullahi-CNP  Pulmonary & Critical Care Medicine

## 2025-06-11 NOTE — CARE PLAN
The patient's goals for the shift include      The clinical goals for the shift include pain management, safety      Problem: Pain - Adult  Goal: Verbalizes/displays adequate comfort level or baseline comfort level  Outcome: Progressing     Problem: Safety - Adult  Goal: Free from fall injury  Outcome: Progressing     Problem: Discharge Planning  Goal: Discharge to home or other facility with appropriate resources  Outcome: Progressing     Problem: Chronic Conditions and Co-morbidities  Goal: Patient's chronic conditions and co-morbidity symptoms are monitored and maintained or improved  Outcome: Progressing     Problem: Nutrition  Goal: Nutrient intake appropriate for maintaining nutritional needs  Outcome: Progressing     Problem: Skin  Goal: Decreased wound size/increased tissue granulation at next dressing change  Outcome: Progressing  Goal: Participates in plan/prevention/treatment measures  Outcome: Progressing  Goal: Prevent/manage excess moisture  Outcome: Progressing  Goal: Prevent/minimize sheer/friction injuries  Outcome: Progressing  Goal: Promote/optimize nutrition  Outcome: Progressing  Goal: Promote skin healing  Outcome: Progressing     Problem: Pain  Goal: Takes deep breaths with improved pain control throughout the shift  Outcome: Progressing  Goal: Turns in bed with improved pain control throughout the shift  Outcome: Progressing  Goal: Walks with improved pain control throughout the shift  Outcome: Progressing  Goal: Performs ADL's with improved pain control throughout shift  Outcome: Progressing  Goal: Participates in PT with improved pain control throughout the shift  Outcome: Progressing  Goal: Free from opioid side effects throughout the shift  Outcome: Progressing  Goal: Free from acute confusion related to pain meds throughout the shift  Outcome: Progressing     Problem: Fall/Injury  Goal: Not fall by end of shift  Outcome: Progressing  Goal: Be free from injury by end of the  shift  Outcome: Progressing  Goal: Verbalize understanding of personal risk factors for fall in the hospital  Outcome: Progressing  Goal: Verbalize understanding of risk factor reduction measures to prevent injury from fall in the home  Outcome: Progressing  Goal: Use assistive devices by end of the shift  Outcome: Progressing  Goal: Pace activities to prevent fatigue by end of the shift  Outcome: Progressing     Problem: Pain  Goal: STG - Patient will verbalize an acceptable level of pain  Outcome: Progressing

## 2025-06-11 NOTE — PROGRESS NOTES
06/11/25 1501   Discharge Planning   Living Arrangements Spouse/significant other   Support Systems Spouse/significant other   Type of Residence Private residence   Home or Post Acute Services Post acute facilities (Rehab/SNF/etc)   Type of Post Acute Facility Services Skilled nursing   Expected Discharge Disposition SNF  ([facility])   Does the patient need discharge transport arranged? Yes   RoundTrip coordination needed? Yes   Has discharge transport been arranged? Yes   What day is the transport expected? 06/11/25   What time is the transport expected? 1500     Patient being discharged to [facility] via stretcher due being deconditioned and falls risk. Transport is set up by [facility].

## 2025-06-11 NOTE — NURSING NOTE
Pt resistant to let staff change linens after incontinence d/t pain. When staff tries to assist pt is screaming at them before there is touch. At times pt is accusing staff of injury without contact first. Pt is observed other times rolling side to side in bed without assist of difficulty.

## 2025-06-11 NOTE — DISCHARGE SUMMARY
Discharge Diagnosis  HAP (hospital-acquired pneumonia)           Issues Requiring Follow-Up      Discharge Meds     Medication List      PAUSE taking these medications     atorvastatin 40 mg tablet; Wait to take this until your doctor or other   care provider tells you to start again.; Follow up with PCP for repeat   LFT's prior to resuming; Commonly known as: Lipitor     START taking these medications     lidocaine 4 % patch; Place 1 patch over 12 hours on the skin once daily.   Remove & discard patch within 12 hours or as directed by MD.; Start taking   on: June 12, 2025   nicotine 14 mg/24 hr patch; Commonly known as: Nicoderm CQ; Place 1   patch over 24 hours on the skin once daily.; Start taking on: June 12, 2025     CHANGE how you take these medications     gabapentin 600 mg tablet; Commonly known as: Neurontin; Take 1 tablet   (600 mg) by mouth 3 times a day as needed (Neuropathy) for up to 3 days.;   What changed: when to take this, reasons to take this   spironolactone 25 mg tablet; Commonly known as: Aldactone; Take 0.5   tablets (12.5 mg) by mouth once daily.; Start taking on: June 12, 2025;   What changed: when to take this     CONTINUE taking these medications     acetaminophen 325 mg tablet; Commonly known as: Tylenol; Take 2 tablets   (650 mg) by mouth every 6 hours if needed for mild pain (1 - 3).   albuterol 90 mcg/actuation inhaler; Inhale 2 puffs every 4 hours if   needed for wheezing.   fluticasone-umeclidin-vilanter 100-62.5-25 mcg blister with device;   Commonly known as: TRELEGY-ELLIPTA; INHALE 1 PUFF BY MOUTH ONCE DAILY.   guaiFENesin 600 mg 12 hr tablet; Commonly known as: Mucinex; Take 1   tablet (600 mg) by mouth 2 times a day. Do not crush, chew, or split.   nystatin 100,000 unit/gram powder; Commonly known as: Mycostatin; Apply   1 Application topically 2 times a day.   oxygen gas therapy; Commonly known as: O2; Inhale 4 L/min once every 24   hours.   pantoprazole 40 mg EC tablet;  Commonly known as: ProtoNix; Take 1 tablet   (40 mg) by mouth once daily. Do not crush, chew, or split.     STOP taking these medications     predniSONE 10 mg tablet; Commonly known as: Deltasone     ASK your doctor about these medications     ipratropium-albuteroL 0.5-2.5 mg/3 mL nebulizer solution; Commonly known   as: Duo-Neb; Take 3 mL by nebulization 4 times a day.       Test Results Pending At Discharge  Pending Labs       Order Current Status    Extra Urine Gray Tube Collected (06/07/25 1867)    Urinalysis with Reflex Culture and Microscopic In process    Blood Culture Preliminary result            Hospital Course  HPI from admission  Subha Miller is a 65 y.o. female presenting with Left shoulder Pain and generalized body aches.  The patient was just discharged from [facility] earlier today to home.  She stated that she was there for over 1 week and was intubated on the mechanical ventilator in a coma. Per ED she has no pulmonary symptoms at this time and denies any fever/chills,  symptoms, or neuro dysfunction of extremities. Denies any headache, vision changes, dysarthria. Notices generalized weakness and unable to care for self and lives with her  who is unable to care for her at this time. States she was offered skilled rehab but declined at the time and now believes she does need further assistance in management.         The patient was just hospitalized at [facility] from June 1, 2025 to June 6, 2025.  He was discharged to home on June 6, 2025.  She declined skilled nursing facility at that time.     Patient's vital signs upon arrival to the ED were noted for Tmax 36.2, pulse rate 91, respiratory rate 20, /73.  Patient was saturating 90% on supplemental oxygen by nasal cannula at 4 L.      The patient's ED diagnostic workup was noted for a leukocytosis of 12.8.  H&H was hemoconcentrated 16.2/51.9, respectively.  The patient's platelet count was low normal at 118.  There was a  minimal neutrophil predominance.  The blood chemistry was noted for glucose of 120.  Chloride was 95 and the bicarbonate was 37.  The BUN was elevated 33.  AST and ALT were 49 and 123, respectively.  A troponin level and a BNP level were not obtained.  Rapid influenza coronavirus PCR testing were pan negative.  MRSA swab was obtained in the ED.  A CT chest without IV contrast was obtained in the ED.  This note for the following:            IMPRESSION:      There is near complete interval resolution of consolidation      previously seen in the left upper lobe, with a small region of the      ground-glass and consolidative opacity along the left heart border      (series 7, image [id number]), which could relate to residual pneumonia      or developing scarring.      There is new/progressive consolidation in the left lower lobe with      associated volume loss, likely representing combination of      atelectasis and pneumonia, probably aspiration pneumonia, noting      patchy opacification of the left lower lobe bronchial branches, and      fluid layering in the proximal left mainstem bronchus.      There is also patchy opacification of the right middle and lower lobe      bronchial branches, with complete atelectasis of the right middle      lobe. Endobronchial lesion resulting in postobstructive atelectasis      cannot be excluded. Pulmonology consultation suggested.              Background of moderate emphysema. Platelike opacities in the lower      lungs suggesting atelectasis or scar. Trace pleural effusions. No      pneumothorax.         Cardiomegaly.          Cholelithiasis.         In the ED the patient was administered Rocephin 2 g IV piggyback x 1 as well as doxycycline 100 mg IV piggyback x 1.      During hospital course patient was seen by infectious disease and pulmonology.  Her symptoms are much improved.  She was currently on her baseline 4 L of oxygen.  She was treated for aspiration pneumonia during  hospital course and will be discharged on antibiotics per ID recommendations.  Patient will follow-up with pulmonology on an outpatient basis. patient was seen by PT, OT who recommended moderate intensity rehab.  Patient is now agreeable to skilled nursing facility.  Patient lives at home alone and attempted to go home on June 6, 2025.  She was unable to take care of herself so she returned to the emergency department.    Above Case and plan discussed collaborating physician, time spent on discharge 40 minutes  Pertinent Physical Exam At Time of Discharge  Physical Exam  Constitutional:       General: She is not in acute distress.     Appearance: She is ill-appearing. She is not toxic-appearing.   Cardiovascular:      Rate and Rhythm: Normal rate and regular rhythm.      Pulses: Normal pulses.      Heart sounds: Normal heart sounds.   Pulmonary:      Effort: Pulmonary effort is normal. No respiratory distress.      Breath sounds: Normal breath sounds. No wheezing, rhonchi or rales.   Abdominal:      General: Bowel sounds are normal.      Palpations: Abdomen is soft.   Musculoskeletal:      Right lower leg: No edema.      Left lower leg: No edema.   Skin:     General: Skin is warm and dry.   Neurological:      General: No focal deficit present.      Mental Status: She is alert and oriented to person, place, and time.         Outpatient Follow-Up  No future appointments.      Anna Lambert, APRN-CNP

## 2025-06-11 NOTE — PROGRESS NOTES
Subha Miller is a 65 y.o. female on day 4 of admission presenting with HAP (hospital-acquired pneumonia).    Subjective   Interval History:   Feeling better  Afebrile, no chills  No significant cough  No shortness of breath   No nausea, vomiting or diarrhea     Objective   Range of Vitals (last 24 hours)  Heart Rate:  [66-88]   Temp:  [36 °C (96.8 °F)-36.7 °C (98.1 °F)]   Resp:  [13-27]   BP: (106-119)/(50-64)   SpO2:  [94 %-100 %]   Daily Weight  06/06/25 : 90.7 kg (200 lb)    Body mass index is 35.43 kg/m².    Physical Exam  Constitutional:       Appearance: Normal appearance.   HENT:      Head: Normocephalic and atraumatic.   Eyes:      Extraocular Movements: Extraocular movements intact.      Conjunctiva/sclera: Conjunctivae normal.   Cardiovascular:      Rate and Rhythm: Normal rate.   Pulmonary:      Effort: Pulmonary effort is normal.      Breath sounds: Decreased bilateral breath sounds.   Abdominal:      General: Bowel sounds are normal.      Palpations: Abdomen is soft.      Tenderness: There is no abdominal tenderness.   Musculoskeletal:         General: No swelling. Normal range of motion.      Cervical back: Normal range of motion and neck supple.      Right lower leg: No edema.      Left lower leg: No edema.      Comments: Left shoulder tenderness with movement  Skin:     General: Skin is warm and dry.   Neurological:      General: No focal deficit present.      Mental Status: She is alert and oriented to person, place, and time.   Psychiatric:         Mood and Affect: Mood normal.         Behavior: Behavior normal.     Antibiotics  meropenem - 1 gram/50 mL    Relevant Results  Labs  Results from last 72 hours   Lab Units 06/11/25  0522 06/09/25  0700   WBC AUTO x10*3/uL 11.3 12.8*   HEMOGLOBIN g/dL 14.1 14.7   HEMATOCRIT % 45.7 47.7*   PLATELETS AUTO x10*3/uL 151 119*   NEUTROS PCT AUTO % 67.2  --    LYMPHS PCT AUTO % 23.2  --    MONOS PCT AUTO % 7.3  --    EOS PCT AUTO % 0.7  --      Results from last  72 hours   Lab Units 06/11/25  0522 06/09/25  0700   SODIUM mmol/L 138 137   POTASSIUM mmol/L 4.3 4.6   CHLORIDE mmol/L 100 100   CO2 mmol/L 34* 33*   BUN mg/dL 24* 25*   CREATININE mg/dL 0.43* 0.55   GLUCOSE mg/dL 135* 172*   CALCIUM mg/dL 8.9 8.9   ANION GAP mmol/L 8* 9*   EGFR mL/min/1.73m*2 >90 >90     Results from last 72 hours   Lab Units 06/11/25  0522   ALK PHOS U/L 49   BILIRUBIN TOTAL mg/dL 0.6   PROTEIN TOTAL g/dL 5.6*   ALT U/L 48*   AST U/L 29   ALBUMIN g/dL 3.2*       Estimated Creatinine Clearance: 125 mL/min (A) (by C-G formula based on SCr of 0.43 mg/dL (L)).  C-Reactive Protein   Date Value Ref Range Status   02/02/2025 1.75 (H) <1.00 mg/dL Final   01/02/2024 0.66 <1.00 mg/dL Final   01/01/2024 0.71 <1.00 mg/dL Final     Microbiology  Reviewed-positive urine streptococcal antigen, blood culture pending, negative is MRSA PCR  Imaging  XR chest 1 view  Result Date: 6/8/2025  Interpreted By:  Jamie Fuller, STUDY: XR CHEST 1 VIEW;  6/7/2025 4:15 pm   INDICATION: Signs/Symptoms:baseline LLL consolidation.   COMPARISON: 02/03/2025   ACCESSION NUMBER(S): [id number]   ORDERING CLINICIAN: MARYSE RAMIREZ   FINDINGS: CARDIOMEDIASTINAL SILHOUETTE AND VASCULATURE:   Cardiac size:  Within normal limits considering patient rotation. Aortic shadow:  Within normal limits.   Mediastinal contours: Within normal limits.   Pulmonary vasculature:  The central vasculature is unremarkable   LUNGS: Persistent left basilar retrocardiac opacification probably from consolidated infiltrate. There is also a probable small effusion but which has decreased.   ABDOMEN AND OTHER FINDINGS: No remarkable upper abdominal findings.   BONES: No acute osseous changes.       1.  As above   Signed by: Jamie Fuller 6/8/2025 1:58 PM Dictation workstation:   [id number]    XR shoulder left 2+ views  Result Date: 6/7/2025  Interpreted By:  Jamie Fuller, STUDY: XR SHOULDER LEFT 2+ VIEWS;  6/7/2025 7:46 am   INDICATION:  Signs/Symptoms:Pain.   COMPARISON: None.   ACCESSION NUMBER(S): [id number]   ORDERING CLINICIAN: BRANDAN RAGLAND   FINDINGS: Bony structures:  Intact   Joint spaces:  There appears to be mild osteophytosis at the glenoid indicating mild osteoarthritis. The acromioclavicular joint is fairly well maintained.   Soft tissues:  Unremarkable without significant edema or radiodense foreign body   Other:  Opacity left lower lung and base is probably from an effusion with atelectasis.       Mild osteoarthritis.   MACRO: None   Signed by: Jamie Fuller 6/7/2025 1:40 PM Dictation workstation:   [id number]    CT chest wo IV contrast  Result Date: 6/6/2025  Interpreted By:  Cory Riley, STUDY: CT CHEST WO IV CONTRAST;  6/6/2025 9:39 pm   INDICATION: Signs/Symptoms:gen weakness.     COMPARISON: CT chest with contrast from 02/01/2025.   ACCESSION NUMBER(S): [id number]   ORDERING CLINICIAN: SHANI WELLS   TECHNIQUE: Helical data acquisition of the chest was obtained  without IV contrast material.  Images were reformatted in axial, coronal, and sagittal planes.   FINDINGS: LUNGS AND AIRWAYS: The trachea and central airways are patent. No endobronchial lesion.   There is near complete interval resolution of consolidation previously seen in the left upper lobe, with a small region of the ground-glass and consolidative opacity along the left heart border (series 7, image 134-157), which could relate to residual pneumonia or developing scarring.   There is new/progressive consolidation in the left lower lobe with associated volume loss, likely representing combination of atelectasis and pneumonia, probably aspiration pneumonia, noting patchy opacification of the left lower lobe bronchial branches, and fluid layering in the proximal left mainstem bronchus.   There is also patchy opacification of the right middle and lower lobe bronchial branches, with complete atelectasis of the right middle lobe. Endobronchial lesion resulting  in postobstructive atelectasis cannot be excluded. Pulmonology consultation suggested.   Background of moderate emphysema. Platelike opacities in the lower lungs suggesting atelectasis or scar. Trace pleural effusions. No pneumothorax.   MEDIASTINUM AND MARISOL, LOWER NECK AND AXILLA: The visualized thyroid gland is within normal limits.   No pathologically enlarged lymph nodes are seen. Calcified right hilar node, in keeping with sequelae of chronic granulomatous disease.   Esophagus appears within normal limits as seen. Small sliding hiatal hernia.   HEART AND VESSELS: The thoracic aorta is of normal course and caliber with mild vascular calcifications.   Main pulmonary artery is dilated, suggesting pulmonary arterial hypertension.   Mild coronary artery calcifications are seen. The study is not optimized for evaluation of coronary arteries.   Heart is enlarged. Mitral annular calcification.   No evidence of pericardial effusion.   UPPER ABDOMEN: The visualized subdiaphragmatic structures demonstrate no acute findings. Cholelithiasis without evidence of acute cholecystitis.   CHEST WALL AND OSSEOUS STRUCTURES: There are no suspicious osseous lesions. Moderate to severe disc degeneration of the thoracic spine.       There is near complete interval resolution of consolidation previously seen in the left upper lobe, with a small region of the ground-glass and consolidative opacity along the left heart border (series 7, image [id number]), which could relate to residual pneumonia or developing scarring.   There is new/progressive consolidation in the left lower lobe with associated volume loss, likely representing combination of atelectasis and pneumonia, probably aspiration pneumonia, noting patchy opacification of the left lower lobe bronchial branches, and fluid layering in the proximal left mainstem bronchus.   There is also patchy opacification of the right middle and lower lobe bronchial branches, with complete  atelectasis of the right middle lobe. Endobronchial lesion resulting in postobstructive atelectasis cannot be excluded. Pulmonology consultation suggested.   Background of moderate emphysema. Platelike opacities in the lower lungs suggesting atelectasis or scar. Trace pleural effusions. No pneumothorax.   Cardiomegaly.   Cholelithiasis.   MACRO: None   Signed by: Cory Riley 6/6/2025 10:43 PM Dictation workstation:   [id number]     Assessment/Plan   Pneumonia - gram negative versus gram positive-positive streptococcal antigen, aspiration also suspected, negative nasal MRSA PCR  Chronic respiratory failure-on 4LNC at baseline, recently intubated during previous hospitalization  Abnormal CT chest-possible endobronchial lesion  Left shoulder pain - unclear etiology, x-ray -mild osteoarthritis   Allergy penicillin -tolerates cephalosporins   Thrombocytopenia         IV meropenem  Monitor WBC and temperature  Monitor renal function  Follow-up blood culture  Oxygen as needed  Supportive care  Pulmonary follow-up  Long term plan de-escalate to po cefuroxime (separate dose from pantoprazole) and flagyl to complete total 7 days-till 6/13/2025          Cristela Donnelly, APRN-CNP

## 2025-06-12 ENCOUNTER — NURSING HOME VISIT (OUTPATIENT)
Dept: POST ACUTE CARE | Facility: EXTERNAL LOCATION | Age: 65
End: 2025-06-12
Payer: COMMERCIAL

## 2025-06-12 DIAGNOSIS — I50.33 ACUTE ON CHRONIC DIASTOLIC (CONGESTIVE) HEART FAILURE: ICD-10-CM

## 2025-06-12 DIAGNOSIS — C67.0 MALIGNANT NEOPLASM OF TRIGONE OF URINARY BLADDER (MULTI): ICD-10-CM

## 2025-06-12 DIAGNOSIS — J15.9 BACTERIAL PNEUMONIA: ICD-10-CM

## 2025-06-12 DIAGNOSIS — S42.002A CLOSED NONDISPLACED FRACTURE OF LEFT CLAVICLE, UNSPECIFIED PART OF CLAVICLE, INITIAL ENCOUNTER: ICD-10-CM

## 2025-06-12 DIAGNOSIS — I35.0 AORTIC VALVE STENOSIS, ETIOLOGY OF CARDIAC VALVE DISEASE UNSPECIFIED: ICD-10-CM

## 2025-06-12 DIAGNOSIS — E66.01 OBESITY, MORBID (MULTI): ICD-10-CM

## 2025-06-12 DIAGNOSIS — J96.90 RESPIRATORY FAILURE, UNSPECIFIED CHRONICITY, UNSPECIFIED WHETHER WITH HYPOXIA OR HYPERCAPNIA: ICD-10-CM

## 2025-06-12 DIAGNOSIS — J44.9 CHRONIC OBSTRUCTIVE PULMONARY DISEASE, UNSPECIFIED COPD TYPE (MULTI): Primary | ICD-10-CM

## 2025-06-12 DIAGNOSIS — Z91.81 AT RISK FOR FALLS: ICD-10-CM

## 2025-06-12 DIAGNOSIS — F17.200 TOBACCO DEPENDENCE: ICD-10-CM

## 2025-06-12 DIAGNOSIS — E66.2 MORBID (SEVERE) OBESITY WITH ALVEOLAR HYPOVENTILATION (MULTI): ICD-10-CM

## 2025-06-12 DIAGNOSIS — I10 HYPERTENSION, UNSPECIFIED TYPE: ICD-10-CM

## 2025-06-12 DIAGNOSIS — R53.1 WEAKNESS: ICD-10-CM

## 2025-06-12 DIAGNOSIS — D69.6 THROMBOCYTOPENIA: ICD-10-CM

## 2025-06-12 PROCEDURE — 99306 1ST NF CARE HIGH MDM 50: CPT | Performed by: INTERNAL MEDICINE

## 2025-06-12 NOTE — LETTER
Patient: Subha Miller  : 1960    Encounter Date: 2025    PLACE OF SERVICE:  [facility].    This is new/initial history and physical.    Subjective  Patient ID: Subha Miller is a 65 y.o. female who presents for New Patient Visit.    Ms. Jesi Miller is a 65-year-old female with history of pneumonia with COPD and respiratory failure.  She is currently oxygen dependent and requires supportive care.    Review of Systems   Constitutional:  Negative for chills and fever.   Cardiovascular:  Negative for chest pain.   All other systems reviewed and are negative.    Objective  /78   Pulse 84   Temp 36.8 °C (98.2 °F)   Resp 18     Physical Exam  Vitals reviewed.   Constitutional:       Comments: This is a well-developed, well-nourished female, lying in bed, appearing weak.   HENT:      Right Ear: Tympanic membrane, ear canal and external ear normal.      Left Ear: Tympanic membrane, ear canal and external ear normal.   Eyes:      General: No scleral icterus.     Pupils: Pupils are equal, round, and reactive to light.   Neck:      Vascular: No carotid bruit.   Cardiovascular:      Heart sounds: Normal heart sounds, S1 normal and S2 normal. No murmur heard.     No friction rub.   Pulmonary:      Effort: Pulmonary effort is normal.      Breath sounds: Decreased breath sounds (throughout) present.   Abdominal:      Palpations: There is no hepatomegaly, splenomegaly or mass.   Musculoskeletal:         General: No swelling or deformity. Normal range of motion.      Cervical back: Neck supple.      Right lower leg: No edema.      Left lower leg: No edema.   Lymphadenopathy:      Cervical: No cervical adenopathy.      Upper Body:      Right upper body: No axillary adenopathy.      Left upper body: No axillary adenopathy.      Lower Body: No right inguinal adenopathy. No left inguinal adenopathy.   Neurological:      Mental Status: She is oriented to person, place, and time. She is  lethargic.      Cranial Nerves: Cranial nerves 2-12 are intact. No cranial nerve deficit.      Sensory: No sensory deficit.      Motor: Motor function is intact. No weakness.      Gait: Gait is intact.      Deep Tendon Reflexes: Reflexes normal.   Psychiatric:         Mood and Affect: Mood normal. Mood is not anxious or depressed. Affect is not angry.         Behavior: Behavior is not agitated.         Thought Content: Thought content normal.         Judgment: Judgment normal.     LAB WORK: Laboratory studies reviewed.    Assessment/Plan  Problem List Items Addressed This Visit           ICD-10-CM       Cardiac and Vasculature    Hypertension I10       Coag and Thromboembolic    Thrombocytopenia D69.6       Pulmonary and Pneumonias    COPD (chronic obstructive pulmonary disease) (Multi) - Primary J44.9    Bacterial pneumonia J15.9       Tobacco    Tobacco dependence F17.200     Other Visit Diagnoses         Codes      Respiratory failure, unspecified chronicity, unspecified whether with hypoxia or hypercapnia     J96.90      Aortic valve stenosis, etiology of cardiac valve disease unspecified     I35.0      Closed nondisplaced fracture of left clavicle, unspecified part of clavicle, initial encounter     S42.002A      Weakness     R53.1      At risk for falls     Z91.81        1. COPD, on bronchodilator therapy.  2. Pneumonia, on antibiotic.  3. Respiratory failure, on oxygen.  4. Hypertension, med controlled.  5. Tobacco dependence, on nicotine patch.  6. Aortic valve stenosis, follow with Cardiology.  7. Thrombocytopenia, monitor platelet level.  8. Left clavicle fracture, on pain control, follow with Orthopedics.  9. Weakness, on PT/OT.  10. Fall risk, fall precautions.    Scribe Attestation  By signing my name below, IMadelyn Scribe attest that this documentation has been prepared under the direction and in the presence of Amina Block MD.     All medical record entries made by the scribe were  personally dictated by me I have reviewed the chart and agree the record accurately reflects my personal performance of his history physical examination and management      Electronically Signed By: Amina Block MD   6/17/25 11:27 PM

## 2025-06-15 ENCOUNTER — NURSING HOME VISIT (OUTPATIENT)
Dept: POST ACUTE CARE | Facility: EXTERNAL LOCATION | Age: 65
End: 2025-06-15
Payer: COMMERCIAL

## 2025-06-15 DIAGNOSIS — S42.002A CLOSED NONDISPLACED FRACTURE OF LEFT CLAVICLE, UNSPECIFIED PART OF CLAVICLE, INITIAL ENCOUNTER: ICD-10-CM

## 2025-06-15 DIAGNOSIS — Z91.81 AT RISK FOR FALLS: ICD-10-CM

## 2025-06-15 DIAGNOSIS — D69.6 THROMBOCYTOPENIA: ICD-10-CM

## 2025-06-15 DIAGNOSIS — J15.9 BACTERIAL PNEUMONIA: ICD-10-CM

## 2025-06-15 DIAGNOSIS — I10 HYPERTENSION, UNSPECIFIED TYPE: ICD-10-CM

## 2025-06-15 DIAGNOSIS — I35.0 AORTIC VALVE STENOSIS, ETIOLOGY OF CARDIAC VALVE DISEASE UNSPECIFIED: ICD-10-CM

## 2025-06-15 DIAGNOSIS — R53.1 WEAKNESS: ICD-10-CM

## 2025-06-15 DIAGNOSIS — J44.9 CHRONIC OBSTRUCTIVE PULMONARY DISEASE, UNSPECIFIED COPD TYPE (MULTI): Primary | ICD-10-CM

## 2025-06-15 DIAGNOSIS — J96.90 RESPIRATORY FAILURE, UNSPECIFIED CHRONICITY, UNSPECIFIED WHETHER WITH HYPOXIA OR HYPERCAPNIA: ICD-10-CM

## 2025-06-15 DIAGNOSIS — F17.200 TOBACCO DEPENDENCE: ICD-10-CM

## 2025-06-15 PROCEDURE — 99309 SBSQ NF CARE MODERATE MDM 30: CPT | Performed by: INTERNAL MEDICINE

## 2025-06-15 NOTE — LETTER
Patient: Subha Miller  : 1960    Encounter Date: 06/15/2025    PLACE OF SERVICE:  [facility]    This is a subsequent visit.    Subjective  Patient ID: Subha Miller is a 65 y.o. female who presents for Follow-up.    Ms. Jesi Miller is a 65-year-old female with history of COPD with respiratory failure.  She has been treated for recent pneumonia.  She is unable to care for herself and requires supportive care.    Review of Systems   Constitutional:  Negative for chills and fever.   Cardiovascular:  Negative for chest pain.   All other systems reviewed and are negative.    Objective  /82   Pulse 80   Temp 36.8 °C (98.2 °F)   Resp 18     Physical Exam  Vitals reviewed.   Constitutional:       Comments: This is a well-developed, well-nourished female, lying in bed, appearing weak.   HENT:      Right Ear: Tympanic membrane, ear canal and external ear normal.      Left Ear: Tympanic membrane, ear canal and external ear normal.   Eyes:      General: No scleral icterus.     Pupils: Pupils are equal, round, and reactive to light.   Neck:      Vascular: No carotid bruit.   Cardiovascular:      Heart sounds: Normal heart sounds, S1 normal and S2 normal. No murmur heard.     No friction rub.   Pulmonary:      Breath sounds: Decreased breath sounds (throughout) present.   Abdominal:      Palpations: There is no hepatomegaly, splenomegaly or mass.   Musculoskeletal:         General: No swelling or deformity. Normal range of motion.      Cervical back: Neck supple.      Right lower leg: No edema.      Left lower leg: No edema.   Lymphadenopathy:      Cervical: No cervical adenopathy.      Upper Body:      Right upper body: No axillary adenopathy.      Left upper body: No axillary adenopathy.      Lower Body: No right inguinal adenopathy. No left inguinal adenopathy.   Neurological:      Mental Status: She is oriented to person, place, and time. She is lethargic.      Cranial Nerves: Cranial  nerves 2-12 are intact. No cranial nerve deficit.      Sensory: No sensory deficit.      Motor: Motor function is intact. No weakness.      Gait: Gait is intact.      Deep Tendon Reflexes: Reflexes normal.   Psychiatric:         Mood and Affect: Mood normal. Mood is not anxious or depressed. Affect is not angry.         Behavior: Behavior is not agitated.         Thought Content: Thought content normal.         Judgment: Judgment normal.     LAB WORK: Laboratory studies reviewed.    Assessment/Plan  Problem List Items Addressed This Visit           ICD-10-CM    COPD (chronic obstructive pulmonary disease) (Multi) - Primary J44.9    Hypertension I10    Bacterial pneumonia J15.9    Thrombocytopenia D69.6    Tobacco dependence F17.200     Other Visit Diagnoses         Codes      Respiratory failure, unspecified chronicity, unspecified whether with hypoxia or hypercapnia     J96.90      Closed nondisplaced fracture of left clavicle, unspecified part of clavicle, initial encounter     S42.002A      Aortic valve stenosis, etiology of cardiac valve disease unspecified     I35.0      Weakness     R53.1      At risk for falls     Z91.81        1. COPD, on bronchodilator therapy.  2. Respiratory failure, on oxygen as needed.  3. Recent pneumonia, has finished antibiotic.  4. Tobacco dependence, on nicotine patch.  5. Hypertension, med controlled.  6. Thrombocytopenia, monitor platelet level.  7. Left clavicle fracture, wearing sling, follow with Orthopedics.  8. Aortic valve stenosis, follow with Cardiology.  9. Weakness, on PT/OT.  10. Fall risk, fall precautions.    Scribe Attestation  By signing my name below, ITrish Scribe attest that this documentation has been prepared under the direction and in the presence of Amina Block MD.     All medical record entries made by the scribe were personally dictated by me I have reviewed the chart and agree the record accurately reflects my personal performance of his  history physical examination and management      Electronically Signed By: Amina Block MD   6/25/25  1:06 AM

## 2025-06-16 VITALS
HEART RATE: 84 BPM | DIASTOLIC BLOOD PRESSURE: 78 MMHG | SYSTOLIC BLOOD PRESSURE: 126 MMHG | RESPIRATION RATE: 18 BRPM | TEMPERATURE: 98.2 F

## 2025-06-16 ASSESSMENT — ENCOUNTER SYMPTOMS
CHILLS: 0
FEVER: 0

## 2025-06-16 NOTE — PROGRESS NOTES
PLACE OF SERVICE:  [facility].    This is new/initial history and physical.    Subjective   Patient ID: Subha Miller is a 65 y.o. female who presents for New Patient Visit.    Ms. Jesi Miller is a 65-year-old female with history of pneumonia with COPD and respiratory failure.  She is currently oxygen dependent and requires supportive care.    Review of Systems   Constitutional:  Negative for chills and fever.   Cardiovascular:  Negative for chest pain.   All other systems reviewed and are negative.    Objective   /78   Pulse 84   Temp 36.8 °C (98.2 °F)   Resp 18     Physical Exam  Vitals reviewed.   Constitutional:       Comments: This is a well-developed, well-nourished female, lying in bed, appearing weak.   HENT:      Right Ear: Tympanic membrane, ear canal and external ear normal.      Left Ear: Tympanic membrane, ear canal and external ear normal.   Eyes:      General: No scleral icterus.     Pupils: Pupils are equal, round, and reactive to light.   Neck:      Vascular: No carotid bruit.   Cardiovascular:      Heart sounds: Normal heart sounds, S1 normal and S2 normal. No murmur heard.     No friction rub.   Pulmonary:      Effort: Pulmonary effort is normal.      Breath sounds: Decreased breath sounds (throughout) present.   Abdominal:      Palpations: There is no hepatomegaly, splenomegaly or mass.   Musculoskeletal:         General: No swelling or deformity. Normal range of motion.      Cervical back: Neck supple.      Right lower leg: No edema.      Left lower leg: No edema.   Lymphadenopathy:      Cervical: No cervical adenopathy.      Upper Body:      Right upper body: No axillary adenopathy.      Left upper body: No axillary adenopathy.      Lower Body: No right inguinal adenopathy. No left inguinal adenopathy.   Neurological:      Mental Status: She is oriented to person, place, and time. She is lethargic.      Cranial Nerves: Cranial nerves 2-12 are intact. No cranial  nerve deficit.      Sensory: No sensory deficit.      Motor: Motor function is intact. No weakness.      Gait: Gait is intact.      Deep Tendon Reflexes: Reflexes normal.   Psychiatric:         Mood and Affect: Mood normal. Mood is not anxious or depressed. Affect is not angry.         Behavior: Behavior is not agitated.         Thought Content: Thought content normal.         Judgment: Judgment normal.     LAB WORK: Laboratory studies reviewed.    Assessment/Plan   Problem List Items Addressed This Visit           ICD-10-CM       Cardiac and Vasculature    Hypertension I10       Coag and Thromboembolic    Thrombocytopenia D69.6       Pulmonary and Pneumonias    COPD (chronic obstructive pulmonary disease) (Multi) - Primary J44.9    Bacterial pneumonia J15.9       Tobacco    Tobacco dependence F17.200     Other Visit Diagnoses         Codes      Respiratory failure, unspecified chronicity, unspecified whether with hypoxia or hypercapnia     J96.90      Aortic valve stenosis, etiology of cardiac valve disease unspecified     I35.0      Closed nondisplaced fracture of left clavicle, unspecified part of clavicle, initial encounter     S42.002A      Weakness     R53.1      At risk for falls     Z91.81        1. COPD, on bronchodilator therapy.  2. Pneumonia, on antibiotic.  3. Respiratory failure, on oxygen.  4. Hypertension, med controlled.  5. Tobacco dependence, on nicotine patch.  6. Aortic valve stenosis, follow with Cardiology.  7. Thrombocytopenia, monitor platelet level.  8. Left clavicle fracture, on pain control, follow with Orthopedics.  9. Weakness, on PT/OT.  10. Fall risk, fall precautions.    Scribe Attestation  By signing my name below, IMadelyn Scribe attest that this documentation has been prepared under the direction and in the presence of Amina Block MD.     All medical record entries made by the scribe were personally dictated by me I have reviewed the chart and agree the record accurately  reflects my personal performance of his history physical examination and management

## 2025-06-17 PROBLEM — C67.0 MALIGNANT NEOPLASM OF TRIGONE OF URINARY BLADDER (MULTI): Status: ACTIVE | Noted: 2025-06-17

## 2025-06-17 PROBLEM — E66.2 MORBID (SEVERE) OBESITY WITH ALVEOLAR HYPOVENTILATION (MULTI): Status: ACTIVE | Noted: 2025-06-17

## 2025-06-17 PROBLEM — I50.33 ACUTE ON CHRONIC DIASTOLIC (CONGESTIVE) HEART FAILURE: Status: ACTIVE | Noted: 2025-06-17

## 2025-06-17 PROBLEM — E66.01 OBESITY, MORBID (MULTI): Status: ACTIVE | Noted: 2025-06-17

## 2025-06-22 ENCOUNTER — NURSING HOME VISIT (OUTPATIENT)
Dept: POST ACUTE CARE | Facility: EXTERNAL LOCATION | Age: 65
End: 2025-06-22
Payer: COMMERCIAL

## 2025-06-22 DIAGNOSIS — I35.0 AORTIC VALVE STENOSIS, ETIOLOGY OF CARDIAC VALVE DISEASE UNSPECIFIED: ICD-10-CM

## 2025-06-22 DIAGNOSIS — R53.1 WEAKNESS: ICD-10-CM

## 2025-06-22 DIAGNOSIS — F17.200 TOBACCO DEPENDENCE: ICD-10-CM

## 2025-06-22 DIAGNOSIS — Z91.81 AT RISK FOR FALLS: ICD-10-CM

## 2025-06-22 DIAGNOSIS — D69.6 THROMBOCYTOPENIA: ICD-10-CM

## 2025-06-22 DIAGNOSIS — S42.002A CLOSED NONDISPLACED FRACTURE OF LEFT CLAVICLE, UNSPECIFIED PART OF CLAVICLE, INITIAL ENCOUNTER: ICD-10-CM

## 2025-06-22 DIAGNOSIS — J96.90 RESPIRATORY FAILURE, UNSPECIFIED CHRONICITY, UNSPECIFIED WHETHER WITH HYPOXIA OR HYPERCAPNIA: ICD-10-CM

## 2025-06-22 DIAGNOSIS — J44.9 CHRONIC OBSTRUCTIVE PULMONARY DISEASE, UNSPECIFIED COPD TYPE (MULTI): Primary | ICD-10-CM

## 2025-06-22 DIAGNOSIS — I10 HYPERTENSION, UNSPECIFIED TYPE: ICD-10-CM

## 2025-06-22 DIAGNOSIS — J15.9 BACTERIAL PNEUMONIA: ICD-10-CM

## 2025-06-22 PROCEDURE — 99309 SBSQ NF CARE MODERATE MDM 30: CPT | Performed by: INTERNAL MEDICINE

## 2025-06-22 NOTE — LETTER
Patient: Subha Miller  : 1960    Encounter Date: 2025    PLACE OF SERVICE:  [facility]    This is a subsequent visit.    Subjective  Patient ID: Subha Miller is a 65 y.o. female who presents for Follow-up.    Ms. Jesi Miller is a 65-year-old female with history of COPD with pneumonia.  She has suffered from respiratory failure.  She is unable to care for herself and requires supportive care.    Review of Systems   Constitutional:  Negative for chills and fever.   Cardiovascular:  Negative for chest pain.   All other systems reviewed and are negative.    Objective  /82   Pulse 84   Temp 36.8 °C (98.2 °F)   Resp 18     Physical Exam  Vitals reviewed.   Constitutional:       General: She is not in acute distress.     Comments: This is a well-developed, well-nourished female, sitting in a chair   HENT:      Right Ear: Tympanic membrane, ear canal and external ear normal.      Left Ear: Tympanic membrane, ear canal and external ear normal.   Eyes:      General: No scleral icterus.     Pupils: Pupils are equal, round, and reactive to light.   Neck:      Vascular: No carotid bruit.   Cardiovascular:      Heart sounds: Normal heart sounds, S1 normal and S2 normal. No murmur heard.     No friction rub.   Pulmonary:      Effort: Pulmonary effort is normal.      Breath sounds: Decreased breath sounds (throughout.) present.   Abdominal:      Palpations: There is no hepatomegaly, splenomegaly or mass.   Musculoskeletal:         General: No swelling or deformity. Normal range of motion.      Cervical back: Neck supple.      Right lower leg: No edema.      Left lower leg: No edema.      Comments: The patient is wearing a sling to her left upper extremity.   Lymphadenopathy:      Cervical: No cervical adenopathy.      Upper Body:      Right upper body: No axillary adenopathy.      Left upper body: No axillary adenopathy.      Lower Body: No right inguinal adenopathy. No left inguinal  adenopathy.   Neurological:      Mental Status: She is oriented to person, place, and time.      Cranial Nerves: Cranial nerves 2-12 are intact. No cranial nerve deficit.      Sensory: No sensory deficit.      Motor: Motor function is intact. No weakness.      Gait: Gait is intact.      Deep Tendon Reflexes: Reflexes normal.   Psychiatric:         Mood and Affect: Mood normal. Mood is not anxious or depressed. Affect is not angry.         Behavior: Behavior is not agitated.         Thought Content: Thought content normal.         Judgment: Judgment normal.     LAB WORK: Laboratory studies reviewed.    Assessment/Plan  Problem List Items Addressed This Visit           ICD-10-CM    COPD (chronic obstructive pulmonary disease) (Multi) - Primary J44.9    Hypertension I10    Bacterial pneumonia J15.9    Thrombocytopenia D69.6    Tobacco dependence F17.200     Other Visit Diagnoses         Codes      Closed nondisplaced fracture of left clavicle, unspecified part of clavicle, initial encounter     S42.002A      Respiratory failure, unspecified chronicity, unspecified whether with hypoxia or hypercapnia     J96.90      Aortic valve stenosis, etiology of cardiac valve disease unspecified     I35.0      Weakness     R53.1      At risk for falls     Z91.81        1. COPD, on bronchodilator therapy.  2. Left clavicle fracture, wearing sling, follow with Orthopedics.  3. Respiratory failure, use oxygen as needed.  4. Hypertension, med controlled.  5. Recent pneumonia, has finished antibiotic.  6. Thrombocytopenia, monitor platelet level.  7. Aortic valve stenosis, follow with Cardiology.  8. Tobacco dependence, on nicotine patch.  9. Weakness, on PT/OT.  10. Fall risk, fall precautions.    Scribe Attestation  By signing my name below, Trish NEAL Scribe attest that this documentation has been prepared under the direction and in the presence of Amina Block MD.     All medical record entries made by the scribe were  personally dictated by me I have reviewed the chart and agree the record accurately reflects my personal performance of his history physical examination and management      Electronically Signed By: Amina Block MD   6/27/25 10:08 PM

## 2025-06-23 VITALS
SYSTOLIC BLOOD PRESSURE: 128 MMHG | RESPIRATION RATE: 18 BRPM | HEART RATE: 80 BPM | DIASTOLIC BLOOD PRESSURE: 82 MMHG | TEMPERATURE: 98.2 F

## 2025-06-23 ASSESSMENT — ENCOUNTER SYMPTOMS
CHILLS: 0
FEVER: 0

## 2025-06-23 NOTE — PROGRESS NOTES
PLACE OF SERVICE:  [facility]    This is a subsequent visit.    Subjective   Patient ID: Subha Miller is a 65 y.o. female who presents for Follow-up.    Ms. Jesi Miller is a 65-year-old female with history of COPD with respiratory failure.  She has been treated for recent pneumonia.  She is unable to care for herself and requires supportive care.    Review of Systems   Constitutional:  Negative for chills and fever.   Cardiovascular:  Negative for chest pain.   All other systems reviewed and are negative.    Objective   /82   Pulse 80   Temp 36.8 °C (98.2 °F)   Resp 18     Physical Exam  Vitals reviewed.   Constitutional:       Comments: This is a well-developed, well-nourished female, lying in bed, appearing weak.   HENT:      Right Ear: Tympanic membrane, ear canal and external ear normal.      Left Ear: Tympanic membrane, ear canal and external ear normal.   Eyes:      General: No scleral icterus.     Pupils: Pupils are equal, round, and reactive to light.   Neck:      Vascular: No carotid bruit.   Cardiovascular:      Heart sounds: Normal heart sounds, S1 normal and S2 normal. No murmur heard.     No friction rub.   Pulmonary:      Breath sounds: Decreased breath sounds (throughout) present.   Abdominal:      Palpations: There is no hepatomegaly, splenomegaly or mass.   Musculoskeletal:         General: No swelling or deformity. Normal range of motion.      Cervical back: Neck supple.      Right lower leg: No edema.      Left lower leg: No edema.   Lymphadenopathy:      Cervical: No cervical adenopathy.      Upper Body:      Right upper body: No axillary adenopathy.      Left upper body: No axillary adenopathy.      Lower Body: No right inguinal adenopathy. No left inguinal adenopathy.   Neurological:      Mental Status: She is oriented to person, place, and time. She is lethargic.      Cranial Nerves: Cranial nerves 2-12 are intact. No cranial nerve deficit.      Sensory: No  sensory deficit.      Motor: Motor function is intact. No weakness.      Gait: Gait is intact.      Deep Tendon Reflexes: Reflexes normal.   Psychiatric:         Mood and Affect: Mood normal. Mood is not anxious or depressed. Affect is not angry.         Behavior: Behavior is not agitated.         Thought Content: Thought content normal.         Judgment: Judgment normal.     LAB WORK: Laboratory studies reviewed.    Assessment/Plan   Problem List Items Addressed This Visit           ICD-10-CM    COPD (chronic obstructive pulmonary disease) (Multi) - Primary J44.9    Hypertension I10    Bacterial pneumonia J15.9    Thrombocytopenia D69.6    Tobacco dependence F17.200     Other Visit Diagnoses         Codes      Respiratory failure, unspecified chronicity, unspecified whether with hypoxia or hypercapnia     J96.90      Closed nondisplaced fracture of left clavicle, unspecified part of clavicle, initial encounter     S42.002A      Aortic valve stenosis, etiology of cardiac valve disease unspecified     I35.0      Weakness     R53.1      At risk for falls     Z91.81        1. COPD, on bronchodilator therapy.  2. Respiratory failure, on oxygen as needed.  3. Recent pneumonia, has finished antibiotic.  4. Tobacco dependence, on nicotine patch.  5. Hypertension, med controlled.  6. Thrombocytopenia, monitor platelet level.  7. Left clavicle fracture, wearing sling, follow with Orthopedics.  8. Aortic valve stenosis, follow with Cardiology.  9. Weakness, on PT/OT.  10. Fall risk, fall precautions.    Scribe Attestation  By signing my name below, ITrish Scribe attest that this documentation has been prepared under the direction and in the presence of Amina Block MD.     All medical record entries made by the scribe were personally dictated by me I have reviewed the chart and agree the record accurately reflects my personal performance of his history physical examination and management

## 2025-06-25 VITALS
TEMPERATURE: 98.2 F | DIASTOLIC BLOOD PRESSURE: 82 MMHG | RESPIRATION RATE: 18 BRPM | SYSTOLIC BLOOD PRESSURE: 128 MMHG | HEART RATE: 84 BPM

## 2025-06-25 ASSESSMENT — ENCOUNTER SYMPTOMS
FEVER: 0
CHILLS: 0

## 2025-06-26 NOTE — PROGRESS NOTES
PLACE OF SERVICE:  [facility]    This is a subsequent visit.    Subjective   Patient ID: Subha Miller is a 65 y.o. female who presents for Follow-up.    Ms. Jesi Miller is a 65-year-old female with history of COPD with pneumonia.  She has suffered from respiratory failure.  She is unable to care for herself and requires supportive care.    Review of Systems   Constitutional:  Negative for chills and fever.   Cardiovascular:  Negative for chest pain.   All other systems reviewed and are negative.    Objective   /82   Pulse 84   Temp 36.8 °C (98.2 °F)   Resp 18     Physical Exam  Vitals reviewed.   Constitutional:       General: She is not in acute distress.     Comments: This is a well-developed, well-nourished female, sitting in a chair   HENT:      Right Ear: Tympanic membrane, ear canal and external ear normal.      Left Ear: Tympanic membrane, ear canal and external ear normal.   Eyes:      General: No scleral icterus.     Pupils: Pupils are equal, round, and reactive to light.   Neck:      Vascular: No carotid bruit.   Cardiovascular:      Heart sounds: Normal heart sounds, S1 normal and S2 normal. No murmur heard.     No friction rub.   Pulmonary:      Effort: Pulmonary effort is normal.      Breath sounds: Decreased breath sounds (throughout.) present.   Abdominal:      Palpations: There is no hepatomegaly, splenomegaly or mass.   Musculoskeletal:         General: No swelling or deformity. Normal range of motion.      Cervical back: Neck supple.      Right lower leg: No edema.      Left lower leg: No edema.      Comments: The patient is wearing a sling to her left upper extremity.   Lymphadenopathy:      Cervical: No cervical adenopathy.      Upper Body:      Right upper body: No axillary adenopathy.      Left upper body: No axillary adenopathy.      Lower Body: No right inguinal adenopathy. No left inguinal adenopathy.   Neurological:      Mental Status: She is oriented to  person, place, and time.      Cranial Nerves: Cranial nerves 2-12 are intact. No cranial nerve deficit.      Sensory: No sensory deficit.      Motor: Motor function is intact. No weakness.      Gait: Gait is intact.      Deep Tendon Reflexes: Reflexes normal.   Psychiatric:         Mood and Affect: Mood normal. Mood is not anxious or depressed. Affect is not angry.         Behavior: Behavior is not agitated.         Thought Content: Thought content normal.         Judgment: Judgment normal.     LAB WORK: Laboratory studies reviewed.    Assessment/Plan   Problem List Items Addressed This Visit           ICD-10-CM    COPD (chronic obstructive pulmonary disease) (Multi) - Primary J44.9    Hypertension I10    Bacterial pneumonia J15.9    Thrombocytopenia D69.6    Tobacco dependence F17.200     Other Visit Diagnoses         Codes      Closed nondisplaced fracture of left clavicle, unspecified part of clavicle, initial encounter     S42.002A      Respiratory failure, unspecified chronicity, unspecified whether with hypoxia or hypercapnia     J96.90      Aortic valve stenosis, etiology of cardiac valve disease unspecified     I35.0      Weakness     R53.1      At risk for falls     Z91.81        1. COPD, on bronchodilator therapy.  2. Left clavicle fracture, wearing sling, follow with Orthopedics.  3. Respiratory failure, use oxygen as needed.  4. Hypertension, med controlled.  5. Recent pneumonia, has finished antibiotic.  6. Thrombocytopenia, monitor platelet level.  7. Aortic valve stenosis, follow with Cardiology.  8. Tobacco dependence, on nicotine patch.  9. Weakness, on PT/OT.  10. Fall risk, fall precautions.    Scribe Attestation  By signing my name below, ITrish Scribe attest that this documentation has been prepared under the direction and in the presence of Amina Block MD.     All medical record entries made by the scribsarwat were personally dictated by me I have reviewed the chart and agree the record  accurately reflects my personal performance of his history physical examination and management

## 2025-07-05 ENCOUNTER — NURSING HOME VISIT (OUTPATIENT)
Dept: POST ACUTE CARE | Facility: EXTERNAL LOCATION | Age: 65
End: 2025-07-05
Payer: COMMERCIAL

## 2025-07-05 DIAGNOSIS — S42.002A CLOSED NONDISPLACED FRACTURE OF LEFT CLAVICLE, UNSPECIFIED PART OF CLAVICLE, INITIAL ENCOUNTER: ICD-10-CM

## 2025-07-05 DIAGNOSIS — I10 HYPERTENSION, UNSPECIFIED TYPE: ICD-10-CM

## 2025-07-05 DIAGNOSIS — I35.0 AORTIC VALVE STENOSIS, ETIOLOGY OF CARDIAC VALVE DISEASE UNSPECIFIED: ICD-10-CM

## 2025-07-05 DIAGNOSIS — D69.6 THROMBOCYTOPENIA: ICD-10-CM

## 2025-07-05 DIAGNOSIS — F17.200 TOBACCO DEPENDENCE: ICD-10-CM

## 2025-07-05 DIAGNOSIS — J44.9 CHRONIC OBSTRUCTIVE PULMONARY DISEASE, UNSPECIFIED COPD TYPE (MULTI): Primary | ICD-10-CM

## 2025-07-05 DIAGNOSIS — J96.90 RESPIRATORY FAILURE, UNSPECIFIED CHRONICITY, UNSPECIFIED WHETHER WITH HYPOXIA OR HYPERCAPNIA: ICD-10-CM

## 2025-07-05 DIAGNOSIS — J18.9 PNEUMONIA DUE TO INFECTIOUS ORGANISM, UNSPECIFIED LATERALITY, UNSPECIFIED PART OF LUNG: ICD-10-CM

## 2025-07-05 DIAGNOSIS — R53.1 WEAKNESS: ICD-10-CM

## 2025-07-05 DIAGNOSIS — Z91.81 AT RISK FOR FALLS: ICD-10-CM

## 2025-07-05 NOTE — LETTER
Patient: Subha Miller  : 1960    Encounter Date: 2025    PLACE OF SERVICE:  [facility]    This is a subsequent visit.    Subjective  Patient ID: Subha Miller is a 65 y.o. female who presents for Follow-up.    Ms. Jesi Miller is a 65-year-old female with history of COPD with pneumonia.  She has had recent left clavicle fracture and does wear a sling.  She is unable to care for herself and requires supportive care.    Review of Systems   Constitutional:  Negative for chills and fever.   Cardiovascular:  Negative for chest pain.   All other systems reviewed and are negative.    Objective  /80   Pulse 82   Temp 36.7 °C (98.1 °F)   Resp 18     Physical Exam  Vitals reviewed.   Constitutional:       General: She is not in acute distress.     Comments: This is a well-developed, well-nourished female, sitting in a chair   HENT:      Right Ear: Tympanic membrane, ear canal and external ear normal.      Left Ear: Tympanic membrane, ear canal and external ear normal.   Eyes:      General: No scleral icterus.     Pupils: Pupils are equal, round, and reactive to light.   Neck:      Vascular: No carotid bruit.   Cardiovascular:      Heart sounds: Normal heart sounds, S1 normal and S2 normal. No murmur heard.     No friction rub.   Pulmonary:      Effort: Pulmonary effort is normal.      Breath sounds: Decreased breath sounds (throughout.) present.   Abdominal:      Palpations: There is no hepatomegaly, splenomegaly or mass.   Musculoskeletal:         General: No swelling or deformity. Normal range of motion.      Cervical back: Neck supple.      Right lower leg: No edema.      Left lower leg: No edema.      Comments: The patient is wearing a sling to her left upper extremity.   Lymphadenopathy:      Cervical: No cervical adenopathy.      Upper Body:      Right upper body: No axillary adenopathy.      Left upper body: No axillary adenopathy.      Lower Body: No right inguinal  adenopathy. No left inguinal adenopathy.   Neurological:      Mental Status: She is oriented to person, place, and time.      Cranial Nerves: Cranial nerves 2-12 are intact. No cranial nerve deficit.      Sensory: No sensory deficit.      Motor: Motor function is intact. No weakness.      Gait: Gait is intact.      Deep Tendon Reflexes: Reflexes normal.   Psychiatric:         Mood and Affect: Mood normal. Mood is not anxious or depressed. Affect is not angry.         Behavior: Behavior is not agitated.         Thought Content: Thought content normal.         Judgment: Judgment normal.     LAB WORK:  Laboratory studies were reviewed.    Assessment/Plan  Problem List Items Addressed This Visit           ICD-10-CM    COPD (chronic obstructive pulmonary disease) (Multi) - Primary J44.9    Hypertension I10    Thrombocytopenia D69.6    Tobacco dependence F17.200     Other Visit Diagnoses         Codes      Pneumonia due to infectious organism, unspecified laterality, unspecified part of lung     J18.9      Respiratory failure, unspecified chronicity, unspecified whether with hypoxia or hypercapnia     J96.90      Closed nondisplaced fracture of left clavicle, unspecified part of clavicle, initial encounter     S42.002A      Aortic valve stenosis, etiology of cardiac valve disease unspecified     I35.0      Weakness     R53.1      At risk for falls     Z91.81        1. COPD, on bronchodilator therapy.  2. Pneumonia.  Finish antibiotic.  3. Respiratory failure, on oxygen as needed.  4. Left clavicle fracture.  Wearing sling.  Follow with Orthopedics.  5. Thrombocytopenia.  Monitor platelet level.  6. Hypertension, medically controlled.  8. Aortic valve stenosis. Follow with Cardiology.  9. Tobacco dependency, on nicotine patch.  10. Weakness, on PT/OT.  11. Fall risk, on fall precautions.    Scribe Attestation  By signing my name below, ITrish, Scrjonathan attest that this documentation has been prepared under the  direction and in the presence of Amina Block MD.     All medical record entries made by the scribe were personally dictated by me I have reviewed the chart and agree the record accurately reflects my personal performance of his history physical examination and management      Electronically Signed By: Amina Block MD   7/12/25  1:18 AM

## 2025-07-08 VITALS
RESPIRATION RATE: 18 BRPM | SYSTOLIC BLOOD PRESSURE: 128 MMHG | DIASTOLIC BLOOD PRESSURE: 80 MMHG | TEMPERATURE: 98.1 F | HEART RATE: 82 BPM

## 2025-07-08 ASSESSMENT — ENCOUNTER SYMPTOMS
FEVER: 0
CHILLS: 0

## 2025-07-09 NOTE — PROGRESS NOTES
PLACE OF SERVICE:  [facility]    This is a subsequent visit.    Subjective   Patient ID: Subha Miller is a 65 y.o. female who presents for Follow-up.    Ms. Jesi Miller is a 65-year-old female with history of COPD with pneumonia.  She has had recent left clavicle fracture and does wear a sling.  She is unable to care for herself and requires supportive care.    Review of Systems   Constitutional:  Negative for chills and fever.   Cardiovascular:  Negative for chest pain.   All other systems reviewed and are negative.    Objective   /80   Pulse 82   Temp 36.7 °C (98.1 °F)   Resp 18     Physical Exam  Vitals reviewed.   Constitutional:       General: She is not in acute distress.     Comments: This is a well-developed, well-nourished female, sitting in a chair   HENT:      Right Ear: Tympanic membrane, ear canal and external ear normal.      Left Ear: Tympanic membrane, ear canal and external ear normal.   Eyes:      General: No scleral icterus.     Pupils: Pupils are equal, round, and reactive to light.   Neck:      Vascular: No carotid bruit.   Cardiovascular:      Heart sounds: Normal heart sounds, S1 normal and S2 normal. No murmur heard.     No friction rub.   Pulmonary:      Effort: Pulmonary effort is normal.      Breath sounds: Decreased breath sounds (throughout.) present.   Abdominal:      Palpations: There is no hepatomegaly, splenomegaly or mass.   Musculoskeletal:         General: No swelling or deformity. Normal range of motion.      Cervical back: Neck supple.      Right lower leg: No edema.      Left lower leg: No edema.      Comments: The patient is wearing a sling to her left upper extremity.   Lymphadenopathy:      Cervical: No cervical adenopathy.      Upper Body:      Right upper body: No axillary adenopathy.      Left upper body: No axillary adenopathy.      Lower Body: No right inguinal adenopathy. No left inguinal adenopathy.   Neurological:      Mental  Status: She is oriented to person, place, and time.      Cranial Nerves: Cranial nerves 2-12 are intact. No cranial nerve deficit.      Sensory: No sensory deficit.      Motor: Motor function is intact. No weakness.      Gait: Gait is intact.      Deep Tendon Reflexes: Reflexes normal.   Psychiatric:         Mood and Affect: Mood normal. Mood is not anxious or depressed. Affect is not angry.         Behavior: Behavior is not agitated.         Thought Content: Thought content normal.         Judgment: Judgment normal.     LAB WORK:  Laboratory studies were reviewed.    Assessment/Plan   Problem List Items Addressed This Visit           ICD-10-CM    COPD (chronic obstructive pulmonary disease) (Multi) - Primary J44.9    Hypertension I10    Thrombocytopenia D69.6    Tobacco dependence F17.200     Other Visit Diagnoses         Codes      Pneumonia due to infectious organism, unspecified laterality, unspecified part of lung     J18.9      Respiratory failure, unspecified chronicity, unspecified whether with hypoxia or hypercapnia     J96.90      Closed nondisplaced fracture of left clavicle, unspecified part of clavicle, initial encounter     S42.002A      Aortic valve stenosis, etiology of cardiac valve disease unspecified     I35.0      Weakness     R53.1      At risk for falls     Z91.81        1. COPD, on bronchodilator therapy.  2. Pneumonia.  Finish antibiotic.  3. Respiratory failure, on oxygen as needed.  4. Left clavicle fracture.  Wearing sling.  Follow with Orthopedics.  5. Thrombocytopenia.  Monitor platelet level.  6. Hypertension, medically controlled.  8. Aortic valve stenosis. Follow with Cardiology.  9. Tobacco dependency, on nicotine patch.  10. Weakness, on PT/OT.  11. Fall risk, on fall precautions.    Scribe Attestation  By signing my name below, ITrish, Scrjonathan attest that this documentation has been prepared under the direction and in the presence of Amina Block MD.     All medical record  entries made by the scribe were personally dictated by me I have reviewed the chart and agree the record accurately reflects my personal performance of his history physical examination and management

## (undated) DEVICE — COVER, EQUIPMENT, ZERO GRAVITY

## (undated) DEVICE — KIT, NAMIC STANDARD LEFT HEART, CUSTOM, LWMC

## (undated) DEVICE — TR BAND, RADIAL COMPRESSION, STANDARD, 24CM

## (undated) DEVICE — POSITIONER, RETENTION SYSTEM, PANNUS, 2 PADS 1 STRAP, NS

## (undated) DEVICE — CATHETER, DIAG, EXPO, 5FR 110CM, PIG

## (undated) DEVICE — INTRODUCER SHEATH, GLIDESHEATH, 6FR 25CM

## (undated) DEVICE — GUIDEWIRE, J TIP, 3 MM, 0.035 IN X 260 CM, PTFE

## (undated) DEVICE — CATHETER, OPTITORQUE, 5FR, TIG, 1H/100CM

## (undated) DEVICE — PACK, ANGIO P2, CUSTOM, LAKE